# Patient Record
Sex: MALE | Race: BLACK OR AFRICAN AMERICAN | Employment: OTHER | URBAN - METROPOLITAN AREA
[De-identification: names, ages, dates, MRNs, and addresses within clinical notes are randomized per-mention and may not be internally consistent; named-entity substitution may affect disease eponyms.]

---

## 2017-06-01 PROBLEM — F17.200 TOBACCO USE DISORDER: Status: ACTIVE | Noted: 2017-06-01

## 2017-06-01 PROBLEM — I42.9 CARDIOMYOPATHY (HCC): Status: ACTIVE | Noted: 2017-06-01

## 2017-06-01 PROBLEM — I10 HYPERTENSION: Status: ACTIVE | Noted: 2017-06-01

## 2017-06-01 PROBLEM — I48.92 ATRIAL FLUTTER (HCC): Status: ACTIVE | Noted: 2017-06-01

## 2017-06-01 PROBLEM — K27.9 PEPTIC ULCER DISEASE: Status: ACTIVE | Noted: 2017-06-01

## 2017-06-01 PROBLEM — I26.99 PULMONARY EMBOLUS (HCC): Status: ACTIVE | Noted: 2017-06-01

## 2017-06-22 PROBLEM — Z86.79 HISTORY OF CARDIOMYOPATHY: Status: ACTIVE | Noted: 2017-06-22

## 2017-06-22 PROBLEM — Z86.711 HISTORY OF PULMONARY EMBOLISM: Status: ACTIVE | Noted: 2017-06-22

## 2018-07-23 ENCOUNTER — APPOINTMENT (OUTPATIENT)
Dept: GENERAL RADIOLOGY | Age: 78
DRG: 291 | End: 2018-07-23
Attending: EMERGENCY MEDICINE
Payer: MEDICARE

## 2018-07-23 ENCOUNTER — HOSPITAL ENCOUNTER (INPATIENT)
Age: 78
LOS: 6 days | Discharge: HOME | DRG: 291 | End: 2018-07-29
Attending: EMERGENCY MEDICINE | Admitting: INTERNAL MEDICINE
Payer: MEDICARE

## 2018-07-23 DIAGNOSIS — J96.21 ACUTE ON CHRONIC RESPIRATORY FAILURE WITH HYPOXIA AND HYPERCAPNIA (HCC): ICD-10-CM

## 2018-07-23 DIAGNOSIS — J44.1 COPD EXACERBATION (HCC): Primary | ICD-10-CM

## 2018-07-23 DIAGNOSIS — A31.9 MYCOBACTERIAL PNEUMONIA (HCC): ICD-10-CM

## 2018-07-23 DIAGNOSIS — J15.8 MYCOBACTERIAL PNEUMONIA (HCC): ICD-10-CM

## 2018-07-23 DIAGNOSIS — A31.0 MYCOBACTERIUM AVIUM COMPLEX (HCC): ICD-10-CM

## 2018-07-23 DIAGNOSIS — I50.43 ACUTE ON CHRONIC COMBINED SYSTOLIC AND DIASTOLIC CONGESTIVE HEART FAILURE (HCC): ICD-10-CM

## 2018-07-23 DIAGNOSIS — I50.41 ACUTE COMBINED SYSTOLIC AND DIASTOLIC CONGESTIVE HEART FAILURE (HCC): ICD-10-CM

## 2018-07-23 DIAGNOSIS — J96.22 ACUTE ON CHRONIC RESPIRATORY FAILURE WITH HYPOXIA AND HYPERCAPNIA (HCC): ICD-10-CM

## 2018-07-23 DIAGNOSIS — I50.9 ACUTE ON CHRONIC CONGESTIVE HEART FAILURE, UNSPECIFIED HEART FAILURE TYPE (HCC): ICD-10-CM

## 2018-07-23 DIAGNOSIS — I48.19 PERSISTENT ATRIAL FIBRILLATION (HCC): ICD-10-CM

## 2018-07-23 DIAGNOSIS — I50.9 ACUTE CONGESTIVE HEART FAILURE, UNSPECIFIED HEART FAILURE TYPE (HCC): ICD-10-CM

## 2018-07-23 DIAGNOSIS — J96.20 ACUTE ON CHRONIC RESPIRATORY FAILURE, UNSPECIFIED WHETHER WITH HYPOXIA OR HYPERCAPNIA (HCC): ICD-10-CM

## 2018-07-23 DIAGNOSIS — I48.91 ATRIAL FIBRILLATION, UNSPECIFIED TYPE (HCC): ICD-10-CM

## 2018-07-23 DIAGNOSIS — J44.1 CHRONIC OBSTRUCTIVE PULMONARY DISEASE WITH ACUTE EXACERBATION (HCC): ICD-10-CM

## 2018-07-23 DIAGNOSIS — I48.0 PAROXYSMAL A-FIB (HCC): ICD-10-CM

## 2018-07-23 LAB
ALBUMIN SERPL-MCNC: 2.8 G/DL (ref 3.2–4.6)
ALBUMIN/GLOB SERPL: 0.6 {RATIO} (ref 1.2–3.5)
ALP SERPL-CCNC: 110 U/L (ref 50–136)
ALT SERPL-CCNC: 62 U/L (ref 12–65)
ANION GAP SERPL CALC-SCNC: 8 MMOL/L (ref 7–16)
APTT PPP: 73.9 SEC (ref 23.2–35.3)
ARTERIAL PATENCY WRIST A: POSITIVE
AST SERPL-CCNC: 41 U/L (ref 15–37)
ATRIAL RATE: 120 BPM
ATRIAL RATE: 202 BPM
BASE EXCESS BLDA CALC-SCNC: 0.1 MMOL/L (ref 0–3)
BASOPHILS # BLD: 0 K/UL (ref 0–0.2)
BASOPHILS NFR BLD: 0 % (ref 0–2)
BDY SITE: ABNORMAL
BILIRUB SERPL-MCNC: 0.2 MG/DL (ref 0.2–1.1)
BNP SERPL-MCNC: 932 PG/ML
BUN SERPL-MCNC: 12 MG/DL (ref 8–23)
CALCIUM SERPL-MCNC: 8.5 MG/DL (ref 8.3–10.4)
CALCULATED P AXIS, ECG09: 85 DEGREES
CALCULATED R AXIS, ECG10: 70 DEGREES
CALCULATED R AXIS, ECG10: 72 DEGREES
CALCULATED T AXIS, ECG11: -147 DEGREES
CALCULATED T AXIS, ECG11: 90 DEGREES
CHLORIDE SERPL-SCNC: 108 MMOL/L (ref 98–107)
CO2 SERPL-SCNC: 28 MMOL/L (ref 21–32)
COHGB MFR BLD: 0.3 % (ref 0.5–1.5)
CREAT SERPL-MCNC: 1.45 MG/DL (ref 0.8–1.5)
DIAGNOSIS, 93000: NORMAL
DIAGNOSIS, 93000: NORMAL
DIFFERENTIAL METHOD BLD: ABNORMAL
DO-HGB BLD-MCNC: 1 % (ref 0–5)
EOSINOPHIL # BLD: 0.2 K/UL (ref 0–0.8)
EOSINOPHIL NFR BLD: 3 % (ref 0.5–7.8)
ERYTHROCYTE [DISTWIDTH] IN BLOOD BY AUTOMATED COUNT: 20 % (ref 11.9–14.6)
GAS FLOW.O2 O2 DELIVERY SYS: 15 L/MIN
GLOBULIN SER CALC-MCNC: 4.7 G/DL (ref 2.3–3.5)
GLUCOSE SERPL-MCNC: 114 MG/DL (ref 65–100)
HCO3 BLDA-SCNC: 27 MMOL/L (ref 22–26)
HCT VFR BLD AUTO: 38.4 % (ref 41.1–50.3)
HGB BLD-MCNC: 11.7 G/DL (ref 13.6–17.2)
HGB BLDMV-MCNC: 12.3 GM/DL (ref 11.7–15)
IMM GRANULOCYTES # BLD: 0 K/UL (ref 0–0.5)
IMM GRANULOCYTES NFR BLD AUTO: 0 % (ref 0–5)
LACTATE BLD-SCNC: 0.8 MMOL/L (ref 0.5–1.9)
LYMPHOCYTES # BLD: 1.7 K/UL (ref 0.5–4.6)
LYMPHOCYTES NFR BLD: 29 % (ref 13–44)
MAGNESIUM SERPL-MCNC: 2.1 MG/DL (ref 1.8–2.4)
MCH RBC QN AUTO: 24.7 PG (ref 26.1–32.9)
MCHC RBC AUTO-ENTMCNC: 30.5 G/DL (ref 31.4–35)
MCV RBC AUTO: 81 FL (ref 79.6–97.8)
METHGB MFR BLD: 0.5 % (ref 0–1.5)
MONOCYTES # BLD: 0.5 K/UL (ref 0.1–1.3)
MONOCYTES NFR BLD: 9 % (ref 4–12)
NEUTS SEG # BLD: 3.5 K/UL (ref 1.7–8.2)
NEUTS SEG NFR BLD: 59 % (ref 43–78)
OXYHGB MFR BLDA: 98.2 % (ref 94–97)
P-R INTERVAL, ECG05: 138 MS
PCO2 BLDA: 56 MMHG (ref 35–45)
PH BLDA: 7.31 [PH] (ref 7.35–7.45)
PLATELET # BLD AUTO: 353 K/UL (ref 150–450)
PMV BLD AUTO: 9.9 FL (ref 10.8–14.1)
PO2 BLDA: 209 MMHG (ref 80–105)
POTASSIUM SERPL-SCNC: 4.4 MMOL/L (ref 3.5–5.1)
PROCALCITONIN SERPL-MCNC: 0.2 NG/ML
PROT SERPL-MCNC: 7.5 G/DL (ref 6.3–8.2)
Q-T INTERVAL, ECG07: 256 MS
Q-T INTERVAL, ECG07: 304 MS
QRS DURATION, ECG06: 74 MS
QRS DURATION, ECG06: 80 MS
QTC CALCULATION (BEZET), ECG08: 405 MS
QTC CALCULATION (BEZET), ECG08: 429 MS
RBC # BLD AUTO: 4.74 M/UL (ref 4.23–5.67)
SAO2 % BLD: 99 % (ref 92–98.5)
SODIUM SERPL-SCNC: 144 MMOL/L (ref 136–145)
TROPONIN I SERPL-MCNC: 0.06 NG/ML (ref 0.02–0.05)
VENTILATION MODE VENT: ABNORMAL
VENTRICULAR RATE, ECG03: 120 BPM
VENTRICULAR RATE, ECG03: 151 BPM
WBC # BLD AUTO: 5.8 K/UL (ref 4.3–11.1)

## 2018-07-23 PROCEDURE — 80053 COMPREHEN METABOLIC PANEL: CPT | Performed by: EMERGENCY MEDICINE

## 2018-07-23 PROCEDURE — 74011250637 HC RX REV CODE- 250/637: Performed by: INTERNAL MEDICINE

## 2018-07-23 PROCEDURE — 93005 ELECTROCARDIOGRAM TRACING: CPT | Performed by: EMERGENCY MEDICINE

## 2018-07-23 PROCEDURE — 74011000250 HC RX REV CODE- 250: Performed by: INTERNAL MEDICINE

## 2018-07-23 PROCEDURE — 94644 CONT INHLJ TX 1ST HOUR: CPT

## 2018-07-23 PROCEDURE — 87040 BLOOD CULTURE FOR BACTERIA: CPT | Performed by: EMERGENCY MEDICINE

## 2018-07-23 PROCEDURE — 85730 THROMBOPLASTIN TIME PARTIAL: CPT | Performed by: INTERNAL MEDICINE

## 2018-07-23 PROCEDURE — 99285 EMERGENCY DEPT VISIT HI MDM: CPT | Performed by: EMERGENCY MEDICINE

## 2018-07-23 PROCEDURE — 94660 CPAP INITIATION&MGMT: CPT

## 2018-07-23 PROCEDURE — 99223 1ST HOSP IP/OBS HIGH 75: CPT | Performed by: INTERNAL MEDICINE

## 2018-07-23 PROCEDURE — 83880 ASSAY OF NATRIURETIC PEPTIDE: CPT | Performed by: EMERGENCY MEDICINE

## 2018-07-23 PROCEDURE — 84484 ASSAY OF TROPONIN QUANT: CPT | Performed by: INTERNAL MEDICINE

## 2018-07-23 PROCEDURE — 83735 ASSAY OF MAGNESIUM: CPT | Performed by: EMERGENCY MEDICINE

## 2018-07-23 PROCEDURE — 94640 AIRWAY INHALATION TREATMENT: CPT

## 2018-07-23 PROCEDURE — 36600 WITHDRAWAL OF ARTERIAL BLOOD: CPT

## 2018-07-23 PROCEDURE — 74011000250 HC RX REV CODE- 250: Performed by: EMERGENCY MEDICINE

## 2018-07-23 PROCEDURE — 83605 ASSAY OF LACTIC ACID: CPT

## 2018-07-23 PROCEDURE — 71045 X-RAY EXAM CHEST 1 VIEW: CPT

## 2018-07-23 PROCEDURE — 84145 PROCALCITONIN (PCT): CPT | Performed by: EMERGENCY MEDICINE

## 2018-07-23 PROCEDURE — 74011250636 HC RX REV CODE- 250/636: Performed by: INTERNAL MEDICINE

## 2018-07-23 PROCEDURE — 65660000000 HC RM CCU STEPDOWN

## 2018-07-23 PROCEDURE — 77010033678 HC OXYGEN DAILY

## 2018-07-23 PROCEDURE — 74011000258 HC RX REV CODE- 258: Performed by: INTERNAL MEDICINE

## 2018-07-23 PROCEDURE — 93005 ELECTROCARDIOGRAM TRACING: CPT | Performed by: INTERNAL MEDICINE

## 2018-07-23 PROCEDURE — 96374 THER/PROPH/DIAG INJ IV PUSH: CPT | Performed by: EMERGENCY MEDICINE

## 2018-07-23 PROCEDURE — 74011250636 HC RX REV CODE- 250/636: Performed by: EMERGENCY MEDICINE

## 2018-07-23 PROCEDURE — 82803 BLOOD GASES ANY COMBINATION: CPT

## 2018-07-23 PROCEDURE — 96375 TX/PRO/DX INJ NEW DRUG ADDON: CPT | Performed by: EMERGENCY MEDICINE

## 2018-07-23 PROCEDURE — 85025 COMPLETE CBC W/AUTO DIFF WBC: CPT | Performed by: EMERGENCY MEDICINE

## 2018-07-23 PROCEDURE — 36415 COLL VENOUS BLD VENIPUNCTURE: CPT | Performed by: INTERNAL MEDICINE

## 2018-07-23 PROCEDURE — 74011250637 HC RX REV CODE- 250/637: Performed by: EMERGENCY MEDICINE

## 2018-07-23 PROCEDURE — 94760 N-INVAS EAR/PLS OXIMETRY 1: CPT

## 2018-07-23 RX ORDER — ETHAMBUTOL HYDROCHLORIDE 400 MG/1
2000 TABLET, FILM COATED ORAL
Status: CANCELLED | OUTPATIENT
Start: 2018-07-23

## 2018-07-23 RX ORDER — AZITHROMYCIN 500 MG/1
TABLET, FILM COATED ORAL
COMMUNITY
Start: 2018-06-01 | End: 2019-03-01 | Stop reason: ALTCHOICE

## 2018-07-23 RX ORDER — HEPARIN SODIUM 5000 [USP'U]/ML
5000 INJECTION, SOLUTION INTRAVENOUS; SUBCUTANEOUS EVERY 12 HOURS
Status: CANCELLED | OUTPATIENT
Start: 2018-07-23

## 2018-07-23 RX ORDER — FUROSEMIDE 10 MG/ML
40 INJECTION INTRAMUSCULAR; INTRAVENOUS DAILY
Status: DISCONTINUED | OUTPATIENT
Start: 2018-07-24 | End: 2018-07-27

## 2018-07-23 RX ORDER — FLUTICASONE PROPIONATE AND SALMETEROL 250; 50 UG/1; UG/1
1 POWDER RESPIRATORY (INHALATION)
COMMUNITY
Start: 2018-05-15 | End: 2020-01-08

## 2018-07-23 RX ORDER — ETHAMBUTOL HYDROCHLORIDE 400 MG/1
2000 TABLET, FILM COATED ORAL
Status: DISCONTINUED | OUTPATIENT
Start: 2018-07-23 | End: 2018-07-29 | Stop reason: HOSPADM

## 2018-07-23 RX ORDER — ALLOPURINOL 300 MG/1
300 TABLET ORAL DAILY
Status: DISCONTINUED | OUTPATIENT
Start: 2018-07-24 | End: 2018-07-29 | Stop reason: HOSPADM

## 2018-07-23 RX ORDER — LEVALBUTEROL INHALATION SOLUTION 0.63 MG/3ML
0.63 SOLUTION RESPIRATORY (INHALATION)
Status: DISCONTINUED | OUTPATIENT
Start: 2018-07-23 | End: 2018-07-29 | Stop reason: HOSPADM

## 2018-07-23 RX ORDER — NITROGLYCERIN 0.4 MG/1
0.4 TABLET SUBLINGUAL
Status: DISCONTINUED | OUTPATIENT
Start: 2018-07-23 | End: 2018-07-29 | Stop reason: HOSPADM

## 2018-07-23 RX ORDER — HEPARIN SODIUM 5000 [USP'U]/ML
5000 INJECTION, SOLUTION INTRAVENOUS; SUBCUTANEOUS ONCE
Status: COMPLETED | OUTPATIENT
Start: 2018-07-23 | End: 2018-07-23

## 2018-07-23 RX ORDER — DILTIAZEM HYDROCHLORIDE 5 MG/ML
10 INJECTION INTRAVENOUS ONCE
Status: COMPLETED | OUTPATIENT
Start: 2018-07-23 | End: 2018-07-23

## 2018-07-23 RX ORDER — BUDESONIDE 0.5 MG/2ML
500 INHALANT ORAL
Status: DISCONTINUED | OUTPATIENT
Start: 2018-07-23 | End: 2018-07-29 | Stop reason: HOSPADM

## 2018-07-23 RX ORDER — BRIMONIDINE TARTRATE 2 MG/ML
1 SOLUTION/ DROPS OPHTHALMIC 3 TIMES DAILY
Status: DISCONTINUED | OUTPATIENT
Start: 2018-07-23 | End: 2018-07-29 | Stop reason: HOSPADM

## 2018-07-23 RX ORDER — DILTIAZEM HYDROCHLORIDE 60 MG/1
60 TABLET, FILM COATED ORAL
Status: DISCONTINUED | OUTPATIENT
Start: 2018-07-23 | End: 2018-07-24

## 2018-07-23 RX ORDER — ALBUTEROL SULFATE 0.83 MG/ML
2.5 SOLUTION RESPIRATORY (INHALATION)
Status: CANCELLED | OUTPATIENT
Start: 2018-07-23

## 2018-07-23 RX ORDER — ASPIRIN 325 MG
325 TABLET ORAL DAILY
Status: CANCELLED | OUTPATIENT
Start: 2018-07-24

## 2018-07-23 RX ORDER — HEPARIN SODIUM 5000 [USP'U]/ML
35 INJECTION, SOLUTION INTRAVENOUS; SUBCUTANEOUS ONCE
Status: COMPLETED | OUTPATIENT
Start: 2018-07-23 | End: 2018-07-23

## 2018-07-23 RX ORDER — AMLODIPINE BESYLATE 10 MG/1
10 TABLET ORAL DAILY
Status: DISCONTINUED | OUTPATIENT
Start: 2018-07-24 | End: 2018-07-23

## 2018-07-23 RX ORDER — RIFAMPIN 300 MG/1
600 CAPSULE ORAL
Status: DISCONTINUED | OUTPATIENT
Start: 2018-07-23 | End: 2018-07-29 | Stop reason: HOSPADM

## 2018-07-23 RX ORDER — RIFAMPIN 300 MG/1
CAPSULE ORAL
COMMUNITY
Start: 2018-06-01 | End: 2019-05-15 | Stop reason: ALTCHOICE

## 2018-07-23 RX ORDER — PANTOPRAZOLE SODIUM 40 MG/1
40 TABLET, DELAYED RELEASE ORAL
Status: DISCONTINUED | OUTPATIENT
Start: 2018-07-24 | End: 2018-07-29 | Stop reason: HOSPADM

## 2018-07-23 RX ORDER — TAMSULOSIN HYDROCHLORIDE 0.4 MG/1
0.4 CAPSULE ORAL
Status: DISCONTINUED | OUTPATIENT
Start: 2018-07-23 | End: 2018-07-29 | Stop reason: HOSPADM

## 2018-07-23 RX ORDER — LATANOPROST 50 UG/ML
1 SOLUTION/ DROPS OPHTHALMIC
Status: DISCONTINUED | OUTPATIENT
Start: 2018-07-23 | End: 2018-07-29 | Stop reason: HOSPADM

## 2018-07-23 RX ORDER — DILTIAZEM HYDROCHLORIDE 5 MG/ML
10 INJECTION INTRAVENOUS
Status: COMPLETED | OUTPATIENT
Start: 2018-07-23 | End: 2018-07-23

## 2018-07-23 RX ORDER — FUROSEMIDE 10 MG/ML
40 INJECTION INTRAMUSCULAR; INTRAVENOUS
Status: COMPLETED | OUTPATIENT
Start: 2018-07-23 | End: 2018-07-23

## 2018-07-23 RX ORDER — ASPIRIN 81 MG/1
81 TABLET ORAL DAILY
Status: DISCONTINUED | OUTPATIENT
Start: 2018-07-24 | End: 2018-07-29 | Stop reason: HOSPADM

## 2018-07-23 RX ORDER — ETHAMBUTOL HYDROCHLORIDE 400 MG/1
TABLET, FILM COATED ORAL
COMMUNITY
Start: 2018-06-01 | End: 2019-01-16

## 2018-07-23 RX ORDER — HEPARIN SODIUM 5000 [USP'U]/100ML
12-25 INJECTION, SOLUTION INTRAVENOUS
Status: DISCONTINUED | OUTPATIENT
Start: 2018-07-23 | End: 2018-07-25

## 2018-07-23 RX ORDER — ALBUTEROL SULFATE 0.83 MG/ML
10 SOLUTION RESPIRATORY (INHALATION)
Status: COMPLETED | OUTPATIENT
Start: 2018-07-23 | End: 2018-07-23

## 2018-07-23 RX ORDER — AZITHROMYCIN 250 MG/1
500 TABLET, FILM COATED ORAL
Status: DISCONTINUED | OUTPATIENT
Start: 2018-07-23 | End: 2018-07-29 | Stop reason: HOSPADM

## 2018-07-23 RX ADMIN — HEPARIN SODIUM 2800 UNITS: 5000 INJECTION, SOLUTION INTRAVENOUS; SUBCUTANEOUS at 22:02

## 2018-07-23 RX ADMIN — HEPARIN SODIUM AND DEXTROSE 12 UNITS/KG/HR: 5000; 5 INJECTION INTRAVENOUS at 15:16

## 2018-07-23 RX ADMIN — DILTIAZEM HYDROCHLORIDE 60 MG: 60 TABLET, FILM COATED ORAL at 21:34

## 2018-07-23 RX ADMIN — BRIMONIDINE TARTRATE 1 DROP: 2 SOLUTION OPHTHALMIC at 22:03

## 2018-07-23 RX ADMIN — LEVALBUTEROL HYDROCHLORIDE 0.63 MG: 0.63 SOLUTION RESPIRATORY (INHALATION) at 20:06

## 2018-07-23 RX ADMIN — METHYLPREDNISOLONE SODIUM SUCCINATE 125 MG: 125 INJECTION, POWDER, FOR SOLUTION INTRAMUSCULAR; INTRAVENOUS at 10:47

## 2018-07-23 RX ADMIN — DILTIAZEM HYDROCHLORIDE 10 MG: 5 INJECTION INTRAVENOUS at 14:26

## 2018-07-23 RX ADMIN — METHYLPREDNISOLONE SODIUM SUCCINATE 40 MG: 40 INJECTION, POWDER, FOR SOLUTION INTRAMUSCULAR; INTRAVENOUS at 17:41

## 2018-07-23 RX ADMIN — RIFAMPIN 600 MG: 300 CAPSULE ORAL at 20:40

## 2018-07-23 RX ADMIN — ETHAMBUTOL HYDROCHLORIDE 2000 MG: 400 TABLET, FILM COATED ORAL at 20:39

## 2018-07-23 RX ADMIN — BUDESONIDE 500 MCG: 0.5 INHALANT RESPIRATORY (INHALATION) at 20:06

## 2018-07-23 RX ADMIN — HEPARIN SODIUM 5000 UNITS: 5000 INJECTION, SOLUTION INTRAVENOUS; SUBCUTANEOUS at 15:11

## 2018-07-23 RX ADMIN — AZITHROMYCIN 500 MG: 250 TABLET, FILM COATED ORAL at 17:41

## 2018-07-23 RX ADMIN — ALBUTEROL SULFATE 10 MG: 2.5 SOLUTION RESPIRATORY (INHALATION) at 10:40

## 2018-07-23 RX ADMIN — TAMSULOSIN HYDROCHLORIDE 0.4 MG: 0.4 CAPSULE ORAL at 21:34

## 2018-07-23 RX ADMIN — LATANOPROST 1 DROP: 50 SOLUTION OPHTHALMIC at 22:03

## 2018-07-23 RX ADMIN — METHYLPREDNISOLONE SODIUM SUCCINATE 40 MG: 40 INJECTION, POWDER, FOR SOLUTION INTRAMUSCULAR; INTRAVENOUS at 21:34

## 2018-07-23 RX ADMIN — NITROGLYCERIN 1 INCH: 20 OINTMENT TOPICAL at 13:04

## 2018-07-23 RX ADMIN — SODIUM CHLORIDE 2.5 MG/HR: 900 INJECTION, SOLUTION INTRAVENOUS at 14:38

## 2018-07-23 RX ADMIN — DILTIAZEM HYDROCHLORIDE 10 MG: 5 INJECTION INTRAVENOUS at 11:41

## 2018-07-23 RX ADMIN — FUROSEMIDE 40 MG: 10 INJECTION, SOLUTION INTRAMUSCULAR; INTRAVENOUS at 12:24

## 2018-07-23 NOTE — PROGRESS NOTES
Patient being admitted to floor from ER  Resting  No family   Will follow    Chaz Gale, staff Felix nair [address], [address] [facility]  /   [email or web address]

## 2018-07-23 NOTE — ED PROVIDER NOTES
HPI Comments: Presents via EMS for shortness of breath. Patient reports  Shortness of breath last evening that was not resolved with his normal inhalers. He increased his oxygen this morning and still felt very short of breath. He is on 2 L all the time. EMS gave 2 duonebs and placed him on a nonrebreather which decreased his respiratory rate. Patient denies chest pain or lower extremity edema. He reports  Yellow sputum with cough. Patient is a 78 y.o. male presenting with respiratory distress syndrome. The history is provided by the patient. Respiratory Distress   This is a new problem. The problem occurs continuously. The current episode started yesterday. The problem has been rapidly worsening. Associated symptoms include cough, sputum production (yellow) and wheezing. Pertinent negatives include no fever, no hemoptysis, no chest pain and no leg swelling. It is unknown what precipitated the problem. He has tried beta-agonist inhalers and ipratropium inhalers for the symptoms. He has had prior hospitalizations. He has had prior ED visits. Associated medical issues include COPD and heart failure.         Past Medical History:   Diagnosis Date    A-fib [facility]) 5/2/2014    AAA (abdominal aortic aneurysm) without rupture ([address] 5/2/2014    3.2 on US 2/14 [facility]    Aneurysm ([address]     Arthritis     Cancer ([address]     hx prostate cancer    Chronic lung disease     COPD     COPD (chronic obstructive pulmonary disease) ([address] 5/2/2014    Elevated prostate specific antigen (PSA)     Glaucoma 5/2/2014    Heart disease     Heart failure ([address]     Hypercholesterolemia     Hyperlipemia 5/2/2014    Hypertension     Hypertrophy of prostate with urinary obstruction and other lower urinary tract symptoms (LUTS)     OA (osteoarthritis) 5/2/2014    Peptic ulcer disease 6/1/2017    Poor historian     Prostate cancer ([address]     Pulmonary embolus ([address] 6/1/2017    Supplemental oxygen dependent 5/2/2014    Unspecified disorder of prostate     Warfarin anticoagulation 5/2/2014       Past Surgical History:   Procedure Laterality Date    HX HEART CATHETERIZATION           Family History:   Problem Relation Age of Onset    Cancer Mother     Heart Disease Father        Social History     Social History    Marital status: SINGLE     Spouse name: N/A    Number of children: N/A    Years of education: N/A     Occupational History    Not on file. Social History Main Topics    Smoking status: Former Smoker     Packs/day: 2.00     Quit date: 7/26/2014    Smokeless tobacco: Never Used      Comment: still taking Chantix     Alcohol use No    Drug use: No    Sexual activity: Yes     Partners: Female     Birth control/ protection: None     Other Topics Concern    Not on file     Social History Narrative         ALLERGIES: Statins-hmg-coa reductase inhibitors    Review of Systems   Constitutional: Negative for chills and fever. Respiratory: Positive for cough, sputum production (yellow) and wheezing. Negative for hemoptysis. Cardiovascular: Negative for chest pain and leg swelling. All other systems reviewed and are negative. Vitals:    07/23/18 1017   Pulse: (!) 120   Resp: 26   Temp: 98.9 °F (37.2 °C)   SpO2: 95%   Weight: 87.1 kg (192 lb)   Height: 5' 9\" (1.753 m)            Physical Exam   Constitutional: He is oriented to person, place, and time. He appears well-developed and well-nourished. He appears distressed. HENT:   Head: Normocephalic and atraumatic. Neck: Normal range of motion. Neck supple. Cardiovascular: Normal rate and regular rhythm. Pulmonary/Chest: He is in respiratory distress. He has wheezes. He has no rales. Poor air movement and tight   Abdominal: Soft. He exhibits no distension. There is no tenderness. There is no rebound and no guarding. Musculoskeletal: Normal range of motion. He exhibits no edema or tenderness.    Neurological: He is alert and oriented to person, place, and time. No cranial nerve deficit. Coordination normal.   Skin: Skin is warm and dry. No rash noted. He is not diaphoretic. No erythema. Psychiatric: He has a normal mood and affect. His behavior is normal.   Nursing note and vitals reviewed. MDM  Number of Diagnoses or Management Options  Acute congestive heart failure, unspecified heart failure type [facility]):   Acute on chronic respiratory failure with hypoxia and hypercapnia [facility]):   Chronic obstructive pulmonary disease with acute exacerbation ([address]):   Mycobacterium avium complex [facility]):   Diagnosis management comments: When placed back on NC pt became immediately more dyspneic. His RR increased significantly. Sats stayed nl. Placed on bipap and given tx.      1 hr of Bipap-pt looks much better. Wheezing is gone, good air movement. Pt feels like he can come off bipap. CXR and BNP elevated so given lasix and NTG paste. Also given 1 dose of cardizem for PAF. 12:57 PM  Off bipap for approx 1 hour. Feels better. Admit for tx of COPD and CHF?-last echo I could find was 2011 and nl EF. D/w hospitalist.      ===================================================================  This patient is critically ill and there is a high probability of of imminent or life threatening deterioration in the patient's condition without immediate management. The nature of the patient's clinical problem is: resp failure  I have spent 45 minutes in direct patient care, documentation, review of labs/xrays/old records, discussion with Staff, Colleague, Nursing, RT . The time involved in the performance of separately reportable procedures was not counted toward critical care time.      Stevie Nava MD; 7/23/2018 @1:01 PM  ===================================================================           Amount and/or Complexity of Data Reviewed  Clinical lab tests: reviewed and ordered  Tests in the radiology section of CPT®: ordered and reviewed  Review and summarize past medical records: yes ([facility].  He is recently diagnosed with MAC. He had multiple lung nodules Bx neg for CA but culture positive for MAC.   On multiple abx currently. )  Discuss the patient with other providers: yes  Independent visualization of images, tracings, or specimens: yes (resp artifact, sinus tach, no ST elevation, CXR per my view is CHF)    Risk of Complications, Morbidity, and/or Mortality  Presenting problems: high  Diagnostic procedures: high  Management options: high    Critical Care  Total time providing critical care: 30-74 minutes    Patient Progress  Patient progress: improved        ED Course       Procedures

## 2018-07-23 NOTE — ED NOTES
Patient in room, resting in bed. Currently on NRB mask, saturation 99%. Alert and oriented, answering questions appropriately. RR around 26/min. .

## 2018-07-23 NOTE — CONSULTS
[address] Cardiology see dictation Pt has known PAF now back in sinus tach Severe COPD change to po diltiazem He is followed at [facility]

## 2018-07-23 NOTE — IP AVS SNAPSHOT
[address] 
[phone] Patient: Jay Schultz. MRN: [id number] OCF:61/[birth year] A check jagdish indicates which time of day the medication should be taken. My Medications START taking these medications Instructions Each Dose to Equal  
 Morning Noon Evening Bedtime  
 apixaban 5 mg tablet Commonly known as:  Griffin Bittinger Your next dose is:  07/29/18 PM   
   
 Take 1 Tab by mouth every twelve (12) hours for 30 days. 5 mg  
    
  
   
   
   
  
  
 furosemide 40 mg tablet Commonly known as:  LASIX Your next dose is:  07/30/18 AM  
   
 Take 1 Tab by mouth daily for 30 days. 40 mg  
    
  
   
   
   
  
 lisinopril 10 mg tablet Commonly known as:  Katya Vandana Your next dose is:  07/30/2018 AM  
   
 Take 1 Tab by mouth daily. 10 mg  
    
  
   
   
   
  
 metoprolol tartrate 50 mg tablet Commonly known as:  LOPRESSOR Your next dose is:  07/29/18 Evening Take 1 Tab by mouth two (2) times a day. 50 mg  
    
  
   
   
  
   
  
 predniSONE 10 mg tablet Commonly known as:  Nicky Mitchell Your next dose is:  07/30/18 AM 
  
   
 Take 1 Tab by mouth daily (with breakfast). 10 mg CHANGE how you take these medications Instructions Each Dose to Equal  
 Morning Noon Evening Bedtime  
 nitroglycerin 0.4 mg SL tablet Commonly known as:  NITROSTAT What changed:  Another medication with the same name was removed. Continue taking this medication, and follow the directions you see here. Notes to Patient:  Per home 1 Tab by SubLINGual route every five (5) minutes as needed for Chest Pain. 0.4 mg  
    
   
   
   
  
  
CONTINUE taking these medications Instructions Each Dose to Equal  
 Morning Noon Evening Bedtime  
 allopurinol 300 mg tablet Commonly known as:  Starr Womack Your next dose is:  07/30/18 AM  
   
 Take  by mouth daily. ALPHAGAN P 0.1 % ophthalmic solution Generic drug:  brimonidine Your next dose is:  7/29/18 Evening  
   
 every eight (8) hours. azithromycin 500 mg Tab Commonly known as:  Holly Palmer Notes to Patient:  Per home Take 1 tablet every Mon, Wed, Fri  
     
   
   
   
  
 bimatoprost 0.01 % ophthalmic drops Commonly known as:  LUMIGAN Your next dose is:  07/29/18 PM  
   
 Administer 1 Drop to both eyes every evening. 1 Drop  
    
   
   
   
  
  
 cholecalciferol (vitamin D3) 2,000 unit Tab Notes to Patient:  Per home Take  by mouth. ELIGARD SC Notes to Patient:  Per home 45 mg by SubCUTAneous route. 45 mg  
    
   
   
   
  
 ethambutol 400 mg tablet Commonly known as:  MYAMBUTOL Notes to Patient:  Per home Take 5 tabs every mon, wed, fri  
     
   
   
   
  
 fluticasone-salmeterol 250-50 mcg/dose diskus inhaler Commonly known as:  ADVAIR Notes to Patient:  Per home Take 1 Puff by inhalation. 1 Puff Iron 325 mg (65 mg iron) tablet Generic drug:  ferrous sulfate Notes to Patient:  Per home dose Take  by mouth Daily (before breakfast). omeprazole 20 mg capsule Commonly known as:  PRILOSEC Your next dose is:  07/30/18 AM  
   
 Take 20 mg by mouth daily. 20 mg OXYGEN-AIR DELIVERY SYSTEMS Notes to Patient:  Per home Use as instructed. Use as instructed. potassium chloride SR 10 mEq tablet Commonly known as:  KLOR-CON 10 Your next dose is:  07/29/18 PM  
   
 Take 20 mEq by mouth two (2) times a day. 20 mEq  
    
  
   
   
  
   
  
 rifAMPin 300 mg capsule Commonly known as:  RIFADIN Notes to Patient:  Per home Take 2 tabs every mon, wed, fri  
     
   
   
   
  
 rosuvastatin 40 mg tablet Commonly known as:  CRESTOR Your next dose is:  07/29/18 PM  
   
 Take 40 mg by mouth nightly. 40 mg  
    
   
   
  
   
  
 SPIRIVA WITH HANDIHALER 18 mcg inhalation capsule Generic drug:  tiotropium Your next dose is:  07/30/18 AM 
  
   
 Take 1 Cap by inhalation daily. 1 Cap  
    
  
   
   
   
  
 tamsulosin 0.4 mg capsule Commonly known as:  FLOMAX Notes to Patient:  Per home TAKE ONE CAPSULE BY MOUTH EVERY DAY  
     
   
   
   
  
 VENTOLIN HFA 90 mcg/actuation inhaler Generic drug:  albuterol Notes to Patient:  Per home Take  by inhalation. STOP taking these medications   
 amLODIPine 10 mg tablet Commonly known as:  NORVASC  
   
  
 aspirin 325 mg tablet Commonly known as:  ASPIRIN DYAZIDE 37.5-25 mg per capsule Generic drug:  triamterene-hydroCHLOROthiazide Where to Get Your Medications Information on where to get these meds will be given to you by the nurse or doctor. ! Ask your nurse or doctor about these medications  
  apixaban 5 mg tablet  
 furosemide 40 mg tablet  
 lisinopril 10 mg tablet  
 metoprolol tartrate 50 mg tablet  
 predniSONE 10 mg tablet

## 2018-07-23 NOTE — Clinical Note
Status[de-identified] Inpatient [101] Type of Bed: Remote Telemetry [29] Inpatient Hospitalization Certified Necessary for the Following Reasons: 3. Patient receiving treatment that can only be provided in an inpatient setting (further clarification in H&P documentation) Admitting Diagnosis: COPD exacerbation ([facility] [[id number]] Admitting Diagnosis: CHF exacerbation ([facility] [[id number]] Admitting Physician: Melissa Chin[address] [[id number]] Attending Physician: Melissa Chin[address] [[id number]] Estimated Length of Stay: 2 Midnights Discharge Plan[de-identified] Home with Office Follow-up

## 2018-07-23 NOTE — H&P
[address] [address]  HISTORY AND PHYSICAL      Rogers Camacho  MR#: [id number]  : 1940  ACCOUNT #: [de-identified]   ADMIT DATE: 2018    TIME OF ADMISSION:  2:30 p.m. CHIEF COMPLAINT:  Shortness of breath. HISTORY OF PRESENT ILLNESS:  This is a 72-year-old male patient who has a past medical history of previous tobacco use, COPD, using 3 liters of supplemental oxygen at home, hypertension, previous prostate cancer, adenomatous polyp in the colon, BPH and recently diagnosed with MAC infection who came into the emergency room with a chief complaint of shortness of breath. According to the patient, for the last 3 days he has noticed progressive shortness of breath with wheezing. He also complains of cough with some sputum production and subjective fevers. According to the patient, he has noticed some leg swelling, but he cannot specify when it developed. He has felt palpitations and according to him, he has a previous history of atrial fibrillation which comes and goes. At some point in the past he was on Coumadin, but apparently he was taken off this medication and he was just on aspirin. He has been also recently diagnosed with MAC infection and at the present time, he is receiving treatment at the [facility] system with ethambutol, Zithromax and rifampin 3 times per week. Because his shortness of breath was extremely severe, he decided to come into the emergency room. When he arrived here, his vital signs were blood pressure of 180/90, heart rate of 135, O2 saturation of 95% and a respiratory rate of 26. He was awake and alert. He had decreased breath sounds with wheezing in both lung fields and initially he had to be placed on BiPAP. His initial blood workup reported normal white blood cell count, stable hemoglobin level, normal potassium, creatinine of 1.45, which is around his baseline. His procalcitonin level was 0.2 and his BNP was 932.   An ABG was done in the emergency room and showed a pH of 7.31, pCO2 of 56, pO2 of 209 and O2 saturation of 99%. A chest x-ray was done and reported cardiomegaly with clear lung fields. An EKG was done and was found to have atrial fibrillation with rapid ventricular response. The patient received a total of 20 mg IV of Cardizem with a brief decrease in his heart rate, but he developed tachycardia, not a little bit longer after receiving the boluses. He has been started on a Cardizem drip and a heparin drip. The hospitalist team was requested to admit this patient to the hospital.    REVIEW OF SYSTEMS:  A review of 14 systems was performed and it was negative except for the findings that are reported in the HPI. PAST MEDICAL HISTORY:  1. Paroxysmal atrial fibrillation. 2.  Chronic COPD. 3.  History of aortic abdominal aneurysm. 4.  Hyperlipidemia. 5.  Hypertension. 6.  Peptic ulcer disease. 7.  History of chronic cardiomyopathy. PAST SURGICAL HISTORY:  Left heart catheterization. SOCIAL HISTORY:  Quit smoking in 2014. Denies alcohol use or illicit drug use. FAMILY HISTORY:  Positive for cancer and heart disease. ALLERGIES:  STATINS. PHYSICAL EXAMINATION:  VITAL SIGNS:  Blood pressure 190/84, heart rate of 135, respiratory rate of 17, O2 saturation of 98%. EYES:  PERRLA. EARS, NOSE,  MOUTH AND THROAT:  There is no evidence of pharyngeal erythema, edema or purulent exudate. RESPIRATORY:  Decreased breath sounds in both lung fields with diffuse wheezing and rhonchi. CARDIOVASCULAR:  Irregularly irregular rate and rhythm. Tachycardic. GASTROINTESTINAL:  Abdomen is soft and nontender with positive bowel sounds. GENITOURINARY:  Unremarkable. MUSCULOSKELETAL:  No evident leg edema. SKIN:  No evidence of active skin lesions. NEUROLOGIC:  Patient is alert and oriented with no evidence of focal weakness. PSYCHIATRIC:  Normal mood. HEMATOLOGIC, LYMPHATIC, IMMUNOLOGIC:  No evidence of active bleeding. No abnormal lymphadenopathy. LABORATORY AND IMAGING RESULTS:  White blood cell count 5.8, hemoglobin 11.7, platelet count 892. Sodium 144, potassium 4.4, anion gap 8, BUN 12, creatinine 1.45, calcium 8.5, total bilirubin 0.2. Procalcitonin 0.2. . ABG:  The pH is 7.31, pCO2 56, pO2 209, bicarbonate 27, O2 saturation 99%. Chest x-ray seen and analyzed by me, cardiomegaly, no evidence of lung infiltrates. EKG seen and analyzed by me, atrial fibrillation with rapid ventricular response. ASSESSMENT:  This is a 57-year-old male patient who came into the emergency room with severe COPD exacerbation and possible CHF exacerbation. He is at high risk of further complications. He is going to be admitted to the hospital and his length of stay is calculated to be more than 2 midnights. PLAN:  1. COPD exacerbation. The patient was briefly placed on BiPAP in the emergency room, but he is not needing it at the time. He will receive nebulizations with Xopenex and budesonide. He will also be treated with IV Solu-Medrol 40 mg IV q.8 hours. He is actively receiving treatment for Mycobacterium avium complex and I will continue using the regular dose of his antibiotics. Pulmonary has been consulted. 3.  Atrial fibrillation with rapid ventricular response. The patient received a total of 20 mg of IV Cardizem in the emergency room with persistent tachycardia. He has been started on a Cardizem drip. He has been started on a heparin drip. An echocardiogram has been ordered. Cardiology has been consulted and he has been admitted to the telemetry floor. 4.  Possible CHF exacerbation. The patient has an elevated BNP and has cardiomegaly in his chest x-ray. The chest x-ray is not very suggestive of pulmonary edema and he has no important peripheral edema. He already received 1 dose of 40 mg of Lasix in the emergency room. I will continue using Lasix, but will not receive the next dose until tomorrow. Echocardiogram has been ordered. At least 1 set of cardiac biomarkers has been ordered. 5.  MAC infection. I will continue using his regular dose of antibiotics. 6.  History of abdominal aortic aneurysm. No need for acute intervention at this time. Because he has a large aneurysm, long-term anticoagulation should be discussed with the cardiology group. 7.  Previous history of tobacco abuse. Not using tobacco anymore. 8.  History of prostate cancer and BPH. I will continue using his regular dose of Flomax. 9.  DVT, started on a heparin drip.       MD JENNIFER Gloria/  D: 07/23/2018 14:50     T: 07/23/2018 15:27  JOB #: [id number]

## 2018-07-23 NOTE — PROGRESS NOTES
TRANSFER - IN REPORT:    [facility] [facility] [employer]. being received from ED for routine progression of care      Report consisted of patients Situation, Background, Assessment and Recommendations(SBAR). Information from the following report(s) SBAR was reviewed with the receiving nurse. Opportunity for questions and clarification was provided. Assessment completed upon patients arrival to unit and care assumed. Patient arrived to room 309, heart monitor applied, ST noted on monitor with frequent PVCs noted. Pt oriented to room, educated on call bell, instructed to call for assistance OOB. Dual skin assessment completed with secondary RN - skin intact, no breakdown noted.

## 2018-07-23 NOTE — ED TRIAGE NOTES
Arrived by EMS. Hx COPD. Dyspniec since last night. O2 95% on 4L home O2. Given 2 duonebs, RR in 50's. Decreased to 20's after treatment during transport. Denies fever/cough. Currently on NRB mask. Alert and oriented.

## 2018-07-23 NOTE — PROGRESS NOTES
TRANSFER - IN REPORT:    Verbal report received from Jeremy Patton, [facility] (name) on [facility].  being received from ED (unit) for routine progression of care      Report consisted of patients Situation, Background, Assessment and   Recommendations(SBAR). Information from the following report(s) SBAR and Kardex was reviewed with the receiving nurse. Opportunity for questions and clarification was provided. Assessment completed upon patients arrival to unit and care assumed. SBAR given to primary receiving RN, Mikaela Hunt.

## 2018-07-23 NOTE — CONSULTS
[address]    Bárbara Strickland  MR#: [id number]  : 1940  ACCOUNT #: [de-identified]   DATE OF SERVICE: 2018    REASON FOR CONSULTATION:  Dr. Bud Isidro requested consultation for atrial fib. HISTORY OF PRESENT ILLNESS:  The patient is a 19-year-old -American male who presents with progressive increasing shortness of breath and exacerbation of severe chronic obstructive pulmonary disease. He is normally followed at [facility] the pulmonary clinic and medicine clinic. He had history of atrial fib in the past he states. He has not been on any antiarrhythmic drug. He came here this time with increasing shortness of breath. He has had these lung lesions and is followed by [facility] in pulmonary clinic by Dr. Ainsley López. 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 He became more and more short of breath, came here was initially in atrial fib, rate 150, given Cardizem drip and it looks like he has converted back to a sinus tachycardia. His white counts were normal.  He has noted increased shortness of breath and wheezing. Denies any significant swelling. Denies any chest pain. He has, I believe, had a heart catheterization in our group years ago with no major coronary disease. He has had no fever. PAST MEDICAL HISTORY:  Noted for PAF, COPD, severe of a small abdominal aneurysm like 4.4, hyperlipidemia, hypertension, peptic ulcer disease. There is a possibility of a cardiomyopathy in the past.    PAST SURGICAL HISTORY:  Does not drink. He quit smoking about  but smoked for over 40 years. FAMILY HISTORY:  Noted for heart disease and breast cancer. MEDICATIONS:  He is on Norvasc at home 10 mg per day for blood pressure.   He is on rifampin, the ethambutol, Zithromax, nitro p.r.n., potassium chloride, aspirin, eyedrops, Prilosec, Crestor 40, iron sulfate oxygen delivery system and he says he is on 4 L, Flomax every day. REVIEW OF SYSTEMS:  He has had no major weight change. No strokes. He has had no fever. He has had shortness of breath, wheezing. Denies chest pain but reports palpitations. Denies abdominal pain or melena. Denies swelling. PHYSICAL EXAMINATION:  GENERAL:  Currently, he is alert and walking around the room. VITAL SIGNS:  His heart rate is about  but it is sinus tachycardia at this time. His blood pressures have all been running high at 165/115. His temperature has been 97.9. NECK:  Supple. LUNGS:  Showed diffuse wheezing. CARDIOVASCULAR:  Distant sounds with regular rate and tachycardic. No definite gallop or murmur. ABDOMEN:  Soft. EXTREMITIES:  Show no major edema. NEUROLOGIC:  Alert and oriented. LABORATORY DATA:  EKG with 1 atrial fib rapid rate noted. His subsequent EKG shows a sinus tachycardia. Clearly T waves are present. Nonspecific ST-T wave change. His lab white count is 5.8, hemoglobin is 11.7. Sodium 140, potassium 4.4, creatinine 1.45, AST 41, ALT 62. Procalcitonin 0.2. Troponin 0.06. , pH 7.31, pCO2 56, pO2 209. Chest x-ray portable shows cardiomegaly, clear lung fields. IMPRESSION:    1. Paroxysmal atrial fibrillation. He has had this in the past treated at [facility]. He had a brief with the days back in a sinus tachycardia, currently. We can switch the Norvasc around, switch it to p.o. Cardizem for rate control. Not a great candidate with his lungs for amiodarone long-term. There is not a lot of good choices here. He will need a blood thinner in the long run, I think for that. He is on a heparin drip at this time. We need to consider a novel anticoagulant over time. This AFib is exacerbated by his severe lung disease. 2.  Severe chronic obstructive pulmonary disease exacerbation. He has got MAC lung infection it has been treated. This is predominant symptoms. 3.  History of cardiomyopathy. I do not have any details of that exactly. We will try to obtain an echo here. All of his records look like they have been predominantly at [facility]. We will follow with you. Thank you for this consultation.       MD PASQUALE Carey / MARIETTA  D: 07/23/2018 19:16     T: 07/23/2018 19:50  JOB #: [id number]

## 2018-07-23 NOTE — ED NOTES
Pt taken off of bipap and switched to 4L nasal cannula per Dr. Dixon Denson. Pt is tolerating the nasal cannula, was informed to let nurse know if he feels any different or if he feels like he has increased work of breathing. This nurse will check his resp rate.  Pt has call bell within reach

## 2018-07-23 NOTE — PROGRESS NOTES
Bedside and Verbal shift change report given to [address] [place] [address] Shakir RN (oncoming nurse) by self (offgoing nurse). Report included the following information SBAR, Kardex, MAR and Recent Results. Heparin gtt and Cardizem verified per [place] - VS placed in chart.

## 2018-07-23 NOTE — CONSULTS
CONSULT NOTE Carroll Shah. 
 
7/23/2018 Date of Admission:  7/23/2018 The patient's chart is reviewed and the patient is discussed with the staff. 47-year-old AA male patient who has a past medical history of previous tobacco use, COPD, using 3 liters of supplemental oxygen at home, hypertension, previous prostate cancer, adenomatous polyp in the colon, BPH and recently diagnosed with MAC infection who came into the emergency room with a chief complaint of shortness of breath. 
  
According to the patient, for the last 3 days he has noticed progressive shortness of breath with wheezing. He also complains of cough with some sputum production and subjective fevers. According to the patient, he has noticed some leg swelling, but he cannot specify when it developed. He has felt palpitations and according to him, he has a previous history of atrial fibrillation which comes and goes. At some point in the past he was on Coumadin, but apparently he was taken off this medication and he was just on aspirin. He has been also recently diagnosed with MAC infection and at the present time, he is receiving treatment at the [facility] with ethambutol, Zithromax and rifampin 3 times per week. Because his shortness of breath was extremely severe, he decided to come into the emergency room. When he arrived here, his vital signs were blood pressure of 180/90, heart rate of 135, O2 saturation of 95% and a respiratory rate of 26. He was awake and alert. He had decreased breath sounds with wheezing in both lung fields and initially he had to be placed on BiPAP. His initial blood workup reported normal white blood cell count, stable hemoglobin level, normal potassium, creatinine of 1.45, which is around his baseline. His procalcitonin level was 0.2 and his BNP was 932. An ABG was done in the emergency room and showed a pH of 7.31, pCO2 of 56, pO2 of 209 and O2 saturation of 99%.   A chest x-ray was done and reported cardiomegaly with clear lung fields. An EKG was done and was found to have atrial fibrillation with rapid ventricular response. The patient received a total of 20 mg IV of Cardizem with a brief decrease in his heart rate, but he developed tachycardia, not a little bit longer after receiving the boluses. He has been started on a Cardizem drip and a heparin drip Subjective: We are asked to see patient for COPD exacerbation As noted above patient was recently started on triple ABX Rx with ETB/Zmax and Rif and is on albuterol,  advair  and spiriva for COPD- he is per history GOLD stage IV with O2 dependence at 3L/min. He has been seen by Dr Almetta Apgar [facility] pulmonology - last available note is from 18: 
PLAN;\"Santiago Alexis. is a 78 y.o. male who returns for follow-up of COPD, chronic hypoxemic respiratory failure, and pulmonary nodule. His most recent CT scan of the chest on March 15, 2018 confirm stability of the previously followed pulmonary nodular densities but also revealed 3 new nodules in the left upper lobe with the largest measuring 10 mm x 9 mm, noncalcified, with mild spiculation.  We then proceeded to PET/CT imaging on 2018 which confirmed hypermetabolic uptake in the newly identified left upper lobe pulmonary nodules as well as in the left hilar tissue and a level 4L lymph node. He underwent bronchoscopy with BAL of the left upper lobe and endobronchial ultrasound-guided needle biopsy of the level 4L and level 11 L lymph nodes. All specimens were negative for malignancy and AFB, fungal, and respiratory cultures and stains are all negative to date. He then underwent CT-guided needle biopsy of the dominant left upper lobe nodule on May 30, 2018 which revealed no malignant cells with AFB stains positive and cultures ultimately confirming Mycobacterium avium complex. Sensitivities are pending.  He reports his moderate severity shortness of breath which is worse with exertion and better with rest that has been going on for several years with no other associated signs or symptoms at present. He is compliant with Advair, Spiriva, albuterol, and supplemental oxygen. PLAN: 
Pulmonary nodule CT-guided needle biopsy negative. Stain and culture positive for MAC. We will treat with base otherwise, rifampin, and ethambutol and repeat CT in 8 weeks to confirm stability/improvement\" He already feels better since his admission and Rx. Review of Systems Pertinent items are noted in HPI. Patient Active Problem List  
Diagnosis Code  AAA (abdominal aortic aneurysm) without rupture (HCC) I71.4  
 COPD (chronic obstructive pulmonary disease) (HCC) J44.9  Hyperlipemia E78.5  Glaucoma H40.9  Supplemental oxygen dependent Z99.81  
 OA (osteoarthritis) M19.90  Paroxysmal atrial fibrillation (HCC) I48.0  Warfarin anticoagulation Z79.01  
 Renal cysts, acquired, bilateral N28.1  Tobacco use disorder F17.200  Hypertension I10  
 Pulmonary embolus ([facility]) I26.99  
 Cardiomyopathy ([facility] I42.9  Atrial flutter ([facility]) I48.92  
 Peptic ulcer disease K27.9  History of cardiomyopathy Z86.79  
 History of pulmonary embolism Z86.711  
 COPD exacerbation (HCC) J44.1  CHF exacerbation ([facility]) I50.9  CHF (congestive heart failure) ([facility]) I50.9  A-fib ([facility] I48.91 Prior to Admission Medications Prescriptions Last Dose Informant Patient Reported? Taking? LEUPROLIDE ACETATE (ELIGARD SC)   Yes No  
Si mg by SubCUTAneous route. OXYGEN-AIR DELIVERY SYSTEMS   Yes No  
Sig: Use as instructed. Use as instructed. albuterol (VENTOLIN HFA) 90 mcg/actuation inhaler   Yes No  
Sig: Take  by inhalation. allopurinol (ZYLOPRIM) 300 mg tablet   Yes No  
Sig: Take  by mouth daily. amLODIPine (NORVASC) 10 mg tablet   Yes No  
Sig: Take  by mouth daily. aspirin (ASPIRIN) 325 mg tablet   Yes No  
Sig: Take 325 mg by mouth daily.   
azithromycin (ZITHROMAX) 500 mg tab   Yes Yes Sig: Take 1 tablet every Mon, Wed, Fri  
bimatoprost (LUMIGAN) 0.01 % ophthalmic drops   Yes No  
Sig: Administer 1 Drop to both eyes every evening. brimonidine (ALPHAGAN P) 0.1 % ophthalmic solution   Yes No  
Sig: every eight (8) hours. cholecalciferol, vitamin D3, 2,000 unit tab   Yes No  
Sig: Take  by mouth.  
ethambutol (MYAMBUTOL) 400 mg tablet   Yes Yes Sig: Take 5 tabs every mon, wed, fri  
ferrous sulfate (IRON) 325 mg (65 mg iron) tablet   Yes No  
Sig: Take  by mouth Daily (before breakfast). fluticasone-salmeterol (ADVAIR) 250-50 mcg/dose diskus inhaler   Yes Yes Sig: Take 1 Puff by inhalation. nitroglycerin (NITROSTAT) 0.4 mg SL tablet   No No  
Si Tab by SubLINGual route every five (5) minutes as needed for Chest Pain. nitroglycerin (NITROSTAT) 0.4 mg SL tablet   No No  
Si Tab by SubLINGual route every five (5) minutes as needed for Chest Pain. omeprazole (PRILOSEC) 20 mg capsule   Yes No  
Sig: Take 20 mg by mouth daily. potassium chloride SR (KLOR-CON 10) 10 mEq tablet   Yes No  
Sig: Take 20 mEq by mouth two (2) times a day. rifAMPin (RIFADIN) 300 mg capsule   Yes Yes Sig: Take 2 tabs every mon, wed, fri  
rosuvastatin (CRESTOR) 40 mg tablet   Yes No  
Sig: Take 40 mg by mouth nightly. tamsulosin (FLOMAX) 0.4 mg capsule   No No  
Sig: TAKE ONE CAPSULE BY MOUTH EVERY DAY  
tiotropium (SPIRIVA WITH HANDIHALER) 18 mcg inhalation capsule   Yes No  
Sig: Take 1 Cap by inhalation daily. triamterene-hydrochlorothiazide (DYAZIDE) 37.5-25 mg per capsule   Yes No  
Sig: Take  by mouth daily. Facility-Administered Medications: None Past Medical History:  
Diagnosis Date  A-fib ([facility] 2014  AAA (abdominal aortic aneurysm) without rupture ([facility] 2014  
 3.2 on US  [facility]  Aneurysm ([address]  Arthritis  Cancer ([address]   
 hx prostate cancer  Chronic lung disease  COPD   
 COPD (chronic obstructive pulmonary disease) ([facility] 5/2/2014  Elevated prostate specific antigen (PSA)  Glaucoma 5/2/2014  Heart disease  Heart failure ([facility]  Hypercholesterolemia  Hyperlipemia 5/2/2014  Hypertension  Hypertrophy of prostate with urinary obstruction and other lower urinary tract symptoms (LUTS)  OA (osteoarthritis) 5/2/2014  Peptic ulcer disease 6/1/2017  Poor historian  Prostate cancer ([facility]  Pulmonary embolus ([facility] 6/1/2017  Supplemental oxygen dependent 5/2/2014  Unspecified disorder of prostate  Warfarin anticoagulation 5/2/2014 Past Surgical History:  
Procedure Laterality Date  HX HEART CATHETERIZATION Social History Social History  Marital status: SINGLE Spouse name: N/A  
 Number of children: N/A  
 Years of education: N/A Occupational History  Not on file. Social History Main Topics  Smoking status: Former Smoker Packs/day: 2.00 Years: 40.00 Quit date: 7/26/2014  Smokeless tobacco: Never Used Comment: still taking Chantix  Alcohol use No  
 Drug use: No  
 Sexual activity: Yes  
  Partners: Female Birth control/ protection: None Other Topics Concern  Not on file Social History Narrative Family History Problem Relation Age of Onset  Cancer Mother  Heart Disease Father Allergies Allergen Reactions  Statins-Hmg-Coa Reductase Inhibitors Myalgia Muscle pain Current Facility-Administered Medications Medication Dose Route Frequency  budesonide (PULMICORT) 500 mcg/2 ml nebulizer suspension  500 mcg Nebulization BID RT  
 [START ON 7/24/2018] allopurinol (ZYLOPRIM) tablet 300 mg  300 mg Oral DAILY  azithromycin (ZITHROMAX) tablet 500 mg  500 mg Oral Q MON, WED & FRI  latanoprost (XALATAN) 0.005 % ophthalmic solution 1 Drop  1 Drop Both Eyes QHS  brimonidine (ALPHAGAN) 0.2 % ophthalmic solution 1 Drop  1 Drop Both Eyes TID  ethambutol (MYAMBUTOL) tablet 2,000 mg  2,000 mg Oral Q MON, WED & FRI  nitroglycerin (NITROSTAT) tablet 0.4 mg  0.4 mg SubLINGual Q5MIN PRN  
 [START ON 7/24/2018] pantoprazole (PROTONIX) tablet 40 mg  40 mg Oral ACB  rifAMPin (RIFADIN) capsule 600 mg  600 mg Oral Q MON, WED & FRI  tamsulosin (FLOMAX) capsule 0.4 mg  0.4 mg Oral QHS  [START ON 7/24/2018] tiotropium (SPIRIVA) inhalation capsule 18 mcg  1 Cap Inhalation DAILY  [START ON 7/24/2018] furosemide (LASIX) injection 40 mg  40 mg IntraVENous DAILY  levalbuterol (XOPENEX) nebulizer soln 0.63 mg/3 mL  0.63 mg Nebulization Q6H RT  
 methylPREDNISolone (PF) (SOLU-MEDROL) injection 40 mg  40 mg IntraVENous Q8H  
 heparin 25,000 units in dextrose 500 mL infusion  12-25 Units/kg/hr IntraVENous TITRATE  [START ON 7/24/2018] aspirin delayed-release tablet 81 mg  81 mg Oral DAILY  dilTIAZem (CARDIZEM) IR tablet 60 mg  60 mg Oral AC&HS Objective:  
 
Vitals:  
 07/23/18 1515 07/23/18 1529 07/23/18 1704 07/23/18 1705 BP: 156/88 171/81 (!) 171/98 (!) 165/115 Pulse: (!) 103 99 96 (!) 101 Resp: 21 21 20 Temp:   97.9 °F (36.6 °C) SpO2: 97% 99% 99% Weight:   177 lb 9.6 oz (80.6 kg) Height:   5' 9\" (1.753 m) PHYSICAL EXAM  
 
Gen:  the patient is well developed and in no acute distress on O2 via NC 
HEENT:  Sclera clear, pupils equal, oral mucosa moist 
Lungs: CTA at time of examination Heart:  RRR without M,G,R 
Abd: soft and non-tender; with positive bowel sounds. Ext: warm without cyanosis. There is 1+ lower leg edema. Skin:  no jaundice or rashes, no wounds Neuro: no gross neuro deficits Psychiatric:  alert and oriented x 3 Chest X-ray:   
 
 
Recent Labs  
   07/23/18 
 1041 WBC  5.8 HGB  11.7* HCT  38.4* PLT  353 Recent Labs  
   07/23/18 
 1041 NA  144  
K  4.4  
CL  108* GLU  114* CO2  28 BUN  12  
CREA  1.45  
MG  2.1 CA  8.5 ALB  2.8* SGOT  41* Recent Labs  
   07/23/18 
 1040 PH  7.31* PCO2  56* PO2  209* HCO3  27* Assessment:  (Medical Decision Making) Patient Active Problem List  
Diagnosis Code  AAA (abdominal aortic aneurysm) without rupture (HCC) I71.4  
 COPD (chronic obstructive pulmonary disease) (HCC) J44.9  Hyperlipemia E78.5  Glaucoma H40.9  Supplemental oxygen dependent Z99.81  
 OA (osteoarthritis) M19.90  Paroxysmal atrial fibrillation (HCC) I48.0  Warfarin anticoagulation Z79.01  
 Renal cysts, acquired, bilateral N28.1  Tobacco use disorder F17.200  Hypertension I10  
 Pulmonary embolus (HCC) I26.99  
 Cardiomyopathy ([address] I42.9  Atrial flutter (HCC) I48.92  
 Peptic ulcer disease K27.9  History of cardiomyopathy Z86.79  
 History of pulmonary embolism Z86.711  
 COPD exacerbation (HCC) J44.1  CHF exacerbation (HCC) I50.9  CHF (congestive heart failure) (HCC) I50.9  A-fib ([facility] I48.91 Plan:  (Medical Decision Making) Hospital Problems  Date Reviewed: 2/8/2018 Codes Class Noted POA  
 COPD exacerbation ([address] ICD-10-CM: J44.1 ICD-9-CM: 491.21  7/23/2018 Unknown Agree with current management - he is already improving- once improvement assured taper steroids. CHF exacerbation (HCC) ICD-10-CM: I50.9 ICD-9-CM: 428.0  7/23/2018 Unknown Made worse by AFIB - now rate controlled A-fib [facility]) ICD-10-CM: I48.91 
ICD-9-CM: 427.31  7/23/2018 Unknown On heparin, avoid amiodarone if possible due to Rx with zithromax and underlying respiratory failure History of cardiomyopathy ICD-10-CM: Z86.79 
ICD-9-CM: V12.59  6/22/2017 Yes Peptic ulcer disease ICD-10-CM: K27.9 ICD-9-CM: 533.90  6/1/2017 Yes  
   
 AAA (abdominal aortic aneurysm) without rupture (HCC) (Chronic) ICD-10-CM: I71.4 ICD-9-CM: 441.4  5/2/2014 Yes Overview Signed 5/2/2014 10:53 AM by Cruz Dockery NP  
  3.2 on US 2/14 [facility] Hyperlipemia (Chronic) ICD-10-CM: J16.7 ICD-9-CM: 272.4  5/2/2014 Yes Thank you very much for this referral.  We appreciate the opportunity to participate in this patient's care. Will follow along with above stated plan.  
 
Roe Villalta MD

## 2018-07-23 NOTE — IP AVS SNAPSHOT
[address] [facility], [place], operated by [facility] 
 
 
 [address] 
[phone] Patient: Rhys Retana. 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   
 Yuri Quach MD In 2 days  [address] [facility] [address] 
[phone] Hailey Lopez MD In 2 weeks  [address] [facility] [address] [facility] [address] 
[phone] Your Scheduled Appointments Thursday August 30, 2018  9:00 AM EDT  
BLOOD DRAW with AEY65 BLOOD DRAW [facility] 52 ([facility]OLOG[facility]) [address] [facility] [address]  
[phone] Thursday September 06, 2018 10:45 AM EDT Office Visit with Daina Camp MD  
[facility] 48 ([facility] [place] [address]) [address]  
[phone] Discharge Orders None A check jagdish indicates which time of day the medication should be taken. My Medications START taking these medications Instructions Each Dose to Equal  
 Morning Noon Evening Bedtime  
 apixaban 5 mg tablet Commonly known as:  Feliciana Spatz Your next dose is:  07/29/18 PM   
   
 Take 1 Tab by mouth every twelve (12) hours for 30 days. 5 mg furosemide 40 mg tablet Commonly known as:  LASIX Your next dose is:  07/30/18 AM  
   
 Take 1 Tab by mouth daily for 30 days. 40 mg  
    
  
   
   
   
  
 lisinopril 10 mg tablet Commonly known as:  Fifi Reasons Your next dose is:  07/30/2018 AM  
   
 Take 1 Tab by mouth daily. 10 mg  
    
  
   
   
   
  
 metoprolol tartrate 50 mg tablet Commonly known as:  LOPRESSOR Your next dose is:  07/29/18 Evening Take 1 Tab by mouth two (2) times a day. 50 mg  
    
  
   
   
  
   
  
 predniSONE 10 mg tablet Commonly known as:  Miguel Lever Your next dose is:  07/30/18 AM 
  
   
 Take 1 Tab by mouth daily (with breakfast). 10 mg CHANGE how you take these medications Instructions Each Dose to Equal  
 Morning Noon Evening Bedtime  
 nitroglycerin 0.4 mg SL tablet Commonly known as:  NITROSTAT What changed:  Another medication with the same name was removed. Continue taking this medication, and follow the directions you see here. Notes to Patient:  Per home 1 Tab by SubLINGual route every five (5) minutes as needed for Chest Pain. 0.4 mg  
    
   
   
   
  
  
CONTINUE taking these medications Instructions Each Dose to Equal  
 Morning Noon Evening Bedtime  
 allopurinol 300 mg tablet Commonly known as:  Kyler Gonzalez Your next dose is:  07/30/18 AM  
   
 Take  by mouth daily. ALPHAGAN P 0.1 % ophthalmic solution Generic drug:  brimonidine Your next dose is:  7/29/18 Evening  
   
 every eight (8) hours. azithromycin 500 mg Tab Commonly known as:  Holly Mcdonough Notes to Patient:  Per home Take 1 tablet every Mon, Wed, Fri  
     
   
   
   
  
 bimatoprost 0.01 % ophthalmic drops Commonly known as:  LUMIGAN Your next dose is:  07/29/18 PM  
   
 Administer 1 Drop to both eyes every evening. 1 Drop cholecalciferol (vitamin D3) 2,000 unit Tab Notes to Patient:  Per home Take  by mouth. ELIGARD SC Notes to Patient:  Per home 45 mg by SubCUTAneous route. 45 mg  
    
   
   
   
  
 ethambutol 400 mg tablet Commonly known as:  MYAMBUTOL Notes to Patient:  Per home Take 5 tabs every mon, wed, fri  
     
   
   
   
  
 fluticasone-salmeterol 250-50 mcg/dose diskus inhaler Commonly known as:  ADVAIR Notes to Patient:  Per home Take 1 Puff by inhalation. 1 Puff Iron 325 mg (65 mg iron) tablet Generic drug:  ferrous sulfate Notes to Patient:  Per home dose Take  by mouth Daily (before breakfast). omeprazole 20 mg capsule Commonly known as:  PRILOSEC Your next dose is:  07/30/18 AM  
   
 Take 20 mg by mouth daily. 20 mg OXYGEN-AIR DELIVERY SYSTEMS Notes to Patient:  Per home Use as instructed. Use as instructed. potassium chloride SR 10 mEq tablet Commonly known as:  KLOR-CON 10 Your next dose is:  07/29/18 PM  
   
 Take 20 mEq by mouth two (2) times a day. 20 mEq  
    
  
   
   
  
   
  
 rifAMPin 300 mg capsule Commonly known as:  RIFADIN Notes to Patient:  Per home Take 2 tabs every mon, wed, fri  
     
   
   
   
  
 rosuvastatin 40 mg tablet Commonly known as:  CRESTOR Your next dose is:  07/29/18 PM  
   
 Take 40 mg by mouth nightly. 40 mg  
    
   
   
  
   
  
 SPIRIVA WITH HANDIHALER 18 mcg inhalation capsule Generic drug:  tiotropium Your next dose is:  07/30/18 AM 
  
   
 Take 1 Cap by inhalation daily. 1 Cap  
    
  
   
   
   
  
 tamsulosin 0.4 mg capsule Commonly known as:  FLOMAX Notes to Patient:  Per home TAKE ONE CAPSULE BY MOUTH EVERY DAY  
     
   
   
   
  
 VENTOLIN HFA 90 mcg/actuation inhaler Generic drug:  albuterol Notes to Patient:  Per home Take  by inhalation. STOP taking these medications   
 amLODIPine 10 mg tablet Commonly known as:  NORVASC  
   
  
 aspirin 325 mg tablet Commonly known as:  ASPIRIN DYAZIDE 37.5-25 mg per capsule Generic drug:  triamterene-hydroCHLOROthiazide Where to Get Your Medications Information on where to get these meds will be given to you by the nurse or doctor. ! Ask your nurse or doctor about these medications  
  apixaban 5 mg tablet  
 furosemide 40 mg tablet  
 lisinopril 10 mg tablet  
 metoprolol tartrate 50 mg tablet  
 predniSONE 10 mg tablet Discharge Instructions Renato Smith Chronic Obstructive Pulmonary Disease (COPD): Care Instructions Your Care Instructions Chronic obstructive pulmonary disease (COPD) is a general term for a group of lung diseases, including emphysema and chronic bronchitis. People with COPD have decreased airflow in and out of the lungs, which makes it hard to breathe. The airways also can get clogged with thick mucus. Cigarette smoking is a major cause of COPD. Although there is no cure for COPD, you can slow its progress. Following your treatment plan and taking care of yourself can help you feel better and live longer. Follow-up care is a key part of your treatment and safety. Be sure to make and go to all appointments, and call your doctor if you are having problems. It's also a good idea to know your test results and keep a list of the medicines you take. How can you care for yourself at home? 
 Staying healthy 
  · Do not smoke. This is the most important step you can take to prevent more damage to your lungs. If you need help quitting, talk to your doctor about stop-smoking programs and medicines. These can increase your chances of quitting for good.  
  · Avoid colds and flu. Get a pneumococcal vaccine shot.  If you have had one before, ask your doctor whether you need a second dose. Get the flu vaccine every fall. If you must be around people with colds or the flu, wash your hands often.  
  · Avoid secondhand smoke, air pollution, and high altitudes. Also avoid cold, dry air and hot, humid air. Stay at home with your windows closed when air pollution is bad.  
 Medicines and oxygen therapy 
  · Take your medicines exactly as prescribed. Call your doctor if you think you are having a problem with your medicine.  
  · You may be taking medicines such as: ¨ Bronchodilators. These help open your airways and make breathing easier. Bronchodilators are either short-acting (work for 6 to 9 hours) or long-acting (work for 24 hours). You inhale most bronchodilators, so they start to act quickly. Always carry your quick-relief inhaler with you in case you need it while you are away from home. ¨ Corticosteroids (prednisone, budesonide). These reduce airway inflammation. They come in pill or inhaled form. You must take these medicines every day for them to work well.  
  · A spacer may help you get more inhaled medicine to your lungs. Ask your doctor or pharmacist if a spacer is right for you. If it is, ask how to use it properly.  
  · Do not take any vitamins, over-the-counter medicine, or herbal products without talking to your doctor first.  
  · If your doctor prescribed antibiotics, take them as directed. Do not stop taking them just because you feel better. You need to take the full course of antibiotics.  
  · Oxygen therapy boosts the amount of oxygen in your blood and helps you breathe easier. Use the flow rate your doctor has recommended, and do not change it without talking to your doctor first.  
Activity 
  · Get regular exercise. Walking is an easy way to get exercise. Start out slowly, and walk a little more each day.  
  · Pay attention to your breathing. You are exercising too hard if you cannot talk while you are exercising.   · Take short rest breaks when doing household chores and other activities.  
  · Learn breathing methods-such as breathing through pursed lips-to help you become less short of breath.  
  · If your doctor has not set you up with a pulmonary rehabilitation program, talk to him or her about whether rehab is right for you. Rehab includes exercise programs, education about your disease and how to manage it, help with diet and other changes, and emotional support. Diet 
  · Eat regular, healthy meals. Use bronchodilators about 1 hour before you eat to make it easier to eat. Eat several small meals instead of three large ones. Drink beverages at the end of the meal. Avoid foods that are hard to chew.  
  · Eat foods that contain protein so that you do not lose muscle mass.  
  · Talk with your doctor if you gain too much weight or if you lose weight without trying.  
 Mental health 
  · Talk to your family, friends, or a therapist about your feelings. It is normal to feel frightened, angry, hopeless, helpless, and even guilty. Talking openly about bad feelings can help you cope. If these feelings last, talk to your doctor. When should you call for help? Call 911 anytime you think you may need emergency care. For example, call if: 
  · You have severe trouble breathing.  
 Call your doctor now or seek immediate medical care if: 
  · You have new or worse trouble breathing.  
  · You cough up blood.  
  · You have a fever.  
 Watch closely for changes in your health, and be sure to contact your doctor if: 
  · You cough more deeply or more often, especially if you notice more mucus or a change in the color of your mucus.  
  · You have new or worse swelling in your legs or belly.  
  · You are not getting better as expected. Where can you learn more? Go to http://airam-shanelle.info/.  
Enter X342 in the search box to learn more about \"Chronic Obstructive Pulmonary Disease (COPD): Care Instructions. \" Current as of: December 6, 2017 Content Version: 11.7 © 8954-4695 [employer]. Care instructions adapted under license by [employer] (which disclaims liability or warranty for this information). If you have questions about a medical condition or this instruction, always ask your healthcare professional. [address] any warranty or liability for your use of this information. DISCHARGE SUMMARY from Nurse PATIENT INSTRUCTIONS: 
 
 
F-face looks uneven A-arms unable to move or move unevenly S-speech slurred or non-existent T-time-call 911 as soon as signs and symptoms begin-DO NOT go Back to bed or wait to see if you get better-TIME IS BRAIN. Warning Signs of HEART ATTACK Call 911 if you have these symptoms: 
? Chest discomfort. Most heart attacks involve discomfort in the center of the chest that lasts more than a few minutes, or that goes away and comes back. It can feel like uncomfortable pressure, squeezing, fullness, or pain. ? Discomfort in other areas of the upper body. Symptoms can include pain or discomfort in one or both arms, the back, neck, jaw, or stomach. ? Shortness of breath with or without chest discomfort. ? Other signs may include breaking out in a cold sweat, nausea, or lightheadedness. Don't wait more than five minutes to call [address]! Fast action can save your life. Calling 911 is almost always the fastest way to get lifesaving treatment. Emergency Medical Services staff can begin treatment when they arrive  up to an hour sooner than if someone gets to the hospital by car. The discharge information has been reviewed with the patient.   The patient verbalized understanding. Discharge medications reviewed with the patient and appropriate educational materials and side effects teaching were provided. ___________________________________________________________________________________________________________________________________ MyChart Announcement We are excited to announce that we are making your provider's discharge notes available to you in [employer]. You will see these notes when they are completed and signed by the physician that discharged you from your recent hospital stay. If you have any questions or concerns about any information you see in PolyRemedy, please call the Health Information Department where you were seen or reach out to your Primary Care Provider for more information about your plan of care. Introducing \[facility]\"" & HEALTH SERVICES! Debo Shelley introduces PolyRemedy patient portal. Now you can access parts of your medical record, email your doctor's office, and request medication refills online. 1. In your internet browser, go to [email or web address] 2. Click on the First Time User? Click Here link in the Sign In box. You will see the New Member Sign Up page. 3. Enter your PolyRemedy Access Code exactly as it appears below. You will not need to use this code after youve completed the sign-up process. If you do not sign up before the expiration date, you must request a new code. · PolyRem[employer] Access Code: [email or web address] Expires: 10/21/2018 10:24 AM 
 
4. Enter the last four digits of your Social Security Number (xxxx) and Date of Birth (mm/dd/yyyy) as indicated and click Submit. You will be taken to the next sign-up page. 5. Create a PolyRemedy ID. This will be your PolyRemedy login ID and cannot be changed, so think of one that is secure and easy to remember. 6. Create a PolyRemedy password. You can change your password at any time. 7. Enter your Password Reset Question and Answer.  This can be used at a later time if you forget your password. 8. Enter your e-mail address. You will receive e-mail notification when new information is available in [address] 9. Click Sign Up. You can now view and download portions of your medical record. 10. Click the Download Summary menu link to download a portable copy of your medical information. If you have questions, please visit the Frequently Asked Questions section of the [employer] website. Remember, [employer] is NOT to be used for urgent needs. For medical emergencies, dial 911. Now available from your iPhone and Android! Introducing Patric Cleary As a Reyna Lingsachin patient, I wanted to make you aware of our electronic visit tool called Patric Cleary. MindJolt 24/7 allows you to connect within minutes with a medical provider 24 hours a day, seven days a week via a mobile device or tablet or logging into a secure website from your computer. You can access Patric Cleary from anywhere in the [place]. A virtual visit might be right for you when you have a simple condition and feel like you just dont want to get out of bed, or cant get away from work for an appointment, when your regular Reyna Clare provider is not available (evenings, weekends or holidays), or when youre out of town and need minor care. Electronic visits cost only $49 and if the [employer]/7 provider determines a prescription is needed to treat your condition, one can be electronically transmitted to a nearby pharmacy*. Please take a moment to enroll today if you have not already done so. The enrollment process is free and takes just a few minutes. To enroll, please download the [employer]/RentFeeder irma to your tablet or phone, or visit [email or web address] to enroll on your computer.    
And, as an [address] [place] [address] patient with a [employer] account, the results of your visits will be scanned into your electronic medical record and your primary care provider will be able to view the scanned results. We urge you to continue to see your regular [employer] provider for your ongoing medical care. And while your primary care provider may not be the one available when you seek a [employer] virtual visit, the peace of mind you get from getting a real diagnosis real time can be priceless. For more information on [employer], view our Frequently Asked Questions (FAQs) at [email or web address]. Sincerely, 
 
Renato Pearce MD 
Chief Medical Officer Saul Cespedes *:  certain medications cannot be prescribed via [employer] Providers Seen During Your Hospitalization Provider Specialty Primary office phone Alba Mathews MD Emergency Medicine [phone] Ana Kerr MD Internal Medicine [phone] Your Primary Care Physician (PCP) Primary Care Physician Office Phone Office Reji T[address] [phone] You are allergic to the following Allergen Reactions Statins-Hmg-Coa Reductase Inhibitors Myalgia Muscle pain Recent Documentation Height Weight BMI Smoking Status 1.753 m 82.5 kg 26.86 kg/m2 Former Smoker Emergency Contacts Name Discharge Info Relation Home Work Mobile Ciro Alberto [address]  Child [2] [phone] Diamond Irby  Other Relative [6] [phone] Patient Belongings The following personal items are in your possession at time of discharge: 
  Dental Appliances: None  Visual Aid: Glasses, At bedside      Home Medications: None   Jewelry: None  Clothing: Shirt, Pants, Socks, Undergarments, Footwear    Other Valuables: None Please provide this summary of care documentation to your next provider. Signatures-by signing, you are acknowledging that this After Visit Summary has been reviewed with you and you have received a copy. Patient Signature:  ____________________________________________________________ Date:  ____________________________________________________________  
  
Aloma Snellen Provider Signature:  ____________________________________________________________ Date:  ____________________________________________________________

## 2018-07-23 NOTE — IP AVS SNAPSHOT
Summary of Care Report The Summary of Care report has been created to help improve care coordination. Users with access to [employer] or [employer] [facility] (Web-based application) may access additional patient information including the Discharge Summary. If you are not currently a [address] user and need more information, please call the number listed below in the [place] [address] section and ask to be connected with Medical Records. Facility Information Name Address Phone [address] [address] [address] [phone] Patient Information Patient Name Sex EZIO Waller Kenneth ([id number]) Male 1940 Discharge Information Admitting Provider Service Area Unit Ana Kerr MD / [id number] Telemetry / [phone] Discharge Provider Discharge Date/Time Discharge Disposition Destination (none) 2018 Morning (Pending) AHR (none) Patient Language Language ENGLISH [13] Hospital Problems as of 2018  Reviewed: 2018  8:11 AM by Angelica Fernandez NP Class Noted - Resolved Last Modified POA Active Problems AAA (abdominal aortic aneurysm) without rupture ([facility] [address] (Chronic)  2014 - Present 2018 by Ana Kerr MD Yes Entered by Zora Spring NP Overview Signed 2014 10:53 AM by Zora Spring NP  
   3.2 on US  [facility] Hyperlipemia (Chronic)  2014 - Present 2018 by Ana Kerr MD Yes Entered by Zora Spring NP Peptic ulcer disease  2017 - Present 2018 by Ana Kerr MD Yes Entered by Caren Pichardo History of cardiomyopathy  2017 - Present 2018 by Ana Kerr MD Yes   Entered by Ramos Herr MD  
 * (Principal)COPD exacerbation ([facility] [address]  7/23/2018 - Present 7/24/2018 by Paola Jeong, NP Yes Entered by Blanca Dumont MD  
  CHF exacerbation ([facility] [address]  7/23/2018 - Present 7/24/2018 by Paola Jeong, NP Yes Entered by Blanca Dumont MD  
  CHF (congestive heart failure) ([facility] [address]  7/23/2018 - Present 7/24/2018 by Paola Jeong, NP Yes Entered by Blanca Dumont MD  
  A-fib [facility])  7/23/2018 - Present 7/24/2018 by Paola Jeong, NP Yes Entered by Blanca Dumont MD  
  Mycobacterial pneumonia [facility])  7/25/2018 - Present 7/25/2018 by Paola Jeong, NP Yes Entered by Paola Jeong NP Acute on chronic respiratory failure ([facility] [address]  7/25/2018 - Present 7/25/2018 by Fan Wang MD Yes Entered by Fan Wang MD  
  
Non-Hospital Problems as of 7/29/2018  Reviewed: 7/27/2018  8:11 AM by Myriam Sosa NP Class Noted - Resolved Last Modified Active Problems COPD (chronic obstructive pulmonary disease) ([facility] [address] (Chronic)  5/2/2014 - Present 5/2/2014 by Eduard Lawrence, NP Entered by Eduard Lawrence, MESSI Glaucoma (Chronic)  5/2/2014 - Present 5/2/2014 by Eduard Lawrence, NP Entered by Eduard Lawrence, NP  
  Supplemental oxygen dependent (Chronic)  5/2/2014 - Present 5/2/2014 by Eduard Lawrence, NP Entered by Eduard Lawrence, NP  
  OA (osteoarthritis) (Chronic)  5/2/2014 - Present 5/2/2014 by Eduard Lawrence, NP Entered by Eduard Lawrence, NP Paroxysmal atrial fibrillation ([facility] [address] (Chronic)  5/2/2014 - Present 6/22/2017 by Arik Roman MD  
  Entered by Eduard Lawrence, MSESI Warfarin anticoagulation (Chronic)  5/2/2014 - Present 5/2/2014 by Eduard Lawrence, NP Entered by Eduard Lawrence, NP Renal cysts, acquired, bilateral  5/18/2015 - Present 5/18/2015 by Pelsor Flax Entered by Pelsor Flax Tobacco use disorder  6/1/2017 - Present 6/1/2017 by Delisa Davis Entered by Drew Bryant Hypertension  6/1/2017 - Present 6/1/2017 by Drew Bryant Entered by Drew Bryant Pulmonary embolus ([address]  6/1/2017 - Present 6/1/2017 by Drew Bryant Entered by Drew Bryant Cardiomyopathy ([address]  6/1/2017 - Present 6/1/2017 by Drew Bryant Entered by Drew Bryant Atrial flutter ([address]  6/1/2017 - Present 6/1/2017 by Drew Bryant Entered by Drew Bryant History of pulmonary embolism  6/22/2017 - Present 6/22/2017 by Qian Mccarthy MD  
  Entered by Qian Mccarthy MD  
  
You are allergic to the following Allergen Reactions Statins-Hmg-Coa Reductase Inhibitors Myalgia Muscle pain Current Discharge Medication List  
  
START taking these medications Dose & Instructions Dispensing Information Comments  
 apixaban 5 mg tablet Commonly known as:  Sanjana Minus Dose:  5 mg Take 1 Tab by mouth every twelve (12) hours for 30 days. Quantity:  60 Tab Refills:  0  
   
 furosemide 40 mg tablet Commonly known as:  LASIX Dose:  40 mg Take 1 Tab by mouth daily for 30 days. Quantity:  30 Tab Refills:  0  
   
 lisinopril 10 mg tablet Commonly known as:  Jacklynn Pares Dose:  10 mg Take 1 Tab by mouth daily. Quantity:  30 Tab Refills:  0  
   
 metoprolol tartrate 50 mg tablet Commonly known as:  LOPRESSOR Dose:  50 mg Take 1 Tab by mouth two (2) times a day. Quantity:  30 Tab Refills:  0  
   
 predniSONE 10 mg tablet Commonly known as:  Enrique Lazcano Dose:  10 mg Take 1 Tab by mouth daily (with breakfast). Quantity:  1 Tab Refills:  0 CONTINUE these medications which have CHANGED Dose & Instructions Dispensing Information Comments  
 nitroglycerin 0.4 mg SL tablet Commonly known as:  NITROSTAT What changed:  Another medication with the same name was removed. Continue taking this medication, and follow the directions you see here. Notes to Patient:  Per home Dose:  0.4 mg  
1 Tab by SubLINGual route every five (5) minutes as needed for Chest Pain. Quantity:  1 Bottle Refills:  11 CONTINUE these medications which have NOT CHANGED Dose & Instructions Dispensing Information Comments  
 allopurinol 300 mg tablet Commonly known as:  Pasquale Needle Take  by mouth daily. Refills:  0 ALPHAGAN P 0.1 % ophthalmic solution Generic drug:  brimonidine  
 every eight (8) hours. Refills:  0  
   
 azithromycin 500 mg Tab Commonly known as:  Unice Messing Notes to Patient:  Per home Take 1 tablet every Mon, Wed, Fri Refills:  0  
   
 bimatoprost 0.01 % ophthalmic drops Commonly known as:  LUMIGAN Dose:  1 Drop Administer 1 Drop to both eyes every evening. Refills:  0  
   
 cholecalciferol (vitamin D3) 2,000 unit Tab Notes to Patient:  Per home Take  by mouth. Refills:  0 ELIGARD SC Notes to Patient:  Per home Dose:  45 mg  
45 mg by SubCUTAneous route. Refills:  0  
   
 ethambutol 400 mg tablet Commonly known as:  MYAMBUTOL Notes to Patient:  Per home Take 5 tabs every mon, wed, fri Refills:  0  
   
 fluticasone-salmeterol 250-50 mcg/dose diskus inhaler Commonly known as:  ADVAIR Notes to Patient:  Per home Dose:  1 Puff Take 1 Puff by inhalation. Refills:  0 Iron 325 mg (65 mg iron) tablet Generic drug:  ferrous sulfate Notes to Patient:  Per home dose Take  by mouth Daily (before breakfast). Refills:  0  
   
 omeprazole 20 mg capsule Commonly known as:  PRILOSEC Dose:  20 mg Take 20 mg by mouth daily. Refills:  0 OXYGEN-AIR DELIVERY SYSTEMS Notes to Patient:  Per home Use as instructed. Use as instructed. Refills:  0  
   
 potassium chloride SR 10 mEq tablet Commonly known as:  KLOR-CON 10 Dose:  20 mEq Take 20 mEq by mouth two (2) times a day. Refills:  0  
   
 rifAMPin 300 mg capsule Commonly known as:  RIFADIN Notes to Patient:  Per home Take 2 tabs every mon, wed, fri Refills:  0  
   
 rosuvastatin 40 mg tablet Commonly known as:  CRESTOR Dose:  40 mg Take 40 mg by mouth nightly. Refills:  0 SPIRIVA WITH HANDIHALER 18 mcg inhalation capsule Generic drug:  tiotropium Dose:  1 Cap Take 1 Cap by inhalation daily. Refills:  0  
   
 tamsulosin 0.4 mg capsule Commonly known as:  FLOMAX Notes to Patient:  Per home TAKE ONE CAPSULE BY MOUTH EVERY DAY Quantity:  30 capsule Refills:  12 VENTOLIN HFA 90 mcg/actuation inhaler Generic drug:  albuterol Notes to Patient:  Per home Take  by inhalation. Refills:  0 STOP taking these medications Comments  
 amLODIPine 10 mg tablet Commonly known as:  NORVASC  
   
   
 aspirin 325 mg tablet Commonly known as:  ASPIRIN DYAZIDE 37.5-25 mg per capsule Generic drug:  triamterene-hydroCHLOROthiazide Follow-up Information Follow up With Details Comments Contact Info [facility] In 1 week Wednesday August 1st at 2:30pm   
 Jose Escobedo MD In 2 days  [address] [facility] [address] 
[phone] Aiden Huff MD In 2 weeks  [address] [place] [address] [address] [facility] [address] 
[phone] Discharge Instructions Jackelyn Sellers Chronic Obstructive Pulmonary Disease (COPD): Care Instructions Your Care Instructions Chronic obstructive pulmonary disease (COPD) is a general term for a group of lung diseases, including emphysema and chronic bronchitis. People with COPD have decreased airflow in and out of the lungs, which makes it hard to breathe. The airways also can get clogged with thick mucus. Cigarette smoking is a major cause of COPD. Although there is no cure for COPD, you can slow its progress. Following your treatment plan and taking care of yourself can help you feel better and live longer. Follow-up care is a key part of your treatment and safety. Be sure to make and go to all appointments, and call your doctor if you are having problems. It's also a good idea to know your test results and keep a list of the medicines you take. How can you care for yourself at home? 
 Staying healthy 
  · Do not smoke. This is the most important step you can take to prevent more damage to your lungs. If you need help quitting, talk to your doctor about stop-smoking programs and medicines. These can increase your chances of quitting for good.  
  · Avoid colds and flu. Get a pneumococcal vaccine shot. If you have had one before, ask your doctor whether you need a second dose. Get the flu vaccine every fall. If you must be around people with colds or the flu, wash your hands often.  
  · Avoid secondhand smoke, air pollution, and high altitudes. Also avoid cold, dry air and hot, humid air. Stay at home with your windows closed when air pollution is bad.  
 Medicines and oxygen therapy 
  · Take your medicines exactly as prescribed. Call your doctor if you think you are having a problem with your medicine.  
  · You may be taking medicines such as: ¨ Bronchodilators. These help open your airways and make breathing easier. Bronchodilators are either short-acting (work for 6 to 9 hours) or long-acting (work for 24 hours). You inhale most bronchodilators, so they start to act quickly. Always carry your quick-relief inhaler with you in case you need it while you are away from home. ¨ Corticosteroids (prednisone, budesonide). These reduce airway inflammation. They come in pill or inhaled form. You must take these medicines every day for them to work well.  
  · A spacer may help you get more inhaled medicine to your lungs. Ask your doctor or pharmacist if a spacer is right for you.  If it is, ask how to use it properly.  
  · Do not take any vitamins, over-the-counter medicine, or herbal products without talking to your doctor first.  
  · If your doctor prescribed antibiotics, take them as directed. Do not stop taking them just because you feel better. You need to take the full course of antibiotics.  
  · Oxygen therapy boosts the amount of oxygen in your blood and helps you breathe easier. Use the flow rate your doctor has recommended, and do not change it without talking to your doctor first.  
Activity 
  · Get regular exercise. Walking is an easy way to get exercise. Start out slowly, and walk a little more each day.  
  · Pay attention to your breathing. You are exercising too hard if you cannot talk while you are exercising.  
  · Take short rest breaks when doing household chores and other activities.  
  · Learn breathing methods-such as breathing through pursed lips-to help you become less short of breath.  
  · If your doctor has not set you up with a pulmonary rehabilitation program, talk to him or her about whether rehab is right for you. Rehab includes exercise programs, education about your disease and how to manage it, help with diet and other changes, and emotional support. Diet 
  · Eat regular, healthy meals. Use bronchodilators about 1 hour before you eat to make it easier to eat. Eat several small meals instead of three large ones. Drink beverages at the end of the meal. Avoid foods that are hard to chew.  
  · Eat foods that contain protein so that you do not lose muscle mass.  
  · Talk with your doctor if you gain too much weight or if you lose weight without trying.  
 Mental health 
  · Talk to your family, friends, or a therapist about your feelings. It is normal to feel frightened, angry, hopeless, helpless, and even guilty. Talking openly about bad feelings can help you cope. If these feelings last, talk to your doctor. When should you call for help? Call 911 anytime you think you may need emergency care. For example, call if:   · You have severe trouble breathing.  
 Call your doctor now or seek immediate medical care if: 
  · You have new or worse trouble breathing.  
  · You cough up blood.  
  · You have a fever.  
 Watch closely for changes in your health, and be sure to contact your doctor if: 
  · You cough more deeply or more often, especially if you notice more mucus or a change in the color of your mucus.  
  · You have new or worse swelling in your legs or belly.  
  · You are not getting better as expected. Where can you learn more? Go to http://airam-shanelle.info/. Hermes Stephens in the search box to learn more about \"Chronic Obstructive Pulmonary Disease (COPD): Care Instructions. \" Current as of: December 6, 2017 Content Version: 11.7 © 7787-6871 [employer]. Care instructions adapted under license by [employer] (which disclaims liability or warranty for this information). If you have questions about a medical condition or this instruction, always ask your healthcare professional. [place] [address] any warranty or liability for your use of this information. DISCHARGE SUMMARY from Nurse PATIENT INSTRUCTIONS: 
 
 
F-face looks uneven A-arms unable to move or move unevenly S-speech slurred or non-existent T-time-call 911 as soon as signs and symptoms begin-DO NOT go Back to bed or wait to see if you get better-TIME IS BRAIN. Warning Signs of HEART ATTACK Call 911 if you have these symptoms: 
? Chest discomfort. Most heart attacks involve discomfort in the center of the chest that lasts more than a few minutes, or that goes away and comes back. It can feel like uncomfortable pressure, squeezing, fullness, or pain. ? Discomfort in other areas of the upper body. Symptoms can include pain or discomfort in one or both arms, the back, neck, jaw, or stomach. ? Shortness of breath with or without chest discomfort. ? Other signs may include breaking out in a cold sweat, nausea, or lightheadedness. Don't wait more than five minutes to call [address]! Fast action can save your life. Calling 911 is almost always the fastest way to get lifesaving treatment. Emergency Medical Services staff can begin treatment when they arrive  up to an hour sooner than if someone gets to the hospital by car. The discharge information has been reviewed with the patient. The patient verbalized understanding. Discharge medications reviewed with the patient and appropriate educational materials and side effects teaching were provided. ___________________________________________________________________________________________________________________________________ Chart Review Routing History Recipient Method Report Sent By Brain Berny Marti MD  
Fax: [phone] Phone: [phone] Fax Note Review MESSI Giles 5/2/2014 11:17 AM 05/02/2014 Darryl Marti MD  
Fax: [phone] Phone: [phone] Fax Note Review MESSI Giles 1/14/2015 11:21 AM 01/14/2015 Darryl Marti MD  
Fax: [phone] Phone: [phone] Fax Notes Report Rob Monroy [[id number]] 6/23/2017  2:21 PM 6/22/2017

## 2018-07-23 NOTE — PROGRESS NOTES
Placed pt on BiPAP 15/8 and 40% with Large Full Face Mask, minimal leak noted. Pt tolerating BiPAP at this time.

## 2018-07-24 ENCOUNTER — APPOINTMENT (OUTPATIENT)
Dept: ULTRASOUND IMAGING | Age: 78
DRG: 291 | End: 2018-07-24
Attending: INTERNAL MEDICINE
Payer: MEDICARE

## 2018-07-24 LAB
ALBUMIN SERPL-MCNC: 2.6 G/DL (ref 3.2–4.6)
ALBUMIN/GLOB SERPL: 0.6 {RATIO} (ref 1.2–3.5)
ALP SERPL-CCNC: 93 U/L (ref 50–136)
ALT SERPL-CCNC: 78 U/L (ref 12–65)
ANION GAP SERPL CALC-SCNC: 9 MMOL/L (ref 7–16)
APTT PPP: 104.9 SEC (ref 23.2–35.3)
APTT PPP: 71.3 SEC (ref 23.2–35.3)
APTT PPP: 99.6 SEC (ref 23.2–35.3)
AST SERPL-CCNC: 55 U/L (ref 15–37)
BILIRUB SERPL-MCNC: 0.4 MG/DL (ref 0.2–1.1)
BUN SERPL-MCNC: 21 MG/DL (ref 8–23)
CALCIUM SERPL-MCNC: 8.5 MG/DL (ref 8.3–10.4)
CHLORIDE SERPL-SCNC: 104 MMOL/L (ref 98–107)
CO2 SERPL-SCNC: 28 MMOL/L (ref 21–32)
CREAT SERPL-MCNC: 1.58 MG/DL (ref 0.8–1.5)
ERYTHROCYTE [DISTWIDTH] IN BLOOD BY AUTOMATED COUNT: 19.9 % (ref 11.9–14.6)
GLOBULIN SER CALC-MCNC: 4.4 G/DL (ref 2.3–3.5)
GLUCOSE SERPL-MCNC: 145 MG/DL (ref 65–100)
HCT VFR BLD AUTO: 35.1 % (ref 41.1–50.3)
HGB BLD-MCNC: 11 G/DL (ref 13.6–17.2)
MAGNESIUM SERPL-MCNC: 2.1 MG/DL (ref 1.8–2.4)
MCH RBC QN AUTO: 24.9 PG (ref 26.1–32.9)
MCHC RBC AUTO-ENTMCNC: 31.3 G/DL (ref 31.4–35)
MCV RBC AUTO: 79.4 FL (ref 79.6–97.8)
PLATELET # BLD AUTO: 328 K/UL (ref 150–450)
PMV BLD AUTO: 10.1 FL (ref 10.8–14.1)
POTASSIUM SERPL-SCNC: 4.3 MMOL/L (ref 3.5–5.1)
PROT SERPL-MCNC: 7 G/DL (ref 6.3–8.2)
RBC # BLD AUTO: 4.42 M/UL (ref 4.23–5.67)
SODIUM SERPL-SCNC: 141 MMOL/L (ref 136–145)
WBC # BLD AUTO: 5 K/UL (ref 4.3–11.1)

## 2018-07-24 PROCEDURE — 74011250637 HC RX REV CODE- 250/637: Performed by: INTERNAL MEDICINE

## 2018-07-24 PROCEDURE — 74011000250 HC RX REV CODE- 250: Performed by: INTERNAL MEDICINE

## 2018-07-24 PROCEDURE — 93970 EXTREMITY STUDY: CPT

## 2018-07-24 PROCEDURE — 74011250636 HC RX REV CODE- 250/636: Performed by: INTERNAL MEDICINE

## 2018-07-24 PROCEDURE — 80053 COMPREHEN METABOLIC PANEL: CPT | Performed by: INTERNAL MEDICINE

## 2018-07-24 PROCEDURE — 83735 ASSAY OF MAGNESIUM: CPT | Performed by: INTERNAL MEDICINE

## 2018-07-24 PROCEDURE — 85730 THROMBOPLASTIN TIME PARTIAL: CPT | Performed by: INTERNAL MEDICINE

## 2018-07-24 PROCEDURE — 99232 SBSQ HOSP IP/OBS MODERATE 35: CPT | Performed by: INTERNAL MEDICINE

## 2018-07-24 PROCEDURE — C8929 TTE W OR WO FOL WCON,DOPPLER: HCPCS

## 2018-07-24 PROCEDURE — 74011000258 HC RX REV CODE- 258: Performed by: INTERNAL MEDICINE

## 2018-07-24 PROCEDURE — 94760 N-INVAS EAR/PLS OXIMETRY 1: CPT

## 2018-07-24 PROCEDURE — 36415 COLL VENOUS BLD VENIPUNCTURE: CPT | Performed by: INTERNAL MEDICINE

## 2018-07-24 PROCEDURE — 94640 AIRWAY INHALATION TREATMENT: CPT

## 2018-07-24 PROCEDURE — 85027 COMPLETE CBC AUTOMATED: CPT | Performed by: INTERNAL MEDICINE

## 2018-07-24 PROCEDURE — 65660000000 HC RM CCU STEPDOWN

## 2018-07-24 PROCEDURE — 77010033678 HC OXYGEN DAILY

## 2018-07-24 RX ORDER — HEPARIN SODIUM 5000 [USP'U]/ML
35 INJECTION, SOLUTION INTRAVENOUS; SUBCUTANEOUS ONCE
Status: COMPLETED | OUTPATIENT
Start: 2018-07-24 | End: 2018-07-24

## 2018-07-24 RX ORDER — FUROSEMIDE 10 MG/ML
40 INJECTION INTRAMUSCULAR; INTRAVENOUS ONCE
Status: COMPLETED | OUTPATIENT
Start: 2018-07-24 | End: 2018-07-24

## 2018-07-24 RX ORDER — DILTIAZEM HYDROCHLORIDE 5 MG/ML
5 INJECTION INTRAVENOUS ONCE
Status: COMPLETED | OUTPATIENT
Start: 2018-07-24 | End: 2018-07-24

## 2018-07-24 RX ADMIN — LEVALBUTEROL HYDROCHLORIDE 0.63 MG: 0.63 SOLUTION RESPIRATORY (INHALATION) at 01:15

## 2018-07-24 RX ADMIN — ALLOPURINOL 300 MG: 300 TABLET ORAL at 08:46

## 2018-07-24 RX ADMIN — HEPARIN SODIUM 2800 UNITS: 5000 INJECTION, SOLUTION INTRAVENOUS; SUBCUTANEOUS at 13:36

## 2018-07-24 RX ADMIN — LEVALBUTEROL HYDROCHLORIDE 0.63 MG: 0.63 SOLUTION RESPIRATORY (INHALATION) at 14:58

## 2018-07-24 RX ADMIN — BUDESONIDE 500 MCG: 0.5 INHALANT RESPIRATORY (INHALATION) at 19:30

## 2018-07-24 RX ADMIN — TAMSULOSIN HYDROCHLORIDE 0.4 MG: 0.4 CAPSULE ORAL at 21:55

## 2018-07-24 RX ADMIN — METHYLPREDNISOLONE SODIUM SUCCINATE 40 MG: 40 INJECTION, POWDER, FOR SOLUTION INTRAMUSCULAR; INTRAVENOUS at 06:00

## 2018-07-24 RX ADMIN — METHYLPREDNISOLONE SODIUM SUCCINATE 40 MG: 40 INJECTION, POWDER, FOR SOLUTION INTRAMUSCULAR; INTRAVENOUS at 21:55

## 2018-07-24 RX ADMIN — SODIUM CHLORIDE 10 MG/HR: 900 INJECTION, SOLUTION INTRAVENOUS at 10:25

## 2018-07-24 RX ADMIN — ASPIRIN 81 MG: 81 TABLET, COATED ORAL at 08:46

## 2018-07-24 RX ADMIN — DILTIAZEM HYDROCHLORIDE 60 MG: 60 TABLET, FILM COATED ORAL at 08:46

## 2018-07-24 RX ADMIN — BUDESONIDE 500 MCG: 0.5 INHALANT RESPIRATORY (INHALATION) at 10:13

## 2018-07-24 RX ADMIN — LEVALBUTEROL HYDROCHLORIDE 0.63 MG: 0.63 SOLUTION RESPIRATORY (INHALATION) at 19:30

## 2018-07-24 RX ADMIN — LEVALBUTEROL HYDROCHLORIDE 0.63 MG: 0.63 SOLUTION RESPIRATORY (INHALATION) at 10:13

## 2018-07-24 RX ADMIN — PERFLUTREN 1 ML: 6.52 INJECTION, SUSPENSION INTRAVENOUS at 09:00

## 2018-07-24 RX ADMIN — SODIUM CHLORIDE 10 MG/HR: 900 INJECTION, SOLUTION INTRAVENOUS at 13:30

## 2018-07-24 RX ADMIN — FUROSEMIDE 40 MG: 10 INJECTION, SOLUTION INTRAMUSCULAR; INTRAVENOUS at 08:46

## 2018-07-24 RX ADMIN — BRIMONIDINE TARTRATE 1 DROP: 2 SOLUTION OPHTHALMIC at 10:08

## 2018-07-24 RX ADMIN — HEPARIN SODIUM AND DEXTROSE 16 UNITS/KG/HR: 5000; 5 INJECTION INTRAVENOUS at 13:44

## 2018-07-24 RX ADMIN — BRIMONIDINE TARTRATE 1 DROP: 2 SOLUTION OPHTHALMIC at 17:44

## 2018-07-24 RX ADMIN — LATANOPROST 1 DROP: 50 SOLUTION OPHTHALMIC at 22:04

## 2018-07-24 RX ADMIN — METHYLPREDNISOLONE SODIUM SUCCINATE 40 MG: 40 INJECTION, POWDER, FOR SOLUTION INTRAMUSCULAR; INTRAVENOUS at 13:35

## 2018-07-24 RX ADMIN — PANTOPRAZOLE SODIUM 40 MG: 40 TABLET, DELAYED RELEASE ORAL at 08:46

## 2018-07-24 RX ADMIN — DILTIAZEM HYDROCHLORIDE 5 MG: 5 INJECTION INTRAVENOUS at 10:24

## 2018-07-24 RX ADMIN — FUROSEMIDE 40 MG: 10 INJECTION, SOLUTION INTRAMUSCULAR; INTRAVENOUS at 13:35

## 2018-07-24 RX ADMIN — BRIMONIDINE TARTRATE 1 DROP: 2 SOLUTION OPHTHALMIC at 22:04

## 2018-07-24 NOTE — PROGRESS NOTES
Bedside and Verbal shift change report given to self (oncoming nurse) by Carlos Mcarthur RN (offgoing nurse). Report included the following information SBAR, Kardex, MAR and Recent Results.

## 2018-07-24 NOTE — PROGRESS NOTES
Problem: Falls - Risk of  Goal: *Absence of Falls  Document Raul Fall Risk and appropriate interventions in the flowsheet.    Outcome: Progressing Towards Goal  Fall Risk Interventions:            Medication Interventions: Bed/chair exit alarm, Evaluate medications/consider consulting pharmacy, Patient to call before getting OOB, Teach patient to arise slowly    Elimination Interventions: Bed/chair exit alarm, Call light in reach, Patient to call for help with toileting needs, Urinal in reach

## 2018-07-24 NOTE — PROGRESS NOTES
Hospitalist H&P Note Admit Date:  2018 10:11 AM  
Name:  Oscar Castro. Age:  78 y.o. 
:  1940 MRN:  [id number] PCP:  Lina Muller MD 
Treatment Team: Attending Provider: Ashlie Campbell MD; Consulting Provider: Shonna Dent MD; Consulting Provider: Eva Thomas MD; Utilization Review: Chio Meredith RN 
 
HPI:  
CC:  Shortness of breath Pt is a 79 yo male with PMH of COPD on 3 liters, tobacco abuse, HTN, prostate CA, manolo polyp, BPH. Pt was recently diagnosed with a MAC infection. Pt presented to the ER with shortness of breath that has gotten progressively worse x 3 days. Pt noted that he had cough, wheezing, sputum, and fevers, as well as edema. He also had palpitations and has a history of intermittent Afib. He was on Coumadin in the past but was stopped and now just takes aspirin. For the MAC infection pt is following at [facility] and is being treated with ehtambutol, azithromycin, and rifampin. On arrival to the ER his BP was markedly elevated at 180/90, tachycardic at 135, oxygen sat at 95%, tachypnea at 26. Pt was awake and alert, had wheezes throughout. Pt was placed on BiPAP. Procal was 0.2, BNP elevated at 932. ABG was done and a chest xray which showed cardiomegaly and lungs clear. EKG showed Afib with RVR. IV solu medrol, will consider tapering dose. Pt placed on cardizem and heparin drips. 10 systems reviewed and negative except as noted in HPI. Past Medical History:  
Diagnosis Date  A-fib ([address] 2014  AAA (abdominal aortic aneurysm) without rupture ([address] 2014  
 3.2 on   [facility]  Aneurysm ([address]  Arthritis  Cancer ([address]   
 hx prostate cancer  Chronic lung disease  COPD   
 COPD (chronic obstructive pulmonary disease) ([address] 2014  Elevated prostate specific antigen (PSA)  Glaucoma 2014  Heart disease  Heart failure ([address]  Hypercholesterolemia  Hyperlipemia 2014  Hypertension  Hypertrophy of prostate with urinary obstruction and other lower urinary tract symptoms (LUTS)  OA (osteoarthritis) 2014  Peptic ulcer disease 2017  Poor historian  Prostate cancer [facility]  Pulmonary embolus ([facility] 2017  Supplemental oxygen dependent 2014  Unspecified disorder of prostate  Warfarin anticoagulation 2014 Past Surgical History:  
Procedure Laterality Date  HX HEART CATHETERIZATION Allergies Allergen Reactions  Statins-Hmg-Coa Reductase Inhibitors Myalgia Muscle pain Social History Substance Use Topics  Smoking status: Former Smoker Packs/day: 2.00 Years: 40.00 Quit date: 2014  Smokeless tobacco: Never Used Comment: still taking Chantix  Alcohol use No  
  
Family History Problem Relation Age of Onset  Cancer Mother  Heart Disease Father There is no immunization history on file for this patient. PTA Medications: 
Prior to Admission Medications Prescriptions Last Dose Informant Patient Reported? Taking? LEUPROLIDE ACETATE (ELIGARD SC)   Yes No  
Si mg by SubCUTAneous route. OXYGEN-AIR DELIVERY SYSTEMS   Yes No  
Sig: Use as instructed. Use as instructed. albuterol (VENTOLIN HFA) 90 mcg/actuation inhaler   Yes No  
Sig: Take  by inhalation. allopurinol (ZYLOPRIM) 300 mg tablet   Yes No  
Sig: Take  by mouth daily. amLODIPine (NORVASC) 10 mg tablet   Yes No  
Sig: Take  by mouth daily. aspirin (ASPIRIN) 325 mg tablet   Yes No  
Sig: Take 325 mg by mouth daily. azithromycin (ZITHROMAX) 500 mg tab   Yes Yes Sig: Take 1 tablet every Mon, Wed, Fri  
bimatoprost (LUMIGAN) 0.01 % ophthalmic drops   Yes No  
Sig: Administer 1 Drop to both eyes every evening. brimonidine (ALPHAGAN P) 0.1 % ophthalmic solution   Yes No  
Sig: every eight (8) hours. cholecalciferol, vitamin D3, 2,000 unit tab   Yes No  
Sig: Take  by mouth. ethambutol (MYAMBUTOL) 400 mg tablet   Yes Yes Sig: Take 5 tabs every mon, wed, fri  
ferrous sulfate (IRON) 325 mg (65 mg iron) tablet   Yes No  
Sig: Take  by mouth Daily (before breakfast). fluticasone-salmeterol (ADVAIR) 250-50 mcg/dose diskus inhaler   Yes Yes Sig: Take 1 Puff by inhalation. nitroglycerin (NITROSTAT) 0.4 mg SL tablet   No No  
Si Tab by SubLINGual route every five (5) minutes as needed for Chest Pain. nitroglycerin (NITROSTAT) 0.4 mg SL tablet   No No  
Si Tab by SubLINGual route every five (5) minutes as needed for Chest Pain. omeprazole (PRILOSEC) 20 mg capsule   Yes No  
Sig: Take 20 mg by mouth daily. potassium chloride SR (KLOR-CON 10) 10 mEq tablet   Yes No  
Sig: Take 20 mEq by mouth two (2) times a day. rifAMPin (RIFADIN) 300 mg capsule   Yes Yes Sig: Take 2 tabs every mon, wed, fri  
rosuvastatin (CRESTOR) 40 mg tablet   Yes No  
Sig: Take 40 mg by mouth nightly. tamsulosin (FLOMAX) 0.4 mg capsule   No No  
Sig: TAKE ONE CAPSULE BY MOUTH EVERY DAY  
tiotropium (SPIRIVA WITH HANDIHALER) 18 mcg inhalation capsule   Yes No  
Sig: Take 1 Cap by inhalation daily. triamterene-hydrochlorothiazide (DYAZIDE) 37.5-25 mg per capsule   Yes No  
Sig: Take  by mouth daily. Facility-Administered Medications: None Objective:  
Patient Vitals for the past 24 hrs: 
 Temp Pulse Resp BP SpO2  
18 0846 - (!) 108 - (!) 128/101 -  
18 0830 98.3 °F (36.8 °C) 100 20 125/81 98 %  
18 0516 97.8 °F (36.6 °C) 85 20 (!) 142/95 98 %  
18 0117 - - - - 98 %  
18 0103 97.7 °F (36.5 °C) 78 20 122/63 99 % 18 2134 - 92 - 145/74 -  
18 2118 98.4 °F (36.9 °C) 93 19 137/67 94 %  
18 - - - - 94 %  
18 1705 - (!) 101 - (!) 165/115 -  
18 1704 97.9 °F (36.6 °C) 96 20 (!) 171/98 99 % 18 1529 - 99 21 171/81 99 % 18 1515 - (!) 103 21 156/88 97 %  
18 1500 - (!) 107 22 145/85 97 %  
18 1445 - (!) 107 29 177/83 96 %  
07/23/18 1430 - (!) 127 26 149/70 98 %  
07/23/18 1359 - (!) 132 17 193/84 98 %  
07/23/18 1339 - 100 27 168/73 98 %  
07/23/18 1320 - (!) 142 21 (!) 153/114 98 %  
07/23/18 1259 - (!) 104 20 185/84 96 %  
07/23/18 1224 - (!) 108 - (!) 173/99 -  
07/23/18 1207 - - - - 96 %  
07/23/18 1205 - - 20 - -  
07/23/18 1159 - (!) 101 20 169/87 96 %  
07/23/18 1158 - - 20 - -  
07/23/18 1115 - - - - 96 %  
07/23/18 1109 - - - - 99 % 07/23/18 1040 - - - - 100 % 07/23/18 1035 - (!) 109 (!) 52 181/90 100 % 07/23/18 1017 98.9 °F (37.2 °C) (!) 120 26 - 95 % Oxygen Therapy O2 Sat (%): 98 % (07/24/18 0830) Pulse via Oximetry: 88 beats per minute (07/24/18 0117) O2 Device: Nasal cannula (07/24/18 0849) O2 Flow Rate (L/min): 3 l/min (07/24/18 0849) FIO2 (%): 30 % (07/23/18 1115) Intake/Output Summary (Last 24 hours) at 07/24/18 2779 Last data filed at 07/24/18 8806 Gross per 24 hour Intake              360 ml Output              650 ml Net             -290 ml Physical Exam: 
General:    Well nourished. Alert. Eyes:   Normal sclera. Extraocular movements intact. ENT:  Normocephalic, atraumatic. Moist mucous membranes Neck:  Supple, no lymphadenopathy or JVD 
CV:   Pt tachycardic at times currently 80's Lungs:  Bilateral wheezing, rhonchi Abdomen: Soft, nontender, nondistended. +BS. Extremities: Warm and dry. No cyanosis or edema. Neurologic: CN II-XII grossly intact. Sensation intact. Skin:     No rashes or jaundice. Normal coloration Psych:  Normal mood and affect. I reviewed the labs, imaging, EKGs, telemetry, and other studies done this admission. Data Review:  
Recent Results (from the past 24 hour(s)) EKG, 12 LEAD, INITIAL Collection Time: 07/23/18 10:26 AM  
Result Value Ref Range Ventricular Rate 120 BPM  
 Atrial Rate 120 BPM  
 P-R Interval 138 ms QRS Duration 74 ms Q-T Interval 304 ms QTC Calculation (Bezet) 429 ms  Calculated P Axis 85 degrees Calculated R Axis 72 degrees Calculated T Axis 90 degrees Diagnosis    
  !! AGE AND GENDER SPECIFIC ECG ANALYSIS !! Sinus tachycardia Septal infarct (cited on or before 21-DEC-2014) Abnormal ECG When compared with ECG of 21-DEC-2014 20:08, Nonspecific T wave abnormality now evident in Lateral leads Confirmed by CHRISTOPHE MANCINI (), Jere Brian ([id number]) on 7/23/2018 5:07:40 PM 
  
POC LACTIC ACID Collection Time: 07/23/18 10:36 AM  
Result Value Ref Range Lactic Acid (POC) 0.8 0.5 - 1.9 mmol/L  
BLOOD GAS, ARTERIAL Collection Time: 07/23/18 10:40 AM  
Result Value Ref Range pH 7.31 (L) 7.35 - 7.45    
 PCO2 56 (H) 35 - 45 mmHg PO2 209 (H) 80 - 105 mmHg BICARBONATE 27 (H) 22 - 26 mmol/L  
 BASE EXCESS 0.1 0 - 3 mmol/L  
 TOTAL HEMOGLOBIN 12.3 11.7 - 15.0 GM/DL  
 O2 SAT 99 (H) 92 - 98.5 % Arterial O2 Hgb 98.2 (H) 94 - 97 % CARBOXYHEMOGLOBIN 0.3 (L) 0.5 - 1.5 % METHEMOGLOBIN 0.5 0.0 - 1.5 % DEOXYHEMOGLOBIN 1 0.0 - 5.0 % SITE RR   
 ALLENS TEST POSITIVE    
 MODE NRB   
 O2 FLOW 15.00 L/min CBC WITH AUTOMATED DIFF Collection Time: 07/23/18 10:41 AM  
Result Value Ref Range WBC 5.8 4.3 - 11.1 K/uL  
 RBC 4.74 4.23 - 5.67 M/uL  
 HGB 11.7 (L) 13.6 - 17.2 g/dL HCT 38.4 (L) 41.1 - 50.3 % MCV 81.0 79.6 - 97.8 FL  
 MCH 24.7 (L) 26.1 - 32.9 PG  
 MCHC 30.5 (L) 31.4 - 35.0 g/dL RDW 20.0 (H) 11.9 - 14.6 % PLATELET 298 186 - 403 K/uL MPV 9.9 (L) 10.8 - 14.1 FL  
 DF AUTOMATED NEUTROPHILS 59 43 - 78 % LYMPHOCYTES 29 13 - 44 % MONOCYTES 9 4.0 - 12.0 % EOSINOPHILS 3 0.5 - 7.8 % BASOPHILS 0 0.0 - 2.0 % IMMATURE GRANULOCYTES 0 0.0 - 5.0 %  
 ABS. NEUTROPHILS 3.5 1.7 - 8.2 K/UL  
 ABS. LYMPHOCYTES 1.7 0.5 - 4.6 K/UL  
 ABS. MONOCYTES 0.5 0.1 - 1.3 K/UL  
 ABS. EOSINOPHILS 0.2 0.0 - 0.8 K/UL  
 ABS. BASOPHILS 0.0 0.0 - 0.2 K/UL  
 ABS. IMM. GRANS. 0.0 0.0 - 0.5 K/UL METABOLIC PANEL, COMPREHENSIVE  Collection Time: 07/23/18 10:41 AM  
Result Value Ref Range Sodium 144 136 - 145 mmol/L Potassium 4.4 3.5 - 5.1 mmol/L Chloride 108 (H) 98 - 107 mmol/L  
 CO2 28 21 - 32 mmol/L Anion gap 8 7 - 16 mmol/L Glucose 114 (H) 65 - 100 mg/dL BUN 12 8 - 23 MG/DL Creatinine 1.45 0.8 - 1.5 MG/DL  
 GFR est AA >60 >60 ml/min/1.73m2 GFR est non-AA 50 (L) >60 ml/min/1.73m2 Calcium 8.5 8.3 - 10.4 MG/DL Bilirubin, total 0.2 0.2 - 1.1 MG/DL  
 ALT (SGPT) 62 12 - 65 U/L  
 AST (SGOT) 41 (H) 15 - 37 U/L Alk. phosphatase 110 50 - 136 U/L Protein, total 7.5 6.3 - 8.2 g/dL Albumin 2.8 (L) 3.2 - 4.6 g/dL Globulin 4.7 (H) 2.3 - 3.5 g/dL A-G Ratio 0.6 (L) 1.2 - 3.5 PROCALCITONIN Collection Time: 07/23/18 10:41 AM  
Result Value Ref Range Procalcitonin 0.2 ng/mL CULTURE, BLOOD Collection Time: 07/23/18 10:41 AM  
Result Value Ref Range Special Requests: LEFT ANTECUBITAL Culture result: NO GROWTH AFTER 21 HOURS MAGNESIUM Collection Time: 07/23/18 10:41 AM  
Result Value Ref Range Magnesium 2.1 1.8 - 2.4 mg/dL BNP Collection Time: 07/23/18 10:41 AM  
Result Value Ref Range  pg/mL TROPONIN I Collection Time: 07/23/18 10:41 AM  
Result Value Ref Range Troponin-I, Qt. 0.06 (H) 0.02 - 0.05 NG/ML  
CULTURE, BLOOD Collection Time: 07/23/18 11:14 AM  
Result Value Ref Range Special Requests: RIGHT 
HAND Culture result: NO GROWTH AFTER 21 HOURS    
EKG, 12 LEAD, SUBSEQUENT Collection Time: 07/23/18  2:26 PM  
Result Value Ref Range Ventricular Rate 151 BPM  
 Atrial Rate 202 BPM  
 QRS Duration 80 ms  
 Q-T Interval 256 ms  
 QTC Calculation (Bezet) 405 ms Calculated R Axis 70 degrees Calculated T Axis -147 degrees Diagnosis    
  !! AGE AND GENDER SPECIFIC ECG ANALYSIS !! Atrial fibrillation with rapid ventricular response Septal infarct (cited on or before 21-DEC-2014) Abnormal ECG When compared with ECG of 23-JUL-2018 10:26, Atrial fibrillation has replaced Sinus rhythm T wave inversion now evident in Inferior leads Inverted T waves have replaced nonspecific T wave abnormality in Anterior  
leads Confirmed by Guanaco Baumann MD (), SHAUN FERRELL ([id number]) on 7/23/2018 10:26:41 PM 
  
PTT Collection Time: 07/23/18  8:43 PM  
Result Value Ref Range aPTT 73.9 (H) 23.2 - 35.3 SEC  
CBC W/O DIFF Collection Time: 07/24/18  4:44 AM  
Result Value Ref Range WBC 5.0 4.3 - 11.1 K/uL  
 RBC 4.42 4.23 - 5.67 M/uL  
 HGB 11.0 (L) 13.6 - 17.2 g/dL HCT 35.1 (L) 41.1 - 50.3 % MCV 79.4 (L) 79.6 - 97.8 FL  
 MCH 24.9 (L) 26.1 - 32.9 PG  
 MCHC 31.3 (L) 31.4 - 35.0 g/dL  
 RDW 19.9 (H) 11.9 - 14.6 % PLATELET 580 929 - 262 K/uL MPV 10.1 (L) 10.8 - 88.5 FL  
METABOLIC PANEL, COMPREHENSIVE Collection Time: 07/24/18  4:44 AM  
Result Value Ref Range Sodium 141 136 - 145 mmol/L Potassium 4.3 3.5 - 5.1 mmol/L Chloride 104 98 - 107 mmol/L  
 CO2 28 21 - 32 mmol/L Anion gap 9 7 - 16 mmol/L Glucose 145 (H) 65 - 100 mg/dL BUN 21 8 - 23 MG/DL Creatinine 1.58 (H) 0.8 - 1.5 MG/DL  
 GFR est AA 55 (L) >60 ml/min/1.73m2 GFR est non-AA 46 (L) >60 ml/min/1.73m2 Calcium 8.5 8.3 - 10.4 MG/DL Bilirubin, total 0.4 0.2 - 1.1 MG/DL  
 ALT (SGPT) 78 (H) 12 - 65 U/L  
 AST (SGOT) 55 (H) 15 - 37 U/L Alk. phosphatase 93 50 - 136 U/L Protein, total 7.0 6.3 - 8.2 g/dL Albumin 2.6 (L) 3.2 - 4.6 g/dL Globulin 4.4 (H) 2.3 - 3.5 g/dL A-G Ratio 0.6 (L) 1.2 - 3.5 MAGNESIUM Collection Time: 07/24/18  4:44 AM  
Result Value Ref Range Magnesium 2.1 1.8 - 2.4 mg/dL PTT Collection Time: 07/24/18  4:44 AM  
Result Value Ref Range aPTT 99.6 (HH) 23.2 - 35.3 SEC All Micro Results Procedure Component Value Units Date/Time CULTURE, BLOOD [[id number]] Collected:  07/23/18 1114 Order Status:  Completed Specimen:  Blood from Blood Updated:  07/24/18 8028 Special Requests: --     
  RIGHT 
HAND   Culture result: NO GROWTH AFTER 21 HOURS CULTURE, BLOOD [[id number]] Collected:  07/23/18 1041 Order Status:  Completed Specimen:  Blood from Blood Updated:  07/24/18 5664 Special Requests: LEFT ANTECUBITAL Culture result: NO GROWTH AFTER 21 HOURS Other Studies: Xr Chest [facility] Result Date: 7/23/2018 Portable chest x-ray 7/20/2018 INDICATION: Dyspnea Comparison 12/21/2014 Cardiomegaly is present. Lung fields, soft tissues and the bony structures are intact IMPRESSION: cardiomegaly, clear lung fields Assessment and Plan:  
 
Hospital Problems as of 7/24/2018  Date Reviewed: 2/8/2018 Codes Class Noted - Resolved POA  
 COPD exacerbation ([facility] ICD-10-CM: J44.1 ICD-9-CM: 491.21  7/23/2018 - Present Unknown CHF exacerbation (HCC) ICD-10-CM: I50.9 ICD-9-CM: 428.0  7/23/2018 - Present Unknown CHF (congestive heart failure) (HCC) ICD-10-CM: I50.9 ICD-9-CM: 428.0  7/23/2018 - Present Unknown A-fib [facility]) ICD-10-CM: I48.91 
ICD-9-CM: 427.31  7/23/2018 - Present Unknown History of cardiomyopathy ICD-10-CM: Z86.79 
ICD-9-CM: V12.59  6/22/2017 - Present Yes Peptic ulcer disease ICD-10-CM: K27.9 ICD-9-CM: 533.90  6/1/2017 - Present Yes  
   
 AAA (abdominal aortic aneurysm) without rupture (HCC) (Chronic) ICD-10-CM: I71.4 ICD-9-CM: 441.4  5/2/2014 - Present Yes Overview Signed 5/2/2014 10:53 AM by Cristal Vásquez NP  
  3.2 on  2/14 [facility] Hyperlipemia (Chronic) ICD-10-CM: D25.8 ICD-9-CM: 272.4  5/2/2014 - Present Yes PLAN: 
1. COPD exacerbation. 
-Bipap initially in ER, now on NC 
-Nebulizer with xopenex and budesonide 
-IV solumedtrol 40mg IV Q 8hr 
-Continue antibx for [facility] 
-Pulmonology consult 2. Atrial fibrillation with rapid ventricular response.   
-Cardizem drip 
-Heparin drip 
-Echocardiogram 
-Cardiology consult 
-Admit to telemetry floor 3. CHF exacerbation.   
-Elevated BNP and has cardiomegaly in his chest x-ray 
-Lasix 40mg IV QD 
-Echocardiogram  
-cardiac biomarkers 4. MAC infection.   
-Continue antibiotics - ethambutol, azithromycin, rifampin 5. History AAA 
-Stable, no intervention  
-Cardiology re long term anticoagulation 6. Tobacco abuse, non smoker now 
-Stable 8. History of prostate cancer and BPH 
-Continue Flomax 9. DVT 
-Heparin drip Discharge planning: DVT ppx:  
Code status:  Full Estimated LOS:  Greater than 2 midnights Risk:  high Signed: 
Tawana Castleman, NP

## 2018-07-24 NOTE — PROGRESS NOTES
Miguel Angel Hilton. Admission Date: 7/23/2018 Daily Progress Note: 7/24/2018 The patient's chart is reviewed and the patient is discussed with the staff. Miguel Angel Hilton. Is a 78yoM with h/o GOLD IV COPD on 3lpm, HTN, prior prostate ca, BPH and MAC followed by Dr. Severino Hussein at [facility]. In MArch he was found to have some new SAMANTHA nodules with spiculation and is s/p CT-guided biopsy of SAMANTHA nodule revealed MAC. He was started on ETB/azithro/rif. On 7/23 he was admitted to [facility] with worsening dyspnea on exertion,hypertension, BNP elevation, rapid atrial fibrillation requiring cardizem drip. He is being treated for COPD exacerbation as well as rapid atrial fibrillation with pulmonary edema. Subjective:  
 
Reports he has had more dyspnea this morning. C/o cramping in legs prior to admission but no swelling. Current Facility-Administered Medications Medication Dose Route Frequency  dilTIAZem (CARDIZEM) injection 5 mg  5 mg IntraVENous ONCE Followed by  
[address] [place] [address] dilTIAZem (CARDIZEM) 100 mg in 0.9% sodium chloride (MBP/ADV) 100 mL infusion  0-15 mg/hr IntraVENous TITRATE  budesonide (PULMICORT) 500 mcg/2 ml nebulizer suspension  500 mcg Nebulization BID RT  
 allopurinol (ZYLOPRIM) tablet 300 mg  300 mg Oral DAILY  azithromycin (ZITHROMAX) tablet 500 mg  500 mg Oral Q MON, WED & FRI  latanoprost (XALATAN) 0.005 % ophthalmic solution 1 Drop  1 Drop Both Eyes QHS  brimonidine (ALPHAGAN) 0.2 % ophthalmic solution 1 Drop  1 Drop Both Eyes TID  ethambutol (MYAMBUTOL) tablet 2,000 mg  2,000 mg Oral Q MON, WED & FRI  nitroglycerin (NITROSTAT) tablet 0.4 mg  0.4 mg SubLINGual Q5MIN PRN  pantoprazole (PROTONIX) tablet 40 mg  40 mg Oral ACB  rifAMPin (RIFADIN) capsule 600 mg  600 mg Oral Q MON, WED & FRI  tamsulosin (FLOMAX) capsule 0.4 mg  0.4 mg Oral QHS  tiotropium (SPIRIVA) inhalation capsule 18 mcg  1 Cap Inhalation DAILY  furosemide (LASIX) injection 40 mg  40 mg IntraVENous DAILY  levalbuterol (XOPENEX) nebulizer soln 0.63 mg/3 mL  0.63 mg Nebulization Q6H RT  
 methylPREDNISolone (PF) (SOLU-MEDROL) injection 40 mg  40 mg IntraVENous Q8H  
 heparin 25,000 units in dextrose 500 mL infusion  12-25 Units/kg/hr IntraVENous TITRATE  aspirin delayed-release tablet 81 mg  81 mg Oral DAILY Review of Systems Constitutional: negative for fever, chills, sweats Cardiovascular: negative for chest pain, palpitations, syncope, edema Gastrointestinal:  negative for dysphagia, reflux, vomiting, diarrhea, abdominal pain, or melena Neurologic:  negative for focal weakness, numbness, headache Objective:  
 
Vitals:  
 07/24/18 4087 07/24/18 2991 07/24/18 0830 07/24/18 6724 BP:  (!) 142/95 125/81 (!) 128/101 Pulse:  85 100 (!) 108 Resp:  20 20 Temp:  97.8 °F (36.6 °C) 98.3 °F (36.8 °C) SpO2: 98% 98% 98% Weight:  177 lb 11.2 oz (80.6 kg) Height:      
 
Intake and Output:  
07/22 1901 - 07/24 0700 In: 360 [P.O.:360] Out: 450 [Urine:450] 07/24 0701 - 07/24 1900 In: -  
Out: 200 [Urine:200] Physical Exam:  
Constitution:  the patient is well developed and in no acute distress EENMT:  Sclera clear, pupils equal, oral mucosa moist 
Respiratory: bilateral wheezes Cardiovascular:  RRR without M,G,R 
Gastrointestinal: soft and non-tender; with positive bowel sounds. Musculoskeletal: warm without cyanosis. There is no lower leg edema. Skin:  no jaundice or rashes, no wounds Neurologic: no gross neuro deficits Psychiatric:  alert and oriented x 3 CXR:  
 
 
LAB No results for input(s): GLUCPOC in the last 72 hours. No lab exists for component: [place] Recent Labs  
   07/24/18 
 0444  07/23/18 
 1041 WBC  5.0  5.8 HGB  11.0*  11.7* HCT  35.1*  38.4* PLT  328  353 Recent Labs  
   07/24/18 
 0444  07/23/18 
 1041 NA  141  144  
K  4.3  4.4  
CL  104  108* CO2  28  28 GLU  145*  114* BUN  21  12 CREA  1.58*  1.45  
MG  2.1  2.1 CA  8.5  8.5  
TROIQ   --   0.06* ALB  2.6*  2.8* TBILI  0.4  0.2 ALT  78*  62 SGOT  55*  41* Recent Labs  
   07/23/18 
 1040 PH  7.31* PCO2  56* PO2  209* HCO3  27* No results for input(s): LCAD, LAC in the last 72 hours. Assessment:  (Medical Decision Making) Hospital Problems  Date Reviewed: 2/8/2018 Codes Class Noted POA * (Principal)COPD exacerbation ([facility] [address] ICD-10-CM: J44.1 ICD-9-CM: 491.21  7/23/2018 Yes Continue steroids and bronchodilators CHF exacerbation (HCC) ICD-10-CM: I50.9 ICD-9-CM: 428.0  7/23/2018 Yes With IV lasix ordered CHF (congestive heart failure) (HCC) ICD-10-CM: I50.9 ICD-9-CM: 428.0  7/23/2018 Yes On IV diuretics with improving creatinine A-fib [facility]) ICD-10-CM: I48.91 
ICD-9-CM: 427.31  7/23/2018 Yes On cardizem with normal BP History of cardiomyopathy ICD-10-CM: Z86.79 
ICD-9-CM: V12.59  6/22/2017 Yes Peptic ulcer disease ICD-10-CM: K27.9 ICD-9-CM: 533.90  6/1/2017 Yes  
   
 AAA (abdominal aortic aneurysm) without rupture (HCC) (Chronic) ICD-10-CM: I71.4 ICD-9-CM: 441.4  5/2/2014 Yes Overview Signed 5/2/2014 10:53 AM by Tennis [place], NP  
  3.2 on US 2/14 [facility] Hyperlipemia (Chronic) ICD-10-CM: R58.5 ICD-9-CM: 272.4  5/2/2014 Yes Plan:  (Medical Decision Making) --continue cardizem and IV diuretics --will duplex bilateral LE given pain prior to admission. Acute on CKD so contrast not an option presently. --continue MAC therapy 
--continue IV steroids at current dose. More than 50% of the time documented was spent in face-to-face contact with the patient and in the care of the patient on the floor/unit where the patient is located.  
 
Stacey Merino MD

## 2018-07-24 NOTE — PROGRESS NOTES
Bedside and Verbal shift change report given to [facility], RN (oncoming nurse) by self (offgoing nurse). Report included the following information SBAR, Kardex, MAR and Recent Results. Heparin gtt and Cardizem gtt rate verified per [facility] - hourly VS placed in chart.

## 2018-07-24 NOTE — PROGRESS NOTES
Bedside and Verbal shift change report given to Bhavana Brady RN (oncoming nurse) by self (offgoing nurse). Report included the following information SBAR, Kardex, Intake/Output, MAR, Recent Results and Med Rec Status. Heparin IV gtt verified at bedside with oncoming RN.

## 2018-07-24 NOTE — PROGRESS NOTES
Bedside and Verbal shift change report received from Kathy Kate RN (offgoing nurse). Report included the following information SBAR, Kardex, MAR and Recent Results. Heparin gtt rate verified per MAR.

## 2018-07-24 NOTE — PROGRESS NOTES
Problem: Heart Failure: Day 2  Goal: Activity/Safety  Outcome: Progressing Towards Goal  Patient educated to call for assistance OOB, call bell within reach, pt verbalizes understanding to call for assistance OOB  Goal: Consults, if ordered  Outcome: Resolved/Met Date Met: 07/24/18  Consults completed as ordered by MD  Goal: Nutrition/Diet  Outcome: Progressing Towards Goal  Patient educated on cardiac diet

## 2018-07-24 NOTE — PROGRESS NOTES
[facility], [place] CARDIOLOGY PROGRESS NOTE 
      
 
7/24/2018 10:12 AM 
 
Admit Date: 7/23/2018 Subjective:  
Recurrent AF with RVR and associated dyspnea this morning -  bpm.  
 systolic. To receive breathing Rx and will resume IV Cardizem. ROS: 
Cardiovascular:  As noted above Objective:  
  
Vitals:  
 07/24/18 5147 07/24/18 7780 07/24/18 0830 07/24/18 9013 BP:  (!) 142/95 125/81 (!) 128/101 Pulse:  85 100 (!) 108 Resp:  20 20 Temp:  97.8 °F (36.6 °C) 98.3 °F (36.8 °C) SpO2: 98% 98% 98% Weight:  80.6 kg (177 lb 11.2 oz) Height:      
 
 
Physical Exam: 
General-mild respiratory distress Neck- supple, no JVD 
CV  - rapid irregular S1S2,  no MRG Lung- diminished BS bilaterally Abd- soft, nontender, nondistended Ext- no edema bilaterally. Skin- warm and dry Data Review:  
Recent Labs  
   07/24/18 
 0444  07/23/18 
 1041 NA  141  144  
K  4.3  4.4 MG  2.1  2.1 BUN  21  12 CREA  1.58*  1.45 GLU  145*  114* WBC  5.0  5.8 HGB  11.0*  11.7* HCT  35.1*  38.4* PLT  328  353 TROIQ   --   0.06* Assessment/Plan:  
 
Principal Problem: COPD exacerbation ([address] (7/23/2018) Active Problems: 
  AAA (abdominal aortic aneurysm) without rupture ([address] (5/2/2014) Hyperlipemia (5/2/2014) Peptic ulcer disease (6/1/2017) History of cardiomyopathy (6/22/2017) CHF exacerbation ([address] (7/23/2018) A-fib ([address] (7/23/2018) - with RVR Comment:  Resume IV Cardizem for rate control. Switch heparin to po anticoagulation Tomorrow - warfarin or low dose Xarelto (15 mg daily) due to reduced renal function.   
 
 
Jeremy Castellon MD 
7/24/2018 10:12 AM

## 2018-07-24 NOTE — PROGRESS NOTES
Care Management Interventions  PCP Verified by CM: Yes (2-3 months ago )  Palliative Care Criteria Met (RRAT>21 & CHF Dx)?: No (RRAT 24 Dx COPD )  Transition of Care Consult (CM Consult): Discharge Planning  Discharge Durable Medical Equipment: No  Physical Therapy Consult: No  Occupational Therapy Consult: No  Speech Therapy Consult: No  Current Support Network: Lives Alone  Confirm Follow Up Transport: Friends  Plan discussed with Pt/Family/Caregiver: Yes  Freedom of Choice Offered: Yes  Discharge Location  Discharge Placement: Unable to determine at this time  Met with patient for d/c planning. Patient alert and oriented x 3, independent of ADL's and lives alone. He has an aide 3 hrs a day Mon-Friday and she provides him transportation for him. He has O2 at home, walker, portable O2 and nebulizer. Does not know the name of the [employer] company for his Oxygen. He states no one knows he is here and he does not have numbers for his son Marbin Falcon. There is a number in the chart for son Chema Aguilar [phone] and this was provided to patient to call him. PCP is Dr. Lauren Kelly saw 2-3 months ago. Discharge plan is home when medically stable. CM following.

## 2018-07-24 NOTE — PROGRESS NOTES
PCT notified primary RN that patient was complaining of SOB  Upon entering room, O2 sat 98% on 3L NC, /78, 's Sinus Tachycardia  Patient had audible wheezing and tachypneic with RR 24 at rest  Respiratory therapist called to bedside to complete treatment   Dr. Swann  called to bedside - orders received for Cardizem gtt (see MAR) for medication administration   [facility] set up in room to monitor HR and BP - BP cycling hourly

## 2018-07-24 NOTE — PROGRESS NOTES
Problem: Nutrition Deficit  Goal: *Optimize nutritional status  Nutrition  Reason for assessment: Referral received from nursing admission Malnutrition Screening Tool for recently lost 14-23# without trying and eating poorly due to decreased appetite. Assessment:   Diet order(s): Cardiac  Food/Nutrition Patient History:  Pt reports a 10 pound weight loss over 1 week d/t decreased appetite since he has been sick. He reports a UBW of 187 pounds. Pt lives alone and eats two meals per day (lunch and dinner). He denies any weight loss, decreased appetite, or barriers to intake prior to one week ago. H/O COPD. Anthropometrics:Height: 5' 9\" (175.3 cm),  Weight: 80.6 kg (177 lb 11.2 oz), Weight Source: Standing scale (comment), Body mass index is 26.24 kg/(m^2). BMI class of normal weight. No edema noted. WT / BMI 7/24/2018 2/8/2018 6/22/2017   WEIGHT 177 lb 11.2 oz 195 lb 188 lb   Per weights listed in EMR, potential for an 18 pound, 9.2% weight loss within 6 months. Macronutrient needs:  EER:  7424-6853 kcal /day (20-25 kcal/kg listed BW)  EPR:  73-87 grams protein/day (1-1.2 grams/kg IBW)(GFR 55)-no h/o renal insufficiency noted. Intake/Comparative Standards: No recorded meal intakes. Nutrition Diagnosis: Unintended weight loss r/t decreased ability to consume sufficient oral intake as evidenced by weight loss noted above. Intervention:  Meals and snacks: Continue current diet. Nutrition Supplement Therapy: add ensure to breakfast daily. Discharge nutrition plan: Too soon to determine.     Larry Dale [address], [address] [place] [address], [phone]

## 2018-07-24 NOTE — PROGRESS NOTES
COPD education given along with flutter valve. Dyspnea this am.  Pulmonary following. Will need PT consult.

## 2018-07-24 NOTE — PROGRESS NOTES
Problem: Falls - Risk of  Goal: *Absence of Falls  Document Raul Fall Risk and appropriate interventions in the flowsheet.    Outcome: Progressing Towards Goal  Fall Risk Interventions:            Medication Interventions: Evaluate medications/consider consulting pharmacy, Patient to call before getting OOB    Elimination Interventions: Bed/chair exit alarm, Patient to call for help with toileting needs

## 2018-07-25 PROBLEM — J96.20 ACUTE ON CHRONIC RESPIRATORY FAILURE (HCC): Status: ACTIVE | Noted: 2018-07-25

## 2018-07-25 PROBLEM — J15.8 MYCOBACTERIAL PNEUMONIA (HCC): Status: ACTIVE | Noted: 2018-07-25

## 2018-07-25 PROBLEM — A31.9 MYCOBACTERIAL PNEUMONIA (HCC): Status: ACTIVE | Noted: 2018-07-25

## 2018-07-25 LAB
ANION GAP SERPL CALC-SCNC: 12 MMOL/L (ref 7–16)
APTT PPP: 119.3 SEC (ref 23.2–35.3)
BUN SERPL-MCNC: 29 MG/DL (ref 8–23)
CALCIUM SERPL-MCNC: 8.1 MG/DL (ref 8.3–10.4)
CHLORIDE SERPL-SCNC: 102 MMOL/L (ref 98–107)
CO2 SERPL-SCNC: 27 MMOL/L (ref 21–32)
CREAT SERPL-MCNC: 1.67 MG/DL (ref 0.8–1.5)
GLUCOSE SERPL-MCNC: 110 MG/DL (ref 65–100)
MAGNESIUM SERPL-MCNC: 2.2 MG/DL (ref 1.8–2.4)
POTASSIUM SERPL-SCNC: 4.5 MMOL/L (ref 3.5–5.1)
SODIUM SERPL-SCNC: 141 MMOL/L (ref 136–145)

## 2018-07-25 PROCEDURE — 74011250637 HC RX REV CODE- 250/637: Performed by: INTERNAL MEDICINE

## 2018-07-25 PROCEDURE — 36415 COLL VENOUS BLD VENIPUNCTURE: CPT | Performed by: INTERNAL MEDICINE

## 2018-07-25 PROCEDURE — 83735 ASSAY OF MAGNESIUM: CPT | Performed by: INTERNAL MEDICINE

## 2018-07-25 PROCEDURE — 74011000250 HC RX REV CODE- 250: Performed by: INTERNAL MEDICINE

## 2018-07-25 PROCEDURE — 74011250636 HC RX REV CODE- 250/636: Performed by: INTERNAL MEDICINE

## 2018-07-25 PROCEDURE — 80048 BASIC METABOLIC PNL TOTAL CA: CPT | Performed by: INTERNAL MEDICINE

## 2018-07-25 PROCEDURE — 74011000250 HC RX REV CODE- 250: Performed by: NURSE PRACTITIONER

## 2018-07-25 PROCEDURE — 94640 AIRWAY INHALATION TREATMENT: CPT

## 2018-07-25 PROCEDURE — 74011250637 HC RX REV CODE- 250/637: Performed by: FAMILY MEDICINE

## 2018-07-25 PROCEDURE — 65660000000 HC RM CCU STEPDOWN

## 2018-07-25 PROCEDURE — 85730 THROMBOPLASTIN TIME PARTIAL: CPT | Performed by: INTERNAL MEDICINE

## 2018-07-25 PROCEDURE — 99232 SBSQ HOSP IP/OBS MODERATE 35: CPT | Performed by: INTERNAL MEDICINE

## 2018-07-25 PROCEDURE — 77010033678 HC OXYGEN DAILY

## 2018-07-25 PROCEDURE — 94760 N-INVAS EAR/PLS OXIMETRY 1: CPT

## 2018-07-25 PROCEDURE — 74011000258 HC RX REV CODE- 258: Performed by: INTERNAL MEDICINE

## 2018-07-25 RX ORDER — AMOXICILLIN 250 MG
1 CAPSULE ORAL DAILY PRN
Status: DISCONTINUED | OUTPATIENT
Start: 2018-07-25 | End: 2018-07-29 | Stop reason: HOSPADM

## 2018-07-25 RX ORDER — POLYETHYLENE GLYCOL 3350 17 G/17G
17 POWDER, FOR SOLUTION ORAL
Status: DISCONTINUED | OUTPATIENT
Start: 2018-07-25 | End: 2018-07-29 | Stop reason: HOSPADM

## 2018-07-25 RX ORDER — DILTIAZEM HYDROCHLORIDE 120 MG/1
120 CAPSULE, COATED, EXTENDED RELEASE ORAL DAILY
Status: DISCONTINUED | OUTPATIENT
Start: 2018-07-25 | End: 2018-07-26

## 2018-07-25 RX ORDER — DILTIAZEM HYDROCHLORIDE 5 MG/ML
10 INJECTION INTRAVENOUS ONCE
Status: COMPLETED | OUTPATIENT
Start: 2018-07-25 | End: 2018-07-25

## 2018-07-25 RX ORDER — LISINOPRIL 5 MG/1
10 TABLET ORAL DAILY
Status: DISCONTINUED | OUTPATIENT
Start: 2018-07-25 | End: 2018-07-29 | Stop reason: HOSPADM

## 2018-07-25 RX ADMIN — TIOTROPIUM BROMIDE 18 MCG: 18 CAPSULE ORAL; RESPIRATORY (INHALATION) at 07:22

## 2018-07-25 RX ADMIN — FUROSEMIDE 40 MG: 10 INJECTION, SOLUTION INTRAMUSCULAR; INTRAVENOUS at 08:02

## 2018-07-25 RX ADMIN — TAMSULOSIN HYDROCHLORIDE 0.4 MG: 0.4 CAPSULE ORAL at 20:32

## 2018-07-25 RX ADMIN — DILTIAZEM HYDROCHLORIDE 120 MG: 120 CAPSULE, COATED, EXTENDED RELEASE ORAL at 09:01

## 2018-07-25 RX ADMIN — AZITHROMYCIN 500 MG: 250 TABLET, FILM COATED ORAL at 16:03

## 2018-07-25 RX ADMIN — RIFAMPIN 600 MG: 300 CAPSULE ORAL at 16:03

## 2018-07-25 RX ADMIN — PANTOPRAZOLE SODIUM 40 MG: 40 TABLET, DELAYED RELEASE ORAL at 08:02

## 2018-07-25 RX ADMIN — BUDESONIDE 500 MCG: 0.5 INHALANT RESPIRATORY (INHALATION) at 07:22

## 2018-07-25 RX ADMIN — ALLOPURINOL 300 MG: 300 TABLET ORAL at 08:02

## 2018-07-25 RX ADMIN — ASPIRIN 81 MG: 81 TABLET, COATED ORAL at 08:02

## 2018-07-25 RX ADMIN — NITROGLYCERIN 0.4 MG: 0.4 TABLET, ORALLY DISINTEGRATING SUBLINGUAL at 09:23

## 2018-07-25 RX ADMIN — METHYLPREDNISOLONE SODIUM SUCCINATE 40 MG: 40 INJECTION, POWDER, FOR SOLUTION INTRAMUSCULAR; INTRAVENOUS at 20:33

## 2018-07-25 RX ADMIN — LATANOPROST 1 DROP: 50 SOLUTION OPHTHALMIC at 20:34

## 2018-07-25 RX ADMIN — LEVALBUTEROL HYDROCHLORIDE 0.63 MG: 0.63 SOLUTION RESPIRATORY (INHALATION) at 13:46

## 2018-07-25 RX ADMIN — SODIUM CHLORIDE 7.5 MG/HR: 900 INJECTION, SOLUTION INTRAVENOUS at 00:51

## 2018-07-25 RX ADMIN — BRIMONIDINE TARTRATE 1 DROP: 2 SOLUTION OPHTHALMIC at 20:34

## 2018-07-25 RX ADMIN — BUDESONIDE 500 MCG: 0.5 INHALANT RESPIRATORY (INHALATION) at 20:40

## 2018-07-25 RX ADMIN — LEVALBUTEROL HYDROCHLORIDE 0.63 MG: 0.63 SOLUTION RESPIRATORY (INHALATION) at 01:27

## 2018-07-25 RX ADMIN — METHYLPREDNISOLONE SODIUM SUCCINATE 40 MG: 40 INJECTION, POWDER, FOR SOLUTION INTRAMUSCULAR; INTRAVENOUS at 05:12

## 2018-07-25 RX ADMIN — LEVALBUTEROL HYDROCHLORIDE 0.63 MG: 0.63 SOLUTION RESPIRATORY (INHALATION) at 07:22

## 2018-07-25 RX ADMIN — LEVALBUTEROL HYDROCHLORIDE 0.63 MG: 0.63 SOLUTION RESPIRATORY (INHALATION) at 20:40

## 2018-07-25 RX ADMIN — LISINOPRIL 10 MG: 5 TABLET ORAL at 10:39

## 2018-07-25 RX ADMIN — DILTIAZEM HYDROCHLORIDE 10 MG: 5 INJECTION INTRAVENOUS at 17:06

## 2018-07-25 RX ADMIN — BRIMONIDINE TARTRATE 1 DROP: 2 SOLUTION OPHTHALMIC at 16:04

## 2018-07-25 RX ADMIN — STANDARDIZED SENNA CONCENTRATE AND DOCUSATE SODIUM 1 TABLET: 8.6; 5 TABLET, FILM COATED ORAL at 17:08

## 2018-07-25 RX ADMIN — BRIMONIDINE TARTRATE 1 DROP: 2 SOLUTION OPHTHALMIC at 08:04

## 2018-07-25 RX ADMIN — ETHAMBUTOL HYDROCHLORIDE 2000 MG: 400 TABLET, FILM COATED ORAL at 16:04

## 2018-07-25 RX ADMIN — HEPARIN SODIUM AND DEXTROSE 16 UNITS/KG/HR: 5000; 5 INJECTION INTRAVENOUS at 05:17

## 2018-07-25 RX ADMIN — METHYLPREDNISOLONE SODIUM SUCCINATE 40 MG: 40 INJECTION, POWDER, FOR SOLUTION INTRAMUSCULAR; INTRAVENOUS at 13:19

## 2018-07-25 RX ADMIN — APIXABAN 5 MG: 5 TABLET, FILM COATED ORAL at 09:01

## 2018-07-25 RX ADMIN — SODIUM CHLORIDE 10 MG/HR: 900 INJECTION, SOLUTION INTRAVENOUS at 20:29

## 2018-07-25 RX ADMIN — APIXABAN 5 MG: 5 TABLET, FILM COATED ORAL at 20:30

## 2018-07-25 NOTE — PROGRESS NOTES
Verbal bedside report received from [facility], [address] [place] [address]. Assumed care of patient. Heparin IV drip verified at bedside with outgoing RN.

## 2018-07-25 NOTE — PROGRESS NOTES
Bedside and Verbal shift change report given to [facility], [facility], RN (oncoming nurse) by self (offgoing nurse). Report included the following information SBAR, Kardex, MAR and Recent Results. Heparin gtt @ 16 units verified by myself and oncoming RN. Hourly vitals for Cardizem gtt @ 5 mg/hr placed in chart.

## 2018-07-25 NOTE — PROGRESS NOTES
[facility] CARDIOLOGY PROGRESS NOTE 
      
 
7/25/2018 8:12 AM 
 
Admit Date: 7/23/2018 Subjective:  
 shortness of breath Review of Systems Respiratory: Positive for shortness of breath. Cardiovascular: Negative for chest pain. Genitourinary: Negative for dysuria. Skin: Positive for rash. Neurological: Negative for dizziness. Objective:  
  
Vitals:  
 07/25/18 8399 07/25/18 9538 07/25/18 4474 07/25/18 8895 BP: 128/64   141/70 Pulse: (!) 108 81  83 Resp: 19 Temp: 99 °F (37.2 °C) SpO2: 100%  99% Weight: 81.5 kg (179 lb 11.2 oz) Height:      
 
 
 
Physical Exam  
Constitutional: He appears well-developed. HENT:  
Head: Normocephalic and atraumatic. Eyes: Pupils are equal, round, and reactive to light. Neck: Normal range of motion. Cardiovascular: Normal rate. No murmur heard. Pulmonary/Chest: Effort normal. He has decreased breath sounds in the right lower field and the left lower field. Abdominal: Soft. There is no tenderness. Musculoskeletal: He exhibits no tenderness. Neurological: He is alert. Skin: Skin is warm and dry. No rash noted. Psychiatric: He has a normal mood and affect. His behavior is normal.  
 
 
Data Review:  
Recent Labs  
   07/25/18 
 0345  07/24/18 
 0444  07/23/18 
 1041 NA  141  141  144  
K  4.5  4.3  4.4 MG  2.2  2.1  2.1 BUN  29*  21  12 CREA  1.67*  1.58*  1.45 GLU  110*  145*  114* WBC   --   5.0  5.8 HGB   --   11.0*  11.7* HCT   --   35.1*  38.4* PLT   --   328  353 TROIQ   --    --   0.06* Estimated Creatinine Clearance: 38.2 mL/min (based on Cr of 1.67). Intake/Output Summary (Last 24 hours) at 07/25/18 5609 Last data filed at 07/25/18 3630 Gross per 24 hour Intake                0 ml Output             1250 ml Net            -1250 ml  
 
Current Facility-Administered Medications Medication Dose Route Frequency  dilTIAZem (CARDIZEM) 100 mg in 0.9% sodium chloride (MBP/ADV) 100 mL infusion  0-15 mg/hr IntraVENous TITRATE  budesonide (PULMICORT) 500 mcg/2 ml nebulizer suspension  500 mcg Nebulization BID RT  
 allopurinol (ZYLOPRIM) tablet 300 mg  300 mg Oral DAILY  azithromycin (ZITHROMAX) tablet 500 mg  500 mg Oral Q MON, WED & FRI  latanoprost (XALATAN) 0.005 % ophthalmic solution 1 Drop  1 Drop Both Eyes QHS  brimonidine (ALPHAGAN) 0.2 % ophthalmic solution 1 Drop  1 Drop Both Eyes TID  ethambutol (MYAMBUTOL) tablet 2,000 mg  2,000 mg Oral Q MON, WED & FRI  nitroglycerin (NITROSTAT) tablet 0.4 mg  0.4 mg SubLINGual Q5MIN PRN  pantoprazole (PROTONIX) tablet 40 mg  40 mg Oral ACB  rifAMPin (RIFADIN) capsule 600 mg  600 mg Oral Q MON, WED & FRI  tamsulosin (FLOMAX) capsule 0.4 mg  0.4 mg Oral QHS  tiotropium (SPIRIVA) inhalation capsule 18 mcg  1 Cap Inhalation DAILY  furosemide (LASIX) injection 40 mg  40 mg IntraVENous DAILY  levalbuterol (XOPENEX) nebulizer soln 0.63 mg/3 mL  0.63 mg Nebulization Q6H RT  
 methylPREDNISolone (PF) (SOLU-MEDROL) injection 40 mg  40 mg IntraVENous Q8H  
 heparin 25,000 units in dextrose 500 mL infusion  12-25 Units/kg/hr IntraVENous TITRATE  aspirin delayed-release tablet 81 mg  81 mg Oral DAILY Assessment/Plan:  
 
1. COPD exacerbation felt by pulmonary 2. CHF EF now 30-35% previous echocardiogram showed normal left ventricular systolic function review of [facility] records does not reveal recent cardiology visit he is not on any guideline therapies at this time. Would initiate ACE inhibitor therapy [hypertensive] on diltiazem which will be converted to oral therapy beta blocker not initiated by previous consult team due to concern for acute COPD exacerbation. Additional ischemic workup should be completed following recovery from acute respiratory illness 3.  Atrial fibrillation paroxysmal now in sinus rhythm rate control with diltiazem as above on heparin drip which should be converted to oral anticoagulation. It does not appear patient has indications for cardiac procedures at this time. Estimated Creatinine Clearance: 38.2 mL/min (based on Cr of 1.67). Eliquis 5 mg po bid Add ace Start eliquis Start po CCB Maritza Pérez MD 
7/25/2018 8:12 AM

## 2018-07-25 NOTE — PROGRESS NOTES
Notified by Dr. Angie Callahan MD that new orders have been placed for po Eliquis and po cardizem, d/c heparin and cardizem gtt.

## 2018-07-25 NOTE — PROGRESS NOTES
Bedside and Verbal shift change report given to self (oncoming nurse) by Karen Duong RN (offgoing nurse). Report included the following information SBAR, Kardex, MAR and Recent Results.

## 2018-07-25 NOTE — PROGRESS NOTES
Roel Pham. Admission Date: 7/23/2018 Daily Progress Note: 7/25/2018 The patient's chart is reviewed and the patient is discussed with the staff. Roel King Is a 78yoM with h/o GOLD IV COPD on 3lpm, HTN, prior prostate ca, BPH and MAC followed by Dr. Mariana Ashton at [facility]. In [facility] he was found to have some new SAMANTHA nodules with spiculation and is s/p CT-guided biopsy of SAMANTHA nodule revealed MAC. He was started on ETB/azithro/rif. On 7/23 he was admitted to [address] [place] [address] with worsening dyspnea on exertion,hypertension, BNP elevation, rapid atrial fibrillation requiring cardizem drip. He is being treated for COPD exacerbation as well as rapid atrial fibrillation with pulmonary edema. Subjective:  
 
Remains a little dyspneic. Some wheezing which is chronic per pt. Sputum is white. Remains in afib. Pt's inhaler at bedside- encourage him not to use it as he is already getting nebs. Current Facility-Administered Medications Medication Dose Route Frequency  lisinopril (PRINIVIL, ZESTRIL) tablet 10 mg  10 mg Oral DAILY  apixaban (ELIQUIS) tablet 5 mg  5 mg Oral Q12H  
 dilTIAZem CD (CARDIZEM CD) capsule 120 mg  120 mg Oral DAILY  dilTIAZem (CARDIZEM) 100 mg in 0.9% sodium chloride (MBP/ADV) 100 mL infusion  0-15 mg/hr IntraVENous TITRATE  budesonide (PULMICORT) 500 mcg/2 ml nebulizer suspension  500 mcg Nebulization BID RT  
 allopurinol (ZYLOPRIM) tablet 300 mg  300 mg Oral DAILY  azithromycin (ZITHROMAX) tablet 500 mg  500 mg Oral Q MON, WED & FRI  latanoprost (XALATAN) 0.005 % ophthalmic solution 1 Drop  1 Drop Both Eyes QHS  brimonidine (ALPHAGAN) 0.2 % ophthalmic solution 1 Drop  1 Drop Both Eyes TID  ethambutol (MYAMBUTOL) tablet 2,000 mg  2,000 mg Oral Q MON, WED & FRI  nitroglycerin (NITROSTAT) tablet 0.4 mg  0.4 mg SubLINGual Q5MIN PRN  pantoprazole (PROTONIX) tablet 40 mg  40 mg Oral ACB  rifAMPin (RIFADIN) capsule 600 mg  600 mg Oral Q MON, WED & FRI  tamsulosin (FLOMAX) capsule 0.4 mg  0.4 mg Oral QHS  tiotropium (SPIRIVA) inhalation capsule 18 mcg  1 Cap Inhalation DAILY  furosemide (LASIX) injection 40 mg  40 mg IntraVENous DAILY  levalbuterol (XOPENEX) nebulizer soln 0.63 mg/3 mL  0.63 mg Nebulization Q6H RT  
 methylPREDNISolone (PF) (SOLU-MEDROL) injection 40 mg  40 mg IntraVENous Q8H  
 aspirin delayed-release tablet 81 mg  81 mg Oral DAILY Review of Systems Constitutional: negative for fever, chills, sweats Cardiovascular: negative for chest pain, palpitations, syncope, edema Gastrointestinal:  negative for dysphagia, reflux, vomiting, diarrhea, abdominal pain, or melena Neurologic:  negative for focal weakness, numbness, headache Objective:  
 
Vitals:  
 07/25/18 9144 07/25/18 9674 07/25/18 1527 07/25/18 0330 BP: 128/64   141/70 Pulse: (!) 108 81  83 Resp: 19 Temp: 99 °F (37.2 °C) SpO2: 100%  99% Weight: 179 lb 11.2 oz (81.5 kg) Height:      
 
Intake and Output:  
07/23 1901 - 07/25 0700 In: 360 [P.O.:360] Out: 1700 [Urine:1700] Physical Exam:  
Constitution:  the patient is well developed and in no acute distress EENMT:  Sclera clear, pupils equal, oral mucosa moist 
Respiratory: bilateral wheezes with prolonged exp phase Cardiovascular:  iRR without M,G,R 
Gastrointestinal: soft and non-tender; with positive bowel sounds. Musculoskeletal: warm without cyanosis. There is no lower leg edema. Skin:  no jaundice or rashes, no wounds Neurologic: no gross neuro deficits Psychiatric:  alert and oriented x 3 CXR:  
 
 
LAB No results for input(s): GLUCPOC in the last 72 hours. No lab exists for component: [place] Recent Labs  
   07/24/18 
 0444  07/23/18 
 1041 WBC  5.0  5.8 HGB  11.0*  11.7* HCT  35.1*  38.4* PLT  328  353 Recent Labs  
   07/25/18 
 0345  07/24/18 
 0444  07/23/18 
 1041 NA  141  141  144  
K  4.5  4.3  4.4  
CL  102 104  108* CO2  27  28  28 GLU  110*  145*  114* BUN  29*  21  12 CREA  1.67*  1.58*  1.45  
MG  2.2  2.1  2.1 CA  8.1*  8.5  8.5  
TROIQ   --    --   0.06* ALB   --   2.6*  2.8* TBILI   --   0.4  0.2 ALT   --   78*  62 SGOT   --   55*  41* Recent Labs  
   07/23/18 
 1040 PH  7.31* PCO2  56* PO2  209* HCO3  27* No results for input(s): LCAD, LAC in the last 72 hours. Venous US: IMPRESSION:   
 
No deep venous thrombosis identified. Assessment:  (Medical Decision Making) Hospital Problems  Date Reviewed: 2/8/2018 Codes Class Noted POA * (Principal)COPD exacerbation ([facility] ICD-10-CM: J44.1 ICD-9-CM: 491.21  7/23/2018 Yes Continue steroids and bronchodilators CHF exacerbation (HCC) ICD-10-CM: I50.9 ICD-9-CM: 428.0  7/23/2018 Yes On lasix; creatinine rising. CHF (congestive heart failure) (HCC) ICD-10-CM: I50.9 ICD-9-CM: 428.0  7/23/2018 Yes On IV diuretics with improving creatinine A-fib [facility]) ICD-10-CM: I48.91 
ICD-9-CM: 427.31  7/23/2018 Yes On cardizem with normal BP History of cardiomyopathy ICD-10-CM: Z86.79 
ICD-9-CM: V12.59  6/22/2017 Yes Peptic ulcer disease ICD-10-CM: K27.9 ICD-9-CM: 533.90  6/1/2017 Yes  
   
 AAA (abdominal aortic aneurysm) without rupture (HCC) (Chronic) ICD-10-CM: I71.4 ICD-9-CM: 441.4  5/2/2014 Yes Overview Signed 5/2/2014 10:53 AM by Cruz Dockery NP  
  3.2 on US 2/14 [facility] Hyperlipemia (Chronic) ICD-10-CM: V00.1 ICD-9-CM: 272.4  5/2/2014 Yes Plan:  (Medical Decision Making) Try to wean steroids a little to avoid rising BUN. Wean oxygen. Mobilize. Continue RON Rx TIW. Watch renal function with pt on diuretics and ACE. More than 50% of the time documented was spent in face-to-face contact with the patient and in the care of the patient on the floor/unit where the patient is located.  
 
Maria Alejandra Hood MD

## 2018-07-25 NOTE — PROGRESS NOTES
Verbal bedside report given to Corinna Huddleston oncoming RN. Patient's situation, background, assessment and recommendations provided. Opportunity for questions provided. Oncoming RN assumed care of patient.

## 2018-07-25 NOTE — PROGRESS NOTES
Problem: Falls - Risk of  Goal: *Absence of Falls  Document Raul Fall Risk and appropriate interventions in the flowsheet. Outcome: Progressing Towards Goal  Fall Risk Interventions:            Medication Interventions: Teach patient to arise slowly, Patient to call before getting OOB, Bed/chair exit alarm    Elimination Interventions: Bed/chair exit alarm, Call light in reach       Instructed patient to call for assistance before getting up due to IV pole and oxygen tubing. Patient voiced understanding.

## 2018-07-25 NOTE — PROGRESS NOTES
Patient's 's-150's. Marla Printers, NP notified and new orders received to give 10 mg Cardizem bolus and to restart cardizem gtt at 10 mg/hr.

## 2018-07-25 NOTE — PROGRESS NOTES
Hospitalist H&P Note Admit Date:  2018 10:11 AM  
Name:  Daisy Huff. Age:  78 y.o. 
:  1940 MRN:  [id number] PCP:  Cielo Morris MD 
Treatment Team: Attending Provider: David Ordonez MD; Consulting Provider: Shoshana Cuellar MD; Consulting Provider: Jan San MD; Utilization Review: Whit Hoffman RN; Transitional Nurse Navigator: Mohini Mansfield RN; Care Manager: Italo Maradiaga RN 
 
HPI:  
CC:  Shortness of breath Pt is a 79 yo male with PMH of COPD on 3 liters, tobacco abuse, HTN, prostate CA, manolo polyp, BPH. Pt was recently diagnosed with a MAC infection. Pt presented to the ER with shortness of breath that has gotten progressively worse x 3 days. Pt noted that he had cough, wheezing, sputum, and fevers, as well as edema. He also had palpitations and has a history of intermittent Afib. He was on Coumadin in the past but was stopped and now just takes aspirin. For the MAC infection pt is following at [facility] and is being treated with ehtambutol, azithromycin, and rifampin. On arrival to the ER his BP was markedly elevated at 180/90, tachycardic at 135, oxygen sat at 95%, tachypnea at 26. Pt was awake and alert, had wheezes throughout. Pt was placed on BiPAP initially. Procal was 0.2, BNP elevated at 932. ABG was done and a chest xray which showed cardiomegaly and lungs clear. EKG showed Afib with RVR. IV solu medrol and on cardizem and heparin drips. Pt resting comfortably, does report that he feels constipated. Denies any pain. Short of breath with minimal activity. Per cardiology pt EF is now 30-35%. Transitioned from heparin drip to apixaban and diltiazem being changed from IV to PO, adding lisinopril. 10 systems reviewed and negative except as noted in HPI. Past Medical History:  
Diagnosis Date  A-fib ([facility] 2014  AAA (abdominal aortic aneurysm) without rupture ([facility] 2014  
 [facility]  Aneurysm ([facility]  Arthritis  Cancer ([facility]   
 hx prostate cancer  Chronic lung disease  COPD   
 COPD [facility] obstructive pulmonary [facility] 2014  Elevated prostate specific antigen (PSA)  Glaucoma 2014  Heart disease  Heart failure ([facility]  Hypercholesterolemia  Hyperlipemia 2014  Hypertension  Hypertrophy of prostate with urinary obstruction and other lower urinary tract symptoms (LUTS)  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[facility] 2018  OA (osteoarthritis) 2014  Peptic ulcer disease 2017  Poor historian  Prostate cancer ([facility]  Pulmonary embolus ([facility] 2017  Supplemental oxygen dependent 2014  Unspecified disorder of prostate  Warfarin anticoagulation 2014 Past Surgical History:  
Procedure Laterality Date  HX HEART CATHETERIZATION Allergies Allergen Reactions  Statins-Hmg-Coa Reductase Inhibitors Myalgia Muscle pain Social History Substance Use Topics  Smoking status: Former Smoker Packs/day: 2.00 Years: 40.00 Quit date: 2014  Smokeless tobacco: Never Used Comment: still taking Chantix  Alcohol use No  
  
Family History Problem Relation Age of Onset  Cancer Mother  Heart Disease Father There is no immunization history on file for this patient. PTA Medications: 
Prior to Admission Medications Prescriptions Last Dose Informant Patient Reported? Taking? LEUPROLIDE ACETATE (ELIGARD SC)   Yes No  
Si mg by SubCUTAneous route. OXYGEN-AIR DELIVERY SYSTEMS   Yes No  
Sig: Use as instructed. Use as instructed. albuterol (VENTOLIN HFA) 90 mcg/actuation inhaler   Yes No  
Sig: Take  by inhalation. allopurinol (ZYLOPRIM) 300 mg tablet   Yes No  
Sig: Take  by mouth daily. amLODIPine (NORVASC) 10 mg tablet   Yes No  
Sig: Take  by mouth daily. aspirin (ASPIRIN) 325 mg tablet   Yes No  
Sig: Take 325 mg by mouth daily.   
azithromycin (ZITHROMAX) 500 mg tab   Yes Yes Sig: Take 1 tablet every Mon, Wed, Fri  
bimatoprost (LUMIGAN) 0.01 % ophthalmic drops   Yes No  
Sig: Administer 1 Drop to both eyes every evening. brimonidine (ALPHAGAN P) 0.1 % ophthalmic solution   Yes No  
Sig: every eight (8) hours. cholecalciferol, vitamin D3, 2,000 unit tab   Yes No  
Sig: Take  by mouth.  
ethambutol (MYAMBUTOL) 400 mg tablet   Yes Yes Sig: Take 5 tabs every mon, wed, fri  
ferrous sulfate (IRON) 325 mg (65 mg iron) tablet   Yes No  
Sig: Take  by mouth Daily (before breakfast). fluticasone-salmeterol (ADVAIR) 250-50 mcg/dose diskus inhaler   Yes Yes Sig: Take 1 Puff by inhalation. nitroglycerin (NITROSTAT) 0.4 mg SL tablet   No No  
Si Tab by SubLINGual route every five (5) minutes as needed for Chest Pain. nitroglycerin (NITROSTAT) 0.4 mg SL tablet   No No  
Si Tab by SubLINGual route every five (5) minutes as needed for Chest Pain. omeprazole (PRILOSEC) 20 mg capsule   Yes No  
Sig: Take 20 mg by mouth daily. potassium chloride SR (KLOR-CON 10) 10 mEq tablet   Yes No  
Sig: Take 20 mEq by mouth two (2) times a day. rifAMPin (RIFADIN) 300 mg capsule   Yes Yes Sig: Take 2 tabs every mon, wed, fri  
rosuvastatin (CRESTOR) 40 mg tablet   Yes No  
Sig: Take 40 mg by mouth nightly. tamsulosin (FLOMAX) 0.4 mg capsule   No No  
Sig: TAKE ONE CAPSULE BY MOUTH EVERY DAY  
tiotropium (SPIRIVA WITH HANDIHALER) 18 mcg inhalation capsule   Yes No  
Sig: Take 1 Cap by inhalation daily. triamterene-hydrochlorothiazide (DYAZIDE) 37.5-25 mg per capsule   Yes No  
Sig: Take  by mouth daily. Facility-Administered Medications: None Objective:  
 
Patient Vitals for the past 24 hrs: 
 Temp Pulse Resp BP SpO2  
18 1039 - 82 - 123/65 -  
18 0948 98.1 °F (36.7 °C) 86 18 134/84 94 %  
18 0928 - 96 - 125/60 -  
18 0925 - - - - 92 %  
18 0923 - 84 - 123/62 -  
18 09 - 90 - 134/ -  
18 08 - 83 - 141/70 -  
07/25/18 0722 - - - - 99 % 07/25/18 0453 - 81 - - -  
07/25/18 0445 99 °F (37.2 °C) (!) 108 19 128/64 100 % 07/25/18 0040 98 °F (36.7 °C) 80 19 123/74 100 % 07/24/18 2037 97.8 °F (36.6 °C) 94 20 140/72 91 %  
07/24/18 1930 - - - - 93 % 07/24/18 1740 99.1 °F (37.3 °C) 95 19 157/75 96 %  
07/24/18 1459 - - - - 94 %  
07/24/18 1331 - 84 - 129/68 - Oxygen Therapy O2 Sat (%): 94 % (07/25/18 0948) Pulse via Oximetry: 79 beats per minute (07/25/18 0722) O2 Device: Nasal cannula (07/25/18 1215) O2 Flow Rate (L/min): 2 l/min (07/25/18 1215) FIO2 (%): 30 % (07/23/18 1115) Intake/Output Summary (Last 24 hours) at 07/25/18 1327 Last data filed at 07/25/18 1041 Gross per 24 hour Intake              240 ml Output             1400 ml Net            -1160 ml Physical Exam: 
General:    Well nourished. Alert. Eyes:   Normal sclera. Extraocular movements intact. ENT:  Normocephalic, atraumatic. Moist mucous membranes Neck:  Supple, no lymphadenopathy or JVD 
CV:   Pt tachycardic at times currently 80's Lungs:  Decreased BS in bases Abdomen: Soft, nontender, nondistended. +BS. Extremities: Warm and dry. No cyanosis or edema. Neurologic: CN II-XII grossly intact. Sensation intact. Skin:     No rashes or jaundice. Normal coloration Psych:  Normal mood and affect. I reviewed the labs, imaging, EKGs, telemetry, and other studies done this admission. Data Review:  
Recent Results (from the past 24 hour(s)) PTT Collection Time: 07/24/18  8:37 PM  
Result Value Ref Range aPTT 104.9 (HH) 23.2 - 35.3 SEC  
PTT Collection Time: 07/25/18  3:45 AM  
Result Value Ref Range aPTT 119.3 (HH) 23.2 - 35.3 SEC MAGNESIUM Collection Time: 07/25/18  3:45 AM  
Result Value Ref Range Magnesium 2.2 1.8 - 2.4 mg/dL METABOLIC PANEL, BASIC Collection Time: 07/25/18  3:45 AM  
Result Value Ref Range Sodium 141 136 - 145 mmol/L  Potassium 4.5 3.5 - 5.1 mmol/L  
 Chloride 102 98 - 107 mmol/L  
 CO2 27 21 - 32 mmol/L Anion gap 12 7 - 16 mmol/L Glucose 110 (H) 65 - 100 mg/dL BUN 29 (H) 8 - 23 MG/DL Creatinine 1.67 (H) 0.8 - 1.5 MG/DL  
 GFR est AA 52 (L) >60 ml/min/1.73m2 GFR est non-AA 43 (L) >60 ml/min/1.73m2 Calcium 8.1 (L) 8.3 - 10.4 MG/DL All Micro Results Procedure Component Value Units Date/Time CULTURE, BLOOD [[id number]] Collected:  07/23/18 1041 Order Status:  Completed Specimen:  Blood from Blood Updated:  07/25/18 3995 Special Requests: LEFT ANTECUBITAL Culture result: NO GROWTH 2 DAYS     
 CULTURE, BLOOD [[id number]] Collected:  07/23/18 1114 Order Status:  Completed Specimen:  Blood from Blood Updated:  07/25/18 9744 Special Requests: --     
  RIGHT 
HAND Culture result: NO GROWTH 2 DAYS Other Studies: No results found. Assessment and Plan:  
 
Hospital Problems as of 7/25/2018  Date Reviewed: 2/8/2018 Codes Class Noted - Resolved POA 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[facility] ICD-10-CM: J15.8, A31.9 ICD-9-CM: 482.89, 031.9  7/25/2018 - Present Yes Acute on chronic respiratory failure (HCC) ICD-10-CM: J96.20 ICD-9-CM: 518.84  7/25/2018 - Present Yes * (Principal)COPD exacerbation ([facility] ICD-10-CM: J44.1 ICD-9-CM: 491.21  7/23/2018 - Present Yes CHF exacerbation (HCC) ICD-10-CM: I50.9 ICD-9-CM: 428.0  7/23/2018 - Present Yes  
   
 CHF (congestive heart failure) (HCC) ICD-10-CM: I50.9 ICD-9-CM: 428.0  7/23/2018 - Present Yes A-fib [facility]) ICD-10-CM: I48.91 
ICD-9-CM: 427.31  7/23/2018 - Present Yes History of cardiomyopathy ICD-10-CM: Z86.79 
ICD-9-CM: V12.59  6/22/2017 - Present Yes Peptic ulcer disease ICD-10-CM: K27.9 ICD-9-CM: 533.90  6/1/2017 - Present Yes  
   
 AAA (abdominal aortic aneurysm) without rupture (HCC) (Chronic) ICD-10-CM: I71.4 ICD-9-CM: 441.4  5/2/2014 - Present Yes  Overview Signed 5/2/2014 10:53 AM by Parveen Alaniz NP 3.2 on  2/14 [facility] Hyperlipemia (Chronic) ICD-10-CM: Q95.6 ICD-9-CM: 272.4  5/2/2014 - Present Yes PLAN: 
1. COPD exacerbation. 
-Bipap initially in ER, now on NC 
-Nebulizer with xopenex and budesonide 
-IV solumedtrol 40mg IV Q 8hr 
-Continue antibx for [facility] 
-Pulmonology consult 2. Atrial fibrillation with rapid ventricular response.   
-Cardizem PO 
-Heparin drip d/c Apixaban started 
-Echocardiogram EF 30-35% 
-Cardiology following 
-Continue tele 3. CHF exacerbation.   
-Elevated BNP and has cardiomegaly in his chest x-ray 
-Lasix 40mg IV QD 
-cardiac biomarkers WNL 4. MAC infection.   
-Continue antibiotics - ethambutol, azithromycin, rifampin 5. History AAA 
-Stable, no intervention  
-Cardiology re long term anticoagulation 6. Tobacco abuse, non smoker now 
-Stable 8. History of prostate cancer and BPH 
-Continue Flomax 9. DVT 
-Heparin drip d/c 
-Start apixaban Discharge planning:  TBD 
DVT ppx: apixaban Code status:  Full Estimated LOS:  Greater than 2 midnights Risk:  high Signed: 
Sergo Roldan NP

## 2018-07-26 LAB — APTT PPP: 28.6 SEC (ref 23.2–35.3)

## 2018-07-26 PROCEDURE — 74011250637 HC RX REV CODE- 250/637: Performed by: INTERNAL MEDICINE

## 2018-07-26 PROCEDURE — 77010033678 HC OXYGEN DAILY

## 2018-07-26 PROCEDURE — 65660000000 HC RM CCU STEPDOWN

## 2018-07-26 PROCEDURE — 36415 COLL VENOUS BLD VENIPUNCTURE: CPT | Performed by: INTERNAL MEDICINE

## 2018-07-26 PROCEDURE — 99232 SBSQ HOSP IP/OBS MODERATE 35: CPT | Performed by: INTERNAL MEDICINE

## 2018-07-26 PROCEDURE — 85730 THROMBOPLASTIN TIME PARTIAL: CPT | Performed by: INTERNAL MEDICINE

## 2018-07-26 PROCEDURE — 94640 AIRWAY INHALATION TREATMENT: CPT

## 2018-07-26 PROCEDURE — 74011250636 HC RX REV CODE- 250/636: Performed by: INTERNAL MEDICINE

## 2018-07-26 PROCEDURE — 94760 N-INVAS EAR/PLS OXIMETRY 1: CPT

## 2018-07-26 PROCEDURE — 74011000250 HC RX REV CODE- 250: Performed by: INTERNAL MEDICINE

## 2018-07-26 RX ORDER — PREDNISONE 20 MG/1
20 TABLET ORAL
Status: DISCONTINUED | OUTPATIENT
Start: 2018-07-27 | End: 2018-07-27

## 2018-07-26 RX ORDER — METOPROLOL TARTRATE 50 MG/1
50 TABLET ORAL 2 TIMES DAILY
Status: DISCONTINUED | OUTPATIENT
Start: 2018-07-26 | End: 2018-07-29 | Stop reason: HOSPADM

## 2018-07-26 RX ADMIN — APIXABAN 5 MG: 5 TABLET, FILM COATED ORAL at 08:53

## 2018-07-26 RX ADMIN — BRIMONIDINE TARTRATE 1 DROP: 2 SOLUTION OPHTHALMIC at 17:16

## 2018-07-26 RX ADMIN — LEVALBUTEROL HYDROCHLORIDE 0.63 MG: 0.63 SOLUTION RESPIRATORY (INHALATION) at 13:46

## 2018-07-26 RX ADMIN — TAMSULOSIN HYDROCHLORIDE 0.4 MG: 0.4 CAPSULE ORAL at 21:41

## 2018-07-26 RX ADMIN — BUDESONIDE 500 MCG: 0.5 INHALANT RESPIRATORY (INHALATION) at 08:20

## 2018-07-26 RX ADMIN — BRIMONIDINE TARTRATE 1 DROP: 2 SOLUTION OPHTHALMIC at 08:54

## 2018-07-26 RX ADMIN — LATANOPROST 1 DROP: 50 SOLUTION OPHTHALMIC at 21:43

## 2018-07-26 RX ADMIN — LEVALBUTEROL HYDROCHLORIDE 0.63 MG: 0.63 SOLUTION RESPIRATORY (INHALATION) at 08:20

## 2018-07-26 RX ADMIN — LEVALBUTEROL HYDROCHLORIDE 0.63 MG: 0.63 SOLUTION RESPIRATORY (INHALATION) at 20:07

## 2018-07-26 RX ADMIN — METOPROLOL TARTRATE 50 MG: 50 TABLET ORAL at 17:15

## 2018-07-26 RX ADMIN — METHYLPREDNISOLONE SODIUM SUCCINATE 40 MG: 40 INJECTION, POWDER, FOR SOLUTION INTRAMUSCULAR; INTRAVENOUS at 06:03

## 2018-07-26 RX ADMIN — ALLOPURINOL 300 MG: 300 TABLET ORAL at 08:53

## 2018-07-26 RX ADMIN — PANTOPRAZOLE SODIUM 40 MG: 40 TABLET, DELAYED RELEASE ORAL at 08:53

## 2018-07-26 RX ADMIN — ASPIRIN 81 MG: 81 TABLET, COATED ORAL at 08:53

## 2018-07-26 RX ADMIN — TIOTROPIUM BROMIDE 18 MCG: 18 CAPSULE ORAL; RESPIRATORY (INHALATION) at 08:20

## 2018-07-26 RX ADMIN — FUROSEMIDE 40 MG: 10 INJECTION, SOLUTION INTRAMUSCULAR; INTRAVENOUS at 08:54

## 2018-07-26 RX ADMIN — APIXABAN 5 MG: 5 TABLET, FILM COATED ORAL at 21:41

## 2018-07-26 RX ADMIN — METOPROLOL TARTRATE 50 MG: 50 TABLET ORAL at 10:00

## 2018-07-26 RX ADMIN — BRIMONIDINE TARTRATE 1 DROP: 2 SOLUTION OPHTHALMIC at 21:43

## 2018-07-26 RX ADMIN — BUDESONIDE 500 MCG: 0.5 INHALANT RESPIRATORY (INHALATION) at 20:07

## 2018-07-26 RX ADMIN — LISINOPRIL 10 MG: 5 TABLET ORAL at 08:53

## 2018-07-26 NOTE — PROGRESS NOTES
[facility] CARDIOLOGY PROGRESS NOTE 
      
 
7/26/2018 9:24 AM 
 
Admit Date: 7/23/2018 Subjective:  
Denies cp or palpitations. Notes short of breath with minimal activity though improving. ROS: 
Cardiovascular:  As noted above Objective:  
  
Vitals:  
 07/26/18 4580 07/26/18 2345 07/26/18 8546 07/26/18 3026 BP: 117/63 124/70  127/71 Pulse: 68 76  94 Resp: 18 18 Temp: 97.6 °F (36.4 °C) 97.5 °F (36.4 °C) SpO2: 99% 98% (!) 86% Weight:  179 lb 4.8 oz (81.3 kg) Height:      
 
 
Physical Exam: 
General-No Acute Distress Neck- supple, no JVD 
CV- irregular, distant Lung- diminished throughout, no wheeze or rales Ext- trace edema bilaterally. Skin- warm and dry Data Review:  
Recent Labs  
   07/25/18 
 0345  07/24/18 
 0444  07/23/18 
 1041 NA  141  141  144  
K  4.5  4.3  4.4 MG  2.2  2.1  2.1 BUN  29*  21  12 CREA  1.67*  1.58*  1.45 GLU  110*  145*  114* WBC   --   5.0  5.8 HGB   --   11.0*  11.7* HCT   --   35.1*  38.4* PLT   --   328  353 TROIQ   --    --   0.06* Assessment/Plan:  
 
Principal Problem: COPD exacerbation [facility] (7/23/2018) Per primary team 
 
Active Problems: 
  AAA (abdominal aortic aneurysm) without rupture ([facility] (5/2/2014) Followed serially by S vascular, 4 cm, asymptomatic Hyperlipemia (5/2/2014) Peptic ulcer disease (6/1/2017) History of cardiomyopathy (6/22/2017) CHF exacerbation ([facility] (7/23/2018) EF 30-35%, apparently first time documented. Stop diltiazem, switch to cardioselective beta blocker metoprolol, change to once daily form once dose stabilized. Lisinopril was added yesterday. Eventually will need ischemic evaluation as outpatient, no ischemic symptoms at this time CHF (congestive heart failure) ([facility] (7/23/2018) A-fib ([facility] (7/23/2018) See above, rate controlled, switch to BB, wean off IV dilt and stop oral diltiazem.  Eliquis was added yesterday 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[facility] (7/25/2018) Per primary team 
 
  Acute on chronic respiratory failure ([facility] (7/25/2018) Julián Celis MD 
7/26/2018 9:24 AM

## 2018-07-26 NOTE — PROGRESS NOTES
Verbal bedside report received from Mary Cuevas, [facility]. Assumed care of patient.

## 2018-07-26 NOTE — PROGRESS NOTES
Amina Bowser. Admission Date: 7/23/2018 Daily Progress Note: 7/26/2018 The patient's chart is reviewed and the patient is discussed with the staff. Amina Arreola Is a 78yoM with h/o GOLD IV COPD on 3lpm, HTN, prior prostate ca, BPH and MAC followed by Dr. Nico Nuno at [address] In [facility] he was found to have some new SAMANTHA nodules with spiculation and is s/p CT-guided biopsy of SAMANTHA nodule revealed MAC. He was started on ETB/azithro/rif. On 7/23 he was admitted to [facility] with worsening dyspnea on exertion,hypertension, BNP elevation, rapid atrial fibrillation requiring cardizem drip. He is being treated for COPD exacerbation as well as rapid atrial fibrillation with pulmonary edema. Subjective:  
Has been weaned to 2lpm with O2 sat of 97%. HR is controlled with rate of 76bpm. 
 
Current Facility-Administered Medications Medication Dose Route Frequency  metoprolol tartrate (LOPRESSOR) tablet 50 mg  50 mg Oral BID  lisinopril (PRINIVIL, ZESTRIL) tablet 10 mg  10 mg Oral DAILY  apixaban (ELIQUIS) tablet 5 mg  5 mg Oral Q12H  polyethylene glycol (MIRALAX) packet 17 g  17 g Oral DAILY PRN  
 senna-docusate (PERICOLACE) 8.6-50 mg per tablet 1 Tab  1 Tab Oral DAILY PRN  
 dilTIAZem (CARDIZEM) 100 mg in 0.9% sodium chloride (MBP/ADV) 100 mL infusion  0-15 mg/hr IntraVENous TITRATE  budesonide (PULMICORT) 500 mcg/2 ml nebulizer suspension  500 mcg Nebulization BID RT  
 allopurinol (ZYLOPRIM) tablet 300 mg  300 mg Oral DAILY  azithromycin (ZITHROMAX) tablet 500 mg  500 mg Oral Q MON, WED & FRI  latanoprost (XALATAN) 0.005 % ophthalmic solution 1 Drop  1 Drop Both Eyes QHS  brimonidine (ALPHAGAN) 0.2 % ophthalmic solution 1 Drop  1 Drop Both Eyes TID  ethambutol (MYAMBUTOL) tablet 2,000 mg  2,000 mg Oral Q MON, WED & FRI  nitroglycerin (NITROSTAT) tablet 0.4 mg  0.4 mg SubLINGual Q5MIN PRN  pantoprazole (PROTONIX) tablet 40 mg  40 mg Oral ACB  rifAMPin (RIFADIN) capsule 600 mg  600 mg Oral Q MON, WED & FRI  tamsulosin (FLOMAX) capsule 0.4 mg  0.4 mg Oral QHS  tiotropium (SPIRIVA) inhalation capsule 18 mcg  1 Cap Inhalation DAILY  furosemide (LASIX) injection 40 mg  40 mg IntraVENous DAILY  levalbuterol (XOPENEX) nebulizer soln 0.63 mg/3 mL  0.63 mg Nebulization Q6H RT  
 methylPREDNISolone (PF) (SOLU-MEDROL) injection 40 mg  40 mg IntraVENous Q8H  
 aspirin delayed-release tablet 81 mg  81 mg Oral DAILY Review of Systems Constitutional: negative for fever, chills, sweats Cardiovascular: negative for chest pain, palpitations, syncope, edema Gastrointestinal:  negative for dysphagia, reflux, vomiting, diarrhea, abdominal pain, or melena Neurologic:  negative for focal weakness, numbness, headache Objective:  
 
Vitals:  
 07/26/18 0822 07/26/18 0853 07/26/18 0900 07/26/18 1000 BP:  127/71  128/63 Pulse:  94  76 Resp:  16 16 Temp:  98 °F (36.7 °C) 98 °F (36.7 °C) SpO2: (!) 86% 97% 97% Weight:      
Height:      
 
Intake and Output:  
07/24 1901 - 07/26 0700 In: 240 [P.O.:240] Out: 1800 [Urine:1800] 07/26 0701 - 07/26 1900 In: -  
Out: 880 [RCAIK:860] Physical Exam:  
Constitution:  the patient is well developed and in no acute distress EENMT:  Sclera clear, pupils equal, oral mucosa moist 
Respiratory: bilateral wheezes with prolonged exp phase Cardiovascular:  iRR without M,G,R 
Gastrointestinal: soft and non-tender; with positive bowel sounds. Musculoskeletal: warm without cyanosis. There is no lower leg edema. Skin:  no jaundice or rashes, no wounds Neurologic: no gross neuro deficits Psychiatric:  alert and oriented x 3 CXR:  
 
 
LAB No results for input(s): GLUCPOC in the last 72 hours. No lab exists for component: [place] Recent Labs  
   07/24/18 
 0444 WBC  5.0 HGB  11.0*  
HCT  35.1*  
PLT  328 Recent Labs  
   07/25/18 
 0345  07/24/18 
 0444 NA  141  141 K 4.5  4.3 CL  102  104 CO2  27  28 GLU  110*  145* BUN  29*  21  
CREA  1.67*  1.58* MG  2.2  2.1 CA  8.1*  8.5 ALB   --   2.6* TBILI   --   0.4 ALT   --   78* SGOT   --   55* No results for input(s): PH, PCO2, PO2, HCO3 in the last 72 hours. No results for input(s): LCAD, LAC in the last 72 hours. Venous US: IMPRESSION:   
 
No deep venous thrombosis identified. Assessment:  (Medical Decision Making) Hospital Problems  Date Reviewed: 2/8/2018 Codes Class Noted POA * (Principal)COPD exacerbation ([facility] ICD-10-CM: J44.1 ICD-9-CM: 491.21  7/23/2018 Yes D/C iv steroids. Transition to prednisone 20mg taper. CHF exacerbation (HCC) ICD-10-CM: I50.9 ICD-9-CM: 428.0  7/23/2018 Yes On lasix; creatinine rising. CHF (congestive heart failure) (HCC) ICD-10-CM: I50.9 ICD-9-CM: 428.0  7/23/2018 Yes On IV diuretics with improving creatinine A-fib [facility]) ICD-10-CM: I48.91 
ICD-9-CM: 427.31  7/23/2018 Yes On cardizem with normal BP History of cardiomyopathy ICD-10-CM: Z86.79 
ICD-9-CM: V12.59  6/22/2017 Yes Peptic ulcer disease ICD-10-CM: K27.9 ICD-9-CM: 533.90  6/1/2017 Yes  
   
 AAA (abdominal aortic aneurysm) without rupture (HCC) (Chronic) ICD-10-CM: I71.4 ICD-9-CM: 441.4  5/2/2014 Yes Overview Signed 5/2/2014 10:53 AM by Sabrina Gray NP  
  3.2 on US 2/14 [facility] Hyperlipemia (Chronic) ICD-10-CM: Q38.7 ICD-9-CM: 272.4  5/2/2014 Yes Plan:  (Medical Decision Making) Prednisone 20mg tomorrow, then 10mg x 3 days, then stop. Ambulating oximetry today with RT. OK for discharge from a pulmonary perspective. F/u at the [facility] as scheduled. More than 50% of the time documented was spent in face-to-face contact with the patient and in the care of the patient on the floor/unit where the patient is located.  
 
Saloni Arevalo MD

## 2018-07-26 NOTE — PROGRESS NOTES
Bedside and Verbal shift change report given to [facility], [facility], RN (oncoming nurse) by self (offgoing nurse). Report included the following information SBAR, Kardex, MAR and Recent Results.

## 2018-07-26 NOTE — PROGRESS NOTES
Bedside and Verbal shift change report given to self (oncoming nurse) by [facility], [facility], RN (offgoing nurse). Report included the following information SBAR, Kardex, MAR and Recent Results.

## 2018-07-26 NOTE — PROGRESS NOTES
Progress Note    2018  Admit Date: 2018 10:11 AM   NAME: Nina Sheriff. :  1940   MRN:  [id number]   Attending: Arnaud Hooper, *  PCP:  Olena Jimenez MD  Treatment Team: Attending Provider: Arnaud Hooper MD; Consulting Provider: Amisha Bean MD; Consulting Provider: Veda Canela MD; Utilization Review: Shanika Pendleton RN; Transitional Nurse Navigator: Juan Meneses RN; Care Manager: Glen Moran RN      SUBJECTIVE:     Mr. Callie Spring is a 77 yo male with PMH of COPD on 3 Liters O2 at home, tobacco abuse (stopped ), HTN, hx prostate cancer, hx on afib on coumadin at one time but now only on ASA, colon polyps, BPH who was recently dx with MAC infection at [facility] (on ehtambutol, azithromycin, rifampin). He presented with c/o SOB that had progressively worsened over 3 days. He was found to have cough, wheezing, sputum, fevers and LE edema. Pt on BiPAP initially in ED and wean to NC. BNP elevated at 932, CXR showed cardiomegaly. EKG with afib with RVR. Pt started on solu medrol, cardizem and heparin gtts. EF 30-35%. Cardiology following. Heparin gtt stopped and eliquis started. Pt on po prednisone currently. Pulmonary following. Pt reports SOB continues with minimal activity but is improving. Denies CP, n/v/d, abd pain.          Past Medical History:   Diagnosis Date    A-fib [facility]) 2014    AAA (abdominal aortic aneurysm) without rupture ([address] 2014    3.2 on US  [facility]    Aneurysm ([address]     Arthritis     Cancer ([facility]     hx prostate cancer    Chronic lung disease     COPD     COPD (chronic obstructive pulmonary disease) ([facility] 2014    Elevated prostate specific antigen (PSA)     Glaucoma 2014    Heart disease     Heart failure ([address]     Hypercholesterolemia     Hyperlipemia 2014    Hypertension     Hypertrophy of prostate with urinary obstruction and other lower urinary tract symptoms (LUTS)     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[facility] 7/25/2018    OA (osteoarthritis) 5/2/2014    Peptic ulcer disease 6/1/2017    Poor historian     Prostate cancer ([facility]     Pulmonary embolus ([facility] 6/1/2017    Supplemental oxygen dependent 5/2/2014    Unspecified disorder of prostate     Warfarin anticoagulation 5/2/2014     Recent Results (from the past 24 hour(s))   PTT    Collection Time: 07/26/18  4:33 AM   Result Value Ref Range    aPTT 28.6 23.2 - 35.3 SEC     Allergies   Allergen Reactions    Statins-Hmg-Coa Reductase Inhibitors Myalgia     Muscle pain     Current Facility-Administered Medications   Medication Dose Route Frequency Provider Last Rate Last Dose    metoprolol tartrate (LOPRESSOR) tablet 50 mg  50 mg Oral BID Jadon Ren MD   50 mg at 07/26/18 1000    lisinopril (PRINIVIL, ZESTRIL) tablet 10 mg  10 mg Oral DAILY Daniel Jasso MD   10 mg at 07/26/18 8810    apixaban (ELIQUIS) tablet 5 mg  5 mg Oral Q12H Daniel Jasso MD   5 mg at 07/26/18 0853    polyethylene glycol (MIRALAX) packet 17 g  17 g Oral DAILY PRN Ignacio Ohara MD        senna-docusate (PERICOLACE) 8.6-50 mg per tablet 1 Tab  1 Tab Oral DAILY PRN Ignacio Ohara MD   1 Tab at 07/25/18 1708    dilTIAZem (CARDIZEM) 100 mg in 0.9% sodium chloride (MBP/ADV) 100 mL infusion  0-15 mg/hr IntraVENous TITRATE Jadon Ren MD 2.5 mL/hr at 07/26/18 0400 2.5 mg/hr at 07/26/18 0400    budesonide (PULMICORT) 500 mcg/2 ml nebulizer suspension  500 mcg Nebulization BID RT Bri Nascimento MD   500 mcg at 07/26/18 0820    allopurinol (ZYLOPRIM) tablet 300 mg  300 mg Oral DAILY Bri Nascimento MD   300 mg at 07/26/18 0853    azithromycin (ZITHROMAX) tablet 500 mg  500 mg Oral Q MON, [address] Teresita Soliz R Diana Hall MD   500 mg at 07/25/18 1603    latanoprost (XALATAN) 0.005 % ophthalmic solution 1 Drop  1 Drop Both Eyes QHS Bri Nascimento MD   1 Drop at 07/25/18 2034    brimonidine (ALPHAGAN) 0.2 % ophthalmic solution 1 Drop  1 Drop Both Eyes TID Wale Caruso MD   1 Drop at 18 0854    ethambutol (MYAMBUTOL) tablet 2,000 mg  2,000 mg Oral Q MON, WED &  [address] R Lorna Severin, MD   2,000 mg at 18 1604    nitroglycerin (NITROSTAT) tablet 0.4 mg  0.4 mg SubLINGual Q5MIN PRN Wale Caruso MD   0.4 mg at 18 0709    pantoprazole (PROTONIX) tablet 40 mg  40 mg Oral ACB Wale Caruso MD   40 mg at 18 0853    rifAMPin (RIFADIN) capsule 600 mg  600 mg Oral Q MON, WED &  [address] R Lorna Severin, MD   600 mg at 18 1603    tamsulosin (FLOMAX) capsule 0.4 mg  0.4 mg Oral QHS Wale Caruso MD   0.4 mg at 18 2032    tiotropium [facility]) inhalation capsule 18 mcg  1 Cap Inhalation DAILY Wale Caruso MD   18 mcg at 18 0820    furosemide (LASIX) injection 40 mg  40 mg IntraVENous DAILY Wale Caruso MD   40 mg at 18 0854    levalbuterol (XOPENEX) nebulizer soln 0.63 mg/3 mL  0.63 mg Nebulization Q6H RT Wale Caruso MD   0.63 mg at 18 0820    methylPREDNISolone (PF) (SOLU-MEDROL) injection 40 mg  40 mg IntraVENous Q8H Nadir San MD   40 mg at 18 6355    aspirin delayed-release tablet 81 mg  81 mg Oral DAILY Wale Caruso MD   81 mg at 18 8437       Review of Systems negative with exception of pertinent positives noted above  PHYSICAL EXAM     Visit Vitals    /63    Pulse 76    Temp 98 °F (36.7 °C)    Resp 16    Ht 5' 9\" (1.753 m)    Wt 81.3 kg (179 lb 4.8 oz)    SpO2 97%    BMI 26.48 kg/m2      Temp (24hrs), Av.9 °F (36.6 °C), Min:97.2 °F (36.2 °C), Max:98.4 °F (36.9 °C)    Oxygen Therapy  O2 Sat (%): 97 % (18 0900)  Pulse via Oximetry: 85 beats per minute (18 0822)  O2 Device: Nasal cannula (18 0830)  O2 Flow Rate (L/min): 2 l/min (18 0830)  FIO2 (%): 30 % (18 1115)    Intake/Output Summary (Last 24 hours) at 18 1305 [facility] filed at 07/26/18 0850   Gross per 24 hour   Intake                0 ml   Output             1200 ml   Net            -1200 ml      General: No acute distress    Lungs: Diminished all lung fields. Resp even and non labored  Heart:  S1S2 without murmurs rubs gallops. Irregularly irregular. No edema  Abdomen: Soft, non tender, non distended. BS present  Extremities: Moves ext spontaneously. No cyanosis  Neurologic:   A/O X4. 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[facility] 07/25/2018    Acute on chronic respiratory failure ([facility] 07/25/2018    COPD exacerbation ([facility] 07/23/2018    CHF exacerbation ([facility] 07/23/2018    CHF (congestive heart failure) ([facility] 07/23/2018    A-fib ([facility] 07/23/2018    History of cardiomyopathy 06/22/2017    Peptic ulcer disease 06/01/2017    AAA (abdominal aortic aneurysm) without rupture ([facility] 05/02/2014     3.2 on  2/14 [facility]      Hyperlipemia 05/02/2014     Plan:      COPD exacerbation:  Pulmonary following. On oral prednisone now (20mg tomorrow, 10mg X3 days then stop). Continue nebulizers. Ambulating oximetry today. MAC:  Continue current tx regimen. Pulmonary following    Afib with RVR:  On Eliquis. Cardiology following. Echo with EF 30-35%. Continue lopressor. Stop cardizem gtt. Acute systolic CHF exacerbation:  Continue Lasix IV. Echo with EF 30-35%. Cardiology following. On BB, ACE-I. Will need outpatient ischemic workup.         Notes, labs, VS, diagnostic testing reviewed  Time spent with pt 20 min      DVT Prophylaxis: eliquis  Plan of Care Discussed with: Supervising MD Dr. Acosta Roland, care team, pt   Aydee Malik NP

## 2018-07-26 NOTE — PROGRESS NOTES
Verbal bedside report given to Lynnette Ya, oncoming RN. Patient's situation, background, assessment and recommendations provided. Opportunity for questions provided. Oncoming RN assumed care of patient.

## 2018-07-27 LAB
ANION GAP SERPL CALC-SCNC: 7 MMOL/L (ref 7–16)
BUN SERPL-MCNC: 34 MG/DL (ref 8–23)
CALCIUM SERPL-MCNC: 8.1 MG/DL (ref 8.3–10.4)
CHLORIDE SERPL-SCNC: 105 MMOL/L (ref 98–107)
CO2 SERPL-SCNC: 32 MMOL/L (ref 21–32)
CREAT SERPL-MCNC: 1.66 MG/DL (ref 0.8–1.5)
ERYTHROCYTE [DISTWIDTH] IN BLOOD BY AUTOMATED COUNT: 20.2 % (ref 11.9–14.6)
GLUCOSE SERPL-MCNC: 78 MG/DL (ref 65–100)
HCT VFR BLD AUTO: 35 % (ref 41.1–50.3)
HGB BLD-MCNC: 10.7 G/DL (ref 13.6–17.2)
MCH RBC QN AUTO: 24.6 PG (ref 26.1–32.9)
MCHC RBC AUTO-ENTMCNC: 30.6 G/DL (ref 31.4–35)
MCV RBC AUTO: 80.5 FL (ref 79.6–97.8)
PLATELET # BLD AUTO: 353 K/UL (ref 150–450)
PMV BLD AUTO: 10.7 FL (ref 10.8–14.1)
POTASSIUM SERPL-SCNC: 4.1 MMOL/L (ref 3.5–5.1)
RBC # BLD AUTO: 4.35 M/UL (ref 4.23–5.67)
SODIUM SERPL-SCNC: 144 MMOL/L (ref 136–145)
WBC # BLD AUTO: 9 K/UL (ref 4.3–11.1)

## 2018-07-27 PROCEDURE — 94640 AIRWAY INHALATION TREATMENT: CPT

## 2018-07-27 PROCEDURE — 74011250637 HC RX REV CODE- 250/637: Performed by: INTERNAL MEDICINE

## 2018-07-27 PROCEDURE — 85027 COMPLETE CBC AUTOMATED: CPT | Performed by: NURSE PRACTITIONER

## 2018-07-27 PROCEDURE — 99232 SBSQ HOSP IP/OBS MODERATE 35: CPT | Performed by: INTERNAL MEDICINE

## 2018-07-27 PROCEDURE — 65660000000 HC RM CCU STEPDOWN

## 2018-07-27 PROCEDURE — 74011000250 HC RX REV CODE- 250: Performed by: INTERNAL MEDICINE

## 2018-07-27 PROCEDURE — 77010033678 HC OXYGEN DAILY

## 2018-07-27 PROCEDURE — 74011636637 HC RX REV CODE- 636/637: Performed by: INTERNAL MEDICINE

## 2018-07-27 PROCEDURE — 36415 COLL VENOUS BLD VENIPUNCTURE: CPT | Performed by: NURSE PRACTITIONER

## 2018-07-27 PROCEDURE — 74011250637 HC RX REV CODE- 250/637: Performed by: NURSE PRACTITIONER

## 2018-07-27 PROCEDURE — 80048 BASIC METABOLIC PNL TOTAL CA: CPT | Performed by: NURSE PRACTITIONER

## 2018-07-27 PROCEDURE — 94760 N-INVAS EAR/PLS OXIMETRY 1: CPT

## 2018-07-27 RX ORDER — MAG HYDROX/ALUMINUM HYD/SIMETH 200-200-20
30 SUSPENSION, ORAL (FINAL DOSE FORM) ORAL
Status: DISCONTINUED | OUTPATIENT
Start: 2018-07-27 | End: 2018-07-29 | Stop reason: HOSPADM

## 2018-07-27 RX ORDER — PREDNISONE 5 MG/1
10 TABLET ORAL
Status: DISCONTINUED | OUTPATIENT
Start: 2018-07-28 | End: 2018-07-29 | Stop reason: HOSPADM

## 2018-07-27 RX ORDER — FUROSEMIDE 40 MG/1
40 TABLET ORAL 2 TIMES DAILY
Status: DISCONTINUED | OUTPATIENT
Start: 2018-07-27 | End: 2018-07-29 | Stop reason: HOSPADM

## 2018-07-27 RX ADMIN — LATANOPROST 1 DROP: 50 SOLUTION OPHTHALMIC at 20:07

## 2018-07-27 RX ADMIN — TAMSULOSIN HYDROCHLORIDE 0.4 MG: 0.4 CAPSULE ORAL at 19:59

## 2018-07-27 RX ADMIN — BRIMONIDINE TARTRATE 1 DROP: 2 SOLUTION OPHTHALMIC at 21:38

## 2018-07-27 RX ADMIN — TIOTROPIUM BROMIDE 18 MCG: 18 CAPSULE ORAL; RESPIRATORY (INHALATION) at 07:18

## 2018-07-27 RX ADMIN — ALUMINUM HYDROXIDE, MAGNESIUM HYDROXIDE, AND SIMETHICONE 30 ML: 200; 200; 20 SUSPENSION ORAL at 13:24

## 2018-07-27 RX ADMIN — METOPROLOL TARTRATE 50 MG: 50 TABLET ORAL at 08:19

## 2018-07-27 RX ADMIN — APIXABAN 5 MG: 5 TABLET, FILM COATED ORAL at 08:19

## 2018-07-27 RX ADMIN — METOPROLOL TARTRATE 50 MG: 50 TABLET ORAL at 19:58

## 2018-07-27 RX ADMIN — BRIMONIDINE TARTRATE 1 DROP: 2 SOLUTION OPHTHALMIC at 17:06

## 2018-07-27 RX ADMIN — PREDNISONE 20 MG: 20 TABLET ORAL at 08:19

## 2018-07-27 RX ADMIN — LEVALBUTEROL HYDROCHLORIDE 0.63 MG: 0.63 SOLUTION RESPIRATORY (INHALATION) at 20:25

## 2018-07-27 RX ADMIN — RIFAMPIN 600 MG: 300 CAPSULE ORAL at 17:04

## 2018-07-27 RX ADMIN — ETHAMBUTOL HYDROCHLORIDE 2000 MG: 400 TABLET, FILM COATED ORAL at 17:04

## 2018-07-27 RX ADMIN — FUROSEMIDE 40 MG: 40 TABLET ORAL at 08:19

## 2018-07-27 RX ADMIN — LISINOPRIL 10 MG: 5 TABLET ORAL at 08:19

## 2018-07-27 RX ADMIN — BRIMONIDINE TARTRATE 1 DROP: 2 SOLUTION OPHTHALMIC at 08:22

## 2018-07-27 RX ADMIN — AZITHROMYCIN 500 MG: 250 TABLET, FILM COATED ORAL at 17:04

## 2018-07-27 RX ADMIN — BUDESONIDE 500 MCG: 0.5 INHALANT RESPIRATORY (INHALATION) at 20:25

## 2018-07-27 RX ADMIN — FUROSEMIDE 40 MG: 40 TABLET ORAL at 19:58

## 2018-07-27 RX ADMIN — ALLOPURINOL 300 MG: 300 TABLET ORAL at 08:19

## 2018-07-27 RX ADMIN — LEVALBUTEROL HYDROCHLORIDE 0.63 MG: 0.63 SOLUTION RESPIRATORY (INHALATION) at 07:18

## 2018-07-27 RX ADMIN — LEVALBUTEROL HYDROCHLORIDE 0.63 MG: 0.63 SOLUTION RESPIRATORY (INHALATION) at 01:58

## 2018-07-27 RX ADMIN — ASPIRIN 81 MG: 81 TABLET, COATED ORAL at 08:19

## 2018-07-27 RX ADMIN — LEVALBUTEROL HYDROCHLORIDE 0.63 MG: 0.63 SOLUTION RESPIRATORY (INHALATION) at 11:58

## 2018-07-27 RX ADMIN — APIXABAN 5 MG: 5 TABLET, FILM COATED ORAL at 20:00

## 2018-07-27 RX ADMIN — BUDESONIDE 500 MCG: 0.5 INHALANT RESPIRATORY (INHALATION) at 07:18

## 2018-07-27 RX ADMIN — PANTOPRAZOLE SODIUM 40 MG: 40 TABLET, DELAYED RELEASE ORAL at 08:19

## 2018-07-27 NOTE — PROGRESS NOTES
Progress Note 2018 Admit Date: 2018 10:11 AM  
NAME: Crystal Price. :  1940 MRN:  [id number] Attending: Rolf Newberry, * PCP:  Leesa Lindo MD 
Treatment Team: Attending Provider: Rolf Newberry MD; Consulting Provider: Heavenly Lee MD; Utilization Review: Brodie Sanchez RN; Transitional Nurse Navigator: Marcio Leone RN; Care Manager: Krissy Hamm RN 
 
 
SUBJECTIVE:  
Mr. Elías Shaw is a 79 yo male with PMH of COPD on 3 Liters O2 at home, tobacco abuse (stopped ), HTN, hx prostate cancer, hx on afib on coumadin at one time but now only on ASA, colon polyps, BPH who was recently dx with MAC infection at [facility] (on ehtambutol, azithromycin, rifampin). He presented with c/o SOB that had progressively worsened over 3 days. He was found to have cough, wheezing, sputum, fevers and LE edema. Pt on BiPAP initially in ED and wean to NC. BNP elevated at 932, CXR showed cardiomegaly. EKG with afib with RVR. Pt started on solu medrol, cardizem and heparin gtts. EF 30-35%. Cardiology following. Heparin gtt stopped and eliquis started. Pt on po prednisone currently. Pulmonary following. Pt reports SOB improved this morning however worse after exertion to care. C/o \"heartburn\". Denies CP, n/v/d, abd pain. Past Medical History:  
Diagnosis Date  A-fib ([address] 2014  AAA (abdominal aortic aneurysm) without rupture ([address] 2014  
 3.2 on US  [facility]  Aneurysm ([address]  Arthritis  Cancer ([address]   
 hx prostate cancer  Chronic lung disease  COPD   
 COPD (chronic obstructive pulmonary disease) ([address] 2014  Elevated prostate specific antigen (PSA)  Glaucoma 2014  Heart disease  Heart failure ([address]  Hypercholesterolemia  Hyperlipemia 2014  Hypertension  Hypertrophy of prostate with urinary obstruction and other lower urinary tract symptoms (LUTS)  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  OA (osteoarthritis) 5/2/2014  Peptic ulcer disease 6/1/2017  Poor historian  Prostate cancer ([facility]  Pulmonary embolus ([facility] 6/1/2017  Supplemental oxygen dependent 5/2/2014  Unspecified disorder of prostate  Warfarin anticoagulation 5/2/2014 Recent Results (from the past 24 hour(s)) METABOLIC PANEL, BASIC Collection Time: 07/27/18  4:50 AM  
Result Value Ref Range Sodium 144 136 - 145 mmol/L Potassium 4.1 3.5 - 5.1 mmol/L Chloride 105 98 - 107 mmol/L  
 CO2 32 21 - 32 mmol/L Anion gap 7 7 - 16 mmol/L Glucose 78 65 - 100 mg/dL BUN 34 (H) 8 - 23 MG/DL Creatinine 1.66 (H) 0.8 - 1.5 MG/DL  
 GFR est AA 52 (L) >60 ml/min/1.73m2 GFR est non-AA 43 (L) >60 ml/min/1.73m2 Calcium 8.1 (L) 8.3 - 10.4 MG/DL  
CBC W/O DIFF Collection Time: 07/27/18  4:50 AM  
Result Value Ref Range WBC 9.0 4.3 - 11.1 K/uL  
 RBC 4.35 4.23 - 5.67 M/uL  
 HGB 10.7 (L) 13.6 - 17.2 g/dL HCT 35.0 (L) 41.1 - 50.3 % MCV 80.5 79.6 - 97.8 FL  
 MCH 24.6 (L) 26.1 - 32.9 PG  
 MCHC 30.6 (L) 31.4 - 35.0 g/dL RDW 20.2 (H) 11.9 - 14.6 % PLATELET 250 135 - 392 K/uL MPV 10.7 (L) 10.8 - 14.1 FL Allergies Allergen Reactions  Statins-Hmg-Coa Reductase Inhibitors Myalgia Muscle pain Current Facility-Administered Medications Medication Dose Route Frequency Provider Last Rate Last Dose  furosemide (LASIX) tablet 40 mg  40 mg Oral BID Jadon Ren MD   40 mg at 07/27/18 0819  
 [START ON 7/28/2018] predniSONE (DELTASONE) tablet 10 mg  10 mg Oral DAILY WITH BREAKFAST Jesus Bernabe MD      
 alum-mag hydroxide-[facility]) oral suspension 30 mL  30 mL Oral Q4H PRN Veda Odell, NP      
 metoprolol tartrate (LOPRESSOR) tablet 50 mg  50 mg Oral BID Jadon Ren MD   50 mg at 07/27/18 0819  
 lisinopril (PRINIVIL, ZESTRIL) tablet 10 mg  10 mg Oral DAILY Daniel Jasso MD   10 mg at 07/27/18 5541  apixaban (ELIQUIS) tablet 5 mg  5 mg Oral Q12H Daniel Jasso MD   5 mg at 07/27/18 5909  polyethylene glycol (MIRALAX) packet 17 g  17 g Oral DAILY PRN Ignacio Ohara MD      
 senna-docusate (PERICOLACE) 8.6-50 mg per tablet 1 Tab  1 Tab Oral DAILY PRN Ignacio Ohara MD   1 Tab at 07/25/18 1708  dilTIAZem (CARDIZEM) 100 mg in 0.9% sodium chloride (MBP/ADV) 100 mL infusion  0-15 mg/hr IntraVENous TITRATE Jadon Ren MD 2.5 mL/hr at 07/26/18 0400 2.5 mg/hr at 07/26/18 0400  budesonide (PULMICORT) 500 mcg/2 ml nebulizer suspension  500 mcg Nebulization BID RT Bri Nascimento MD   500 mcg at 07/27/18 8184  allopurinol (ZYLOPRIM) tablet 300 mg  300 mg Oral DAILY Bri Nascimento MD   300 mg at 07/27/18 0819  
 azithromycin (ZITHROMAX) tablet 500 mg  500 mg Oral Q MON, [address] Teresita Soliz MD   500 mg at 07/25/18 1603  latanoprost (XALATAN) 0.005 % ophthalmic solution 1 Drop  1 Drop Both Eyes QHS Nadir Hall MD   1 Drop at 07/26/18 2143  brimonidine (ALPHAGAN) 0.2 % ophthalmic solution 1 Drop  1 Drop Both Eyes TID Bri Nascimento MD   1 Drop at 07/27/18 7919  
 ethambutol (MYAMBUTOL) tablet 2,000 mg  2,000 mg Oral Q MON, WED & [address] Teresita Soliz MD   2,000 mg at 07/25/18 1604  nitroglycerin (NITROSTAT) tablet 0.4 mg  0.4 mg SubLINGual Q5MIN PRN Bri Nascimento MD   0.4 mg at 07/25/18 1678  pantoprazole (PROTONIX) tablet 40 mg  40 mg Oral ACB Nadir Hall MD   40 mg at 07/27/18 0819  
 rifAMPin (RIFADIN) capsule 600 mg  600 mg Oral Q MON, WED & [address] EstefaniTeresita Taylor MD   600 mg at 07/25/18 1603  tamsulosin (FLOMAX) capsule 0.4 mg  0.4 mg Oral QHS Bri Nascimento MD   0.4 mg at 07/26/18 2141  tiotropium (SPIRIVA) inhalation capsule 18 mcg  1 Cap Inhalation DAILY Nadir Hall MD   18 mcg at 07/27/18 4422  levalbuterol (XOPENEX) nebulizer soln 0.63 mg/3 mL  0.63 mg Nebulization Q6H RT Coretta Gooden Noelle Georges MD   0.63 mg at 18 1158  aspirin delayed-release tablet 81 mg  81 mg Oral DAILY Kelin Caldera MD   81 mg at 18 1733 Review of Systems negative with exception of pertinent positives noted above PHYSICAL EXAM  
 
Visit Vitals  BP 94/56 (BP 1 Location: Right arm, BP Patient Position: Supine)  Pulse 67  Temp 98.8 °F (37.1 °C)  Resp 18  Ht 5' 9\" (1.753 m)  Wt 81.7 kg (180 lb 1.6 oz)  SpO2 96%  BMI 26.6 kg/m2 Temp (24hrs), Av.1 °F (36.7 °C), Min:97.9 °F (36.6 °C), Max:98.8 °F (37.1 °C) Oxygen Therapy O2 Sat (%): 96 % (18 115) Pulse via Oximetry: 85 beats per minute (18 115) O2 Device: Nasal cannula (18) O2 Flow Rate (L/min): 2 l/min (18 115) FIO2 (%): 28 % (18 0718) Intake/Output Summary (Last 24 hours) at 18 1227 Last data filed at 18 2205 Gross per 24 hour Intake                0 ml Output              200 ml Net             -200 ml General:                 No acute distress   
Lungs:                    Diminished all lung fields. Resp even and non labored Heart:                      S1S2 without murmurs rubs gallops. RRR. No edema Abdomen:              Soft, non tender, non distended. BS present Extremities:           Moves ext spontaneously. No cyanosis Neurologic:           A/O X4. No focal deficits 
  
 
Results summary of Diagnostic Studies/Procedures copied from within [facility] EMR: 
 
 
ASSESSMENT Active Hospital Problems Diagnosis Date Noted  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[facility] 2018  Acute on chronic respiratory failure ([facility] 2018  COPD exacerbation ([facility] 2018  CHF exacerbation ([facility] 2018  CHF (congestive heart failure) ([facility] 2018  A-fib ([facility] 2018  History of cardiomyopathy 2017  Peptic ulcer disease 2017  AAA (abdominal aortic aneurysm) without rupture ([facility] 2014  
  3.2 on US 2/14 [facility]  Hyperlipemia 05/02/2014 Plan: 
 
  
COPD exacerbation:  Pulmonary following. On oral prednisone now (10mg X3 days then stop). Continue nebulizers.   
  
MAC:  Continue current tx regimen. Will need FU at [facility] on DC 
  
Afib with RVR:  On Eliquis. Cardiology following. Echo with EF 30-35%. Continue lopressor. Will need Cardiology FU outpatient, pt request [facility]. 
  
Acute systolic CHF exacerbation:  Transition to oral lasix today. Echo with EF 30-35%. Cardiology following. On BB, ACE-I. Will need outpatient ischemic workup.   
  
  
Likely DC in AM 
 
Notes, labs, VS, diagnostic testing reviewed Time spent with pt 20 min 
  
  
DVT Prophylaxis: eliquis Plan of Care Discussed with: Supervising MD Dr. Yoana Escobar, care team, pt Deborah Parks NP

## 2018-07-27 NOTE — PROGRESS NOTES
Jossue Sanders. Admission Date: 7/23/2018 Daily Progress Note: 7/27/2018 The patient's chart is reviewed and the patient is discussed with the staff. 78yoM with h/o GOLD IV COPD on 3lpm, HTN, prior prostate CA, BPH and MAC followed by Dr. Uzair Ac at [facility]. In MArch he was found to have some new SAMANTHA nodules with spiculation and is s/p CT-guided biopsy of SAMANTHA nodule revealed MAC. He was started on ETB/azithro/rif. On 7/23 he was admitted to [facility] with worsening dyspnea on exertion, hypertension, BNP elevation, and rapid atrial fibrillation requiring cardizem drip. He is being treated for COPD exacerbation as well as rapid atrial fibrillation with pulmonary edema. Initially required BIPAP but since weaned to [facility].  EF 30-35% Cardiology following. Transitioned from Heparin to Eliquis. Nebs and steroids added. Subjective:  
 
HR controlled. Currently on O2 at 2 lpm via NC with o2 sat 94%. Continues to feel slightly more short of breath than baseline. Denies cough or mucus production. No further palpitations. Denies chest pain. Current Facility-Administered Medications Medication Dose Route Frequency  furosemide (LASIX) tablet 40 mg  40 mg Oral BID  metoprolol tartrate (LOPRESSOR) tablet 50 mg  50 mg Oral BID  predniSONE (DELTASONE) tablet 20 mg  20 mg Oral DAILY WITH BREAKFAST  lisinopril (PRINIVIL, ZESTRIL) tablet 10 mg  10 mg Oral DAILY  apixaban (ELIQUIS) tablet 5 mg  5 mg Oral Q12H  polyethylene glycol (MIRALAX) packet 17 g  17 g Oral DAILY PRN  
 senna-docusate (PERICOLACE) 8.6-50 mg per tablet 1 Tab  1 Tab Oral DAILY PRN  
 dilTIAZem (CARDIZEM) 100 mg in 0.9% sodium chloride (MBP/ADV) 100 mL infusion  0-15 mg/hr IntraVENous TITRATE  budesonide (PULMICORT) 500 mcg/2 ml nebulizer suspension  500 mcg Nebulization BID RT  
 allopurinol (ZYLOPRIM) tablet 300 mg  300 mg Oral DAILY  azithromycin (ZITHROMAX) tablet 500 mg  500 mg Oral Q MON, WED & FRI  latanoprost (XALATAN) 0.005 % ophthalmic solution 1 Drop  1 Drop Both Eyes QHS  brimonidine (ALPHAGAN) 0.2 % ophthalmic solution 1 Drop  1 Drop Both Eyes TID  ethambutol (MYAMBUTOL) tablet 2,000 mg  2,000 mg Oral Q MON, WED & FRI  nitroglycerin (NITROSTAT) tablet 0.4 mg  0.4 mg SubLINGual Q5MIN PRN  pantoprazole (PROTONIX) tablet 40 mg  40 mg Oral ACB  rifAMPin (RIFADIN) capsule 600 mg  600 mg Oral Q MON, WED & FRI  tamsulosin (FLOMAX) capsule 0.4 mg  0.4 mg Oral QHS  tiotropium (SPIRIVA) inhalation capsule 18 mcg  1 Cap Inhalation DAILY  levalbuterol (XOPENEX) nebulizer soln 0.63 mg/3 mL  0.63 mg Nebulization Q6H RT  
 aspirin delayed-release tablet 81 mg  81 mg Oral DAILY Review of Systems Constitutional: negative for fever, chills, sweats Cardiovascular: negative for chest pain, palpitations, syncope, edema Gastrointestinal:  negative for dysphagia, reflux, vomiting, diarrhea, abdominal pain, or melena Neurologic:  negative for focal weakness, numbness, headache Objective:  
 
Vitals:  
 07/27/18 9615 07/27/18 0200 07/27/18 9081 07/27/18 9491 BP: 118/71  105/62 Pulse: (!) 58  78 Resp: 18  18 Temp: 97.9 °F (36.6 °C)  98 °F (36.7 °C) SpO2: 97% 92% 92% 94% Weight:   180 lb 1.6 oz (81.7 kg) Height:      
 
Intake and Output:  
07/25 1901 - 07/27 0700 In: -  
Out: 2779 [[id number]] Physical Exam:  
Constitution:  the patient is well developed and in no acute distress EENMT:  Sclera clear, pupils equal, oral mucosa moist 
Respiratory: diminished but CTA bilaterally, O2 at 2 lpm 
Cardiovascular:  iRRR without M,G,R 
Gastrointestinal: soft and non-tender; with positive bowel sounds. Musculoskeletal: warm without cyanosis. There is no lower leg edema. Skin:  no jaundice or rashes, no open wounds Neurologic: no gross neuro deficits Psychiatric:  alert and oriented x 3 CXR:  
7/24 LE doppler: No deep venous thrombosis identified. 7/23 7/23 TTE -  Left ventricle: The ventricle was moderately dilated. Systolic function was moderately reduced. Ejection fraction was estimated in the range of 30% to 35%. There was mild to moderate global diffuse hypokinesis. -  Left atrium: The atrium was mildly dilated. -  Right atrium: The atrium was mildly dilated. -  Inferior vena cava, hepatic veins: The inferior vena cava was dilated. The respirophasic change in diameter was more than 50%. LAB Recent Labs  
   07/27/18 
 0450 WBC  9.0 HGB  10.7* HCT  35.0*  
PLT  353 Recent Labs  
   07/27/18 
 0450  07/25/18 
 0345 NA  144  141  
K  4.1  4.5  
CL  105  102 CO2  32  27 GLU  78  110* BUN  34*  29* CREA  1.66*  1.67* MG   --   2.2 CA  8.1*  8.1* No results for input(s): PH, PCO2, PO2, HCO3 in the last 72 hours. No results for input(s): LCAD, LAC in the last 72 hours. Assessment:  (Medical Decision Making) Hospital Problems  Date Reviewed: 7/27/2018 Codes Class Noted POA 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[facility] ICD-10-CM: J15.8, A31.9 ICD-9-CM: 482.89, 031.9  7/25/2018 Yes Triple abx therapy Acute on chronic respiratory failure (HCC) ICD-10-CM: J96.20 ICD-9-CM: 518.84  7/25/2018 Yes Currently on o2 at 2 lpm - baseline o2 at 3 lpm 
  
 * (Principal)COPD exacerbation (HCC) ICD-10-CM: J44.1 ICD-9-CM: 491.21  7/23/2018 Yes No active wheezing Nebs Steroid taper CHF exacerbation (HCC) ICD-10-CM: I50.9 ICD-9-CM: 428.0  7/23/2018 Yes PO lasix per Cardiology CHF (congestive heart failure) (HCC) ICD-10-CM: I50.9 ICD-9-CM: 428.0  7/23/2018 Yes A-fib [facility]) ICD-10-CM: I48.91 
ICD-9-CM: 427.31  7/23/2018 Yes Rate controlled Eliquis History of cardiomyopathy ICD-10-CM: Z86.79 
ICD-9-CM: V12.59  6/22/2017 Yes EF 30-35% Peptic ulcer disease ICD-10-CM: K27.9 ICD-9-CM: 533.90  6/1/2017 Yes  
   
 AAA (abdominal aortic aneurysm) without rupture (HCC) (Chronic) ICD-10-CM: I71.4 ICD-9-CM: 441.4  5/2/2014 Yes  
  3.2 on US 2/14 [facility] Hyperlipemia (Chronic) ICD-10-CM: A44.8 ICD-9-CM: 272.4  5/2/2014 Yes Plan:  (Medical Decision Making) --Zithromax / ethambutol / rifampin 
--xopenex / Tessa Bough / Ria Lust --prednisone - tapering 
--lasix 40 mg po BID Creat 1.66 
 950 ml urine output / 24 hours - overall negative fluid balance 
--plans for ongoing follow up with S More than 50% of the time documented was spent in face-to-face contact with the patient and in the care of the patient on the floor/unit where the patient is located. Angelica Fernandez NP I have spoken with and examined the patient. I agree with the above assessment and plan as documented. Gen: pleasant, on home abraham O2 Lungs:  Decreased bilaterally Heart:  RRR with no Murmur/Rubs/Gallops 
 
--f/u with Dr. Alis Mckeon at [facility] within one month, continuing on chronic medications for MAC 
--Complete prednisone taper as outpatient with 10mg x 3 more days 
--[facility] will sign off. Please let us know if we can be of further assistance.  
 
Nasima Mckay MD

## 2018-07-27 NOTE — PROGRESS NOTES
Bedside and Verbal shift change report given to Wanda Hilliard RN (oncoming nurse) by self Jamison [facility] ). Report included the following information SBAR, Kardex, MAR and Recent Results.

## 2018-07-27 NOTE — PROGRESS NOTES
[facility] PROGRESS NOTE 
      
 
7/27/2018 7:52 AM 
 
Admit Date: 7/23/2018 Subjective:  
Feeling better today, breathing significantly improved, no cp or edema. ROS: 
Cardiovascular:  As noted above Objective:  
  
Vitals:  
 07/27/18 1524 07/27/18 0200 07/27/18 5868 07/27/18 2380 BP: 118/71  105/62 Pulse: (!) 58  78 Resp: 18  18 Temp: 97.9 °F (36.6 °C)  98 °F (36.7 °C) SpO2: 97% 92% 92% 94% Weight:   180 lb 1.6 oz (81.7 kg) Height:      
 
 
Physical Exam: 
General-No Acute Distress Neck- supple, no JVD 
CV- regular rate and rhythm distant heart sounds Lung- clear bilaterally Ext- no edema bilaterally. Skin- warm and dry Data Review:  
Recent Labs  
   07/27/18 
 0450  07/25/18 
 0345 NA  144  141  
K  4.1  4.5 MG   --   2.2 BUN  34*  29* CREA  1.66*  1.67* GLU  78  110* WBC  9.0   --   
HGB  10.7*   --   
HCT  35.0*   --   
PLT  353   -- Assessment/Plan:  
 
Principal Problem: COPD exacerbation ([facility] (7/23/2018) Per pulmonary, stable for discharge and follow up with Dr Alis Mckeon at [facility] Active Problems: 
  AAA (abdominal aortic aneurysm) without rupture ([facility] (5/2/2014) Followed by [facility] Vascular Hyperlipemia (5/2/2014) Peptic ulcer disease (6/1/2017) CHF (congestive heart failure) ([facility] (7/23/2018) Acute systolic, new EF 23-09%, tolerating beta blocker, ACEi. BUN beginning to climb Transition to po lasix today and anticipate discharge tomorrow. He wishes to continue his follow up at [facility], will request a TC-7 follow up at [facility] for outpatient ischemic evaluation A-fib ([facility] (7/23/2018) Spontaneously converted. Rate controlled, anticoagulated. 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[facility] (7/25/2018) Acute on chronic respiratory failure ([address] (7/25/2018) improved Kenny Rich MD 
7/27/2018 7:52 AM

## 2018-07-27 NOTE — PROGRESS NOTES
Bedside and Verbal shift change report given to self (oncoming nurse) by Marcela Barlow RN (offgoing nurse). Report included the following information SBAR, Kardex, MAR and Recent Results.

## 2018-07-28 LAB
BACTERIA SPEC CULT: NORMAL
BACTERIA SPEC CULT: NORMAL
SERVICE CMNT-IMP: NORMAL
SERVICE CMNT-IMP: NORMAL

## 2018-07-28 PROCEDURE — 74011636637 HC RX REV CODE- 636/637: Performed by: INTERNAL MEDICINE

## 2018-07-28 PROCEDURE — 65660000000 HC RM CCU STEPDOWN

## 2018-07-28 PROCEDURE — 74011250637 HC RX REV CODE- 250/637: Performed by: INTERNAL MEDICINE

## 2018-07-28 PROCEDURE — 94760 N-INVAS EAR/PLS OXIMETRY 1: CPT

## 2018-07-28 PROCEDURE — 74011250637 HC RX REV CODE- 250/637: Performed by: NURSE PRACTITIONER

## 2018-07-28 PROCEDURE — 94640 AIRWAY INHALATION TREATMENT: CPT

## 2018-07-28 PROCEDURE — 74011000250 HC RX REV CODE- 250: Performed by: INTERNAL MEDICINE

## 2018-07-28 PROCEDURE — 77010033678 HC OXYGEN DAILY

## 2018-07-28 RX ORDER — METOPROLOL TARTRATE 50 MG/1
50 TABLET ORAL 2 TIMES DAILY
Qty: 30 TAB | Refills: 0 | Status: SHIPPED | OUTPATIENT
Start: 2018-07-28 | End: 2018-09-01

## 2018-07-28 RX ORDER — PREDNISONE 10 MG/1
10 TABLET ORAL
Qty: 1 TAB | Refills: 0 | Status: ON HOLD | OUTPATIENT
Start: 2018-07-29 | End: 2018-09-01

## 2018-07-28 RX ORDER — LISINOPRIL 10 MG/1
10 TABLET ORAL DAILY
Qty: 30 TAB | Refills: 0 | Status: SHIPPED | OUTPATIENT
Start: 2018-07-29 | End: 2021-01-01

## 2018-07-28 RX ORDER — FUROSEMIDE 40 MG/1
40 TABLET ORAL DAILY
Qty: 30 TAB | Refills: 0 | Status: SHIPPED | OUTPATIENT
Start: 2018-07-28 | End: 2018-08-27

## 2018-07-28 RX ADMIN — TIOTROPIUM BROMIDE 18 MCG: 18 CAPSULE ORAL; RESPIRATORY (INHALATION) at 07:17

## 2018-07-28 RX ADMIN — BRIMONIDINE TARTRATE 1 DROP: 2 SOLUTION OPHTHALMIC at 16:25

## 2018-07-28 RX ADMIN — APIXABAN 5 MG: 5 TABLET, FILM COATED ORAL at 09:02

## 2018-07-28 RX ADMIN — BUDESONIDE 500 MCG: 0.5 INHALANT RESPIRATORY (INHALATION) at 07:17

## 2018-07-28 RX ADMIN — APIXABAN 5 MG: 5 TABLET, FILM COATED ORAL at 21:30

## 2018-07-28 RX ADMIN — METOPROLOL TARTRATE 50 MG: 50 TABLET ORAL at 09:02

## 2018-07-28 RX ADMIN — LEVALBUTEROL HYDROCHLORIDE 0.63 MG: 0.63 SOLUTION RESPIRATORY (INHALATION) at 19:40

## 2018-07-28 RX ADMIN — ALUMINUM HYDROXIDE, MAGNESIUM HYDROXIDE, AND SIMETHICONE 30 ML: 200; 200; 20 SUSPENSION ORAL at 00:29

## 2018-07-28 RX ADMIN — LATANOPROST 1 DROP: 50 SOLUTION OPHTHALMIC at 21:40

## 2018-07-28 RX ADMIN — FUROSEMIDE 40 MG: 40 TABLET ORAL at 17:13

## 2018-07-28 RX ADMIN — LEVALBUTEROL HYDROCHLORIDE 0.63 MG: 0.63 SOLUTION RESPIRATORY (INHALATION) at 14:48

## 2018-07-28 RX ADMIN — BRIMONIDINE TARTRATE 1 DROP: 2 SOLUTION OPHTHALMIC at 09:04

## 2018-07-28 RX ADMIN — ASPIRIN 81 MG: 81 TABLET, COATED ORAL at 09:02

## 2018-07-28 RX ADMIN — LISINOPRIL 10 MG: 5 TABLET ORAL at 09:02

## 2018-07-28 RX ADMIN — BRIMONIDINE TARTRATE 1 DROP: 2 SOLUTION OPHTHALMIC at 21:40

## 2018-07-28 RX ADMIN — BUDESONIDE 500 MCG: 0.5 INHALANT RESPIRATORY (INHALATION) at 19:40

## 2018-07-28 RX ADMIN — FUROSEMIDE 40 MG: 40 TABLET ORAL at 09:01

## 2018-07-28 RX ADMIN — PANTOPRAZOLE SODIUM 40 MG: 40 TABLET, DELAYED RELEASE ORAL at 05:52

## 2018-07-28 RX ADMIN — ALUMINUM HYDROXIDE, MAGNESIUM HYDROXIDE, AND SIMETHICONE 30 ML: 200; 200; 20 SUSPENSION ORAL at 16:25

## 2018-07-28 RX ADMIN — ALLOPURINOL 300 MG: 300 TABLET ORAL at 09:02

## 2018-07-28 RX ADMIN — TAMSULOSIN HYDROCHLORIDE 0.4 MG: 0.4 CAPSULE ORAL at 21:30

## 2018-07-28 RX ADMIN — PREDNISONE 10 MG: 5 TABLET ORAL at 07:49

## 2018-07-28 RX ADMIN — LEVALBUTEROL HYDROCHLORIDE 0.63 MG: 0.63 SOLUTION RESPIRATORY (INHALATION) at 02:34

## 2018-07-28 RX ADMIN — METOPROLOL TARTRATE 50 MG: 50 TABLET ORAL at 17:13

## 2018-07-28 RX ADMIN — LEVALBUTEROL HYDROCHLORIDE 0.63 MG: 0.63 SOLUTION RESPIRATORY (INHALATION) at 07:17

## 2018-07-28 NOTE — PROGRESS NOTES
Problem: Falls - Risk of 
Goal: *Absence of Falls Document Leida Vila Fall Risk and appropriate interventions in the flowsheet. Outcome: Progressing Towards Goal 
Fall Risk Interventions: 
  
 
  
 
Medication Interventions: Evaluate medications/consider consulting pharmacy, Teach patient to arise slowly Elimination Interventions: Call light in reach, Urinal in reach History of Falls Interventions: Consult care management for discharge planning Pt has not had any falls during this shift. Pt knows to call if he feels short of breath or dizzy. Problem: Patient Education: Go to Patient Education Activity Goal: Patient/Family Education Outcome: Progressing Towards Goal 
Pt educated on his medications at each medication pass. Also educated on plan of care for his breathing. Problem: Chronic Obstructive Pulmonary Disease (COPD) Goal: *Able to remain out of bed as prescribed Outcome: Progressing Towards Goal 
Pt able to get up ad shayne, has not needed assistance or increased oxygen except for one point where his anxiety was increased. Goal: *Optimize nutritional status Outcome: Progressing Towards Goal 
Pt has been eating adequate intake: breakfast, lunch, and dinner.

## 2018-07-28 NOTE — PROGRESS NOTES
Bedside and Verbal shift change report given to self & Jonnathan Ashley RN (oncoming nurse) by Krista Moss RN (offgoing nurse). Report included the following information SBAR, Kardex, ED Summary, Intake/Output, MAR, Recent Results and Cardiac Rhythm SR-SB.

## 2018-07-28 NOTE — PROGRESS NOTES
Bedside and Verbal shift change report given to Lazaro Cook RN / Liz Steel RN (oncoming nurse) by self Niles huitron). Report included the following information SBAR, Kardex, MAR and Recent Results.

## 2018-07-28 NOTE — PROGRESS NOTES
Progress Note 2018 Admit Date: 2018 10:11 AM  
NAME: Varghese Cuellar. :  1940 MRN:  [id number] Attending: Hitesh Ramos, * PCP:  Brody Webster MD 
Treatment Team: Attending Provider: Hitesh Ramos MD; Consulting Provider: Melba Mancia MD; Utilization Review: Marisela Olivares RN; Transitional Nurse Navigator: Evangelina Lozoya RN; Care Manager: Clementina Medley RN 
 
 
SUBJECTIVE:  
 
Mr. Jose L Gardner is a 79 yo male with PMH of COPD on 3 Liters O2 at home, tobacco abuse (stopped ), HTN, hx prostate cancer, hx on afib on coumadin at one time but now only on ASA, colon polyps, BPH who was recently dx with MAC infection at [facility] (on ehtambutol, azithromycin, rifampin).  He presented with c/o SOB that had progressively worsened over 3 days. Tawny Reno was found to have cough, wheezing, sputum, fevers and LE edema.  Pt on BiPAP initially in ED and wean to NC.  BNP elevated at 932, CXR showed cardiomegaly.  EKG with afib with RVR.  Pt started on solu medrol, cardizem and heparin gtts.  EF 30-35%.  Cardiology following.  Heparin gtt stopped and eliquis started.  Pt on po prednisone currently. Pulmonary following.  Pt on PO cardizem in NSR currently. SOB at rest is at baseline however still quite SOB on exertion. Pt anxious about going home as he lives along. Family setting up O2 at home today to ensure he has enough tanks.  Denies CP, n/v/d, abd pain. Past Medical History:  
Diagnosis Date  A-fib ([address] 2014  AAA (abdominal aortic aneurysm) without rupture ([address] 2014  
 3.2 on   [facility]  Aneurysm ([address]  Arthritis  Cancer ([address]   
 hx prostate cancer  Chronic lung disease  COPD   
 COPD (chronic obstructive pulmonary disease) ([address] 2014  Elevated prostate specific antigen (PSA)  Glaucoma 2014  Heart disease  Heart failure ([address]  Hypercholesterolemia  Hyperlipemia 2014  Hypertension  Hypertrophy of prostate with urinary obstruction and other lower urinary tract symptoms (LUTS)  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[facility] 7/25/2018  OA (osteoarthritis) 5/2/2014  Peptic ulcer disease 6/1/2017  Poor historian  Prostate cancer ([facility]  Pulmonary embolus ([facility] 6/1/2017  Supplemental oxygen dependent 5/2/2014  Unspecified disorder of prostate  Warfarin anticoagulation 5/2/2014 No results found for this or any previous visit (from the past 24 hour(s)). Allergies Allergen Reactions  Statins-Hmg-Coa Reductase Inhibitors Myalgia Muscle pain Current Facility-Administered Medications Medication Dose Route Frequency Provider Last Rate Last Dose  furosemide (LASIX) tablet 40 mg  40 mg Oral BID Javier Barraza MD   40 mg at 07/28/18 0901  predniSONE (DELTASONE) tablet 10 mg  10 mg Oral DAILY WITH BREAKFAST Daisy Nina MD   10 mg at 07/28/18 0749  
 alum-mag hydroxide-simeth (MYLANTA) oral suspension 30 mL  30 mL Oral Q4H PRN Matthew Hernandez NP   30 mL at 07/28/18 0029  
 metoprolol tartrate (LOPRESSOR) tablet 50 mg  50 mg Oral BID Javier Barraza MD   50 mg at 07/28/18 5332  lisinopril (PRINIVIL, ZESTRIL) tablet 10 mg  10 mg Oral DAILY Rex Rushing MD   10 mg at 07/28/18 6020  apixaban (ELIQUIS) tablet 5 mg  5 mg Oral Q12H Rex Rushing MD   5 mg at 07/28/18 3405  polyethylene glycol (MIRALAX) packet 17 g  17 g Oral DAILY PRN Elena Mooney MD      
 senna-docusate (PERICOLACE) 8.6-50 mg per tablet 1 Tab  1 Tab Oral DAILY PRN Elena Mooney MD   1 Tab at 07/25/18 1708  dilTIAZem (CARDIZEM) 100 mg in 0.9% sodium chloride (MBP/ADV) 100 mL infusion  0-15 mg/hr IntraVENous TITRATE Javier Barraza MD 2.5 mL/hr at 07/26/18 0400 2.5 mg/hr at 07/26/18 0400  budesonide (PULMICORT) 500 mcg/2 ml nebulizer suspension  500 mcg Nebulization BID RT Arvel Pace, MD   500 mcg at 07/28/18 0717  
 allopurinol (ZYLOPRIM) tablet 300 mg 300 mg Oral DAILY Blanca Dumont MD   300 mg at 18 0902  
 azithromycin [facility]) tablet 500 mg  500 mg Oral Q MON, WED & 9725 Teresita Soliz MD   500 mg at 18 1704  latanoprost (XALATAN) 0.005 % ophthalmic solution 1 Drop  1 Drop Both Eyes QHS Nadir Aguilera MD   1 Drop at 18 2007  brimonidine (ALPHAGAN) 0.2 % ophthalmic solution 1 Drop  1 Drop Both Eyes TID Blanca Dumont MD   1 Drop at 18 5771  ethambutol (MYAMBUTOL) tablet 2,000 mg  2,000 mg Oral Q MON, WED & 9725 Teresita Soliz MD   2,000 mg at 18 1704  nitroglycerin (NITROSTAT) tablet 0.4 mg  0.4 mg SubLINGual Q5MIN PRN Blanca Dumont MD   0.4 mg at 18 0018  pantoprazole (PROTONIX) tablet 40 mg  40 mg Oral ACB Nadir Aguilera MD   40 mg at 18 0858  rifAMPin (RIFADIN) capsule 600 mg  600 mg Oral Q MON, WED & 9725 Teresita Soliz MD   600 mg at 18 1704  tamsulosin (FLOMAX) capsule 0.4 mg  0.4 mg Oral QHS Nadir Aguilera MD   0.4 mg at 18 7645  tiotropium (SPIRIVA) inhalation capsule 18 mcg  1 Cap Inhalation DAILY Nadir Aguilera MD   18 mcg at 18 3550  levalbuterol (XOPENEX) nebulizer soln 0.63 mg/3 mL  0.63 mg Nebulization Q6H RT Blanca Dumont MD   0.63 mg at 18 7639  aspirin delayed-release tablet 81 mg  81 mg Oral DAILY Blanca Dumont MD   81 mg at 18 3283 Review of Systems negative with exception of pertinent positives noted above PHYSICAL EXAM  
 
Visit Vitals  /76 (BP 1 Location: Right arm, BP Patient Position: At rest)  Pulse 77  Temp 98.1 °F (36.7 °C)  Resp 18  Ht 5' 9\" (1.753 m)  Wt 81.4 kg (179 lb 6.4 oz)  SpO2 98%  BMI 26.49 kg/m2 Temp (24hrs), Av.8 °F (36.6 °C), Min:97.1 °F (36.2 °C), Max:98.1 °F (36.7 °C) Oxygen Therapy O2 Sat (%): 98 % (18 0855) Pulse via Oximetry: 69 beats per minute (07/28/18 9810) O2 Device: Nasal cannula (07/28/18 1220) O2 Flow Rate (L/min): 2 l/min (07/28/18 1220) FIO2 (%): 28 % (07/27/18 0718) Intake/Output Summary (Last 24 hours) at 07/28/18 1239 Last data filed at 07/28/18 1569 Gross per 24 hour Intake              390 ml Output             1150 ml Net             -760 ml General:                 No acute distress   
Lungs:                    Diminished all lung fields. Resp even and non labored FBQTX:                      S1S2 without murmurs rubs gallops. RRR. No edema Abdomen:              Soft, non tender, non distended. BS present Extremities:           Moves ext spontaneously. No cyanosis Neurologic:           A/O X4.  No focal deficits 
  
 
 
Results summary of Diagnostic Studies/Procedures copied from within [facility] EMR: 
 
 
 
 
ASSESSMENT Active Hospital Problems Diagnosis Date Noted  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[facility] 07/25/2018  Acute on chronic respiratory failure ([facility] 07/25/2018  COPD exacerbation ([address] 07/23/2018  CHF exacerbation ([address] 07/23/2018  CHF (congestive heart failure) ([address] 07/23/2018  A-fib ([address] 07/23/2018  History of cardiomyopathy 06/22/2017  Peptic ulcer disease 06/01/2017  AAA (abdominal aortic aneurysm) without rupture ([address] 05/02/2014  
  3.2 on US 2/14 [facility]  Hyperlipemia 05/02/2014 Plan: COPD exacerbation:  Pulmonary following. On oral prednisone now (10mg X2 days then stop).  Continue nebulizers.   
   
MAC:  Continue current tx regimen. Will need FU at [facility] on DC 
   
Afib with RVR:  On Eliquis. Cardiology following. Lorrene Bun with EF 30-35%.  Continue lopressor. Sandy Peter need Cardiology FU outpatient, pt request [facility]. On PO Cardizem now in NSR 
   
Acute systolic CHF exacerbation:  On oral lasix now.  Echo with EF 30-35%.  Cardiology following.  On BB, ACE-I.   Will need outpatient ischemic workup.   
   
   
Likely DC in AM if exertional dyspnea improved   Notes, labs, VS, diagnostic testing reviewed Time spent with pt 20 min 
   
   
DVT Prophylaxis: eliquis Plan of Care Discussed with: Supervising MD  Dr. Acosta Roland, care team, pt Aydee Malik NP

## 2018-07-28 NOTE — PROGRESS NOTES
[facility] CARDIOLOGY PROGRESS NOTE 
      
 
7/28/2018 12:15 PM 
 
Admit Date: 7/23/2018 Subjective:  
Still having mild sob Review of Systems Respiratory: Positive for shortness of breath. Cardiovascular: Negative for chest pain. Objective:  
  
Vitals:  
 07/28/18 9069 07/28/18 0500 07/28/18 8172 07/28/18 5157 BP:  114/57  127/76 Pulse:  67  77 Resp:  20  18 Temp:  97.1 °F (36.2 °C)  98.1 °F (36.7 °C) SpO2: 98% 96% 98% 98% Weight:  81.4 kg (179 lb 6.4 oz) Height:      
 
 
 
Physical Exam  
Eyes: Conjunctivae are normal.  
Neck: No tracheal deviation present. Cardiovascular: Normal rate. Pulmonary/Chest: He has wheezes. Abdominal: He exhibits no distension. Musculoskeletal: He exhibits no tenderness. Neurological: He is alert. Skin: Skin is warm. He is not diaphoretic. Data Review:  
Recent Labs  
   07/27/18 
 0450 NA  144  
K  4.1 BUN  34* CREA  1.66* GLU  78 WBC  9.0 HGB  10.7* HCT  35.0*  
PLT  353 Intake/Output Summary (Last 24 hours) at 07/28/18 1215 Last data filed at 07/28/18 8881 Gross per 24 hour Intake              390 ml Output             1150 ml Net             -760 ml  
 
Current Facility-Administered Medications Medication Dose Route Frequency  furosemide (LASIX) tablet 40 mg  40 mg Oral BID  predniSONE (DELTASONE) tablet 10 mg  10 mg Oral DAILY WITH BREAKFAST  alum-mag hydroxide-simeth (MYLANTA) oral suspension 30 mL  30 mL Oral Q4H PRN  
 metoprolol tartrate (LOPRESSOR) tablet 50 mg  50 mg Oral BID  lisinopril (PRINIVIL, ZESTRIL) tablet 10 mg  10 mg Oral DAILY  apixaban (ELIQUIS) tablet 5 mg  5 mg Oral Q12H  polyethylene glycol (MIRALAX) packet 17 g  17 g Oral DAILY PRN  
 senna-docusate (PERICOLACE) 8.6-50 mg per tablet 1 Tab  1 Tab Oral DAILY PRN  
 dilTIAZem (CARDIZEM) 100 mg in 0.9% sodium chloride (MBP/ADV) 100 mL infusion  0-15 mg/hr IntraVENous TITRATE  budesonide (PULMICORT) 500 mcg/2 ml nebulizer suspension  500 mcg Nebulization BID RT  
 allopurinol (ZYLOPRIM) tablet 300 mg  300 mg Oral DAILY  azithromycin (ZITHROMAX) tablet 500 mg  500 mg Oral Q MON, WED & FRI  latanoprost (XALATAN) 0.005 % ophthalmic solution 1 Drop  1 Drop Both Eyes QHS  brimonidine (ALPHAGAN) 0.2 % ophthalmic solution 1 Drop  1 Drop Both Eyes TID  ethambutol (MYAMBUTOL) tablet 2,000 mg  2,000 mg Oral Q MON, WED & FRI  nitroglycerin (NITROSTAT) tablet 0.4 mg  0.4 mg SubLINGual Q5MIN PRN  pantoprazole (PROTONIX) tablet 40 mg  40 mg Oral ACB  rifAMPin (RIFADIN) capsule 600 mg  600 mg Oral Q MON, WED & FRI  tamsulosin (FLOMAX) capsule 0.4 mg  0.4 mg Oral QHS  tiotropium (SPIRIVA) inhalation capsule 18 mcg  1 Cap Inhalation DAILY  levalbuterol (XOPENEX) nebulizer soln 0.63 mg/3 mL  0.63 mg Nebulization Q6H RT  
 aspirin delayed-release tablet 81 mg  81 mg Oral DAILY Assessment/Plan: 1. Systolic heart failure new diagnosis EF 30-35%. Follow-up will be made with [facility] per patient request.  Tolerating current therapies ACE inhibitor and beta blocker. Placed on oral Lasix 7/27/2018 2. Atrial fibrillation now in sinus rhythm on appropriate anticoagulation Neno Hernandez MD 
7/28/2018 12:15 PM

## 2018-07-28 NOTE — PROGRESS NOTES
Bedside and Verbal shift change report given to Jonathan Baldwin RN (oncoming nurse) by self & Archana Esqueda RN (offgoing nurse). Report included the following information SBAR, Kardex, Intake/Output, MAR, Recent Results and Cardiac Rhythm SR, 60s-70s.

## 2018-07-29 VITALS
DIASTOLIC BLOOD PRESSURE: 49 MMHG | OXYGEN SATURATION: 97 % | BODY MASS INDEX: 26.94 KG/M2 | HEART RATE: 57 BPM | SYSTOLIC BLOOD PRESSURE: 92 MMHG | RESPIRATION RATE: 16 BRPM | WEIGHT: 181.9 LBS | HEIGHT: 69 IN | TEMPERATURE: 97.5 F

## 2018-07-29 PROCEDURE — 74011000250 HC RX REV CODE- 250: Performed by: INTERNAL MEDICINE

## 2018-07-29 PROCEDURE — 94760 N-INVAS EAR/PLS OXIMETRY 1: CPT

## 2018-07-29 PROCEDURE — 74011250637 HC RX REV CODE- 250/637: Performed by: INTERNAL MEDICINE

## 2018-07-29 PROCEDURE — 77010033678 HC OXYGEN DAILY

## 2018-07-29 PROCEDURE — 94640 AIRWAY INHALATION TREATMENT: CPT

## 2018-07-29 PROCEDURE — 74011636637 HC RX REV CODE- 636/637: Performed by: INTERNAL MEDICINE

## 2018-07-29 RX ADMIN — BUDESONIDE 500 MCG: 0.5 INHALANT RESPIRATORY (INHALATION) at 07:45

## 2018-07-29 RX ADMIN — PANTOPRAZOLE SODIUM 40 MG: 40 TABLET, DELAYED RELEASE ORAL at 06:26

## 2018-07-29 RX ADMIN — LEVALBUTEROL HYDROCHLORIDE 0.63 MG: 0.63 SOLUTION RESPIRATORY (INHALATION) at 02:43

## 2018-07-29 RX ADMIN — PREDNISONE 10 MG: 5 TABLET ORAL at 08:18

## 2018-07-29 RX ADMIN — BRIMONIDINE TARTRATE 1 DROP: 2 SOLUTION OPHTHALMIC at 08:19

## 2018-07-29 RX ADMIN — METOPROLOL TARTRATE 50 MG: 50 TABLET ORAL at 08:18

## 2018-07-29 RX ADMIN — LISINOPRIL 10 MG: 5 TABLET ORAL at 08:18

## 2018-07-29 RX ADMIN — TIOTROPIUM BROMIDE 18 MCG: 18 CAPSULE ORAL; RESPIRATORY (INHALATION) at 07:47

## 2018-07-29 RX ADMIN — FUROSEMIDE 40 MG: 40 TABLET ORAL at 08:18

## 2018-07-29 RX ADMIN — ASPIRIN 81 MG: 81 TABLET, COATED ORAL at 08:18

## 2018-07-29 RX ADMIN — ALLOPURINOL 300 MG: 300 TABLET ORAL at 08:18

## 2018-07-29 RX ADMIN — LEVALBUTEROL HYDROCHLORIDE 0.63 MG: 0.63 SOLUTION RESPIRATORY (INHALATION) at 07:45

## 2018-07-29 RX ADMIN — APIXABAN 5 MG: 5 TABLET, FILM COATED ORAL at 08:18

## 2018-07-29 NOTE — DISCHARGE SUMMARY
Hospitalist Discharge Summary Patient ID: 
Eileen Fernandez 
[id number] 
78 y.o. 
1940 Admit date: 7/23/2018 10:11 AM 
Discharge date and time: 7/29/2018 Attending: Lino Alfaro, * PCP:  Demetris Chin MD 
Treatment Team: Attending Provider: Lino Alfaro MD; Consulting Provider: Maikol Castro MD; Utilization Review: Thea Arellano RN; Transitional Nurse Navigator: Kim Knott RN; Care Manager: Fermín Jamil RN 
 
Principal Diagnosis COPD exacerbation [facility]) Principal Problem: COPD exacerbation ([address] (7/23/2018) Active Problems: 
  AAA (abdominal aortic aneurysm) without rupture ([facility] (5/2/2014) Overview: 3.2 on  2/14 [facility] Hyperlipemia (5/2/2014) Peptic ulcer disease (6/1/2017) History of cardiomyopathy (6/22/2017) CHF exacerbation ([address] (7/23/2018) CHF (congestive heart failure) ([address] (7/23/2018) A-fib ([facility] (7/23/2018) 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[address] (7/25/2018) Acute on chronic respiratory failure ([address] (7/25/2018) Hospital Course: 
Please refer to the admission H&P for details of presentation. In summary, the patient is a 77 yo male with PMH of COPD on 3 Liters O2 at home, tobacco abuse (stopped 2013), HTN, hx prostate cancer, hx on afib on coumadin at one time but now only on ASA, colon polyps, BPH who was recently dx with MAC infection at [facility] (on ehtambutol, azithromycin, rifampin). He presented here 07/23 with c/o SOB that had progressively worsened over 3 days. He was found to have cough, wheezing, sputum, fevers and LE edema. Pt on BiPAP initially in ED and weaned to [place]. BNP elevated at 932, CXR showed cardiomegaly. EKG with afib with RVR. Pt started on solumedrol, cardizem and heparin gtt. EF 30-35%. Cardiology consulted, Heparin gtt stopped and eliquis started. Pt now in SR on PO Cardizem. Additionally started on Lasix, Lisinopril and Toprol.   Pulmonary also followed pt here with recs to continue chronic med therapy for MAC. Today pt is stable for d/c home. He is on 2 L NC which is his baseline, denies SOB/CP and states ready. He is to follow up with S cards and pulm soon. Significant Diagnostic Studies:  
 
Echo SUMMARY: 
 
-  Left ventricle: The ventricle was moderately dilated. Systolic function  
was 
moderately reduced. Ejection fraction was estimated in the range of 30 % to  
35 
%. There was mild to moderate global diffuse hypokinesis. -  Left atrium: The atrium was mildly dilated. -  Right atrium: The atrium was mildly dilated. -  Inferior vena cava, hepatic veins: The inferior vena cava was dilated. The 
respirophasic change in diameter was more than 50%. Labs: Results:  
   
Chemistry Recent Labs  
   07/27/18 
 9031 GLU  78 NA  144  
K  4.1 CL  105 CO2  32 BUN  34* CREA  1.66* CA  8.1* AGAP  7  
  
CBC w/Diff Recent Labs  
   07/27/18 
 0450 WBC  9.0  
RBC  4.35  
HGB  10.7* HCT  35.0*  
PLT  353 Cardiac Enzymes No results for input(s): CPK, CKND1, ALISHA in the last 72 hours. No lab exists for component: Horris Clan Coagulation No results for input(s): PTP, INR, APTT in the last 72 hours. No lab exists for component: INREXT Lipid Panel No results found for: CHOL, CHOLPOCT, CHOLX, CHLST, CHOLV, 190455, HDL, LDL, LDLC, DLDLP, 394797, VLDLC, VLDL, TGLX, TRIGL, TRIGP, TGLPOCT, CHHD, CHHDX  
BNP No results for input(s): BNPP in the last 72 hours. Liver Enzymes No results for input(s): TP, ALB, TBIL, AP, SGOT, GPT in the last 72 hours. No lab exists for component: DBIL Thyroid Studies No results found for: T4, T3U, TSH, TSHEXT Discharge Exam: 
Visit Vitals  /59 (BP 1 Location: Left arm, BP Patient Position: At rest)  Pulse 67  Temp 98 °F (36.7 °C)  Resp 18  Ht 5' 9\" (1.753 m)  Wt 82.5 kg (181 lb 14.4 oz)  SpO2 94%  BMI 26.86 kg/m2 General appearance: alert, cooperative, no distress, appears stated age Lungs: clear to auscultation bilaterally Heart: regular rate and rhythm, S1, S2 normal, no murmur, click, rub or gallop Abdomen: soft, non-tender. Bowel sounds normal. No masses,  no organomegaly Extremities: no cyanosis or edema Neurologic: Grossly normal 
 
Disposition: Home Discharge Condition: stable Patient Instructions:  
Current Discharge Medication List  
  
START taking these medications Details  
apixaban (ELIQUIS) 5 mg tablet Take 1 Tab by mouth every twelve (12) hours for 30 days. Qty: 60 Tab, Refills: 0  
  
furosemide (LASIX) 40 mg tablet Take 1 Tab by mouth daily for 30 days. Qty: 30 Tab, Refills: 0 Associated Diagnoses: Acute on chronic combined systolic and diastolic congestive heart failure (HCC)  
  
lisinopril (PRINIVIL, ZESTRIL) 10 mg tablet Take 1 Tab by mouth daily. Qty: 30 Tab, Refills: 0  
  
metoprolol tartrate (LOPRESSOR) 50 mg tablet Take 1 Tab by mouth two (2) times a day. Qty: 30 Tab, Refills: 0 Associated Diagnoses: Acute combined systolic and diastolic congestive heart failure ([address] Persistent atrial fibrillation ([address] predniSONE (DELTASONE) 10 mg tablet Take 1 Tab by mouth daily (with breakfast). Qty: 1 Tab, Refills: 0 CONTINUE these medications which have NOT CHANGED Details  
azithromycin (ZITHROMAX) 500 mg tab Take 1 tablet every Mon, Wed, Fri  
  
ethambutol (MYAMBUTOL) 400 mg tablet Take 5 tabs every mon, wed, fri  
  
fluticasone-salmeterol (ADVAIR) 250-50 mcg/dose diskus inhaler Take 1 Puff by inhalation. rifAMPin (RIFADIN) 300 mg capsule Take 2 tabs every mon, wed, fri  
  
nitroglycerin (NITROSTAT) 0.4 mg SL tablet 1 Tab by SubLINGual route every five (5) minutes as needed for Chest Pain. Qty: 1 Bottle, Refills: 11  
  
allopurinol (ZYLOPRIM) 300 mg tablet Take  by mouth daily.   
  
cholecalciferol, vitamin D3, 2,000 unit tab Take  by mouth.  
  
bimatoprost (LUMIGAN) 0.01 % ophthalmic drops Administer 1 Drop to both eyes every evening. OXYGEN-AIR DELIVERY SYSTEMS Use as instructed. Use as instructed. omeprazole (PRILOSEC) 20 mg capsule Take 20 mg by mouth daily. ferrous sulfate (IRON) 325 mg (65 mg iron) tablet Take  by mouth Daily (before breakfast). brimonidine (ALPHAGAN P) 0.1 % ophthalmic solution every eight (8) hours. LEUPROLIDE ACETATE (ELIGARD SC) 45 mg by SubCUTAneous route. albuterol (VENTOLIN HFA) 90 mcg/actuation inhaler Take  by inhalation. potassium chloride SR (KLOR-CON 10) 10 mEq tablet Take 20 mEq by mouth two (2) times a day. rosuvastatin (CRESTOR) 40 mg tablet Take 40 mg by mouth nightly. tamsulosin (FLOMAX) 0.4 mg capsule TAKE ONE CAPSULE BY MOUTH EVERY DAY Qty: 30 capsule, Refills: 12  
  
tiotropium (SPIRIVA WITH HANDIHALER) 18 mcg inhalation capsule Take 1 Cap by inhalation daily. STOP taking these medications  
  
 amLODIPine (NORVASC) 10 mg tablet Comments:  
Reason for Stopping:   
   
 aspirin (ASPIRIN) 325 mg tablet Comments:  
Reason for Stopping:   
   
 triamterene-hydrochlorothiazide (DYAZIDE) 37.5-25 mg per capsule Comments:  
Reason for Stopping:   
   
  
 
 
Activity: Regular, as tolerated Diet: Low sodium,  DIET CARDIAC Regular DIET NUTRITIONAL SUPPLEMENTS Breakfast; Ensure Doyce Labs Follow-up Follow-up Information Follow up With Details Comments Contact Info [facility] In 1 week Wednesday August 1st at 2:30pm   
 Narcisa Munoz MD In 2 days  [address] [place] [id number] 
[phone] Haylee Dubose MD In 2 weeks  [address] [facility] [address] [place] [id number] 
[phone] Time spent to discharge patient greater than 30 minutes Signed: 
Raisa Davies NP 
7/29/2018 
7:58 AM

## 2018-07-29 NOTE — PROGRESS NOTES
Bedside and Verbal shift change report given to kelvin & Robbie Miranda RN (oncoming nurse) by Tamie Miranda RN (offgoing nurse).  Report included the following information SBAR, Kardex, Intake/Output, MAR, Recent Results and Cardiac Rhythm SR.

## 2018-07-29 NOTE — PROGRESS NOTES
Discharge instructions reviewed with patient. Prescriptions given for eliquis, prednisone, metoprolol, lisinopril, and lasix and med info sheets provided for all new medications. Opportunity for questions provided. Patient voiced understanding of all discharge instructions.

## 2018-07-29 NOTE — DISCHARGE INSTRUCTIONS
.     Chronic Obstructive Pulmonary Disease (COPD): Care Instructions  Your Care Instructions    Chronic obstructive pulmonary disease (COPD) is a general term for a group of lung diseases, including emphysema and chronic bronchitis. People with COPD have decreased airflow in and out of the lungs, which makes it hard to breathe. The airways also can get clogged with thick mucus. Cigarette smoking is a major cause of COPD. Although there is no cure for COPD, you can slow its progress. Following your treatment plan and taking care of yourself can help you feel better and live longer. Follow-up care is a key part of your treatment and safety. Be sure to make and go to all appointments, and call your doctor if you are having problems. It's also a good idea to know your test results and keep a list of the medicines you take. How can you care for yourself at home?   Staying healthy    · Do not smoke. This is the most important step you can take to prevent more damage to your lungs. If you need help quitting, talk to your doctor about stop-smoking programs and medicines. These can increase your chances of quitting for good.     · Avoid colds and flu. Get a pneumococcal vaccine shot. If you have had one before, ask your doctor whether you need a second dose. Get the flu vaccine every fall. If you must be around people with colds or the flu, wash your hands often.     · Avoid secondhand smoke, air pollution, and high altitudes. Also avoid cold, dry air and hot, humid air. Stay at home with your windows closed when air pollution is bad.    Medicines and oxygen therapy    · Take your medicines exactly as prescribed. Call your doctor if you think you are having a problem with your medicine.     · You may be taking medicines such as:  ¨ Bronchodilators. These help open your airways and make breathing easier. Bronchodilators are either short-acting (work for 6 to 9 hours) or long-acting (work for 24 hours).  You inhale most bronchodilators, so they start to act quickly. Always carry your quick-relief inhaler with you in case you need it while you are away from home. ¨ Corticosteroids (prednisone, budesonide). These reduce airway inflammation. They come in pill or inhaled form. You must take these medicines every day for them to work well.     · A spacer may help you get more inhaled medicine to your lungs. Ask your doctor or pharmacist if a spacer is right for you. If it is, ask how to use it properly.     · Do not take any vitamins, over-the-counter medicine, or herbal products without talking to your doctor first.     · If your doctor prescribed antibiotics, take them as directed. Do not stop taking them just because you feel better. You need to take the full course of antibiotics.     · Oxygen therapy boosts the amount of oxygen in your blood and helps you breathe easier. Use the flow rate your doctor has recommended, and do not change it without talking to your doctor first.   Activity    · Get regular exercise. Walking is an easy way to get exercise. Start out slowly, and walk a little more each day.     · Pay attention to your breathing. You are exercising too hard if you cannot talk while you are exercising.     · Take short rest breaks when doing household chores and other activities.     · Learn breathing methods-such as breathing through pursed lips-to help you become less short of breath.     · If your doctor has not set you up with a pulmonary rehabilitation program, talk to him or her about whether rehab is right for you. Rehab includes exercise programs, education about your disease and how to manage it, help with diet and other changes, and emotional support. Diet    · Eat regular, healthy meals. Use bronchodilators about 1 hour before you eat to make it easier to eat. Eat several small meals instead of three large ones.  Drink beverages at the end of the meal. Avoid foods that are hard to chew.     · Eat foods that contain protein so that you do not lose muscle mass.     · Talk with your doctor if you gain too much weight or if you lose weight without trying.    Mental health    · Talk to your family, friends, or a therapist about your feelings. It is normal to feel frightened, angry, hopeless, helpless, and even guilty. Talking openly about bad feelings can help you cope. If these feelings last, talk to your doctor. When should you call for help? Call 911 anytime you think you may need emergency care. For example, call if:    · You have severe trouble breathing.    Call your doctor now or seek immediate medical care if:    · You have new or worse trouble breathing.     · You cough up blood.     · You have a fever.    Watch closely for changes in your health, and be sure to contact your doctor if:    · You cough more deeply or more often, especially if you notice more mucus or a change in the color of your mucus.     · You have new or worse swelling in your legs or belly.     · You are not getting better as expected. Where can you learn more? Go to http://airam-shanelle.info/. Ary Riojas in the search box to learn more about \"Chronic Obstructive Pulmonary Disease (COPD): Care Instructions. \"  Current as of: December 6, 2017  Content Version: 11.7  © 9094-9144 [employer]. Care instructions adapted under license by [employer] (which disclaims liability or warranty for this information). If you have questions about a medical condition or this instruction, always ask your healthcare professional. [place] [address] any warranty or liability for your use of this information.       DISCHARGE SUMMARY from Nurse    PATIENT INSTRUCTIONS:    After general anesthesia or intravenous sedation, for 24 hours or while taking prescription Narcotics:  · Limit your activities  · Do not drive and operate hazardous machinery  · Do not make important personal or business decisions  · Do  not drink alcoholic beverages  · If you have not urinated within 8 hours after discharge, please contact your surgeon on call. Report the following to your surgeon:  · Excessive pain, swelling, redness or odor of or around the surgical area  · Temperature over 100.5  · Nausea and vomiting lasting longer than 4 hours or if unable to take medications  · Any signs of decreased circulation or nerve impairment to extremity: change in color, persistent  numbness, tingling, coldness or increase pain  · Any questions    What to do at Home:  Recommended activity: Activity as tolerated    If you experience shortness of breath, please follow up with primary care provider. *  Please give a list of your current medications to your Primary Care Provider. *  Please update this list whenever your medications are discontinued, doses are      changed, or new medications (including over-the-counter products) are added. *  Please carry medication information at all times in case of emergency situations. These are general instructions for a healthy lifestyle:    No smoking/ No tobacco products/ Avoid exposure to second hand smoke  Surgeon General's Warning:  Quitting smoking now greatly reduces serious risk to your health. Obesity, smoking, and sedentary lifestyle greatly increases your risk for illness    A healthy diet, regular physical exercise & weight monitoring are important for maintaining a healthy lifestyle    You may be retaining fluid if you have a history of heart failure or if you experience any of the following symptoms:  Weight gain of 3 pounds or more overnight or 5 pounds in a week, increased swelling in our hands or feet or shortness of breath while lying flat in bed. Please call your doctor as soon as you notice any of these symptoms; do not wait until your next office visit.     Recognize signs and symptoms of STROKE:    F-face looks uneven    A-arms unable to move or move unevenly    S-speech slurred or non-existent    T-time-call 911 as soon as signs and symptoms begin-DO NOT go       Back to bed or wait to see if you get better-TIME IS BRAIN. Warning Signs of HEART ATTACK     Call 911 if you have these symptoms:   Chest discomfort. Most heart attacks involve discomfort in the center of the chest that lasts more than a few minutes, or that goes away and comes back. It can feel like uncomfortable pressure, squeezing, fullness, or pain.  Discomfort in other areas of the upper body. Symptoms can include pain or discomfort in one or both arms, the back, neck, jaw, or stomach.  Shortness of breath with or without chest discomfort.  Other signs may include breaking out in a cold sweat, nausea, or lightheadedness. Don't wait more than five minutes to call 911 - MINUTES MATTER! Fast action can save your life. Calling 911 is almost always the fastest way to get lifesaving treatment. Emergency Medical Services staff can begin treatment when they arrive -- up to an hour sooner than if someone gets to the hospital by car. The discharge information has been reviewed with the patient. The patient verbalized understanding. Discharge medications reviewed with the patient and appropriate educational materials and side effects teaching were provided.   ___________________________________________________________________________________________________________________________________

## 2018-07-29 NOTE — PROGRESS NOTES
[facility] CARDIOLOGY PROGRESS NOTE 
      
 
7/29/2018 12:15 PM 
 
Admit Date: 7/23/2018 Subjective:  
 
No chest pain Review of Systems Constitutional: Negative for chills. Eyes: Negative for double vision. Respiratory: Positive for shortness of breath. Cardiovascular: Negative for chest pain. Neurological: Negative for dizziness. Objective:  
  
Vitals:  
 07/29/18 5278 07/29/18 4044 07/29/18 9004 07/29/18 6449 BP:  107/59  92/49 Pulse:  67  (!) 57 Resp:  18  16 Temp:  98 °F (36.7 °C)  97.5 °F (36.4 °C) SpO2: 99% 99% 94% 97% Weight:  82.5 kg (181 lb 14.4 oz) Height:      
 
 
 
Physical Exam  
HENT:  
Head: Normocephalic. Eyes: Conjunctivae are normal.  
Neck: Normal range of motion. No tracheal deviation present. Cardiovascular: Normal rate. Pulmonary/Chest: He has wheezes. Abdominal: He exhibits no distension. Musculoskeletal: He exhibits no tenderness. Neurological: He is alert. Skin: Skin is warm. He is not diaphoretic. Data Review:  
Recent Labs  
   07/27/18 
 0450 NA  144  
K  4.1 BUN  34* CREA  1.66* GLU  78 WBC  9.0 HGB  10.7* HCT  35.0*  
PLT  353 Intake/Output Summary (Last 24 hours) at 07/29/18 1003 Last data filed at 07/29/18 0484 Gross per 24 hour Intake              150 ml Output              600 ml Net             -450 ml  
 
Current Facility-Administered Medications Medication Dose Route Frequency  furosemide (LASIX) tablet 40 mg  40 mg Oral BID  predniSONE (DELTASONE) tablet 10 mg  10 mg Oral DAILY WITH BREAKFAST  alum-mag hydroxide-simeth (MYLANTA) oral suspension 30 mL  30 mL Oral Q4H PRN  
 metoprolol tartrate (LOPRESSOR) tablet 50 mg  50 mg Oral BID  lisinopril (PRINIVIL, ZESTRIL) tablet 10 mg  10 mg Oral DAILY  apixaban (ELIQUIS) tablet 5 mg  5 mg Oral Q12H  polyethylene glycol (MIRALAX) packet 17 g  17 g Oral DAILY PRN  
 senna-docusate (PERICOLACE) 8.6-50 mg per tablet 1 Tab  1 Tab Oral DAILY PRN  
 dilTIAZem (CARDIZEM) 100 mg in 0.9% sodium chloride (MBP/ADV) 100 mL infusion  0-15 mg/hr IntraVENous TITRATE  budesonide (PULMICORT) 500 mcg/2 ml nebulizer suspension  500 mcg Nebulization BID RT  
 allopurinol (ZYLOPRIM) tablet 300 mg  300 mg Oral DAILY  azithromycin (ZITHROMAX) tablet 500 mg  500 mg Oral Q MON, WED & FRI  latanoprost (XALATAN) 0.005 % ophthalmic solution 1 Drop  1 Drop Both Eyes QHS  brimonidine (ALPHAGAN) 0.2 % ophthalmic solution 1 Drop  1 Drop Both Eyes TID  ethambutol (MYAMBUTOL) tablet 2,000 mg  2,000 mg Oral Q MON, WED & FRI  nitroglycerin (NITROSTAT) tablet 0.4 mg  0.4 mg SubLINGual Q5MIN PRN  pantoprazole (PROTONIX) tablet 40 mg  40 mg Oral ACB  rifAMPin (RIFADIN) capsule 600 mg  600 mg Oral Q MON, WED & FRI  tamsulosin (FLOMAX) capsule 0.4 mg  0.4 mg Oral QHS  tiotropium (SPIRIVA) inhalation capsule 18 mcg  1 Cap Inhalation DAILY  levalbuterol (XOPENEX) nebulizer soln 0.63 mg/3 mL  0.63 mg Nebulization Q6H RT  
 aspirin delayed-release tablet 81 mg  81 mg Oral DAILY Assessment/Plan: 1. Systolic heart failure new diagnosis EF 30-35%. Follow-up will be made with [address] [place] [address] cardiology per patient request.  Tolerating current therapies ACE inhibitor and beta blocker. Placed on oral Lasix 7/27/2018. He will need ischemic evaluation. 2. Atrial fibrillation now in sinus rhythm on appropriate anticoagulation We will sign off patient needs TC7 with [facility] JESSICA Pitts MD 
7/29/2018 12:15 PM

## 2018-07-30 ENCOUNTER — PATIENT OUTREACH (OUTPATIENT)
Dept: CASE MANAGEMENT | Age: 78
End: 2018-07-30

## 2018-07-30 NOTE — PROGRESS NOTES
Transition of Care Discharge Follow-up Questionnaire Date/Time of Call: 
 July 30, 2018 1:03PM  
What was the patient hospitalized for? COPD exacerbation Does the patient understand his/her diagnosis and/or treatment and what happened during the hospitalization? Care Coordinator spoke with patient who agreed to Transitions of Care Outreach Call. Patient states understanding of diagnosis and treatment plan during hospitalization. Did the patient receive discharge instructions? Yes  
CM Assessed Risk for Readmission:  
 
 
Patient stated Risk for Readmission: Low to Moderate risk for readmit related to complications from COPD exacerbation. Review any discharge instructions (see discharge instructions/AVS in [facility]). Ask patient if they understand these. Do they have any questions? Patient states understanding of discharge instructions, patient states no questions. Were home services ordered (nursing, PT, OT, ST, etc.)? No home health services ordered. If so, has the first visit occurred? If not, why? (Assist with coordination of services if necessary. ) 
 NA Was any DME ordered? No durable medical equipment ordered. If so, has it been received? If not, why?  (Assist patient in obtaining DME orders &/or equipment if necessary. ) NA Complete a review of all medications (new, continued and discontinued meds per the D/C instructions and medication tab in [facility]). Patient decline medication review, patient states he has follow up appointment at [facility] with Dr. Renato Morris (PCP) today for medication review and hospital follow up. Were all new prescriptions filled? If not, why?  (Assist patient in obtaining medications if necessary  escalate for CCM &/or SW if ongoing issues are verbalized by pt or anticipated) Yes, patient states new prescriptions picked up.   
 
 
  
Does the patient understand the purpose and dosing instructions for all medications? (If patient has questions, provide explanation and education.) Patient states he has very little understanding of new medications and states he has follow up appointment today to review new medications with PCP. Does the patient have any problems in performing ADLs? (If patient is unable to perform ADLs  what is the limiting factor(s)? Do they have a support system that can assist? If no support system is present, discuss possible assistance that they may be able to obtain. Escalate for CCM/SW if ongoing issues are verbalized by pt or anticipated) Patient states he is independent with ADL's and ambulation. Patient states he is doing okay and states he has no questions or concerns. Does the patient have all follow-up appointments scheduled? 7 day f/up with PCP?  
(f/up with PCP may be w/in 14 days if patient has a f/up with their specialist w/in 7 days) 7-14 day f/up with specialist?  
(or per discharge instructions) If f/up has not been made  what actions has the care coordinator made to accomplish this? Has transportation been arranged? Yes Patient states follow up appointment at [facility] with Dr. Rima Garcia (PCP) 07/30/3018 @ 4:00PM. Follow Up with [facility] @ [address] on 08/01/2018 @ 2:30PM. Patient advised to follow up with Dr. Rica Karimi (Pulmonary Disease). Yes Any other questions or concerns expressed by the patient? No further needs identified: Patient instructed to call Care Coordinator if further questions or concerns arise Schedule next appointment with JE Waters or refer to RN Case Manager/ per the workflow guidelines. When is care coordinators next follow-up call scheduled? If referred for CCM  what RN care manager was the referral assigned? NA 
 
 
 
 
1 week - Care Coordinator provided contact information if assistance is needed for concerns or questions.  
 
NA YUMIKO Call Completed By: Dedrick Healy LPN [facility] Care Coordinator This note will not be viewable in [address]

## 2018-08-08 ENCOUNTER — PATIENT OUTREACH (OUTPATIENT)
Dept: CASE MANAGEMENT | Age: 78
End: 2018-08-08

## 2018-08-08 NOTE — PROGRESS NOTES
Transitions of Care  Follow up Outreach Note   Outreach type Phone call: [phone]  Home visit:   Date/Time of Outreach: 08/08/2018 2:58PM     Has patient attended PCP or specialist follow-up appointments since last contact? What was outcome of appointment? When is next follow-up scheduled? Patient states follow up appointment went well with Dr. Annette Ann (PCP)  patient states questions he has pertaining to new medications were explained and patient states he has understanding of new medications dosages and purpose. Patient states he has a follow up appointment scheduled with Pulmonary doctor tomorrow August 9, 2018 @ 1:00 with Dr. Roger Vo. Review medications. Any medication changes since last outreach? Does patient have any questions or issues related to their medications? Patient states no medications ordered, patient states questions about new medications were answered at his follow up appointment [facility] (Dr. Annette Ann) on July 30, 2018     Home health active? If yes  any issue? Progress? NA- No Home Health services ordered     Referrals needed?  (CM, SW, HH, etc. )   NA   Other issues/Miscellaneous? (Transportation, access to meals, ability to perform ADLs, adequate caregiver support, etc.)     Patient states he has no issues at this time, patient states he is independent with ADL's and ambulation. Next Outreach Scheduled? Graduation from program?   Patient states he is doing pretty good and states he has no needs at this time. Next Steps/Goals (if applicable): Outreach completed by:   Zac Edwards, [address] [place] [address] Coordinator    This note will not be viewable in [address]

## 2018-08-30 ENCOUNTER — APPOINTMENT (OUTPATIENT)
Dept: GENERAL RADIOLOGY | Age: 78
DRG: 273 | End: 2018-08-30
Attending: EMERGENCY MEDICINE
Payer: MEDICARE

## 2018-08-30 ENCOUNTER — HOSPITAL ENCOUNTER (INPATIENT)
Age: 78
LOS: 2 days | Discharge: HOME HEALTH | DRG: 273 | End: 2018-09-01
Attending: EMERGENCY MEDICINE | Admitting: INTERNAL MEDICINE
Payer: MEDICARE

## 2018-08-30 DIAGNOSIS — R91.8 MULTIPLE PULMONARY NODULES: Chronic | ICD-10-CM

## 2018-08-30 DIAGNOSIS — J96.22 ACUTE ON CHRONIC RESPIRATORY FAILURE WITH HYPOXIA AND HYPERCAPNIA (HCC): ICD-10-CM

## 2018-08-30 DIAGNOSIS — A31.9 MYCOBACTERIAL PNEUMONIA (HCC): Chronic | ICD-10-CM

## 2018-08-30 DIAGNOSIS — J15.8 MYCOBACTERIAL PNEUMONIA (HCC): Chronic | ICD-10-CM

## 2018-08-30 DIAGNOSIS — I48.3 TYPICAL ATRIAL FLUTTER (HCC): Primary | ICD-10-CM

## 2018-08-30 DIAGNOSIS — I48.91 ATRIAL FIBRILLATION AND FLUTTER (HCC): ICD-10-CM

## 2018-08-30 DIAGNOSIS — J43.9 PULMONARY EMPHYSEMA, UNSPECIFIED EMPHYSEMA TYPE (HCC): Chronic | ICD-10-CM

## 2018-08-30 DIAGNOSIS — I48.0 PAROXYSMAL ATRIAL FIBRILLATION (HCC): Chronic | ICD-10-CM

## 2018-08-30 DIAGNOSIS — I48.92 ATRIAL FIBRILLATION AND FLUTTER (HCC): ICD-10-CM

## 2018-08-30 DIAGNOSIS — I50.41 ACUTE COMBINED SYSTOLIC AND DIASTOLIC CONGESTIVE HEART FAILURE (HCC): ICD-10-CM

## 2018-08-30 DIAGNOSIS — J96.21 ACUTE ON CHRONIC RESPIRATORY FAILURE WITH HYPOXIA AND HYPERCAPNIA (HCC): ICD-10-CM

## 2018-08-30 DIAGNOSIS — J44.1 COPD EXACERBATION (HCC): ICD-10-CM

## 2018-08-30 DIAGNOSIS — I50.43 ACUTE ON CHRONIC COMBINED SYSTOLIC AND DIASTOLIC CONGESTIVE HEART FAILURE (HCC): ICD-10-CM

## 2018-08-30 PROBLEM — I50.9 CHF (CONGESTIVE HEART FAILURE) (HCC): Chronic | Status: ACTIVE | Noted: 2018-07-23

## 2018-08-30 LAB
ALBUMIN SERPL-MCNC: 2.7 G/DL (ref 3.2–4.6)
ALBUMIN SERPL-MCNC: 3.3 G/DL (ref 3.2–4.6)
ALBUMIN/GLOB SERPL: 0.6 {RATIO} (ref 1.2–3.5)
ALBUMIN/GLOB SERPL: 0.8 {RATIO} (ref 1.2–3.5)
ALP SERPL-CCNC: 112 U/L (ref 50–136)
ALP SERPL-CCNC: 125 U/L (ref 50–136)
ALT SERPL-CCNC: 59 U/L (ref 12–65)
ALT SERPL-CCNC: 63 U/L (ref 12–65)
ANION GAP SERPL CALC-SCNC: 12 MMOL/L (ref 7–16)
ANION GAP SERPL CALC-SCNC: 7 MMOL/L (ref 7–16)
APTT PPP: 80.6 SEC (ref 23.2–35.3)
APTT PPP: 91.1 SEC (ref 23.2–35.3)
APTT PPP: 97 SEC (ref 23.2–35.3)
ARTERIAL PATENCY WRIST A: POSITIVE
AST SERPL-CCNC: 24 U/L (ref 15–37)
AST SERPL-CCNC: 31 U/L (ref 15–37)
ATRIAL RATE: 105 BPM
ATRIAL RATE: 159 BPM
BASE DEFICIT BLDA-SCNC: 1.2 MMOL/L (ref 0–2)
BASE DEFICIT BLDA-SCNC: 1.4 MMOL/L (ref 0–2)
BASE DEFICIT BLDA-SCNC: 2.9 MMOL/L (ref 0–2)
BASOPHILS # BLD: 0 K/UL (ref 0–0.2)
BASOPHILS # BLD: 0.1 K/UL (ref 0–0.2)
BASOPHILS NFR BLD: 0 % (ref 0–2)
BASOPHILS NFR BLD: 1 % (ref 0–2)
BDY SITE: ABNORMAL
BILIRUB SERPL-MCNC: 0.3 MG/DL (ref 0.2–1.1)
BILIRUB SERPL-MCNC: 0.4 MG/DL (ref 0.2–1.1)
BNP SERPL-MCNC: 1067 PG/ML
BUN SERPL-MCNC: 17 MG/DL (ref 8–23)
BUN SERPL-MCNC: 18 MG/DL (ref 8–23)
CALCIUM SERPL-MCNC: 8.7 MG/DL (ref 8.3–10.4)
CALCIUM SERPL-MCNC: 9 MG/DL (ref 8.3–10.4)
CALCULATED P AXIS, ECG09: -90 DEGREES
CALCULATED R AXIS, ECG10: 73 DEGREES
CALCULATED R AXIS, ECG10: 75 DEGREES
CALCULATED T AXIS, ECG11: -102 DEGREES
CALCULATED T AXIS, ECG11: 87 DEGREES
CHLORIDE SERPL-SCNC: 107 MMOL/L (ref 98–107)
CHLORIDE SERPL-SCNC: 108 MMOL/L (ref 98–107)
CO2 SERPL-SCNC: 25 MMOL/L (ref 21–32)
CO2 SERPL-SCNC: 27 MMOL/L (ref 21–32)
COHGB MFR BLD: 0.3 % (ref 0.5–1.5)
CREAT SERPL-MCNC: 1.42 MG/DL (ref 0.8–1.5)
CREAT SERPL-MCNC: 1.47 MG/DL (ref 0.8–1.5)
DIAGNOSIS, 93000: NORMAL
DIAGNOSIS, 93000: NORMAL
DIFFERENTIAL METHOD BLD: ABNORMAL
DIFFERENTIAL METHOD BLD: ABNORMAL
DO-HGB BLD-MCNC: 2 % (ref 0–5)
DO-HGB BLD-MCNC: 4 % (ref 0–5)
DO-HGB BLD-MCNC: 5 % (ref 0–5)
EOSINOPHIL # BLD: 0 K/UL (ref 0–0.8)
EOSINOPHIL # BLD: 0.1 K/UL (ref 0–0.8)
EOSINOPHIL NFR BLD: 0 % (ref 0.5–7.8)
EOSINOPHIL NFR BLD: 2 % (ref 0.5–7.8)
ERYTHROCYTE [DISTWIDTH] IN BLOOD BY AUTOMATED COUNT: 19.1 %
ERYTHROCYTE [DISTWIDTH] IN BLOOD BY AUTOMATED COUNT: 19.1 %
GAS FLOW.O2 O2 DELIVERY SYS: 2 L/MIN
GAS FLOW.O2 O2 DELIVERY SYS: 2 L/MIN
GLOBULIN SER CALC-MCNC: 4.3 G/DL (ref 2.3–3.5)
GLOBULIN SER CALC-MCNC: 4.5 G/DL (ref 2.3–3.5)
GLUCOSE SERPL-MCNC: 122 MG/DL (ref 65–100)
GLUCOSE SERPL-MCNC: 123 MG/DL (ref 65–100)
HCO3 BLDA-SCNC: 24 MMOL/L (ref 22–26)
HCO3 BLDA-SCNC: 25 MMOL/L (ref 22–26)
HCO3 BLDA-SCNC: 27 MMOL/L (ref 22–26)
HCT VFR BLD AUTO: 33.7 % (ref 41.1–50.3)
HCT VFR BLD AUTO: 35.3 % (ref 41.1–50.3)
HGB BLD-MCNC: 10.2 G/DL (ref 13.6–17.2)
HGB BLD-MCNC: 10.6 G/DL (ref 13.6–17.2)
HGB BLDMV-MCNC: 11.5 GM/DL (ref 11.7–15)
HGB BLDMV-MCNC: 11.5 GM/DL (ref 11.7–15)
HGB BLDMV-MCNC: 11.6 GM/DL (ref 11.7–15)
IMM GRANULOCYTES # BLD: 0 K/UL (ref 0–0.5)
IMM GRANULOCYTES # BLD: 0.1 K/UL (ref 0–0.5)
IMM GRANULOCYTES NFR BLD AUTO: 1 % (ref 0–5)
IMM GRANULOCYTES NFR BLD AUTO: 1 % (ref 0–5)
INR PPP: 1.1
LYMPHOCYTES # BLD: 0.8 K/UL (ref 0.5–4.6)
LYMPHOCYTES # BLD: 1.8 K/UL (ref 0.5–4.6)
LYMPHOCYTES NFR BLD: 16 % (ref 13–44)
LYMPHOCYTES NFR BLD: 20 % (ref 13–44)
MAGNESIUM SERPL-MCNC: 2.3 MG/DL (ref 1.8–2.4)
MCH RBC QN AUTO: 25.8 PG (ref 26.1–32.9)
MCH RBC QN AUTO: 25.9 PG (ref 26.1–32.9)
MCHC RBC AUTO-ENTMCNC: 30 G/DL (ref 31.4–35)
MCHC RBC AUTO-ENTMCNC: 30.3 G/DL (ref 31.4–35)
MCV RBC AUTO: 85.1 FL (ref 79.6–97.8)
MCV RBC AUTO: 86.3 FL (ref 79.6–97.8)
METHGB MFR BLD: 0.3 % (ref 0–1.5)
MONOCYTES # BLD: 0.2 K/UL (ref 0.1–1.3)
MONOCYTES # BLD: 0.8 K/UL (ref 0.1–1.3)
MONOCYTES NFR BLD: 5 % (ref 4–12)
MONOCYTES NFR BLD: 9 % (ref 4–12)
NEUTS SEG # BLD: 3.8 K/UL (ref 1.7–8.2)
NEUTS SEG # BLD: 6.4 K/UL (ref 1.7–8.2)
NEUTS SEG NFR BLD: 69 % (ref 43–78)
NEUTS SEG NFR BLD: 79 % (ref 43–78)
NRBC # BLD: 0.02 K/UL (ref 0–0.2)
NRBC # BLD: 0.03 K/UL (ref 0–0.2)
OXYHGB MFR BLDA: 94.8 % (ref 94–97)
OXYHGB MFR BLDA: 95.5 % (ref 94–97)
OXYHGB MFR BLDA: 97.1 % (ref 94–97)
P-R INTERVAL, ECG05: 104 MS
PCO2 BLDA: 47 MMHG (ref 35–45)
PCO2 BLDA: 50 MMHG (ref 35–45)
PCO2 BLDA: 61 MMHG (ref 35–45)
PH BLDA: 7.26 [PH] (ref 7.35–7.45)
PH BLDA: 7.3 [PH] (ref 7.35–7.45)
PH BLDA: 7.34 [PH] (ref 7.35–7.45)
PHOSPHATE SERPL-MCNC: 4 MG/DL (ref 2.3–3.7)
PLATELET # BLD AUTO: 335 K/UL (ref 150–450)
PLATELET # BLD AUTO: 343 K/UL (ref 150–450)
PMV BLD AUTO: 10.1 FL (ref 9.4–12.3)
PMV BLD AUTO: 10.4 FL (ref 9.4–12.3)
PO2 BLDA: 119 MMHG (ref 80–105)
PO2 BLDA: 88 MMHG (ref 80–105)
PO2 BLDA: 88 MMHG (ref 80–105)
POTASSIUM SERPL-SCNC: 4.3 MMOL/L (ref 3.5–5.1)
POTASSIUM SERPL-SCNC: 4.6 MMOL/L (ref 3.5–5.1)
PROT SERPL-MCNC: 7.2 G/DL (ref 6.3–8.2)
PROT SERPL-MCNC: 7.6 G/DL (ref 6.3–8.2)
PROTHROMBIN TIME: 13.7 SEC (ref 11.5–14.5)
Q-T INTERVAL, ECG07: 284 MS
Q-T INTERVAL, ECG07: 348 MS
QRS DURATION, ECG06: 148 MS
QRS DURATION, ECG06: 92 MS
QTC CALCULATION (BEZET), ECG08: 418 MS
QTC CALCULATION (BEZET), ECG08: 461 MS
RBC # BLD AUTO: 3.96 M/UL (ref 4.23–5.6)
RBC # BLD AUTO: 4.09 M/UL (ref 4.23–5.6)
RESP RATE: 8
SAO2 % BLD: 95 % (ref 92–98.5)
SAO2 % BLD: 96 % (ref 92–98.5)
SAO2 % BLD: 98 % (ref 92–98.5)
SERVICE CMNT-IMP: ABNORMAL
SODIUM SERPL-SCNC: 142 MMOL/L (ref 136–145)
SODIUM SERPL-SCNC: 144 MMOL/L (ref 136–145)
TROPONIN I BLD-MCNC: 0.09 NG/ML (ref 0.02–0.05)
VENTILATION MODE VENT: ABNORMAL
VENTILATION MODE VENT: ABNORMAL
VENTRICULAR RATE, ECG03: 159 BPM
VENTRICULAR RATE, ECG03: 87 BPM
WBC # BLD AUTO: 4.8 K/UL (ref 4.3–11.1)
WBC # BLD AUTO: 9.3 K/UL (ref 4.3–11.1)

## 2018-08-30 PROCEDURE — 93005 ELECTROCARDIOGRAM TRACING: CPT | Performed by: EMERGENCY MEDICINE

## 2018-08-30 PROCEDURE — 74011250636 HC RX REV CODE- 250/636: Performed by: NURSE PRACTITIONER

## 2018-08-30 PROCEDURE — 85730 THROMBOPLASTIN TIME PARTIAL: CPT

## 2018-08-30 PROCEDURE — 36415 COLL VENOUS BLD VENIPUNCTURE: CPT

## 2018-08-30 PROCEDURE — 85025 COMPLETE CBC W/AUTO DIFF WBC: CPT

## 2018-08-30 PROCEDURE — 84484 ASSAY OF TROPONIN QUANT: CPT

## 2018-08-30 PROCEDURE — 83735 ASSAY OF MAGNESIUM: CPT

## 2018-08-30 PROCEDURE — 65660000000 HC RM CCU STEPDOWN

## 2018-08-30 PROCEDURE — 82803 BLOOD GASES ANY COMBINATION: CPT

## 2018-08-30 PROCEDURE — 74011000258 HC RX REV CODE- 258: Performed by: EMERGENCY MEDICINE

## 2018-08-30 PROCEDURE — 83880 ASSAY OF NATRIURETIC PEPTIDE: CPT

## 2018-08-30 PROCEDURE — 80053 COMPREHEN METABOLIC PANEL: CPT

## 2018-08-30 PROCEDURE — 99291 CRITICAL CARE FIRST HOUR: CPT | Performed by: INTERNAL MEDICINE

## 2018-08-30 PROCEDURE — 74011000250 HC RX REV CODE- 250: Performed by: EMERGENCY MEDICINE

## 2018-08-30 PROCEDURE — 74011000258 HC RX REV CODE- 258: Performed by: INTERNAL MEDICINE

## 2018-08-30 PROCEDURE — 94760 N-INVAS EAR/PLS OXIMETRY 1: CPT

## 2018-08-30 PROCEDURE — 36600 WITHDRAWAL OF ARTERIAL BLOOD: CPT

## 2018-08-30 PROCEDURE — 74011250636 HC RX REV CODE- 250/636: Performed by: INTERNAL MEDICINE

## 2018-08-30 PROCEDURE — 99285 EMERGENCY DEPT VISIT HI MDM: CPT | Performed by: EMERGENCY MEDICINE

## 2018-08-30 PROCEDURE — 94660 CPAP INITIATION&MGMT: CPT

## 2018-08-30 PROCEDURE — 96375 TX/PRO/DX INJ NEW DRUG ADDON: CPT | Performed by: EMERGENCY MEDICINE

## 2018-08-30 PROCEDURE — 84100 ASSAY OF PHOSPHORUS: CPT

## 2018-08-30 PROCEDURE — 96365 THER/PROPH/DIAG IV INF INIT: CPT | Performed by: EMERGENCY MEDICINE

## 2018-08-30 PROCEDURE — 74011250637 HC RX REV CODE- 250/637: Performed by: INTERNAL MEDICINE

## 2018-08-30 PROCEDURE — 94640 AIRWAY INHALATION TREATMENT: CPT

## 2018-08-30 PROCEDURE — 85610 PROTHROMBIN TIME: CPT

## 2018-08-30 PROCEDURE — 74011250636 HC RX REV CODE- 250/636: Performed by: EMERGENCY MEDICINE

## 2018-08-30 PROCEDURE — 71045 X-RAY EXAM CHEST 1 VIEW: CPT

## 2018-08-30 PROCEDURE — 96366 THER/PROPH/DIAG IV INF ADDON: CPT | Performed by: EMERGENCY MEDICINE

## 2018-08-30 PROCEDURE — 74011000250 HC RX REV CODE- 250: Performed by: INTERNAL MEDICINE

## 2018-08-30 RX ORDER — ENOXAPARIN SODIUM 100 MG/ML
40 INJECTION SUBCUTANEOUS EVERY 24 HOURS
Status: CANCELLED | OUTPATIENT
Start: 2018-08-30

## 2018-08-30 RX ORDER — DOCUSATE SODIUM 100 MG/1
100 CAPSULE, LIQUID FILLED ORAL 2 TIMES DAILY
Status: DISCONTINUED | OUTPATIENT
Start: 2018-08-30 | End: 2018-09-01

## 2018-08-30 RX ORDER — ONDANSETRON 2 MG/ML
4 INJECTION INTRAMUSCULAR; INTRAVENOUS
Status: DISCONTINUED | OUTPATIENT
Start: 2018-08-30 | End: 2018-08-31 | Stop reason: SDUPTHER

## 2018-08-30 RX ORDER — RIFAMPIN 300 MG/1
600 CAPSULE ORAL
Status: DISCONTINUED | OUTPATIENT
Start: 2018-08-31 | End: 2018-09-01 | Stop reason: HOSPADM

## 2018-08-30 RX ORDER — ALBUTEROL SULFATE 0.83 MG/ML
2.5 SOLUTION RESPIRATORY (INHALATION)
Status: DISCONTINUED | OUTPATIENT
Start: 2018-08-30 | End: 2018-08-31

## 2018-08-30 RX ORDER — HEPARIN SODIUM 5000 [USP'U]/ML
4000 INJECTION, SOLUTION INTRAVENOUS; SUBCUTANEOUS ONCE
Status: COMPLETED | OUTPATIENT
Start: 2018-08-30 | End: 2018-08-30

## 2018-08-30 RX ORDER — IPRATROPIUM BROMIDE 0.5 MG/2.5ML
0.5 SOLUTION RESPIRATORY (INHALATION)
Status: DISCONTINUED | OUTPATIENT
Start: 2018-08-30 | End: 2018-08-31

## 2018-08-30 RX ORDER — PANTOPRAZOLE SODIUM 40 MG/1
40 TABLET, DELAYED RELEASE ORAL
Status: DISCONTINUED | OUTPATIENT
Start: 2018-08-31 | End: 2018-09-01 | Stop reason: HOSPADM

## 2018-08-30 RX ORDER — SODIUM CHLORIDE 0.9 % (FLUSH) 0.9 %
5-10 SYRINGE (ML) INJECTION EVERY 8 HOURS
Status: DISCONTINUED | OUTPATIENT
Start: 2018-08-30 | End: 2018-09-01 | Stop reason: HOSPADM

## 2018-08-30 RX ORDER — SODIUM CHLORIDE 0.9 % (FLUSH) 0.9 %
5-10 SYRINGE (ML) INJECTION AS NEEDED
Status: DISCONTINUED | OUTPATIENT
Start: 2018-08-30 | End: 2018-09-01 | Stop reason: HOSPADM

## 2018-08-30 RX ORDER — METOPROLOL SUCCINATE 50 MG/1
50 TABLET, EXTENDED RELEASE ORAL DAILY
Status: DISCONTINUED | OUTPATIENT
Start: 2018-08-31 | End: 2018-09-01 | Stop reason: HOSPADM

## 2018-08-30 RX ORDER — TAMSULOSIN HYDROCHLORIDE 0.4 MG/1
0.4 CAPSULE ORAL DAILY
Status: DISCONTINUED | OUTPATIENT
Start: 2018-08-31 | End: 2018-09-01 | Stop reason: HOSPADM

## 2018-08-30 RX ORDER — METOPROLOL TARTRATE 50 MG/1
50 TABLET ORAL 2 TIMES DAILY
Status: CANCELLED | OUTPATIENT
Start: 2018-08-30

## 2018-08-30 RX ORDER — FUROSEMIDE 10 MG/ML
40 INJECTION INTRAMUSCULAR; INTRAVENOUS ONCE
Status: COMPLETED | OUTPATIENT
Start: 2018-08-30 | End: 2018-08-30

## 2018-08-30 RX ORDER — AZITHROMYCIN 250 MG/1
500 TABLET, FILM COATED ORAL
Status: DISCONTINUED | OUTPATIENT
Start: 2018-08-31 | End: 2018-09-01 | Stop reason: HOSPADM

## 2018-08-30 RX ORDER — ACETAMINOPHEN 325 MG/1
650 TABLET ORAL
Status: DISCONTINUED | OUTPATIENT
Start: 2018-08-30 | End: 2018-08-31 | Stop reason: SDUPTHER

## 2018-08-30 RX ORDER — METOPROLOL TARTRATE 5 MG/5ML
5 INJECTION INTRAVENOUS ONCE
Status: COMPLETED | OUTPATIENT
Start: 2018-08-30 | End: 2018-08-30

## 2018-08-30 RX ORDER — INSULIN LISPRO 100 [IU]/ML
INJECTION, SOLUTION INTRAVENOUS; SUBCUTANEOUS EVERY 6 HOURS
Status: DISCONTINUED | OUTPATIENT
Start: 2018-08-30 | End: 2018-08-30

## 2018-08-30 RX ORDER — ETHAMBUTOL HYDROCHLORIDE 400 MG/1
2000 TABLET, FILM COATED ORAL
Status: DISCONTINUED | OUTPATIENT
Start: 2018-08-31 | End: 2018-09-01 | Stop reason: HOSPADM

## 2018-08-30 RX ORDER — DILTIAZEM HYDROCHLORIDE 5 MG/ML
20 INJECTION INTRAVENOUS
Status: COMPLETED | OUTPATIENT
Start: 2018-08-30 | End: 2018-08-30

## 2018-08-30 RX ORDER — NITROGLYCERIN 0.4 MG/1
0.4 TABLET SUBLINGUAL
Status: DISCONTINUED | OUTPATIENT
Start: 2018-08-30 | End: 2018-09-01 | Stop reason: HOSPADM

## 2018-08-30 RX ORDER — LISINOPRIL 5 MG/1
10 TABLET ORAL DAILY
Status: DISCONTINUED | OUTPATIENT
Start: 2018-08-31 | End: 2018-09-01 | Stop reason: HOSPADM

## 2018-08-30 RX ORDER — ALLOPURINOL 300 MG/1
300 TABLET ORAL DAILY
Status: DISCONTINUED | OUTPATIENT
Start: 2018-08-31 | End: 2018-09-01 | Stop reason: HOSPADM

## 2018-08-30 RX ORDER — ATORVASTATIN CALCIUM 40 MG/1
40 TABLET, FILM COATED ORAL DAILY
Status: DISCONTINUED | OUTPATIENT
Start: 2018-08-31 | End: 2018-09-01 | Stop reason: HOSPADM

## 2018-08-30 RX ORDER — HEPARIN SODIUM 5000 [USP'U]/100ML
12-25 INJECTION, SOLUTION INTRAVENOUS
Status: DISCONTINUED | OUTPATIENT
Start: 2018-08-30 | End: 2018-08-31

## 2018-08-30 RX ORDER — LANOLIN ALCOHOL/MO/W.PET/CERES
1 CREAM (GRAM) TOPICAL
Status: DISCONTINUED | OUTPATIENT
Start: 2018-08-31 | End: 2018-09-01 | Stop reason: HOSPADM

## 2018-08-30 RX ADMIN — DILTIAZEM HYDROCHLORIDE 20 MG: 5 INJECTION INTRAVENOUS at 07:05

## 2018-08-30 RX ADMIN — HEPARIN SODIUM 4000 UNITS: 5000 INJECTION INTRAVENOUS; SUBCUTANEOUS at 12:34

## 2018-08-30 RX ADMIN — DOCUSATE SODIUM 100 MG: 100 CAPSULE, LIQUID FILLED ORAL at 17:32

## 2018-08-30 RX ADMIN — IPRATROPIUM BROMIDE 0.5 MG: 0.5 SOLUTION RESPIRATORY (INHALATION) at 16:30

## 2018-08-30 RX ADMIN — SODIUM CHLORIDE 15 MG/HR: 900 INJECTION, SOLUTION INTRAVENOUS at 18:54

## 2018-08-30 RX ADMIN — SODIUM CHLORIDE 15 MG/HR: 900 INJECTION, SOLUTION INTRAVENOUS at 13:45

## 2018-08-30 RX ADMIN — METOPROLOL TARTRATE 5 MG: 1 INJECTION, SOLUTION INTRAVENOUS at 08:03

## 2018-08-30 RX ADMIN — Medication 10 ML: at 21:12

## 2018-08-30 RX ADMIN — ALBUTEROL SULFATE 2.5 MG: 2.5 SOLUTION RESPIRATORY (INHALATION) at 16:30

## 2018-08-30 RX ADMIN — METHYLPREDNISOLONE SODIUM SUCCINATE 60 MG: 125 INJECTION, POWDER, FOR SOLUTION INTRAMUSCULAR; INTRAVENOUS at 17:33

## 2018-08-30 RX ADMIN — FUROSEMIDE 40 MG: 10 INJECTION, SOLUTION INTRAMUSCULAR; INTRAVENOUS at 10:13

## 2018-08-30 RX ADMIN — HEPARIN SODIUM AND DEXTROSE 12 UNITS/KG/HR: 5000; 5 INJECTION INTRAVENOUS at 12:38

## 2018-08-30 RX ADMIN — IPRATROPIUM BROMIDE 0.5 MG: 0.5 SOLUTION RESPIRATORY (INHALATION) at 19:18

## 2018-08-30 RX ADMIN — SODIUM CHLORIDE 10 MG/HR: 900 INJECTION, SOLUTION INTRAVENOUS at 07:39

## 2018-08-30 RX ADMIN — ALBUTEROL SULFATE 2.5 MG: 2.5 SOLUTION RESPIRATORY (INHALATION) at 19:18

## 2018-08-30 NOTE — ED TRIAGE NOTES
Pt arrives to ED via [facility] coming from home. Pt has hx COPD and c/o increased SOB this AM. Wears home O2 @2L via [place]. Pt had attempted home inhalers/breathing treatments without relief. Initial sats with EMS on 2L via [place] were at 90%. EMS gave: 125 solumedrol, 2 g mag sulfate, and 1 duoneb tx. Was placed on CPAP en oute to facility. Pt denies any CP at this time.

## 2018-08-30 NOTE — PROGRESS NOTES
BiPAP initiated in the ED following ABG results and an increased WOB. Patient has accessory muscle use and labored breaths with hypercapnia and a low pH. ER MD notified of results.

## 2018-08-30 NOTE — ED NOTES
Pt respirations even and unlabored. Pt denies any additional needs at this time. Pt bed locked and low, call bell within reach. Pt vital signs continuously being monitored.

## 2018-08-30 NOTE — CONSULTS
[facility] Cardiology/Electrophyiology Consult Date of  Admission: 8/30/2018  6:47 AM  
 
CC/Reason for consult: atrial flutter Asa Cole is a 78 y.o. male admitted for COPD exacerbation ([address] 78 y. o. male with a history of PAF, HFrEF, AAA, COPD,  HTN, Prostate CA, hyperlipidemia, OA, and PE who presented to ER with complaints of worsening SOB, malaise, and cough and was admitted with aflutter with RVR and hypoxic respiratory failure and EP was consulted for management of aflutter.  The patient has had SOB starting yesterday and just feeling bad.  He was brought to the hospital by EMS with productive yellow sputum but denies any recent fevers or chills.  He was stared with IV Nebs, Steroids, and placed on CPAP for transport.  In the ED he was changed to bipap and started on a Cardizem drip with bolus and IV lopressor. Felice Cheese recently finished up antibiotics but is a poor historian.  The patient denies CP, Neck pain, back pain, n/v, weight gain, weakness, dizziness, or LOC.  Pt aflutter is a recurrent problem, aggravated by underlying cardiomyopathy, no alleviating factors, with symptoms as noted above. Cardiac PMH: (Old records have been reviewed and summarized below) LHC: LHC from 2006 at [facility] shows mild none obstructive disease with dilated cardiomyopathy. Echo: from 7/23/2018 Left ventricle: The ventricle was moderately dilated. Systolic function  Was moderately reduced. Ejection fraction was estimated in the range of 30 % to  35%. There was mild to moderate global diffuse hypokinesis.  Left atrium: The atrium was mildly dilated. Right atrium: The atrium was mildly dilated.  Inferior vena cava, hepatic veins: The inferior vena cava was dilated. The respirophasic change in diameter was more than 50%. Trop I: 0.09 BNP: 1067 EKG:  Atrial Flutter rate of 110 CXR: Possible ascending aortic aneurysm, unchanged. Consider a dedicated CTA of the chest there is further clinical concern. Note: Pulmonary space are grossly clear. Patient Active Problem List  
Diagnosis Code  AAA (abdominal aortic aneurysm) without rupture (HCC) I71.4  
 COPD (chronic obstructive pulmonary disease) (HCC) J44.9  Hyperlipemia E78.5  Glaucoma H40.9  Supplemental oxygen dependent Z99.81  
 OA (osteoarthritis) M19.90  Paroxysmal atrial fibrillation (HCC) I48.0  Warfarin anticoagulation Z79.01  
 Renal cysts, acquired, bilateral N28.1  Tobacco use disorder F17.200  Hypertension I10  
 Pulmonary embolus (HCC) I26.99  
 Cardiomyopathy ([address] I42.9  Atrial flutter (HCC) I48.92  
 Peptic ulcer disease K27.9  History of pulmonary embolism Z86.711  
 COPD exacerbation (HCC) J44.1  CHF exacerbation (HCC) I50.9  CHF (congestive heart failure) (HCC) I50.9  A-fib ([facility]) I48.91  
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  
Diagnosis Date  A-fib ([address] 5/2/2014  AAA (abdominal aortic aneurysm) without rupture ([address] 5/2/2014  
 3.2 on US 2/14 [facility]  Arthritis  Cancer ([address]   
 hx prostate cancer  COPD (chronic obstructive pulmonary disease) ([address] 5/2/2014  Elevated prostate specific antigen (PSA)  Glaucoma 5/2/2014  Heart disease  Heart failure ([address]  Hyperlipemia 5/2/2014  Hypertension  Hypertrophy of prostate with urinary obstruction and other lower urinary tract symptoms (LUTS)  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  OA (osteoarthritis) 5/2/2014  Peptic ulcer disease 6/1/2017  Poor historian  Prostate cancer ([address]  Pulmonary embolus ([address] 6/1/2017  Supplemental oxygen dependent 5/2/2014  Warfarin anticoagulation 5/2/2014 Past Surgical History:  
Procedure Laterality Date  HX HEART CATHETERIZATION Allergies Allergen Reactions  Statins-Hmg-Coa Reductase Inhibitors Myalgia Muscle pain Family History Problem Relation Age of Onset  Cancer Mother  Heart Disease Father Current Facility-Administered Medications Medication Dose Route Frequency  dilTIAZem (CARDIZEM) 100 mg in 0.9% sodium chloride (MBP/ADV) 100 mL infusion  0-15 mg/hr IntraVENous TITRATE  [START ON 8/31/2018] azithromycin (ZITHROMAX) tablet 500 mg  500 mg Oral Q MON, WED & FRI  
 [START ON 8/31/2018] ethambutol (MYAMBUTOL) tablet 2,000 mg  2,000 mg Oral Q MON, WED & FRI  
 [START ON 8/31/2018] rifAMPin (RIFADIN) capsule 600 mg  600 mg Oral Q MON, WED & FRI  
 [START ON 8/31/2018] tamsulosin (FLOMAX) capsule 0.4 mg  0.4 mg Oral DAILY  sodium chloride (NS) flush 5-10 mL  5-10 mL IntraVENous Q8H  
 sodium chloride (NS) flush 5-10 mL  5-10 mL IntraVENous PRN  
 albuterol (PROVENTIL VENTOLIN) nebulizer solution 2.5 mg  2.5 mg Nebulization QID RT  
 ipratropium (ATROVENT) 0.02 % nebulizer solution 0.5 mg  0.5 mg Nebulization QID RT  
 methylPREDNISolone (PF) (SOLU-MEDROL) injection 60 mg  60 mg IntraVENous Q6H  
 acetaminophen (TYLENOL) tablet 650 mg  650 mg Oral Q4H PRN  
 ondansetron (ZOFRAN) injection 4 mg  4 mg IntraVENous Q4H PRN  
 docusate sodium (COLACE) capsule 100 mg  100 mg Oral BID  heparin 25,000 units in dextrose 500 mL infusion  12-25 Units/kg/hr IntraVENous TITRATE  [START ON 8/31/2018] lisinopril (PRINIVIL, ZESTRIL) tablet 10 mg  10 mg Oral DAILY  [START ON 8/31/2018] pantoprazole (PROTONIX) tablet 40 mg  40 mg Oral ACB  [START ON 8/31/2018] ferrous sulfate tablet 325 mg  1 Tab Oral DAILY WITH BREAKFAST  [START ON 8/31/2018] allopurinol (ZYLOPRIM) tablet 300 mg  300 mg Oral DAILY  nitroglycerin (NITROSTAT) tablet 0.4 mg  0.4 mg SubLINGual Q5MIN PRN  
 [START ON 8/31/2018] atorvastatin (LIPITOR) tablet 40 mg  40 mg Oral DAILY  [START ON 8/31/2018] metoprolol succinate (TOPROL-XL) XL tablet 50 mg 50 mg Oral DAILY Review of Symptoms: A comprehensive ROS was performed with the pertinent positives and negatives mentioned in the HPI, all other systems reviewed and are negative Physical Exam 
Vitals:  
 08/30/18 1430 08/30/18 1512 08/30/18 1630 08/30/18 1645 BP: 135/73 145/74  149/74 Pulse: 85 84  97 Resp:  18  18 Temp:  98.3 °F (36.8 °C)  98.9 °F (37.2 °C) SpO2: 97% 98% 97% 100% Weight:      
Height:      
 
 
Physical Exam: 
General appearance - Alert, well appearing, and in no distress Mental status - Affect appropriate to mood. Eyes - Sclerae anicteric, ENMT - Hearing grossly normal bilaterally, Dental hygiene good. Neck - Carotids upstroke normal bilaterally, no bruits, no JVD. Resp - bibasilar crackles Heart - irreg irreg, distant S1, S2, no murmurs, rubs, clicks or gallops. GI - Soft, nontender, nondistended, no masses or organomegaly. Neurological - Grossly intact - normal speech, no focal findings Musculoskeletal - No joint tenderness, deformity or swelling, no muscular tenderness noted. Extremities - Peripheral pulses normal, trace JOVITA, no clubbing or cyanosis. Skin - Normal coloration and turgor. Psych -  oriented to person, place, and time. Cardiographics Telemetry:  
ECG (Indpendently visualized and interpreted): atrial flutter, rate 87, NSST, appears CTI dependent Echocardiogram: as noted above Labs:  
Recent Labs 08/30/18 
 1629  08/30/18 
 1249  08/30/18 2005 NA  144   --   142  
K  4.6   --   4.3 MG  2.3   --    --   
BUN  18   --   17  
CREA  1.47   --   1.42  
GLU  123*   --   122* WBC  4.8   --   9.3 HGB  10.2*   --   10.6* HCT  33.7*   --   35.3*  
PLT  343   --   335 INR   --   1.1   -- Assessment:  
  
Principal Problem: 
  Acute on chronic respiratory failure ([facility] (7/25/2018) Active Problems: 
  AAA (abdominal aortic aneurysm) without rupture ([facility] (5/2/2014)     Overview: 3.2 on US 2/14 [facility] 
 
 COPD (chronic obstructive pulmonary disease) ([facility] (5/2/2014) Paroxysmal atrial fibrillation ([facility] (5/2/2014) COPD exacerbation ([facility] (7/23/2018) CHF (congestive heart failure) ([facility] (7/23/2018) Overview: 7/23/18  ECHO 
    -  Left ventricle moderately dilated. EF30-35%. Mild/mod global diffuse  
    hypokinesis. -  Left atrium: The atrium was mildly dilated. -  Right atrium: The atrium was mildly dilated. -  Inferior vena cava, hepatic veins: IVC dilated. Respirophasic change  
    more than 50%. 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[facility] (7/25/2018) Overview: MAC followed by Dr. Orlando Bello at [facility]. In March 2018 was found with new SAMANTHA  
    nodules with spiculation. 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  
     He was started on ETB/azithro/rif.   
 
  Multiple pulmonary nodules (8/30/2018) 78 y. o. male with a history of PAF, HFrEF, AAA, COPD,  HTN, Prostate CA, hyperlipidemia, OA, and PE who presented to ER with complaints of worsening SOB, malaise, and cough and was admitted with aflutter with RVR and hypoxic respiratory failure and EP was consulted for management of aflutter. Plan: 1. Atrial flutter with RVR, uncontrolled: I had a long discussion with the Pt today regarding rate and rhythm control strategies, rhythm control strategy treatment options including DCCV, antiarrhythmic therapy, catheter ablation and the combination of the above. I discussed at length the advantages and disadvantages of all treatment strategies and possible complications from cardiac ablation including femoral vascular access complications, damage to the heart tissue, bleeding around the heart, the need for emergent open heart surgery, heart attack, stroke, possible need for pacemaker post procedure or even death. Pt will undergo a transesophageal echocardiogram immediately prior to catheter insertion to completely exclude the possibility of left atrial appendage thrombus. --EPS and aflutter ablation in the AM 
 
2. NICM, uncontrolled: will pursue rhythm control strategy for aflutter, consider life vest, initiate OMT and Pt will require follow up TTE, may ultimately have indication for ICD Thank you very much for this referral. We appreciate the opportunity to participate in this patient's care. We will follow along with above stated plan. Jm Keller MD, MS 
Cardiology/Electrophysiology

## 2018-08-30 NOTE — PROGRESS NOTES
Primary Nurse Jim Avila RN and Diego Barber RN performed a dual skin assessment on this patient No skin breakdown noted

## 2018-08-30 NOTE — PROGRESS NOTES
Patient taken off bipap and placed on 2L NC. He is tolerating this change well and wears 2L O2 at home. O2 sat 98%.

## 2018-08-30 NOTE — PROGRESS NOTES
Verbal bedside report received from Gerardo Gilman RN. Assumed care of patient. Heparin and Cardizem IV drip verified at bedside with outgoing RN.

## 2018-08-30 NOTE — H&P
HISTORY AND PHYSICAL Javon Wright. 
 
2018 Date of Admission:  2018 The patient's chart is reviewed and the patient is discussed with the staff. Subjective:  
 
Patient is a 78 y.o.  male presents via EMS with increased shortness of breath and productive cough with yellow sputum. States he began to feel \"bad yesterday\" feeling like his breath was cutting off. He denies recent fever or chills but appetite has been less and had some nausea. Was treated by EMS with nebs, IV steroids and placed on CPAP for transport. In the ER was changed to BIPAP and given Cardizem bolus and Lopressor IV for tachycardia--was started on Cardizem drip. Troponin was slightly elevated 0.09, BNP elevated 1067, AB.30/50/119/24 on BIPAP. Heart rate currently uncontrolled and appears to be AFlutter with HR 90-110s. He states since recent discharge he \"finished up his antibiotics after 2-3 days and has not been on any treatment for MAC since then. He is romero poor historian and has a poor understanding of his medical regimen. He was to follow up with Dr. Matty Hoover and cardiology after discharge but has only seen Dr. Brian Echols at [facility]. He will be admitted to the ICU on BIPAP for acute on chronic respiratory failure. Was recently hospitalized -18 by hospitalist service for worsening hypoxia, dyspnea on exertion, hypertension, BNP elevation, and rapid atrial fibrillation requiring cardizem drip. We were consulted during that admission to assist with care. He was discharged home on Zithromax / ethambutol / rifampin, Prednisone taper, Eliquis and planned follow up with [facility] pulmonary and cardiology. 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 Matty Hoover at [address], [address] [facility], cardiomegaly with EF 30-35%, hx tobacco abuse, pulmonary nodules. Review of Systems A comprehensive review of systems was negative except for: Constitutional: positive for fatigue, malaise and anorexia Respiratory: positive for cough, sputum, dyspnea on exertion or MAC, on chronic O2 2L, COPD Cardiovascular: positive for dyspnea, palpitations, fatigue, dyspnea on exertion Gastrointestinal: positive for reflux symptoms, nausea and abdominal bloating Patient Active Problem List  
Diagnosis Code  AAA (abdominal aortic aneurysm) without rupture ([facility]) I71.4  
 COPD (chronic obstructive pulmonary disease) ([facility]) J44.9  Hyperlipemia E78.5  Glaucoma H40.9  Supplemental oxygen dependent Z99.81  
 OA (osteoarthritis) M19.90  Paroxysmal atrial fibrillation ([facility]) I48.0  Warfarin anticoagulation Z79.01  
 Renal cysts, acquired, bilateral N28.1  Tobacco use disorder F17.200  Hypertension I10  
 Pulmonary embolus ([facility]) I26.99  
 Cardiomyopathy ([facility] I42.9  Atrial flutter ([facility]) I48.92  
 Peptic ulcer disease K27.9  History of pulmonary embolism Z86.711  
 COPD exacerbation ([facility]) J44.1  CHF exacerbation ([facility]) I50.9  CHF (congestive heart failure) ([facility]) I50.9  A-fib ([facility]) I48.91  
 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[facility]) J15.8, A31.9  Acute on chronic respiratory failure ([facility]) J96.20  Multiple pulmonary nodules R91.8 Prior to Admission Medications Prescriptions Last Dose Informant Patient Reported? Taking? LEUPROLIDE ACETATE (ELIGARD SC)   Yes No  
Si mg by SubCUTAneous route. OXYGEN-AIR DELIVERY SYSTEMS   Yes No  
Sig: Use as instructed. Use as instructed. albuterol (VENTOLIN HFA) 90 mcg/actuation inhaler   Yes No  
Sig: Take  by inhalation. allopurinol (ZYLOPRIM) 300 mg tablet   Yes No  
Sig: Take  by mouth daily. azithromycin (ZITHROMAX) 500 mg tab   Yes No  
Sig: Take 1 tablet every Mon, Wed, Fri  
bimatoprost (LUMIGAN) 0.01 % ophthalmic drops   Yes No  
Sig: Administer 1 Drop to both eyes every evening. brimonidine (ALPHAGAN P) 0.1 % ophthalmic solution   Yes No  
Sig: every eight (8) hours. cholecalciferol, vitamin D3, 2,000 unit tab   Yes No  
Sig: Take  by mouth.  
ethambutol (MYAMBUTOL) 400 mg tablet   Yes No  
Sig: Take 5 tabs every mon, wed, fri  
ferrous sulfate (IRON) 325 mg (65 mg iron) tablet   Yes No  
Sig: Take  by mouth Daily (before breakfast). fluticasone-salmeterol (ADVAIR) 250-50 mcg/dose diskus inhaler   Yes No  
Sig: Take 1 Puff by inhalation. lisinopril (PRINIVIL, ZESTRIL) 10 mg tablet   No No  
Sig: Take 1 Tab by mouth daily. metoprolol tartrate (LOPRESSOR) 50 mg tablet   No No  
Sig: Take 1 Tab by mouth two (2) times a day. nitroglycerin (NITROSTAT) 0.4 mg SL tablet   No No  
Si Tab by SubLINGual route every five (5) minutes as needed for Chest Pain. omeprazole (PRILOSEC) 20 mg capsule   Yes No  
Sig: Take 20 mg by mouth daily. potassium chloride SR (KLOR-CON 10) 10 mEq tablet   Yes No  
Sig: Take 20 mEq by mouth two (2) times a day. predniSONE (DELTASONE) 10 mg tablet   No No  
Sig: Take 1 Tab by mouth daily (with breakfast). rifAMPin (RIFADIN) 300 mg capsule   Yes No  
Sig: Take 2 tabs every mon, wed, fri  
rosuvastatin (CRESTOR) 40 mg tablet   Yes No  
Sig: Take 40 mg by mouth nightly. tamsulosin (FLOMAX) 0.4 mg capsule   No No  
Sig: TAKE ONE CAPSULE BY MOUTH EVERY DAY  
tiotropium (SPIRIVA WITH HANDIHALER) 18 mcg inhalation capsule   Yes No  
Sig: Take 1 Cap by inhalation daily. Facility-Administered Medications: None Past Medical History:  
Diagnosis Date  A-fib ([facility] 2014  AAA (abdominal aortic aneurysm) without rupture ([facility] 2014  
 3.2 on US  [facility]  Arthritis  Cancer ([facility]   
 hx prostate cancer  COPD (chronic obstructive pulmonary disease) ([facility] 2014  Elevated prostate specific antigen (PSA)  Glaucoma 2014  Heart disease  Heart failure ([facility]  Hyperlipemia 2014  Hypertension  Hypertrophy of prostate with urinary obstruction and other lower urinary tract symptoms (LUTS)  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[facility] 7/25/2018  OA (osteoarthritis) 5/2/2014  Peptic ulcer disease 6/1/2017  Poor historian  Prostate cancer ([facility]  Pulmonary embolus ([facility] 6/1/2017  Supplemental oxygen dependent 5/2/2014  Warfarin anticoagulation 5/2/2014 Past Surgical History:  
Procedure Laterality Date  HX HEART CATHETERIZATION Social History Social History  Marital status: SINGLE Spouse name: N/A  
 Number of children: N/A  
 Years of education: N/A Occupational History  Not on file. Social History Main Topics  Smoking status: Former Smoker Packs/day: 2.00 Years: 40.00 Quit date: 7/26/2014  Smokeless tobacco: Never Used Comment: still taking Chantix  Alcohol use No  
 Drug use: No  
 Sexual activity: Yes  
  Partners: Female Birth control/ protection: None Other Topics Concern  Not on file Social History Narrative Family History Problem Relation Age of Onset  Cancer Mother  Heart Disease Father Allergies Allergen Reactions  Statins-Hmg-Coa Reductase Inhibitors Myalgia Muscle pain Current Facility-Administered Medications Medication Dose Route Frequency  dilTIAZem (CARDIZEM) 100 mg in 0.9% sodium chloride (MBP/ADV) 100 mL infusion  0-15 mg/hr IntraVENous TITRATE Current Outpatient Prescriptions Medication Sig  
 lisinopril (PRINIVIL, ZESTRIL) 10 mg tablet Take 1 Tab by mouth daily.  metoprolol tartrate (LOPRESSOR) 50 mg tablet Take 1 Tab by mouth two (2) times a day.  predniSONE (DELTASONE) 10 mg tablet Take 1 Tab by mouth daily (with breakfast).   
 azithromycin (ZITHROMAX) 500 mg tab Take 1 tablet every Mon, Wed, Fri  
 ethambutol (MYAMBUTOL) 400 mg tablet Take 5 tabs every mon, wed, fri  
 fluticasone-salmeterol (ADVAIR) 250-50 mcg/dose diskus inhaler Take 1 Puff by inhalation.  rifAMPin (RIFADIN) 300 mg capsule Take 2 tabs every mon, wed, fri  
 nitroglycerin (NITROSTAT) 0.4 mg SL tablet 1 Tab by SubLINGual route every five (5) minutes as needed for Chest Pain.  potassium chloride SR (KLOR-CON 10) 10 mEq tablet Take 20 mEq by mouth two (2) times a day.  allopurinol (ZYLOPRIM) 300 mg tablet Take  by mouth daily.  cholecalciferol, vitamin D3, 2,000 unit tab Take  by mouth.  bimatoprost (LUMIGAN) 0.01 % ophthalmic drops Administer 1 Drop to both eyes every evening.  OXYGEN-AIR DELIVERY SYSTEMS Use as instructed. Use as instructed.  omeprazole (PRILOSEC) 20 mg capsule Take 20 mg by mouth daily.  rosuvastatin (CRESTOR) 40 mg tablet Take 40 mg by mouth nightly.  ferrous sulfate (IRON) 325 mg (65 mg iron) tablet Take  by mouth Daily (before breakfast).  tamsulosin (FLOMAX) 0.4 mg capsule TAKE ONE CAPSULE BY MOUTH EVERY DAY  brimonidine (ALPHAGAN P) 0.1 % ophthalmic solution every eight (8) hours.  LEUPROLIDE ACETATE (ELIGARD SC) 45 mg by SubCUTAneous route.  tiotropium (SPIRIVA WITH HANDIHALER) 18 mcg inhalation capsule Take 1 Cap by inhalation daily.  albuterol (VENTOLIN HFA) 90 mcg/actuation inhaler Take  by inhalation. Objective:  
 
Vitals:  
 08/30/18 4556 08/30/18 1285 08/30/18 9749 08/30/18 2560 BP: 124/90 124/90 (!) 133/92 Pulse: (!) 162 (!) 161 (!) 133 Resp: 20 Temp:      
SpO2: 100%  97% 97% Weight:      
Height: PHYSICAL EXAM  
 
Constitutional:  the patient is well developed and in no acute distress, BIPAP 30% 14/8, sat 99% EENMT:  Sclera clear, pupils equal, oral mucosa moist 
Respiratory: clear anterior, diminished in bases, no wheezing Cardiovascular:  Irregular, AFlutter without M,G,R 
Gastrointestinal: soft and non-tender; with positive bowel sounds. Musculoskeletal: warm without cyanosis. There is no lower leg edema. Skin:  Dry, no jaundice or rashes, no wounds Neurologic: no gross neuro deficits Psychiatric:  alert and oriented x 3 CXR 8/30/18:  Possible ascending aortic aneurysm, unchanged. Consider a dedicated CTA of the chest there is further clinical concern Recent Labs 08/30/18 
 4690 WBC  9.3 HGB  10.6* HCT  35.3*  
PLT  335 Recent Labs 08/30/18 
 9983 NA  142  
K  4.3 CL  108* GLU  122* CO2  27 BUN  17 CREA  1.42  
CA  9.0 ALB  3.3 TBILI  0.4 ALT  59 SGOT  24 Recent Labs 08/30/18 
 0815  08/30/18 
 4693 PH  7.30*  7.26* PCO2  50*  61* PO2  119*  88 HCO3  24  27* No results for input(s): LCAD, LAC in the last 72 hours. Assessment:  (Medical Decision Making) Hospital Problems  Date Reviewed: 8/30/2018 Codes Class Noted POA Acute on chronic respiratory failure (HCC) ICD-10-CM: J96.20 ICD-9-CM: 518.84  7/25/2018 Yes 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HCC) (Chronic) ICD-10-CM: J15.8, A31.9 ICD-9-CM: 482.89, 031.9  7/25/2018 Yes Overview Signed 8/30/2018  8:54 AM by Shirley Turner NP  
  MAC followed by Dr. Jesús Paulino at [facility]. In March 2018 was found with new SAMANTHA nodules with spiculation. 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  He was started on ETB/azithro/rif.   
  
  
   
 Paroxysmal atrial fibrillation (HCC) (Chronic) ICD-10-CM: I48.0 ICD-9-CM: 427.31  5/2/2014 Yes Multiple pulmonary nodules (Chronic) ICD-10-CM: R91.8 ICD-9-CM: 793.19  8/30/2018 Yes  
   
 CHF (congestive heart failure) (HCC) (Chronic) ICD-10-CM: I50.9 ICD-9-CM: 428.0  7/23/2018 Yes Overview Signed 8/30/2018  8:50 AM by Shirley Turner NP  
  7/23/18  ECHO 
-  Left ventricle moderately dilated. EF30-35%. Mild/mod global diffuse hypokinesis. -  Left atrium: The atrium was mildly dilated. -  Right atrium: The atrium was mildly dilated. -  Inferior vena cava, hepatic veins: IVC dilated. Respirophasic change more than 50%. AAA (abdominal aortic aneurysm) without rupture (HCC) (Chronic) ICD-10-CM: I71.4 ICD-9-CM: 441.4  5/2/2014 Yes Overview Signed 5/2/2014 10:53 AM by Cristal Vásquez NP  
  3.2 on US 2/14 [facility] 
  
  
   
 COPD (chronic obstructive pulmonary disease) (HCC) (Chronic) ICD-10-CM: J44.9 ICD-9-CM: 624  5/2/2014 Yes Plan:  (Medical Decision Making) --Will admit to the ICU for further medical management. If can wean off BIPAP could move to telemetry on Cardizem drip. --Supplemental O2--BIPAP--wean as tolerated --Respiratory nebulizer treatments 
--IV steroid therapy 60mg q6h 
--IV Lasix 40mg now--[id number] 
--Antibiotic therapy--resume coverage for MAC:   
 Zithromax 500 mg 1 tablet every MWF Ethambutol 400 mg 5 tablets every MWF Rifampin 300 mg 2 tablets MWF 
--Consult cardiology for atrial flutter and CHF More than 50% of the time documented was spent in face-to-face contact with the patient and in the care of the patient on the floor/unit where the patient is located. Kwesi Blanco NP Lungs:  Diminished bilaterally, no rales Heart:  [place] Additional Comments:  Acute hypoxemic/hypercapneic respiratory failure, h/o COPD and CHF likely with exacerbations of same based on history, exam and lab reviewed. Nebs, Steroids, MAC therapy, diuresis now, BIPAP wean as able. Will give Lasix 40 x 1 now and continue rate control with Dilt gtt. Start Lopressor and hopefully can wean dilt gtt. Will consult cardiology for any additional expert recommendations. I have spoken with and examined the patient. I agree with the above assessment and plan as documented. Ottie Cooks, MD 
 
Patient was able to be taken off BiPAP after lasix, rate control, nebs and one dose of lasix. Reports breathing was better, ABG was acceptable and weaned to [facility]. Cardiology is willing to admit for primary and will discuss with him option for aflutter ablation. Appreciate assistance.  We will continue to follow along.   
 
Raymond Torrez MD

## 2018-08-30 NOTE — H&P
[facility] Cardiology H and P 
                                                                                               
  
Date of  Admission: 8/30/2018  6:47 AM                   
  
Primary Care Physician: Dr Mike Murphy Primary Cardiologist: Dr Yves Baltazar last seen 7/2018 to be re-established Referring Physician: Pulmonology Admitting Physician: Dr Nicki Carlin 
CC/Reason for consult: A Flutter RVR 
  
  
Jac Bernabe. is a 78 y.o. male with a history of PAF, HFrEF, AAA, COPD,  HTN, Prostate CA, hyperlipidemia, OA, and PE. The patient has had SOB starting yesterday and just feeling bad. He was brought to the hospital by EMS with productive yellow sputum but denies any recent fevers or chills. He was stared with IV Nebs, Steroids, and placed on CPAP for transport. In the ED he was changed to bipap and started on a Cardizem drip with bolus and IV lopressor. He recently finished up antibiotics but is a poor historian on what for. The patient denies CP, Neck pain, back pain, n/v, weight gain, weakness, dizziness, or LOC.   
  
LHC: LHC from 2006 at [facility] shows mild none obstructive disease with dilated cardiomyopathy. Echo: from 7/23/2018 Left ventricle: The ventricle was moderately dilated. Systolic function  Was moderately reduced. Ejection fraction was estimated in the range of 30 % to  35%. There was mild to moderate global diffuse hypokinesis. Left atrium: The atrium was mildly dilated. Right atrium: The atrium was mildly dilated. Inferior vena cava, hepatic veins: The inferior vena cava was dilated. The respirophasic change in diameter was more than 50%. Trop I: 0.09 BNP: 1067 EKG:  Atrial Flutter rate of 110 CXR: Possible ascending aortic aneurysm, unchanged. Consider a dedicated CTA of the chest there is further clinical concern. Note: Pulmonary space are grossly clear. 
  
    
Patient Active Problem List  
Diagnosis Code  AAA (abdominal aortic aneurysm) without rupture (HCC) I71.4  COPD (chronic obstructive pulmonary disease) (HCC) J44.9  Hyperlipemia E78.5  Glaucoma H40.9  Supplemental oxygen dependent Z99.81  
 OA (osteoarthritis) M19.90  Paroxysmal atrial fibrillation (HCC) I48.0  Warfarin anticoagulation Z79.01  
 Renal cysts, acquired, bilateral N28.1  Tobacco use disorder F17.200  Hypertension I10  
 Pulmonary embolus (HCC) I26.99  
 Cardiomyopathy ([address] I42.9  Atrial flutter (HCC) I48.92  
 Peptic ulcer disease K27.9  History of pulmonary embolism Z86.711  
 COPD exacerbation (HCC) J44.1  CHF exacerbation (HCC) I50.9  CHF (congestive heart failure) (HCC) I50.9  A-fib ([facility]) I48.91  
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  
  
  
    
Past Medical History:  
Diagnosis Date  A-fib ([address] 5/2/2014  AAA (abdominal aortic aneurysm) without rupture ([address] 5/2/2014  
  3.2 on US 2/14 [facility]  Arthritis    
 Cancer ([address]    
  hx prostate cancer  COPD (chronic obstructive pulmonary disease) ([address] 5/2/2014  Elevated prostate specific antigen (PSA)    
 Glaucoma 5/2/2014  Heart disease    
 Heart failure ([address]    
 Hyperlipemia 5/2/2014  Hypertension    
 Hypertrophy of prostate with urinary obstruction and other lower urinary tract symptoms (LUTS)    
 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  OA (osteoarthritis) 5/2/2014  Peptic ulcer disease 6/1/2017  Poor historian    
 Prostate cancer [facility])    
 Pulmonary embolus ([address] 6/1/2017  Supplemental oxygen dependent 5/2/2014  Warfarin anticoagulation 5/2/2014 Past Surgical History:  
Procedure Laterality Date  HX HEART CATHETERIZATION      
  
     
Allergies Allergen Reactions  Statins-Hmg-Coa Reductase Inhibitors Myalgia  
    Muscle pain Family History Problem Relation Age of Onset  Cancer Mother    
 Heart Disease Father    
  
  
 Current Facility-Administered Medications Medication Dose Route Frequency  dilTIAZem (CARDIZEM) 100 mg in 0.9% sodium chloride (MBP/ADV) 100 mL infusion  0-15 mg/hr IntraVENous TITRATE  
  
    
Current Outpatient Prescriptions Medication Sig  
 lisinopril (PRINIVIL, ZESTRIL) 10 mg tablet Take 1 Tab by mouth daily.  metoprolol tartrate (LOPRESSOR) 50 mg tablet Take 1 Tab by mouth two (2) times a day.  predniSONE (DELTASONE) 10 mg tablet Take 1 Tab by mouth daily (with breakfast).  azithromycin (ZITHROMAX) 500 mg tab Take 1 tablet every Mon, Wed, Fri  
 ethambutol (MYAMBUTOL) 400 mg tablet Take 5 tabs every mon, wed, fri  
 fluticasone-salmeterol (ADVAIR) 250-50 mcg/dose diskus inhaler Take 1 Puff by inhalation.  rifAMPin (RIFADIN) 300 mg capsule Take 2 tabs every mon, wed, fri  
 nitroglycerin (NITROSTAT) 0.4 mg SL tablet 1 Tab by SubLINGual route every five (5) minutes as needed for Chest Pain.  potassium chloride SR (KLOR-CON 10) 10 mEq tablet Take 20 mEq by mouth two (2) times a day.  allopurinol (ZYLOPRIM) 300 mg tablet Take  by mouth daily.  cholecalciferol, vitamin D3, 2,000 unit tab Take  by mouth.  bimatoprost (LUMIGAN) 0.01 % ophthalmic drops Administer 1 Drop to both eyes every evening.  OXYGEN-AIR DELIVERY SYSTEMS Use as instructed. Use as instructed.  omeprazole (PRILOSEC) 20 mg capsule Take 20 mg by mouth daily.  rosuvastatin (CRESTOR) 40 mg tablet Take 40 mg by mouth nightly.  ferrous sulfate (IRON) 325 mg (65 mg iron) tablet Take  by mouth Daily (before breakfast).  tamsulosin (FLOMAX) 0.4 mg capsule TAKE ONE CAPSULE BY MOUTH EVERY DAY  brimonidine (ALPHAGAN P) 0.1 % ophthalmic solution every eight (8) hours.  LEUPROLIDE ACETATE (ELIGARD SC) 45 mg by SubCUTAneous route.  tiotropium (SPIRIVA WITH HANDIHALER) 18 mcg inhalation capsule Take 1 Cap by inhalation daily.  albuterol (VENTOLIN HFA) 90 mcg/actuation inhaler Take  by inhalation.  
  
  
Review of Systems Constitution: Negative for weight gain and weight loss. HENT: Negative. Eyes: Negative. Cardiovascular: Negative for chest pain (currently pain free), claudication, cyanosis, dyspnea on exertion, irregular heartbeat, leg swelling, near-syncope, orthopnea, palpitations, paroxysmal nocturnal dyspnea and syncope. Respiratory: Positive for cough and sputum production. Negative for shortness of breath (since last night) and wheezing. Endocrine: Negative. Skin: Negative. Musculoskeletal: Negative. Gastrointestinal: Negative for nausea and vomiting. Genitourinary: Negative. Neurological: Negative for dizziness. Psychiatric/Behavioral: Negative. Allergic/Immunologic: Negative.   
  
  
 Physical Exam 
      
Vitals:  
  08/30/18 8897 08/30/18 6845 08/30/18 0932 08/30/18 1013 BP: 124/90 (!) 133/92   131/64 Pulse: (!) 161 (!) 133   (!) 105 Resp:          
Temp:          
SpO2:   97% 97%    
Weight:          
Height:          
  
  
Physical Exam: 
General: Well Developed, Well Nourished, No Acute Distress HEENT: pupils equal and round, no abnormalities noted Neck: supple, no JVD, no carotid bruits Heart: S1S2 with Regular unable to obtain due to noise of bipap Lungs: Clear throughout auscultation bilaterally without adventitious sounds Abd: soft, nontender, nondistended, with good bowel sounds Ext: warm, no edema, calves supple/nontender, pulses 2+ bilaterally Skin: warm and dry Psychiatric: Normal mood and affect Neurologic: Alert and oriented X 3 
  
  
Labs:  
   
Recent Labs  
    08/30/18 
 4577 NA  142  
K  4.3 BUN  17 CREA  1.42  
GLU  122* WBC  9.3 HGB  10.6* HCT  35.3*  
PLT  335  
  
  
   
Echo Results  (Last 48 hours)  
  None  
   
  
CXR Results  (Last 48 hours)  
              
  08/30/18 0700   XR CHEST PORT Final result   Impression:   IMPRESSION: Possible ascending aortic aneurysm, unchanged. Consider a dedicated CTA of the chest there is further clinical concern.  
   
  Narrative:   Portable chest:   
    
History: dyspnea.    
    
Comparison: 07/23/2018  
    
Findings: Portable AP views of the chest were obtained at 6:42 AM.  
    
The cardiac silhouette is normal in size. There is mild prominence along the  
right aspect of the mediastinum which raises suspicion for prominence to the  
ascending aorta. The lungs and pleural spaces are grossly clear. The pulmonary  
vasculature is within normal limits.     
   
  
  
  
 Assessment/Plan: 
  
 Assessment:  
Atrial flutter with rapid ventricular response-  Heparin and cardizem gtt, iv lasix today, EP consult for ablation consideration, outpatient evaluation for ICD but Life Vest consult this admit     
 
CHF (congestive heart failure) ([address] (7/23/2018) Overview: 7/23/18  ECHO 
    -  Left ventricle moderately dilated. EF30-35%. Mild/mod global diffuse  
    hypokinesis. -  Left atrium: The atrium was mildly dilated. -  Right atrium: The atrium was mildly dilated. -  Inferior vena cava, hepatic veins: IVC dilated. Respirophasic change  
    more than 50%. COPD exacerbation ([address] (7/23/2018) Active Problems: 
  AAA (abdominal aortic aneurysm) without rupture ([address] (5/2/2014) Overview: 3.2 on US 2/14 [facility]  
  
  COPD (chronic obstructive pulmonary disease) ([address] (5/2/2014) 
  
  Paroxysmal atrial fibrillation ([address] (5/2/2014) Start Heparin Drip with Bolus weight based protocol, Continue Cardizem Drip, EP consult for RASHEEDA possible A Fib Ablation.  
  
  COPD exacerbation ([address] (7/23/2018) 
   
  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[address] (7/25/2018) Overview: MAC followed by Dr. Jesús Paulino at [facility]. In March 2018 was found with new SAMANTHA  
    nodules with spiculation.   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  
     He was started on ETB/azithro/rif.   
  
 Acute on chronic respiratory failure ([facility] (7/25/2018) 
  
  Multiple pulmonary nodules (8/30/2018) 
  
  
 
Thank you very much for this referral. We appreciate the opportunity to participate in this patient's care. We will follow along with above stated plan. 
  
Rosa Smyth NP Admiting MD: Dr Sid Morton ATTENDING ADDENDUM: 
 
Patient seen and examined by me. Agree with above note by physician extender. Key findings are:  No CP or palpitations but more SOB, weakness, SIM and in typical flutter with RVR in setting of chronic NICM with EF 20-30% dating back 12 years to [facility] with no significant CAD. No angina recently, acute onset SOB yesterday but no palpitations whatsoever (? Duration of flutter). CV- irregularly irregular in flutter without murmur, JVD 8 at 45 deg Lungs- Clear bilaterally apically, mild exp bibasilar wheezing but no crackles Abd- soft, nontender, nondistended Ext- no edema Plan: As above. Does not appear overtly volume overloaded at present, but known LV dysfunction, NICM and BNP >1000 at present. ? Rapid acute onset typical flutter to blame for acute respiratory failure at present? Feeling better and off BiPAP now s/p control of HR with cardizem gtt, but still in typical flutter. Admit, continue cardizem gtt, start heparin gtt for now and consult EP for consideration of flutter ablation after RASHEEDA. Diurese overnight, recheck labs in AM. Titrate CHF, cardiac meds as tolerated. Raul Yancey MD 
[address] Cardiology Pager [phone]

## 2018-08-30 NOTE — ED NOTES
TRANSFER - OUT REPORT: 
 
Verbal report given to SAULO fonseca on [employer].  being transferred to [facility] for routine progression of care Report consisted of patients Situation, Background, Assessment and  
Recommendations(SBAR). Information from the following report(s) SBAR, MAR and Recent Results was reviewed with the receiving nurse. Lines:  
Peripheral IV 08/30/18 Left Hand (Active) Peripheral IV 08/30/18 Right Forearm (Active) Site Assessment Clean, dry, & intact 8/30/2018  7:33 AM  
Phlebitis Assessment 0 8/30/2018  7:33 AM  
Infiltration Assessment 0 8/30/2018  7:33 AM  
Dressing Status Clean, dry, & intact 8/30/2018  7:33 AM  
Dressing Type Transparent 8/30/2018  7:33 AM  
  
 
Opportunity for questions and clarification was provided. Patient transported with: 
 O2 @ 2 liters

## 2018-08-30 NOTE — IP AVS SNAPSHOT
[address] 
[phone] Patient: Savi Lambert. MRN: [id number] A check jagdish indicates which time of day the medication should be taken. My Medications START taking these medications Instructions Each Dose to Equal  
 Morning Noon Evening Bedtime  
 apixaban 5 mg tablet Commonly known as:  Rhenda Juan Your last dose was:  9/1/2018 Take 1 Tab by mouth two (2) times a day. 5 mg  
    
  
   
   
   
  
  
 atorvastatin 40 mg tablet Commonly known as:  LIPITOR Your last dose was:  9/1/2018 Take 1 Tab by mouth daily. 40 mg  
    
  
   
   
   
  
 metoprolol succinate 50 mg XL tablet Commonly known as:  TOPROL-XL Your last dose was:  9/1/2018 Take 1 Tab by mouth daily. 50 mg CHANGE how you take these medications Instructions Each Dose to Equal  
 Morning Noon Evening Bedtime  
 predniSONE 10 mg tablet Commonly known as:  More Lim What changed:  additional instructions Your last dose was:  9/1/2018 Take 1 Tab by mouth daily (with breakfast). 40mg per day for 3 days, then 30mg per day for 3 days, 20mg per day for 3 days then 10mg per day maintenance 10 mg CONTINUE taking these medications Instructions Each Dose to Equal  
 Morning Noon Evening Bedtime  
 allopurinol 300 mg tablet Commonly known as:  Per Cons Your last dose was:  9/1/2018 Take  by mouth daily. ALPHAGAN P 0.1 % ophthalmic solution Generic drug:  brimonidine Your last dose was:  8/30/2018  
   
 every eight (8) hours. azithromycin 500 mg Tab Commonly known as:  Oma Campoverde Your last dose was:  As directed Take 1 tablet every Mon, Wed, Fri  
     
  
   
   
   
  
 bimatoprost 0.01 % ophthalmic drops Commonly known as:  LUMIGAN Your last dose was:  8/30/2018 Administer 1 Drop to both eyes every evening. 1 Drop  
    
   
   
  
   
  
 cholecalciferol (vitamin D3) 2,000 unit Tab Your last dose was:  As directed Take  by mouth. ELIGARD SC Your last dose was:  As directed 45 mg by SubCUTAneous route. 45 mg  
    
  
   
   
   
  
 ethambutol 400 mg tablet Commonly known as:  MYAMBUTOL Your last dose was:  As directed Take 5 tabs every mon, wed, fri  
     
  
   
   
   
  
 fluticasone-salmeterol 250-50 mcg/dose diskus inhaler Commonly known as:  ADVAIR Your last dose was:  As directed Take 1 Puff by inhalation. 1 Puff Iron 325 mg (65 mg iron) tablet Generic drug:  ferrous sulfate Your last dose was:  9/1/2018 Take  by mouth Daily (before breakfast). lisinopril 10 mg tablet Commonly known as:  Corbett Lights Your last dose was:  9/1/2018 Take 1 Tab by mouth daily. 10 mg  
    
  
   
   
   
  
 nitroglycerin 0.4 mg SL tablet Commonly known as:  NITROSTAT Your last dose was: As needed 1 Tab by SubLINGual route every five (5) minutes as needed for Chest Pain. 0.4 mg  
    
   
   
   
  
 omeprazole 20 mg capsule Commonly known as:  PRILOSEC Your last dose was:  8/30/2018 Take 20 mg by mouth daily. 20 mg OXYGEN-AIR DELIVERY SYSTEMS Your last dose was:  As directed Use as instructed. Use as instructed. potassium chloride SR 10 mEq tablet Commonly known as:  KLOR-CON 10 Your last dose was:  8/29/2018 Take 20 mEq by mouth two (2) times a day. 20 mEq  
    
  
   
   
  
   
  
 rifAMPin 300 mg capsule Commonly known as:  RIFADIN Your last dose was:  As directed  Take 2 tabs every mon, wed, fri  
     
  
   
   
   
  
 SPIRIVA WITH HANDIHALER 18 mcg inhalation capsule Generic drug:  tiotropium Your last dose was:  As directed Take 1 Cap by inhalation daily. 1 Cap  
    
  
   
   
   
  
 tamsulosin 0.4 mg capsule Commonly known as:  FLOMAX Your last dose was:  As directed TAKE ONE CAPSULE BY MOUTH EVERY DAY  
     
   
   
   
  
 VENTOLIN HFA 90 mcg/actuation inhaler Generic drug:  albuterol Your last dose was:  As directed Take  by inhalation. STOP taking these medications   
 metoprolol tartrate 50 mg tablet Commonly known as:  LOPRESSOR  
   
  
 rosuvastatin 40 mg tablet Commonly known as:  CRESTOR Where to Get Your Medications These medications were sent to [address] [place] [address] Way [address] Hours:  24-hours Phone:  [phone]  
  apixaban 5 mg tablet  
 metoprolol succinate 50 mg XL tablet Information on where to get these meds will be given to you by the nurse or doctor. ! Ask your nurse or doctor about these medications  
  atorvastatin 40 mg tablet  
 predniSONE 10 mg tablet

## 2018-08-30 NOTE — IP AVS SNAPSHOT
[facility] 
 
 
 [address] [place] [address] 
[phone] Patient: Crystal Price. 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 
[phone] Leesa Lindo MD In 2 weeks [facility] follow up, Please call office for appointment on Tuesday  [address] [place] [address] 
[phone] [facility]  In 2 weeks [facility] follow up, Please call office for appointment on Tuesday 0819  [address]   [address] [place] [address] [address] [place] [address] 
[phone] Your Scheduled Appointments Thursday September 06, 2018 10:45 AM EDT Office Visit with Jailene Tao MD  
[facility] ([facility] [address]) 1441 [facility] [address] [place] [address]  
[phone] Wednesday September 26, 2018 11:00 AM EDT HOSPITAL FOLLOW-UP with Brittany Reddy MD  
[facility] 2 [facility] 
[address] [place] [address]  
[phone] Discharge Orders None A check jagdish indicates which time of day the medication should be taken. My Medications START taking these medications Instructions Each Dose to Equal  
 Morning Noon Evening Bedtime  
 apixaban 5 mg tablet Commonly known as:  Felipe Athens Your last dose was:  9/1/2018 Take 1 Tab by mouth two (2) times a day. 5 mg  
    
  
   
   
   
  
  
 atorvastatin 40 mg tablet Commonly known as:  LIPITOR Your last dose was:  9/1/2018 Take 1 Tab by mouth daily. 40 mg  
    
  
   
   
   
  
 metoprolol succinate 50 mg XL tablet Commonly known as:  TOPROL-XL Your last dose was:  9/1/2018 Take 1 Tab by mouth daily. 50 mg CHANGE how you take these medications Instructions Each Dose to Equal  
 Morning Noon Evening Bedtime  
 predniSONE 10 mg tablet Commonly known as:  Agustin Powers What changed:  additional instructions Your last dose was:  9/1/2018 Take 1 Tab by mouth daily (with breakfast). 40mg per day for 3 days, then 30mg per day for 3 days, 20mg per day for 3 days then 10mg per day maintenance 10 mg CONTINUE taking these medications Instructions Each Dose to Equal  
 Morning Noon Evening Bedtime  
 allopurinol 300 mg tablet Commonly known as:  Gaylan Elders Your last dose was:  9/1/2018 Take  by mouth daily. ALPHAGAN P 0.1 % ophthalmic solution Generic drug:  brimonidine Your last dose was:  8/30/2018  
   
 every eight (8) hours. azithromycin 500 mg Tab Commonly known as:  Livan Sleight Your last dose was:  As directed Take 1 tablet every Mon, Wed, Fri  
     
  
   
   
   
  
 bimatoprost 0.01 % ophthalmic drops Commonly known as:  LUMIGAN Your last dose was:  8/30/2018 Administer 1 Drop to both eyes every evening. 1 Drop  
    
   
   
  
   
  
 cholecalciferol (vitamin D3) 2,000 unit Tab Your last dose was:  As directed Take  by mouth. FARHEEN KIMBALL Your last dose was:  As directed 45 mg by SubCUTAneous route. 45 mg  
    
  
   
   
   
  
 ethambutol 400 mg tablet Commonly known as:  MYAMBUTOL Your last dose was:  As directed Take 5 tabs every mon, wed, fri  
     
  
   
   
   
  
 fluticasone-salmeterol 250-50 mcg/dose diskus inhaler Commonly known as:  ADVAIR Your last dose was:  As directed Take 1 Puff by inhalation. 1 Puff Iron 325 mg (65 mg iron) tablet Generic drug:  ferrous sulfate Your last dose was:  9/1/2018 Take  by mouth Daily (before breakfast). lisinopril 10 mg tablet Commonly known as:  Deshaun Economy Your last dose was:  9/1/2018 Take 1 Tab by mouth daily. 10 mg  
    
  
   
   
   
  
 nitroglycerin 0.4 mg SL tablet Commonly known as:  NITROSTAT Your last dose was: As needed 1 Tab by SubLINGual route every five (5) minutes as needed for Chest Pain. 0.4 mg  
    
   
   
   
  
 omeprazole 20 mg capsule Commonly known as:  PRILOSEC Your last dose was:  8/30/2018 Take 20 mg by mouth daily. 20 mg OXYGEN-AIR DELIVERY SYSTEMS Your last dose was:  As directed Use as instructed. Use as instructed. potassium chloride SR 10 mEq tablet Commonly known as:  KLOR-CON 10 Your last dose was:  8/29/2018 Take 20 mEq by mouth two (2) times a day. 20 mEq  
    
  
   
   
  
   
  
 rifAMPin 300 mg capsule Commonly known as:  RIFADIN Your last dose was:  As directed Take 2 tabs every mon, wed, fri  
     
  
   
   
   
  
 SPIRIVA WITH HANDIHALER 18 mcg inhalation capsule Generic drug:  tiotropium Your last dose was:  As directed Take 1 Cap by inhalation daily. 1 Cap  
    
  
   
   
   
  
 tamsulosin 0.4 mg capsule Commonly known as:  FLOMAX Your last dose was:  As directed  TAKE ONE CAPSULE BY MOUTH EVERY DAY  
     
   
   
   
  
 VENTOLIN HFA 90 mcg/actuation inhaler Generic drug:  albuterol Your last dose was:  As directed Take  by inhalation. STOP taking these medications   
 metoprolol tartrate 50 mg tablet Commonly known as:  LOPRESSOR  
   
  
 rosuvastatin 40 mg tablet Commonly known as:  CRESTOR Where to Get Your Medications These medications were sent to [address] [place] [address]  [address] [address] Hours:  24-hours Phone:  [phone]  
  apixaban 5 mg tablet  
 metoprolol succinate 50 mg XL tablet Information on where to get these meds will be given to you by the nurse or doctor. ! Ask your nurse or doctor about these medications  
  atorvastatin 40 mg tablet  
 predniSONE 10 mg tablet Discharge Instructions DISCHARGE SUMMARY from Nurse PATIENT INSTRUCTIONS: 
 
 
F-face looks uneven A-arms unable to move or move unevenly S-speech slurred or non-existent T-time-call 911 as soon as signs and symptoms begin-DO NOT go Back to bed or wait to see if you get better-TIME IS BRAIN. Warning Signs of HEART ATTACK Call 911 if you have these symptoms: 
? Chest discomfort. Most heart attacks involve discomfort in the center of the chest that lasts more than a few minutes, or that goes away and comes back. It can feel like uncomfortable pressure, squeezing, fullness, or pain. ? Discomfort in other areas of the upper body. Symptoms can include pain or discomfort in one or both arms, the back, neck, jaw, or stomach. ? Shortness of breath with or without chest discomfort. ? Other signs may include breaking out in a cold sweat, nausea, or lightheadedness. Don't wait more than five minutes to call [address]! Fast action can save your life. Calling 911 is almost always the fastest way to get lifesaving treatment. Emergency Medical Services staff can begin treatment when they arrive  up to an hour sooner than if someone gets to the hospital by car. The discharge information has been reviewed with the patient. The patient verbalized understanding. Discharge medications reviewed with the patient and appropriate educational materials and side effects teaching were provided. ___________________________________________________________________________________________________________________________________ Home Health to follow patient at home. Electrophysiology Study and Catheter Ablation: What to Expect at [facility] Your Recovery You had an electrophysiology study for a problem with your heartbeat. You may also have had a catheter ablation to try to correct the problem. You may have swelling, bruising, or a small lump around the site where the catheters went into your body. These should go away in 3 to 4 weeks. Do not exercise hard or lift anything heavy for a week. You may be able to go back to work and to your normal routine in 1 or 2 days. This care sheet gives you a general idea about how long it will take for you to recover. But each person recovers at a different pace. Follow the steps below to get better as quickly as possible. How can you care for yourself at home? Activity 
  · For 1 week, do not lift anything that would make you strain.  This may include heavy grocery bags and milk containers, a heavy briefcase or backpack, cat litter or dog food bags, a vacuum , or a child.  
  · For 1 week, do not exercise hard or do any activity that could strain your blood vessels or the site where the catheters went into your body.  
  · Ask your doctor when it is okay to have sex.  
  · You may shower 24 to 48 hours after the procedure, if your doctor okays it. Pat the incision dry. Do not take a bath for 1 week, or until your doctor tells you it is okay. Diet 
  · You can eat your normal diet. If your stomach is upset, try bland, low-fat foods like plain rice, broiled chicken, toast, and yogurt.  
  · Drink plenty of fluids (unless your doctor tells you not to). Medicines 
  · Your doctor will tell you if and when you can restart your medicines. He or she will also give you instructions about taking any new medicines.  
  · If you take blood thinners, such as warfarin (Coumadin), clopidogrel (Plavix), or aspirin, be sure to talk to your doctor. He or she will tell you if and when to start taking those medicines again. Make sure that you understand exactly what your doctor wants you to do.  
  · Ask your doctor if you can take acetaminophen (Tylenol) for pain. Do not take aspirin for 3 days, unless your doctor says it is okay.  
  · Check with your doctor before you take aspirin or anti-inflammatory medicines to reduce pain and swelling. These include ibuprofen (Advil, Motrin) and naproxen (Aleve).   · Make sure you know which heart medicines to continue and which ones to stop. Ask your doctor if you are not sure.  
[facility] site care 
  · You can remove your bandages the day after the procedure.  
  · If you are bleeding, lie down and press on the area for 15 minutes to try to make it stop. If the bleeding does not stop, call your doctor or seek immediate medical care. Follow-up care is a key part of your treatment and safety. Be sure to make and go to all appointments, and call your doctor if you are having problems. It's also a good idea to know your test results and keep a list of the medicines you take. When should you call for help? Call 911 anytime you think you may need emergency care. For example, call if: 
  · You passed out (lost consciousness).  
  · You have symptoms of a heart attack. These may include: ¨ Chest pain or pressure, or a strange feeling in the chest. 
¨ Sweating. ¨ Shortness of breath. ¨ Nausea or vomiting. ¨ Pain, pressure, or a strange feeling in the back, neck, jaw, or upper belly, or in one or both shoulders or arms. ¨ Lightheadedness or sudden weakness. ¨ A fast or irregular heartbeat. After you call 911, the  may tell you to chew 1 adult-strength or 2 to 4 low-dose aspirin. Wait for an ambulance. Do not try to drive yourself.  
  · You have symptoms of a stroke. These may include: 
¨ Sudden numbness, tingling, weakness, or loss of movement in your face, arm, or leg, especially on lorena side of your body. ¨ Sudden vision changes. ¨ Sudden trouble speaking. ¨ Sudden confusion or trouble understanding simple statements. ¨ Sudden problems with walking or balance. ¨ A sudden, severe headache that is different from past headaches.  
 Call your doctor now or seek immediate medical care if: 
  · You are bleeding from the area where the catheter was put in your artery.  
  · You have a fast-growing, painful lump at the catheter site.  
  · You have signs of infection, such as: 
¨ Increased pain, swelling, warmth, or redness. ¨ Red streaks leading from the catheter site. ¨ Pus draining from the catheter site. ¨ A fever.  
  · Your leg or arm looks blue or feels cold, numb, or tingly.  
 Watch closely for any changes in your health, and be sure to contact your doctor if you have any problems. Where can you learn more? Go to http://airam-shanelle.info/. Enter [phone] in the search box to learn more about \"Electrophysiology Study and Catheter Ablation: What to Expect at Home. \" Current as of: December 6, 2017 Content Version: 11.7 © 1611-5296 [employer]. Care instructions adapted under license by [employer] (which disclaims liability or warranty for this information). If you have questions about a medical condition or this instruction, always ask your healthcare professional. [address] any warranty or liability for your use of this information. Avoiding Triggers With Heart Failure: Care Instructions Your Care Instructions Triggers are anything that make your heart failure flare up. A flare-up is also called \"sudden heart failure\" or \"acute heart failure. \" When you have a flare-up, fluid builds up in your lungs, and you have problems breathing. You might need to go to the hospital. By watching for changes in your condition and avoiding triggers, you can prevent heart failure flare-ups. Follow-up care is a key part of your treatment and safety. Be sure to make and go to all appointments, and call your doctor if you are having problems. It's also a good idea to know your test results and keep a list of the medicines you take. How can you care for yourself at home? Watch for changes in your weight and condition · Weigh yourself without clothing at the same time each day. Record your weight. Call your doctor if you have sudden weight gain, such as more than 2 to 3 pounds in a day or 5 pounds in a week. (Your doctor may suggest a different range of weight gain.) A sudden weight gain may mean that your heart failure is getting worse. · Keep a daily record of your symptoms. Write down any changes in how you feel, such as new shortness of breath, cough, or problems eating. Also record if your ankles are more swollen than usual and if you feel more tired than usual. Note anything that you ate or did that could have triggered these changes. Limit sodium Sodium causes your body to hold on to extra water.  This may cause your heart failure symptoms to get worse. People get most of their sodium from processed foods. Fast food and restaurant meals also tend to be very high in sodium. · Your doctor may suggest that you limit sodium to 2,000 milligrams (mg) a day or less. That is less than 1 teaspoon of salt a day, including all the salt you eat in cooking or in packaged foods. · Read food labels on cans and food packages. They tell you how much sodium you get in one serving. Check the serving size. If you eat more than one serving, you are getting more sodium. · Be aware that sodium can come in forms other than salt, including monosodium glutamate (MSG), sodium citrate, and sodium bicarbonate (baking soda). MSG is often added to Asian food. You can sometimes ask for food without MSG or salt. · Slowly reducing salt will help you adjust to the taste. Take the salt shaker off the table. · Flavor your food with garlic, lemon juice, onion, vinegar, herbs, and spices instead of salt. Do not use soy sauce, steak sauce, onion salt, garlic salt, mustard, or ketchup on your food, unless it is labeled \"low-sodium\" or \"low-salt. \" 
· Make your own salad dressings, sauces, and ketchup without adding salt. · Use fresh or frozen ingredients, instead of canned ones, whenever you can. Choose low-sodium canned goods. · Eat less processed food and food from restaurants, including fast food. Exercise as directed Moderate, regular exercise is very good for your heart. It improves your blood flow and helps control your weight. But too much exercise can stress your heart and cause a heart failure flare-up. · Check with your doctor before you start an exercise program. 
· Walking is an easy way to get exercise. Start out slowly. Gradually increase the length and pace of your walk. Swimming, riding a bike, and using a treadmill are also good forms of exercise. · When you exercise, watch for signs that your heart is working too hard. You are pushing yourself too hard if you cannot talk while you are exercising. If you become short of breath or dizzy or have chest pain, stop, sit down, and rest. 
· Do not exercise when you do not feel well. Take medicines correctly · Take your medicines exactly as prescribed. Call your doctor if you think you are having a problem with your medicine. · Make a list of all the medicines you take. Include those prescribed to you by other doctors and any over-the-counter medicines, vitamins, or supplements you take. Take this list with you when you go to any doctor. · Take your medicines at the same time every day. It may help you to post a list of all the medicines you take every day and what time of day you take them. · Make taking your medicine as simple as you can. Plan times to take your medicines when you are doing other things, such as eating a meal or getting ready for bed. This will make it easier to remember to take your medicines. · Get organized. Use helpful tools, such as daily or weekly pill containers. When should you call for help? Call 911 if you have symptoms of sudden heart failure such as: 
  · You have severe trouble breathing.  
  · You cough up pink, foamy mucus.  
  · You have a new irregular or rapid heartbeat.  
 Call your doctor now or seek immediate medical care if: 
  · You have new or increased shortness of breath.  
  · You are dizzy or lightheaded, or you feel like you may faint.  
  · You have sudden weight gain, such as more than 2 to 3 pounds in a day or 5 pounds in a week. (Your doctor may suggest a different range of weight gain.)  
  · You have increased swelling in your legs, ankles, or feet.  
  · You are suddenly so tired or weak that you cannot do your usual activities.  
 Watch closely for changes in your health, and be sure to contact your doctor if you develop new symptoms. Where can you learn more? Go to http://airam-shanelle.info/. Enter X264 in the search box to learn more about \"Avoiding Triggers With Heart Failure: Care Instructions. \" Current as of: December 6, 2017 Content Version: 11.7 © 2621-2703 [employer]. Care instructions adapted under license by [employer] (which disclaims liability or warranty for this information). If you have questions about a medical condition or this instruction, always ask your healthcare professional. [place] [address] any warranty or liability for your use of this information. [employer] Announcement We are excited to announce that we are making your provider's discharge notes available to you in [employer]. You will see these notes when they are completed and signed by the physician that discharged you from your recent hospital stay. If you have any questions or concerns about any information you see in [employer], please call the Health Information Department where you were seen or reach out to your Primary Care Provider for more information about your plan of care. Introducing [facility]! [employer] introduces [employer] patient portal. Now you can access parts of your medical record, email your doctor's office, and request medication refills online. 1. In your internet browser, go to [email or web address] 2. Click on the First Time User? Click Here link in the Sign In box. You will see the New Member Sign Up page. 3. Enter your [employer] Access Code exactly as it appears below. You will not need to use this code after youve completed the sign-up process. If you do not sign up before the expiration date, you must request a new code. · [employer] Access Code: [email or web address] Expires: 10/21/2018 10:24 AM 
 
4. Enter the last four digits of your Social Security Number (xxxx) and Date of Birth (mm/dd/yyyy) as indicated and click Submit. You will be taken to the next sign-up page. 5. Create a [employer] ID. This will be your [employer] login ID and cannot be changed, so think of one that is secure and easy to remember. 6. Create a [employer] password. You can change your password at any time. 7. Enter your Password Reset Question and Answer. This can be used at a later time if you forget your password. 8. Enter your e-mail address. You will receive e-mail notification when new information is available in [address] 9. Click Sign Up. You can now view and download portions of your medical record. 10. Click the Download Summary menu link to download a portable copy of your medical information. If you have questions, please visit the Frequently Asked Questions section of the [employer] website. Remember, [employer] is NOT to be used for urgent needs. For medical emergencies, dial 911. Now available from your iPhone and Android! Introducing Patric Cleary As a [employer] patient, I wanted to make you aware of our electronic visit tool called Patric Cleary. [employer] 24/7 allows you to connect within minutes with a medical provider 24 hours a day, seven days a week via a mobile device or tablet or logging into a secure website from your computer. You can access Patric Cleary from anywhere in the [place]. A virtual visit might be right for you when you have a simple condition and feel like you just dont want to get out of bed, or cant get away from work for an appointment, when your regular [employer] provider is not available (evenings, weekends or holidays), or when youre out of town and need minor care. Electronic visits cost only $49 and if the [employer] 24/7 provider determines a prescription is needed to treat your condition, one can be electronically transmitted to a nearby pharmacy*. Please take a moment to enroll today if you have not already done so. The enrollment process is free and takes just a few minutes.   To enroll, please download the [employer] 24/7 irma to your tablet or phone, or visit [email or web address] to enroll on your computer. And, as an [address] [place] [address] patient with a [employer] account, the results of your visits will be scanned into your electronic medical record and your primary care provider will be able to view the scanned results. We urge you to continue to see your regular [employer] provider for your ongoing medical care. And while your primary care provider may not be the one available when you seek a [employer] virtual visit, the peace of mind you get from getting a real diagnosis real time can be priceless. For more information on [employer], view our Frequently Asked Questions (FAQs) at [email or web address]. Sincerely, 
 
Alvaro Londono MD 
Chief Medical Officer [employer] *:  certain medications cannot be prescribed via [employer] Providers Seen During Your Hospitalization Provider Specialty Primary office phone Kaleb Rosas MD Emergency Medicine [phone] Lisa Thakur MD Pulmonary Disease [phone] Estefany [place] MD Cardiology [phone] Your Primary Care Physician (PCP) Primary Care Physician Office Phone [facility] [phone] You are allergic to the following Allergen Reactions Statins-Hmg-Coa Reductase Inhibitors Myalgia Muscle pain Recent Documentation Height Weight BMI Smoking Status 1.753 m 80.4 kg 26.17 kg/m2 Former Smoker Emergency Contacts Name Discharge Info Relation Home Work Mobile Ciro [address]  Child [2] [phone] IrbyDiamond mak  Other Relative [6] [phone] Patient Belongings The following personal items are in your possession at time of discharge: 
  Dental Appliances:  At home         Home Medications: None   Jewelry: None  Clothing: At bedside    Other Valuables: Cell Phone Please provide this summary of care documentation to your next provider. Signatures-by signing, you are acknowledging that this After Visit Summary has been reviewed with you and you have received a copy. Patient Signature:  ____________________________________________________________ Date:  ____________________________________________________________  
  
Bhavna Renetta Provider Signature:  ____________________________________________________________ Date:  ____________________________________________________________

## 2018-08-30 NOTE — CONSULTS
Consult 
  
  
Angela Humphrey. 
  
2018 
  
Date of Admission:  2018 
  
The patient's chart is reviewed and the patient is discussed with the staff. 
  
Subjective:  
  
Patient is a 78 y.o.  male presents via EMS with increased shortness of breath and productive cough with yellow sputum. States he began to feel \"bad yesterday\" feeling like his breath was cutting off. He denies recent fever or chills but appetite has been less and had some nausea. Was treated by EMS with nebs, IV steroids and placed on CPAP for transport. In the ER was changed to BIPAP and given Cardizem bolus and Lopressor IV for tachycardia--was started on Cardizem drip. Troponin was slightly elevated 0.09, BNP elevated 1067, AB.30/50/119/24 on BIPAP. Heart rate currently uncontrolled and appears to be AFlutter with HR 90-110s. He states since recent discharge he \"finished up his antibiotics after 2-3 days and has not been on any treatment for MAC since then. He is romero poor historian and has a poor understanding of his medical regimen. He was to follow up with Dr. Dagmar Lobo and cardiology after discharge but has only seen Dr. Carroll Rodriguez at [facility]. He will be admitted to the ICU on BIPAP for acute on chronic respiratory failure.   
  
Was recently hospitalized -18 by hospitalist service for worsening hypoxia, dyspnea on exertion, hypertension, BNP elevation, and rapid atrial fibrillation requiring cardizem drip. We were consulted during that admission to assist with care. He was discharged home on Zithromax / ethambutol / rifampin, Prednisone taper, Eliquis and planned follow up with [facility] pulmonary and cardiology.   
  
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 Dagmar Lobo at [facility], [address] [facility], cardiomegaly with EF 30-35%, hx tobacco abuse, pulmonary nodules. 
  
Review of Systems A comprehensive review of systems was negative except for: Constitutional: positive for fatigue, malaise and anorexia Respiratory: positive for cough, sputum, dyspnea on exertion or MAC, on chronic O2 2L, COPD Cardiovascular: positive for dyspnea, palpitations, fatigue, dyspnea on exertion Gastrointestinal: positive for reflux symptoms, nausea and abdominal bloating 
  
    
Patient Active Problem List  
Diagnosis Code  AAA (abdominal aortic aneurysm) without rupture ([facility]) I71.4  
 COPD (chronic obstructive pulmonary disease) ([facility]) J44.9  Hyperlipemia E78.5  Glaucoma H40.9  Supplemental oxygen dependent Z99.81  
 OA (osteoarthritis) M19.90  Paroxysmal atrial fibrillation ([facility]) I48.0  Warfarin anticoagulation Z79.01  
 Renal cysts, acquired, bilateral N28.1  Tobacco use disorder F17.200  Hypertension I10  
 Pulmonary embolus ([facility]) I26.99  
 Cardiomyopathy ([facility] I42.9  Atrial flutter ([facility]) I48.92  
 Peptic ulcer disease K27.9  History of pulmonary embolism Z86.711  
 COPD exacerbation ([facility]) J44.1  CHF exacerbation ([facility]) I50.9  CHF (congestive heart failure) ([facility]) I50.9  A-fib ([facility]) I48.91  
 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[facility]) J15.8, A31.9  Acute on chronic respiratory failure ([facility]) J96.20  Multiple pulmonary nodules R91.8  
  
  
Prior to Admission Medications Prescriptions Last Dose Informant Patient Reported? Taking? LEUPROLIDE ACETATE (ELIGARD SC)     Yes No  
Si mg by SubCUTAneous route. OXYGEN-AIR DELIVERY SYSTEMS     Yes No  
Sig: Use as instructed. Use as instructed. albuterol (VENTOLIN HFA) 90 mcg/actuation inhaler     Yes No  
Sig: Take  by inhalation. allopurinol (ZYLOPRIM) 300 mg tablet     Yes No  
Sig: Take  by mouth daily. azithromycin (ZITHROMAX) 500 mg tab     Yes No  
Sig: Take 1 tablet every Mon, Wed, Fri  
bimatoprost (LUMIGAN) 0.01 % ophthalmic drops     Yes No  
Sig: Administer 1 Drop to both eyes every evening. brimonidine (ALPHAGAN P) 0.1 % ophthalmic solution     Yes No  
Sig: every eight (8) hours. cholecalciferol, vitamin D3, 2,000 unit tab     Yes No  
Sig: Take  by mouth.  
ethambutol (MYAMBUTOL) 400 mg tablet     Yes No  
Sig: Take 5 tabs every mon, wed, fri  
ferrous sulfate (IRON) 325 mg (65 mg iron) tablet     Yes No  
Sig: Take  by mouth Daily (before breakfast). fluticasone-salmeterol (ADVAIR) 250-50 mcg/dose diskus inhaler     Yes No  
Sig: Take 1 Puff by inhalation. lisinopril (PRINIVIL, ZESTRIL) 10 mg tablet     No No  
Sig: Take 1 Tab by mouth daily. metoprolol tartrate (LOPRESSOR) 50 mg tablet     No No  
Sig: Take 1 Tab by mouth two (2) times a day. nitroglycerin (NITROSTAT) 0.4 mg SL tablet     No No  
Si Tab by SubLINGual route every five (5) minutes as needed for Chest Pain. omeprazole (PRILOSEC) 20 mg capsule     Yes No  
Sig: Take 20 mg by mouth daily. potassium chloride SR (KLOR-CON 10) 10 mEq tablet     Yes No  
Sig: Take 20 mEq by mouth two (2) times a day. predniSONE (DELTASONE) 10 mg tablet     No No  
Sig: Take 1 Tab by mouth daily (with breakfast). rifAMPin (RIFADIN) 300 mg capsule     Yes No  
Sig: Take 2 tabs every mon, wed, fri  
rosuvastatin (CRESTOR) 40 mg tablet     Yes No  
Sig: Take 40 mg by mouth nightly. tamsulosin (FLOMAX) 0.4 mg capsule     No No  
Sig: TAKE ONE CAPSULE BY MOUTH EVERY DAY  
tiotropium (SPIRIVA WITH HANDIHALER) 18 mcg inhalation capsule     Yes No  
Sig: Take 1 Cap by inhalation daily.  
   
Facility-Administered Medications: None  
  
  
    
Past Medical History:  
Diagnosis Date  A-fib ([facility] [address] 2014  AAA (abdominal aortic aneurysm) without rupture ([facility] [address] 2014  
  3.2 on US  [facility]  Arthritis    
 Cancer ([facility] [address]    
  hx prostate cancer  COPD (chronic obstructive pulmonary disease) ([facility] [address] 2014  Elevated prostate specific antigen (PSA)    
 Glaucoma 2014  Heart disease    
 Heart failure ([facility] [address]    
  Hyperlipemia 5/2/2014  Hypertension    
 Hypertrophy of prostate with urinary obstruction and other lower urinary tract symptoms (LUTS)    
 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 7/25/2018  OA (osteoarthritis) 5/2/2014  Peptic ulcer disease 6/1/2017  Poor historian    
 Prostate cancer [facility])    
 Pulmonary embolus ([facility] 6/1/2017  Supplemental oxygen dependent 5/2/2014  Warfarin anticoagulation 5/2/2014  
  
     
Past Surgical History:  
Procedure Laterality Date  HX HEART CATHETERIZATION      
  
Social History  
  
     
Social History  Marital status: SINGLE  
    Spouse name: N/A  
 Number of children: N/A  
 Years of education: N/A  
  
   
Occupational History  Not on file.  
  
Social History Main Topics  Smoking status: Former Smoker  
    Packs/day: 2.00  
    Years: 40.00  
    Quit date: 7/26/2014  Smokeless tobacco: Never Used  
      Comment: still taking Chantix  Alcohol use No  
 Drug use: No  
 Sexual activity: Yes  
    Partners: Female  
    Birth control/ protection: None  
  
    
Other Topics Concern  Not on file  
  
Social History Narrative  
  
     
Family History Problem Relation Age of Onset  Cancer Mother    
 Heart Disease Father    
  
     
Allergies Allergen Reactions  Statins-Hmg-Coa Reductase Inhibitors Myalgia  
    Muscle pain  
  
  
      
Current Facility-Administered Medications Medication Dose Route Frequency  dilTIAZem (CARDIZEM) 100 mg in 0.9% sodium chloride (MBP/ADV) 100 mL infusion  0-15 mg/hr IntraVENous TITRATE  
  
    
Current Outpatient Prescriptions Medication Sig  
 lisinopril (PRINIVIL, ZESTRIL) 10 mg tablet Take 1 Tab by mouth daily.  metoprolol tartrate (LOPRESSOR) 50 mg tablet Take 1 Tab by mouth two (2) times a day.  predniSONE (DELTASONE) 10 mg tablet Take 1 Tab by mouth daily (with breakfast).   
 azithromycin (ZITHROMAX) 500 mg tab Take 1 tablet every Mon, Wed, Fri  
  ethambutol (MYAMBUTOL) 400 mg tablet Take 5 tabs every mon, wed, fri  
 fluticasone-salmeterol (ADVAIR) 250-50 mcg/dose diskus inhaler Take 1 Puff by inhalation.  rifAMPin (RIFADIN) 300 mg capsule Take 2 tabs every mon, wed, fri  
 nitroglycerin (NITROSTAT) 0.4 mg SL tablet 1 Tab by SubLINGual route every five (5) minutes as needed for Chest Pain.  potassium chloride SR (KLOR-CON 10) 10 mEq tablet Take 20 mEq by mouth two (2) times a day.  allopurinol (ZYLOPRIM) 300 mg tablet Take  by mouth daily.  cholecalciferol, vitamin D3, 2,000 unit tab Take  by mouth.  bimatoprost (LUMIGAN) 0.01 % ophthalmic drops Administer 1 Drop to both eyes every evening.  OXYGEN-AIR DELIVERY SYSTEMS Use as instructed. Use as instructed.  omeprazole (PRILOSEC) 20 mg capsule Take 20 mg by mouth daily.  rosuvastatin (CRESTOR) 40 mg tablet Take 40 mg by mouth nightly.  ferrous sulfate (IRON) 325 mg (65 mg iron) tablet Take  by mouth Daily (before breakfast).  tamsulosin (FLOMAX) 0.4 mg capsule TAKE ONE CAPSULE BY MOUTH EVERY DAY  brimonidine (ALPHAGAN P) 0.1 % ophthalmic solution every eight (8) hours.  LEUPROLIDE ACETATE (ELIGARD SC) 45 mg by SubCUTAneous route.  tiotropium (SPIRIVA WITH HANDIHALER) 18 mcg inhalation capsule Take 1 Cap by inhalation daily.  albuterol (VENTOLIN HFA) 90 mcg/actuation inhaler Take  by inhalation.  
  
  
  
  
Objective:  
  
      
Vitals:  
  08/30/18 1400 08/30/18 2581 08/30/18 8911 08/30/18 8785 BP: 124/90 124/90 (!) 133/92    
Pulse: (!) 162 (!) 161 (!) 133    
Resp: 20        
Temp:          
SpO2: 100%   97% 97% Weight:          
Height:          
  
  
PHYSICAL EXAM  
  
Constitutional:  the patient is well developed and in no acute distress, BIPAP 30% 14/8, sat 99% EENMT:  Sclera clear, pupils equal, oral mucosa moist 
Respiratory: clear anterior, diminished in bases, no wheezing Cardiovascular:  Irregular, AFlutter without M,G,R 
 Gastrointestinal: soft and non-tender; with positive bowel sounds. Musculoskeletal: warm without cyanosis. There is no lower leg edema. Skin:  Dry, no jaundice or rashes, no wounds Neurologic: no gross neuro deficits Psychiatric:  alert and oriented x 3 
  
CXR 8/30/18:  Possible ascending aortic aneurysm, unchanged. Consider a dedicated CTA of the chest there is further clinical concern 
 
  
   
Recent Labs  
    08/30/18 
 0708 WBC  9.3 HGB  10.6* HCT  35.3*  
PLT  335  
  
   
Recent Labs  
    08/30/18 
 0708 NA  142  
K  4.3 CL  108* GLU  122* CO2  27 BUN  17 CREA  1.42  
CA  9.0 ALB  3.3 TBILI  0.4 ALT  59 SGOT  24  
  
    
Recent Labs  
    08/30/18 
 0815  08/30/18 
 8165 PH  7.30*  7.26* PCO2  50*  61* PO2  119*  88 HCO3  24  27*  
  
No results for input(s): LCAD, LAC in the last 72 hours. 
  
Assessment:  (Medical Decision Making)  
  Hospital Problems  Date Reviewed: 8/30/2018  
          Codes Class Noted POA  
  Acute on chronic respiratory failure [facility]) ICD-10-CM: J96.20 ICD-9-CM: 518.84   7/25/2018 Yes  
     
  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HCC) (Chronic) ICD-10-CM: J15.8, A31.9 ICD-9-CM: 482.89, 031.9   7/25/2018 Yes  
  Overview Signed 8/30/2018  8:54 AM by Carlos Enrique Casiano NP  
    MAC followed by Dr. Mariana Ashton at [facility]. In March 2018 was found with new SAMANTHA nodules with spiculation. 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  He was started on ETB/azithro/rif.    
   
     
  Paroxysmal atrial fibrillation (HCC) (Chronic) ICD-10-CM: I48.0 ICD-9-CM: 427.31   5/2/2014 Yes  
     
  Multiple pulmonary nodules (Chronic) ICD-10-CM: R91.8 ICD-9-CM: 793.19   8/30/2018 Yes  
     
  CHF (congestive heart failure) (HCC) (Chronic) ICD-10-CM: I50.9 ICD-9-CM: 428.0   7/23/2018 Yes  
  Overview Signed 8/30/2018  8:50 AM by Carlos Enrique Casiano NP  
    7/23/18  ECHO 
-  Left ventricle moderately dilated. EF30-35%. Mild/mod global diffuse hypokinesis. -  Left atrium: The atrium was mildly dilated. -  Right atrium: The atrium was mildly dilated. -  Inferior vena cava, hepatic veins: IVC dilated. Respirophasic change more than 50%.    
     
  AAA (abdominal aortic aneurysm) without rupture (HCC) (Chronic) ICD-10-CM: I71.4 ICD-9-CM: 540. 4   5/2/2014 Yes  
  Overview Signed 5/2/2014 10:53 AM by Gregoria Nyhan, NP  
    3.2 on US 2/14 [facility]  
   
     
  COPD (chronic obstructive pulmonary disease) (HCC) (Chronic) ICD-10-CM: J44.9 ICD-9-CM: 174   5/2/2014 Yes  
     
  
  
  
Plan:  (Medical Decision Making)  
  
--Will admit to the ICU for further medical management. If can wean off BIPAP could move to telemetry on Cardizem drip. --Supplemental O2--BIPAP--wean as tolerated --Respiratory nebulizer treatments 
--IV steroid therapy 60mg q6h 
--IV Lasix 40mg now--[id number] 
--Antibiotic therapy--resume coverage for MAC:   
                      Zithromax 500 mg 1 tablet every MWF Ethambutol 400 mg 5 tablets every MWF Rifampin 300 mg 2 tablets MWF 
--Consult cardiology for atrial flutter and CHF 
  
More than 50% of the time documented was spent in face-to-face contact with the patient and in the care of the patient on the floor/unit where the patient is located. 
  
Destiny Mancuso NP 
  
Lungs:  Diminished bilaterally, no rales Heart:  Irr, aflutter  
  
Additional Comments:  Acute hypoxemic/hypercapneic respiratory failure, h/o COPD and CHF likely with exacerbations of same based on history, exam and lab reviewed. Nebs, Steroids, MAC therapy, diuresis now, BIPAP wean as able. Will give Lasix 40 x 1 now and continue rate control with Dilt gtt. Start Lopressor and hopefully can wean dilt gtt. Will consult cardiology for any additional expert recommendations.  
  
I have spoken with and examined the patient.  I agree with the above assessment and plan as documented. 
Fang Colon MD 
  
 Patient was able to be taken off BiPAP after lasix, rate control, nebs and one dose of lasix. Reports breathing was better, ABG was acceptable and weaned to [facility]. Cardiology is willing to admit for primary and will discuss with him option for aflutter ablation. Appreciate assistance. We will continue to follow along.   
 
Taiwo Mohamud MD

## 2018-08-30 NOTE — CONSULTS
[facility] Cardiology Consult Date of  Admission: 8/30/2018  6:47 AM  
 
Primary Care Physician: Dr Art Bradford Primary Cardiologist: Dr Dannie Pang last seen 7/2018 to be re-established Referring Physician: Pulmonology Consulting Physician: Dr Loy Leventhal CC/Reason for consult: A Flutter RVR Savi Lacy is a 78 y.o. male with a history of PAF, HFrEF, AAA, COPD,  HTN, Prostate CA, hyperlipidemia, OA, and PE. The patient has had SOB starting yesterday and just feeling bad. He was brought to the hospital by EMS with productive yellow sputum but denies any recent fevers or chills. He was stared with IV Nebs, Steroids, and placed on CPAP for transport. In the ED he was changed to bipap and started on a Cardizem drip with bolus and IV lopressor. He recently finished up antibiotics but is a poor historian on what for. The patient denies CP, Neck pain, back pain, n/v, weight gain, weakness, dizziness, or LOC. LHC: LHC from 2006 at [address] shows mild none obstructive disease with dilated cardiomyopathy. Echo: from 7/23/2018 Left ventricle: The ventricle was moderately dilated. Systolic function  Was moderately reduced. Ejection fraction was estimated in the range of 30 % to  35%. There was mild to moderate global diffuse hypokinesis. Left atrium: The atrium was mildly dilated. Right atrium: The atrium was mildly dilated. Inferior vena cava, hepatic veins: The inferior vena cava was dilated. The respirophasic change in diameter was more than 50%. Trop I: 0.09 BNP: 1067 EKG:  Atrial Flutter rate of 110 CXR: Possible ascending aortic aneurysm, unchanged. Consider a dedicated CTA of the chest there is further clinical concern. Note: Pulmonary space are grossly clear. Patient Active Problem List  
Diagnosis Code  AAA (abdominal aortic aneurysm) without rupture ([facility]) I71.4  
 COPD (chronic obstructive pulmonary disease) ([facility]) J44.9  Hyperlipemia E78.5  Glaucoma H40.9  Supplemental oxygen dependent Z99.81  
 OA (osteoarthritis) M19.90  Paroxysmal atrial fibrillation (HCC) I48.0  Warfarin anticoagulation Z79.01  
 Renal cysts, acquired, bilateral N28.1  Tobacco use disorder F17.200  Hypertension I10  
 Pulmonary embolus (HCC) I26.99  
 Cardiomyopathy ([address] I42.9  Atrial flutter (HCC) I48.92  
 Peptic ulcer disease K27.9  History of pulmonary embolism Z86.711  
 COPD exacerbation (HCC) J44.1  CHF exacerbation (HCC) I50.9  CHF (congestive heart failure) (HCC) I50.9  A-fib (HCC) I48.91  
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  
Diagnosis Date  A-fib ([address] 5/2/2014  AAA (abdominal aortic aneurysm) without rupture ([address] 5/2/2014  
 3.2 on US 2/14 [facility]  Arthritis  Cancer ([address]   
 hx prostate cancer  COPD (chronic obstructive pulmonary disease) ([address] 5/2/2014  Elevated prostate specific antigen (PSA)  Glaucoma 5/2/2014  Heart disease  Heart failure ([address]  Hyperlipemia 5/2/2014  Hypertension  Hypertrophy of prostate with urinary obstruction and other lower urinary tract symptoms (LUTS)  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  OA (osteoarthritis) 5/2/2014  Peptic ulcer disease 6/1/2017  Poor historian  Prostate cancer ([address]  Pulmonary embolus ([address] 6/1/2017  Supplemental oxygen dependent 5/2/2014  Warfarin anticoagulation 5/2/2014 Past Surgical History:  
Procedure Laterality Date  HX HEART CATHETERIZATION Allergies Allergen Reactions  Statins-Hmg-Coa Reductase Inhibitors Myalgia Muscle pain Family History Problem Relation Age of Onset  Cancer Mother  Heart Disease Father Current Facility-Administered Medications Medication Dose Route Frequency  dilTIAZem (CARDIZEM) 100 mg in 0.9% sodium chloride (MBP/ADV) 100 mL infusion  0-15 mg/hr IntraVENous TITRATE Current Outpatient Prescriptions Medication Sig  
 lisinopril (PRINIVIL, ZESTRIL) 10 mg tablet Take 1 Tab by mouth daily.  metoprolol tartrate (LOPRESSOR) 50 mg tablet Take 1 Tab by mouth two (2) times a day.  predniSONE (DELTASONE) 10 mg tablet Take 1 Tab by mouth daily (with breakfast).  azithromycin (ZITHROMAX) 500 mg tab Take 1 tablet every Mon, Wed, Fri  
 ethambutol (MYAMBUTOL) 400 mg tablet Take 5 tabs every mon, wed, fri  
 fluticasone-salmeterol (ADVAIR) 250-50 mcg/dose diskus inhaler Take 1 Puff by inhalation.  rifAMPin (RIFADIN) 300 mg capsule Take 2 tabs every mon, wed, fri  
 nitroglycerin (NITROSTAT) 0.4 mg SL tablet 1 Tab by SubLINGual route every five (5) minutes as needed for Chest Pain.  potassium chloride SR (KLOR-CON 10) 10 mEq tablet Take 20 mEq by mouth two (2) times a day.  allopurinol (ZYLOPRIM) 300 mg tablet Take  by mouth daily.  cholecalciferol, vitamin D3, 2,000 unit tab Take  by mouth.  bimatoprost (LUMIGAN) 0.01 % ophthalmic drops Administer 1 Drop to both eyes every evening.  OXYGEN-AIR DELIVERY SYSTEMS Use as instructed. Use as instructed.  omeprazole (PRILOSEC) 20 mg capsule Take 20 mg by mouth daily.  rosuvastatin (CRESTOR) 40 mg tablet Take 40 mg by mouth nightly.  ferrous sulfate (IRON) 325 mg (65 mg iron) tablet Take  by mouth Daily (before breakfast).  tamsulosin (FLOMAX) 0.4 mg capsule TAKE ONE CAPSULE BY MOUTH EVERY DAY  brimonidine (ALPHAGAN P) 0.1 % ophthalmic solution every eight (8) hours.  LEUPROLIDE ACETATE (ELIGARD SC) 45 mg by SubCUTAneous route.  tiotropium (SPIRIVA WITH HANDIHALER) 18 mcg inhalation capsule Take 1 Cap by inhalation daily.  albuterol (VENTOLIN HFA) 90 mcg/actuation inhaler Take  by inhalation. Review of Systems Constitution: Negative for weight gain and weight loss. HENT: Negative. Eyes: Negative. Cardiovascular: Negative for chest pain (currently pain free), claudication, cyanosis, dyspnea on exertion, irregular heartbeat, leg swelling, near-syncope, orthopnea, palpitations, paroxysmal nocturnal dyspnea and syncope. Respiratory: Positive for cough and sputum production. Negative for shortness of breath (since last night) and wheezing. Endocrine: Negative. Skin: Negative. Musculoskeletal: Negative. Gastrointestinal: Negative for nausea and vomiting. Genitourinary: Negative. Neurological: Negative for dizziness. Psychiatric/Behavioral: Negative. Allergic/Immunologic: Negative. Physical Exam 
Vitals:  
 08/30/18 5469 08/30/18 0128 08/30/18 0932 08/30/18 1013 BP: 124/90 (!) 133/92  131/64 Pulse: (!) 161 (!) 133  (!) 105 Resp:      
Temp:      
SpO2:  97% 97% Weight:      
Height:      
 
 
Physical Exam: 
General: Well Developed, Well Nourished, No Acute Distress HEENT: pupils equal and round, no abnormalities noted Neck: supple, no JVD, no carotid bruits Heart: S1S2 with Regular unable to obtain due to noise of bipap Lungs: Clear throughout auscultation bilaterally without adventitious sounds Abd: soft, nontender, nondistended, with good bowel sounds Ext: warm, no edema, calves supple/nontender, pulses 2+ bilaterally Skin: warm and dry Psychiatric: Normal mood and affect Neurologic: Alert and oriented X 3 Labs:  
Recent Labs 08/30/18 
 9310 NA  142  
K  4.3 BUN  17 CREA  1.42  
GLU  122* WBC  9.3 HGB  10.6* HCT  35.3*  
PLT  335 Echo Results  (Last 48 hours) None CXR Results  (Last 48 hours) 08/30/18 0700  XR CHEST PORT Final result Impression:  IMPRESSION: Possible ascending aortic aneurysm, unchanged. Consider a dedicated CTA of the chest there is further clinical concern. Narrative:  Portable chest:   
   
History: dyspnea. Comparison: 07/23/2018 Findings: Portable AP views of the chest were obtained at 6:42 AM. The cardiac silhouette is normal in size. There is mild prominence along the  
right aspect of the mediastinum which raises suspicion for prominence to the  
ascending aorta. The lungs and pleural spaces are grossly clear. The pulmonary  
vasculature is within normal limits. Assessment/Plan: 
 
 Assessment:  
  
Active Problems: 
  AAA (abdominal aortic aneurysm) without rupture ([address] (5/2/2014) Overview: 3.2 on US 2/14 [facility]  
 
  COPD (chronic obstructive pulmonary disease) ([address] (5/2/2014) Paroxysmal atrial fibrillation (HCC) (5/2/2014) Start Heparin Drip with Bolus weight based protocol, Continue Cardizem Drip, EP consult for RASHEEDA possible A Fib Ablation. COPD exacerbation ([address] (7/23/2018) CHF (congestive heart failure) ([address] (7/23/2018) Overview: 7/23/18  ECHO 
    -  Left ventricle moderately dilated. EF30-35%. Mild/mod global diffuse  
    hypokinesis. -  Left atrium: The atrium was mildly dilated. -  Right atrium: The atrium was mildly dilated. -  Inferior vena cava, hepatic veins: IVC dilated. Respirophasic change  
    more than 50%. 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[address] (7/25/2018) Overview: MAC followed by Dr. Silvia Garg at [facility]. In March 2018 was found with new SAMANTHA  
    nodules with spiculation. 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  
     He was started on ETB/azithro/rif.   
 
  Acute on chronic respiratory failure ([address] (7/25/2018) Multiple pulmonary nodules (8/30/2018) Thank you very much for this referral. We appreciate the opportunity to participate in this patient's care. We will follow along with above stated plan. Giselle Silverio NP Consulting MD: Dr John Albert

## 2018-08-30 NOTE — ED PROVIDER NOTES
HPI Comments: Patient is a 24-year-old male with a history of COPD who arrives in the emergency department via EMS from home on CPAP. He states yesterday he started breathing bad. He reports that he was recently admitted to the hospital for a similar exacerbation. He chronically wears 2 L of oxygen at home. He used extra breathing treatments yesterday and last night. Then this morning his breathing was worse and called EMS. EMS states his sat was 90% but he was labored. They gave a neb treatment. Solu-Medrol 125 mg IV. Magnesium sulfate 2 g IV. And placed him on CPAP. He states he is improving a lot now. Patient is a 78 y.o. male presenting with shortness of breath. The history is provided by the patient. Shortness of Breath This is a new problem. The problem occurs continuously. The current episode started yesterday. The problem has been gradually worsening. Associated symptoms include cough. Pertinent negatives include no fever, no chest pain, no abdominal pain and no leg swelling. It is unknown what precipitated the problem. He has tried beta-agonist inhalers for the symptoms. The treatment provided no relief. He has had prior hospitalizations. He has had prior ED visits. He has had prior ICU admissions. Associated medical issues include COPD and heart failure. Past Medical History:  
Diagnosis Date  A-fib ([address] 5/2/2014  AAA (abdominal aortic aneurysm) without rupture ([address] 5/2/2014  
 3.2 on US 2/14 [facility]  Aneurysm ([address]  Arthritis  Cancer ([address]   
 hx prostate cancer  Chronic lung disease  COPD   
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  
Procedure Laterality Date  HX HEART CATHETERIZATION Family History:  
Problem Relation Age of Onset  Cancer Mother  Heart Disease Father Social History Social History  Marital status: SINGLE Spouse name: N/A  
 Number of children: N/A  
 Years of education: N/A Occupational History  Not on file. Social History Main Topics  Smoking status: Former Smoker Packs/day: 2.00 Years: 40.00 Quit date: 7/26/2014  Smokeless tobacco: Never Used Comment: still taking Chantix  Alcohol use No  
 Drug use: No  
 Sexual activity: Yes  
  Partners: Female Birth control/ protection: None Other Topics Concern  Not on file Social History Narrative ALLERGIES: Statins-hmg-coa reductase inhibitors Review of Systems Constitutional: Negative for chills and fever. HENT: Negative. Eyes: Negative. Respiratory: Positive for cough and shortness of breath. Cardiovascular: Negative for chest pain and leg swelling. Gastrointestinal: Negative for abdominal pain. Endocrine: Negative. Genitourinary: Negative. Musculoskeletal: Negative. Skin: Negative. Neurological: Negative. Vitals:  
 08/30/18 8402 BP: (!) 148/95 Pulse: (!) 160 Resp: 20 Temp: 98.9 °F (37.2 °C) SpO2: 98% Weight: 86.2 kg (190 lb) Height: 5' 9\" (1.753 m) Physical Exam  
Constitutional: He is oriented to person, place, and time. He appears well-developed. He appears distressed. HENT:  
Head: Normocephalic and atraumatic. Neck: Normal range of motion. Neck supple. Cardiovascular: Intact distal pulses. An irregularly irregular rhythm present. Tachycardia present. Pulmonary/Chest: He is in respiratory distress. He has wheezes. Abdominal: Soft. There is no tenderness. Musculoskeletal: Normal range of motion. He exhibits no edema. Neurological: He is alert and oriented to person, place, and time. No cranial nerve deficit. Skin: Skin is warm and dry. No rash noted. Nursing note and vitals reviewed. MDM Number of Diagnoses or Management Options Diagnosis management comments: On arrival the patient was taken off of the EMS CPAP and placed on his 2 L nasal cannula. He did maintain his sat and he became very labored and tachypneic up to about a rate of 40. Arterial blood gas showed a PCO2 of 61 sodium is placed back on the CPAP by the respiratory therapist.  A dose of Cardizem is also been ordered for his atrial fibrillation EKG shows A. Fib with RVR at a rate of 159 
 
7:13 AM 
After Cardizem 20 mg IV his heart rate decreased and repeat EKG showed atrial flutter at a rate of 87. 
 
7:59 AM 
Breathing much more comfortably on the CPAP. Heart rate did climb back up to the 150s Cardizem infusion was ordered. White blood cell count is normal, chest x-ray shows no infiltrate. Chemistries show some chronic renal insufficiency but otherwise at his baseline. I called and consulted with the intensivist for admission to the ICU and further management. His heart rate was then noted to be back up at 160 on reviewing his records he is on Lopressor at home so a dose of 5 mg Lopressor IV was ordered. Voice dictation software was used during the making of this note. This software is not perfect and grammatical and other typographical errors may be present. This note has been proofread, but may still contain errors.  
Calista Rosas MD; 8/30/2018 @8:00 AM  
=================================================================== 
 
 
  
 Amount and/or Complexity of Data Reviewed Clinical lab tests: reviewed and ordered Tests in the radiology section of CPT®: ordered and reviewed Review and summarize past medical records: yes Discuss the patient with other providers: yes Independent visualization of images, tracings, or specimens: yes Risk of Complications, Morbidity, and/or Mortality Presenting problems: high Diagnostic procedures: moderate Management options: moderate Patient Progress Patient progress: improved ED Course  
=================================================================== This patient is critically ill and there is a high probability of of imminent or life threatening deterioration in the patient's condition without immediate management. The nature of the patient's clinical problem is: copd and atrial flutter I have spent 60 minutes in direct patient care, documentation, review of labs/xrays/old records, discussion with patient and intensivist . The time involved in the performance of separately reportable procedures was not counted toward critical care time. Sandra Arizmendi MD; 8/30/2018 @8:00 AM 
=================================================================== 
 
 
 
 
Procedures

## 2018-08-31 ENCOUNTER — ANESTHESIA (OUTPATIENT)
Dept: SURGERY | Age: 78
DRG: 273 | End: 2018-08-31
Payer: MEDICARE

## 2018-08-31 ENCOUNTER — ANESTHESIA EVENT (OUTPATIENT)
Dept: SURGERY | Age: 78
DRG: 273 | End: 2018-08-31
Payer: MEDICARE

## 2018-08-31 LAB
ALBUMIN SERPL-MCNC: 2.6 G/DL (ref 3.2–4.6)
ALBUMIN/GLOB SERPL: 0.6 {RATIO} (ref 1.2–3.5)
ALP SERPL-CCNC: 102 U/L (ref 50–136)
ALT SERPL-CCNC: 77 U/L (ref 12–65)
ANION GAP SERPL CALC-SCNC: 9 MMOL/L (ref 7–16)
APTT PPP: 73.1 SEC (ref 23.2–35.3)
AST SERPL-CCNC: 43 U/L (ref 15–37)
ATRIAL RATE: 76 BPM
BASOPHILS # BLD: 0 K/UL (ref 0–0.2)
BASOPHILS NFR BLD: 0 % (ref 0–2)
BILIRUB SERPL-MCNC: 0.2 MG/DL (ref 0.2–1.1)
BUN SERPL-MCNC: 24 MG/DL (ref 8–23)
CALCIUM SERPL-MCNC: 8.7 MG/DL (ref 8.3–10.4)
CALCULATED P AXIS, ECG09: 76 DEGREES
CALCULATED R AXIS, ECG10: 71 DEGREES
CALCULATED T AXIS, ECG11: 98 DEGREES
CHLORIDE SERPL-SCNC: 105 MMOL/L (ref 98–107)
CO2 SERPL-SCNC: 27 MMOL/L (ref 21–32)
CREAT SERPL-MCNC: 1.61 MG/DL (ref 0.8–1.5)
DIAGNOSIS, 93000: NORMAL
DIFFERENTIAL METHOD BLD: ABNORMAL
EOSINOPHIL # BLD: 0 K/UL (ref 0–0.8)
EOSINOPHIL NFR BLD: 0 % (ref 0.5–7.8)
ERYTHROCYTE [DISTWIDTH] IN BLOOD BY AUTOMATED COUNT: 18.9 %
GLOBULIN SER CALC-MCNC: 4.1 G/DL (ref 2.3–3.5)
GLUCOSE SERPL-MCNC: 198 MG/DL (ref 65–100)
HCT VFR BLD AUTO: 31.5 % (ref 41.1–50.3)
HGB BLD-MCNC: 9.7 G/DL (ref 13.6–17.2)
IMM GRANULOCYTES # BLD: 0 K/UL (ref 0–0.5)
IMM GRANULOCYTES NFR BLD AUTO: 0 % (ref 0–5)
LYMPHOCYTES # BLD: 0.7 K/UL (ref 0.5–4.6)
LYMPHOCYTES NFR BLD: 14 % (ref 13–44)
MAGNESIUM SERPL-MCNC: 2.4 MG/DL (ref 1.8–2.4)
MCH RBC QN AUTO: 25.9 PG (ref 26.1–32.9)
MCHC RBC AUTO-ENTMCNC: 30.8 G/DL (ref 31.4–35)
MCV RBC AUTO: 84 FL (ref 79.6–97.8)
MONOCYTES # BLD: 0.3 K/UL (ref 0.1–1.3)
MONOCYTES NFR BLD: 7 % (ref 4–12)
NEUTS SEG # BLD: 3.7 K/UL (ref 1.7–8.2)
NEUTS SEG NFR BLD: 78 % (ref 43–78)
NRBC # BLD: 0.03 K/UL (ref 0–0.2)
P-R INTERVAL, ECG05: 142 MS
PHOSPHATE SERPL-MCNC: 3.6 MG/DL (ref 2.3–3.7)
PLATELET # BLD AUTO: 335 K/UL (ref 150–450)
PMV BLD AUTO: 10.7 FL (ref 9.4–12.3)
POTASSIUM SERPL-SCNC: 4.4 MMOL/L (ref 3.5–5.1)
PROT SERPL-MCNC: 6.7 G/DL (ref 6.3–8.2)
Q-T INTERVAL, ECG07: 422 MS
QRS DURATION, ECG06: 82 MS
QTC CALCULATION (BEZET), ECG08: 474 MS
RBC # BLD AUTO: 3.75 M/UL (ref 4.23–5.6)
SODIUM SERPL-SCNC: 141 MMOL/L (ref 136–145)
VENTRICULAR RATE, ECG03: 76 BPM
WBC # BLD AUTO: 4.7 K/UL (ref 4.3–11.1)

## 2018-08-31 PROCEDURE — C1759 CATH, INTRA ECHOCARDIOGRAPHY: HCPCS

## 2018-08-31 PROCEDURE — 74011250637 HC RX REV CODE- 250/637: Performed by: INTERNAL MEDICINE

## 2018-08-31 PROCEDURE — 74011000250 HC RX REV CODE- 250: Performed by: INTERNAL MEDICINE

## 2018-08-31 PROCEDURE — 74011250636 HC RX REV CODE- 250/636

## 2018-08-31 PROCEDURE — 93005 ELECTROCARDIOGRAM TRACING: CPT | Performed by: INTERNAL MEDICINE

## 2018-08-31 PROCEDURE — 94760 N-INVAS EAR/PLS OXIMETRY 1: CPT

## 2018-08-31 PROCEDURE — 76060000034 HC ANESTHESIA 1.5 TO 2 HR: Performed by: INTERNAL MEDICINE

## 2018-08-31 PROCEDURE — 74011000258 HC RX REV CODE- 258: Performed by: INTERNAL MEDICINE

## 2018-08-31 PROCEDURE — 74011250636 HC RX REV CODE- 250/636: Performed by: INTERNAL MEDICINE

## 2018-08-31 PROCEDURE — 84100 ASSAY OF PHOSPHORUS: CPT

## 2018-08-31 PROCEDURE — C1894 INTRO/SHEATH, NON-LASER: HCPCS

## 2018-08-31 PROCEDURE — 02K83ZZ MAP CONDUCTION MECHANISM, PERCUTANEOUS APPROACH: ICD-10-PCS | Performed by: INTERNAL MEDICINE

## 2018-08-31 PROCEDURE — 93613 INTRACARDIAC EPHYS 3D MAPG: CPT

## 2018-08-31 PROCEDURE — 65660000000 HC RM CCU STEPDOWN

## 2018-08-31 PROCEDURE — 93621 COMP EP EVL L PAC&REC C SINS: CPT

## 2018-08-31 PROCEDURE — C1732 CATH, EP, DIAG/ABL, 3D/VECT: HCPCS

## 2018-08-31 PROCEDURE — 77030035291 HC TBNG PMP SMARTABLATE J&J -B

## 2018-08-31 PROCEDURE — 93312 ECHO TRANSESOPHAGEAL: CPT

## 2018-08-31 PROCEDURE — 94640 AIRWAY INHALATION TREATMENT: CPT

## 2018-08-31 PROCEDURE — 80053 COMPREHEN METABOLIC PANEL: CPT

## 2018-08-31 PROCEDURE — 93653 COMPRE EP EVAL TX SVT: CPT

## 2018-08-31 PROCEDURE — 4A023FZ MEASUREMENT OF CARDIAC RHYTHM, PERCUTANEOUS APPROACH: ICD-10-PCS | Performed by: INTERNAL MEDICINE

## 2018-08-31 PROCEDURE — 83735 ASSAY OF MAGNESIUM: CPT

## 2018-08-31 PROCEDURE — 77030027107 HC PTCH EXT REF CARTO3 J&J -F

## 2018-08-31 PROCEDURE — C8924 2D TTE W OR W/O FOL W/CON,FU: HCPCS

## 2018-08-31 PROCEDURE — 74011000250 HC RX REV CODE- 250

## 2018-08-31 PROCEDURE — 74011250637 HC RX REV CODE- 250/637: Performed by: NURSE PRACTITIONER

## 2018-08-31 PROCEDURE — 4A0234Z MEASUREMENT OF CARDIAC ELECTRICAL ACTIVITY, PERCUTANEOUS APPROACH: ICD-10-PCS | Performed by: INTERNAL MEDICINE

## 2018-08-31 PROCEDURE — B24BZZ4 ULTRASONOGRAPHY OF HEART WITH AORTA, TRANSESOPHAGEAL: ICD-10-PCS | Performed by: INTERNAL MEDICINE

## 2018-08-31 PROCEDURE — 99232 SBSQ HOSP IP/OBS MODERATE 35: CPT | Performed by: INTERNAL MEDICINE

## 2018-08-31 PROCEDURE — 36415 COLL VENOUS BLD VENIPUNCTURE: CPT

## 2018-08-31 PROCEDURE — 77010033678 HC OXYGEN DAILY

## 2018-08-31 PROCEDURE — 93662 INTRACARDIAC ECG (ICE): CPT

## 2018-08-31 PROCEDURE — 76937 US GUIDE VASCULAR ACCESS: CPT

## 2018-08-31 PROCEDURE — 85730 THROMBOPLASTIN TIME PARTIAL: CPT

## 2018-08-31 PROCEDURE — 85025 COMPLETE CBC W/AUTO DIFF WBC: CPT

## 2018-08-31 PROCEDURE — 02583ZZ DESTRUCTION OF CONDUCTION MECHANISM, PERCUTANEOUS APPROACH: ICD-10-PCS | Performed by: INTERNAL MEDICINE

## 2018-08-31 RX ORDER — CEFAZOLIN SODIUM IN 0.9 % NACL 2 G/50 ML
INTRAVENOUS SOLUTION, PIGGYBACK (ML) INTRAVENOUS AS NEEDED
Status: DISCONTINUED | OUTPATIENT
Start: 2018-08-31 | End: 2018-08-31 | Stop reason: HOSPADM

## 2018-08-31 RX ORDER — ONDANSETRON 2 MG/ML
4 INJECTION INTRAMUSCULAR; INTRAVENOUS
Status: DISCONTINUED | OUTPATIENT
Start: 2018-08-31 | End: 2018-09-01 | Stop reason: HOSPADM

## 2018-08-31 RX ORDER — IPRATROPIUM BROMIDE AND ALBUTEROL SULFATE 2.5; .5 MG/3ML; MG/3ML
3 SOLUTION RESPIRATORY (INHALATION)
Status: DISCONTINUED | OUTPATIENT
Start: 2018-08-31 | End: 2018-09-01 | Stop reason: HOSPADM

## 2018-08-31 RX ORDER — HEPARIN SODIUM 5000 [USP'U]/ML
35 INJECTION, SOLUTION INTRAVENOUS; SUBCUTANEOUS ONCE
Status: COMPLETED | OUTPATIENT
Start: 2018-08-31 | End: 2018-08-31

## 2018-08-31 RX ORDER — SODIUM CHLORIDE 0.9 % (FLUSH) 0.9 %
5-10 SYRINGE (ML) INJECTION AS NEEDED
Status: DISCONTINUED | OUTPATIENT
Start: 2018-08-31 | End: 2018-09-01 | Stop reason: HOSPADM

## 2018-08-31 RX ORDER — ACETAMINOPHEN 325 MG/1
650 TABLET ORAL
Status: DISCONTINUED | OUTPATIENT
Start: 2018-08-31 | End: 2018-09-01 | Stop reason: HOSPADM

## 2018-08-31 RX ORDER — PROPOFOL 10 MG/ML
INJECTION, EMULSION INTRAVENOUS
Status: DISCONTINUED | OUTPATIENT
Start: 2018-08-31 | End: 2018-08-31 | Stop reason: HOSPADM

## 2018-08-31 RX ORDER — IPRATROPIUM BROMIDE AND ALBUTEROL SULFATE 2.5; .5 MG/3ML; MG/3ML
SOLUTION RESPIRATORY (INHALATION)
Status: ACTIVE
Start: 2018-08-31 | End: 2018-08-31

## 2018-08-31 RX ORDER — SODIUM CHLORIDE 0.9 % (FLUSH) 0.9 %
5-10 SYRINGE (ML) INJECTION EVERY 8 HOURS
Status: DISCONTINUED | OUTPATIENT
Start: 2018-08-31 | End: 2018-09-01 | Stop reason: HOSPADM

## 2018-08-31 RX ORDER — WARFARIN SODIUM 5 MG/1
5 TABLET ORAL ONCE
Status: CANCELLED | OUTPATIENT
Start: 2018-08-31 | End: 2018-08-31

## 2018-08-31 RX ADMIN — METHYLPREDNISOLONE SODIUM SUCCINATE 60 MG: 125 INJECTION, POWDER, FOR SOLUTION INTRAMUSCULAR; INTRAVENOUS at 06:02

## 2018-08-31 RX ADMIN — IPRATROPIUM BROMIDE 0.5 MG: 0.5 SOLUTION RESPIRATORY (INHALATION) at 07:49

## 2018-08-31 RX ADMIN — Medication 10 ML: at 14:35

## 2018-08-31 RX ADMIN — ATORVASTATIN CALCIUM 40 MG: 40 TABLET, FILM COATED ORAL at 08:15

## 2018-08-31 RX ADMIN — AZITHROMYCIN 500 MG: 250 TABLET, FILM COATED ORAL at 17:37

## 2018-08-31 RX ADMIN — ALLOPURINOL 300 MG: 300 TABLET ORAL at 08:15

## 2018-08-31 RX ADMIN — Medication 5 ML: at 21:49

## 2018-08-31 RX ADMIN — ALBUTEROL SULFATE 2.5 MG: 2.5 SOLUTION RESPIRATORY (INHALATION) at 09:37

## 2018-08-31 RX ADMIN — TAMSULOSIN HYDROCHLORIDE 0.4 MG: 0.4 CAPSULE ORAL at 08:15

## 2018-08-31 RX ADMIN — IPRATROPIUM BROMIDE AND ALBUTEROL SULFATE 3 ML: .5; 3 SOLUTION RESPIRATORY (INHALATION) at 21:00

## 2018-08-31 RX ADMIN — DOCUSATE SODIUM 100 MG: 100 CAPSULE, LIQUID FILLED ORAL at 08:15

## 2018-08-31 RX ADMIN — METOPROLOL SUCCINATE 50 MG: 50 TABLET, EXTENDED RELEASE ORAL at 08:15

## 2018-08-31 RX ADMIN — IPRATROPIUM BROMIDE AND ALBUTEROL SULFATE 3 ML: .5; 3 SOLUTION RESPIRATORY (INHALATION) at 16:23

## 2018-08-31 RX ADMIN — APIXABAN 5 MG: 5 TABLET, FILM COATED ORAL at 17:37

## 2018-08-31 RX ADMIN — LISINOPRIL 10 MG: 5 TABLET ORAL at 08:15

## 2018-08-31 RX ADMIN — ETHAMBUTOL HYDROCHLORIDE 2000 MG: 400 TABLET, FILM COATED ORAL at 17:37

## 2018-08-31 RX ADMIN — PERFLUTREN 1 ML: 6.52 INJECTION, SUSPENSION INTRAVENOUS at 13:00

## 2018-08-31 RX ADMIN — PANTOPRAZOLE SODIUM 40 MG: 40 TABLET, DELAYED RELEASE ORAL at 08:15

## 2018-08-31 RX ADMIN — SODIUM CHLORIDE 15 MG/HR: 900 INJECTION, SOLUTION INTRAVENOUS at 02:49

## 2018-08-31 RX ADMIN — FERROUS SULFATE TAB 325 MG (65 MG ELEMENTAL FE) 325 MG: 325 (65 FE) TAB at 08:15

## 2018-08-31 RX ADMIN — HEPARIN SODIUM 3000 UNITS: 5000 INJECTION INTRAVENOUS; SUBCUTANEOUS at 03:52

## 2018-08-31 RX ADMIN — METHYLPREDNISOLONE SODIUM SUCCINATE 40 MG: 125 INJECTION, POWDER, FOR SOLUTION INTRAMUSCULAR; INTRAVENOUS at 21:49

## 2018-08-31 RX ADMIN — Medication 2 G: at 11:07

## 2018-08-31 RX ADMIN — DOCUSATE SODIUM 100 MG: 100 CAPSULE, LIQUID FILLED ORAL at 17:37

## 2018-08-31 RX ADMIN — RIFAMPIN 600 MG: 300 CAPSULE ORAL at 17:37

## 2018-08-31 RX ADMIN — Medication 5 ML: at 06:05

## 2018-08-31 RX ADMIN — METHYLPREDNISOLONE SODIUM SUCCINATE 60 MG: 125 INJECTION, POWDER, FOR SOLUTION INTRAMUSCULAR; INTRAVENOUS at 00:05

## 2018-08-31 RX ADMIN — ALBUTEROL SULFATE 2.5 MG: 2.5 SOLUTION RESPIRATORY (INHALATION) at 07:49

## 2018-08-31 RX ADMIN — PROPOFOL 75 MCG/KG/MIN: 10 INJECTION, EMULSION INTRAVENOUS at 10:53

## 2018-08-31 NOTE — ANESTHESIA POSTPROCEDURE EVALUATION
Post-Anesthesia Evaluation and Assessment Patient: Savi Lambert. MRN: [id number]  SSN: [id number] YOB: 1940  Age: 78 y.o. Sex: male Cardiovascular Function/Vital Signs Visit Vitals  /62 (BP 1 Location: Right arm, BP Patient Position: At rest)  Pulse 78  Temp 35.7 °C (96.2 °F)  Resp 20  
 Ht 5' 9\" (1.753 m)  Wt 79.6 kg (175 lb 6.4 oz)  SpO2 98%  BMI 25.9 kg/m2 Patient is status post total IV anesthesia anesthesia for Procedure(s): ESOPHAGEAL TRANS ECHOCARDIOGRAM. 
 
Nausea/Vomiting: None Postoperative hydration reviewed and adequate. Pain: 
Pain Scale 1: Numeric (0 - 10) (08/31/18 1340) Pain Intensity 1: 0 (08/31/18 1340) Managed Neurological Status:  
Neuro Neurologic State: Drowsy; Alert (08/31/18 1241) Orientation Level: Oriented X4 (08/31/18 0815) Cognition: Follows commands; Appropriate decision making; Appropriate for age attention/concentration (08/31/18 3781) Speech: Clear; Appropriate for age (08/31/18 2098) At baseline Mental Status and Level of Consciousness: Arousable Pulmonary Status:  
O2 Device: Nasal cannula (08/31/18 1340) Adequate oxygenation and airway patent Complications related to anesthesia: None Post-anesthesia assessment completed. No concerns Signed By: Marquez Cherry MD   
 August 31, 2018

## 2018-08-31 NOTE — PROGRESS NOTES
Verbal bedside report received from Martín RN. Assumed care of patient. Bilateral groin sites assessed at bedside with outgoing RNs. Right groin site with small amount of old oozing marked with black marker.

## 2018-08-31 NOTE — PROGRESS NOTES
[facility] CARDIOLOGY PROGRESS NOTE 
      
 
8/31/2018 7:30 AM 
 
Admit Date: 8/30/2018 Admit Diagnosis: COPD exacerbation ([address] Assessment:  
Principal Problem: 
  Acute on chronic respiratory failure ([address] (7/25/2018) Active Problems: 
  Typical atrial flutter AAA (abdominal aortic aneurysm) without rupture ([address] (5/2/2014) Overview: 3.2 on  2/14 [facility] 
 
  COPD (chronic obstructive pulmonary disease) ([address] (5/2/2014) Paroxysmal atrial fibrillation ([address] (5/2/2014) COPD exacerbation ([address] (7/23/2018) CHF (congestive heart failure) ([address] (7/23/2018) Overview: 7/23/18  ECHO 
    -  Left ventricle moderately dilated. EF30-35%. Mild/mod global diffuse  
    hypokinesis. -  Left atrium: The atrium was mildly dilated. -  Right atrium: The atrium was mildly dilated. -  Inferior vena cava, hepatic veins: IVC dilated. Respirophasic change  
    more than 50%. 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 In March 2018 was found with new SAMANTHA  
    nodules with spiculation. 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  
     He was started on ETB/azithro/rif.   
 
  Multiple pulmonary nodules (8/30/2018) Plan: 1. Continues to be in AFL, on metoprolol and IV diltiazem. Discussed RASHEEDA/DCCV vs AFL ablation. The patient is interested in the flutter ablation. Will look to perform today. Will look to perform RASHEEDA prior to ablation to ensure clear appendage. I  explained  to  the  patient  in  detail  today  the  pathophysiology  of  and  managementoptions  for  atrial  flutter,  including  rate  control  versus  rhythm  control  with  antiarrhythmic  drugs  (AAD)  or  catheter  ablation. I  also  explained  to  the  patient  that  approximately  50%  of  patients  with  atrial  flutter  may  have  or  develop  atrial  fibrillation  some  time  in  the  future. This  patient  has  not  had  documented  atrial  fibrillation  and  a  typical  atrial  flutter  ablation  is  not  technically  complex  and  relatively  low  risk  (<<1%  for  major complications). I  also  explained  that  atrial  flutter  is  often  more  difficult  to  rate  control  than  atrial  fibrillation,  and  although  this  will  not  address  AF  if  it  recurs  (nor  left  atrial  flutters),  a  typical  (right)  atrial  flutter  ablation  can  almost  always  eliminate  episodes  of  persistent  AFL  with  RVR. This  may  provide  for  improved  rate  control  and  symptomatic  relief  in  the  future. Then,  if  AF  recurs  and  is  symptomatic,  we  can  discuss  further  rhythm  control  strategies  at  that  time. The  catheter  ablation  procedure  was  described  to the  patient  in  detail,  including  the  risks  of  recurrent  AF,  stroke,  cardiac  perforation  with  the  need  for  catheter  drainage  or  surgical  repair,  vascular  damage,  DVT/PE,  bleeding,  pneumo/hemothorax,  need  for  permanent  pacemaker,  phrenic  or  vagus  nerve  damage  resulting  in  diaphragmatic  paralysis  or  gastroparesis,  thermal  skin  burns,  and  even  death. The  patient  understands  these  risks  and  wishes  to  proceed  with  ablation. We  will  perform  the  ablation  procedure  on  oral  anticoagulation  therapy  ([address] [facility])  to  reduce  the  risk  of  developing  LA  thrombus. The  patient  understands  that  there  will  be  a  slightly  higher  bleeding  risk  and  agrees  to  proceed  as  planned. The  patient  will  need  to  continue  anticoagulation  for  at  least  one  month  post  procedure,  and  should  continue  until  we  can  satisfactorily  document  the  lack  of  recurrent  atrial  arrhythmias.    
 
I spent  a  total  of  45  minutes  with  the  patient,  with  over  half  of  the  time  spent  discussing  pathophysiolgy  and treatment/management options. Thank you for allowing me to participate in the electrophysiologic care of this most pleasant patient. Please feel free to contact me if there are any questions or concerns. Jana Wooten. Homar Bennett MD, MS Clinical Cardiac Electrophysiology [facility] Cardiology Subjective: No complaints this AM, no chest pain or shortness of breath Interval History: (History of pertinent interval events obtained from nursing staff) ROS: 
GEN:  No fever or chills Cardiovascular:  As noted above Pulmonary:  As noted above Neuro:  No new focal motor or sensory loss Objective:  
 
Vitals:  
 08/30/18 1918 08/30/18 2100 08/31/18 0158 08/31/18 4574 BP:  131/78 129/72 142/76 Pulse:  (!) 127 88 98 Resp:  18 18 18 Temp:  97.2 °F (36.2 °C) 97.4 °F (36.3 °C) 97.7 °F (36.5 °C) SpO2: 98% 100% 99% 92% Weight:    175 lb 6.4 oz (79.6 kg) Height:    5' 9\" (1.753 m) Physical Exam: 
Ilean Alden, Well Nourished, No Acute Distress, Alert & Oriented x 3, appropriate mood. Neck- supple, no JVD 
CV- regular rate and rhythm no MRG Lung- clear bilaterally Abd- soft, nontender, nondistended Ext- no edema bilaterally. Skin- warm and dry Current Facility-Administered Medications Medication Dose Route Frequency  dilTIAZem (CARDIZEM) 100 mg in 0.9% sodium chloride (MBP/ADV) 100 mL infusion  0-15 mg/hr IntraVENous TITRATE  azithromycin (ZITHROMAX) tablet 500 mg  500 mg Oral Q MON, WED & FRI  ethambutol (MYAMBUTOL) tablet 2,000 mg  2,000 mg Oral Q MON, WED & FRI  rifAMPin (RIFADIN) capsule 600 mg  600 mg Oral Q MON, WED & FRI  tamsulosin (FLOMAX) capsule 0.4 mg  0.4 mg Oral DAILY  sodium chloride (NS) flush 5-10 mL  5-10 mL IntraVENous Q8H  
 sodium chloride (NS) flush 5-10 mL  5-10 mL IntraVENous PRN  
 albuterol (PROVENTIL VENTOLIN) nebulizer solution 2.5 mg  2.5 mg Nebulization QID RT  
 ipratropium (ATROVENT) 0.02 % nebulizer solution 0.5 mg  0.5 mg Nebulization QID RT  
 methylPREDNISolone (PF) (SOLU-MEDROL) injection 60 mg  60 mg IntraVENous Q6H  
 acetaminophen (TYLENOL) tablet 650 mg  650 mg Oral Q4H PRN  
 ondansetron (ZOFRAN) injection 4 mg  4 mg IntraVENous Q4H PRN  
 docusate sodium (COLACE) capsule 100 mg  100 mg Oral BID  heparin 25,000 units in dextrose 500 mL infusion  12-25 Units/kg/hr IntraVENous TITRATE  lisinopril (PRINIVIL, ZESTRIL) tablet 10 mg  10 mg Oral DAILY  pantoprazole (PROTONIX) tablet 40 mg  40 mg Oral ACB  ferrous sulfate tablet 325 mg  1 Tab Oral DAILY WITH BREAKFAST  allopurinol (ZYLOPRIM) tablet 300 mg  300 mg Oral DAILY  nitroglycerin (NITROSTAT) tablet 0.4 mg  0.4 mg SubLINGual Q5MIN PRN  
 atorvastatin (LIPITOR) tablet 40 mg  40 mg Oral DAILY  metoprolol succinate (TOPROL-XL) XL tablet 50 mg  50 mg Oral DAILY Data Review:  
Recent Results (from the past 24 hour(s)) BLOOD GAS, ARTERIAL Collection Time: 08/30/18  8:15 AM  
Result Value Ref Range pH 7.30 (L) 7.35 - 7.45    
 PCO2 50 (H) 35 - 45 mmHg PO2 119 (H) 80 - 105 mmHg BICARBONATE 24 22 - 26 mmol/L  
 BASE DEFICIT 2.9 (H) 0 - 2 mmol/L  
 TOTAL HEMOGLOBIN 11.5 (L) 11.7 - 15.0 GM/DL  
 O2 SAT 98 92 - 98.5 % Arterial O2 Hgb 97.1 (H) 94 - 97 % CARBOXYHEMOGLOBIN 0.3 (L) 0.5 - 1.5 % METHEMOGLOBIN 0.3 0.0 - 1.5 % DEOXYHEMOGLOBIN 2 0.0 - 5.0 % SITE LR   
 ALLENS TEST POSITIVE    
 MODE BIPAP 14 8 RATE 8.0 Respiratory comment: MD Brain at 8 30 2018 8 21 49 AM. Read back. BLOOD GAS, ARTERIAL Collection Time: 08/30/18 12:08 PM  
Result Value Ref Range pH 7.34 (L) 7.35 - 7.45    
 PCO2 47 (H) 35 - 45 mmHg PO2 88 80 - 105 mmHg BICARBONATE 25 22 - 26 mmol/L  
 BASE DEFICIT 1.4 0 - 2 mmol/L  
 TOTAL HEMOGLOBIN 11.5 (L) 11.7 - 15.0 GM/DL  
 O2 SAT 96 92 - 98.5 % Arterial O2 Hgb 95.5 94 - 97 % CARBOXYHEMOGLOBIN 0.3 (L) 0.5 - 1.5 % METHEMOGLOBIN 0.3 0.0 - 1.5 % DEOXYHEMOGLOBIN 4 0.0 - 5.0 % SITE LR   
 ALLENS TEST POSITIVE    
 MODE NC   
 O2 FLOW 2.00 L/min Respiratory comment: MD Marco Antonio at 8 30 2018 12 13 25 PM. Read back. PROTHROMBIN TIME + INR Collection Time: 08/30/18 12:49 PM  
Result Value Ref Range Prothrombin time 13.7 11.5 - 14.5 sec INR 1.1 PTT Collection Time: 08/30/18 12:49 PM  
Result Value Ref Range aPTT 97.0 (HH) 23.2 - 35.3 SEC  
PTT Collection Time: 08/30/18  4:29 PM  
Result Value Ref Range aPTT 91.1 (HH) 23.2 - 35.3 SEC METABOLIC PANEL, COMPREHENSIVE Collection Time: 08/30/18  4:29 PM  
Result Value Ref Range Sodium 144 136 - 145 mmol/L Potassium 4.6 3.5 - 5.1 mmol/L Chloride 107 98 - 107 mmol/L  
 CO2 25 21 - 32 mmol/L Anion gap 12 7 - 16 mmol/L Glucose 123 (H) 65 - 100 mg/dL BUN 18 8 - 23 MG/DL Creatinine 1.47 0.8 - 1.5 MG/DL  
 GFR est AA >60 >60 ml/min/1.73m2 GFR est non-AA 50 (L) >60 ml/min/1.73m2 Calcium 8.7 8.3 - 10.4 MG/DL Bilirubin, total 0.3 0.2 - 1.1 MG/DL  
 ALT (SGPT) 63 12 - 65 U/L  
 AST (SGOT) 31 15 - 37 U/L Alk. phosphatase 112 50 - 136 U/L Protein, total 7.2 6.3 - 8.2 g/dL Albumin 2.7 (L) 3.2 - 4.6 g/dL Globulin 4.5 (H) 2.3 - 3.5 g/dL A-G Ratio 0.6 (L) 1.2 - 3.5 MAGNESIUM Collection Time: 08/30/18  4:29 PM  
Result Value Ref Range Magnesium 2.3 1.8 - 2.4 mg/dL PHOSPHORUS Collection Time: 08/30/18  4:29 PM  
Result Value Ref Range Phosphorus 4.0 (H) 2.3 - 3.7 MG/DL  
CBC WITH AUTOMATED DIFF Collection Time: 08/30/18  4:29 PM  
Result Value Ref Range WBC 4.8 4.3 - 11.1 K/uL  
 RBC 3.96 (L) 4.23 - 5.6 M/uL  
 HGB 10.2 (L) 13.6 - 17.2 g/dL HCT 33.7 (L) 41.1 - 50.3 % MCV 85.1 79.6 - 97.8 FL  
 MCH 25.8 (L) 26.1 - 32.9 PG  
 MCHC 30.3 (L) 31.4 - 35.0 g/dL  
 RDW 19.1 % PLATELET 838 933 - 068 K/uL MPV 10.4 9.4 - 12.3 FL ABSOLUTE NRBC 0.03 0.0 - 0.2 K/uL  
 DF AUTOMATED NEUTROPHILS 79 (H) 43 - 78 % LYMPHOCYTES 16 13 - 44 % MONOCYTES 5 4.0 - 12.0 % EOSINOPHILS 0 (L) 0.5 - 7.8 % BASOPHILS 0 0.0 - 2.0 % IMMATURE GRANULOCYTES 1 0.0 - 5.0 %  
 ABS. NEUTROPHILS 3.8 1.7 - 8.2 K/UL  
 ABS. LYMPHOCYTES 0.8 0.5 - 4.6 K/UL  
 ABS. MONOCYTES 0.2 0.1 - 1.3 K/UL  
 ABS. EOSINOPHILS 0.0 0.0 - 0.8 K/UL  
 ABS. BASOPHILS 0.0 0.0 - 0.2 K/UL  
 ABS. IMM. GRANS. 0.0 0.0 - 0.5 K/UL  
PTT Collection Time: 08/30/18  6:34 PM  
Result Value Ref Range aPTT 80.6 (H) 23.2 - 35.3 SEC METABOLIC PANEL, COMPREHENSIVE Collection Time: 08/31/18  1:40 AM  
Result Value Ref Range Sodium 141 136 - 145 mmol/L Potassium 4.4 3.5 - 5.1 mmol/L Chloride 105 98 - 107 mmol/L  
 CO2 27 21 - 32 mmol/L Anion gap 9 7 - 16 mmol/L Glucose 198 (H) 65 - 100 mg/dL BUN 24 (H) 8 - 23 MG/DL Creatinine 1.61 (H) 0.8 - 1.5 MG/DL  
 GFR est AA 54 (L) >60 ml/min/1.73m2 GFR est non-AA 45 (L) >60 ml/min/1.73m2 Calcium 8.7 8.3 - 10.4 MG/DL Bilirubin, total 0.2 0.2 - 1.1 MG/DL  
 ALT (SGPT) 77 (H) 12 - 65 U/L  
 AST (SGOT) 43 (H) 15 - 37 U/L Alk. phosphatase 102 50 - 136 U/L Protein, total 6.7 6.3 - 8.2 g/dL Albumin 2.6 (L) 3.2 - 4.6 g/dL Globulin 4.1 (H) 2.3 - 3.5 g/dL A-G Ratio 0.6 (L) 1.2 - 3.5 MAGNESIUM Collection Time: 08/31/18  1:40 AM  
Result Value Ref Range Magnesium 2.4 1.8 - 2.4 mg/dL PHOSPHORUS Collection Time: 08/31/18  1:40 AM  
Result Value Ref Range Phosphorus 3.6 2.3 - 3.7 MG/DL  
CBC WITH AUTOMATED DIFF Collection Time: 08/31/18  1:40 AM  
Result Value Ref Range WBC 4.7 4.3 - 11.1 K/uL  
 RBC 3.75 (L) 4.23 - 5.6 M/uL HGB 9.7 (L) 13.6 - 17.2 g/dL HCT 31.5 (L) 41.1 - 50.3 % MCV 84.0 79.6 - 97.8 FL  
 MCH 25.9 (L) 26.1 - 32.9 PG  
 MCHC 30.8 (L) 31.4 - 35.0 g/dL  
 RDW 18.9 % PLATELET 586 165 - 871 K/uL MPV 10.7 9.4 - 12.3 FL ABSOLUTE NRBC 0.03 0.0 - 0.2 K/uL  
 DF AUTOMATED NEUTROPHILS 78 43 - 78 % LYMPHOCYTES 14 13 - 44 %  MONOCYTES 7 4.0 - 12.0 %  
 EOSINOPHILS 0 (L) 0.5 - 7.8 % BASOPHILS 0 0.0 - 2.0 % IMMATURE GRANULOCYTES 0 0.0 - 5.0 %  
 ABS. NEUTROPHILS 3.7 1.7 - 8.2 K/UL  
 ABS. LYMPHOCYTES 0.7 0.5 - 4.6 K/UL  
 ABS. MONOCYTES 0.3 0.1 - 1.3 K/UL  
 ABS. EOSINOPHILS 0.0 0.0 - 0.8 K/UL  
 ABS. BASOPHILS 0.0 0.0 - 0.2 K/UL  
 ABS. IMM. GRANS. 0.0 0.0 - 0.5 K/UL  
PTT Collection Time: 08/31/18  1:40 AM  
Result Value Ref Range aPTT 73.1 (H) 23.2 - 35.3 SEC  
 
 
EKG:  (EKG has been independently visualized by me with interpretation below): Typical appearing atrial flutter with PVCs.

## 2018-08-31 NOTE — PROGRESS NOTES
Nina Sheriff. Admission Date: 8/30/2018 Daily Progress Note: 8/31/2018 The patient's chart is reviewed and the patient is discussed with the staff. 79 yo male with a history of PAF, CHF with EF 30-35%, AAA, tobacco abuse, pulmonary nodules, COPD, chronic respiratory failure maintained on o2 at 2 lpm, MAC followed at [facility], HTN, Prostate CA, hyperlipidemia, OA, and PE. Patient is a poor historian with poor understanding of his medical regimen. Was hospitalized  7/23-7/29/18 by hospitalist service for worsening hypoxia, dyspnea on exertion, hypertension, BNP elevation, and rapid atrial fibrillation requiring cardizem drip. We were consulted during that admission to assist with care. He was discharged home on Zithromax / ethambutol / rifampin, Prednisone taper, Eliquis and planned follow up with [facility] pulmonary and Cardiology. Since recent discharge he \"finished up his antibiotics after 2-3 days and has not been on any treatment for MAC since then. Called EMS for difficulty breathing and treated with nebs, IV steroids and placed on CPAP for transport. In the ER was changed to BIPAP and given Cardizem bolus and Lopressor IV for tachycardia--was started on Cardizem drip. He was admitted and abx for MAC resumed. EP consulted for possible ablation 
  
 
Subjective:  
 
Currently on o2 at 2lpm via NC with o2 sat 96%. Back from ablation procedure. States that his breathing feels back to his usual baseline. No additional issues. Current Facility-Administered Medications Medication Dose Route Frequency  albuterol-ipratropium (DUO-NEB) 2.5 MG-0.5 MG/3 ML  3 mL Nebulization QID RT  
 sodium chloride (NS) flush 5-10 mL  5-10 mL IntraVENous Q8H  
 sodium chloride (NS) flush 5-10 mL  5-10 mL IntraVENous PRN  
 acetaminophen (TYLENOL) tablet 650 mg  650 mg Oral Q4H PRN  
 ondansetron (ZOFRAN) injection 4 mg  4 mg IntraVENous Q4H PRN  
  apixaban (ELIQUIS) tablet 5 mg  5 mg Oral BID  perflutren lipid microspheres (DEFINITY) in NS bolus IV  1 mL IntraVENous PRN  
 azithromycin (ZITHROMAX) tablet 500 mg  500 mg Oral Q MON, WED & FRI  ethambutol (MYAMBUTOL) tablet 2,000 mg  2,000 mg Oral Q MON, WED & FRI  rifAMPin (RIFADIN) capsule 600 mg  600 mg Oral Q MON, WED & FRI  tamsulosin (FLOMAX) capsule 0.4 mg  0.4 mg Oral DAILY  sodium chloride (NS) flush 5-10 mL  5-10 mL IntraVENous Q8H  
 sodium chloride (NS) flush 5-10 mL  5-10 mL IntraVENous PRN  
 methylPREDNISolone (PF) (SOLU-MEDROL) injection 60 mg  60 mg IntraVENous Q6H  
 docusate sodium (COLACE) capsule 100 mg  100 mg Oral BID  lisinopril (PRINIVIL, ZESTRIL) tablet 10 mg  10 mg Oral DAILY  pantoprazole (PROTONIX) tablet 40 mg  40 mg Oral ACB  ferrous sulfate tablet 325 mg  1 Tab Oral DAILY WITH BREAKFAST  allopurinol (ZYLOPRIM) tablet 300 mg  300 mg Oral DAILY  nitroglycerin (NITROSTAT) tablet 0.4 mg  0.4 mg SubLINGual Q5MIN PRN  
 atorvastatin (LIPITOR) tablet 40 mg  40 mg Oral DAILY  metoprolol succinate (TOPROL-XL) XL tablet 50 mg  50 mg Oral DAILY Review of Systems Constitutional: negative for fever, chills, sweats Cardiovascular: negative for chest pain, palpitations, syncope, edema Gastrointestinal:  negative for dysphagia, reflux, vomiting, diarrhea, abdominal pain, or melena Neurologic:  negative for focal weakness, numbness, headache Objective:  
 
Vitals:  
 08/31/18 1241 08/31/18 1244 08/31/18 1249 08/31/18 1319 BP: 121/66 113/68 113/69 108/62 Pulse: 76 68 69 78 Resp: 17 15 16 20 Temp:    96.2 °F (35.7 °C) SpO2: 100% 100% 100% 98% Weight:      
Height:      
 
Intake and Output:  
08/29 1901 - 08/31 0700 In: 240 [P.O.:240] Out: 470 [Urine:470] 08/31 0701 - 08/31 1900 In: 27 [P.O.:30] Out: 5 Physical Exam:  
Constitution:  the patient is well developed and in no acute distress EENMT:  Sclera clear, pupils equal, oral mucosa moist 
Respiratory: Minimal R sided exp wheeze Cardiovascular:  RRR without M,G,R 
Gastrointestinal: soft and non-tender; with positive bowel sounds. Musculoskeletal: warm without cyanosis. There is no lower leg edema. Skin:  no jaundice or rashes, no wounds Neurologic: no gross neuro deficits Psychiatric:  alert and oriented x ppt CXR:  
8/30 LAB No results for input(s): GLUCPOC in the last 72 hours. No lab exists for component: [place] Recent Labs 08/31/18 
 0140  08/30/18 
 1629  08/30/18 
 1249  08/30/18 
 3483 WBC  4.7  4.8   --   9.3 HGB  9.7*  10.2*   --   10.6* HCT  31.5*  33.7*   --   35.3*  
PLT  335  343   --   335 INR   --    --   1.1   --   
 
Recent Labs 08/31/18 
 0140  08/30/18 
 1629  08/30/18 
 2801 NA  141  144  142  
K  4.4  4.6  4.3 CL  105  107  108* CO2  27  25  27 GLU  198*  123*  122* BUN  24*  18  17 CREA  1.61*  1.47  1.42  
MG  2.4  2.3   --   
CA  8.7  8.7  9.0 PHOS  3.6  4.0*   --   
ALB  2.6*  2.7*  3.3 TBILI  0.2  0.3  0.4 ALT  77*  63  59 SGOT  43*  31  24 Recent Labs 08/30/18 
 1208  08/30/18 
 0815  08/30/18 
 4443 PH  7.34*  7.30*  7.26* PCO2  47*  50*  61* PO2  88  119*  88 HCO3  25  24  27* No results for input(s): LCAD, LAC in the last 72 hours. Assessment:  (Medical Decision Making) Hospital Problems  Date Reviewed: 8/31/2018 Codes Class Noted POA Multiple pulmonary nodules (Chronic) ICD-10-CM: R91.8 ICD-9-CM: 793.19  8/30/2018 Yes 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HCC) (Chronic) ICD-10-CM: J15.8, A31.9 ICD-9-CM: 482.89, 031.9  7/25/2018 Yes Overview Signed 8/30/2018  8:54 AM by Jeremy Barker NP  
  MAC followed by Dr. Aby Bowman at [facility]. In March 2018 was found with new SAMANTHA nodules with spiculation.   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  He was started on ETB/azithro/rif.   
  
  
   
 * (Principal)Acute on chronic respiratory failure (HCC) ICD-10-CM: J96.20 ICD-9-CM: 518.84  7/25/2018 Yes COPD exacerbation ([address] ICD-10-CM: J44.1 ICD-9-CM: 491.21  7/23/2018 Unknown CHF (congestive heart failure) (HCC) (Chronic) ICD-10-CM: I50.9 ICD-9-CM: 428.0  7/23/2018 Yes Overview Signed 8/30/2018  8:50 AM by Caryle Dach, NP  
  7/23/18  ECHO 
-  Left ventricle moderately dilated. EF30-35%. Mild/mod global diffuse hypokinesis. -  Left atrium: The atrium was mildly dilated. -  Right atrium: The atrium was mildly dilated. -  Inferior vena cava, hepatic veins: IVC dilated. Respirophasic change more than 50%. AAA (abdominal aortic aneurysm) without rupture (HCC) (Chronic) ICD-10-CM: I71.4 ICD-9-CM: 441.4  5/2/2014 Yes Overview Signed 5/2/2014 10:53 AM by Eduard Lawrence NP  
  3.2 on  2/14 [facility] 
  
  
   
 COPD (chronic obstructive pulmonary disease) (HCC) (Chronic) ICD-10-CM: J44.9 ICD-9-CM: 429  5/2/2014 Yes Paroxysmal atrial fibrillation (HCC) (Chronic) ICD-10-CM: I48.0 ICD-9-CM: 427.31  5/2/2014 Yes Patient started on outpatient MAC therapy by S. Clinically better after ablation for a fib. Minimal wheeze on exam at present. Plan:  (Medical Decision Making) --cont duoneb. --cont MAC therapy: zithromax / rifampin / ethambutol 
--wean solumedrol to 40 q 12. More than 50% of the time documented was spent in face-to-face contact with the patient and in the care of the patient on the floor/unit where the patient is located.  
 
Junior Jacinto MD

## 2018-08-31 NOTE — PROGRESS NOTES
Bedside and Verbal shift change report given to [address] [place] [address] Shakir RN (oncoming nurse) by Loida Leal (offgoing nurse). Report included the following information SBAR, Kardex, Procedure Summary, Intake/Output, MAR and Recent Results.

## 2018-08-31 NOTE — PROGRESS NOTES
Problem: Afib Pathway: Day 1 Goal: Activity/Safety Outcome: Progressing Towards Goal 
Patient ambulate in room with standby assistance with unsteady gait. Goal: Consults, if ordered Outcome: Progressing Towards Goal 
Cardiology is consulted. Goal: Nutrition/Diet Outcome: Progressing Towards Goal 
Patient is on 2L fluid restriction. Goal: Respiratory Outcome: Progressing Towards Goal 
Patient has scattered wheezing in his bilateral upper lobes. Lower breath sounds are diminished. Goal: Treatments/Interventions/Procedures Outcome: Progressing Towards Goal 
Current treatment for his A flutter includes Cardizem gtt infusing. Goal: *Optimal pain control at patient's stated goal 
Outcome: Progressing Towards Goal 
Patient has no pain at this time. Problem: Falls - Risk of 
Goal: *Absence of Falls Document Efrain Kaiser Fall Risk and appropriate interventions in the flowsheet. Outcome: Progressing Towards Goal 
Fall Risk Interventions: 
Mobility Interventions: Communicate number of staff needed for ambulation/transfer, Bed/chair exit alarm, Patient to call before getting OOB Medication Interventions: Bed/chair exit alarm, Evaluate medications/consider consulting pharmacy, Teach patient to arise slowly, Patient to call before getting OOB

## 2018-08-31 NOTE — PROGRESS NOTES
TRANSFER - IN REPORT: 
 
Verbal report received from [address] (name) on Asa Bailey.  being received from CCL (unit) for routine progression of care Report consisted of patients Situation, Background, Assessment and  
Recommendations(SBAR). Information from the following report(s) SBAR, Procedure Summary, Intake/Output and Cardiac Rhythm NSR was reviewed with the receiving nurse. Opportunity for questions and clarification was provided. Assessment completed upon patients arrival to unit and care assumed. Bilateral groin sites assessed. Bilateral sites clean dry and intact with no hematomas present.

## 2018-08-31 NOTE — PROCEDURES
Attending: Ludwig Bowen. Drea Ahuja MD 
 
Referring: Niko Hamilton MD 
 
Pre-Electrophysiology Diagnosis 1. Typical atrial flutter. 2. Nonischemic cardiomyopathy 
  
Procedure Performed 1. Electrophysiology testing with right-sided atrial flutter ablation. 2. Left atrial pacing recording from the coronary sinus. 3. 3-D Electroanatomical mapping 4. Transesophageal echo 5. Intracardiac Echo Anesthesia: MAC Estimated Blood Loss: Less than 10 mL Complications: None Fluoroscopy Time: 4.3 minutes Specimens: * No specimens in log * Procedure in Detail:  The patient was brought to the electrophysiology lab in the fasting state. A Ref-Star CARTO patch was placed, the patient was then prepped and draped in sterile fashion. A transesophageal echocardiogram was performed prior to the procedure and was negative for an GASTON thrombus (see full report in the chart). Venous access was then obtained x3 using modified Seldinger technique under ultrasound guidance, with placement of 2 8 F short sidearm sheaths into the right femoral vein and a 9 F sheath in the left femoral vein. A 3.5 mm InsideViewter porous irrigated [employer] ablation catheter was inserted into one of the 8F sheaths and was used to create an electroanatomical map of the right atrium focusing on the cavotricuspid isthmus and into the coronary sinus. The ablation catheter was used to record intracardiac electrograms along the lateral wall of the right atrium and the His electrogram.  A multipolar catheter was then inserted via an 8Fr sheath and positioned in the mid coronary sinus. An 6 F SoundStar ICE catheter was then advanced into the right atrium for complete visualization of pertinent atrial anatomy and the CTI during mapping ablation. The heart appeared to be anteriorly rotated. The patient presented to the EP lab in typical appearing atrial flutter based on the surface ECG.   After right atrial and coronary sinus electrograms were obtained, it was clear the majority of the cycle length was accounted for with a counterclockwise activation pattern consistent with CTI dependent atrial flutter. Entrainment was performed with proximal CS pacing and lateral TV pacing revealing concealed entrainment with a PPI-TCL of less than 30 msec (218-200=18). RF ablation was performed during the clinical tachycardia. Linear ablation across the cavo-tricuspid isthmus was performed starting with 1:2 A:V EGMs along the isthmus at 6pm LORETO. During delivery of RF, the arrhythmia terminated and further ablation was performed to obtain bidirectional block. The local electrogram activation sequence, differential pacing maneuvers and electrogram timing was used to demonstrate bidirectional block along the cavotricuspid isthmus with further ablation. Tachycardia cycle length: 200 msec Local double potential atrial electrograms: 90 msec Trans-isthmus time post ablation: 150 msec The coronary sinus multipolar catheter was used to pace the left atrium during the EP study. The LA CS electrograms were documented and interpreted during the procedure. A comprehensive EP study was performed with 1:1 AV decremental pacing, atrial extrastimuli and ventricular pacing to assess retrograde conduction. The patient did not have sustained slow pathway conduction or evidence of an accessory pathway. Ventricular pacing was not performed due to excessive ectopy during catheter manipulation in the RV. At the completion of the final comprehensive EP study, all catheters were removed, and sheaths pulled. The patient tolerated the procedure well with no acute complications recognized. The patient was transferred to the recovery area in stable condition. Baseline Intervals QRS duration: 76 msec MN interval: 123 msec RR interval: 993 msec AH interval: 80 msec HV interval: 50 msec QT interval: 373 msec EP Study AV [place]: 350 msec AV ran ERP: 600/260 msec Figures 1 and 2. Inferior and BULL images of pertinent RA and CS geometry with associated typical CTI-dependent atrial flutter line. Summary: 1. Successful ablation of typical CTI-dependent atrial flutter. 2. Creation of a line of bidirectional block at the cavotricuspid isthmus. 3.         Comprehensive EP study . 4. Pt tolerated the procedure well. 5. Family updated. Plan: - Return to telemetry bed. - 4 hours of bedrest. 
- Limited echo for effusion. 
- D/c diltiazem and heparin infusions. 
- Start Eliquis tonight if no significant effusion. Amrik Coronel. Clayton Nielsen MD, MS Clinical Cardiac Electrophysiology [facility] Cardiology 8/31/2018 12:43 PM

## 2018-08-31 NOTE — ANESTHESIA PREPROCEDURE EVALUATION
Anesthetic History No history of anesthetic complications Review of Systems / Medical History Patient summary reviewed and pertinent labs reviewed Pulmonary COPD: severe Smoker (quit 5 years) Neuro/Psych Within defined limits Cardiovascular Hypertension: well controlled Dysrhythmias : atrial flutter Exercise tolerance: <4 METS Comments: Ef 30-35% on echo GI/Hepatic/Renal 
Within defined limits Endo/Other Within defined limits Arthritis Other Findings Physical Exam 
 
Airway Mallampati: II 
TM Distance: 4 - 6 cm Neck ROM: normal range of motion Mouth opening: Normal 
 
 Cardiovascular Regular rate and rhythm,  S1 and S2 normal,  no murmur, click, rub, or gallop Rhythm: regular Rate: normal 
 
 
 
 Dental 
 
Dentition: Edentulous, Full lower dentures and Full upper dentures Pulmonary Wheezes:bilateral 
 
 
 
 Abdominal 
 
 
 
 Other Findings Anesthetic Plan ASA: 4 Anesthesia type: total IV anesthesia Induction: Intravenous Anesthetic plan and risks discussed with: Patient

## 2018-08-31 NOTE — PROGRESS NOTES
Patient HR sustains at 154-159. Dorian Alicea NP made aware. Cardizem gtt infusing at 15 mcg. No new orders received. Will continue to monitor pt.

## 2018-08-31 NOTE — PROGRESS NOTES
Care Management Interventions PCP Verified by CM:  Burt Varma MD) Transition of Care Consult (CM Consult): Discharge Planning (Pt is insured with pharmacy benefits through medicaid. He also has [employer] aide that assists with errands and transportation to appointments.  ) Discharge Durable Medical Equipment:  (he has home O2, walker and nebulizer) Physical Therapy Consult: No 
Occupational Therapy Consult: No 
Speech Therapy Consult: No 
Current Support Network: Own Home, Lives Alone, Has Personal Caregivers (Lives alone with the support of his CLTC aide. He has a son in the area that works fulltime so he can only assist at [employer].) Confirm Follow Up Transport: Other (see comment) (Medicaid transport or CLTC aide) Plan discussed with Pt/Family/Caregiver: Yes Freedom of Choice Offered: Yes [employer] Information Provided?: No 
Discharge Location Discharge Placement: Unable to determine at this time ([facility] follow plan of care and asssit with supportive referrals as exact needs are known.) He has an aide 3 hrs a day Mon-Friday. He has O2 at home, walker, portable O2 and nebulizer. Does not know the name of the [employer] company for his Oxygen. PCP is Dr. May Gordon saw a few months ago. Discharge plan is home when medically stable. CM following, may benefit from [facility] follow up.

## 2018-08-31 NOTE — PROGRESS NOTES
Report received from YudyRN for continued care. Right and Left groin site dsgs D&I. No complaints voiced. In NSR.

## 2018-08-31 NOTE — PROGRESS NOTES
Bedside and Verbal shift change report given to Self and Loida Paez (oncoming nurse) by [address] [place] [address] Shakir RN (offgoing nurse). Report included the following information SBAR, Kardex, ED Summary, Intake/Output, MAR, Recent Results and Cardiac Rhythm A Flutter. Heparin and Cardizem gtt verified at bedside with offgoing nurse.

## 2018-08-31 NOTE — PROGRESS NOTES
Bedside and Verbal shift change report given to Ana Neville and [place] RN (oncoming nurse) by self (offgoing nurse). Report included the following information SBAR, Kardex, Intake/Output, MAR, Recent Results and Med Rec Status. Heparin and Cardizem gtt verified at bedside with oncoming RN.

## 2018-08-31 NOTE — PROGRESS NOTES
TRANSFER - OUT REPORT: 
 
Verbal report given to [facility] on Velora Pham.  being transferred to [facility] for routine post - op Report consisted of patients Situation, Background, Assessment and  
Recommendations(SBAR). Information from the following report(s) SBAR, Procedure Summary, Intake/Output, MAR and Cardiac Rhythm NSR was reviewed with the receiving nurse. Lines:  
Peripheral IV 08/30/18 Left Hand (Active) Site Assessment Clean, dry, & intact; Clean;Dry 8/31/2018 12:41 PM  
Phlebitis Assessment 0 8/31/2018 12:41 PM  
Infiltration Assessment 0 8/31/2018 12:41 PM  
Dressing Status Clean, dry, & intact; Clean;Dry 8/31/2018 12:41 PM  
Dressing Type Tape;Transparent 8/31/2018 12:41 PM  
Hub Color/Line Status Patent; Infusing;Green 8/31/2018 12:41 PM  
Alcohol Cap Used No 8/31/2018  3:51 AM  
   
Peripheral IV 08/30/18 Right Forearm (Active) Site Assessment Clean, dry, & intact; Clean;Dry 8/31/2018 12:41 PM  
Phlebitis Assessment 0 8/31/2018 12:41 PM  
Infiltration Assessment 0 8/31/2018 12:41 PM  
Dressing Status Clean, dry, & intact; Clean;Dry 8/31/2018 12:41 PM  
Dressing Type Tape;Transparent 8/31/2018 12:41 PM  
Hub Color/Line Status Patent; Infusing;Green 8/31/2018 12:41 PM  
Alcohol Cap Used No 8/31/2018  3:51 AM  
  
 
Opportunity for questions and clarification was provided. Patient transported with: 
 O2 @ 2 liters

## 2018-08-31 NOTE — PROGRESS NOTES
MEWS score noted to be 3. Charge nurse, Ambreen Aguilera RN informed and assessed patient. High MEWS score noted due to elevate HR. Will continue to monitor.

## 2018-09-01 ENCOUNTER — HOME HEALTH ADMISSION (OUTPATIENT)
Dept: HOME HEALTH SERVICES | Facility: HOME HEALTH | Age: 78
End: 2018-09-01

## 2018-09-01 VITALS
SYSTOLIC BLOOD PRESSURE: 125 MMHG | TEMPERATURE: 97.2 F | WEIGHT: 177.2 LBS | DIASTOLIC BLOOD PRESSURE: 55 MMHG | HEIGHT: 69 IN | RESPIRATION RATE: 18 BRPM | OXYGEN SATURATION: 93 % | BODY MASS INDEX: 26.25 KG/M2 | HEART RATE: 97 BPM

## 2018-09-01 LAB
ALBUMIN SERPL-MCNC: 2.9 G/DL (ref 3.2–4.6)
ALBUMIN/GLOB SERPL: 0.8 {RATIO} (ref 1.2–3.5)
ALP SERPL-CCNC: 98 U/L (ref 50–136)
ALT SERPL-CCNC: 90 U/L (ref 12–65)
ANION GAP SERPL CALC-SCNC: 7 MMOL/L (ref 7–16)
AST SERPL-CCNC: 51 U/L (ref 15–37)
BASOPHILS # BLD: 0 K/UL (ref 0–0.2)
BASOPHILS NFR BLD: 0 % (ref 0–2)
BILIRUB SERPL-MCNC: 0.4 MG/DL (ref 0.2–1.1)
BUN SERPL-MCNC: 31 MG/DL (ref 8–23)
CALCIUM SERPL-MCNC: 8.5 MG/DL (ref 8.3–10.4)
CHLORIDE SERPL-SCNC: 106 MMOL/L (ref 98–107)
CO2 SERPL-SCNC: 27 MMOL/L (ref 21–32)
CREAT SERPL-MCNC: 1.62 MG/DL (ref 0.8–1.5)
DIFFERENTIAL METHOD BLD: ABNORMAL
EOSINOPHIL # BLD: 0 K/UL (ref 0–0.8)
EOSINOPHIL NFR BLD: 0 % (ref 0.5–7.8)
ERYTHROCYTE [DISTWIDTH] IN BLOOD BY AUTOMATED COUNT: 19 %
GLOBULIN SER CALC-MCNC: 3.7 G/DL (ref 2.3–3.5)
GLUCOSE SERPL-MCNC: 118 MG/DL (ref 65–100)
HCT VFR BLD AUTO: 31 % (ref 41.1–50.3)
HGB BLD-MCNC: 9.6 G/DL (ref 13.6–17.2)
IMM GRANULOCYTES # BLD: 0 K/UL (ref 0–0.5)
IMM GRANULOCYTES NFR BLD AUTO: 0 % (ref 0–5)
LYMPHOCYTES # BLD: 0.7 K/UL (ref 0.5–4.6)
LYMPHOCYTES NFR BLD: 6 % (ref 13–44)
MAGNESIUM SERPL-MCNC: 2.4 MG/DL (ref 1.8–2.4)
MCH RBC QN AUTO: 26.4 PG (ref 26.1–32.9)
MCHC RBC AUTO-ENTMCNC: 31 G/DL (ref 31.4–35)
MCV RBC AUTO: 85.2 FL (ref 79.6–97.8)
MONOCYTES # BLD: 1 K/UL (ref 0.1–1.3)
MONOCYTES NFR BLD: 9 % (ref 4–12)
NEUTS SEG # BLD: 8.9 K/UL (ref 1.7–8.2)
NEUTS SEG NFR BLD: 84 % (ref 43–78)
NRBC # BLD: 0.05 K/UL (ref 0–0.2)
PHOSPHATE SERPL-MCNC: 4.3 MG/DL (ref 2.3–3.7)
PLATELET # BLD AUTO: 324 K/UL (ref 150–450)
PMV BLD AUTO: 10.5 FL (ref 9.4–12.3)
POTASSIUM SERPL-SCNC: 5.5 MMOL/L (ref 3.5–5.1)
PROT SERPL-MCNC: 6.6 G/DL (ref 6.3–8.2)
RBC # BLD AUTO: 3.64 M/UL (ref 4.23–5.6)
SODIUM SERPL-SCNC: 140 MMOL/L (ref 136–145)
WBC # BLD AUTO: 10.6 K/UL (ref 4.3–11.1)

## 2018-09-01 PROCEDURE — 77010033678 HC OXYGEN DAILY

## 2018-09-01 PROCEDURE — 74011250636 HC RX REV CODE- 250/636: Performed by: INTERNAL MEDICINE

## 2018-09-01 PROCEDURE — 74011000250 HC RX REV CODE- 250: Performed by: INTERNAL MEDICINE

## 2018-09-01 PROCEDURE — 80053 COMPREHEN METABOLIC PANEL: CPT

## 2018-09-01 PROCEDURE — 74011250637 HC RX REV CODE- 250/637: Performed by: NURSE PRACTITIONER

## 2018-09-01 PROCEDURE — 74011250637 HC RX REV CODE- 250/637: Performed by: INTERNAL MEDICINE

## 2018-09-01 PROCEDURE — 84100 ASSAY OF PHOSPHORUS: CPT

## 2018-09-01 PROCEDURE — 83735 ASSAY OF MAGNESIUM: CPT

## 2018-09-01 PROCEDURE — 85025 COMPLETE CBC W/AUTO DIFF WBC: CPT

## 2018-09-01 PROCEDURE — 36415 COLL VENOUS BLD VENIPUNCTURE: CPT

## 2018-09-01 PROCEDURE — 94640 AIRWAY INHALATION TREATMENT: CPT

## 2018-09-01 RX ORDER — ATORVASTATIN CALCIUM 40 MG/1
40 TABLET, FILM COATED ORAL DAILY
Qty: 30 TAB | Refills: 111 | Status: SHIPPED | OUTPATIENT
Start: 2018-09-01 | End: 2019-01-16

## 2018-09-01 RX ORDER — METOPROLOL SUCCINATE 50 MG/1
50 TABLET, EXTENDED RELEASE ORAL DAILY
Qty: 30 TAB | Refills: 11 | Status: SHIPPED | OUTPATIENT
Start: 2018-09-01 | End: 2018-09-01

## 2018-09-01 RX ORDER — ATORVASTATIN CALCIUM 40 MG/1
40 TABLET, FILM COATED ORAL DAILY
Qty: 30 TAB | Refills: 111 | Status: SHIPPED | OUTPATIENT
Start: 2018-09-01 | End: 2018-09-01

## 2018-09-01 RX ORDER — POLYETHYLENE GLYCOL 3350 17 G/17G
17 POWDER, FOR SOLUTION ORAL DAILY
Status: DISCONTINUED | OUTPATIENT
Start: 2018-09-01 | End: 2018-09-01 | Stop reason: HOSPADM

## 2018-09-01 RX ORDER — PREDNISONE 10 MG/1
10 TABLET ORAL
Qty: 50 TAB | Refills: 0 | Status: SHIPPED | OUTPATIENT
Start: 2018-09-01 | End: 2018-09-13

## 2018-09-01 RX ORDER — AMOXICILLIN 250 MG
1 CAPSULE ORAL DAILY
Status: DISCONTINUED | OUTPATIENT
Start: 2018-09-01 | End: 2018-09-01 | Stop reason: HOSPADM

## 2018-09-01 RX ORDER — METOPROLOL SUCCINATE 50 MG/1
50 TABLET, EXTENDED RELEASE ORAL DAILY
Qty: 30 TAB | Refills: 11 | Status: SHIPPED | OUTPATIENT
Start: 2018-09-01 | End: 2019-12-04 | Stop reason: SDUPTHER

## 2018-09-01 RX ADMIN — METOPROLOL SUCCINATE 50 MG: 50 TABLET, EXTENDED RELEASE ORAL at 08:15

## 2018-09-01 RX ADMIN — ATORVASTATIN CALCIUM 40 MG: 40 TABLET, FILM COATED ORAL at 08:15

## 2018-09-01 RX ADMIN — METHYLPREDNISOLONE SODIUM SUCCINATE 40 MG: 125 INJECTION, POWDER, FOR SOLUTION INTRAMUSCULAR; INTRAVENOUS at 08:14

## 2018-09-01 RX ADMIN — FERROUS SULFATE TAB 325 MG (65 MG ELEMENTAL FE) 325 MG: 325 (65 FE) TAB at 08:15

## 2018-09-01 RX ADMIN — APIXABAN 5 MG: 5 TABLET, FILM COATED ORAL at 08:15

## 2018-09-01 RX ADMIN — PANTOPRAZOLE SODIUM 40 MG: 40 TABLET, DELAYED RELEASE ORAL at 08:15

## 2018-09-01 RX ADMIN — Medication 5 ML: at 05:21

## 2018-09-01 RX ADMIN — IPRATROPIUM BROMIDE AND ALBUTEROL SULFATE 3 ML: .5; 3 SOLUTION RESPIRATORY (INHALATION) at 07:10

## 2018-09-01 RX ADMIN — TAMSULOSIN HYDROCHLORIDE 0.4 MG: 0.4 CAPSULE ORAL at 08:15

## 2018-09-01 RX ADMIN — ALLOPURINOL 300 MG: 300 TABLET ORAL at 08:15

## 2018-09-01 RX ADMIN — LISINOPRIL 10 MG: 5 TABLET ORAL at 08:15

## 2018-09-01 RX ADMIN — DOCUSATE SODIUM AND SENNOSIDES 1 TABLET: 8.6; 5 TABLET, FILM COATED ORAL at 08:15

## 2018-09-01 RX ADMIN — POLYETHYLENE GLYCOL (3350) 17 G: 17 POWDER, FOR SOLUTION ORAL at 08:15

## 2018-09-01 NOTE — PROGRESS NOTES
[facility] Face to Face Encounter Patients Name: Eda Ovalle. YOB: 1940 Ordering Physician: Brooklyn Kim MD 
 
Primary Diagnosis: COPD exacerbation ([facility] [address] A Flutter Date of Face to Face:   9/1/2018 Face to Face Encounter findings are related to primary reason for home care:   yes. 1. I certify that the patient needs intermittent care as follows: skilled nursing care:  skilled observation/assessment, patient education 
physical therapy: strengthening 2. I certify that this patient is homebound, that is: 1) patient requires the use of a walker device, special transportation, or assistance of another to leave the home; or 2) patient's condition makes leaving the home medically contraindicated; and 3) patient has a normal inability to leave the home and leaving the home requires considerable and taxing effort. Patient may leave the home for infrequent and short duration for medical reasons, and occasional absences for non-medical reasons. Homebound status is due to the following functional limitations: Patient with strength deficits limiting the performance of all ADL's without caregiver assistance or the use of an assistive device. 3. I certify that this patient is under my care and that I, or a nurse practitioner or [facility], or clinical nurse specialist, or certified nurse midwife, working with me, had a Face-to-Face Encounter that meets the physician Face-to-Face Encounter requirements. The following are the clinical findings from the [address] [place] [address] encounter that support the need for skilled services and is a summary of the encounter: see hospital medical record See discharge summary London Grider LMSW 
9/1/2018 THE FOLLOWING TO BE COMPLETED BY THE COMMUNITY PHYSICIAN: 
 
I concur with the findings described above from the [facility] encounter that this patient is homebound and in need of a skilled service. Certifying Physician: _____________________________________ Printed Certifying Physician Name: _____________________________________ Date: _________________

## 2018-09-01 NOTE — DISCHARGE SUMMARY
[facility] Cardiology Discharge Summary Patient ID: 
Robson Garza 
[id number] 
78 y.o. 
1940 Admit date: 8/30/2018 Discharge date:  8/30/2018 Admitting Physician: Dr. Radha Nova Discharge Physician: Dr. Apurva Arellano Admission Diagnoses: COPD exacerbation ([address] A Flutter Discharge Diagnoses:  
 Diagnosis  Multiple pulmonary nodules  Mycobacterial pneumonia  Acute on chronic respiratory failure  COPD exacerbation  CHF exacerbation  CHF (congestive heart failure)  History of pulmonary embolism  Tobacco use disorder  Hypertension  Cardiomyopathy  Atrial flutter  Peptic ulcer disease  Renal cysts, acquired, bilateral  
 AAA (abdominal aortic aneurysm) without rupture  Hyperlipemia  Glaucoma  Supplemental oxygen dependent  OA (osteoarthritis)  Paroxysmal atrial fibrillation  Warfarin anticoagulation Cardiology Procedures this admission:  EPS and AFlutter ablation Consults: Pulmonary/Intensive care Hospital Course: Patient presented to the emergency department of [facility] with complaints of shortness of breath and feeling poorly. Recently finished antibiotics -- unknown reason. He was given nebs, IV steroids and then brought in by EMS on CPAP. In the ED he was moved to BIPAP for further support. He was found to be in AFib RVR rate 160s. He was given IV Cardizem bolus/gtt and IV lopressor. He was started on IV heparin. BNP was 1067 and he was given IV lasix. After receiving meds he was weaned off BIPAP to NC. Pulmonary was also consulted for evaluation. He was admitted to the telemetry floor and was evaluated by EP. He was taken to the EP lab on 8/31/18 by Dr. Apurva Arellano. RASHEEDA was negative for GASTON thrombus. He then underwent successful EPS and atrial flutter ablation. He was started on Eliquis for anticoagulation.  The morning of 9/1/18, the patient was feeling much better and remained in sinus rhythm. Cath access sites were stable. Labs were stable. The patient was seen and examined by Dr. Zhane Verde and was determined stable and ready for discharge home. The patient was instructed on the importance of medication compliance and outpatient follow up. The patient will follow up with [address] [place] [address] [address]. Zhane Verde on 9/26/18 @ 11:00AM in the [facility]. DISPOSITION: The patient is being discharged home in stable condition on a low saturated fat, low cholesterol and low salt diet. The patient is instructed to advance activities as tolerated to the limit of fatigue or shortness of breath. The patient is instructed to avoid all heavy lifting, straining, stooping or squatting for 3-5 days. The patient is instructed to watch the cath site for bleeding/oozing; if seen, the patient is instructed to apply firm pressure with a clean cloth and call [address] [place] [address] Cardiology at [phone]. The patient is instructed to watch for signs of infection which include: increasing area of redness, fever/hot to touch or purulent drainage at the catheterization site. The patient is instructed not to soak in a bathtub for 7-10 days, but is cleared to shower. The patient is instructed to call the office or return to the ER for immediate evaluation for any severe shortness of breath, chest pain, prolonged palpitations, near syncope or syncope. Discharge Exam:  
Visit Vitals  /68 (BP 1 Location: Right arm, BP Patient Position: At rest)  Pulse (!) 107 Comment: RN notified  Temp 98.5 °F (36.9 °C)  Resp 18  Ht 5' 9\" (1.753 m)  Wt 79.6 kg (175 lb 6.4 oz)  SpO2 99%  BMI 25.9 kg/m2 Patient has been seen by Dr. Zhane Verde: see his progress note for exam details. Recent Results (from the past 24 hour(s)) METABOLIC PANEL, COMPREHENSIVE Collection Time: 08/31/18  1:40 AM  
Result Value Ref Range  Sodium 141 136 - 145 mmol/L  
 Potassium 4.4 3.5 - 5.1 mmol/L Chloride 105 98 - 107 mmol/L  
 CO2 27 21 - 32 mmol/L Anion gap 9 7 - 16 mmol/L Glucose 198 (H) 65 - 100 mg/dL BUN 24 (H) 8 - 23 MG/DL Creatinine 1.61 (H) 0.8 - 1.5 MG/DL  
 GFR est AA 54 (L) >60 ml/min/1.73m2 GFR est non-AA 45 (L) >60 ml/min/1.73m2 Calcium 8.7 8.3 - 10.4 MG/DL Bilirubin, total 0.2 0.2 - 1.1 MG/DL  
 ALT (SGPT) 77 (H) 12 - 65 U/L  
 AST (SGOT) 43 (H) 15 - 37 U/L Alk. phosphatase 102 50 - 136 U/L Protein, total 6.7 6.3 - 8.2 g/dL Albumin 2.6 (L) 3.2 - 4.6 g/dL Globulin 4.1 (H) 2.3 - 3.5 g/dL A-G Ratio 0.6 (L) 1.2 - 3.5 MAGNESIUM Collection Time: 08/31/18  1:40 AM  
Result Value Ref Range Magnesium 2.4 1.8 - 2.4 mg/dL PHOSPHORUS Collection Time: 08/31/18  1:40 AM  
Result Value Ref Range Phosphorus 3.6 2.3 - 3.7 MG/DL  
CBC WITH AUTOMATED DIFF Collection Time: 08/31/18  1:40 AM  
Result Value Ref Range WBC 4.7 4.3 - 11.1 K/uL  
 RBC 3.75 (L) 4.23 - 5.6 M/uL HGB 9.7 (L) 13.6 - 17.2 g/dL HCT 31.5 (L) 41.1 - 50.3 % MCV 84.0 79.6 - 97.8 FL  
 MCH 25.9 (L) 26.1 - 32.9 PG  
 MCHC 30.8 (L) 31.4 - 35.0 g/dL  
 RDW 18.9 % PLATELET 198 927 - 382 K/uL MPV 10.7 9.4 - 12.3 FL ABSOLUTE NRBC 0.03 0.0 - 0.2 K/uL  
 DF AUTOMATED NEUTROPHILS 78 43 - 78 % LYMPHOCYTES 14 13 - 44 % MONOCYTES 7 4.0 - 12.0 % EOSINOPHILS 0 (L) 0.5 - 7.8 % BASOPHILS 0 0.0 - 2.0 % IMMATURE GRANULOCYTES 0 0.0 - 5.0 %  
 ABS. NEUTROPHILS 3.7 1.7 - 8.2 K/UL  
 ABS. LYMPHOCYTES 0.7 0.5 - 4.6 K/UL  
 ABS. MONOCYTES 0.3 0.1 - 1.3 K/UL  
 ABS. EOSINOPHILS 0.0 0.0 - 0.8 K/UL  
 ABS. BASOPHILS 0.0 0.0 - 0.2 K/UL  
 ABS. IMM. GRANS. 0.0 0.0 - 0.5 K/UL  
PTT Collection Time: 08/31/18  1:40 AM  
Result Value Ref Range aPTT 73.1 (H) 23.2 - 35.3 SEC  
EKG, 12 LEAD, INITIAL Collection Time: 08/31/18  2:06 PM  
Result Value Ref Range Ventricular Rate 76 BPM  
 Atrial Rate 76 BPM  
 P-R Interval 142 ms QRS Duration 82 ms Q-T Interval 422 ms QTC Calculation (Bezet) 474 ms Calculated P Axis 76 degrees Calculated R Axis 71 degrees Calculated T Axis 98 degrees Diagnosis Sinus rhythm with Premature ventricular complexes or Fusion complexes T wave abnormality, consider lateral ischemia Abnormal ECG When compared with ECG of 30-AUG-2018 07:10, Sinus rhythm has replaced Atrial fibrillation T wave inversion now evident in Anterolateral leads QT has lengthened Patient Instructions: Follow up Planned. Continue current medicines including Eliquis.

## 2018-09-01 NOTE — DISCHARGE INSTRUCTIONS
DISCHARGE SUMMARY from Nurse    PATIENT INSTRUCTIONS:    After general anesthesia or intravenous sedation, for 24 hours or while taking prescription Narcotics:  · Limit your activities  · Do not drive and operate hazardous machinery  · Do not make important personal or business decisions  · Do  not drink alcoholic beverages  · If you have not urinated within 8 hours after discharge, please contact your surgeon on call. Report the following to your surgeon:  · Excessive pain, swelling, redness or odor of or around the surgical area  · Temperature over 100.5  · Nausea and vomiting lasting longer than 4 hours or if unable to take medications  · Any signs of decreased circulation or nerve impairment to extremity: change in color, persistent  numbness, tingling, coldness or increase pain  · Any questions    What to do at Home:  Recommended activity: No lifting, Driving, or Strenuous exercise for 5 days    If you experience any of the following symptoms chest pain, shortness of breath, palpitations, dizziness drainage at puncture sites please follow up with [address] Card[facility]. *  Please give a list of your current medications to your Primary Care Provider. *  Please update this list whenever your medications are discontinued, doses are      changed, or new medications (including over-the-counter products) are added. *  Please carry medication information at all times in case of emergency situations. These are general instructions for a healthy lifestyle:    No smoking/ No tobacco products/ Avoid exposure to second hand smoke  Surgeon General's Warning:  Quitting smoking now greatly reduces serious risk to your health.     Obesity, smoking, and sedentary lifestyle greatly increases your risk for illness    A healthy diet, regular physical exercise & weight monitoring are important for maintaining a healthy lifestyle    You may be retaining fluid if you have a history of heart failure or if you experience any of the following symptoms:  Weight gain of 3 pounds or more overnight or 5 pounds in a week, increased swelling in our hands or feet or shortness of breath while lying flat in bed. Please call your doctor as soon as you notice any of these symptoms; do not wait until your next office visit. Recognize signs and symptoms of STROKE:    F-face looks uneven    A-arms unable to move or move unevenly    S-speech slurred or non-existent    T-time-call 911 as soon as signs and symptoms begin-DO NOT go       Back to bed or wait to see if you get better-TIME IS BRAIN. Warning Signs of HEART ATTACK     Call 911 if you have these symptoms:   Chest discomfort. Most heart attacks involve discomfort in the center of the chest that lasts more than a few minutes, or that goes away and comes back. It can feel like uncomfortable pressure, squeezing, fullness, or pain.  Discomfort in other areas of the upper body. Symptoms can include pain or discomfort in one or both arms, the back, neck, jaw, or stomach.  Shortness of breath with or without chest discomfort.  Other signs may include breaking out in a cold sweat, nausea, or lightheadedness. Don't wait more than five minutes to call 911 - MINUTES MATTER! Fast action can save your life. Calling 911 is almost always the fastest way to get lifesaving treatment. Emergency Medical Services staff can begin treatment when they arrive -- up to an hour sooner than if someone gets to the hospital by car. The discharge information has been reviewed with the patient. The patient verbalized understanding. Discharge medications reviewed with the patient and appropriate educational materials and side effects teaching were provided. ___________________________________________________________________________________________________________________________________     Home Health to follow patient at home.    Electrophysiology Study and Catheter Ablation: What to Expect at Home  Your Recovery    You had an electrophysiology study for a problem with your heartbeat. You may also have had a catheter ablation to try to correct the problem. You may have swelling, bruising, or a small lump around the site where the catheters went into your body. These should go away in 3 to 4 weeks. Do not exercise hard or lift anything heavy for a week. You may be able to go back to work and to your normal routine in 1 or 2 days. This care sheet gives you a general idea about how long it will take for you to recover. But each person recovers at a different pace. Follow the steps below to get better as quickly as possible. How can you care for yourself at home? Activity    · For 1 week, do not lift anything that would make you strain. This may include heavy grocery bags and milk containers, a heavy briefcase or backpack, cat litter or dog food bags, a vacuum , or a child.     · For 1 week, do not exercise hard or do any activity that could strain your blood vessels or the site where the catheters went into your body.     · Ask your doctor when it is okay to have sex.     · You may shower 24 to 48 hours after the procedure, if your doctor okays it. Pat the incision dry. Do not take a bath for 1 week, or until your doctor tells you it is okay. Diet    · You can eat your normal diet. If your stomach is upset, try bland, low-fat foods like plain rice, broiled chicken, toast, and yogurt.     · Drink plenty of fluids (unless your doctor tells you not to). Medicines    · Your doctor will tell you if and when you can restart your medicines. He or she will also give you instructions about taking any new medicines.     · If you take blood thinners, such as warfarin (Coumadin), clopidogrel (Plavix), or aspirin, be sure to talk to your doctor. He or she will tell you if and when to start taking those medicines again.  Make sure that you understand exactly what your doctor wants you to do.     · Ask your doctor if you can take acetaminophen (Tylenol) for pain. Do not take aspirin for 3 days, unless your doctor says it is okay.     · Check with your doctor before you take aspirin or anti-inflammatory medicines to reduce pain and swelling. These include ibuprofen (Advil, Motrin) and naproxen (Aleve).   · Make sure you know which heart medicines to continue and which ones to stop. Ask your doctor if you are not sure.   Arelis Paulino site care    · You can remove your bandages the day after the procedure.     · If you are bleeding, lie down and press on the area for 15 minutes to try to make it stop. If the bleeding does not stop, call your doctor or seek immediate medical care. Follow-up care is a key part of your treatment and safety. Be sure to make and go to all appointments, and call your doctor if you are having problems. It's also a good idea to know your test results and keep a list of the medicines you take. When should you call for help? Call 911 anytime you think you may need emergency care. For example, call if:    · You passed out (lost consciousness).     · You have symptoms of a heart attack. These may include:  ¨ Chest pain or pressure, or a strange feeling in the chest.  ¨ Sweating. ¨ Shortness of breath. ¨ Nausea or vomiting. ¨ Pain, pressure, or a strange feeling in the back, neck, jaw, or upper belly, or in one or both shoulders or arms. ¨ Lightheadedness or sudden weakness. ¨ A fast or irregular heartbeat. After you call 911, the  may tell you to chew 1 adult-strength or 2 to 4 low-dose aspirin. Wait for an ambulance. Do not try to drive yourself.     · You have symptoms of a stroke. These may include:  ¨ Sudden numbness, tingling, weakness, or loss of movement in your face, arm, or leg, especially on lorena side of your body. ¨ Sudden vision changes. ¨ Sudden trouble speaking. ¨ Sudden confusion or trouble understanding simple statements.   ¨ Sudden problems with walking or balance. ¨ A sudden, severe headache that is different from past headaches.    Call your doctor now or seek immediate medical care if:    · You are bleeding from the area where the catheter was put in your artery.     · You have a fast-growing, painful lump at the catheter site.     · You have signs of infection, such as:  ¨ Increased pain, swelling, warmth, or redness. ¨ Red streaks leading from the catheter site. ¨ Pus draining from the catheter site. ¨ A fever.     · Your leg or arm looks blue or feels cold, numb, or tingly.    Watch closely for any changes in your health, and be sure to contact your doctor if you have any problems. Where can you learn more? Go to http://airam-shanelle.info/. Enter [phone] in the search box to learn more about \"Electrophysiology Study and Catheter Ablation: What to Expect at Home. \"  Current as of: December 6, 2017  Content Version: 11.7  © 6309-2190 [email or web address]. Care instructions adapted under license by [employer] (which disclaims liability or warranty for this information). If you have questions about a medical condition or this instruction, always ask your healthcare professional. [place] [address] any warranty or liability for your use of this information. Avoiding Triggers With Heart Failure: Care Instructions  Your Care Instructions    Triggers are anything that make your heart failure flare up. A flare-up is also called \"sudden heart failure\" or \"acute heart failure. \" When you have a flare-up, fluid builds up in your lungs, and you have problems breathing. You might need to go to the hospital. By watching for changes in your condition and avoiding triggers, you can prevent heart failure flare-ups. Follow-up care is a key part of your treatment and safety. Be sure to make and go to all appointments, and call your doctor if you are having problems.  It's also a good idea to know your test results and keep a list of the medicines you take. How can you care for yourself at home? Watch for changes in your weight and condition  · Weigh yourself without clothing at the same time each day. Record your weight. Call your doctor if you have sudden weight gain, such as more than 2 to 3 pounds in a day or 5 pounds in a week. (Your doctor may suggest a different range of weight gain.) A sudden weight gain may mean that your heart failure is getting worse. · Keep a daily record of your symptoms. Write down any changes in how you feel, such as new shortness of breath, cough, or problems eating. Also record if your ankles are more swollen than usual and if you feel more tired than usual. Note anything that you ate or did that could have triggered these changes. Limit sodium  Sodium causes your body to hold on to extra water. This may cause your heart failure symptoms to get worse. People get most of their sodium from processed foods. Fast food and restaurant meals also tend to be very high in sodium. · Your doctor may suggest that you limit sodium to 2,000 milligrams (mg) a day or less. That is less than 1 teaspoon of salt a day, including all the salt you eat in cooking or in packaged foods. · Read food labels on cans and food packages. They tell you how much sodium you get in one serving. Check the serving size. If you eat more than one serving, you are getting more sodium. · Be aware that sodium can come in forms other than salt, including monosodium glutamate (MSG), sodium citrate, and sodium bicarbonate (baking soda). MSG is often added to Asian food. You can sometimes ask for food without MSG or salt. · Slowly reducing salt will help you adjust to the taste. Take the salt shaker off the table. · Flavor your food with garlic, lemon juice, onion, vinegar, herbs, and spices instead of salt.  Do not use soy sauce, steak sauce, onion salt, garlic salt, mustard, or ketchup on your food, unless it is labeled \"low-sodium\" or \"low-salt. \"  · Make your own salad dressings, sauces, and ketchup without adding salt. · Use fresh or frozen ingredients, instead of canned ones, whenever you can. Choose low-sodium canned goods. · Eat less processed food and food from restaurants, including fast food. Exercise as directed  Moderate, regular exercise is very good for your heart. It improves your blood flow and helps control your weight. But too much exercise can stress your heart and cause a heart failure flare-up. · Check with your doctor before you start an exercise program.  · Walking is an easy way to get exercise. Start out slowly. Gradually increase the length and pace of your walk. Swimming, riding a bike, and using a treadmill are also good forms of exercise. · When you exercise, watch for signs that your heart is working too hard. You are pushing yourself too hard if you cannot talk while you are exercising. If you become short of breath or dizzy or have chest pain, stop, sit down, and rest.  · Do not exercise when you do not feel well. Take medicines correctly  · Take your medicines exactly as prescribed. Call your doctor if you think you are having a problem with your medicine. · Make a list of all the medicines you take. Include those prescribed to you by other doctors and any over-the-counter medicines, vitamins, or supplements you take. Take this list with you when you go to any doctor. · Take your medicines at the same time every day. It may help you to post a list of all the medicines you take every day and what time of day you take them. · Make taking your medicine as simple as you can. Plan times to take your medicines when you are doing other things, such as eating a meal or getting ready for bed. This will make it easier to remember to take your medicines. · Get organized. Use helpful tools, such as daily or weekly pill containers. When should you call for help?   Call 911 if you have symptoms of sudden heart failure such as:    · You have severe trouble breathing.     · You cough up pink, foamy mucus.     · You have a new irregular or rapid heartbeat.    Call your doctor now or seek immediate medical care if:    · You have new or increased shortness of breath.     · You are dizzy or lightheaded, or you feel like you may faint.     · You have sudden weight gain, such as more than 2 to 3 pounds in a day or 5 pounds in a week. (Your doctor may suggest a different range of weight gain.)     · You have increased swelling in your legs, ankles, or feet.     · You are suddenly so tired or weak that you cannot do your usual activities.    Watch closely for changes in your health, and be sure to contact your doctor if you develop new symptoms. Where can you learn more? Go to http://airam-shanelle.info/. Enter S390 in the search box to learn more about \"Avoiding Triggers With Heart Failure: Care Instructions. \"  Current as of: December 6, 2017  Content Version: 11.7  © 6631-4106 [employer]. Care instructions adapted under license by [employer] (which disclaims liability or warranty for this information). If you have questions about a medical condition or this instruction, always ask your healthcare professional. [address] any warranty or liability for your use of this information.

## 2018-09-01 NOTE — PROGRESS NOTES
Problem: Falls - Risk of 
Goal: *Absence of Falls Document Leticiat President Fall Risk and appropriate interventions in the flowsheet. Outcome: Progressing Towards Goal 
Fall Risk Interventions: 
Mobility Interventions: Bed/chair exit alarm, Communicate number of staff needed for ambulation/transfer, Patient to call before getting OOB Medication Interventions: Bed/chair exit alarm, Evaluate medications/consider consulting pharmacy, Patient to call before getting OOB

## 2018-09-01 NOTE — PROGRESS NOTES
IV's removed and heart monitor returned to monitor room. Discharge instructions reviewed and given to patient with prescriptions. Patient verbalized understanding.

## 2018-09-01 NOTE — PROGRESS NOTES
Problem: Cath Lab Procedures: Post-Cath Day of Procedure (Initiate SCIP Measures for Post-Op Care) Goal: Activity/Safety Outcome: Progressing Towards Goal 
Patient ambulate in room without any difficulties.

## 2018-09-01 NOTE — PROGRESS NOTES
Bedside and Verbal shift change report given to Gwyn Davenport RN (oncoming nurse) by self Genetta Ebbing nurse). Report included the following information SBAR, Kardex, Intake/Output, MAR, Recent Results and Med Rec Status. Bilateral groin sites assessed at bedside.

## 2018-09-03 ENCOUNTER — PATIENT OUTREACH (OUTPATIENT)
Dept: CASE MANAGEMENT | Age: 78
End: 2018-09-03

## 2018-09-03 NOTE — PROGRESS NOTES
Transition of Care Discharge Follow-up Questionnaire Date/Time of YUMIKO Outreach: 9/03/18 3:43 PM  
What was the patient hospitalized for? Patient was hospitalized for Acute on Chronic Respiratory Failure Does the patient understand his/her diagnosis and/or treatment and what happened during the hospitalization? CM Assessed Risk for Readmission: 
 
 
 
Patient stated Risk for Readmission: 
 
 Spoke to Patient who consented to [facility] [place] outreach, and verbalized understanding of treatment and diagnosis. Risk for Readmission completed and assessed and Care Coordinator assessed risk would be due to diagnosis. Patient states he has support from his CLTC aide and does not feel he will go back in the hospital. 
  
Did the patient receive discharge instructions? Patient states d/c instructions received. Review any discharge instructions (see notes in [facility]). Ask patient if they understand these. Do they have any questions? Patient discussed discharge plan and instruction as Patient understood it to be with Care Coordinator. Which I have documented in the pertinent areas throughout this progress note. Were home services ordered (nursing, PT, OT, ST, etc.)? [facility] ordered at d/c. Please note patient also has CLTC aide. If so, has the first visit occurred? If not, why? (Assist with coordination of services if necessary.) No visit has yet occurred, but patient states he has received a call from [facility] on today. Was any DME ordered? No DME ordered at d/c. If so, has it been received? If not, why?  (Assist with coordination of arranging DME orders if necessary.) N/A Complete a review of all medications (new, continued and discontinued meds per the D/C instructions and medication tab in [facility]). Review of all meds completed with Patient and Care Coordinator. Eliquis, Lipitor, Toprol XL prescribed at d/c. Were all new prescriptions filled?   If not, why?  (Assist with obtainment of medications if necessary.) Yes. Does the patient understand the purpose and dosing instructions for all medications? (If patient has questions, provide explanation and education.) Indicated by Patient to Care Coordinator, the purpose and dosing instructions for all medications were understood. Does the patient have any problems in performing ADLs? (If patient is unable to perform ADLs  what is the limiting factor(s)? Do they have a support system that can assist? If no support system is present, discuss possible assistance that they may be able to obtain.) Patient states he is limited with ADLs. Please note patient has CLTC aide. Does the patient have all follow-up appointments scheduled? 7 day f/up with PCP? 
 
7-14 day f/up with specialist? 
 
If f/up has not been made  what actions has the care coordinator made to accomplish this? Has transportation been arranged? [facility] Pulmonary follow-up should be within 7 days of discharge; all others should have PCP follow-up within 7 days of discharge; follow-ups with other specialists as appropriate or ordered.) PCP and Pulmonology f/u appts. advised and patient stated understanding and that he and or his aide will schedule tomorrow on their own. Please note today is a holiday, and offices are closed. Aide and or medical transport provide transportation to patient for all appts. Schedule next appointment with JE ASHLEY Coordinator or refer to RN Case Manager/  per the workflow guidelines. Care Coordinator will f/u in appx. 2 days to discuss Pulm and [facility]t. [facility]. Any other questions or concerns expressed by the patient? Gratitude stated and no further questions asked or needs identified. YUMIKO Call Completed By:  
Amparo Andrews LPN/ Care Coordinator [email or web address]:[phone] [place] [address] [place] [address] [email or web address] This note will not be viewable in [address]

## 2018-09-13 ENCOUNTER — HOSPITAL ENCOUNTER (INPATIENT)
Age: 78
LOS: 2 days | Discharge: HOME HEALTH | DRG: 190 | End: 2018-09-15
Attending: EMERGENCY MEDICINE | Admitting: INTERNAL MEDICINE
Payer: MEDICARE

## 2018-09-13 ENCOUNTER — APPOINTMENT (OUTPATIENT)
Dept: GENERAL RADIOLOGY | Age: 78
DRG: 190 | End: 2018-09-13
Attending: EMERGENCY MEDICINE
Payer: MEDICARE

## 2018-09-13 DIAGNOSIS — R79.89 ELEVATED BRAIN NATRIURETIC PEPTIDE (BNP) LEVEL: ICD-10-CM

## 2018-09-13 DIAGNOSIS — J44.1 ACUTE EXACERBATION OF CHRONIC OBSTRUCTIVE PULMONARY DISEASE (COPD) (HCC): Primary | ICD-10-CM

## 2018-09-13 DIAGNOSIS — J96.21 ACUTE ON CHRONIC RESPIRATORY FAILURE WITH HYPOXIA AND HYPERCAPNIA (HCC): ICD-10-CM

## 2018-09-13 DIAGNOSIS — A31.9 MYCOBACTERIAL PNEUMONIA (HCC): Chronic | ICD-10-CM

## 2018-09-13 DIAGNOSIS — J43.9 PULMONARY EMPHYSEMA, UNSPECIFIED EMPHYSEMA TYPE (HCC): Chronic | ICD-10-CM

## 2018-09-13 DIAGNOSIS — J96.22 ACUTE ON CHRONIC RESPIRATORY FAILURE WITH HYPOXIA AND HYPERCAPNIA (HCC): ICD-10-CM

## 2018-09-13 DIAGNOSIS — I50.43 ACUTE ON CHRONIC COMBINED SYSTOLIC AND DIASTOLIC CONGESTIVE HEART FAILURE (HCC): ICD-10-CM

## 2018-09-13 DIAGNOSIS — J15.8 MYCOBACTERIAL PNEUMONIA (HCC): Chronic | ICD-10-CM

## 2018-09-13 DIAGNOSIS — J44.1 COPD EXACERBATION (HCC): ICD-10-CM

## 2018-09-13 DIAGNOSIS — R91.8 MULTIPLE PULMONARY NODULES: Chronic | ICD-10-CM

## 2018-09-13 LAB
ALBUMIN SERPL-MCNC: 2.9 G/DL (ref 3.2–4.6)
ALBUMIN/GLOB SERPL: 0.7 {RATIO} (ref 1.2–3.5)
ALP SERPL-CCNC: 111 U/L (ref 50–136)
ALT SERPL-CCNC: 92 U/L (ref 12–65)
ANION GAP SERPL CALC-SCNC: 8 MMOL/L (ref 7–16)
ARTERIAL PATENCY WRIST A: POSITIVE
AST SERPL-CCNC: 69 U/L (ref 15–37)
BASE DEFICIT BLDA-SCNC: 3.1 MMOL/L (ref 0–2)
BASOPHILS # BLD: 0 K/UL (ref 0–0.2)
BASOPHILS NFR BLD: 0 % (ref 0–2)
BDY SITE: ABNORMAL
BILIRUB SERPL-MCNC: 0.2 MG/DL (ref 0.2–1.1)
BNP SERPL-MCNC: 1516 PG/ML
BUN SERPL-MCNC: 15 MG/DL (ref 8–23)
CALCIUM SERPL-MCNC: 8.4 MG/DL (ref 8.3–10.4)
CHLORIDE SERPL-SCNC: 109 MMOL/L (ref 98–107)
CO2 SERPL-SCNC: 26 MMOL/L (ref 21–32)
COHGB MFR BLD: 0.1 % (ref 0.5–1.5)
CREAT SERPL-MCNC: 1.29 MG/DL (ref 0.8–1.5)
DIFFERENTIAL METHOD BLD: ABNORMAL
DO-HGB BLD-MCNC: 6 % (ref 0–5)
EOSINOPHIL # BLD: 0.1 K/UL (ref 0–0.8)
EOSINOPHIL NFR BLD: 1 % (ref 0.5–7.8)
ERYTHROCYTE [DISTWIDTH] IN BLOOD BY AUTOMATED COUNT: 20 %
GAS FLOW.O2 O2 DELIVERY SYS: 2 L/MIN
GLOBULIN SER CALC-MCNC: 3.9 G/DL (ref 2.3–3.5)
GLUCOSE SERPL-MCNC: 138 MG/DL (ref 65–100)
HCO3 BLDA-SCNC: 23 MMOL/L (ref 22–26)
HCT VFR BLD AUTO: 35.4 % (ref 41.1–50.3)
HGB BLD-MCNC: 10.6 G/DL (ref 13.6–17.2)
HGB BLDMV-MCNC: 11.4 GM/DL (ref 11.7–15)
IMM GRANULOCYTES # BLD: 0 K/UL (ref 0–0.5)
IMM GRANULOCYTES NFR BLD AUTO: 0 % (ref 0–5)
LACTATE BLD-SCNC: 0.6 MMOL/L (ref 0.5–1.9)
LYMPHOCYTES # BLD: 1.8 K/UL (ref 0.5–4.6)
LYMPHOCYTES NFR BLD: 18 % (ref 13–44)
MCH RBC QN AUTO: 26.2 PG (ref 26.1–32.9)
MCHC RBC AUTO-ENTMCNC: 29.9 G/DL (ref 31.4–35)
MCV RBC AUTO: 87.6 FL (ref 79.6–97.8)
METHGB MFR BLD: 0.4 % (ref 0–1.5)
MONOCYTES # BLD: 0.8 K/UL (ref 0.1–1.3)
MONOCYTES NFR BLD: 8 % (ref 4–12)
NEUTS SEG # BLD: 7 K/UL (ref 1.7–8.2)
NEUTS SEG NFR BLD: 73 % (ref 43–78)
NRBC # BLD: 0.02 K/UL (ref 0–0.2)
OXYHGB MFR BLDA: 93.4 % (ref 94–97)
PCO2 BLDA: 49 MMHG (ref 35–45)
PH BLDA: 7.3 [PH] (ref 7.35–7.45)
PLATELET # BLD AUTO: 214 K/UL (ref 150–450)
PMV BLD AUTO: 11.5 FL (ref 9.4–12.3)
PO2 BLDA: 75 MMHG (ref 80–105)
POTASSIUM SERPL-SCNC: 4.6 MMOL/L (ref 3.5–5.1)
PROCALCITONIN SERPL-MCNC: 0.1 NG/ML
PROT SERPL-MCNC: 6.8 G/DL (ref 6.3–8.2)
RBC # BLD AUTO: 4.04 M/UL (ref 4.23–5.6)
SAO2 % BLD: 94 % (ref 92–98.5)
SODIUM SERPL-SCNC: 143 MMOL/L (ref 136–145)
VENTILATION MODE VENT: ABNORMAL
WBC # BLD AUTO: 9.7 K/UL (ref 4.3–11.1)

## 2018-09-13 PROCEDURE — 74011000250 HC RX REV CODE- 250: Performed by: EMERGENCY MEDICINE

## 2018-09-13 PROCEDURE — 84145 PROCALCITONIN (PCT): CPT

## 2018-09-13 PROCEDURE — 83605 ASSAY OF LACTIC ACID: CPT

## 2018-09-13 PROCEDURE — 99285 EMERGENCY DEPT VISIT HI MDM: CPT | Performed by: EMERGENCY MEDICINE

## 2018-09-13 PROCEDURE — 85025 COMPLETE CBC W/AUTO DIFF WBC: CPT

## 2018-09-13 PROCEDURE — 82803 BLOOD GASES ANY COMBINATION: CPT

## 2018-09-13 PROCEDURE — 94640 AIRWAY INHALATION TREATMENT: CPT

## 2018-09-13 PROCEDURE — 83880 ASSAY OF NATRIURETIC PEPTIDE: CPT

## 2018-09-13 PROCEDURE — 80053 COMPREHEN METABOLIC PANEL: CPT

## 2018-09-13 PROCEDURE — 71045 X-RAY EXAM CHEST 1 VIEW: CPT

## 2018-09-13 PROCEDURE — 36600 WITHDRAWAL OF ARTERIAL BLOOD: CPT

## 2018-09-13 PROCEDURE — 65270000029 HC RM PRIVATE

## 2018-09-13 PROCEDURE — 93005 ELECTROCARDIOGRAM TRACING: CPT | Performed by: EMERGENCY MEDICINE

## 2018-09-13 RX ORDER — PREDNISONE 20 MG/1
40 TABLET ORAL DAILY
Qty: 10 TAB | Refills: 0 | Status: SHIPPED | OUTPATIENT
Start: 2018-09-13 | End: 2018-09-18

## 2018-09-13 RX ORDER — ALBUTEROL SULFATE 0.83 MG/ML
5 SOLUTION RESPIRATORY (INHALATION)
Status: COMPLETED | OUTPATIENT
Start: 2018-09-13 | End: 2018-09-13

## 2018-09-13 RX ADMIN — ALBUTEROL SULFATE 5 MG: 2.5 SOLUTION RESPIRATORY (INHALATION) at 20:33

## 2018-09-13 NOTE — IP AVS SNAPSHOT
[address] 
[phone] Patient: Jac Bernabe. MRN: [id number] [facility]:92[id number] A check jagdish indicates which time of day the medication should be taken. My Medications START taking these medications Instructions Each Dose to Equal  
 Morning Noon Evening Bedtime  
 albuterol-ipratropium 2.5 mg-0.5 mg/3 ml Nebu Commonly known as:  DUO-NEB  
   
 3 mL by Nebulization route every six (6) hours as needed. 3 mL  
    
   
   
   
  
 budesonide 0.5 mg/2 mL Nbsp Commonly known as:  PULMICORT  
   
 2 mL by Nebulization route two (2) times a day. 500 mcg  
    
  
   
   
   
  
 furosemide 20 mg tablet Commonly known as:  LASIX Take 1 Tab by mouth daily. 20 mg CHANGE how you take these medications Instructions Each Dose to Equal  
 Morning Noon Evening Bedtime  
 predniSONE 20 mg tablet Commonly known as:  Maurice Aldana What changed:   
- medication strength 
- how much to take - when to take this 
- additional instructions Take 2 Tabs by mouth daily for 5 days. 40 mg CONTINUE taking these medications Instructions Each Dose to Equal  
 Morning Noon Evening Bedtime  
 allopurinol 300 mg tablet Commonly known as:  Jaunita Phenes Take  by mouth daily. ALPHAGAN P 0.1 % ophthalmic solution Generic drug:  brimonidine  
   
 every eight (8) hours. apixaban 5 mg tablet Commonly known as:  Jodelle Patient Take 1 Tab by mouth two (2) times a day. 5 mg  
    
  
   
   
  
   
  
 atorvastatin 40 mg tablet Commonly known as:  LIPITOR Take 1 Tab by mouth daily. 40 mg  
    
  
   
   
   
  
 azithromycin 500 mg Tab Commonly known as:  Alia Bass Take 1 tablet every Mon, Wed, Fri  
     
  
   
   
   
  
 bimatoprost 0.01 % ophthalmic drops Commonly known as:  LUMIGAN Administer 1 Drop to both eyes every evening. 1 Drop  
    
   
   
   
  
 cholecalciferol (vitamin D3) 2,000 unit Tab Take  by mouth. ELIGARD SC  
   
 45 mg by SubCUTAneous route. 45 mg  
    
   
   
   
  
 ethambutol 400 mg tablet Commonly known as:  MYAMBUTOL Take 5 tabs every mon, wed, fri  
     
  
   
   
   
  
 fluticasone-salmeterol 250-50 mcg/dose diskus inhaler Commonly known as:  ADVAIR Take 1 Puff by inhalation. 1 Puff Iron 325 mg (65 mg iron) tablet Generic drug:  ferrous sulfate Take  by mouth Daily (before breakfast). lisinopril 10 mg tablet Commonly known as:  Robertha Roup Take 1 Tab by mouth daily. 10 mg  
    
  
   
   
   
  
 metoprolol succinate 50 mg XL tablet Commonly known as:  TOPROL-XL Take 1 Tab by mouth daily. 50 mg  
    
  
   
   
   
  
 nitroglycerin 0.4 mg SL tablet Commonly known as:  NITROSTAT  
   
 1 Tab by SubLINGual route every five (5) minutes as needed for Chest Pain. 0.4 mg  
    
   
   
   
  
 omeprazole 20 mg capsule Commonly known as:  PRILOSEC Take 20 mg by mouth daily. 20 mg OXYGEN-AIR DELIVERY SYSTEMS Use as instructed. Use as instructed. potassium chloride SR 10 mEq tablet Commonly known as:  KLOR-CON 10 Take 20 mEq by mouth two (2) times a day. 20 mEq  
    
  
   
   
  
   
  
 rifAMPin 300 mg capsule Commonly known as:  RIFADIN Take 2 tabs every mon, wed, fri  
     
  
   
   
   
  
 SPIRIVA WITH HANDIHALER 18 mcg inhalation capsule Generic drug:  tiotropium Take 1 Cap by inhalation daily. 1 Cap  
    
  
   
   
   
  
 tamsulosin 0.4 mg capsule Commonly known as:  FLOMAX Take 1 Cap by mouth nightly for 90 days.   
 0.4 mg  
    
   
   
  
   
  
 VENTOLIN HFA 90 mcg/actuation inhaler Generic drug:  albuterol Take  by inhalation. Where to Get Your Medications Information on where to get these meds will be given to you by the nurse or doctor. ! Ask your nurse or doctor about these medications  
  albuterol-ipratropium 2.5 mg-0.5 mg/3 ml Nebu  
 budesonide 0.5 mg/2 mL Nbsp  
 furosemide 20 mg tablet  
 predniSONE 20 mg tablet

## 2018-09-13 NOTE — IP AVS SNAPSHOT
[address] 
[phone] Patient: Cyndi Kiser. 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 
[phone] Collin Hutchinson MD Schedule an appointment as soon as possible for a visit in 1 week  [address] [place] [address] [address] [facility] [address] 
[phone] Your Scheduled Appointments Wednesday September 26, 2018 11:00 AM EDT HOSPITAL FOLLOW-UP with Nima Trevino MD  
[facility] ([address] [place] [address]) 2 [facility] 
[address] [place] [address]  
[phone] Discharge Orders None A check jagdish indicates which time of day the medication should be taken. My Medications START taking these medications Instructions Each Dose to Equal  
 Morning Noon Evening Bedtime  
 albuterol-ipratropium 2.5 mg-0.5 mg/3 ml Nebu Commonly known as:  DUO-NEB  
   
 3 mL by Nebulization route every six (6) hours as needed. 3 mL  
    
   
   
   
  
 budesonide 0.5 mg/2 mL Nbsp Commonly known as:  PULMICORT  
   
 2 mL by Nebulization route two (2) times a day. 500 mcg furosemide 20 mg tablet Commonly known as:  LASIX Take 1 Tab by mouth daily. 20 mg CHANGE how you take these medications Instructions Each Dose to Equal  
 Morning Noon Evening Bedtime  
 predniSONE 20 mg tablet Commonly known as:  Rita Seth What changed:   
- medication strength 
- how much to take - when to take this 
- additional instructions Take 2 Tabs by mouth daily for 5 days. 40 mg CONTINUE taking these medications Instructions Each Dose to Equal  
 Morning Noon Evening Bedtime  
 allopurinol 300 mg tablet Commonly known as:  Jerrica Bailey Take  by mouth daily. ALPHAGAN P 0.1 % ophthalmic solution Generic drug:  brimonidine  
   
 every eight (8) hours. apixaban 5 mg tablet Commonly known as:  Odette Haver Take 1 Tab by mouth two (2) times a day. 5 mg  
    
  
   
   
  
   
  
 atorvastatin 40 mg tablet Commonly known as:  LIPITOR Take 1 Tab by mouth daily. 40 mg  
    
  
   
   
   
  
 azithromycin 500 mg Tab Commonly known as:  Herve Mcdowell Take 1 tablet every Mon, Wed, Fri  
     
  
   
   
   
  
 bimatoprost 0.01 % ophthalmic drops Commonly known as:  LUMIGAN Administer 1 Drop to both eyes every evening. 1 Drop  
    
   
   
   
  
 cholecalciferol (vitamin D3) 2,000 unit Tab Take  by mouth. ELIGARD SC  
   
 45 mg by SubCUTAneous route. 45 mg  
    
   
   
   
  
 ethambutol 400 mg tablet Commonly known as:  MYAMBUTOL Take 5 tabs every mon, wed, fri  
     
  
   
   
   
  
 fluticasone-salmeterol 250-50 mcg/dose diskus inhaler Commonly known as:  ADVAIR Take 1 Puff by inhalation. 1 Puff Iron 325 mg (65 mg iron) tablet Generic drug:  ferrous sulfate Take  by mouth Daily (before breakfast). lisinopril 10 mg tablet Commonly known as:  Kary Needy Take 1 Tab by mouth daily. 10 mg  
    
  
   
   
   
  
 metoprolol succinate 50 mg XL tablet Commonly known as:  TOPROL-XL Take 1 Tab by mouth daily. 50 mg  
    
  
   
   
   
  
 nitroglycerin 0.4 mg SL tablet Commonly known as:  NITROSTAT  
   
 1 Tab by SubLINGual route every five (5) minutes as needed for Chest Pain. 0.4 mg  
    
   
   
   
  
 omeprazole 20 mg capsule Commonly known as:  PRILOSEC Take 20 mg by mouth daily. 20 mg OXYGEN-AIR DELIVERY SYSTEMS Use as instructed. Use as instructed. potassium chloride SR 10 mEq tablet Commonly known as:  KLOR-CON 10 Take 20 mEq by mouth two (2) times a day. 20 mEq  
    
  
   
   
  
   
  
 rifAMPin 300 mg capsule Commonly known as:  RIFADIN Take 2 tabs every mon, wed, fri  
     
  
   
   
   
  
 SPIRIVA WITH HANDIHALER 18 mcg inhalation capsule Generic drug:  tiotropium Take 1 Cap by inhalation daily. 1 Cap  
    
  
   
   
   
  
 tamsulosin 0.4 mg capsule Commonly known as:  FLOMAX Take 1 Cap by mouth nightly for 90 days. 0.4 mg VENTOLIN HFA 90 mcg/actuation inhaler Generic drug:  albuterol Take  by inhalation. Where to Get Your Medications Information on where to get these meds will be given to you by the nurse or doctor. ! Ask your nurse or doctor about these medications  
  albuterol-ipratropium 2.5 mg-0.5 mg/3 ml Nebu  
 budesonide 0.5 mg/2 mL Nbsp  
 furosemide 20 mg tablet  
 predniSONE 20 mg tablet Discharge Instructions COPD Exacerbation Plan: Care Instructions Your Care Instructions If you have chronic obstructive pulmonary disease (COPD), your usual shortness of breath could suddenly get worse.  You may start coughing more and have more mucus. This flare-up is called a COPD exacerbation (say \"sh-CGZ-ug-BAY-kristie\"). A lung infection or air pollution could set off an exacerbation. Sometimes it can happen after a quick change in temperature or being around chemicals. Work with your doctor to make a plan for dealing with an exacerbation. You can better manage it if you plan ahead. Follow-up care is a key part of your treatment and safety. Be sure to make and go to all appointments, and call your doctor if you are having problems. It's also a good idea to know your test results and keep a list of the medicines you take. How can you care for yourself at home? During an exacerbation · Do not panic if you start to have one. Quick treatment at home may help you prevent serious breathing problems. If you have a COPD exacerbation plan that you developed with your doctor, follow it. · Take your medicines exactly as your doctor tells you. ¨ Use your inhaler as directed by your doctor. If your symptoms do not get better after you use your medicine, have someone take you to the emergency room. Call an ambulance if necessary. ¨ With inhaled medicines, a spacer or a nebulizer may help you get more medicine to your lungs. Ask your doctor or pharmacist how to use them properly. Practice using the spacer in front of a mirror before you have an exacerbation. This may help you get the medicine into your lungs quickly. ¨ If your doctor has given you steroid pills, take them as directed. ¨ Your doctor may have given you a prescription for antibiotics, which you can fill if you need it. ¨ Talk to your doctor if you have any problems with your medicine. And call your doctor if you have to use your antibiotic or steroid pills. Preventing an exacerbation · Do not smoke. This is the most important step you can take to prevent more damage to your lungs and prevent problems.  If you already smoke, it is never too late to stop. If you need help quitting, talk to your doctor about stop-smoking programs and medicines. These can increase your chances of quitting for good. · Take your daily medicines as prescribed. · Avoid colds and flu. ¨ Get a pneumococcal vaccine. ¨ Get a flu vaccine each year, as soon as it is available. Ask those you live or work with to do the same, so they will not get the flu and infect you. ¨ Try to stay away from people with colds or the flu. ¨ Wash your hands often. · Avoid secondhand smoke; air pollution; cold, dry air; hot, humid air; and high altitudes. Stay at home with your windows closed when air pollution is bad. · Learn breathing techniques for COPD, such as breathing through pursed lips. These techniques can help you breathe easier during an exacerbation. When should you call for help? Call 911 anytime you think you may need emergency care. For example, call if: 
  · You have severe trouble breathing.  
  · You have severe chest pain.  
 Call your doctor now or seek immediate medical care if: 
  · You have new or worse shortness of breath.  
  · You develop new chest pain.  
  · You are coughing more deeply or more often, especially if you notice more mucus or a change in the color of your mucus.  
  · You cough up blood.  
  · You have new or increased swelling in your legs or belly.  
  · You have a fever.  
 Watch closely for changes in your health, and be sure to contact your doctor if: 
  · You need to use your antibiotic or steroid pills.  
  · Your symptoms are getting worse. Where can you learn more? Go to http://airam-shanelle.info/. Enter J137 in the search box to learn more about \"COPD Exacerbation Plan: Care Instructions. \" Current as of: December 6, 2017 Content Version: 11.7 © 8207-8888 [employer].  Care instructions adapted under license by [employer] (which disclaims liability or warranty for this information). If you have questions about a medical condition or this instruction, always ask your healthcare professional. [address] any warranty or liability for your use of this information. [employer] Announcement We are excited to announce that we are making your provider's discharge notes available to you in [employer]. You will see these notes when they are completed and signed by the physician that discharged you from your recent hospital stay. If you have any questions or concerns about any information you see in [employer], please call the Health Information Department where you were seen or reach out to your Primary Care Provider for more information about your plan of care. Introducing [facility]! [facility] introduces [employer] patient portal. Now you can access parts of your medical record, email your doctor's office, and request medication refills online. 1. In your internet browser, go to [email or web address] 2. Click on the First Time User? Click Here link in the Sign In box. You will see the New Member Sign Up page. 3. Enter your [employer] Access Code exactly as it appears below. You will not need to use this code after youve completed the sign-up process. If you do not sign up before the expiration date, you must request a new code. · [employer] Access Code: [email or web address] Expires: 10/21/2018 10:24 AM 
 
4. Enter the last four digits of your Social Security Number (xxxx) and Date of Birth (mm/dd/yyyy) as indicated and click Submit. You will be taken to the next sign-up page. 5. Create a Partner[employer] ID. This will be your [employer] login ID and cannot be changed, so think of one that is secure and easy to remember. 6. Create a [employer] password. You can change your password at any time. 7. Enter your Password Reset Question and Answer. This can be used at a later time if you forget your password. 8. Enter your e-mail address. You will receive e-mail notification when new information is available in [address] 9. Click Sign Up. You can now view and download portions of your medical record. 10. Click the Download Summary menu link to download a portable copy of your medical information. If you have questions, please visit the Frequently Asked Questions section of the [employer] website. Remember, [employer] (QEEP) is NOT to be used for urgent needs. For medical emergencies, dial 911. Now available from your iPhone and Android! Introducing Patric Cleary As a [employer] patient, I wanted to make you aware of our electronic visit tool called Patric Cleary. [employer] 24/7 allows you to connect within minutes with a medical provider 24 hours a day, seven days a week via a mobile device or tablet or logging into a secure website from your computer. You can access Patric Cleary from anywhere in the [place]. A virtual visit might be right for you when you have a simple condition and feel like you just dont want to get out of bed, or cant get away from work for an appointment, when your regular [employer] provider is not available (evenings, weekends or holidays), or when youre out of town and need minor care. Electronic visits cost only $49 and if the [employer] 24/7 provider determines a prescription is needed to treat your condition, one can be electronically transmitted to a nearby pharmacy*. Please take a moment to enroll today if you have not already done so. The enrollment process is free and takes just a few minutes. To enroll, please download the [employer] 24/7 irma to your tablet or phone, or visit [email or web address] to enroll on your computer.    
And, as an [address] [place] [address] patient with a [employer] account, the results of your visits will be scanned into your electronic medical record and your primary care provider will be able to view the scanned results. We urge you to continue to see your regular [facility] provider for your ongoing medical care. And while your primary care provider may not be the one available when you seek a [employer] virtual visit, the peace of mind you get from getting a real diagnosis real time can be priceless. For more information on [employer]Communities, view our Frequently Asked Questions (FAQs) at [email or web address]. Sincerely, 
 
Obey Sol MD 
Chief Medical Officer [facility] Eri Cespedes *:  certain medications cannot be prescribed via bCommunities Providers Seen During Your Hospitalization Provider Specialty Primary office phone Saw Mccartney MD Emergency Medicine [phone] MD Myriam Internal Medicine [phone] Immunizations Administered for This Admission Name Date Influenza Vaccine (Quad) PF 9/15/2018 TB Skin Test (PPD) Intradermal  Deferred (), 9/14/2018 Your Primary Care Physician (PCP) Primary Care Physician Office Phone Office Reji Titus [place] [address] [phone] You are allergic to the following Allergen Reactions Statins-Hmg-Coa Reductase Inhibitors Myalgia Muscle pain Recent Documentation Height Weight BMI Smoking Status 1.753 m 80.9 kg 26.35 kg/m2 Former Smoker Emergency Contacts Name Discharge Info Relation Home Work Mobile [address]  Child [2] [phone] Diamond Irby  Other Relative [6] [phone] Patient Belongings The following personal items are in your possession at time of discharge: 
  Dental Appliances: Uppers, Lowers  Visual Aid: Glasses          Jewelry: None  Clothing: Pants, Shirt    Other Valuables: None Please provide this summary of care documentation to your next provider.  
  
  
 
  
Signatures-by signing, you are acknowledging that this After Visit Summary has been reviewed with you and you have received a copy. Patient Signature:  ____________________________________________________________ Date:  ____________________________________________________________  
  
Guille Mais Provider Signature:  ____________________________________________________________ Date:  ____________________________________________________________

## 2018-09-14 LAB
ALBUMIN SERPL-MCNC: 2.6 G/DL (ref 3.2–4.6)
ALBUMIN/GLOB SERPL: 0.7 {RATIO} (ref 1.2–3.5)
ALP SERPL-CCNC: 93 U/L (ref 50–136)
ALT SERPL-CCNC: 80 U/L (ref 12–65)
ANION GAP SERPL CALC-SCNC: 8 MMOL/L (ref 7–16)
AST SERPL-CCNC: 45 U/L (ref 15–37)
ATRIAL RATE: 88 BPM
BILIRUB SERPL-MCNC: 0.2 MG/DL (ref 0.2–1.1)
BUN SERPL-MCNC: 16 MG/DL (ref 8–23)
CALCIUM SERPL-MCNC: 8.2 MG/DL (ref 8.3–10.4)
CALCULATED P AXIS, ECG09: 79 DEGREES
CALCULATED R AXIS, ECG10: 69 DEGREES
CALCULATED T AXIS, ECG11: 78 DEGREES
CHLORIDE SERPL-SCNC: 106 MMOL/L (ref 98–107)
CO2 SERPL-SCNC: 29 MMOL/L (ref 21–32)
CREAT SERPL-MCNC: 1.13 MG/DL (ref 0.8–1.5)
DIAGNOSIS, 93000: NORMAL
ERYTHROCYTE [DISTWIDTH] IN BLOOD BY AUTOMATED COUNT: 19.7 %
GLOBULIN SER CALC-MCNC: 3.5 G/DL (ref 2.3–3.5)
GLUCOSE SERPL-MCNC: 119 MG/DL (ref 65–100)
HCT VFR BLD AUTO: 30.8 % (ref 41.1–50.3)
HGB BLD-MCNC: 9.3 G/DL (ref 13.6–17.2)
MAGNESIUM SERPL-MCNC: 2.2 MG/DL (ref 1.8–2.4)
MCH RBC QN AUTO: 26.4 PG (ref 26.1–32.9)
MCHC RBC AUTO-ENTMCNC: 30.2 G/DL (ref 31.4–35)
MCV RBC AUTO: 87.5 FL (ref 79.6–97.8)
NRBC # BLD: 0.02 K/UL (ref 0–0.2)
P-R INTERVAL, ECG05: 148 MS
PLATELET # BLD AUTO: 187 K/UL (ref 150–450)
PMV BLD AUTO: 11 FL (ref 9.4–12.3)
POTASSIUM SERPL-SCNC: 4.4 MMOL/L (ref 3.5–5.1)
PROT SERPL-MCNC: 6.1 G/DL (ref 6.3–8.2)
Q-T INTERVAL, ECG07: 384 MS
QRS DURATION, ECG06: 90 MS
QTC CALCULATION (BEZET), ECG08: 464 MS
RBC # BLD AUTO: 3.52 M/UL (ref 4.23–5.6)
SODIUM SERPL-SCNC: 143 MMOL/L (ref 136–145)
VENTRICULAR RATE, ECG03: 88 BPM
WBC # BLD AUTO: 6.8 K/UL (ref 4.3–11.1)

## 2018-09-14 PROCEDURE — 80053 COMPREHEN METABOLIC PANEL: CPT

## 2018-09-14 PROCEDURE — 74011000250 HC RX REV CODE- 250: Performed by: INTERNAL MEDICINE

## 2018-09-14 PROCEDURE — 36415 COLL VENOUS BLD VENIPUNCTURE: CPT

## 2018-09-14 PROCEDURE — 83735 ASSAY OF MAGNESIUM: CPT

## 2018-09-14 PROCEDURE — 86580 TB INTRADERMAL TEST: CPT | Performed by: FAMILY MEDICINE

## 2018-09-14 PROCEDURE — 94640 AIRWAY INHALATION TREATMENT: CPT

## 2018-09-14 PROCEDURE — 74011000302 HC RX REV CODE- 302: Performed by: FAMILY MEDICINE

## 2018-09-14 PROCEDURE — 77010033678 HC OXYGEN DAILY

## 2018-09-14 PROCEDURE — 94761 N-INVAS EAR/PLS OXIMETRY MLT: CPT

## 2018-09-14 PROCEDURE — 65270000029 HC RM PRIVATE

## 2018-09-14 PROCEDURE — 74011250637 HC RX REV CODE- 250/637: Performed by: INTERNAL MEDICINE

## 2018-09-14 PROCEDURE — 94760 N-INVAS EAR/PLS OXIMETRY 1: CPT

## 2018-09-14 PROCEDURE — 99223 1ST HOSP IP/OBS HIGH 75: CPT | Performed by: INTERNAL MEDICINE

## 2018-09-14 PROCEDURE — 74011250636 HC RX REV CODE- 250/636: Performed by: INTERNAL MEDICINE

## 2018-09-14 PROCEDURE — 85027 COMPLETE CBC AUTOMATED: CPT

## 2018-09-14 RX ORDER — TRAMADOL HYDROCHLORIDE 50 MG/1
50 TABLET ORAL
Status: DISCONTINUED | OUTPATIENT
Start: 2018-09-14 | End: 2018-09-15 | Stop reason: HOSPADM

## 2018-09-14 RX ORDER — ATORVASTATIN CALCIUM 40 MG/1
40 TABLET, FILM COATED ORAL DAILY
Status: DISCONTINUED | OUTPATIENT
Start: 2018-09-14 | End: 2018-09-15 | Stop reason: HOSPADM

## 2018-09-14 RX ORDER — ALBUTEROL SULFATE 0.83 MG/ML
2.5 SOLUTION RESPIRATORY (INHALATION)
Status: DISCONTINUED | OUTPATIENT
Start: 2018-09-14 | End: 2018-09-15 | Stop reason: HOSPADM

## 2018-09-14 RX ORDER — FUROSEMIDE 10 MG/ML
40 INJECTION INTRAMUSCULAR; INTRAVENOUS DAILY
Status: DISCONTINUED | OUTPATIENT
Start: 2018-09-14 | End: 2018-09-15 | Stop reason: HOSPADM

## 2018-09-14 RX ORDER — TAMSULOSIN HYDROCHLORIDE 0.4 MG/1
0.4 CAPSULE ORAL
Status: DISCONTINUED | OUTPATIENT
Start: 2018-09-14 | End: 2018-09-15 | Stop reason: HOSPADM

## 2018-09-14 RX ORDER — RIFAMPIN 300 MG/1
600 CAPSULE ORAL
Status: DISCONTINUED | OUTPATIENT
Start: 2018-09-14 | End: 2018-09-14 | Stop reason: SDUPTHER

## 2018-09-14 RX ORDER — PANTOPRAZOLE SODIUM 40 MG/1
40 TABLET, DELAYED RELEASE ORAL
Status: DISCONTINUED | OUTPATIENT
Start: 2018-09-14 | End: 2018-09-15 | Stop reason: HOSPADM

## 2018-09-14 RX ORDER — ACETAMINOPHEN 325 MG/1
650 TABLET ORAL
Status: DISCONTINUED | OUTPATIENT
Start: 2018-09-14 | End: 2018-09-15 | Stop reason: HOSPADM

## 2018-09-14 RX ORDER — ETHAMBUTOL HYDROCHLORIDE 400 MG/1
2000 TABLET, FILM COATED ORAL
Status: DISCONTINUED | OUTPATIENT
Start: 2018-09-14 | End: 2018-09-14 | Stop reason: SDUPTHER

## 2018-09-14 RX ORDER — BRIMONIDINE TARTRATE 2 MG/ML
1 SOLUTION/ DROPS OPHTHALMIC EVERY 8 HOURS
Status: DISCONTINUED | OUTPATIENT
Start: 2018-09-14 | End: 2018-09-15 | Stop reason: HOSPADM

## 2018-09-14 RX ORDER — ALLOPURINOL 300 MG/1
300 TABLET ORAL DAILY
Status: DISCONTINUED | OUTPATIENT
Start: 2018-09-14 | End: 2018-09-15 | Stop reason: HOSPADM

## 2018-09-14 RX ORDER — NITROGLYCERIN 0.4 MG/1
0.4 TABLET SUBLINGUAL
Status: DISCONTINUED | OUTPATIENT
Start: 2018-09-14 | End: 2018-09-15 | Stop reason: HOSPADM

## 2018-09-14 RX ORDER — METOPROLOL SUCCINATE 50 MG/1
50 TABLET, EXTENDED RELEASE ORAL DAILY
Status: DISCONTINUED | OUTPATIENT
Start: 2018-09-14 | End: 2018-09-15 | Stop reason: HOSPADM

## 2018-09-14 RX ORDER — LISINOPRIL 5 MG/1
10 TABLET ORAL DAILY
Status: DISCONTINUED | OUTPATIENT
Start: 2018-09-14 | End: 2018-09-15 | Stop reason: HOSPADM

## 2018-09-14 RX ORDER — LATANOPROST 50 UG/ML
1 SOLUTION/ DROPS OPHTHALMIC EVERY EVENING
Status: DISCONTINUED | OUTPATIENT
Start: 2018-09-14 | End: 2018-09-15 | Stop reason: HOSPADM

## 2018-09-14 RX ORDER — POTASSIUM CHLORIDE 750 MG/1
10 TABLET, EXTENDED RELEASE ORAL DAILY
Status: DISCONTINUED | OUTPATIENT
Start: 2018-09-14 | End: 2018-09-15 | Stop reason: HOSPADM

## 2018-09-14 RX ORDER — ETHAMBUTOL HYDROCHLORIDE 400 MG/1
2000 TABLET, FILM COATED ORAL
Status: DISCONTINUED | OUTPATIENT
Start: 2018-09-14 | End: 2018-09-15 | Stop reason: HOSPADM

## 2018-09-14 RX ORDER — ONDANSETRON 2 MG/ML
4 INJECTION INTRAMUSCULAR; INTRAVENOUS
Status: DISCONTINUED | OUTPATIENT
Start: 2018-09-14 | End: 2018-09-15 | Stop reason: HOSPADM

## 2018-09-14 RX ORDER — BUDESONIDE 0.5 MG/2ML
500 INHALANT ORAL
Status: DISCONTINUED | OUTPATIENT
Start: 2018-09-14 | End: 2018-09-15 | Stop reason: HOSPADM

## 2018-09-14 RX ORDER — RIFAMPIN 300 MG/1
600 CAPSULE ORAL
Status: DISCONTINUED | OUTPATIENT
Start: 2018-09-14 | End: 2018-09-15 | Stop reason: HOSPADM

## 2018-09-14 RX ORDER — AZITHROMYCIN 250 MG/1
500 TABLET, FILM COATED ORAL
Status: DISCONTINUED | OUTPATIENT
Start: 2018-09-14 | End: 2018-09-15 | Stop reason: HOSPADM

## 2018-09-14 RX ADMIN — BRIMONIDINE TARTRATE 1 DROP: 2 SOLUTION OPHTHALMIC at 15:29

## 2018-09-14 RX ADMIN — LISINOPRIL 10 MG: 5 TABLET ORAL at 09:08

## 2018-09-14 RX ADMIN — METOPROLOL SUCCINATE 50 MG: 50 TABLET, EXTENDED RELEASE ORAL at 09:08

## 2018-09-14 RX ADMIN — LATANOPROST 1 DROP: 50 SOLUTION OPHTHALMIC at 22:18

## 2018-09-14 RX ADMIN — ALBUTEROL SULFATE 2.5 MG: 2.5 SOLUTION RESPIRATORY (INHALATION) at 07:40

## 2018-09-14 RX ADMIN — BRIMONIDINE TARTRATE 1 DROP: 2 SOLUTION OPHTHALMIC at 02:41

## 2018-09-14 RX ADMIN — RIFAMPIN 600 MG: 300 CAPSULE ORAL at 17:08

## 2018-09-14 RX ADMIN — ATORVASTATIN CALCIUM 40 MG: 40 TABLET, FILM COATED ORAL at 09:08

## 2018-09-14 RX ADMIN — BRIMONIDINE TARTRATE 1 DROP: 2 SOLUTION OPHTHALMIC at 05:36

## 2018-09-14 RX ADMIN — TUBERCULIN PURIFIED PROTEIN DERIVATIVE 5 UNITS: 5 INJECTION, SOLUTION INTRADERMAL at 13:54

## 2018-09-14 RX ADMIN — ALLOPURINOL 300 MG: 300 TABLET ORAL at 09:08

## 2018-09-14 RX ADMIN — BRIMONIDINE TARTRATE 1 DROP: 2 SOLUTION OPHTHALMIC at 22:19

## 2018-09-14 RX ADMIN — APIXABAN 5 MG: 5 TABLET, FILM COATED ORAL at 09:08

## 2018-09-14 RX ADMIN — POTASSIUM CHLORIDE 10 MEQ: 10 TABLET, EXTENDED RELEASE ORAL at 09:08

## 2018-09-14 RX ADMIN — METHYLPREDNISOLONE SODIUM SUCCINATE 40 MG: 40 INJECTION, POWDER, FOR SOLUTION INTRAMUSCULAR; INTRAVENOUS at 09:08

## 2018-09-14 RX ADMIN — METHYLPREDNISOLONE SODIUM SUCCINATE 40 MG: 40 INJECTION, POWDER, FOR SOLUTION INTRAMUSCULAR; INTRAVENOUS at 22:27

## 2018-09-14 RX ADMIN — APIXABAN 5 MG: 5 TABLET, FILM COATED ORAL at 17:08

## 2018-09-14 RX ADMIN — AZITHROMYCIN 500 MG: 250 TABLET, FILM COATED ORAL at 17:08

## 2018-09-14 RX ADMIN — ALBUTEROL SULFATE 2.5 MG: 2.5 SOLUTION RESPIRATORY (INHALATION) at 14:53

## 2018-09-14 RX ADMIN — FUROSEMIDE 40 MG: 10 INJECTION, SOLUTION INTRAMUSCULAR; INTRAVENOUS at 09:07

## 2018-09-14 RX ADMIN — TIOTROPIUM BROMIDE 18 MCG: 18 CAPSULE ORAL; RESPIRATORY (INHALATION) at 07:40

## 2018-09-14 RX ADMIN — ETHAMBUTOL HYDROCHLORIDE 2000 MG: 400 TABLET, FILM COATED ORAL at 17:08

## 2018-09-14 RX ADMIN — BUDESONIDE 500 MCG: 0.5 INHALANT RESPIRATORY (INHALATION) at 19:38

## 2018-09-14 RX ADMIN — TAMSULOSIN HYDROCHLORIDE 0.4 MG: 0.4 CAPSULE ORAL at 02:31

## 2018-09-14 RX ADMIN — ALBUTEROL SULFATE 2.5 MG: 2.5 SOLUTION RESPIRATORY (INHALATION) at 19:38

## 2018-09-14 RX ADMIN — TAMSULOSIN HYDROCHLORIDE 0.4 MG: 0.4 CAPSULE ORAL at 22:19

## 2018-09-14 RX ADMIN — BUDESONIDE 500 MCG: 0.5 INHALANT RESPIRATORY (INHALATION) at 07:40

## 2018-09-14 NOTE — ACP (ADVANCE CARE PLANNING)
HCPOA was requested by nurse. Pt did not want to complete the paperwork.  
Chaplain Dino Metz MDiv,ThM,PhD

## 2018-09-14 NOTE — ED PROVIDER NOTES
HPI Comments: Patient presented to the ER complaining of worsening for the past couple days. EMS was called, his family wheezing and mildly hypoxic. Patient was given nebulizer treatment in route with some improvement in symptoms. Reports some cough, productive of sputum. Denies any fevers or chills. Patient also was administered Solu-Medrol as well as magnesium en route. Patient is a 78 y.o. male presenting with shortness of breath. The history is provided by the patient. Shortness of Breath This is a recurrent problem. The problem occurs frequently. The current episode started more than 2 days ago. The problem has been gradually worsening. Associated symptoms include cough. Pertinent negatives include no fever, no sputum production, no hemoptysis, no abdominal pain and no leg swelling. Associated medical issues include COPD. Past Medical History:  
Diagnosis Date  A-fib ([address] 5/2/2014  AAA (abdominal aortic aneurysm) without rupture ([address] 5/2/2014  
 3.2 on US 2/14 [facility]  Arthritis  Cancer ([address]   
 hx prostate cancer  COPD (chronic obstructive pulmonary disease) ([address] 5/2/2014  Elevated prostate specific antigen (PSA)  Glaucoma 5/2/2014  Heart disease  Heart failure ([address]  Hyperlipemia 5/2/2014  Hypertension  Hypertrophy of prostate with urinary obstruction and other lower urinary tract symptoms (LUTS)  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  OA (osteoarthritis) 5/2/2014  Peptic ulcer disease 6/1/2017  Poor historian  Prostate cancer ([address]  Pulmonary embolus ([address] 6/1/2017  Supplemental oxygen dependent 5/2/2014  Warfarin anticoagulation 5/2/2014 Past Surgical History:  
Procedure Laterality Date  HX HEART CATHETERIZATION Family History:  
Problem Relation Age of Onset  Cancer Mother  Heart Disease Father Social History Social History  Marital status: SINGLE   Spouse name: N/A  
  Number of children: N/A  
 Years of education: N/A Occupational History  Not on file. Social History Main Topics  Smoking status: Former Smoker Packs/day: 2.00 Years: 40.00 Quit date: 7/26/2014  Smokeless tobacco: Never Used Comment: still taking Chantix  Alcohol use No  
 Drug use: No  
 Sexual activity: Yes  
  Partners: Female Birth control/ protection: None Other Topics Concern  Not on file Social History Narrative ALLERGIES: Statins-hmg-coa reductase inhibitors Review of Systems Constitutional: Positive for fatigue. Negative for chills and fever. Eyes: Negative for photophobia and visual disturbance. Respiratory: Positive for cough, chest tightness and shortness of breath. Negative for hemoptysis and sputum production. Cardiovascular: Negative for palpitations and leg swelling. Gastrointestinal: Negative for abdominal pain. Genitourinary: Negative for dysuria and urgency. Musculoskeletal: Negative for back pain and gait problem. Neurological: Negative for light-headedness and numbness. Hematological: Negative for adenopathy. Does not bruise/bleed easily. All other systems reviewed and are negative. Vitals:  
 09/13/18 2010 09/13/18 2012 09/13/18 2019 BP: 158/78 158/78 165/81 Pulse: 90 94 87 Resp: 20  21 Temp: 98 °F (36.7 °C) SpO2: 100% 100% 100% Weight: 82.1 kg (181 lb) Height: 5' 9\" (1.753 m) Physical Exam  
Constitutional: He is oriented to person, place, and time. He appears well-developed and well-nourished. HENT:  
Head: Normocephalic and atraumatic. Mouth/Throat: Oropharynx is clear and moist.  
Cardiovascular: Normal rate, regular rhythm and intact distal pulses. Exam reveals no friction rub. No murmur heard. Pulmonary/Chest: Effort normal. He has wheezes. Neurological: He is alert and oriented to person, place, and time. He has normal reflexes. Nursing note and vitals reviewed. MDM Number of Diagnoses or Management Options Acute exacerbation of chronic obstructive pulmonary disease (COPD) [facility]):  
Diagnosis management comments: Will obtain chest x-ray here to rule out pneumonia or volume overload Treat symptomatically 10:18 PM 
Chest x-ray is clear. Normal lactic acid as well as normal pro-calcitonin. ABG shows a pH is 7.3, PaCO2 49 and a PA  O2 of 75 Symptomatically patient voices improvements. We'll ambulate to ensure no significant hypoxia or exertional dyspnea. If the patient is able to tolerate, we'll discharge home 10:32 PM 
Patient with significant hypoxia and dyspnea upon ambulation. We'll discuss case with hospitalist for admission Amount and/or Complexity of Data Reviewed Clinical lab tests: reviewed and ordered Tests in the radiology section of CPT®: ordered and reviewed Risk of Complications, Morbidity, and/or Mortality Presenting problems: moderate Diagnostic procedures: low Management options: low Patient Progress Patient progress: stable ED Course Procedures Results Include: 
 
Recent Results (from the past 24 hour(s)) EKG, 12 LEAD, INITIAL Collection Time: 09/13/18  8:17 PM  
Result Value Ref Range Ventricular Rate 88 BPM  
 Atrial Rate 88 BPM  
 P-R Interval 148 ms QRS Duration 90 ms Q-T Interval 384 ms QTC Calculation (Bezet) 464 ms Calculated P Axis 79 degrees Calculated R Axis 69 degrees Calculated T Axis 78 degrees Diagnosis    
  !! AGE AND GENDER SPECIFIC ECG ANALYSIS !! Sinus rhythm with occasional Premature ventricular complexes Cannot rule out Anterior infarct , age undetermined Abnormal ECG When compared with ECG of 31-AUG-2018 14:06, Fusion complexes are no longer Present Nonspecific T wave abnormality no longer evident in Inferior leads T wave inversion no longer evident in Anterolateral leads CBC WITH AUTOMATED DIFF  
 Collection Time: 09/13/18  8:18 PM  
Result Value Ref Range WBC 9.7 4.3 - 11.1 K/uL  
 RBC 4.04 (L) 4.23 - 5.6 M/uL  
 HGB 10.6 (L) 13.6 - 17.2 g/dL HCT 35.4 (L) 41.1 - 50.3 % MCV 87.6 79.6 - 97.8 FL  
 MCH 26.2 26.1 - 32.9 PG  
 MCHC 29.9 (L) 31.4 - 35.0 g/dL RDW 20.0 % PLATELET 587 419 - 108 K/uL MPV 11.5 9.4 - 12.3 FL ABSOLUTE NRBC 0.02 0.0 - 0.2 K/uL  
 DF AUTOMATED NEUTROPHILS 73 43 - 78 % LYMPHOCYTES 18 13 - 44 % MONOCYTES 8 4.0 - 12.0 % EOSINOPHILS 1 0.5 - 7.8 % BASOPHILS 0 0.0 - 2.0 % IMMATURE GRANULOCYTES 0 0.0 - 5.0 %  
 ABS. NEUTROPHILS 7.0 1.7 - 8.2 K/UL  
 ABS. LYMPHOCYTES 1.8 0.5 - 4.6 K/UL  
 ABS. MONOCYTES 0.8 0.1 - 1.3 K/UL  
 ABS. EOSINOPHILS 0.1 0.0 - 0.8 K/UL  
 ABS. BASOPHILS 0.0 0.0 - 0.2 K/UL  
 ABS. IMM. GRANS. 0.0 0.0 - 0.5 K/UL METABOLIC PANEL, COMPREHENSIVE Collection Time: 09/13/18  8:18 PM  
Result Value Ref Range Sodium 143 136 - 145 mmol/L Potassium 4.6 3.5 - 5.1 mmol/L Chloride 109 (H) 98 - 107 mmol/L  
 CO2 26 21 - 32 mmol/L Anion gap 8 7 - 16 mmol/L Glucose 138 (H) 65 - 100 mg/dL BUN 15 8 - 23 MG/DL Creatinine 1.29 0.8 - 1.5 MG/DL  
 GFR est AA >60 >60 ml/min/1.73m2 GFR est non-AA 58 (L) >60 ml/min/1.73m2 Calcium 8.4 8.3 - 10.4 MG/DL Bilirubin, total 0.2 0.2 - 1.1 MG/DL  
 ALT (SGPT) 92 (H) 12 - 65 U/L  
 AST (SGOT) 69 (H) 15 - 37 U/L Alk. phosphatase 111 50 - 136 U/L Protein, total 6.8 6.3 - 8.2 g/dL Albumin 2.9 (L) 3.2 - 4.6 g/dL Globulin 3.9 (H) 2.3 - 3.5 g/dL A-G Ratio 0.7 (L) 1.2 - 3.5 BNP Collection Time: 09/13/18  8:18 PM  
Result Value Ref Range BNP 1516 pg/mL BLOOD GAS, ARTERIAL Collection Time: 09/13/18  8:30 PM  
Result Value Ref Range pH 7.30 (L) 7.35 - 7.45    
 PCO2 49 (H) 35 - 45 mmHg PO2 75 (L) 80 - 105 mmHg BICARBONATE 23 22 - 26 mmol/L  
 BASE DEFICIT 3.1 (H) 0 - 2 mmol/L  
 TOTAL HEMOGLOBIN 11.4 (L) 11.7 - 15.0 GM/DL  
 O2 SAT 94 92 - 98.5 % Arterial O2 Hgb 93.4 (L) 94 - 97 % CARBOXYHEMOGLOBIN 0.1 (L) 0.5 - 1.5 % METHEMOGLOBIN 0.4 0.0 - 1.5 % DEOXYHEMOGLOBIN 6 (H) 0.0 - 5.0 % SITE RR   
 ALLENS TEST POSITIVE    
 MODE NC   
 O2 FLOW 2.00 L/min POC LACTIC ACID Collection Time: 09/13/18  8:43 PM  
Result Value Ref Range Lactic Acid (POC) 0.6 0.5 - 1.9 mmol/L PROCALCITONIN Collection Time: 09/13/18  8:48 PM  
Result Value Ref Range Procalcitonin 0.1 ng/mL

## 2018-09-14 NOTE — ED NOTES
TRANSFER - OUT REPORT: 
 
Verbal report given to Evangelina Shaw on [employer].  being transferred to [facility] for routine progression of care Report consisted of patients Situation, Background, Assessment and  
Recommendations(SBAR). Information from the following report(s) SBAR, ED Summary, [facility] and Recent Results was reviewed with the receiving nurse. Lines:    
 
Opportunity for questions and clarification was provided. Patient transported with: 
 O2 @ 2 liters Tech

## 2018-09-14 NOTE — ED TRIAGE NOTES
EMS called to home for SOB. EMS administered duoneb albuterol solumedrol and mag. Dr Shore Forth to bedside

## 2018-09-14 NOTE — CONSULTS
CONSULT NOTE Javon Wright. 
 
9/14/2018 Date of Admission:  9/13/2018 The patient's chart is reviewed and the patient is discussed with the staff. Subjective:  
 
Patient is a 78 y.o.  male seen and evaluated at the request of Dr. Sveta Delong. We have been asked to see for acute respiratory distress. Was brought in the  ER via EMS for shortness of breath, given Duoneb, Albuterol, IV steroids and magnesium. Reported worsening symptoms for the last few days with wheezing and hypoxia. On exam is sitting up in the bed and states is feeling better and back on his home O2. Has chronic productive cough, lives alone and provides his own care in the home. He has MAC and pulmonary nodules, followed at [address] and was seen by Dr. Matty Hoover 9/4 with recommendations to continue azithromycin, ethambutol and rifampin 3 times per week with office follow up 11/5/18. He was just discharged 9/1/18, was in AFib with RVR and placed on BIPAP. Cardiology was consulted, RASHEEDA negative for thrombus and successfully cardioverted and Aflutter ablation. Has chronic respiratory failure and on home O2 2L. Has chronic COPD with centrilobular emphysema, AAA, hx Mycobacterium avium infection, glaucoma, P-AFib on Eliquis, sCHF (EF 30%), multiple pulmonary nodules with previous bx negative for malignancy and hx PE. Review of Systems A comprehensive review of systems was negative except for: Constitutional: positive for fatigue Respiratory: positive for cough, sputum, wheezing, dyspnea on exertion or COPD, emphysema, chronic respiratory failure, home O2 Cardiovascular: positive for dyspnea, irregular heart beats, fatigue, arial flutter ablation, on eliquis, dyspnea on exertion Gastrointestinal: positive for reflux symptoms 
iron def. anemia--on supplement Patient Active Problem List  
Diagnosis Code  AAA (abdominal aortic aneurysm) without rupture (HCC) I71.4  COPD (chronic obstructive pulmonary disease) ([facility]) J44.9  Hyperlipemia E78.5  Glaucoma H40.9  Supplemental oxygen dependent Z99.81  
 OA (osteoarthritis) M19.90  Paroxysmal atrial fibrillation ([facility]) I48.0  Warfarin anticoagulation Z79.01  
 Renal cysts, acquired, bilateral N28.1  Tobacco use disorder F17.200  Hypertension I10  
 Pulmonary embolus ([facility]) I26.99  
 Cardiomyopathy ([address] I42.9  Atrial flutter ([facility]) I48.92  
 Peptic ulcer disease K27.9  History of pulmonary embolism Z86.711  
 COPD exacerbation ([facility]) J44.1  CHF exacerbation ([facility]) I50.9  CHF (congestive heart failure) ([facility]) I50.9  A-fib ([facility]) I48.91  
 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[facility]) J15.8, A31.9  Acute on chronic respiratory failure ([facility]) J96.20  Multiple pulmonary nodules R91.8 Home O2 at 2L. Prior to Admission Medications Prescriptions Last Dose Informant Patient Reported? Taking? LEUPROLIDE ACETATE (ELIGARD SC) 2018 at Unknown time  Yes Yes Si mg by SubCUTAneous route. OXYGEN-AIR DELIVERY SYSTEMS 2018 at Unknown time  Yes Yes Sig: Use as instructed. Use as instructed. albuterol (VENTOLIN HFA) 90 mcg/actuation inhaler   Yes No  
Sig: Take  by inhalation. allopurinol (ZYLOPRIM) 300 mg tablet   Yes No  
Sig: Take  by mouth daily. apixaban (ELIQUIS) 5 mg tablet   No No  
Sig: Take 1 Tab by mouth two (2) times a day. atorvastatin (LIPITOR) 40 mg tablet   No No  
Sig: Take 1 Tab by mouth daily. azithromycin (ZITHROMAX) 500 mg tab   Yes No  
Sig: Take 1 tablet every Mon, Wed, Fri  
bimatoprost (LUMIGAN) 0.01 % ophthalmic drops   Yes No  
Sig: Administer 1 Drop to both eyes every evening. brimonidine (ALPHAGAN P) 0.1 % ophthalmic solution   Yes No  
Sig: every eight (8) hours.   
cholecalciferol, vitamin D3, 2,000 unit tab   Yes No  
Sig: Take  by mouth.  
ethambutol (MYAMBUTOL) 400 mg tablet   Yes No  
Sig: Take 5 tabs every mon, wed, fri  
 ferrous sulfate (IRON) 325 mg (65 mg iron) tablet 2018 at Unknown time  Yes Yes Sig: Take  by mouth Daily (before breakfast). fluticasone-salmeterol (ADVAIR) 250-50 mcg/dose diskus inhaler 2018 at Unknown time  Yes Yes Sig: Take 1 Puff by inhalation. lisinopril (PRINIVIL, ZESTRIL) 10 mg tablet 2018 at Unknown time  No Yes Sig: Take 1 Tab by mouth daily. metoprolol succinate (TOPROL-XL) 50 mg XL tablet 2018 at Unknown time  No Yes Sig: Take 1 Tab by mouth daily. nitroglycerin (NITROSTAT) 0.4 mg SL tablet Unknown at Unknown time  No No  
Si Tab by SubLINGual route every five (5) minutes as needed for Chest Pain. omeprazole (PRILOSEC) 20 mg capsule 2018 at Unknown time  Yes Yes Sig: Take 20 mg by mouth daily. potassium chloride SR (KLOR-CON 10) 10 mEq tablet 2018 at Unknown time  Yes Yes Sig: Take 20 mEq by mouth two (2) times a day. rifAMPin (RIFADIN) 300 mg capsule Unknown at Unknown time  Yes No  
Sig: Take 2 tabs every mon, wed, fri  
tamsulosin (FLOMAX) 0.4 mg capsule 2018 at Unknown time  No Yes Sig: Take 1 Cap by mouth nightly for 90 days. tiotropium (SPIRIVA WITH HANDIHALER) 18 mcg inhalation capsule Unknown at Unknown time  Yes No  
Sig: Take 1 Cap by inhalation daily. Facility-Administered Medications: None Past Medical History:  
Diagnosis Date  A-fib ([address] 2014  AAA (abdominal aortic aneurysm) without rupture ([address] 2014  
 3.2 on US  [facility][1]  Arthritis  Cancer ([address]   
 hx prostate cancer  COPD (chronic obstructive pulmonary disease) ([address] 2014  Elevated prostate specific antigen (PSA)  Glaucoma 2014  Heart disease  Heart failure ([address]  Hyperlipemia 2014  Hypertension  Hypertrophy of prostate with urinary obstruction and other lower urinary tract symptoms (LUTS)  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  OA (osteoarthritis) 2014  Peptic ulcer disease 6/1/2017  Poor historian  Prostate cancer [facility]  Pulmonary embolus ([facility] 6/1/2017  Supplemental oxygen dependent 5/2/2014  Warfarin anticoagulation 5/2/2014 Past Surgical History:  
Procedure Laterality Date  HX HEART CATHETERIZATION Social History Social History  Marital status: SINGLE Spouse name: N/A  
 Number of children: N/A  
 Years of education: N/A Occupational History  Not on file. Social History Main Topics  Smoking status: Former Smoker Packs/day: 2.00 Years: 40.00 Quit date: 7/26/2014  Smokeless tobacco: Never Used Comment: still taking Chantix  Alcohol use No  
 Drug use: No  
 Sexual activity: Yes  
  Partners: Female Birth control/ protection: None Other Topics Concern  Not on file Social History Narrative Family History Problem Relation Age of Onset  Cancer Mother  Heart Disease Father Allergies Allergen Reactions  Statins-Hmg-Coa Reductase Inhibitors Myalgia Muscle pain Current Facility-Administered Medications Medication Dose Route Frequency  allopurinol (ZYLOPRIM) tablet 300 mg  300 mg Oral DAILY  apixaban (ELIQUIS) tablet 5 mg  5 mg Oral BID  atorvastatin (LIPITOR) tablet 40 mg  40 mg Oral DAILY  azithromycin (ZITHROMAX) tablet 500 mg  500 mg Oral Q MON, WED & FRI  latanoprost (XALATAN) 0.005 % ophthalmic solution 1 Drop  1 Drop Both Eyes QPM  
 brimonidine (ALPHAGAN) 0.2 % ophthalmic solution 1 Drop  1 Drop Both Eyes Q8H  
 budesonide (PULMICORT) 500 mcg/2 ml nebulizer suspension  500 mcg Nebulization BID RT  
 lisinopril (PRINIVIL, ZESTRIL) tablet 10 mg  10 mg Oral DAILY  metoprolol succinate (TOPROL-XL) XL tablet 50 mg  50 mg Oral DAILY  nitroglycerin (NITROSTAT) tablet 0.4 mg  0.4 mg SubLINGual Q5MIN PRN  pantoprazole (PROTONIX) tablet 40 mg  40 mg Oral ACB  potassium chloride (KLOR-CON) tablet 10 mEq  10 mEq Oral DAILY  tamsulosin (FLOMAX) capsule 0.4 mg  0.4 mg Oral QHS  tiotropium (SPIRIVA) inhalation capsule 18 mcg  1 Cap Inhalation DAILY  albuterol (PROVENTIL VENTOLIN) nebulizer solution 2.5 mg  2.5 mg Nebulization Q6H RT  
 furosemide (LASIX) injection 40 mg  40 mg IntraVENous DAILY  methylPREDNISolone (PF) (SOLU-MEDROL) injection 40 mg  40 mg IntraVENous Q12H  
 acetaminophen (TYLENOL) tablet 650 mg  650 mg Oral Q6H PRN  
 traMADol (ULTRAM) tablet 50 mg  50 mg Oral Q6H PRN  
 ondansetron (ZOFRAN) injection 4 mg  4 mg IntraVENous Q6H PRN  
 ethambutol (MYAMBUTOL) tablet 2,000 mg  2,000 mg Oral Q MON, WED & FRI  rifAMPin (RIFADIN) capsule 600 mg  600 mg Oral Q MON, WED & FRI  influenza vaccine 2018-19 (6 mos+)(PF) (FLUARIX QUAD/FLULAVAL QUAD) injection 0.5 mL  0.5 mL IntraMUSCular PRIOR TO DISCHARGE Objective:  
 
Vitals:  
 09/14/18 5187 09/14/18 0419 09/14/18 0709 09/14/18 0740 BP: 130/84 125/60 137/69 Pulse: 75 73 64 Resp: 18 18 18 Temp: 97.6 °F (36.4 °C) 97.7 °F (36.5 °C) 98.4 °F (36.9 °C) SpO2: 97% 100% 100% 100% Weight:      
Height: PHYSICAL EXAM  
 
Constitutional:  the patient is well developed and in no acute distress, NC 2L, sat 100% EENMT:  Sclera clear, pupils equal, oral mucosa moist 
Respiratory: few basilar crackles, no wheezing Cardiovascular:  RRR without M,G,R 
Gastrointestinal: soft and non-tender; with positive bowel sounds. Musculoskeletal: warm without cyanosis. There is no lower leg edema. Skin:  no jaundice or rashes, no wounds Neurologic: no gross neuro deficits Psychiatric:  alert and oriented x 3 CXR:  None today CXR 9/13/18:  Grossly stable cardiomegaly without evolving acute changes. Recent Labs  
   09/14/18 
 0533  09/13/18 2018 WBC  6.8  9.7 HGB  9.3*  10.6* HCT  30.8*  35.4*  
PLT  187  214 Recent Labs  
   09/14/18 
 0533  09/13/18 2018  
NA  143  143  
K  4.4  4.6 CL  106  109* GLU  119*  138* CO2  29  26 BUN  16  15 CREA  1.13  1.29  
MG  2.2   --   
CA  8.2*  8.4 ALB  2.6*  2.9* TBILI  0.2  0.2 ALT  80*  92* SGOT  45*  69* Recent Labs  
   09/13/18 
 2030 PH  7.30* PCO2  49* PO2  75* HCO3  23 No results for input(s): LCAD, LAC in the last 72 hours. Assessment:  (Medical Decision Making) Hospital Problems  Date Reviewed: 8/31/2018 Codes Class Noted POA Acute on chronic respiratory failure (HCC) ICD-10-CM: J96.20 ICD-9-CM: 518.84  7/25/2018 Yes Home o2 2l--check ambulating sat on on O2 Paroxysmal atrial fibrillation (HCC) (Chronic) ICD-10-CM: I48.0 ICD-9-CM: 427.31  5/2/2014 Yes S/p ablation--on eliquis Multiple pulmonary nodules (Chronic) ICD-10-CM: R91.8 ICD-9-CM: 793.19  8/30/2018 Yes  
 bx not malignant COPD exacerbation ([facility] ICD-10-CM: J44.1 ICD-9-CM: 491.21  7/23/2018 Unknown No wheezing CHF (congestive heart failure) (HCC) (Chronic) ICD-10-CM: I50.9 ICD-9-CM: 428.0  7/23/2018 Yes  
  7/23/18  ECHO 
-  Left ventricle moderately dilated. EF30-35%. Mild/mod global diffuse hypokinesis. -  Left atrium: The atrium was mildly dilated. -  Right atrium: The atrium was mildly dilated. -  Inferior vena cava, hepatic veins: IVC dilated. Respirophasic change more than 50%. Chronic--elevated BNP--on Lasix IV here Pulmonary embolus [facility]) ICD-10-CM: I26.99 
ICD-9-CM: 415.19  6/1/2017 Yes  
 hx  
 AAA (abdominal aortic aneurysm) without rupture (HCC) (Chronic) ICD-10-CM: I71.4 ICD-9-CM: 441.4  5/2/2014 Yes  
  3.2 on US 2/14 [facility] 
  
  
 chronic Glaucoma (Chronic) ICD-10-CM: H40.9 ICD-9-CM: 365.9  5/2/2014 Yes  
 chronic Plan:  (Medical Decision Making) --Albuterol, Pulmicort, Spiriva 
--Eliquis with recent Aflutter albation 
--Zithromax, Rifampin, Ethambutol 3 times per week for MAC--followed at [facility]. --Admission BNP 1516--Lasix 40mg IV ordered daily--urine output not recorded 
--Solu Medrol 40mg o90r--nn wheezing 
--Will check room air ambulating sat--O2 requirements may had increased with chronic lung diease. More than 50% of the time documented was spent in face-to-face contact with the patient and in the care of the patient on the floor/unit where the patient is located. Thank you very much for this referral.  We appreciate the opportunity to participate in this patient's care. Will follow along with above stated plan. Maddi Michaels NP The patient has been seen and examined by me personally, the chart,labs, and radiographic studies have been reviewed. Chest:CTA at time of my examination Extremities: 2+ edema I agree with the above assessment and plan.  
Needs strict I and Barron Duran MD.

## 2018-09-14 NOTE — DISCHARGE INSTRUCTIONS
COPD Exacerbation Plan: Care Instructions  Your Care Instructions    If you have chronic obstructive pulmonary disease (COPD), your usual shortness of breath could suddenly get worse. You may start coughing more and have more mucus. This flare-up is called a COPD exacerbation (say \"pu-MII-xc-BAY-kristie\"). A lung infection or air pollution could set off an exacerbation. Sometimes it can happen after a quick change in temperature or being around chemicals. Work with your doctor to make a plan for dealing with an exacerbation. You can better manage it if you plan ahead. Follow-up care is a key part of your treatment and safety. Be sure to make and go to all appointments, and call your doctor if you are having problems. It's also a good idea to know your test results and keep a list of the medicines you take. How can you care for yourself at home? During an exacerbation  · Do not panic if you start to have one. Quick treatment at home may help you prevent serious breathing problems. If you have a COPD exacerbation plan that you developed with your doctor, follow it. · Take your medicines exactly as your doctor tells you. ¨ Use your inhaler as directed by your doctor. If your symptoms do not get better after you use your medicine, have someone take you to the emergency room. Call an ambulance if necessary. ¨ With inhaled medicines, a spacer or a nebulizer may help you get more medicine to your lungs. Ask your doctor or pharmacist how to use them properly. Practice using the spacer in front of a mirror before you have an exacerbation. This may help you get the medicine into your lungs quickly. ¨ If your doctor has given you steroid pills, take them as directed. ¨ Your doctor may have given you a prescription for antibiotics, which you can fill if you need it. ¨ Talk to your doctor if you have any problems with your medicine.  And call your doctor if you have to use your antibiotic or steroid pills.  Preventing an exacerbation  · Do not smoke. This is the most important step you can take to prevent more damage to your lungs and prevent problems. If you already smoke, it is never too late to stop. If you need help quitting, talk to your doctor about stop-smoking programs and medicines. These can increase your chances of quitting for good. · Take your daily medicines as prescribed. · Avoid colds and flu. ¨ Get a pneumococcal vaccine. ¨ Get a flu vaccine each year, as soon as it is available. Ask those you live or work with to do the same, so they will not get the flu and infect you. ¨ Try to stay away from people with colds or the flu. ¨ Wash your hands often. · Avoid secondhand smoke; air pollution; cold, dry air; hot, humid air; and high altitudes. Stay at home with your windows closed when air pollution is bad. · Learn breathing techniques for COPD, such as breathing through pursed lips. These techniques can help you breathe easier during an exacerbation. When should you call for help? Call 911 anytime you think you may need emergency care. For example, call if:    · You have severe trouble breathing.     · You have severe chest pain.    Call your doctor now or seek immediate medical care if:    · You have new or worse shortness of breath.     · You develop new chest pain.     · You are coughing more deeply or more often, especially if you notice more mucus or a change in the color of your mucus.     · You cough up blood.     · You have new or increased swelling in your legs or belly.     · You have a fever.    Watch closely for changes in your health, and be sure to contact your doctor if:    · You need to use your antibiotic or steroid pills.     · Your symptoms are getting worse. Where can you learn more? Go to http://airam-shanelle.info/. Enter K631 in the search box to learn more about \"COPD Exacerbation Plan: Care Instructions. \"  Current as of: December 6, 2017  Content Version: 11.7  © 3762-0690 [employer]. Care instructions adapted under license by [employer] (which disclaims liability or warranty for this information). If you have questions about a medical condition or this instruction, always ask your healthcare professional. [address] any warranty or liability for your use of this information.

## 2018-09-14 NOTE — H&P
[address] HISTORY AND PHYSICAL Mansoor Crockett 
MR#: [id number] : 1940 ACCOUNT #: [de-identified] ADMIT DATE: 2018 TIME OF ADMISSION:  11:00 p.m. CHIEF COMPLAINT:  Severe shortness of breath. 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 This patient was recently discharged from this hospital on 2018. At that time, he was admitted to the hospital with respiratory distress and he was found to have atrial fibrillation with rapid ventricular response. He was placed on BiPAP support and he was seen by cardiology. He had a transesophageal echocardiogram done which was negative for any intracavitary thrombus and he underwent successful cardioversion and atrial flutter ablation. He was started on Eliquis for anticoagulation. This patient is receiving active treatment for mycobacterial avium infection and after he was discharged, he was already seen by his pulmonary doctor on 2018. He was stable at that time and he was advised to continue taking his treatment for Mycobacterium infection and a new CT scan of his chest was planned to be done within 3 months. According to the patient in the last 3 days he has developed progressive shortness of breath even at rest, wheezing, rhonchi and progressive discomfort. He denies fever or chills.   He has had some cough with sputum production, but according to him, is not worse than usual.  Today, his symptoms became extremely severe and he was brought into the emergency room. When he arrived here, his vital signs were blood pressure of 158/78, heart rate of 90, respiratory rate of 20, O2 saturation of 98% on 4 liters per nasal cannula. His lungs had bilateral wheezing and rhonchi. He did receive IV Solu-Medrol while he was en route into the emergency room and he received nebulizations with albuterol. His initial blood workup reported normal white blood cell count, stable hemoglobin level, stable kidney function, procalcitonin of 0.1 and a BNP of 1500. Clinically, he was not found to have crackles or bilateral leg edema. His chest x-ray did not report pulmonary edema. The patient was presented to the hospitalist team to be admitted under the suspicion of COPD exacerbation. REVIEW OF SYSTEMS:  A review of 14 systems was performed and it was negative except for the findings that are reported in the HPI. PAST MEDICAL HISTORY: 
1. Systolic congestive heart failure. 2.  Atrial fibrillation, on anticoagulation with Eliquis. 3.  Previous history of pulmonary embolism. 4.  Abdominal aortic aneurysm. 5.  Prostate cancer. 6.  Chronic obstructive pulmonary disease on home oxygen. 7.  Glaucoma. 8.  Hyperlipidemia. 9.  Hypertension. PAST SURGICAL HISTORY:  Left heart catheterization. SOCIAL HISTORY:  Former smoker, but quit in 2014. Denies alcohol use or illicit drug use. FAMILY HISTORY:  Positive for heart disease or cancer. ALLERGIES:  THE PATIENT SEEMS TO BE ALLERGIC TO STATINS. PHYSICAL EXAMINATION: 
VITAL SIGNS:  Blood pressure 156/75, heart rate 74, respiratory rate of 18, O2 saturation of 95% on 2 liters per nasal cannula. EYES:  PERRLA. EARS, NOSE,  MOUTH AND THROAT:  There is no evidence of pharyngeal erythema, edema or purulent exudate. RESPIRATORY:  Mild bilateral wheezing; decreased breath sounds in both lung bases. No crackles. CARDIOVASCULAR:  Irregular rate and rhythm. GENITOURINARY:  Unremarkable. MUSCULOSKELETAL:  No evidence of trauma. SKIN:  No evidence of active skin lesions. NEUROLOGIC:  Patient is alert and oriented with no evidence of focal weakness. PSYCHIATRIC:  Normal mood. HEMATOLOGIC, LYMPHATIC, IMMUNOLOGIC:  No evidence of active bleeding. No abnormal lymphadenopathy. LABORATORY DATA AND  IMAGING RESULTS:  Lactic acid 0.6. White blood cell count 9.7, hemoglobin 10.6, platelet count 275. Sodium 143, potassium 4.6, chloride 109, CO2 of 26, glucose 138, creatinine 1.29, ALT 92, AST 69. Procalcitonin 0.1. BNP 1500. Chest x-ray:  No evidence of lung infiltrates. EKG seen and analyzed by me, normal sinus rhythm with some PVCs. No evidence of acute ischemia. ASSESSMENT:  This is a 53-year-old male patient who came into the emergency room with acute on chronic respiratory failure, possibly secondary to chronic obstructive pulmonary disease exacerbation in the setting of mycobacterial infection. He is at high risk of further complications. He is going to be admitted to the hospital and his initial length of stay is calculated to be more than 2 midnights. PLAN: 
1. Acute on chronic hypoxic respiratory failure in the setting of pulmonary mycobacterial infection. The patient is receiving treatment with azithromycin, ethambutol and rifampin 3 times per week. I will continue using the same antibiotics at this time and I would not add any other antibiotic. He will be treated with a low dose of IV steroids as he is still having some wheezing and he will receive nebulizations with albuterol and Pulmicort. His pulmonary doctor has seen him very recently on 09/04/2018. He recommended to continue the antibiotics and to repeat a CT scan in three months. He has multiple lung nodules which were biopsied and reported negative for malignancy.   Mycobacterial infection was reported. Apparently some of his nodules are larger than before and if his disease continues getting worse, they have recommended doing a second set of biopsies. 2.  History of hypertension. We will continue using his regular medications. 3.  History of systolic congestive heart failure. The patient has a left ventricular ejection fraction of 30%. His BNP is very elevated at 1500, which apparently is chronic. I do not see heavy clinical evidence of fluid overload. He has no JVD. He has no lung crackles and he has no bilateral leg edema. For now, I will treat him with IV diuretics and we will monitor his clinical progress. 4.  History of prostate cancer, stable. No need for acute intervention. 5.  Deep venous thrombosis prophylaxis; already on anticoagulation with Eliquis. 6.  Atrial fibrillation. He recently had an atrial flutter ablation and he is on anticoagulation with Eliquis. MD JENNIFER Neves/[place] 
D: 09/14/2018 00:11 T: 09/14/2018 00:55 JOB #: [id number]

## 2018-09-14 NOTE — PROGRESS NOTES
Pt at rest on 2L NC, SAT 98%. Pt ambulating on 2L NC, SAT 93%. Pt states he is out of breath with exertion.

## 2018-09-14 NOTE — ED NOTES
Ambulated patient on his normal 2L O2 NC. Patient Sp02 dipped as low as 89%, patient with tachypnea and labored breathing near end of walk. Took patient several minutes to catch his breath on return to room. . MD made aware.

## 2018-09-14 NOTE — PROGRESS NOTES
Progress Note Patient: Radha Walters. MRN: [id number]  SSN: [id number] YOB: 1940  Age: 78 y.o. Sex: male Admit Date: 9/13/2018 LOS: 1 day Subjective:  
F/U acute on chronic hypoxic respiratory failure Patient states he is taking all medications appropriately at home. Only SOB when exerts himself. No active SOB or chest pain. No exertional SOB now. Current Facility-Administered Medications Medication Dose Route Frequency  allopurinol (ZYLOPRIM) tablet 300 mg  300 mg Oral DAILY  apixaban (ELIQUIS) tablet 5 mg  5 mg Oral BID  atorvastatin (LIPITOR) tablet 40 mg  40 mg Oral DAILY  azithromycin (ZITHROMAX) tablet 500 mg  500 mg Oral Q MON, WED & FRI  latanoprost (XALATAN) 0.005 % ophthalmic solution 1 Drop  1 Drop Both Eyes QPM  
 brimonidine (ALPHAGAN) 0.2 % ophthalmic solution 1 Drop  1 Drop Both Eyes Q8H  
 budesonide (PULMICORT) 500 mcg/2 ml nebulizer suspension  500 mcg Nebulization BID RT  
 lisinopril (PRINIVIL, ZESTRIL) tablet 10 mg  10 mg Oral DAILY  metoprolol succinate (TOPROL-XL) XL tablet 50 mg  50 mg Oral DAILY  nitroglycerin (NITROSTAT) tablet 0.4 mg  0.4 mg SubLINGual Q5MIN PRN  pantoprazole (PROTONIX) tablet 40 mg  40 mg Oral ACB  potassium chloride (KLOR-CON) tablet 10 mEq  10 mEq Oral DAILY  tamsulosin (FLOMAX) capsule 0.4 mg  0.4 mg Oral QHS  tiotropium (SPIRIVA) inhalation capsule 18 mcg  1 Cap Inhalation DAILY  albuterol (PROVENTIL VENTOLIN) nebulizer solution 2.5 mg  2.5 mg Nebulization Q6H RT  
 furosemide (LASIX) injection 40 mg  40 mg IntraVENous DAILY  methylPREDNISolone (PF) (SOLU-MEDROL) injection 40 mg  40 mg IntraVENous Q12H  
 acetaminophen (TYLENOL) tablet 650 mg  650 mg Oral Q6H PRN  
 traMADol (ULTRAM) tablet 50 mg  50 mg Oral Q6H PRN  
 ondansetron (ZOFRAN) injection 4 mg  4 mg IntraVENous Q6H PRN  
 ethambutol (MYAMBUTOL) tablet 2,000 mg  2,000 mg Oral Q MON, WED & FRI  
  rifAMPin (RIFADIN) capsule 600 mg  600 mg Oral Q MON, WED & FRI  influenza vaccine 2018-19 (6 mos+)(PF) (FLUARIX QUAD/FLULAVAL QUAD) injection 0.5 mL  0.5 mL IntraMUSCular PRIOR TO DISCHARGE  tuberculin injection 5 Units  5 Units IntraDERMal ONCE  
 
 
Objective:  
 
Vitals:  
 09/14/18 0740 09/14/18 1134 09/14/18 1453 09/14/18 1500 BP:  143/75  135/67 Pulse:  (!) 59  91 Resp:  18  18 Temp:  98.1 °F (36.7 °C)  97.9 °F (36.6 °C) SpO2: 100% 98% 99% 92% Weight:      
Height:      
  
  
Intake and Output: 
Current Shift: 09/14 0701 - 09/14 1900 In: 240 [P.O.:240] Out: 400 [Urine:400] Last three shifts:   
 
Physical Exam:  
General:  Alert, cooperative, no distress, appears stated age. Eyes:  Conjunctivae/corneas clear. Ears:  Normal TMs and external ear canals both ears. Nose: Nares normal. Septum midline. Mouth/Throat: Lips, mucosa, and tongue normal.. Neck:  no JVD. Back:   Symmetric, no curvature. ROM normal. No CVA tenderness. Lungs:   Clear to auscultation bilaterally. Heart:  Regular rate and rhythm, S1, S2 normal, no murmur, click, rub or gallop. Abdomen:   Soft, non-tender. Bowel sounds normal. No masses,  No organomegaly. Extremities: Extremities normal, atraumatic, no cyanosis or edema. Pulses: 2+ and symmetric all extremities. Skin: Skin color, texture, turgor normal. No rashes or lesions Lymph nodes: Cervical, supraclavicular, and axillary nodes normal.  
Neurologic: CNII-XII intact. Normal strength, sensation and reflexes throughout. Lab/Data Review: 
 
Recent Results (from the past 24 hour(s)) EKG, 12 LEAD, INITIAL Collection Time: 09/13/18  8:17 PM  
Result Value Ref Range Ventricular Rate 88 BPM  
 Atrial Rate 88 BPM  
 P-R Interval 148 ms QRS Duration 90 ms Q-T Interval 384 ms QTC Calculation (Bezet) 464 ms Calculated P Axis 79 degrees Calculated R Axis 69 degrees Calculated T Axis 78 degrees Diagnosis Sinus rhythm with occasional Premature ventricular complexes When compared with ECG of 31-AUG-2018 14:06, Fusion complexes are no longer Present Nonspecific T wave abnormality no longer evident in Inferior leads T wave inversion no longer evident in Anterolateral leads Confirmed by Shonda Gramajo ([id number]) on 9/14/2018 7:10:56 AM 
  
CBC WITH AUTOMATED DIFF Collection Time: 09/13/18  8:18 PM  
Result Value Ref Range WBC 9.7 4.3 - 11.1 K/uL  
 RBC 4.04 (L) 4.23 - 5.6 M/uL  
 HGB 10.6 (L) 13.6 - 17.2 g/dL HCT 35.4 (L) 41.1 - 50.3 % MCV 87.6 79.6 - 97.8 FL  
 MCH 26.2 26.1 - 32.9 PG  
 MCHC 29.9 (L) 31.4 - 35.0 g/dL RDW 20.0 % PLATELET 224 423 - 616 K/uL MPV 11.5 9.4 - 12.3 FL ABSOLUTE NRBC 0.02 0.0 - 0.2 K/uL  
 DF AUTOMATED NEUTROPHILS 73 43 - 78 % LYMPHOCYTES 18 13 - 44 % MONOCYTES 8 4.0 - 12.0 % EOSINOPHILS 1 0.5 - 7.8 % BASOPHILS 0 0.0 - 2.0 % IMMATURE GRANULOCYTES 0 0.0 - 5.0 %  
 ABS. NEUTROPHILS 7.0 1.7 - 8.2 K/UL  
 ABS. LYMPHOCYTES 1.8 0.5 - 4.6 K/UL  
 ABS. MONOCYTES 0.8 0.1 - 1.3 K/UL  
 ABS. EOSINOPHILS 0.1 0.0 - 0.8 K/UL  
 ABS. BASOPHILS 0.0 0.0 - 0.2 K/UL  
 ABS. IMM. GRANS. 0.0 0.0 - 0.5 K/UL METABOLIC PANEL, COMPREHENSIVE Collection Time: 09/13/18  8:18 PM  
Result Value Ref Range Sodium 143 136 - 145 mmol/L Potassium 4.6 3.5 - 5.1 mmol/L Chloride 109 (H) 98 - 107 mmol/L  
 CO2 26 21 - 32 mmol/L Anion gap 8 7 - 16 mmol/L Glucose 138 (H) 65 - 100 mg/dL BUN 15 8 - 23 MG/DL Creatinine 1.29 0.8 - 1.5 MG/DL  
 GFR est AA >60 >60 ml/min/1.73m2 GFR est non-AA 58 (L) >60 ml/min/1.73m2 Calcium 8.4 8.3 - 10.4 MG/DL Bilirubin, total 0.2 0.2 - 1.1 MG/DL  
 ALT (SGPT) 92 (H) 12 - 65 U/L  
 AST (SGOT) 69 (H) 15 - 37 U/L Alk. phosphatase 111 50 - 136 U/L Protein, total 6.8 6.3 - 8.2 g/dL Albumin 2.9 (L) 3.2 - 4.6 g/dL Globulin 3.9 (H) 2.3 - 3.5 g/dL A-G Ratio 0.7 (L) 1.2 - 3.5 BNP  
 Collection Time: 09/13/18  8:18 PM  
Result Value Ref Range BNP 1516 pg/mL BLOOD GAS, ARTERIAL Collection Time: 09/13/18  8:30 PM  
Result Value Ref Range pH 7.30 (L) 7.35 - 7.45    
 PCO2 49 (H) 35 - 45 mmHg PO2 75 (L) 80 - 105 mmHg BICARBONATE 23 22 - 26 mmol/L  
 BASE DEFICIT 3.1 (H) 0 - 2 mmol/L  
 TOTAL HEMOGLOBIN 11.4 (L) 11.7 - 15.0 GM/DL  
 O2 SAT 94 92 - 98.5 % Arterial O2 Hgb 93.4 (L) 94 - 97 % CARBOXYHEMOGLOBIN 0.1 (L) 0.5 - 1.5 % METHEMOGLOBIN 0.4 0.0 - 1.5 % DEOXYHEMOGLOBIN 6 (H) 0.0 - 5.0 % SITE RR   
 ALLENS TEST POSITIVE    
 MODE NC   
 O2 FLOW 2.00 L/min POC LACTIC ACID Collection Time: 09/13/18  8:43 PM  
Result Value Ref Range Lactic Acid (POC) 0.6 0.5 - 1.9 mmol/L PROCALCITONIN Collection Time: 09/13/18  8:48 PM  
Result Value Ref Range Procalcitonin 0.1 ng/mL CBC W/O DIFF Collection Time: 09/14/18  5:33 AM  
Result Value Ref Range WBC 6.8 4.3 - 11.1 K/uL  
 RBC 3.52 (L) 4.23 - 5.6 M/uL HGB 9.3 (L) 13.6 - 17.2 g/dL HCT 30.8 (L) 41.1 - 50.3 % MCV 87.5 79.6 - 97.8 FL  
 MCH 26.4 26.1 - 32.9 PG  
 MCHC 30.2 (L) 31.4 - 35.0 g/dL  
 RDW 19.7 % PLATELET 498 740 - 405 K/uL MPV 11.0 9.4 - 12.3 FL ABSOLUTE NRBC 0.02 0.0 - 0.2 K/uL METABOLIC PANEL, COMPREHENSIVE Collection Time: 09/14/18  5:33 AM  
Result Value Ref Range Sodium 143 136 - 145 mmol/L Potassium 4.4 3.5 - 5.1 mmol/L Chloride 106 98 - 107 mmol/L  
 CO2 29 21 - 32 mmol/L Anion gap 8 7 - 16 mmol/L Glucose 119 (H) 65 - 100 mg/dL BUN 16 8 - 23 MG/DL Creatinine 1.13 0.8 - 1.5 MG/DL  
 GFR est AA >60 >60 ml/min/1.73m2 GFR est non-AA >60 >60 ml/min/1.73m2 Calcium 8.2 (L) 8.3 - 10.4 MG/DL Bilirubin, total 0.2 0.2 - 1.1 MG/DL  
 ALT (SGPT) 80 (H) 12 - 65 U/L  
 AST (SGOT) 45 (H) 15 - 37 U/L Alk. phosphatase 93 50 - 136 U/L Protein, total 6.1 (L) 6.3 - 8.2 g/dL Albumin 2.6 (L) 3.2 - 4.6 g/dL Globulin 3.5 2.3 - 3.5 g/dL A-G Ratio 0.7 (L) 1.2 - 3.5 MAGNESIUM Collection Time: 09/14/18  5:33 AM  
Result Value Ref Range Magnesium 2.2 1.8 - 2.4 mg/dL Assessment/ Plan:  
 
Principal Problem: 
  Acute on chronic respiratory failure ([address] (7/25/2018) Active Problems: 
  AAA (abdominal aortic aneurysm) without rupture ([address] (5/2/2014) Overview: 3.2 on  2/14 [facility] (5/2/2014) Paroxysmal atrial fibrillation ([address] (5/2/2014) Pulmonary embolus ([address] (6/1/2017) COPD exacerbation ([address] (7/23/2018) CHF (congestive heart failure) ([address] (7/23/2018) Overview: 7/23/18  ECHO 
    -  Left ventricle moderately dilated. EF30-35%. Mild/mod global diffuse  
    hypokinesis. -  Left atrium: The atrium was mildly dilated. -  Right atrium: The atrium was mildly dilated. -  Inferior vena cava, hepatic veins: IVC dilated. Respirophasic change  
    more than 50%. 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[facility] (7/25/2018) Overview: MAC followed by Dr. Silvia Garg at [facility]. In March 2018 was found with new SAMANTHA  
    nodules with spiculation. 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  
     He was started on ETB/azithro/rif.   
 
  Multiple pulmonary nodules (8/30/2018) Continue scheduled nebulizer treatments. Azithromycin, ethambutol, and rifampin on Monday, Wednesday, Friday. Strict Is and Os with Lasix 40mg IV for now. No signs of fluid overload but patient is improving. Pulmonology also following to which I appreciate. Expect to d/c within next two days. Will need to make sure goes to follow up appointments. Patient admits to not having a ride to his physician appointments at times. DVT prophylaxis - Eliquis. Signed By: Braeden Jay, DO September 14, 2018

## 2018-09-14 NOTE — PROGRESS NOTES
Patient very lethargic when CM went to meet with him. Patient verbalized that CM could call son farheen. Call placed to son. Son states patient currently lives alone. Patient has [address] aide that comes 3hrs/day. Patient has home oxygen but cannot recall company. Patient also has walker and nebulizer. Provided patient's son with [employer] [facility] [employer] information and phone number to help with transportation. Son states discharge plan is for patient to return home with [facility] services. [facility] Resume orders faxed to [facility] homecare. Referral made to ambulatory case management. No other needs voiced at this time. CM will continue to follow for discharge needs. Care Management Interventions PCP Verified by CM: Yes Mode of Transport at Discharge: Other (see comment) Transition of Care Consult (CM Consult): Discharge Planning, Home Health [address]: No 
Reason Outside [facility]: Patient already serviced by other home care/hospice agency Discharge Durable Medical Equipment: No 
Physical Therapy Consult: Yes Occupational Therapy Consult: Yes Current Support Network: Own Home, Lives Alone Confirm Follow Up Transport: Family Plan discussed with Pt/Family/Caregiver: Yes Freedom of Choice Offered: Yes Discharge Location Discharge Placement: Home with home health

## 2018-09-14 NOTE — PROGRESS NOTES
Response to [facility] consult, requested by Pt's nurse. Pt did not know what HCPOA was; I explained it and assured pt (who was becoming agitated) that providing an advance directive was 1 of the ways we offered care to pts who needed to designate an agent to further their health care needs, were they not able to do so. Pt asked Oliva Campoverde would I want that? \"   I assured pt that to have the paper work completed was entirely his choice.

## 2018-09-15 VITALS
HEART RATE: 93 BPM | DIASTOLIC BLOOD PRESSURE: 73 MMHG | TEMPERATURE: 98.4 F | WEIGHT: 178.4 LBS | HEIGHT: 69 IN | OXYGEN SATURATION: 100 % | SYSTOLIC BLOOD PRESSURE: 135 MMHG | BODY MASS INDEX: 26.42 KG/M2 | RESPIRATION RATE: 18 BRPM

## 2018-09-15 LAB
ANION GAP SERPL CALC-SCNC: 9 MMOL/L (ref 7–16)
BUN SERPL-MCNC: 23 MG/DL (ref 8–23)
CALCIUM SERPL-MCNC: 8 MG/DL (ref 8.3–10.4)
CHLORIDE SERPL-SCNC: 108 MMOL/L (ref 98–107)
CO2 SERPL-SCNC: 27 MMOL/L (ref 21–32)
CREAT SERPL-MCNC: 1.3 MG/DL (ref 0.8–1.5)
GLUCOSE SERPL-MCNC: 171 MG/DL (ref 65–100)
POTASSIUM SERPL-SCNC: 4.2 MMOL/L (ref 3.5–5.1)
SODIUM SERPL-SCNC: 144 MMOL/L (ref 136–145)

## 2018-09-15 PROCEDURE — 77030020253 HC SOL INJ D545NS .05 DEX .45 SAL

## 2018-09-15 PROCEDURE — 80048 BASIC METABOLIC PNL TOTAL CA: CPT

## 2018-09-15 PROCEDURE — 94760 N-INVAS EAR/PLS OXIMETRY 1: CPT

## 2018-09-15 PROCEDURE — 90471 IMMUNIZATION ADMIN: CPT

## 2018-09-15 PROCEDURE — 99232 SBSQ HOSP IP/OBS MODERATE 35: CPT | Performed by: INTERNAL MEDICINE

## 2018-09-15 PROCEDURE — 74011250636 HC RX REV CODE- 250/636: Performed by: INTERNAL MEDICINE

## 2018-09-15 PROCEDURE — 77010033678 HC OXYGEN DAILY

## 2018-09-15 PROCEDURE — 36415 COLL VENOUS BLD VENIPUNCTURE: CPT

## 2018-09-15 PROCEDURE — 74011250637 HC RX REV CODE- 250/637: Performed by: INTERNAL MEDICINE

## 2018-09-15 PROCEDURE — 94640 AIRWAY INHALATION TREATMENT: CPT

## 2018-09-15 PROCEDURE — 90686 IIV4 VACC NO PRSV 0.5 ML IM: CPT | Performed by: INTERNAL MEDICINE

## 2018-09-15 PROCEDURE — 74011000250 HC RX REV CODE- 250: Performed by: INTERNAL MEDICINE

## 2018-09-15 RX ORDER — IPRATROPIUM BROMIDE AND ALBUTEROL SULFATE 2.5; .5 MG/3ML; MG/3ML
3 SOLUTION RESPIRATORY (INHALATION)
Qty: 30 NEBULE | Refills: 0 | Status: SHIPPED | OUTPATIENT
Start: 2018-09-15 | End: 2021-01-01 | Stop reason: SDUPTHER

## 2018-09-15 RX ORDER — BUDESONIDE 0.5 MG/2ML
500 INHALANT ORAL 2 TIMES DAILY
Qty: 1 EACH | Refills: 0 | Status: SHIPPED | OUTPATIENT
Start: 2018-09-15 | End: 2021-01-01 | Stop reason: ALTCHOICE

## 2018-09-15 RX ORDER — FUROSEMIDE 20 MG/1
20 TABLET ORAL DAILY
Qty: 30 TAB | Refills: 0 | Status: SHIPPED | OUTPATIENT
Start: 2018-09-15 | End: 2019-01-10

## 2018-09-15 RX ADMIN — BRIMONIDINE TARTRATE 1 DROP: 2 SOLUTION OPHTHALMIC at 05:10

## 2018-09-15 RX ADMIN — ALBUTEROL SULFATE 2.5 MG: 2.5 SOLUTION RESPIRATORY (INHALATION) at 14:41

## 2018-09-15 RX ADMIN — BRIMONIDINE TARTRATE 1 DROP: 2 SOLUTION OPHTHALMIC at 15:39

## 2018-09-15 RX ADMIN — METOPROLOL SUCCINATE 50 MG: 50 TABLET, EXTENDED RELEASE ORAL at 09:21

## 2018-09-15 RX ADMIN — BUDESONIDE 500 MCG: 0.5 INHALANT RESPIRATORY (INHALATION) at 07:53

## 2018-09-15 RX ADMIN — POTASSIUM CHLORIDE 10 MEQ: 10 TABLET, EXTENDED RELEASE ORAL at 09:21

## 2018-09-15 RX ADMIN — PANTOPRAZOLE SODIUM 40 MG: 40 TABLET, DELAYED RELEASE ORAL at 06:00

## 2018-09-15 RX ADMIN — LISINOPRIL 10 MG: 5 TABLET ORAL at 09:21

## 2018-09-15 RX ADMIN — ALBUTEROL SULFATE 2.5 MG: 2.5 SOLUTION RESPIRATORY (INHALATION) at 01:13

## 2018-09-15 RX ADMIN — ALLOPURINOL 300 MG: 300 TABLET ORAL at 09:20

## 2018-09-15 RX ADMIN — TIOTROPIUM BROMIDE 18 MCG: 18 CAPSULE ORAL; RESPIRATORY (INHALATION) at 07:53

## 2018-09-15 RX ADMIN — FUROSEMIDE 40 MG: 10 INJECTION, SOLUTION INTRAMUSCULAR; INTRAVENOUS at 09:20

## 2018-09-15 RX ADMIN — METHYLPREDNISOLONE SODIUM SUCCINATE 40 MG: 40 INJECTION, POWDER, FOR SOLUTION INTRAMUSCULAR; INTRAVENOUS at 09:20

## 2018-09-15 RX ADMIN — INFLUENZA VIRUS VACCINE 0.5 ML: 15; 15; 15; 15 SUSPENSION INTRAMUSCULAR at 15:38

## 2018-09-15 RX ADMIN — APIXABAN 5 MG: 5 TABLET, FILM COATED ORAL at 09:20

## 2018-09-15 RX ADMIN — ATORVASTATIN CALCIUM 40 MG: 40 TABLET, FILM COATED ORAL at 09:20

## 2018-09-15 RX ADMIN — ALBUTEROL SULFATE 2.5 MG: 2.5 SOLUTION RESPIRATORY (INHALATION) at 07:53

## 2018-09-15 NOTE — DISCHARGE SUMMARY
Hospitalist Discharge Summary Patient ID: 
Aletha Odell 
[id number] 
78 y.o. 
1940 Admit date: 9/13/2018  8:10 PM 
Discharge date and time: 9/15/2018 Attending: Myriam, * PCP:  Lina Muller MD 
Treatment Team: Attending Provider: MD Myriam; Consulting Provider: Shonna Dent MD; Utilization Review: Chio Meredith RN; Care Manager: Ree Chakraborty RN; Charge Nurse: Prakash Hernandez RN 
 
Principal Diagnosis Acute on chronic respiratory failure ([address] Principal Problem: 
  Acute on chronic respiratory failure ([address] (7/25/2018) Active Problems: 
  AAA (abdominal aortic aneurysm) without rupture ([address] (5/2/2014) Overview: 3.2 on  2/14 [facility] (5/2/2014) Paroxysmal atrial fibrillation ([address] (5/2/2014) Pulmonary embolus ([address] (6/1/2017) COPD exacerbation ([address] (7/23/2018) CHF (congestive heart failure) ([address] (7/23/2018) Overview: 7/23/18  ECHO 
    -  Left ventricle moderately dilated. EF30-35%. Mild/mod global diffuse  
    hypokinesis. -  Left atrium: The atrium was mildly dilated. -  Right atrium: The atrium was mildly dilated. -  Inferior vena cava, hepatic veins: IVC dilated. Respirophasic change  
    more than 50%. 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[address] (7/25/2018) Overview: MAC followed by Dr. Castro Lo at [facility]. In March 2018 was found with new SAMANTHA  
    nodules with spiculation. 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  
     He was started on ETB/azithro/rif.   
 
  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 
  
This patient was recently discharged from this hospital on 09/01/2018. At that time, he was admitted to the hospital with respiratory distress and he was found to have atrial fibrillation with rapid ventricular response. He was placed on BiPAP support and he was seen by cardiology. He had a transesophageal echocardiogram done which was negative for any intracavitary thrombus and he underwent successful cardioversion and atrial flutter ablation. He was started on Eliquis for anticoagulation. This patient is receiving active treatment for mycobacterial avium infection and after he was discharged, he was already seen by his pulmonary doctor on 09/04/2018. He was stable at that time and he was advised to continue taking his treatment for Mycobacterium infection and a new CT scan of his chest was planned to be done within 3 months. When he arrived here, his vital signs were blood pressure of 158/78, heart rate of 90, respiratory rate of 20, O2 saturation of 98% on 4 liters per nasal cannula. His lungs had bilateral wheezing and rhonchi. He did receive IV Solu-Medrol while he was en route into the emergency room and he received nebulizations with albuterol. His initial blood workup reported normal white blood cell count, stable hemoglobin level, stable kidney function, procalcitonin of 0.1 and a BNP of 1500. Clinically, he was not found to have crackles or bilateral leg edema. His chest x-ray did not report pulmonary edema. Pulmonology consulted along with starting IV furosemide 40mg daily. Scheduled pulmicort and duoneb started. With treatments, patient no longer was SOB. Ambulating without SOB. Will discharge with nebulizer treatments along with small daily dose of furosemide.  Will need repeat BMP and BNP in two days. PCP to determine if needs to stay on furosemide long term. Continue treatment for mycobacterium avium infection. Will need close follow up with [facility] pulmonology. If worsening SOB, chest pain, or AMS, please come back to the ED. Please refer to the admission H&P for details of presentation. In summary, the patient is stable for discharge. Significant Diagnostic Studies:  
 
 
Labs: Results:  
   
Chemistry Recent Labs  
   09/15/18 
 0523  09/14/18 
 0533  09/13/18 
 2018 GLU  171*  119*  138* NA  144  143  143  
K  4.2  4.4  4.6 CL  108*  106  109* CO2  27  29  26 BUN  23  16  15 CREA  1.30  1.13  1.29  
CA  8.0*  8.2*  8.4 AGAP  9  8  8 AP   --   93  111 TP   --   6.1*  6.8 ALB   --   2.6*  2.9*  
GLOB   --   3.5  3.9* AGRAT   --   0.7*  0.7* CBC w/Diff Recent Labs  
   09/14/18 0533 09/13/18 2018 WBC  6.8  9.7  
RBC  3.52*  4.04* HGB  9.3*  10.6* HCT  30.8*  35.4*  
PLT  187  214 GRANS   --   73 LYMPH   --   18  
EOS   --   1 Cardiac Enzymes No results for input(s): CPK, CKND1, ALISHA in the last 72 hours. No lab exists for component: Monica Lust Coagulation No results for input(s): PTP, INR, APTT in the last 72 hours. No lab exists for component: INREXT Lipid Panel No results found for: CHOL, CHOLPOCT, CHOLX, CHLST, CHOLV, 141099, HDL, LDL, LDLC, DLDLP, 997120, VLDLC, VLDL, TGLX, TRIGL, TRIGP, TGLPOCT, CHHD, CHHDX  
BNP No results for input(s): BNPP in the last 72 hours. Liver Enzymes Recent Labs  
   09/14/18 0533 TP  6.1* ALB  2.6* AP  93 SGOT  45* Thyroid Studies No results found for: T4, T3U, TSH, TSHEXT Discharge Exam: 
Visit Vitals  /73 (BP 1 Location: Left arm, BP Patient Position: At rest)  Pulse 93  Temp 98.4 °F (36.9 °C)  Resp 18  Ht 5' 9\" (1.753 m)  Wt 80.9 kg (178 lb 6.4 oz)  SpO2 100%  BMI 26.35 kg/m2 General appearance: alert, cooperative, no distress, appears stated age Lungs: clear to auscultation bilaterally Heart: regular rate and rhythm, S1, S2 normal, no murmur, click, rub or gallop Abdomen: soft, non-tender. Bowel sounds normal. No masses,  no organomegaly Extremities: no cyanosis or edema. No JVD Neurologic: Grossly normal 
 
Disposition: home Discharge Condition: stable Patient Instructions: as above Current Discharge Medication List  
  
START taking these medications Details  
budesonide (PULMICORT) 0.5 mg/2 mL nbsp 2 mL by Nebulization route two (2) times a day. Qty: 1 Each, Refills: 0  
  
furosemide (LASIX) 20 mg tablet Take 1 Tab by mouth daily. Qty: 30 Tab, Refills: 0  
  
albuterol-ipratropium (DUO-NEB) 2.5 mg-0.5 mg/3 ml nebu 3 mL by Nebulization route every six (6) hours as needed. Qty: 30 Nebule, Refills: 0 CONTINUE these medications which have CHANGED Details  
predniSONE (DELTASONE) 20 mg tablet Take 2 Tabs by mouth daily for 5 days. Qty: 10 Tab, Refills: 0 CONTINUE these medications which have NOT CHANGED Details  
tamsulosin (FLOMAX) 0.4 mg capsule Take 1 Cap by mouth nightly for 90 days. Qty: 90 Cap, Refills: 4 Associated Diagnoses: Prostate cancer ([facility] [address] metoprolol succinate (TOPROL-XL) 50 mg XL tablet Take 1 Tab by mouth daily. Qty: 30 Tab, Refills: 11  
  
lisinopril (PRINIVIL, ZESTRIL) 10 mg tablet Take 1 Tab by mouth daily. Qty: 30 Tab, Refills: 0  
  
fluticasone-salmeterol (ADVAIR) 250-50 mcg/dose diskus inhaler Take 1 Puff by inhalation. potassium chloride SR (KLOR-CON 10) 10 mEq tablet Take 20 mEq by mouth two (2) times a day. OXYGEN-AIR DELIVERY SYSTEMS Use as instructed. Use as instructed. omeprazole (PRILOSEC) 20 mg capsule Take 20 mg by mouth daily. ferrous sulfate (IRON) 325 mg (65 mg iron) tablet Take  by mouth Daily (before breakfast). LEUPROLIDE ACETATE (ELIGARD SC) 45 mg by SubCUTAneous route. atorvastatin (LIPITOR) 40 mg tablet Take 1 Tab by mouth daily. Qty: 30 Tab, Refills: 111  
  
apixaban (ELIQUIS) 5 mg tablet Take 1 Tab by mouth two (2) times a day. Qty: 60 Tab, Refills: 11  
 Associated Diagnoses: Atrial fibrillation and flutter ([address] Paroxysmal atrial fibrillation ([address] Acute on chronic combined systolic and diastolic congestive heart failure ([address] Typical atrial flutter ([address] azithromycin (ZITHROMAX) 500 mg tab Take 1 tablet every Mon, Wed, Fri  
  
ethambutol (MYAMBUTOL) 400 mg tablet Take 5 tabs every mon, wed, fri  
  
rifAMPin (RIFADIN) 300 mg capsule Take 2 tabs every mon, wed, fri  
  
nitroglycerin (NITROSTAT) 0.4 mg SL tablet 1 Tab by SubLINGual route every five (5) minutes as needed for Chest Pain. Qty: 1 Bottle, Refills: 11  
  
allopurinol (ZYLOPRIM) 300 mg tablet Take  by mouth daily. cholecalciferol, vitamin D3, 2,000 unit tab Take  by mouth.  
  
bimatoprost (LUMIGAN) 0.01 % ophthalmic drops Administer 1 Drop to both eyes every evening. brimonidine (ALPHAGAN P) 0.1 % ophthalmic solution every eight (8) hours. tiotropium (SPIRIVA WITH HANDIHALER) 18 mcg inhalation capsule Take 1 Cap by inhalation daily. albuterol (VENTOLIN HFA) 90 mcg/actuation inhaler Take  by inhalation. Activity: up and shayne. On 2L NC. Diet: cardiac Wound Care: none Follow-up PCP in one week. S pulmonology in one week. · Time spent to discharge patient 35 minutes Signed: 
Karan Campbell DO 
9/15/2018 
3:13 PM

## 2018-09-15 NOTE — PROGRESS NOTES
PROGRESS NOTE Javon Wright. 
 
9/15/2018 Date of Admission:  9/13/2018 The patient's chart is reviewed and the patient is discussed with the staff. Patient is a 78 y.o.  male seen and evaluated at the request of Dr. Sveta Delong. We have been asked to see for acute respiratory distress. Was brought in the  ER via EMS for shortness of breath, given Duoneb, Albuterol, IV steroids and magnesium. Reported worsening symptoms for the last few days with wheezing and hypoxia. On exam is sitting up in the bed and states is feeling better and back on his home O2. Has chronic productive cough, lives alone and provides his own care in the home. He has MAC and pulmonary nodules, followed at [facility] and was seen by Dr. Matty Hoover 9/4 with recommendations to continue azithromycin, ethambutol and rifampin 3 times per week with office follow up 11/5/18. He was just discharged 9/1/18, was in AFib with RVR and placed on BIPAP. Cardiology was consulted, RASHEEDA negative for thrombus and successfully cardioverted and Aflutter ablation. Has chronic respiratory failure and on home O2 2L. Has chronic COPD with centrilobular emphysema, AAA, hx Mycobacterium avium infection, glaucoma, P-AFib on Eliquis, sCHF (EF 30%), multiple pulmonary nodules with previous bx negative for malignancy and hx PE. Subjective:  
 
Feels better no complaints- no events overnight. Review of Systems A comprehensive review of systems was negative except for: Constitutional: positive for fatigue Respiratory: positive for cough, sputum, wheezing, dyspnea on exertion or COPD, emphysema, chronic respiratory failure, home O2 Cardiovascular: positive for dyspnea, irregular heart beats, fatigue, arial flutter ablation, on eliquis, dyspnea on exertion Gastrointestinal: positive for reflux symptoms 
iron def. anemia--on supplement Patient Active Problem List  
Diagnosis Code  AAA (abdominal aortic aneurysm) without rupture ([facility]) I71.4  
 COPD (chronic obstructive pulmonary disease) ([facility]) J44.9  Hyperlipemia E78.5  Glaucoma H40.9  Supplemental oxygen dependent Z99.81  
 OA (osteoarthritis) M19.90  Paroxysmal atrial fibrillation (HCC) I48.0  Warfarin anticoagulation Z79.01  
 Renal cysts, acquired, bilateral N28.1  Tobacco use disorder F17.200  Hypertension I10  
 Pulmonary embolus ([facility]) I26.99  
 Cardiomyopathy ([address] I42.9  Atrial flutter ([facility]) I48.92  
 Peptic ulcer disease K27.9  History of pulmonary embolism Z86.711  
 COPD exacerbation ([facility]) J44.1  CHF exacerbation ([facility]) I50.9  CHF (congestive heart failure) ([facility]) I50.9  A-fib ([facility]) I48.91  
 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[facility]) J15.8, A31.9  Acute on chronic respiratory failure ([facility]) J96.20  Multiple pulmonary nodules R91.8 Home O2 at 2L. Prior to Admission Medications Prescriptions Last Dose Informant Patient Reported? Taking? LEUPROLIDE ACETATE (ELIGARD SC) 2018 at Unknown time  Yes Yes Si mg by SubCUTAneous route. OXYGEN-AIR DELIVERY SYSTEMS 2018 at Unknown time  Yes Yes Sig: Use as instructed. Use as instructed. albuterol (VENTOLIN HFA) 90 mcg/actuation inhaler   Yes No  
Sig: Take  by inhalation. allopurinol (ZYLOPRIM) 300 mg tablet   Yes No  
Sig: Take  by mouth daily. apixaban (ELIQUIS) 5 mg tablet   No No  
Sig: Take 1 Tab by mouth two (2) times a day. atorvastatin (LIPITOR) 40 mg tablet   No No  
Sig: Take 1 Tab by mouth daily. azithromycin (ZITHROMAX) 500 mg tab   Yes No  
Sig: Take 1 tablet every Mon, Wed, Fri  
bimatoprost (LUMIGAN) 0.01 % ophthalmic drops   Yes No  
Sig: Administer 1 Drop to both eyes every evening. brimonidine (ALPHAGAN P) 0.1 % ophthalmic solution   Yes No  
Sig: every eight (8) hours.   
cholecalciferol, vitamin D3, 2,000 unit tab   Yes No  
Sig: Take  by mouth.  
ethambutol (MYAMBUTOL) 400 mg tablet   Yes No  
 Sig: Take 5 tabs every mon, wed, fri  
ferrous sulfate (IRON) 325 mg (65 mg iron) tablet 2018 at Unknown time  Yes Yes Sig: Take  by mouth Daily (before breakfast). fluticasone-salmeterol (ADVAIR) 250-50 mcg/dose diskus inhaler 2018 at Unknown time  Yes Yes Sig: Take 1 Puff by inhalation. lisinopril (PRINIVIL, ZESTRIL) 10 mg tablet 2018 at Unknown time  No Yes Sig: Take 1 Tab by mouth daily. metoprolol succinate (TOPROL-XL) 50 mg XL tablet 2018 at Unknown time  No Yes Sig: Take 1 Tab by mouth daily. nitroglycerin (NITROSTAT) 0.4 mg SL tablet Unknown at Unknown time  No No  
Si Tab by SubLINGual route every five (5) minutes as needed for Chest Pain. omeprazole (PRILOSEC) 20 mg capsule 2018 at Unknown time  Yes Yes Sig: Take 20 mg by mouth daily. potassium chloride SR (KLOR-CON 10) 10 mEq tablet 2018 at Unknown time  Yes Yes Sig: Take 20 mEq by mouth two (2) times a day. rifAMPin (RIFADIN) 300 mg capsule Unknown at Unknown time  Yes No  
Sig: Take 2 tabs every mon, wed, fri  
tamsulosin (FLOMAX) 0.4 mg capsule 2018 at Unknown time  No Yes Sig: Take 1 Cap by mouth nightly for 90 days. tiotropium (SPIRIVA WITH HANDIHALER) 18 mcg inhalation capsule Unknown at Unknown time  Yes No  
Sig: Take 1 Cap by inhalation daily. Facility-Administered Medications: None Past Medical History:  
Diagnosis Date  A-fib ([facility] 2014  AAA (abdominal aortic aneurysm) without rupture ([facility] 2014  
 3.2 on US  [facility]  Arthritis  Cancer ([facility]   
 hx prostate cancer  COPD (chronic obstructive pulmonary disease) ([facility] 2014  Elevated prostate specific antigen (PSA)  Glaucoma 2014  Heart disease  Heart failure ([facility]  Hyperlipemia 2014  Hypertension  Hypertrophy of prostate with urinary obstruction and other lower urinary tract symptoms (LUTS)  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[facility] 2018  OA (osteoarthritis) 5/2/2014  Peptic ulcer disease 6/1/2017  Poor historian  Prostate cancer ([facility]  Pulmonary embolus ([facility] 6/1/2017  Supplemental oxygen dependent 5/2/2014  Warfarin anticoagulation 5/2/2014 Past Surgical History:  
Procedure Laterality Date  HX HEART CATHETERIZATION Social History Social History  Marital status: SINGLE Spouse name: N/A  
 Number of children: N/A  
 Years of education: N/A Occupational History  Not on file. Social History Main Topics  Smoking status: Former Smoker Packs/day: 2.00 Years: 40.00 Quit date: 7/26/2014  Smokeless tobacco: Never Used Comment: still taking Chantix  Alcohol use No  
 Drug use: No  
 Sexual activity: Yes  
  Partners: Female Birth control/ protection: None Other Topics Concern  Not on file Social History Narrative Family History Problem Relation Age of Onset  Cancer Mother  Heart Disease Father Allergies Allergen Reactions  Statins-Hmg-Coa Reductase Inhibitors Myalgia Muscle pain Current Facility-Administered Medications Medication Dose Route Frequency  allopurinol (ZYLOPRIM) tablet 300 mg  300 mg Oral DAILY  apixaban (ELIQUIS) tablet 5 mg  5 mg Oral BID  atorvastatin (LIPITOR) tablet 40 mg  40 mg Oral DAILY  azithromycin (ZITHROMAX) tablet 500 mg  500 mg Oral Q MON, WED & FRI  latanoprost (XALATAN) 0.005 % ophthalmic solution 1 Drop  1 Drop Both Eyes QPM  
 brimonidine (ALPHAGAN) 0.2 % ophthalmic solution 1 Drop  1 Drop Both Eyes Q8H  
 budesonide (PULMICORT) 500 mcg/2 ml nebulizer suspension  500 mcg Nebulization BID RT  
 lisinopril (PRINIVIL, ZESTRIL) tablet 10 mg  10 mg Oral DAILY  metoprolol succinate (TOPROL-XL) XL tablet 50 mg  50 mg Oral DAILY  nitroglycerin (NITROSTAT) tablet 0.4 mg  0.4 mg SubLINGual Q5MIN PRN  pantoprazole (PROTONIX) tablet 40 mg  40 mg Oral ACB  potassium chloride (KLOR-CON) tablet 10 mEq  10 mEq Oral DAILY  tamsulosin (FLOMAX) capsule 0.4 mg  0.4 mg Oral QHS  tiotropium (SPIRIVA) inhalation capsule 18 mcg  1 Cap Inhalation DAILY  albuterol (PROVENTIL VENTOLIN) nebulizer solution 2.5 mg  2.5 mg Nebulization Q6H RT  
 furosemide (LASIX) injection 40 mg  40 mg IntraVENous DAILY  methylPREDNISolone (PF) (SOLU-MEDROL) injection 40 mg  40 mg IntraVENous Q12H  
 acetaminophen (TYLENOL) tablet 650 mg  650 mg Oral Q6H PRN  
 traMADol (ULTRAM) tablet 50 mg  50 mg Oral Q6H PRN  
 ondansetron (ZOFRAN) injection 4 mg  4 mg IntraVENous Q6H PRN  
 ethambutol (MYAMBUTOL) tablet 2,000 mg  2,000 mg Oral Q MON, WED & FRI  rifAMPin (RIFADIN) capsule 600 mg  600 mg Oral Q MON, WED & FRI  influenza vaccine 2018-19 (6 mos+)(PF) (FLUARIX QUAD/FLULAVAL QUAD) injection 0.5 mL  0.5 mL IntraMUSCular PRIOR TO DISCHARGE  tuberculin injection 5 Units  5 Units IntraDERMal ONCE  
 
 
 
Objective:  
 
Vitals:  
 09/15/18 0546 09/15/18 0709 09/15/18 0753 09/15/18 1104 BP: 140/69 125/73  135/73 Pulse: 62 73  93 Resp: 18 18 18 Temp: 98.7 °F (37.1 °C) 98.5 °F (36.9 °C)  98.4 °F (36.9 °C) SpO2: 97% 100% 100% 92% Weight: 178 lb 6.4 oz (80.9 kg) Height: PHYSICAL EXAM  
 
Constitutional:  the patient is well developed and in no acute distress, RA, sat 97% EENMT:  Sclera clear, pupils equal, oral mucosa moist 
Respiratory: few basilar crackles, no wheezing Cardiovascular:  RRR without M,G,R 
Gastrointestinal: soft and non-tender; with positive bowel sounds. Musculoskeletal: warm without cyanosis. There is no lower leg edema. Skin:  no jaundice or rashes, no wounds Neurologic: no gross neuro deficits Psychiatric:  alert and oriented x 3 CXR:  None today CXR 9/13/18:  Grossly stable cardiomegaly without evolving acute changes. Recent Labs  
   09/14/18 
 0533  09/13/18 2018 WBC  6.8  9.7 HGB  9.3*  10.6*  
 HCT  30.8*  35.4*  
PLT  187  214 Recent Labs  
   09/15/18 
 0523  09/14/18 
 0533  09/13/18 2018 NA  144  143  143  
K  4.2  4.4  4.6 CL  108*  106  109* GLU  171*  119*  138* CO2  27  29  26 BUN  23  16  15 CREA  1.30  1.13  1.29  
MG   --   2.2   --   
CA  8.0*  8.2*  8.4 ALB   --   2.6*  2.9* TBILI   --   0.2  0.2 ALT   --   80*  92* SGOT   --   45*  69* Recent Labs  
   09/13/18 2030 PH  7.30* PCO2  49* PO2  75* HCO3  23 No results for input(s): LCAD, LAC in the last 72 hours. Assessment:  (Medical Decision Making) Patient Active Problem List  
Diagnosis Code  AAA (abdominal aortic aneurysm) without rupture (HCC) I71.4  
 COPD (chronic obstructive pulmonary disease) (HCC) J44.9  Hyperlipemia E78.5  Glaucoma H40.9  Supplemental oxygen dependent Z99.81  
 OA (osteoarthritis) M19.90  Paroxysmal atrial fibrillation (HCC) I48.0  Warfarin anticoagulation Z79.01  
 Renal cysts, acquired, bilateral N28.1  Tobacco use disorder F17.200  Hypertension I10  
 Pulmonary embolus (HCC) I26.99  
 Cardiomyopathy ([address] I42.9  Atrial flutter (HCC) I48.92  
 Peptic ulcer disease K27.9  History of pulmonary embolism Z86.711  
 COPD exacerbation (HCC) J44.1  CHF exacerbation (HCC) I50.9  CHF (congestive heart failure) (HCC) I50.9  A-fib ([facility]) I48.91  
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  
 
' Plan:  (Medical Decision Making) Hospital Problems  Date Reviewed: 8/31/2018 Codes Class Noted POA Acute on chronic respiratory failure (HCC) ICD-10-CM: J96.20 ICD-9-CM: 518.84  7/25/2018 Yes Home o2 2l--check ambulating sat on on O2 Paroxysmal atrial fibrillation (HCC) (Chronic) ICD-10-CM: I48.0 ICD-9-CM: 427.31  5/2/2014 Yes S/p ablation--on eliquis Multiple pulmonary nodules (Chronic) ICD-10-CM: R91.8 ICD-9-CM: 793.19  8/30/2018 Yes  
 bx not malignant COPD exacerbation ([address] ICD-10-CM: J44.1 ICD-9-CM: 491.21  7/23/2018 Unknown No wheezing CHF (congestive heart failure) (HCC) (Chronic) ICD-10-CM: I50.9 ICD-9-CM: 428.0  7/23/2018 Yes  
  7/23/18  ECHO 
-  Left ventricle moderately dilated. EF30-35%. Mild/mod global diffuse hypokinesis. -  Left atrium: The atrium was mildly dilated. -  Right atrium: The atrium was mildly dilated. -  Inferior vena cava, hepatic veins: IVC dilated. Respirophasic change more than 50%. Chronic--elevated BNP--on Lasix IV here Pulmonary embolus [facility]) ICD-10-CM: I26.99 
ICD-9-CM: 415.19  6/1/2017 Yes  
 hx  
 AAA (abdominal aortic aneurysm) without rupture (HCC) (Chronic) ICD-10-CM: I71.4 ICD-9-CM: 441.4  5/2/2014 Yes  
  3.2 on US 2/14 [facility] 
  
  
 chronic Glaucoma (Chronic) ICD-10-CM: H40.9 ICD-9-CM: 365.9  5/2/2014 Yes  
 chronic  
  
 
 
--Continue Albuterol, Pulmicort, Spiriva 
--Eliquis with recent Aflutter albation 
--Zithromax, Rifampin, Ethambutol 3 times per week for MAC--followed at [facility]- continue and follow up as scheduled --Stop steroids Nothing to add from my stand point- discharge home to your discretion More than 50% of the time documented was spent in face-to-face contact with the patient and in the care of the patient on the floor/unit where the patient is located.  
Shahnaz Garcia MD

## 2018-09-15 NOTE — PROGRESS NOTES
Pt given prescriptions and reviewed follow up appointments. Pt states that son will bring portable oxygen tank for DC.

## 2018-09-15 NOTE — PROGRESS NOTES
Patient alert and orientated X4, VSS. No complaints of pain. Dyspnea on exertion noted, tolerating 2L o2 via NC, patient's baseline. Bed low, locked, and call bell within reach. Strict I&Os continued. All needs met. Report to be given to oncoming RN.

## 2018-09-17 ENCOUNTER — PATIENT OUTREACH (OUTPATIENT)
Dept: CASE MANAGEMENT | Age: 78
End: 2018-09-17

## 2018-09-17 NOTE — PROGRESS NOTES
Transition of Care Discharge Follow-up Questionnaire Date/Time of Call: 
 September 17, 2018 3:25PM  
What was the patient hospitalized for? Acute on chronic respiratory failure Does the patient understand his/her diagnosis and/or treatment and what happened during the hospitalization? Care Coordinator spoke with patient who agreed to Transitions of Care Outreach Call. Patient states understanding of diagnosis and treatment plan during hospitalization. Did the patient receive discharge instructions? Yes  
CM Assessed Risk for Readmission:  
 
 
 
 
Patient stated Risk for Readmission: Low to Moderate risk for readmit related to complications from Acute on chronic respiratory failure  and other comorbidities. Patient states he is doing pretty good and states he won't readmit. Review any discharge instructions (see discharge instructions/AVS in [facility]). Ask patient if they understand these. Do they have any questions? Care Coordinator reviewed discharge medications, diet and discharge instructions with patient. Patient states understanding of discharge instructions, patient states no questions. Were home services ordered (nursing, PT, OT, ST, etc.)? Yes, [facility] health services [facility]. If so, has the first visit occurred? If not, why? (Assist with coordination of services if necessary.) Yes- 09/17/2018 Was any DME ordered? No durable medical equipment ordered. If so, has it been received? If not, why?  (Assist patient in obtaining DME orders &/or equipment if necessary. ) NA Complete a review of all medications (new, continued and discontinued meds per the D/C instructions and medication tab in [address] [place] [address]). Care Coordinator reviewed all medications with patient per connect care who verbalized understanding of current and new medications. Were all new prescriptions filled? If not, why?  (Assist patient in obtaining medications if necessary  escalate for CCM &/or SW if ongoing issues are verbalized by pt or anticipated) Yes Does the patient understand the purpose and dosing instructions for all medications? (If patient has questions, provide explanation and education.) Patient states understanding of all medications and dosing instructions. Does the patient have any problems in performing ADLs? (If patient is unable to perform ADLs  what is the limiting factor(s)? Do they have a support system that can assist? If no support system is present, discuss possible assistance that they may be able to obtain. Escalate for CCM/SW if ongoing issues are verbalized by pt or anticipated) Patient states he is independent with ADL's and ambulation but requires assistance with ADL's. Patient states he is doing pretty good, no shortness of breath. Patient states he has a CLTC aide that comes to home weekly x 3 hours per day to assist with ADL's, household chores and errands. Does the patient have all follow-up appointments scheduled? 7 day f/up with PCP?  
(f/up with PCP may be w/in 14 days if patient has a f/up with their specialist w/in 7 days) 7-14 day f/up with specialist?  
(or per discharge instructions) If f/up has not been made  what actions has the care coordinator made to accomplish this? Has transportation been arranged? Care Coordinator educated patient on the importance of scheduling follow up appointment with PCP within 7 days of hospital discharge. Patient states that follow up appointments with PCP and Pulmonary doctor has been scheduled. Patient declined to give Care Coordinator date and times of appointments. Patient states follow appointment with pulmonary has been scheduled. NA Patient states he has transportation to appointments. Any other questions or concerns expressed by the patient? No further needs identified: Patient instructed to call Care Coordinator if further questions or concerns arise Schedule next appointment with JE Waters or refer to RN Case Manager/ per the workflow guidelines. When is care coordinators next follow-up call scheduled? If referred for CCM  what RN care manager was the referral assigned? NA 
 
 
 
 
 
2 weeks- Care Coordinator provided contact information if assistance is needed for concerns or questions. Patient offered CCM referral, patient  declined referral for CCM services, patient states he is doing pretty well. YUMIKO Call Completed By: Chase Mcarthur LPN [facility] Care Coordinator This note will not be viewable in [address]

## 2018-10-02 ENCOUNTER — PATIENT OUTREACH (OUTPATIENT)
Dept: CASE MANAGEMENT | Age: 78
End: 2018-10-02

## 2018-10-02 NOTE — PROGRESS NOTES
Care Coordinator called home # [phone], no answer, voicemail box full unable to leave voicemail message,Care Coordinator will reopen encounter if or when patient returns call. This note will not be viewable in [address]

## 2019-03-04 ENCOUNTER — PATIENT OUTREACH (OUTPATIENT)
Dept: CASE MANAGEMENT | Age: 79
End: 2019-03-04

## 2019-03-04 NOTE — PROGRESS NOTES
ESTHELA CM in receipt of referral from Dr. Danny Adam. Attempted phone outreach with pt today. VM is not set up on phone. Will attempt again tomorrow. This note will not be viewable in [address]

## 2019-03-06 ENCOUNTER — PATIENT OUTREACH (OUTPATIENT)
Dept: CASE MANAGEMENT | Age: 79
End: 2019-03-06

## 2019-03-06 NOTE — Clinical Note
Edenilson Morgan today's assessment note . This pt has no SW needs. Is followed by a Home Health RN with [facility] who will be sending you a plan of care to sign. I'll involve Aliyah, our RN CM, after [facility] discharges, if need be.  Thanks,Myra

## 2019-03-07 NOTE — PROGRESS NOTES
Ambulatory Care Coordination  Social Work Assessment   Date of referral?    Referral from which RN CM/other source? 3/1/19    Dr. Corwin Pino   Previously referred? If so, reason and brief outcome na   Reason for current referral: COPD/HTN/CHF, debilitated, weak  Assess needs/resource management    Living situation: Lives alone at [facility] type? Prescription drug plan? Affordable meds? Obtained where? Manage own meds? Take as directed? VA involved? [facility]/Medicaid  Meds are affordable. CLTC aide gets his meds from pharmacy  Manages his own meds. Fills pill box. Reviewed by [facility] RN for accuracy  na     Income information (if needed):    Food stamps? SS    No food stamps   Transportation ? [place] transport   Housing situation? Housing assistance needed? [place]    na   Sources of Support: Family? One son who works full time; minimally involved   [address] involved? [facility] aides/pvt  pay aides? [address], [place], [address] aide 3 hours /day Monday thru Friday   DME? Oxygen, walker, portable oxygen, nebulizer    Ambulatory? ADLs  assistance required? Assistive device needed for ambulation? Debilitated, weak. Able to use walker . Able to perform bathing and dressing. Aide assists with hkeeping, errands, transportation   Referral to [facility]/Medicaid needed? In place   Referral to Medicare Extra Help/LIS program needed? In place   Small home repair needed? na   MOW referral?  Adequate nutrition? Moms Meals in place. Aide goes to the grocery store for pt. He is able to prepare simple meals     Falls Risk Assessment? No falls   Depression screening? Completed during 3/1 OV   ACP set up? Needs information?  Pt requests mailing of forms   CM Assessed Risk for Readmission:       Patient stated Risk for Readmission:     Na        na       Any other concerns/questions? na   Next steps: See below     SW in receipt of referral from Dr. Corwin Pino after pt recently established care. Assessment, as above. Pt is followed by [facility] and has a RN CM involved (contact name and # noted above). She will continue to follow pt, monitor appointments, set up transportation to same, provide meds mgmt and monitoring. No SW needs noted at this time. Dr. Yash Diallo informed. PLAN  ESTHELA CM will contact pt and  RN at least every two weeks to check on status. Once [facility] discharges, ESTHELA CM will refer to [facility] RN CM, if indicated. SW will mail ACP forms to pt and follow up re explanation of documents/instructions for completion. This note will not be viewable in [address]

## 2019-03-18 PROBLEM — D64.9 ANEMIA: Status: ACTIVE | Noted: 2019-03-18

## 2019-03-22 ENCOUNTER — PATIENT OUTREACH (OUTPATIENT)
Dept: CASE MANAGEMENT | Age: 79
End: 2019-03-22

## 2019-03-22 NOTE — PROGRESS NOTES
Phone outreach with Samantha Malcolm1 [facility], [facility]. Discussed pt's recent PCP appt with Dr. Sharyle Kelly. Pt is scheduled to see Hematology/Oncology on 3/27. Transportation arrangements have been made via [place].  RN will work with pt's family to instruct them about how to arrange Medicaid transportation so this will be in place once [facility] discharges in several weeks. Pt is prefilling his own med box. Meds are on auto refill at [facility].  
  
PLAN 
ESTHELA RUCKER will contact pt on Mon March 25 to review ACP 
ESTHELA RUCKER will contact  [facility] RN at least every 2-3weeks to check on status. Once [facility] discharges, ESTHELA RUCKER will refer to [facility] RN CM, if indicated. This note will not be viewable in [address]

## 2019-03-25 ENCOUNTER — PATIENT OUTREACH (OUTPATIENT)
Dept: CASE MANAGEMENT | Age: 79
End: 2019-03-25

## 2019-03-25 NOTE — PROGRESS NOTES
Phone outreach with pt . Generally, doing well. Discussed ACP. Pt states he has looked over the forms. Will review them further and contact ESTHELA RUCKER if he has questions. PLAN 
ESTHELA RUCKER will contact  [facility] RN and pt at least every 2-3weeks to check on status. Once [facility] discharges, ESTHELA RUCKER will refer to [facility] RN CM, if indicated This note will not be viewable in [address]

## 2019-03-27 ENCOUNTER — HOSPITAL ENCOUNTER (OUTPATIENT)
Dept: LAB | Age: 79
Discharge: HOME | End: 2019-03-27
Payer: MEDICARE

## 2019-03-27 DIAGNOSIS — R53.83 FATIGUE, UNSPECIFIED TYPE: ICD-10-CM

## 2019-03-27 DIAGNOSIS — D50.8 OTHER IRON DEFICIENCY ANEMIA: ICD-10-CM

## 2019-03-27 LAB
BASOPHILS # BLD: 0 K/UL (ref 0–0.2)
BASOPHILS NFR BLD: 1 % (ref 0–2)
DIFFERENTIAL METHOD BLD: ABNORMAL
EOSINOPHIL # BLD: 0.2 K/UL (ref 0–0.8)
EOSINOPHIL NFR BLD: 3 % (ref 0.5–7.8)
ERYTHROCYTE [DISTWIDTH] IN BLOOD BY AUTOMATED COUNT: 18.3 % (ref 11.9–14.6)
HCT VFR BLD AUTO: 31.9 % (ref 41.1–50.3)
HGB BLD-MCNC: 9.6 G/DL (ref 13.6–17.2)
HGB RETIC QN AUTO: 27 PG (ref 29–35)
IMM GRANULOCYTES # BLD AUTO: 0.1 K/UL (ref 0–0.5)
IMM GRANULOCYTES NFR BLD AUTO: 1 % (ref 0–5)
IMM RETICS NFR: 26.9 % (ref 2.3–13.4)
LDH SERPL L TO P-CCNC: 168 U/L (ref 110–210)
LYMPHOCYTES # BLD: 1.6 K/UL (ref 0.5–4.6)
LYMPHOCYTES NFR BLD: 27 % (ref 13–44)
MCH RBC QN AUTO: 27.6 PG (ref 26.1–32.9)
MCHC RBC AUTO-ENTMCNC: 30.1 G/DL (ref 31.4–35)
MCV RBC AUTO: 91.7 FL (ref 79.6–97.8)
MONOCYTES # BLD: 0.7 K/UL (ref 0.1–1.3)
MONOCYTES NFR BLD: 12 % (ref 4–12)
NEUTS SEG # BLD: 3.3 K/UL (ref 1.7–8.2)
NEUTS SEG NFR BLD: 57 % (ref 43–78)
NRBC # BLD: 0.01 K/UL (ref 0–0.2)
PLATELET # BLD AUTO: 344 K/UL (ref 150–450)
PMV BLD AUTO: 11 FL (ref 9.4–12.3)
RBC # BLD AUTO: 3.48 M/UL (ref 4.23–5.67)
RETICS # AUTO: 0.1 M/UL (ref 0.03–0.1)
RETICS/RBC NFR AUTO: 3 % (ref 0.3–2)
TSH SERPL DL<=0.005 MIU/L-ACNC: 0.99 UIU/ML (ref 0.36–3.74)
WBC # BLD AUTO: 5.9 K/UL (ref 4.3–11.1)

## 2019-03-27 PROCEDURE — 85025 COMPLETE CBC W/AUTO DIFF WBC: CPT

## 2019-03-27 PROCEDURE — 85046 RETICYTE/HGB CONCENTRATE: CPT

## 2019-03-27 PROCEDURE — 84443 ASSAY THYROID STIM HORMONE: CPT

## 2019-03-27 PROCEDURE — 36415 COLL VENOUS BLD VENIPUNCTURE: CPT

## 2019-03-27 PROCEDURE — 82784 ASSAY IGA/IGD/IGG/IGM EACH: CPT

## 2019-03-27 PROCEDURE — 86334 IMMUNOFIX E-PHORESIS SERUM: CPT

## 2019-03-27 PROCEDURE — 84238 ASSAY NONENDOCRINE RECEPTOR: CPT

## 2019-03-27 PROCEDURE — 83615 LACTATE (LD) (LDH) ENZYME: CPT

## 2019-03-27 PROCEDURE — 82668 ASSAY OF ERYTHROPOIETIN: CPT

## 2019-03-28 LAB
ALBUMIN SERPL ELPH-MCNC: 3.82 G/DL (ref 3.2–5.6)
ALBUMIN/GLOB SERPL: 1.2 {RATIO}
ALPHA1 GLOB SERPL ELPH-MCNC: 0.25 G/DL (ref 0.1–0.4)
ALPHA2 GLOB SERPL ELPH-MCNC: 0.89 G/DL (ref 0.4–1.2)
B-GLOBULIN SERPL QL ELPH: 1.4 G/DL (ref 0.6–1.3)
EPO SERPL-ACNC: 21.8 MIU/ML (ref 2.6–18.5)
GAMMA GLOB MFR SERPL ELPH: 0.73 G/DL (ref 0.5–1.6)
IGA SERPL-MCNC: 323 MG/DL (ref 85–499)
IGG SERPL-MCNC: 744 MG/DL (ref 610–1616)
IGM SERPL-MCNC: 71 MG/DL (ref 35–242)
M PROTEIN SERPL ELPH-MCNC: ABNORMAL G/DL
PATH REV BLD -IMP: NORMAL
PROT PATTERN SERPL ELPH-IMP: ABNORMAL
PROT PATTERN SPEC IFE-IMP: ABNORMAL
PROT SERPL-MCNC: 7.1 G/DL (ref 6.3–8.2)
TRANSFERRIN SERPL-SCNC: 54.5 NMOL/L (ref 12.2–27.3)

## 2019-04-12 PROBLEM — D64.9 ABSOLUTE ANEMIA: Status: ACTIVE | Noted: 2019-04-12

## 2019-04-19 ENCOUNTER — PATIENT OUTREACH (OUTPATIENT)
Dept: CASE MANAGEMENT | Age: 79
End: 2019-04-19

## 2019-04-19 NOTE — PROGRESS NOTES
Phone outreach with pt . Pt reports feeling very weak. Missed his appt today with Dr. Linda Gómez. \"I just got it mixed up with my other appointments. \" 
 CM phone outreach with Samantha Gold[address] [place] [address], Penny Morin. She will visit pt today and follow up after visit. PLAN Will extend SW CM involvement to 5/12 ESTHELA CM will [facility] RN and pt at least every 2-3weeks to check on status. Once [facility] discharges, [facility] will refer to [facility] RN CM, if indicated ADDENDUM; 
RETURN PHONE CALL FROM Abdoul Potts, SAMANTHA [facility] RN. SHE VISITED PT. VSS, LUNGS CLEAR. PT HAS FOLLOW UP APPT WITH HEMATOLOGY ON 5/1. [facility] RN WILL CHECK IN WITH PT TELEPHONICALLY OVER THE WEEKEND AND SEE HIM NEXT WEEK.   
 
 
This note will not be viewable in [address]

## 2019-05-01 ENCOUNTER — APPOINTMENT (OUTPATIENT)
Dept: INFUSION THERAPY | Age: 79
End: 2019-05-01

## 2019-05-08 ENCOUNTER — APPOINTMENT (OUTPATIENT)
Dept: INFUSION THERAPY | Age: 79
End: 2019-05-08

## 2019-05-10 ENCOUNTER — PATIENT OUTREACH (OUTPATIENT)
Dept: CASE MANAGEMENT | Age: 79
End: 2019-05-10

## 2019-05-10 NOTE — PROGRESS NOTES
Phone outreach with pt . Pt reports feeling much better. ESTHELA RUCKER reminded pt of appointment with Dr. Bryson Torres on 5/13. Pt has appt on calendar and transportation is arranged. ESTHELA RUCKER will outreach with Samantha Soto [facility] RN, after Monday's appt. PLAN 
  
Will extend ESTHELA RUCKER involvement to 6/7/19. ESTHELA RUCKER will [facility] RN Monday, 5/13 Continue outreaches with [facility] RN  and pt at least every 2-3 weeks to check on status. Once [facility] discharges, ESTHELA RUCKER will refer to [facility] RN CM, if indicated 
  
This note will not be viewable in [address]

## 2019-05-14 ENCOUNTER — PATIENT OUTREACH (OUTPATIENT)
Dept: CASE MANAGEMENT | Age: 79
End: 2019-05-14

## 2019-05-14 NOTE — PROGRESS NOTES
Attempted phone outreach with Samantha Mota at [facility] RN. LM on voice mail for call back. Informed about pt's appt with Dr. Chani Payan yesterday. No med changes. This note will not be viewable in [address]

## 2019-05-15 ENCOUNTER — HOSPITAL ENCOUNTER (OUTPATIENT)
Dept: INFUSION THERAPY | Age: 79
Discharge: HOME | End: 2019-05-15
Payer: MEDICARE

## 2019-05-15 VITALS
RESPIRATION RATE: 18 BRPM | OXYGEN SATURATION: 99 % | SYSTOLIC BLOOD PRESSURE: 109 MMHG | DIASTOLIC BLOOD PRESSURE: 72 MMHG | HEART RATE: 102 BPM | TEMPERATURE: 97.4 F | WEIGHT: 175.8 LBS | BODY MASS INDEX: 26.34 KG/M2

## 2019-05-15 DIAGNOSIS — D50.8 OTHER IRON DEFICIENCY ANEMIA: Primary | ICD-10-CM

## 2019-05-15 PROCEDURE — 96374 THER/PROPH/DIAG INJ IV PUSH: CPT

## 2019-05-15 PROCEDURE — 74011250636 HC RX REV CODE- 250/636: Performed by: INTERNAL MEDICINE

## 2019-05-15 PROCEDURE — 74011000258 HC RX REV CODE- 258: Performed by: INTERNAL MEDICINE

## 2019-05-15 RX ORDER — ACETAMINOPHEN 325 MG/1
650 TABLET ORAL AS NEEDED
Status: DISCONTINUED | OUTPATIENT
Start: 2019-05-15 | End: 2019-05-16 | Stop reason: HOSPADM

## 2019-05-15 RX ORDER — ONDANSETRON 2 MG/ML
8 INJECTION INTRAMUSCULAR; INTRAVENOUS AS NEEDED
Status: DISCONTINUED | OUTPATIENT
Start: 2019-05-15 | End: 2019-05-16 | Stop reason: HOSPADM

## 2019-05-15 RX ORDER — DIPHENHYDRAMINE HYDROCHLORIDE 50 MG/ML
50 INJECTION, SOLUTION INTRAMUSCULAR; INTRAVENOUS AS NEEDED
Status: DISCONTINUED | OUTPATIENT
Start: 2019-05-15 | End: 2019-05-16 | Stop reason: HOSPADM

## 2019-05-15 RX ADMIN — SODIUM CHLORIDE 500 ML: 900 INJECTION, SOLUTION INTRAVENOUS at 11:40

## 2019-05-15 RX ADMIN — FERUMOXYTOL 510 MG: 510 INJECTION INTRAVENOUS at 12:18

## 2019-05-15 NOTE — PROGRESS NOTES
Arrived to the [facility]. Feraheme infusion completed. Patient tolerated well. Any issues or concerns during appointment: none. Patient aware of next infusion appointment on 05.22.2019 (date) at 1892 (time).   Discharged ambulatory using rolling walker with oxygen via home tank at 3 l/m via [place].

## 2019-05-20 ENCOUNTER — PATIENT OUTREACH (OUTPATIENT)
Dept: CASE MANAGEMENT | Age: 79
End: 2019-05-20

## 2019-05-20 NOTE — PROGRESS NOTES
Phone outreach with [facility] intake. [facility] nursing still involved. Attempted phone outreach with Arlen Vang, [facility] RN. ELADIA on  for call back. This note will not be viewable in [address]

## 2019-05-30 ENCOUNTER — HOSPITAL ENCOUNTER (OUTPATIENT)
Dept: INFUSION THERAPY | Age: 79
Discharge: HOME | End: 2019-05-30
Payer: MEDICARE

## 2019-05-30 VITALS
SYSTOLIC BLOOD PRESSURE: 96 MMHG | OXYGEN SATURATION: 98 % | DIASTOLIC BLOOD PRESSURE: 58 MMHG | BODY MASS INDEX: 26.01 KG/M2 | TEMPERATURE: 98.3 F | HEART RATE: 84 BPM | RESPIRATION RATE: 20 BRPM | WEIGHT: 173.6 LBS

## 2019-05-30 DIAGNOSIS — D50.8 OTHER IRON DEFICIENCY ANEMIA: Primary | ICD-10-CM

## 2019-05-30 PROCEDURE — 74011000258 HC RX REV CODE- 258: Performed by: INTERNAL MEDICINE

## 2019-05-30 PROCEDURE — 96374 THER/PROPH/DIAG INJ IV PUSH: CPT

## 2019-05-30 PROCEDURE — 74011250636 HC RX REV CODE- 250/636: Performed by: INTERNAL MEDICINE

## 2019-05-30 RX ORDER — SODIUM CHLORIDE 0.9 % (FLUSH) 0.9 %
10 SYRINGE (ML) INJECTION AS NEEDED
Status: ACTIVE | OUTPATIENT
Start: 2019-05-30 | End: 2019-05-30

## 2019-05-30 RX ADMIN — FERUMOXYTOL 510 MG: 510 INJECTION INTRAVENOUS at 08:50

## 2019-05-30 RX ADMIN — SODIUM CHLORIDE 500 ML: 900 INJECTION, SOLUTION INTRAVENOUS at 08:32

## 2019-05-30 RX ADMIN — Medication 10 ML: at 08:30

## 2019-05-30 NOTE — PROGRESS NOTES
Arrived to the [facility]. Abbe completed. Patient monitored 30 minutes post infusion completion, VSS. Patient tolerated well. Any issues or concerns during appointment: None. Patient aware of next [address] appointment on 8/16/19 at 0830. Discharged ambulatory from Infusion.

## 2019-06-07 ENCOUNTER — PATIENT OUTREACH (OUTPATIENT)
Dept: CASE MANAGEMENT | Age: 79
End: 2019-06-07

## 2019-06-07 NOTE — PROGRESS NOTES
Phone outreach with pt .  
Doing well, states he is keeping up with meds. ESTHELA RUCKER also spoke with Samantha Cueto [facility] RN. HH will likely continue to remain involved with pt for another month or so. HH RN will be contacting pt's son's girlfriend to see if she can begin helping him schedule transportation to MD appts. PLAN 
  
Will extend ESTHELA RUCKER involvement to 6/28/19. Continue outreaches with [facility] RN  and pt at least every 2-3 weeks to check on status. Once [facility] discharges, ESTHELA RUCKER will refer to [facility] RN CM, if indicated This note will not be viewable in [address]

## 2019-06-28 ENCOUNTER — PATIENT OUTREACH (OUTPATIENT)
Dept: CASE MANAGEMENT | Age: 79
End: 2019-06-28

## 2019-06-28 NOTE — PROGRESS NOTES
ESTHELA RUCKER attempted phone outreach with pt. No VM set up. ETSHELA RUCKER [facility], [facility]. LM on VM for call back. This note will not be viewable in [address]

## 2019-07-08 ENCOUNTER — PATIENT OUTREACH (OUTPATIENT)
Dept: CASE MANAGEMENT | Age: 79
End: 2019-07-08

## 2019-07-08 NOTE — PROGRESS NOTES
ESTHELA RUCKER attempted outreach with pt.  not set up. Unable to lv message. ESTHELA RUCKER attempted outreach with pt's [facility] RN from [facility]. LM on voice mail for call back. 7/8/19 4:00 p.m. 
ESTHELA RUCKER received call from Jeremías Yoon, [facility] RN with Samantha XVAIER. Pt is doing well. [facility]EN[facility] has recertified pt and will remain involved until August 30. Pt is able to manage his own meds. Setting up transportation via Medicaid Mesha Aase to MD appts has been an issue.  RN is assisting with this now but will be speaking with son's gabi (who visits frequently) to see if she can assume this responsibility when [facility] discharges. Agreed that [facility] RN will contact ESTHELA RUCKER if pt needs additional CM services after [facility] discharges. Case will be closed at this time. This note will not be viewable in [address]

## 2019-08-28 ENCOUNTER — HOSPITAL ENCOUNTER (OUTPATIENT)
Dept: LAB | Age: 79
Discharge: HOME | End: 2019-08-28
Payer: MEDICARE

## 2019-08-28 DIAGNOSIS — D63.1 ANEMIA DUE TO STAGE 3 CHRONIC KIDNEY DISEASE (HCC): ICD-10-CM

## 2019-08-28 DIAGNOSIS — N18.30 ANEMIA DUE TO STAGE 3 CHRONIC KIDNEY DISEASE (HCC): ICD-10-CM

## 2019-08-28 LAB
ALBUMIN SERPL-MCNC: 3.5 G/DL (ref 3.2–4.6)
ALBUMIN/GLOB SERPL: 0.9 {RATIO} (ref 1.2–3.5)
ALP SERPL-CCNC: 68 U/L (ref 50–136)
ALT SERPL-CCNC: 10 U/L (ref 12–65)
ANION GAP SERPL CALC-SCNC: 6 MMOL/L (ref 7–16)
AST SERPL-CCNC: 10 U/L (ref 15–37)
BASOPHILS # BLD: 0 K/UL (ref 0–0.2)
BASOPHILS NFR BLD: 1 % (ref 0–2)
BILIRUB SERPL-MCNC: 0.2 MG/DL (ref 0.2–1.1)
BUN SERPL-MCNC: 23 MG/DL (ref 8–23)
CALCIUM SERPL-MCNC: 9.2 MG/DL (ref 8.3–10.4)
CHLORIDE SERPL-SCNC: 105 MMOL/L (ref 98–107)
CO2 SERPL-SCNC: 31 MMOL/L (ref 21–32)
CREAT SERPL-MCNC: 1.61 MG/DL (ref 0.8–1.5)
DIFFERENTIAL METHOD BLD: ABNORMAL
EOSINOPHIL # BLD: 0.2 K/UL (ref 0–0.8)
EOSINOPHIL NFR BLD: 3 % (ref 0.5–7.8)
ERYTHROCYTE [DISTWIDTH] IN BLOOD BY AUTOMATED COUNT: 17.1 % (ref 11.9–14.6)
FERRITIN SERPL-MCNC: 36 NG/ML (ref 8–388)
GLOBULIN SER CALC-MCNC: 4.1 G/DL (ref 2.3–3.5)
GLUCOSE SERPL-MCNC: 98 MG/DL (ref 65–100)
HCT VFR BLD AUTO: 27.1 % (ref 41.1–50.3)
HGB BLD-MCNC: 8.3 G/DL (ref 13.6–17.2)
IMM GRANULOCYTES # BLD AUTO: 0 K/UL (ref 0–0.5)
IMM GRANULOCYTES NFR BLD AUTO: 1 % (ref 0–5)
IRON SATN MFR SERPL: 13 %
IRON SERPL-MCNC: 42 UG/DL (ref 35–150)
LYMPHOCYTES # BLD: 1.9 K/UL (ref 0.5–4.6)
LYMPHOCYTES NFR BLD: 30 % (ref 13–44)
MCH RBC QN AUTO: 27.7 PG (ref 26.1–32.9)
MCHC RBC AUTO-ENTMCNC: 30.6 G/DL (ref 31.4–35)
MCV RBC AUTO: 90.3 FL (ref 79.6–97.8)
MONOCYTES # BLD: 0.6 K/UL (ref 0.1–1.3)
MONOCYTES NFR BLD: 10 % (ref 4–12)
NEUTS SEG # BLD: 3.5 K/UL (ref 1.7–8.2)
NEUTS SEG NFR BLD: 56 % (ref 43–78)
NRBC # BLD: 0.02 K/UL (ref 0–0.2)
PLATELET # BLD AUTO: 349 K/UL (ref 150–450)
PMV BLD AUTO: 9.8 FL (ref 9.4–12.3)
POTASSIUM SERPL-SCNC: 4.1 MMOL/L (ref 3.5–5.1)
PROT SERPL-MCNC: 7.6 G/DL (ref 6.3–8.2)
RBC # BLD AUTO: 3 M/UL (ref 4.23–5.67)
SODIUM SERPL-SCNC: 142 MMOL/L (ref 136–145)
TIBC SERPL-MCNC: 321 UG/DL (ref 250–450)
WBC # BLD AUTO: 6.2 K/UL (ref 4.3–11.1)

## 2019-08-28 PROCEDURE — 36415 COLL VENOUS BLD VENIPUNCTURE: CPT

## 2019-08-28 PROCEDURE — 80053 COMPREHEN METABOLIC PANEL: CPT

## 2019-08-28 PROCEDURE — 83540 ASSAY OF IRON: CPT

## 2019-08-28 PROCEDURE — 85025 COMPLETE CBC W/AUTO DIFF WBC: CPT

## 2019-08-28 PROCEDURE — 82728 ASSAY OF FERRITIN: CPT

## 2019-09-03 ENCOUNTER — HOSPITAL ENCOUNTER (OUTPATIENT)
Dept: INFUSION THERAPY | Age: 79
Discharge: HOME | End: 2019-09-03
Payer: MEDICARE

## 2019-09-03 VITALS
RESPIRATION RATE: 18 BRPM | SYSTOLIC BLOOD PRESSURE: 103 MMHG | TEMPERATURE: 97.7 F | HEART RATE: 97 BPM | DIASTOLIC BLOOD PRESSURE: 65 MMHG

## 2019-09-03 DIAGNOSIS — D50.8 OTHER IRON DEFICIENCY ANEMIA: Primary | ICD-10-CM

## 2019-09-03 PROCEDURE — 74011250636 HC RX REV CODE- 250/636: Performed by: NURSE PRACTITIONER

## 2019-09-03 PROCEDURE — 74011000258 HC RX REV CODE- 258: Performed by: NURSE PRACTITIONER

## 2019-09-03 PROCEDURE — 96374 THER/PROPH/DIAG INJ IV PUSH: CPT

## 2019-09-03 RX ORDER — SODIUM CHLORIDE 0.9 % (FLUSH) 0.9 %
10 SYRINGE (ML) INJECTION AS NEEDED
Status: ACTIVE | OUTPATIENT
Start: 2019-09-03 | End: 2019-09-03

## 2019-09-03 RX ADMIN — SODIUM CHLORIDE 500 ML: 900 INJECTION, SOLUTION INTRAVENOUS at 08:50

## 2019-09-03 RX ADMIN — Medication 10 ML: at 08:30

## 2019-09-03 RX ADMIN — FERUMOXYTOL 510 MG: 510 INJECTION INTRAVENOUS at 09:00

## 2019-09-03 NOTE — PROGRESS NOTES
Pt. Discharged ambulatory accompanied by self. Tolerated infusion well. No distress noted. To call physician with any problems or concerns. Understanding voiced. To return to Infusions on 9/10/19.

## 2019-09-09 ENCOUNTER — HOSPITAL ENCOUNTER (INPATIENT)
Age: 79
LOS: 4 days | Discharge: NURSING, REHAB OR LONG-TERM CARE | DRG: 268 | End: 2019-09-18
Attending: EMERGENCY MEDICINE | Admitting: SURGERY
Payer: MEDICARE

## 2019-09-09 ENCOUNTER — APPOINTMENT (OUTPATIENT)
Dept: GENERAL RADIOLOGY | Age: 79
DRG: 268 | End: 2019-09-09
Attending: EMERGENCY MEDICINE
Payer: MEDICARE

## 2019-09-09 DIAGNOSIS — K92.2 GASTROINTESTINAL HEMORRHAGE, UNSPECIFIED GASTROINTESTINAL HEMORRHAGE TYPE: Primary | ICD-10-CM

## 2019-09-09 DIAGNOSIS — I95.9 HYPOTENSION, UNSPECIFIED HYPOTENSION TYPE: ICD-10-CM

## 2019-09-09 PROBLEM — N18.30 CKD (CHRONIC KIDNEY DISEASE), STAGE III (HCC): Chronic | Status: ACTIVE | Noted: 2019-09-09

## 2019-09-09 PROBLEM — I50.20 HEART FAILURE WITH REDUCED EJECTION FRACTION (HCC): Chronic | Status: ACTIVE | Noted: 2019-09-09

## 2019-09-09 PROBLEM — J96.11 CHRONIC RESPIRATORY FAILURE WITH HYPOXIA (HCC): Chronic | Status: ACTIVE | Noted: 2019-09-09

## 2019-09-09 LAB
ALBUMIN SERPL-MCNC: 2.8 G/DL (ref 3.2–4.6)
ALBUMIN/GLOB SERPL: 0.8 {RATIO} (ref 1.2–3.5)
ALP SERPL-CCNC: 69 U/L (ref 50–136)
ALT SERPL-CCNC: 9 U/L (ref 12–65)
ANION GAP SERPL CALC-SCNC: 4 MMOL/L (ref 7–16)
AST SERPL-CCNC: 17 U/L (ref 15–37)
ATRIAL RATE: 61 BPM
BASOPHILS # BLD: 0.1 K/UL (ref 0–0.2)
BASOPHILS NFR BLD: 1 % (ref 0–2)
BILIRUB SERPL-MCNC: 0.1 MG/DL (ref 0.2–1.1)
BUN SERPL-MCNC: 30 MG/DL (ref 8–23)
CALCIUM SERPL-MCNC: 8.5 MG/DL (ref 8.3–10.4)
CALCULATED P AXIS, ECG09: 79 DEGREES
CALCULATED R AXIS, ECG10: 74 DEGREES
CALCULATED T AXIS, ECG11: 82 DEGREES
CHLORIDE SERPL-SCNC: 107 MMOL/L (ref 98–107)
CO2 SERPL-SCNC: 30 MMOL/L (ref 21–32)
CREAT SERPL-MCNC: 1.82 MG/DL (ref 0.8–1.5)
DIAGNOSIS, 93000: NORMAL
DIFFERENTIAL METHOD BLD: ABNORMAL
EOSINOPHIL # BLD: 0.1 K/UL (ref 0–0.8)
EOSINOPHIL NFR BLD: 2 % (ref 0.5–7.8)
ERYTHROCYTE [DISTWIDTH] IN BLOOD BY AUTOMATED COUNT: 17.2 % (ref 11.9–14.6)
GLOBULIN SER CALC-MCNC: 3.4 G/DL (ref 2.3–3.5)
GLUCOSE SERPL-MCNC: 99 MG/DL (ref 65–100)
HCT VFR BLD AUTO: 23.6 % (ref 41.1–50.3)
HCT VFR BLD AUTO: 27.8 % (ref 41.1–50.3)
HGB BLD-MCNC: 7.2 G/DL (ref 13.6–17.2)
HGB BLD-MCNC: 8.3 G/DL (ref 13.6–17.2)
IMM GRANULOCYTES # BLD AUTO: 0 K/UL (ref 0–0.5)
IMM GRANULOCYTES NFR BLD AUTO: 1 % (ref 0–5)
LYMPHOCYTES # BLD: 1.8 K/UL (ref 0.5–4.6)
LYMPHOCYTES NFR BLD: 32 % (ref 13–44)
MCH RBC QN AUTO: 28.1 PG (ref 26.1–32.9)
MCHC RBC AUTO-ENTMCNC: 30.5 G/DL (ref 31.4–35)
MCV RBC AUTO: 92.2 FL (ref 79.6–97.8)
MM INDURATION POC: 0 MM (ref 0–5)
MONOCYTES # BLD: 0.7 K/UL (ref 0.1–1.3)
MONOCYTES NFR BLD: 12 % (ref 4–12)
NEUTS SEG # BLD: 2.9 K/UL (ref 1.7–8.2)
NEUTS SEG NFR BLD: 53 % (ref 43–78)
NRBC # BLD: 0.02 K/UL (ref 0–0.2)
P-R INTERVAL, ECG05: 194 MS
PLATELET # BLD AUTO: 261 K/UL (ref 150–450)
PMV BLD AUTO: 10.5 FL (ref 9.4–12.3)
POTASSIUM SERPL-SCNC: 5.1 MMOL/L (ref 3.5–5.1)
PPD POC: NEGATIVE NEGATIVE
PROT SERPL-MCNC: 6.2 G/DL (ref 6.3–8.2)
Q-T INTERVAL, ECG07: 408 MS
QRS DURATION, ECG06: 66 MS
QTC CALCULATION (BEZET), ECG08: 410 MS
RBC # BLD AUTO: 2.56 M/UL (ref 4.23–5.6)
SODIUM SERPL-SCNC: 141 MMOL/L (ref 136–145)
TROPONIN I SERPL-MCNC: 0.02 NG/ML (ref 0.02–0.05)
TROPONIN I SERPL-MCNC: <0.02 NG/ML (ref 0.02–0.05)
VENTRICULAR RATE, ECG03: 61 BPM
WBC # BLD AUTO: 5.6 K/UL (ref 4.3–11.1)

## 2019-09-09 PROCEDURE — 77010033678 HC OXYGEN DAILY

## 2019-09-09 PROCEDURE — 65270000029 HC RM PRIVATE

## 2019-09-09 PROCEDURE — 82272 OCCULT BLD FECES 1-3 TESTS: CPT

## 2019-09-09 PROCEDURE — 99285 EMERGENCY DEPT VISIT HI MDM: CPT | Performed by: EMERGENCY MEDICINE

## 2019-09-09 PROCEDURE — 74011250636 HC RX REV CODE- 250/636: Performed by: INTERNAL MEDICINE

## 2019-09-09 PROCEDURE — 86580 TB INTRADERMAL TEST: CPT | Performed by: INTERNAL MEDICINE

## 2019-09-09 PROCEDURE — 74011000302 HC RX REV CODE- 302: Performed by: INTERNAL MEDICINE

## 2019-09-09 PROCEDURE — 36415 COLL VENOUS BLD VENIPUNCTURE: CPT

## 2019-09-09 PROCEDURE — 84484 ASSAY OF TROPONIN QUANT: CPT

## 2019-09-09 PROCEDURE — 85025 COMPLETE CBC W/AUTO DIFF WBC: CPT

## 2019-09-09 PROCEDURE — 80053 COMPREHEN METABOLIC PANEL: CPT

## 2019-09-09 PROCEDURE — 94640 AIRWAY INHALATION TREATMENT: CPT

## 2019-09-09 PROCEDURE — 71045 X-RAY EXAM CHEST 1 VIEW: CPT

## 2019-09-09 PROCEDURE — C9113 INJ PANTOPRAZOLE SODIUM, VIA: HCPCS | Performed by: INTERNAL MEDICINE

## 2019-09-09 PROCEDURE — 85018 HEMOGLOBIN: CPT

## 2019-09-09 PROCEDURE — 93005 ELECTROCARDIOGRAM TRACING: CPT | Performed by: EMERGENCY MEDICINE

## 2019-09-09 PROCEDURE — 74011000250 HC RX REV CODE- 250: Performed by: INTERNAL MEDICINE

## 2019-09-09 PROCEDURE — 94760 N-INVAS EAR/PLS OXIMETRY 1: CPT

## 2019-09-09 PROCEDURE — 65660000000 HC RM CCU STEPDOWN

## 2019-09-09 RX ORDER — SODIUM CHLORIDE 0.9 % (FLUSH) 0.9 %
5-40 SYRINGE (ML) INJECTION EVERY 8 HOURS
Status: DISCONTINUED | OUTPATIENT
Start: 2019-09-09 | End: 2019-09-18 | Stop reason: HOSPADM

## 2019-09-09 RX ORDER — ALLOPURINOL 100 MG/1
300 TABLET ORAL DAILY
Status: DISCONTINUED | OUTPATIENT
Start: 2019-09-10 | End: 2019-09-18 | Stop reason: HOSPADM

## 2019-09-09 RX ORDER — ACETAMINOPHEN 325 MG/1
650 TABLET ORAL
Status: DISCONTINUED | OUTPATIENT
Start: 2019-09-09 | End: 2019-09-18 | Stop reason: HOSPADM

## 2019-09-09 RX ORDER — ONDANSETRON 2 MG/ML
4 INJECTION INTRAMUSCULAR; INTRAVENOUS
Status: DISCONTINUED | OUTPATIENT
Start: 2019-09-09 | End: 2019-09-18 | Stop reason: HOSPADM

## 2019-09-09 RX ORDER — IPRATROPIUM BROMIDE AND ALBUTEROL SULFATE 2.5; .5 MG/3ML; MG/3ML
3 SOLUTION RESPIRATORY (INHALATION)
Status: DISCONTINUED | OUTPATIENT
Start: 2019-09-09 | End: 2019-09-18 | Stop reason: HOSPADM

## 2019-09-09 RX ORDER — SODIUM CHLORIDE 0.9 % (FLUSH) 0.9 %
5-40 SYRINGE (ML) INJECTION AS NEEDED
Status: DISCONTINUED | OUTPATIENT
Start: 2019-09-09 | End: 2019-09-18 | Stop reason: HOSPADM

## 2019-09-09 RX ORDER — BUDESONIDE 0.5 MG/2ML
500 INHALANT ORAL
Status: DISCONTINUED | OUTPATIENT
Start: 2019-09-09 | End: 2019-09-18 | Stop reason: HOSPADM

## 2019-09-09 RX ORDER — BRIMONIDINE TARTRATE 2 MG/ML
1 SOLUTION/ DROPS OPHTHALMIC 2 TIMES DAILY
Status: DISCONTINUED | OUTPATIENT
Start: 2019-09-09 | End: 2019-09-18 | Stop reason: HOSPADM

## 2019-09-09 RX ADMIN — BRIMONIDINE TARTRATE 1 DROP: 2 SOLUTION OPHTHALMIC at 22:43

## 2019-09-09 RX ADMIN — TUBERCULIN PURIFIED PROTEIN DERIVATIVE 5 UNITS: 5 INJECTION, SOLUTION INTRADERMAL at 19:11

## 2019-09-09 RX ADMIN — BUDESONIDE 500 MCG: 0.5 INHALANT RESPIRATORY (INHALATION) at 20:13

## 2019-09-09 RX ADMIN — Medication 10 ML: at 22:46

## 2019-09-09 RX ADMIN — PANTOPRAZOLE SODIUM 40 MG: 40 INJECTION, POWDER, FOR SOLUTION INTRAVENOUS at 19:11

## 2019-09-09 NOTE — PROGRESS NOTES
09/09/19 1827   Dual Skin Pressure Injury Assessment   Dual Skin Pressure Injury Assessment X   Second Care Provider (Based on [address] [place] [address]) Darek Voss RN     Dual skin assessment complete. Skin warm, dry, and intact. Dry flaky skin to BLE.

## 2019-09-09 NOTE — H&P
Hospitalist H&P Note     Admit Date:  2019  2:23 PM   Name:  Greg Soto. Age:  70 y.o.  :  1940   MRN:  [id number]   PCP:  Monica Jean MD  Treatment Team: Attending Provider: Raul Younger MD; Primary Nurse: Hector Gonzales RN    HPI:     CC:   Near syncope       Mr. Daren Balderrama is a 71 yo male with PMH of CKD3, HFrEF (EF 30-35%), iron deficient anemia, COPD with chronic hypoxia, afib on eliquis,  Who is evaluated with near syncope and hypotension. He received IVF bolus while in the field and BP normalized. HGB noted to be down to 7.2 from his baseline of 8-9. He is followed by hematology and receiving IV iron due to CKD, and he has an infusion tomorrow. He denies jaiden GI bleeding due to decreased vision but has pending outpatient GI referral.    He denies chest pain, dyspnea but has vision loss, edema, skin changes, mood and memory changes, and weight loss        10 systems reviewed and negative except as noted in HPI.         Past Medical History:   Diagnosis Date    A-fib [facility]) 2014    AAA (abdominal aortic aneurysm) without rupture ([address] 2014    3.2 on   [facility]    Arthritis     Cancer 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    OA (osteoarthritis) 2014    Peptic ulcer disease 2017    Poor historian     Prostate cancer ([address]     Pulmonary embolus ([address] 2017    Supplemental oxygen dependent 2014    Warfarin anticoagulation 2014      Past Surgical History:   Procedure Laterality Date    HX HEART CATHETERIZATION        Allergies   Allergen Reactions    Statins-Hmg-Coa Reductase Inhibitors Myalgia     Muscle pain      Social History     Tobacco Use    Smoking status: Former Smoker     Packs/day: 2.00     Years: 40.00     Pack years: 80.00     Last attempt to quit: 2014     Years since quittin.1    Smokeless tobacco: Never Used    Tobacco comment: still taking Chantix    Substance Use Topics    Alcohol use: No      Family History   Problem Relation Age of Onset    Cancer Mother     Heart Disease Father       Immunization History   Administered Date(s) Administered    Influenza Vaccine (Quad) PF 09/15/2018    TB Skin Test (PPD) Intradermal 2018     PTA Medications:  Prior to Admission Medications   Prescriptions Last Dose Informant Patient Reported? Taking? OXYGEN-AIR DELIVERY SYSTEMS   Yes No   Sig: Use as instructed. Use as instructed. albuterol-ipratropium (DUO-NEB) 2.5 mg-0.5 mg/3 ml nebu   No No   Sig: 3 mL by Nebulization route every six (6) hours as needed. allopurinol (ZYLOPRIM) 300 mg tablet   Yes No   Sig: Take  by mouth daily. amLODIPine (NORVASC) 10 mg tablet   Yes No   Sig: Take  by mouth daily. apixaban (ELIQUIS) 5 mg tablet   No No   Sig: Take 1 Tab by mouth two (2) times a day. brimonidine (ALPHAGAN P) 0.1 % ophthalmic solution   Yes No   Sig: every eight (8) hours. budesonide (PULMICORT) 0.5 mg/2 mL nbsp   No No   Si mL by Nebulization route two (2) times a day. docusate sodium (COLACE) 100 mg capsule   Yes No   Sig: Take 100 mg by mouth daily. famotidine (PEPCID) 40 mg tablet   Yes No   Sig: Take 40 mg by mouth daily. ferrous sulfate (IRON) 325 mg (65 mg iron) tablet   Yes No   Sig: Take  by mouth Daily (before breakfast). fluticasone propion-salmeterol (WIXELA INHUB) 250-50 mcg/dose diskus inhaler   Yes No   Sig: Take 1 Puff by inhalation every twelve (12) hours. fluticasone-salmeterol (ADVAIR) 250-50 mcg/dose diskus inhaler   Yes No   Sig: Take 1 Puff by inhalation.    furosemide (LASIX) 40 mg tablet   No No   Sig: Take 1 Tab by mouth daily. lisinopril (PRINIVIL, ZESTRIL) 10 mg tablet   No No   Sig: Take 1 Tab by mouth daily. metoprolol succinate (TOPROL-XL) 50 mg XL tablet   No No   Sig: Take 1 Tab by mouth daily. nitroglycerin (NITROSTAT) 0.4 mg SL tablet   No No   Si Tab by SubLINGual route every five (5) minutes as needed for Chest Pain. rosuvastatin (CRESTOR) 40 mg tablet   Yes No   Sig: Take 40 mg by mouth nightly. Facility-Administered Medications: None       Objective:     Patient Vitals for the past 24 hrs:   Temp Pulse Resp BP SpO2   19 1614  (!) 51 (!) 51 129/62    19 1559  (!) 58 23 116/56 100 %   19 1544  64 (!) 36 109/68 95 %   19 1529  64 (!) 31 119/75 100 %   19 1514  (!) 58 24 112/53 100 %   19 1458  61 17 97/54 100 %   19 1443  64 25 101/57 100 %   19 1428  63 24 94/55 100 %   19 1421 98.6 °F (37 °C) 66 18 96/51 100 %     Oxygen Therapy  O2 Sat (%): 100 % (19 155)  Pulse via Oximetry: 58 beats per minute (19 155)  O2 Device: Nasal cannula (19 142)  O2 Flow Rate (L/min): 3 l/min (19 142)  No intake or output data in the 24 hours ending 19 1707    Physical Exam:  General:    Alert. No distress  Eyes:   Normal sclera. Extraocular movements intact. PERRLA  ENT:  Normocephalic, atraumatic. Moist mucous membranes  CV:   RRR. No m/r/g. No edema  Lungs:  CTAB. No wheezing, rhonchi, or rales. Abdomen: Soft, nontender, nondistended. Present BS  Extremities: Warm and dry. .  Neurologic:  grossly intact. Skin:     No rashes or jaundice. Normal coloration  Psych:  Normal mood and affect. I reviewed the labs, imaging, EKGs, telemetry, and other studies done this admission.       EKG personally reviewed as NSR      Data Review:   Recent Results (from the past 24 hour(s))   CBC WITH AUTOMATED DIFF    Collection Time: 19  2:34 PM   Result Value Ref Range    WBC 5.6 4.3 - 11.1 K/uL    RBC 2.56 (L) 4.23 - 5.6 M/uL    HGB 7.2 (L) 13.6 - 17.2 g/dL    HCT 23.6 (L) 41.1 - 50.3 %    MCV 92.2 79.6 - 97.8 FL    MCH 28.1 26.1 - 32.9 PG    MCHC 30.5 (L) 31.4 - 35.0 g/dL    RDW 17.2 (H) 11.9 - 14.6 %    PLATELET 867 205 - 274 K/uL    MPV 10.5 9.4 - 12.3 FL    ABSOLUTE NRBC 0.02 0.0 - 0.2 K/uL    DF AUTOMATED      NEUTROPHILS 53 43 - 78 %    LYMPHOCYTES 32 13 - 44 %    MONOCYTES 12 4.0 - 12.0 %    EOSINOPHILS 2 0.5 - 7.8 %    BASOPHILS 1 0.0 - 2.0 %    IMMATURE GRANULOCYTES 1 0.0 - 5.0 %    ABS. NEUTROPHILS 2.9 1.7 - 8.2 K/UL    ABS. LYMPHOCYTES 1.8 0.5 - 4.6 K/UL    ABS. MONOCYTES 0.7 0.1 - 1.3 K/UL    ABS. EOSINOPHILS 0.1 0.0 - 0.8 K/UL    ABS. BASOPHILS 0.1 0.0 - 0.2 K/UL    ABS. IMM. GRANS. 0.0 0.0 - 0.5 K/UL   METABOLIC PANEL, COMPREHENSIVE    Collection Time: 09/09/19  2:34 PM   Result Value Ref Range    Sodium 141 136 - 145 mmol/L    Potassium 5.1 3.5 - 5.1 mmol/L    Chloride 107 98 - 107 mmol/L    CO2 30 21 - 32 mmol/L    Anion gap 4 (L) 7 - 16 mmol/L    Glucose 99 65 - 100 mg/dL    BUN 30 (H) 8 - 23 MG/DL    Creatinine 1.82 (H) 0.8 - 1.5 MG/DL    GFR est AA 47 (L) >60 ml/min/1.73m2    GFR est non-AA 39 (L) >60 ml/min/1.73m2    Calcium 8.5 8.3 - 10.4 MG/DL    Bilirubin, total 0.1 (L) 0.2 - 1.1 MG/DL    ALT (SGPT) 9 (L) 12 - 65 U/L    AST (SGOT) 17 15 - 37 U/L    Alk.  phosphatase 69 50 - 136 U/L    Protein, total 6.2 (L) 6.3 - 8.2 g/dL    Albumin 2.8 (L) 3.2 - 4.6 g/dL    Globulin 3.4 2.3 - 3.5 g/dL    A-G Ratio 0.8 (L) 1.2 - 3.5     TROPONIN I    Collection Time: 09/09/19  2:34 PM   Result Value Ref Range    Troponin-I, Qt. 0.02 0.02 - 0.05 NG/ML   EKG, 12 LEAD, INITIAL    Collection Time: 09/09/19  2:36 PM   Result Value Ref Range    Ventricular Rate 61 BPM    Atrial Rate 61 BPM    P-R Interval 194 ms    QRS Duration 66 ms    Q-T Interval 408 ms    QTC Calculation (Bezet) 410 ms    Calculated P Axis 79 degrees    Calculated R Axis 74 degrees    Calculated T Axis 82 degrees    Diagnosis       !! AGE AND GENDER SPECIFIC ECG ANALYSIS !! Normal sinus rhythm  Septal infarct (cited on or before 09-SEP-2019)  Abnormal ECG  When compared with ECG of 13-SEP-2018 20:17,  Premature ventricular complexes are no longer Present  QRS duration has decreased  QT has shortened  Confirmed by CHRISTOPHE MANCINI (), Sirera Adler ([id number]) on 9/9/2019 4:42:15 PM         All Micro Results     None          Other Studies:  Xr Chest Port    Result Date: 9/9/2019  History: Hypotension Exam: portable chest Comparison: 9/13/2018 Findings: No focal alveolar infiltrate or pleural effusion. No change in the appearance of the mediastinal contour or osseous structures.  Impressions: Stable portable chest       Assessment and Plan:     Hospital Problems as of 9/9/2019 Date Reviewed: 8/28/2019          Codes Class Noted - Resolved POA    * (Principal) GI bleed ICD-10-CM: K92.2  ICD-9-CM: 578.9  9/9/2019 - Present Yes        Chronic respiratory failure with hypoxia (HCC) (Chronic) ICD-10-CM: J96.11  ICD-9-CM: 518.83, 799.02  9/9/2019 - Present Yes        Heart failure with reduced ejection fraction (HCC) (Chronic) ICD-10-CM: I50.20  ICD-9-CM: 428.20  9/9/2019 - Present Yes        CKD (chronic kidney disease), stage III (HCC) (Chronic) ICD-10-CM: N18.3  ICD-9-CM: 585.3  9/9/2019 - Present Yes        Anemia ICD-10-CM: D64.9  ICD-9-CM: 285.9  3/18/2019 - Present Yes        A-fib ([facility] ICD-10-CM: I48.91  ICD-9-CM: 427.31  7/23/2018 - Present Yes        Hypertension ICD-10-CM: I10  ICD-9-CM: 401.9  6/1/2017 - Present Yes        COPD (chronic obstructive pulmonary disease) ([facility] (Chronic) ICD-10-CM: J44.9  ICD-9-CM: 692  5/2/2014 - Present Yes              · Acute on chronic anemia: no jaiden GI bleed, will admit to remote tele, NPO at midnight, consult GI tomorrow for EGD, trend HGB every 12 hours, add protonix IV every 12 hours, hold eliquis  · Hypotension: improved, will need to evaluate if can resume home antihypertensives slowly tomorrow due to cardiomyopathy  · AFIB: hold eliquis due to GI workup and metoprolol due to hypotension   · CKD: stable, followup BMP   · Near syncope: due to hypotension, followup on tele and trend troponin  · COPD/chronic hypoxia: stable, continue O2, continue pulmicort, duo-nebs    Discharge planning:  PPD, PT,OT  DVT ppx: SCD  Code status:  Full  Estimated LOS:  Greater than 2 midnights  Risk:  high  Care plan: no family  Signed:  Navin Black MD

## 2019-09-09 NOTE — PROGRESS NOTES
Visit with patient in ER. Calm.     Kristi Mcdonough, staff   C: [phone] /  [email or web address]

## 2019-09-09 NOTE — ED PROVIDER NOTES
Patient complains of generalized fatigue, light headed, dry mouth, faint feeling over the past 24 hours. Nausea but no vomiting. He has frequent soft stools - has not seen blood or dark stools. He is on home oxygen -says his breathing is his normal at this time. Denies chest pain. Decreased appetite. No abdominal pain. He admits to some dysuria and straining with urination. Has history of prostate disorder. He has been receiving Iron infusions for anemia. He has cardiomyopathy and is on multiple medications that lower BP. He is on Eliquis. EMS found his BP to be 80/50 and he received 450 cc of NS IVF.             Past Medical History:   Diagnosis Date    A-fib [facility]) 5/2/2014    AAA (abdominal aortic aneurysm) without rupture ([address] 5/2/2014    3.2 on  2/14 [facility]    Arthritis     Cancer ([address]     hx prostate cancer    COPD (chronic obstructive pulmonary disease) ([address] 5/2/2014    Elevated prostate specific antigen (PSA)     Glaucoma 5/2/2014    Heart disease     Heart failure ([address]     Hyperlipemia 5/2/2014    Hypertension     Hypertrophy of prostate with urinary obstruction and other lower urinary tract symptoms (LUTS)     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    OA (osteoarthritis) 5/2/2014    Peptic ulcer disease 6/1/2017    Poor historian     Prostate cancer ([address]     Pulmonary embolus ([address] 6/1/2017    Supplemental oxygen dependent 5/2/2014    Warfarin anticoagulation 5/2/2014       Past Surgical History:   Procedure Laterality Date    HX HEART CATHETERIZATION           Family History:   Problem Relation Age of Onset    Cancer Mother     Heart Disease Father        Social History     Socioeconomic History    Marital status: SINGLE     Spouse name: Not on file    Number of children: Not on file    Years of education: Not on file    Highest education level: Not on file   Occupational History    Not on file   Social Needs    Financial resource strain: Not on file    Food insecurity: Worry: Not on file     Inability: Not on file    Transportation needs:     Medical: Not on file     Non-medical: Not on file   Tobacco Use    Smoking status: Former Smoker     Packs/day: 2.00     Years: 40.00     Pack years: 80.00     Last attempt to quit: 2014     Years since quittin.1    Smokeless tobacco: Never Used    Tobacco comment: still taking Chantix    Substance and Sexual Activity    Alcohol use: No    Drug use: No    Sexual activity: Yes     Partners: Female     Birth control/protection: None   Lifestyle    Physical activity:     Days per week: Not on file     Minutes per session: Not on file    Stress: Not on file   Relationships    Social connections:     Talks on phone: Not on file     Gets together: Not on file     Attends Restoration service: Not on file     Active member of club or organization: Not on file     Attends meetings of clubs or organizations: Not on file     Relationship status: Not on file    Intimate partner violence:     Fear of current or ex partner: Not on file     Emotionally abused: Not on file     Physically abused: Not on file     Forced sexual activity: Not on file   Other Topics Concern    Not on file   Social History Narrative    Not on file         ALLERGIES: Statins-hmg-coa reductase inhibitors    Review of Systems   Constitutional: Positive for appetite change and fatigue. Negative for chills and fever. HENT:        Dry mouth   Eyes: Positive for photophobia. Negative for pain. Respiratory: Positive for shortness of breath (chronic and unchanged). Negative for cough. Cardiovascular: Negative for chest pain, palpitations and leg swelling. Gastrointestinal: Positive for nausea. Negative for abdominal distention, abdominal pain, blood in stool and vomiting. Genitourinary: Positive for difficulty urinating and dysuria. Musculoskeletal: Positive for arthralgias and gait problem. Skin: Negative.     Neurological: Positive for weakness and light-headedness. Hematological:        Eliquis   Psychiatric/Behavioral: Negative. Vitals:    09/09/19 1421 09/09/19 1428   BP: 96/51 94/55   Pulse: 66 63   Resp: 18 24   Temp: 98.6 °F (37 °C)    SpO2: 100% 100%   Weight: 78 kg (172 lb)    Height: 5' 9\" (1.753 m)             Physical Exam   Constitutional: He is oriented to person, place, and time. He appears well-developed and well-nourished. HENT:   Head: Normocephalic and atraumatic. MM dry   Eyes: Pupils are equal, round, and reactive to light. Conjunctivae and EOM are normal. No scleral icterus. Neck: Normal range of motion. Neck supple. No JVD present. Cardiovascular: Normal rate, regular rhythm, normal heart sounds and intact distal pulses. Pulmonary/Chest: Effort normal.   Decreased breath sounds bilaterally   Abdominal: Soft. Bowel sounds are normal. He exhibits no mass. There is no tenderness. Genitourinary: Rectal exam shows guaiac positive stool. Genitourinary Comments: No rectal mass or tenderness. Poor sphincter tone. Stool light brown Heme positive   Musculoskeletal:   No extremity tenderness   Neurological: He is alert and oriented to person, place, and time. Skin: Skin is warm and dry. Psychiatric: He has a normal mood and affect. His behavior is normal.   Nursing note and vitals reviewed.        MDM  Number of Diagnoses or Management Options  Diagnosis management comments: Hypotension  His last ECHO shows EF of 30 - 35 % in 8/2018  GI bleeding  COPD  Oxygen dependent  Anemia  Labs reviewed  EKG sinus no ischemia  CXR clear lungs  Consult Hospitalist - Dr. Jose Bruno - will see       Amount and/or Complexity of Data Reviewed  Clinical lab tests: ordered and reviewed  Tests in the radiology section of CPT®: ordered and reviewed  Review and summarize past medical records: yes  Discuss the patient with other providers: yes (hospitalist)    Critical Care  Total time providing critical care: 30-74 minutes (Critical care time 35 minutes)    Patient Progress  Patient progress: stable         Procedures

## 2019-09-09 NOTE — ED TRIAGE NOTES
Pt arrives via [facility]. Pt c/o weakness and dizziness progressing over last 3 days. Pt wears 3 L O2 [place] at home. Pt walks with a walker at home but was unable to get up on his own today.     Initial BP:80/50    450 fluid bolus given by EMS    Last BP:100/60

## 2019-09-09 NOTE — PROGRESS NOTES
Admission database complete besides PTA med list. Unable to complete med list as pt is unsure of home medications and does not have any family to bring medications.

## 2019-09-09 NOTE — PROGRESS NOTES
TRANSFER - IN REPORT:    Verbal report received from [address] on [employer].  being received from ED for routine progression of care      Report consisted of patients Situation, Background, Assessment and   Recommendations(SBAR). Information from the following report(s) SBAR was reviewed with the receiving nurse. Opportunity for questions and clarification was provided. Assessment completed upon patients arrival to unit and care assumed.

## 2019-09-10 LAB
ANION GAP SERPL CALC-SCNC: 2 MMOL/L (ref 7–16)
APPEARANCE UR: CLEAR
BACTERIA URNS QL MICRO: 0 /HPF
BASOPHILS # BLD: 0 K/UL (ref 0–0.2)
BASOPHILS NFR BLD: 1 % (ref 0–2)
BILIRUB UR QL: NEGATIVE
BUN SERPL-MCNC: 27 MG/DL (ref 8–23)
CALCIUM SERPL-MCNC: 9.2 MG/DL (ref 8.3–10.4)
CASTS URNS QL MICRO: ABNORMAL /LPF
CHLORIDE SERPL-SCNC: 106 MMOL/L (ref 98–107)
CO2 SERPL-SCNC: 31 MMOL/L (ref 21–32)
COLOR UR: YELLOW
CREAT SERPL-MCNC: 1.78 MG/DL (ref 0.8–1.5)
DIFFERENTIAL METHOD BLD: ABNORMAL
EOSINOPHIL # BLD: 0.1 K/UL (ref 0–0.8)
EOSINOPHIL NFR BLD: 2 % (ref 0.5–7.8)
EPI CELLS #/AREA URNS HPF: ABNORMAL /HPF
ERYTHROCYTE [DISTWIDTH] IN BLOOD BY AUTOMATED COUNT: 17.2 % (ref 11.9–14.6)
GLUCOSE BLD STRIP.AUTO-MCNC: 85 MG/DL (ref 65–100)
GLUCOSE SERPL-MCNC: 83 MG/DL (ref 65–100)
GLUCOSE UR STRIP.AUTO-MCNC: NEGATIVE MG/DL
HCT VFR BLD AUTO: 24.4 % (ref 41.1–50.3)
HCT VFR BLD AUTO: 26.4 % (ref 41.1–50.3)
HCT VFR BLD AUTO: 29.3 % (ref 41.1–50.3)
HGB BLD-MCNC: 7.4 G/DL (ref 13.6–17.2)
HGB BLD-MCNC: 8 G/DL (ref 13.6–17.2)
HGB BLD-MCNC: 9 G/DL (ref 13.6–17.2)
HGB UR QL STRIP: NEGATIVE
IMM GRANULOCYTES # BLD AUTO: 0.1 K/UL (ref 0–0.5)
IMM GRANULOCYTES NFR BLD AUTO: 1 % (ref 0–5)
KETONES UR QL STRIP.AUTO: NEGATIVE MG/DL
LEUKOCYTE ESTERASE UR QL STRIP.AUTO: NEGATIVE
LYMPHOCYTES # BLD: 1.9 K/UL (ref 0.5–4.6)
LYMPHOCYTES NFR BLD: 31 % (ref 13–44)
MCH RBC QN AUTO: 28.1 PG (ref 26.1–32.9)
MCHC RBC AUTO-ENTMCNC: 30.3 G/DL (ref 31.4–35)
MCV RBC AUTO: 92.8 FL (ref 79.6–97.8)
MONOCYTES # BLD: 0.8 K/UL (ref 0.1–1.3)
MONOCYTES NFR BLD: 13 % (ref 4–12)
NEUTS SEG # BLD: 3.4 K/UL (ref 1.7–8.2)
NEUTS SEG NFR BLD: 53 % (ref 43–78)
NITRITE UR QL STRIP.AUTO: NEGATIVE
NRBC # BLD: 0 K/UL (ref 0–0.2)
PH UR STRIP: 6.5 [PH] (ref 5–9)
PLATELET # BLD AUTO: 268 K/UL (ref 150–450)
PMV BLD AUTO: 10.6 FL (ref 9.4–12.3)
POTASSIUM SERPL-SCNC: 4.7 MMOL/L (ref 3.5–5.1)
PROT UR STRIP-MCNC: 30 MG/DL
RBC # BLD AUTO: 2.63 M/UL (ref 4.23–5.6)
RBC #/AREA URNS HPF: 0 /HPF
SODIUM SERPL-SCNC: 139 MMOL/L (ref 136–145)
SP GR UR REFRACTOMETRY: 1.01 (ref 1–1.02)
TROPONIN I SERPL-MCNC: 0.06 NG/ML (ref 0.02–0.05)
TROPONIN I SERPL-MCNC: 0.09 NG/ML (ref 0.02–0.05)
UROBILINOGEN UR QL STRIP.AUTO: 0.2 EU/DL (ref 0.2–1)
WBC # BLD AUTO: 6.3 K/UL (ref 4.3–11.1)
WBC URNS QL MICRO: ABNORMAL /HPF

## 2019-09-10 PROCEDURE — 97165 OT EVAL LOW COMPLEX 30 MIN: CPT

## 2019-09-10 PROCEDURE — 97530 THERAPEUTIC ACTIVITIES: CPT

## 2019-09-10 PROCEDURE — 84484 ASSAY OF TROPONIN QUANT: CPT

## 2019-09-10 PROCEDURE — 77010033678 HC OXYGEN DAILY

## 2019-09-10 PROCEDURE — 97161 PT EVAL LOW COMPLEX 20 MIN: CPT

## 2019-09-10 PROCEDURE — C9113 INJ PANTOPRAZOLE SODIUM, VIA: HCPCS | Performed by: INTERNAL MEDICINE

## 2019-09-10 PROCEDURE — 65660000000 HC RM CCU STEPDOWN

## 2019-09-10 PROCEDURE — 94760 N-INVAS EAR/PLS OXIMETRY 1: CPT

## 2019-09-10 PROCEDURE — 74011250637 HC RX REV CODE- 250/637: Performed by: INTERNAL MEDICINE

## 2019-09-10 PROCEDURE — P9016 RBC LEUKOCYTES REDUCED: HCPCS

## 2019-09-10 PROCEDURE — 74011250636 HC RX REV CODE- 250/636: Performed by: INTERNAL MEDICINE

## 2019-09-10 PROCEDURE — 65270000029 HC RM PRIVATE

## 2019-09-10 PROCEDURE — 94640 AIRWAY INHALATION TREATMENT: CPT

## 2019-09-10 PROCEDURE — 85018 HEMOGLOBIN: CPT

## 2019-09-10 PROCEDURE — 36415 COLL VENOUS BLD VENIPUNCTURE: CPT

## 2019-09-10 PROCEDURE — 81001 URINALYSIS AUTO W/SCOPE: CPT

## 2019-09-10 PROCEDURE — 80048 BASIC METABOLIC PNL TOTAL CA: CPT

## 2019-09-10 PROCEDURE — 74011000250 HC RX REV CODE- 250: Performed by: INTERNAL MEDICINE

## 2019-09-10 PROCEDURE — 82962 GLUCOSE BLOOD TEST: CPT

## 2019-09-10 PROCEDURE — 86900 BLOOD TYPING SEROLOGIC ABO: CPT

## 2019-09-10 PROCEDURE — 36430 TRANSFUSION BLD/BLD COMPNT: CPT

## 2019-09-10 PROCEDURE — 85025 COMPLETE CBC W/AUTO DIFF WBC: CPT

## 2019-09-10 PROCEDURE — 86923 COMPATIBILITY TEST ELECTRIC: CPT

## 2019-09-10 PROCEDURE — 77030039270 HC TU BLD FLTR CARD -A

## 2019-09-10 RX ORDER — SODIUM CHLORIDE 9 MG/ML
250 INJECTION, SOLUTION INTRAVENOUS AS NEEDED
Status: DISCONTINUED | OUTPATIENT
Start: 2019-09-10 | End: 2019-09-18 | Stop reason: HOSPADM

## 2019-09-10 RX ORDER — BISACODYL 5 MG
20 TABLET, DELAYED RELEASE (ENTERIC COATED) ORAL
Status: COMPLETED | OUTPATIENT
Start: 2019-09-10 | End: 2019-09-10

## 2019-09-10 RX ORDER — MAGNESIUM CITRATE
296 SOLUTION, ORAL ORAL
Status: COMPLETED | OUTPATIENT
Start: 2019-09-10 | End: 2019-09-10

## 2019-09-10 RX ADMIN — MAGNESIUM CITRATE 296 ML: 1.75 LIQUID ORAL at 15:15

## 2019-09-10 RX ADMIN — BUDESONIDE 500 MCG: 0.5 INHALANT RESPIRATORY (INHALATION) at 07:40

## 2019-09-10 RX ADMIN — BUDESONIDE 500 MCG: 0.5 INHALANT RESPIRATORY (INHALATION) at 19:16

## 2019-09-10 RX ADMIN — SODIUM CHLORIDE 250 ML: 900 INJECTION, SOLUTION INTRAVENOUS at 17:57

## 2019-09-10 RX ADMIN — PANTOPRAZOLE SODIUM 40 MG: 40 INJECTION, POWDER, FOR SOLUTION INTRAVENOUS at 08:56

## 2019-09-10 RX ADMIN — ALLOPURINOL 300 MG: 100 TABLET ORAL at 08:49

## 2019-09-10 RX ADMIN — Medication 10 ML: at 22:13

## 2019-09-10 RX ADMIN — BRIMONIDINE TARTRATE 1 DROP: 2 SOLUTION OPHTHALMIC at 18:00

## 2019-09-10 RX ADMIN — BISACODYL 20 MG: 5 TABLET, COATED ORAL at 15:15

## 2019-09-10 RX ADMIN — BRIMONIDINE TARTRATE 1 DROP: 2 SOLUTION OPHTHALMIC at 08:57

## 2019-09-10 RX ADMIN — PANTOPRAZOLE SODIUM 40 MG: 40 INJECTION, POWDER, FOR SOLUTION INTRAVENOUS at 22:00

## 2019-09-10 RX ADMIN — Medication 10 ML: at 05:28

## 2019-09-10 RX ADMIN — Medication 10 ML: at 15:15

## 2019-09-10 NOTE — CONSULTS
[facility] Consult Note       Primary GI Physician: Dr Suzi Dickerson    Referring Provider:  Dr Ethel Thomas    Consult Date:  9/10/2019    Admit Date:  9/9/2019    Chief Complaint:  AYAN    Subjective:     History of Present Illness:  Patient is a 70 y.o. male with PMH of A fib on Eliquis, AAA, arthritis, hx prostate cancer, CKD stage 3, COPD on O2, heart failure (EF 30-35% in 8/2018), HTN. HLD, PUD, PE, hx of colon polyps, who is seen in consultation at the request of Dr. Ethel Thomas for iron deficiency anemia. Pt presented to ED 9/9/19 for fatigue and weakness. He reported frequent soft stools with no BRB or melena. He did have decreased appetite. He has been receiving iron infusions as outpatient for AYAN secondary to CKD. He does have cardiomyopathy. He does take Eliquis. Rectal exam in ED with light brown heme positive stool. HGB at admission 7.2. Baseline reported as 8-9. Labs 9/10/19: HGB 8.0, MCV 92.8, MCH 28.1, MCHC 30.3, RDW 17.2, BUN 27, Cr 1.78, troponin 0.06. He was admitted to hospitalist. He was started on IV pantoprazole 40mg BID and Eliquis was held. He has not received blood transfusion since arrival.     Pt reports recent onset constipation, having maybe 1 or 2 small BMs weekly. He takes Miralax prn to help manage. He does complain of some lower abdominal discomfort, but not pain. He has had recent onset of frequent HB. He is not currently taking any medication at home for this. He denies any nausea, vomiting, or dysphagia. Last dose of Eliquis was 9/9/19 AM. He has been followed by [facility] hematology for anemia. He was last seen 8/28/19. He had Feraheme infusions 5/30/19 and 9/3/19. Next appt with hematology recommended for Nov/Dec 2019. Pt is known to Dr Suzi Dickerson. He was seen in office 8/30/19 for AYAN. He denied any overt bleeding at that time. He cannot recall if he has had previous EGD. Last colonoscopy was 5/21/18 with Dr Ashley Leon at [facility].  Findings include mild diverticulosis in transverse/left/sigmoid colon, diminutive polyps in proximal R colon, and medium pedunculated sigmoid polyp. Unable to access pathology at this time. He is scheduled for EGD and colonoscopy with Dr Gil Brody 9/26/19 at [facility]. PMH:  Past Medical History:   Diagnosis Date    A-fib [facility]) 5/2/2014    AAA (abdominal aortic aneurysm) without rupture ([address] 5/2/2014    3.2 on US 2/14 [facility]    Arthritis     Cancer [facility]     hx prostate cancer    COPD (chronic obstructive pulmonary disease) ([address] 5/2/2014    Elevated prostate specific antigen (PSA)     Glaucoma 5/2/2014    Heart disease     Heart failure ([address]     Hyperlipemia 5/2/2014    Hypertension     Hypertrophy of prostate with urinary obstruction and other lower urinary tract symptoms (LUTS)     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 Authorizing Provider   famotidine (PEPCID) 40 mg tablet Take 40 mg by mouth daily. Provider, Historical   fluticasone propion-salmeterol (WIXELA INHUB) 250-50 mcg/dose diskus inhaler Take 1 Puff by inhalation every twelve (12) hours. Provider, Historical   furosemide (LASIX) 40 mg tablet Take 1 Tab by mouth daily. 3/1/19   Pelon Hammonds MD   nitroglycerin (NITROSTAT) 0.4 mg SL tablet 1 Tab by SubLINGual route every five (5) minutes as needed for Chest Pain. 2/8/19   Kin Snellen, MD   rosuvastatin (CRESTOR) 40 mg tablet Take 40 mg by mouth nightly.     Provider, Historical   docusate sodium (COLACE) 100 mg capsule Take 100 mg by mouth daily. Provider, Historical   amLODIPine (NORVASC) 10 mg tablet Take  by mouth daily. Provider, Historical   budesonide (PULMICORT) 0.5 mg/2 mL nbsp 2 mL by Nebulization route two (2) times a day. 9/15/18   Juan Pablo Dubose,    albuterol-ipratropium (DUO-NEB) 2.5 mg-0.5 mg/3 ml nebu 3 mL by Nebulization route every six (6) hours as needed. 9/15/18   Juan Pablo Dubose,    metoprolol succinate (TOPROL-XL) 50 mg XL tablet Take 1 Tab by mouth daily. 9/1/18   Hesham Bowens PA   apixaban (ELIQUIS) 5 mg tablet Take 1 Tab by mouth two (2) times a day. 9/1/18   EDGAR Leal   lisinopril (PRINIVIL, ZESTRIL) 10 mg tablet Take 1 Tab by mouth daily. 7/29/18   Elham Rogers NP   fluticasone-salmeterol (ADVAIR) 250-50 mcg/dose diskus inhaler Take 1 Puff by inhalation. 5/15/18   Other, MD Grayson   allopurinol (ZYLOPRIM) 300 mg tablet Take  by mouth daily. Provider, Historical   OXYGEN-AIR DELIVERY SYSTEMS Use as instructed. Use as instructed. 9/15/15   Provider, Historical   ferrous sulfate (IRON) 325 mg (65 mg iron) tablet Take  by mouth Daily (before breakfast). Provider, Historical   brimonidine (ALPHAGAN P) 0.1 % ophthalmic solution every eight (8) hours.     Provider, Historical       Hospital Medications:  Current Facility-Administered Medications   Medication Dose Route Frequency    albuterol-ipratropium (DUO-NEB) 2.5 MG-0.5 MG/3 ML  3 mL Nebulization Q6H PRN    allopurinol (ZYLOPRIM) tablet 300 mg  300 mg Oral DAILY    budesonide (PULMICORT) 500 mcg/2 ml nebulizer suspension  500 mcg Nebulization BID RT    sodium chloride (NS) flush 5-40 mL  5-40 mL IntraVENous Q8H    sodium chloride (NS) flush 5-40 mL  5-40 mL IntraVENous PRN    acetaminophen (TYLENOL) tablet 650 mg  650 mg Oral Q6H PRN    ondansetron (ZOFRAN) injection 4 mg  4 mg IntraVENous Q4H PRN    pantoprazole (PROTONIX) 40 mg in sodium chloride 0.9% 10 mL injection  40 mg IntraVENous Q12H    tuberculin injection 5 Units  5 Units IntraDERMal ONCE    brimonidine (ALPHAGAN) 0.2 % ophthalmic solution 1 Drop  1 Drop Both Eyes BID    influenza vaccine 2019- (6 mos+)(PF) (FLUARIX/FLULAVAL/FLUZONE QUAD) injection 0.5 mL  0.5 mL IntraMUSCular PRIOR TO DISCHARGE       Social History:  Social History     Tobacco Use    Smoking status: Former Smoker     Packs/day: 2.00     Years: 40.00     Pack years: 80.00     Last attempt to quit: 2014     Years since quittin.1    Smokeless tobacco: Never Used    Tobacco comment: still taking Chantix    Substance Use Topics    Alcohol use: No       Pt denies any history of drug use, blood transfusions, or tattoos. Family History:  Family History   Problem Relation Age of Onset    Cancer Mother     Heart Disease Father        Review of Systems:  A detailed 10 system ROS is obtained, with pertinent positives as listed above. All others are negative. Diet:  NPO    Objective:     Physical Exam:  Vitals:  Visit Vitals  /59 (BP 1 Location: Left arm, BP Patient Position: Sitting)   Pulse 80   Temp 98.1 °F (36.7 °C)   Resp 16   Ht 5' 9\" (1.753 m)   Wt 78 kg (172 lb)   SpO2 95%   BMI 25.40 kg/m²     Gen:  Pt is alert, cooperative, no acute distress  Skin:  Extremities and face reveal no rashes. HEENT: Sclerae anicteric. Extra-occular muscles are intact. No oral ulcers. No abnormal pigmentation of the lips. The neck is supple. Cardiovascular: Regular rate and rhythm. No murmurs, gallops, or rubs. Respiratory:  Comfortable breathing with no accessory muscle use. Clear, but decreased breath sounds anteriorly with no wheezes, rales, or rhonchi. O2 at 3L/min. GI:  Abdomen nondistended, soft, and nontender. Normal active bowel sounds. No enlargement of the liver or spleen. No masses palpable. Rectal:  Deferred  Musculoskeletal:  No pitting edema of the lower legs. Neurological:  Gross memory appears intact.   Patient is alert and oriented. Psychiatric:  Mood appears appropriate with judgement intact. Lymphatic:  No cervical or supraclavicular adenopathy. Laboratory:    Recent Labs     09/10/19  0734 09/10/19  0214 09/09/19 2024 09/09/19  1434   WBC  --  6.3  --  5.6   HGB 8.0* 7.4* 8.3* 7.2*   HCT 26.4* 24.4* 27.8* 23.6*   PLT  --  268  --  261   MCV  --  92.8  --  92.2   NA  --  139  --  141   K  --  4.7  --  5.1   CL  --  106  --  107   CO2  --  31  --  30   BUN  --  27*  --  30*   CREA  --  1.78*  --  1.82*   CA  --  9.2  --  8.5   GLU  --  83  --  99   AP  --   --   --  69   SGOT  --   --   --  17   ALT  --   --   --  9*   TBILI  --   --   --  0.1*   ALB  --   --   --  2.8*   TP  --   --   --  6.2*      Colonoscopy 5/21/18-Dr Yeung  Pre-operative Diagnosis:  Positive fecal immunochemical test [R19.5]    Post-op Diagnosis:  Post-Op Diagnosis Codes:  * Colon polyps [K63.5]  * Diverticulosis [K57.90]    Procedure:  Colonoscopy/Ablation/Snare Polypectomy/Control of bleeding with endo clip    Surgeon: Surgeon(s):  Quincy Yeung MD    EBL:  0 mL    Specimen:  ID Type Source Tests Collected by Time Destination   1 : sigmoid polyp Tissue Colon SURGICAL PATHOLOGY EXAM Saumya Sellers MD 5/21/2018 2469       Findings  #1. Mild diverticulosis--Transverse, Left and Sigmoid Colon  #2. Diminutive Polyps--proximal right colon--fulgurated with tip of the snare  #3.  Medium pedunculated sigmoid polyp--snare cautery polypectomy/endo clip          Assessment:     Principal Problem:    GI bleed (9/9/2019)    Active Problems:    COPD (chronic obstructive pulmonary disease) ([facility] (5/2/2014)      Hypertension (6/1/2017)      A-fib ([facility] (7/23/2018)      Anemia (3/18/2019)      Chronic respiratory failure with hypoxia ([facility] (9/9/2019)      Heart failure with reduced ejection fraction ([address] (9/9/2019)      CKD (chronic kidney disease), stage III ([facility] (9/9/2019)     Patient is a 70 y.o. male with PMH of A fib on Eliquis, AAA, arthritis, hx prostate cancer, CKD stage 3, COPD on O2, heart failure (EF 30-35% in 8/2018), HTN. HLD, PUD, PE, hx of colon polyps, who is seen in consultation at the request of Dr. Daniel Clarke for iron deficiency anemia. Pt presented to ED 9/9/19 for fatigue and weakness. He has been receiving iron infusions as outpatient for AYAN secondary to CKD. He does have cardiomyopathy. He does take Eliquis. Rectal exam in ED with light brown heme positive stool. HGB at admission 7.2. Baseline reported as 8-9. Labs 9/10/19: HGB 8.0, MCV 92.8, MCH 28.1, MCHC 30.3, RDW 17.2, BUN 27, Cr 1.78, troponin 0.06. He was admitted to hospitalist. He was started on IV pantoprazole 40mg BID and Eliquis was held. He has not received blood transfusion since arrival. Last colonoscopy was 5/21/18 with Dr Ramonia Denver at [facility]. Findings include mild diverticulosis in transverse/left/sigmoid colon, diminutive polyps in proximal R colon, and medium pedunculated sigmoid polyp. Unable to access pathology at this time. Unknown when or if he had prior EGD. He complains of recent onset constipation and takes prn Miralax. He has recently had frequent heartburn. Currently receiving pantoprazole 40mg IV BID. Last Eliquis dose was 9/9/19 AM. No current overt GI bleeding. Differential for anemia includes: AOCD, chronic blood loss, neoplastic process, PUD. Plan:     -Monitor HGB and transfuse as needed  -Monitor for signs of overt GI bleeding  -Continue PPI IV BID  -Follow troponin  -Will need EGD and colonoscopy for evaluation of AYAN. Will discuss timing with Dr Jose Moncada. He does have constipation and will need additional prep.   -Continue to follow    CHANDLER Oropeza    Patient is seen and examined in collaboration with Dr. Jose Moncada. Assessment and plan as per Dr. Jose Moncada.

## 2019-09-10 NOTE — PROGRESS NOTES
OCCUPATIONAL THERAPY: Initial Assessment and Discharge 9/10/2019  INPATIENT:    Payor: [employer] Petra / Plan: [address] / Product Type: Managed Care Medicare /      NAME/AGE/GENDER: Zackary Lobo is a 70 y.o. male   PRIMARY DIAGNOSIS:  GI bleed [K92.2] GI bleed   GI bleed    Procedure(s) (LRB):  ESOPHAGOGASTRODUODENOSCOPY (EGD) (N/A)  COLONOSCOPY/ 26 ROOM 614 (N/A)     ICD-10: Treatment Diagnosis:    Generalized Muscle Weakness (M62.81)   Precautions/Allergies:     Statins-hmg-coa reductase inhibitors      ASSESSMENT:     Mr. Matt Izquierdo was admitted with weakness, hypotension, and anemia d/t GIB. Pt lives alone and is independent with ADLs at baseline, uses a rollator for mobility, is on 3L home O2. Pt stated that he is mostly independent with IADLs, has a nursing aide several times/ week. Pt denies having any recent falls. This session, pt greeted reclined in bed, very pleasant and agreeable to OT session, A&O X4. Pt wearing sunglasses to cut down on glare/ light sensitivity d/t glaucoma. Pt demonstrated independence with bed mobility, mobility in room and bathroom SBA with RW. Pt donned/ doffed socks independently, completed ADLs at sink independently. Pt endorsed mild SOB with activity; SpO2 < 89% with activity on 3L , increased to 92% within 2 minutes with rest break and cues for PLB technique. Pt educated on energy conservation techniques and verbalized good understanding. Pt is at or near his functional baseline for ADLs and does not require further skilled OT services at this time, no d/c needs. REHAB RECOMMENDATIONS (at time of discharge pending progress):    Placement: It is my opinion, based on this patient's performance to date, that Mr. Matt Izquierdo may benefit from being discharged with NO further skilled therapy due to a proven ability to function at baseline.   Equipment:   None at this time              OCCUPATIONAL PROFILE AND HISTORY:   History of Present Injury/Illness (Reason for Referral):  See H&P  Past Medical History/Comorbidities:   Mr. Gifford Hashimoto  has a past medical history of A-fib ([address] (5/2/2014), AAA (abdominal aortic aneurysm) without rupture ([address] (5/2/2014), Arthritis, Cancer ([address] COPD (chronic obstructive pulmonary disease) ([address] (5/2/2014), Elevated prostate specific antigen (PSA), Glaucoma (5/2/2014), Heart disease, Heart failure ([address] Hyperlipemia (5/2/2014), Hypertension, Hypertrophy of prostate with urinary obstruction and other lower urinary tract symptoms (LUTS), 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[address] (7/25/2018), OA (osteoarthritis) (5/2/2014), Peptic ulcer disease (6/1/2017), Poor historian, Prostate cancer ([address] Pulmonary embolus ([address] (6/1/2017), Supplemental oxygen dependent (5/2/2014), and Warfarin anticoagulation (5/2/2014). He also has no past medical history of Autoimmune disease ([address] Dementia, Liver disease, Neurological disorder, Other ill-defined conditions(799.89), Psychiatric disorder, Seizures ([address] or Sleep disorder. Mr. Gifford Hashimoto  has a past surgical history that includes hx heart catheterization.   Social History/Living Environment:   Home Environment: Apartment  # Steps to Enter: (elevator)  One/Two Story Residence: One story  Living Alone: Yes  Support Systems: None  Patient Expects to be Discharged to[de-identified] Apartment  Current DME Used/Available at Home: Edith Border, rollator, Shower chair, Oxygen, portable  Tub or Shower Type: Tub/Shower combination  Prior Level of Function/Work/Activity:  Independent, lives alone, has a nursing aide, rollator for mobility     Number of Personal Factors/Comorbidities that affect the Plan of Care: Brief history (0):  LOW COMPLEXITY   ASSESSMENT OF OCCUPATIONAL PERFORMANCE[de-identified]   Activities of Daily Living:   Basic ADLs (From Assessment) Complex ADLs (From Assessment)   Feeding: Independent  Oral Facial Hygiene/Grooming: Independent  Bathing: Independent  Upper Body Dressing: Independent  Lower Body Dressing: Independent  Toileting: Independent     Grooming/Bathing/Dressing Activities of Daily Living     Cognitive Retraining  Safety/Judgement: Awareness of environment; Fall prevention                 Functional Transfers  Bathroom Mobility: Supervision/set up     Bed/Mat Mobility  Rolling: Independent  Supine to Sit: Independent  Sit to Supine: Independent  Sit to Stand: Supervision  Stand to Sit: Supervision  Bed to Chair: Supervision     Most Recent Physical Functioning:   Gross Assessment:  AROM: Within functional limits  Strength: Within functional limits               Posture:     Balance:  Sitting: Intact  Standing: Intact; With support  Standing - Static: Good Bed Mobility:  Rolling: Independent  Supine to Sit: Independent  Sit to Supine: Independent  Wheelchair Mobility:     Transfers:  Sit to Stand: Supervision  Stand to Sit: Supervision  Bed to Chair: Supervision            Patient Vitals for the past 6 hrs:   BP BP Patient Position SpO2 O2 Flow Rate (L/min) Pulse   09/10/19 1144 121/45 Sitting 100 % -- 70   09/10/19 1145 -- -- -- 3 l/min --       Mental Status  Neurologic State: Alert  Orientation Level: Oriented X4  Cognition: Appropriate decision making, Follows commands  Perception: Appears intact  Perseveration: No perseveration noted  Safety/Judgement: Awareness of environment, Fall prevention                          Physical Skills Involved: Activity Tolerance Cognitive Skills Affected (resulting in the inability to perform in a timely and safe manner):  none  Psychosocial Skills Affected:  Habits/Routines   Number of elements that affect the Plan of Care: 1-3:  LOW COMPLEXITY   CLINICAL DECISION MAKIN [facility] [address] [id number] AM-PAC 6 Clicks   Daily Activity Inpatient Short Form  How much help from another person does the patient currently need. .. Total A Lot A Little None   1. Putting on and taking off regular lower body clothing? ? 1   ? 2   ? 3   ? 4   2.   Bathing (including washing, rinsing, drying)? ? 1   ? 2   ? 3   ? 4   3. Toileting, which includes using toilet, bedpan or urinal?   ? 1   ? 2   ? 3   ? 4   4. Putting on and taking off regular upper body clothing? ? 1   ? 2   ? 3   ? 4   5. Taking care of personal grooming such as brushing teeth? ? 1   ? 2   ? 3   ? 4   6. Eating meals? ? 1   ? 2   ? 3   ? 4   © 2007, Trustees of [facility] [employer], under license to [employer]. All rights reserved      Score:  Initial: 24 Most Recent: X (Date: -- )    Interpretation of Tool:  Represents activities that are increasingly more difficult (i.e. Bed mobility, Transfers, Gait).        Use of outcome tool(s) and clinical judgement create a POC that gives a: LOW COMPLEXITY         TREATMENT:   (In addition to Assessment/Re-Assessment sessions the following treatments were rendered)     Pre-treatment Symptoms/Complaints:    Pain: Initial:   Pain Intensity 1: 0  Post Session:  0     Assessment/Reassessment only, no treatment provided today    Braces/Orthotics/Lines/Etc:   O2 Device: Nasal cannula  Treatment/Session Assessment:    Response to Treatment:  no adverse reaction   Interdisciplinary Collaboration:   Occupational Therapist  Registered Nurse  After treatment position/precautions:   Supine in bed  Bed/Chair-wheels locked  Bed in low position  Call light within reach  RN notified     Total Treatment Duration:  OT Patient Time In/Time Out  Time In: 1350  Time Out: Kenia Dawkins 50, OT

## 2019-09-10 NOTE — PROGRESS NOTES
Chart screened by  for discharge planning. PT/OT eval ordered. CM will follow for recommendations. Recently discharged from [facility]. CM will send new orders if indicated. Please consult  if any new issues arise. \      \Care Management Interventions  PCP Verified by CM:  Yes  Transition of Care Consult (CM Consult): Discharge Planning  Physical Therapy Consult: Yes  Occupational Therapy Consult: Yes  Current Support Network: Own Home  Confirm Follow Up Transport: Self  Plan discussed with Pt/Family/Caregiver: Yes  Freedom of Choice Offered: Yes  Discharge Location  Discharge Placement: Unable to determine at this time

## 2019-09-10 NOTE — PROGRESS NOTES
Progress Note    Patient: Deandre Trujillo MRN: [id number]  SSN: [id number]    YOB: 1940  Age: 70 y.o. Sex: male      Admit Date: 9/9/2019    LOS: 1 day     Subjective:     Patient admitted with anemia. No clear history of GI bleed. Seen by GI. Will transfuse him with 1 U of pRBCs today and will have EGD/colonoscopy done on 9/12th. Will monitor his VS and Hgb level. Objective:     Vitals:    09/10/19 0741 09/10/19 1144 09/10/19 1534 09/10/19 1629   BP:  121/45  144/66   Pulse:  70  76   Resp:  11  18   Temp:  98.2 °F (36.8 °C)  98.9 °F (37.2 °C)   SpO2: 95% 100% 98% 100%   Weight:       Height:            Intake and Output:  Current Shift: 09/10 0701 - 09/10 1900  In: 0   Out: 90 [Urine:90]  Last three shifts: 09/08 1901 - 09/10 0700  In: -   Out: 300 [Urine:300]    Physical Exam:   GENERAL: alert, appears stated age  EYE: conjunctivae/corneas clear. LYMPHATIC: Cervical, supraclavicular, and axillary nodes normal.   THROAT & NECK: normal and no erythema or exudates noted. LUNG: clear to auscultation bilaterally  HEART: regular rate and rhythm, S1, S2 normal, no murmur, click, rub or gallop  ABDOMEN: soft, non-tender. Bowel sounds normal. No masses,  no organomegaly  EXTREMITIES:  extremities normal, atraumatic, no cyanosis or edema  SKIN: Normal.  NEUROLOGIC: No focal deficits   PSYCHIATRIC: non focal    Lab/Data Review: All lab results for the last 24 hours reviewed.          Assessment:     Principal Problem:    GI bleed (9/9/2019)    Active Problems:    COPD (chronic obstructive pulmonary disease) ([address] (5/2/2014)      Hypertension (6/1/2017)      A-fib ([address] (7/23/2018)      Anemia (3/18/2019)      Chronic respiratory failure with hypoxia ([address] (9/9/2019)      Heart failure with reduced ejection fraction ([address] (9/9/2019)      CKD (chronic kidney disease), stage III ([facility] (9/9/2019)        Plan:     -monitor VS and Hgb level   -transfuse 1 U of pRBCs   -seen by GI. EGD and colonoscopy to be done on 9/12    Signed By: Alexia Tyler MD     September 10, 2019

## 2019-09-10 NOTE — PROGRESS NOTES
Interdisciplinary Rounds completed 09/10/19. Nursing, Case Management, Physician and PT present. Plan of care reviewed and updated. Continue remote tele. Gi following.

## 2019-09-10 NOTE — PROGRESS NOTES
Patient A/O x3. Disoriented to time. Patient NPO since 0000 as ordered. O2 Sat at 95% on 2 L NC. Per tele patient running NSR 60-80's. Hourly rounds performed. All needs meet. Bed low/locked. Call light within reach. Patient denies needs at this time.   Will continue to monitor and report to oncoming RN

## 2019-09-10 NOTE — PROGRESS NOTES
Hourly rounds completed throughout this shift. Pt is currently receiving 1 unit PRBC per MD orders. Pt reported having 3 dark loose stools this shift. Pt resting in bed; denies needs at this time. Will continue to monitor and report to oncoming night shift nurse.

## 2019-09-10 NOTE — H&P (VIEW-ONLY)
[facility] Consult Note       Primary GI Physician: Dr Melissa Britt    Referring Provider:  Dr Milla Adamson    Consult Date:  9/10/2019    Admit Date:  9/9/2019    Chief Complaint:  AYAN    Subjective:     History of Present Illness:  Patient is a 70 y.o. male with PMH of A fib on Eliquis, AAA, arthritis, hx prostate cancer, CKD stage 3, COPD on O2, heart failure (EF 30-35% in 8/2018), HTN. HLD, PUD, PE, hx of colon polyps, who is seen in consultation at the request of Dr. Milla Adamson for iron deficiency anemia. Pt presented to ED 9/9/19 for fatigue and weakness. He reported frequent soft stools with no BRB or melena. He did have decreased appetite. He has been receiving iron infusions as outpatient for AYAN secondary to CKD. He does have cardiomyopathy. He does take Eliquis. Rectal exam in ED with light brown heme positive stool. HGB at admission 7.2. Baseline reported as 8-9. Labs 9/10/19: HGB 8.0, MCV 92.8, MCH 28.1, MCHC 30.3, RDW 17.2, BUN 27, Cr 1.78, troponin 0.06. He was admitted to hospitalist. He was started on IV pantoprazole 40mg BID and Eliquis was held. He has not received blood transfusion since arrival.     Pt reports recent onset constipation, having maybe 1 or 2 small BMs weekly. He takes Miralax prn to help manage. He does complain of some lower abdominal discomfort, but not pain. He has had recent onset of frequent HB. He is not currently taking any medication at home for this. He denies any nausea, vomiting, or dysphagia. Last dose of Eliquis was 9/9/19 AM. He has been followed by [facility] hematology for anemia. He was last seen 8/28/19. He had Feraheme infusions 5/30/19 and 9/3/19. Next appt with hematology recommended for Nov/Dec 2019. Pt is known to Dr Melissa Britt. He was seen in office 8/30/19 for AYAN. He denied any overt bleeding at that time. He cannot recall if he has had previous EGD. Last colonoscopy was 5/21/18 with Dr Amy Cadena at [facility].  Findings include mild diverticulosis in transverse/left/sigmoid colon, diminutive polyps in proximal R colon, and medium pedunculated sigmoid polyp. Unable to access pathology at this time. He is scheduled for EGD and colonoscopy with Dr Raoul Carrasco [facility]. PMH:  Past Medical History:   Diagnosis Date    A-fib [facility]) 5/2/2014    AAA (abdominal aortic aneurysm) without rupture ([address] 5/2/2014    3.2 on US 2/14 [facility]    Arthritis     Cancer [facility]     hx prostate cancer    COPD (chronic obstructive pulmonary disease) ([address] 5/2/2014    Elevated prostate specific antigen (PSA)     Glaucoma 5/2/2014    Heart disease     Heart failure ([address]     Hyperlipemia 5/2/2014    Hypertension     Hypertrophy of prostate with urinary obstruction and other lower urinary tract symptoms (LUTS)     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    OA (osteoarthritis) 5/2/2014    Peptic ulcer disease 6/1/2017    Poor historian     Prostate cancer ([facility]     Pulmonary embolus ([address] 6/1/2017    Supplemental oxygen dependent 5/2/2014    Warfarin anticoagulation 5/2/2014       PSH:  Past Surgical History:   Procedure Laterality Date    HX HEART CATHETERIZATION         Allergies: Allergies   Allergen Reactions    Statins-Hmg-Coa Reductase Inhibitors Myalgia     Muscle pain       Home Medications:  Prior to Admission medications    Medication Sig Start Date End Date Taking? Authorizing Provider   famotidine (PEPCID) 40 mg tablet Take 40 mg by mouth daily. Provider, Historical   fluticasone propion-salmeterol (WIXELA INHUB) 250-50 mcg/dose diskus inhaler Take 1 Puff by inhalation every twelve (12) hours. Provider, Historical   furosemide (LASIX) 40 mg tablet Take 1 Tab by mouth daily. 3/1/19   Sami Hammonds MD   nitroglycerin (NITROSTAT) 0.4 mg SL tablet 1 Tab by SubLINGual route every five (5) minutes as needed for Chest Pain. 2/8/19   Jojo Edward MD   rosuvastatin (CRESTOR) 40 mg tablet Take 40 mg by mouth nightly.     Provider, Historical   docusate sodium (COLACE) 100 mg capsule Take 100 mg by mouth daily. Provider, Historical   amLODIPine (NORVASC) 10 mg tablet Take  by mouth daily. Provider, Historical   budesonide (PULMICORT) 0.5 mg/2 mL nbsp 2 mL by Nebulization route two (2) times a day. 9/15/18   Padbrenda Busing, DO   albuterol-ipratropium (DUO-NEB) 2.5 mg-0.5 mg/3 ml nebu 3 mL by Nebulization route every six (6) hours as needed. 9/15/18   Padbrenda Busing, DO   metoprolol succinate (TOPROL-XL) 50 mg XL tablet Take 1 Tab by mouth daily. 9/1/18   Lei Bowens PA   apixaban (ELIQUIS) 5 mg tablet Take 1 Tab by mouth two (2) times a day. 9/1/18   EDGAR Doherty   lisinopril (PRINIVIL, ZESTRIL) 10 mg tablet Take 1 Tab by mouth daily. 7/29/18   Patricia Love NP   fluticasone-salmeterol (ADVAIR) 250-50 mcg/dose diskus inhaler Take 1 Puff by inhalation. 5/15/18   Other, MD Grayson   allopurinol (ZYLOPRIM) 300 mg tablet Take  by mouth daily. Provider, Historical   OXYGEN-AIR DELIVERY SYSTEMS Use as instructed. Use as instructed. 9/15/15   Provider, Historical   ferrous sulfate (IRON) 325 mg (65 mg iron) tablet Take  by mouth Daily (before breakfast). Provider, Historical   brimonidine (ALPHAGAN P) 0.1 % ophthalmic solution every eight (8) hours.     Provider, Historical       Hospital Medications:  Current Facility-Administered Medications   Medication Dose Route Frequency    albuterol-ipratropium (DUO-NEB) 2.5 MG-0.5 MG/3 ML  3 mL Nebulization Q6H PRN    allopurinol (ZYLOPRIM) tablet 300 mg  300 mg Oral DAILY    budesonide (PULMICORT) 500 mcg/2 ml nebulizer suspension  500 mcg Nebulization BID RT    sodium chloride (NS) flush 5-40 mL  5-40 mL IntraVENous Q8H    sodium chloride (NS) flush 5-40 mL  5-40 mL IntraVENous PRN    acetaminophen (TYLENOL) tablet 650 mg  650 mg Oral Q6H PRN    ondansetron (ZOFRAN) injection 4 mg  4 mg IntraVENous Q4H PRN    pantoprazole (PROTONIX) 40 mg in sodium chloride 0.9% 10 mL injection  40 mg IntraVENous Q12H    tuberculin injection 5 Units  5 Units IntraDERMal ONCE    brimonidine (ALPHAGAN) 0.2 % ophthalmic solution 1 Drop  1 Drop Both Eyes BID    influenza vaccine 2019- (6 mos+)(PF) (FLUARIX/FLULAVAL/FLUZONE QUAD) injection 0.5 mL  0.5 mL IntraMUSCular PRIOR TO DISCHARGE       Social History:  Social History     Tobacco Use    Smoking status: Former Smoker     Packs/day: 2.00     Years: 40.00     Pack years: 80.00     Last attempt to quit: 2014     Years since quittin.1    Smokeless tobacco: Never Used    Tobacco comment: still taking Chantix    Substance Use Topics    Alcohol use: No       Pt denies any history of drug use, blood transfusions, or tattoos. Family History:  Family History   Problem Relation Age of Onset    Cancer Mother     Heart Disease Father        Review of Systems:  A detailed 10 system ROS is obtained, with pertinent positives as listed above. All others are negative. Diet:  NPO    Objective:     Physical Exam:  Vitals:  Visit Vitals  /59 (BP 1 Location: Left arm, BP Patient Position: Sitting)   Pulse 80   Temp 98.1 °F (36.7 °C)   Resp 16   Ht 5' 9\" (1.753 m)   Wt 78 kg (172 lb)   SpO2 95%   BMI 25.40 kg/m²     Gen:  Pt is alert, cooperative, no acute distress  Skin:  Extremities and face reveal no rashes. HEENT: Sclerae anicteric. Extra-occular muscles are intact. No oral ulcers. No abnormal pigmentation of the lips. The neck is supple. Cardiovascular: Regular rate and rhythm. No murmurs, gallops, or rubs. Respiratory:  Comfortable breathing with no accessory muscle use. Clear, but decreased breath sounds anteriorly with no wheezes, rales, or rhonchi. O2 at 3L/min. GI:  Abdomen nondistended, soft, and nontender. Normal active bowel sounds. No enlargement of the liver or spleen. No masses palpable. Rectal:  Deferred  Musculoskeletal:  No pitting edema of the lower legs. Neurological:  Gross memory appears intact.   Patient is alert and oriented. Psychiatric:  Mood appears appropriate with judgement intact. Lymphatic:  No cervical or supraclavicular adenopathy. Laboratory:    Recent Labs     09/10/19  0734 09/10/19  0214 09/09/19 2024 09/09/19  1434   WBC  --  6.3  --  5.6   HGB 8.0* 7.4* 8.3* 7.2*   HCT 26.4* 24.4* 27.8* 23.6*   PLT  --  268  --  261   MCV  --  92.8  --  92.2   NA  --  139  --  141   K  --  4.7  --  5.1   CL  --  106  --  107   CO2  --  31  --  30   BUN  --  27*  --  30*   CREA  --  1.78*  --  1.82*   CA  --  9.2  --  8.5   GLU  --  83  --  99   AP  --   --   --  69   SGOT  --   --   --  17   ALT  --   --   --  9*   TBILI  --   --   --  0.1*   ALB  --   --   --  2.8*   TP  --   --   --  6.2*      Colonoscopy 5/21/18-Dr Yeung  Pre-operative Diagnosis:  Positive fecal immunochemical test [R19.5]    Post-op Diagnosis:  Post-Op Diagnosis Codes:  * Colon polyps [K63.5]  * Diverticulosis [K57.90]    Procedure:  Colonoscopy/Ablation/Snare Polypectomy/Control of bleeding with endo clip    Surgeon: Surgeon(s):  Sujey Yeung MD    EBL:  0 mL    Specimen:  ID Type Source Tests Collected by Time Destination   1 : sigmoid polyp Tissue Colon SURGICAL PATHOLOGY EXAM Sam Hurley MD 5/21/2018 2229       Findings  #1. Mild diverticulosis--Transverse, Left and Sigmoid Colon  #2. Diminutive Polyps--proximal right colon--fulgurated with tip of the snare  #3.  Medium pedunculated sigmoid polyp--snare cautery polypectomy/endo clip          Assessment:     Principal Problem:    GI bleed (9/9/2019)    Active Problems:    COPD (chronic obstructive pulmonary disease) ([facility] (5/2/2014)      Hypertension (6/1/2017)      A-fib ([facility] (7/23/2018)      Anemia (3/18/2019)      Chronic respiratory failure with hypoxia ([facility] (9/9/2019)      Heart failure with reduced ejection fraction ([address] (9/9/2019)      CKD (chronic kidney disease), stage III ([facility] (9/9/2019)     Patient is a 70 y.o. male with PMH of A fib on Eliquis, AAA, arthritis, hx prostate cancer, CKD stage 3, COPD on O2, heart failure (EF 30-35% in 8/2018), HTN. HLD, PUD, PE, hx of colon polyps, who is seen in consultation at the request of Dr. Martha Hernandez for iron deficiency anemia. Pt presented to ED 9/9/19 for fatigue and weakness. He has been receiving iron infusions as outpatient for AYAN secondary to CKD. He does have cardiomyopathy. He does take Eliquis. Rectal exam in ED with light brown heme positive stool. HGB at admission 7.2. Baseline reported as 8-9. Labs 9/10/19: HGB 8.0, MCV 92.8, MCH 28.1, MCHC 30.3, RDW 17.2, BUN 27, Cr 1.78, troponin 0.06. He was admitted to hospitalist. He was started on IV pantoprazole 40mg BID and Eliquis was held. He has not received blood transfusion since arrival. Last colonoscopy was 5/21/18 with Dr Lev Willoughby at [facility]. Findings include mild diverticulosis in transverse/left/sigmoid colon, diminutive polyps in proximal R colon, and medium pedunculated sigmoid polyp. Unable to access pathology at this time. Unknown when or if he had prior EGD. He complains of recent onset constipation and takes prn Miralax. He has recently had frequent heartburn. Currently receiving pantoprazole 40mg IV BID. Last Eliquis dose was 9/9/19 AM. No current overt GI bleeding. Differential for anemia includes: AOCD, chronic blood loss, neoplastic process, PUD. Plan:     -Monitor HGB and transfuse as needed  -Monitor for signs of overt GI bleeding  -Continue PPI IV BID  -Follow troponin  -Will need EGD and colonoscopy for evaluation of AYAN. Will discuss timing with Dr Renee Valle. He does have constipation and will need additional prep.   -Continue to follow    CHANDLER Fuentes    Patient is seen and examined in collaboration with Dr. Renee Valle. Assessment and plan as per Dr. Renee Valle.

## 2019-09-10 NOTE — PROGRESS NOTES
Problem: Patient Education: Go to Patient Education Activity  Goal: Patient/Family Education  9/10/2019 0410 by Randolph Monroy RN  Outcome: Progressing Towards Goal  9/9/2019 2249 by Randolph Monroy RN  Outcome: Progressing Towards Goal     Problem: Upper and Lower GI Bleed: Day 1  Goal: Diagnostic Test/Procedures  Outcome: Progressing Towards Goal  Goal: Medications  Outcome: Progressing Towards Goal     Problem: Falls - Risk of  Goal: *Absence of Falls  Description  Document Reyna Patches Fall Risk and appropriate interventions in the flowsheet.   Outcome: Progressing Towards Goal  Note:   Fall Risk Interventions:  Mobility Interventions: Patient to call before getting OOB    Mentation Interventions: Adequate sleep, hydration, pain control    Medication Interventions: Evaluate medications/consider consulting pharmacy    Elimination Interventions: Call light in reach

## 2019-09-10 NOTE — PROGRESS NOTES
Problem: Mobility Impaired (Adult and Pediatric)  Goal: *Acute Goals and Plan of Care (Insert Text)  Description  1. Mr. Khadijah Trevizo will perform all transfers independently in 5 days. 2.  Mr. Khadijah Trevizo will perform gait with rolling walker and supervision >1000 in 5 days. Outcome: Progressing Towards Goal      PHYSICAL THERAPY: Initial Assessment and Daily Note 9/10/2019  INPATIENT: PT Visit Days : 1  Payor: [address] [place]9D / Plan: [address] / Product Type: [employer] /       NAME/AGE/GENDER: Deandre Trujillo is a 70 y.o. male   PRIMARY DIAGNOSIS: GI bleed [K92.2] GI bleed   GI bleed          ICD-10: Treatment Diagnosis:    Generalized Muscle Weakness (M62.81)  Difficulty in walking, Not elsewhere classified (R26.2)   Precaution/Allergies:  Statins-hmg-coa reductase inhibitors      ASSESSMENT:     Mr. Khadijah Trevizo presents in supine with dark sunglasses on. He tells me he has sensativity to the light due to cataracts and glaucoma. He was able to see to participate with therapy. Transfers supine to sit and sit to stand with supervision. Gait with rolling walker 600 ft. Did 1 lap and he wanted to do another. On 3 liters. No issues. Set up in the chair with all needs. Not really sure if he will need anything different than the aide he has right now. Do not think so. Will see 1 or 2 more times just to make sure since he lives by himself.       This section established at most recent assessment   PROBLEM LIST (Impairments causing functional limitations):  Decreased Transfer Abilities  Decreased Ambulation Ability/Technique  Decreased Activity Tolerance   INTERVENTIONS PLANNED: (Benefits and precautions of physical therapy have been discussed with the patient.)  Gait Training  Therapeutic Activites  Therapeutic Exercise/Strengthening     TREATMENT PLAN: Frequency/Duration: 4 times a week for duration of hospital stay  Rehabilitation Potential For Stated Goals: Good     REHAB RECOMMENDATIONS (at time of discharge pending progress):    Placement: It is my opinion, based on this patient's performance to date, that Mr. Kiko Fonseca may benefit from participating in 1-2 additional therapy sessions in order to continue to assess for rehab potential and then make recommendation for disposition at discharge. Equipment:   None at this time              HISTORY:   History of Present Injury/Illness (Reason for Referral):  Mr. Kiko Fonseca is a 71 yo male with PMH of CKD3, HFrEF (EF 30-35%), iron deficient anemia, COPD with chronic hypoxia, afib on eliquis,  Who is evaluated with near syncope and hypotension. He received IVF bolus while in the field and BP normalized. HGB noted to be down to 7.2 from his baseline of 8-9. He is followed by hematology and receiving IV iron due to CKD, and he has an infusion tomorrow. He denies jaiden GI bleeding due to decreased vision but has pending outpatient GI referral.     He denies chest pain, dyspnea but has vision loss, edema, skin changes, mood and memory changes, and weight loss     Past Medical History/Comorbidities:   Mr. 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  He also has no past medical history of Autoimmune disease ([address] Dementia, Liver disease, Neurological disorder, Other ill-defined conditions(799.89), Psychiatric disorder, Seizures ([address] or Sleep disorder. Mr. Christiano Hui  has a past surgical history that includes hx heart catheterization. Social History/Living Environment:   Home Environment: Apartment  # Steps to Enter: (9th floor apartment with elevator)  One/Two Story Residence: One story  Living Alone: Yes  Support Systems: None  Patient Expects to be Discharged to[de-identified] Apartment  Current DME Used/Available at Home: [place], rollator, Oxygen, portable  Tub or Shower Type: Shower  Prior Level of Function/Work/Activity:  Independent with rollator and O2  Age, COPD    Number of Personal Factors/Comorbidities that affect the Plan of Care: 1-2: MODERATE COMPLEXITY   EXAMINATION:   Most Recent Physical Functioning:   Gross Assessment:  AROM: Within functional limits  PROM: Within functional limits  Strength: Within functional limits  Sensation: Intact               Posture:     Balance:  Sitting: Intact  Standing: With support; Impaired  Standing - Static: Good Bed Mobility:  Rolling: Independent  Supine to Sit: Supervision  Wheelchair Mobility:     Transfers:  Sit to Stand: Supervision  Stand to Sit: Supervision  Bed to Chair: Supervision  Gait:     Step Length: Right shortened;Left shortened  Distance (ft): 600 Feet (ft)  Assistive Device: Walker, rolling  Ambulation - Level of Assistance: Stand-by assistance;Contact guard assistance      Body Structures Involved:  Muscles Body Functions Affected: Movement Related Activities and Participation Affected: Mobility   Number of elements that affect the Plan of Care: 3: MODERATE COMPLEXITY   CLINICAL PRESENTATION:   Presentation: Evolving clinical presentation with changing clinical characteristics: MODERATE COMPLEXITY   CLINICAL DECISION MAKIN [facility] Mobility Inpatient Short Form  How much difficulty does the patient currently have. .. Unable A Lot A Little None   1.   Turning over in bed (including adjusting bedclothes, sheets and blankets)? ? 1   ? 2   ? 3   ? 4   2. Sitting down on and standing up from a chair with arms ( e.g., wheelchair, bedside commode, etc.)   ? 1   ? 2   ? 3   ? 4   3. Moving from lying on back to sitting on the side of the bed?   ? 1   ? 2   ? 3   ? 4   How much help from another person does the patient currently need. .. Total A Lot A Little None   4. Moving to and from a bed to a chair (including a wheelchair)? ? 1   ? 2   ? 3   ? 4   5. Need to walk in hospital room? ? 1   ? 2   ? 3   ? 4   6. Climbing 3-5 steps with a railing? ? 1   ? 2   ? 3   ? 4   © 2007, Trustees of [address] [place] [address] [address] [id number], under license to [employer]. All rights reserved      Score:  Initial: 20 Most Recent: X (Date: -- )    Interpretation of Tool:  Represents activities that are increasingly more difficult (i.e. Bed mobility, Transfers, Gait). Medical Necessity:     Patient is expected to demonstrate progress in functional technique   to increase independence with mobility and gait. .  Reason for Services/Other Comments:  Patient continues to require present interventions due to patient's inability to function at baseline. .   Use of outcome tool(s) and clinical judgement create a POC that gives a: Questionable prediction of patient's progress: MODERATE COMPLEXITY            TREATMENT:   (In addition to Assessment/Re-Assessment sessions the following treatments were rendered)   Pre-treatment Symptoms/Complaints:  none  Pain: Initial:   Pain Intensity 1: 0  Post Session:  good     Therapeutic Activity: (    10): Therapeutic activities including Bed transfers, Chair transfers and Ambulation on level ground to improve mobility. Required minimal   to promote motor control of bilateral, upper extremity(s), lower extremity(s).          Braces/Orthotics/Lines/Etc:   O2 Device: Nasal cannula  Treatment/Session Assessment:    Response to Treatment:  good   Interdisciplinary Collaboration: Registered Nurse  After treatment position/precautions:   Up in chair  Bed alarm/tab alert on  Call light within reach  RN notified   Compliance with Program/Exercises: Will assess as treatment progresses  Recommendations/Intent for next treatment session: \"Next visit will focus on advancements to more challenging activities and reduction in assistance provided\".   Total Treatment Duration:  PT Patient Time In/Time Out  Time In: 1120  Time Out: 310 [facility] ALLISON Yeung

## 2019-09-11 ENCOUNTER — ANESTHESIA EVENT (OUTPATIENT)
Dept: ENDOSCOPY | Age: 79
DRG: 268 | End: 2019-09-11
Payer: MEDICARE

## 2019-09-11 LAB
ABO + RH BLD: NORMAL
ANION GAP SERPL CALC-SCNC: 5 MMOL/L (ref 7–16)
BASOPHILS # BLD: 0 K/UL (ref 0–0.2)
BASOPHILS NFR BLD: 1 % (ref 0–2)
BLD PROD TYP BPU: NORMAL
BLOOD GROUP ANTIBODIES SERPL: NORMAL
BPU ID: NORMAL
BUN SERPL-MCNC: 21 MG/DL (ref 8–23)
CALCIUM SERPL-MCNC: 9.2 MG/DL (ref 8.3–10.4)
CHLORIDE SERPL-SCNC: 108 MMOL/L (ref 98–107)
CO2 SERPL-SCNC: 30 MMOL/L (ref 21–32)
CREAT SERPL-MCNC: 1.56 MG/DL (ref 0.8–1.5)
CROSSMATCH RESULT,%XM: NORMAL
DIFFERENTIAL METHOD BLD: ABNORMAL
EOSINOPHIL # BLD: 0.1 K/UL (ref 0–0.8)
EOSINOPHIL NFR BLD: 2 % (ref 0.5–7.8)
ERYTHROCYTE [DISTWIDTH] IN BLOOD BY AUTOMATED COUNT: 17 % (ref 11.9–14.6)
GLUCOSE SERPL-MCNC: 84 MG/DL (ref 65–100)
HCT VFR BLD AUTO: 29.9 % (ref 41.1–50.3)
HEMOCCULT STL QL: NEGATIVE
HGB BLD-MCNC: 9.1 G/DL (ref 13.6–17.2)
IMM GRANULOCYTES # BLD AUTO: 0 K/UL (ref 0–0.5)
IMM GRANULOCYTES NFR BLD AUTO: 0 % (ref 0–5)
LYMPHOCYTES # BLD: 1.4 K/UL (ref 0.5–4.6)
LYMPHOCYTES NFR BLD: 23 % (ref 13–44)
MCH RBC QN AUTO: 28.1 PG (ref 26.1–32.9)
MCHC RBC AUTO-ENTMCNC: 30.4 G/DL (ref 31.4–35)
MCV RBC AUTO: 92.3 FL (ref 79.6–97.8)
MM INDURATION POC: 0 MM (ref 0–5)
MM INDURATION POC: 0 MM (ref 0–5)
MONOCYTES # BLD: 0.8 K/UL (ref 0.1–1.3)
MONOCYTES NFR BLD: 14 % (ref 4–12)
NEUTS SEG # BLD: 3.5 K/UL (ref 1.7–8.2)
NEUTS SEG NFR BLD: 61 % (ref 43–78)
NRBC # BLD: 0 K/UL (ref 0–0.2)
PLATELET # BLD AUTO: 266 K/UL (ref 150–450)
PMV BLD AUTO: 10.9 FL (ref 9.4–12.3)
POTASSIUM SERPL-SCNC: 4.3 MMOL/L (ref 3.5–5.1)
PPD POC: NEGATIVE NEGATIVE
PPD POC: NEGATIVE NEGATIVE
RBC # BLD AUTO: 3.24 M/UL (ref 4.23–5.6)
SODIUM SERPL-SCNC: 143 MMOL/L (ref 136–145)
SPECIMEN EXP DATE BLD: NORMAL
STATUS OF UNIT,%ST: NORMAL
UNIT DIVISION, %UDIV: 0
WBC # BLD AUTO: 5.8 K/UL (ref 4.3–11.1)

## 2019-09-11 PROCEDURE — 65270000029 HC RM PRIVATE

## 2019-09-11 PROCEDURE — 77010033678 HC OXYGEN DAILY

## 2019-09-11 PROCEDURE — C9113 INJ PANTOPRAZOLE SODIUM, VIA: HCPCS | Performed by: INTERNAL MEDICINE

## 2019-09-11 PROCEDURE — 85025 COMPLETE CBC W/AUTO DIFF WBC: CPT

## 2019-09-11 PROCEDURE — 65660000000 HC RM CCU STEPDOWN

## 2019-09-11 PROCEDURE — 94640 AIRWAY INHALATION TREATMENT: CPT

## 2019-09-11 PROCEDURE — 94760 N-INVAS EAR/PLS OXIMETRY 1: CPT

## 2019-09-11 PROCEDURE — 74011000250 HC RX REV CODE- 250: Performed by: INTERNAL MEDICINE

## 2019-09-11 PROCEDURE — 74011250637 HC RX REV CODE- 250/637: Performed by: PHYSICIAN ASSISTANT

## 2019-09-11 PROCEDURE — 74011250636 HC RX REV CODE- 250/636: Performed by: INTERNAL MEDICINE

## 2019-09-11 PROCEDURE — 80048 BASIC METABOLIC PNL TOTAL CA: CPT

## 2019-09-11 PROCEDURE — 74011250637 HC RX REV CODE- 250/637: Performed by: INTERNAL MEDICINE

## 2019-09-11 PROCEDURE — 36415 COLL VENOUS BLD VENIPUNCTURE: CPT

## 2019-09-11 RX ORDER — SODIUM CHLORIDE, SODIUM LACTATE, POTASSIUM CHLORIDE, CALCIUM CHLORIDE 600; 310; 30; 20 MG/100ML; MG/100ML; MG/100ML; MG/100ML
100 INJECTION, SOLUTION INTRAVENOUS CONTINUOUS
Status: CANCELLED | OUTPATIENT
Start: 2019-09-11

## 2019-09-11 RX ORDER — POLYETHYLENE GLYCOL 3350 17 G/17G
255 POWDER, FOR SOLUTION ORAL ONCE
Status: COMPLETED | OUTPATIENT
Start: 2019-09-11 | End: 2019-09-11

## 2019-09-11 RX ORDER — BISACODYL 5 MG
10 TABLET, DELAYED RELEASE (ENTERIC COATED) ORAL ONCE
Status: COMPLETED | OUTPATIENT
Start: 2019-09-11 | End: 2019-09-11

## 2019-09-11 RX ADMIN — Medication 10 ML: at 22:19

## 2019-09-11 RX ADMIN — BRIMONIDINE TARTRATE 1 DROP: 2 SOLUTION OPHTHALMIC at 17:36

## 2019-09-11 RX ADMIN — Medication 10 ML: at 04:55

## 2019-09-11 RX ADMIN — BUDESONIDE 500 MCG: 0.5 INHALANT RESPIRATORY (INHALATION) at 20:27

## 2019-09-11 RX ADMIN — PANTOPRAZOLE SODIUM 40 MG: 40 INJECTION, POWDER, FOR SOLUTION INTRAVENOUS at 08:43

## 2019-09-11 RX ADMIN — PANTOPRAZOLE SODIUM 40 MG: 40 INJECTION, POWDER, FOR SOLUTION INTRAVENOUS at 22:19

## 2019-09-11 RX ADMIN — ALLOPURINOL 300 MG: 100 TABLET ORAL at 08:43

## 2019-09-11 RX ADMIN — BISACODYL 10 MG: 5 TABLET, COATED ORAL at 11:20

## 2019-09-11 RX ADMIN — BRIMONIDINE TARTRATE 1 DROP: 2 SOLUTION OPHTHALMIC at 08:43

## 2019-09-11 RX ADMIN — POLYETHYLENE GLYCOL 3350 255 G: 17 POWDER, FOR SOLUTION ORAL at 14:33

## 2019-09-11 RX ADMIN — BUDESONIDE 500 MCG: 0.5 INHALANT RESPIRATORY (INHALATION) at 07:40

## 2019-09-11 RX ADMIN — Medication 10 ML: at 13:38

## 2019-09-11 NOTE — PROGRESS NOTES
Patient A/Ox4.  1 unit blood completed this shift. Tolerated transfusion well. H/H post transfusion results: 9.0/29. 3. Patient had 3 small watery, mucous brown BM's. Currently on 3L NC with O2 sat 99%. Hourly rounds performed. All needs meet. Bed low/locked. Call light within reach. Patient denies needs at this time.   Will continue to monitor and report to oncoming RN

## 2019-09-11 NOTE — PROGRESS NOTES
Progress Note    Patient: Chanel Preciado MRN: [id number]  SSN: [id number]    YOB: 1940  Age: 70 y.o. Sex: male      Admit Date: 9/9/2019    LOS: 2 days     Subjective:     Patient admitted with anemia. Received 1 U of pRBCs yesterday. Feels better today. Will have EGD/colonoscopy tomorrow     Objective:     Vitals:    09/11/19 0740 09/11/19 0820 09/11/19 1106 09/11/19 1546   BP:  116/66 107/60 150/69   Pulse:  87 69 83   Resp:  17 17 17   Temp:  98.4 °F (36.9 °C) 99 °F (37.2 °C) 98.4 °F (36.9 °C)   SpO2: 90% 100% 100% 90%   Weight:       Height:            Intake and Output:  Current Shift: No intake/output data recorded. Last three shifts: 09/09 1901 - 09/11 0700  In: 470 [P.O.:120; I.V.:350]  Out: 390 [Urine:390]    Physical Exam:   GENERAL: alert, appears stated age  EYE: conjunctivae/corneas clear. LYMPHATIC: Cervical, supraclavicular, and axillary nodes normal.   THROAT & NECK: normal and no erythema or exudates noted. LUNG: clear to auscultation bilaterally  HEART: regular rate and rhythm, S1, S2 normal, no murmur, click, rub or gallop  ABDOMEN: soft, non-tender. Bowel sounds normal. No masses,  no organomegaly  EXTREMITIES:  extremities normal, atraumatic, no cyanosis or edema  SKIN: Normal.  NEUROLOGIC: No focal deficits   PSYCHIATRIC: non focal    Lab/Data Review: All lab results for the last 24 hours reviewed.          Assessment:     Principal Problem:    GI bleed (9/9/2019)    Active Problems:    COPD (chronic obstructive pulmonary disease) ([address] (5/2/2014)      Hypertension (6/1/2017)      A-fib ([address] (7/23/2018)      Anemia (3/18/2019)      Chronic respiratory failure with hypoxia ([address] (9/9/2019)      Heart failure with reduced ejection fraction ([address] (9/9/2019)      CKD (chronic kidney disease), stage III ([facility] (9/9/2019)        Plan:     -monitor VS and Hgb level   -seen by GI. EGD and colonoscopy to be done tomorrow     Signed By: Dion Ruvalcaba MD September 11, 2019

## 2019-09-11 NOTE — PROGRESS NOTES
Interdisciplinary Rounds completed 09/11/19. Nursing, Case Management, Physician and PT present. Plan of care reviewed and updated.     For EGD and COLO in am.

## 2019-09-11 NOTE — PROGRESS NOTES
Hourly rounds completed throughout this shift. Pt taking bowl prep for EGD and Colonoscopy tomorrow. Pt had 4 loose mucous-like bm. Pt resting in bed; denies needs at this time. Will continue to monitor and report to oncoming night shift nurse.

## 2019-09-11 NOTE — PROGRESS NOTES
[facility] Progress Note         Admit Date:  9/9/2019  Today's Date:  9/11/2019    CC:  AYAN    Subjective:     Patient reports having 2 loose BMS since yesterday evening. No rectal bleeding or melena. No N/V or abdominal pain. Denies SOB or CP. Got 1u PRBCs yesterday for Hgb 7.4 --> 9.0    Medications:   Current Facility-Administered Medications   Medication Dose Route Frequency    polyethylene glycol (MIRALAX) powder 255 g  255 g Oral ONCE    bisacodyl (DULCOLAX) tablet 10 mg  10 mg Oral ONCE    0.9% sodium chloride infusion 250 mL  250 mL IntraVENous PRN    albuterol-ipratropium (DUO-NEB) 2.5 MG-0.5 MG/3 ML  3 mL Nebulization Q6H PRN    allopurinol (ZYLOPRIM) tablet 300 mg  300 mg Oral DAILY    budesonide (PULMICORT) 500 mcg/2 ml nebulizer suspension  500 mcg Nebulization BID RT    sodium chloride (NS) flush 5-40 mL  5-40 mL IntraVENous Q8H    sodium chloride (NS) flush 5-40 mL  5-40 mL IntraVENous PRN    acetaminophen (TYLENOL) tablet 650 mg  650 mg Oral Q6H PRN    ondansetron (ZOFRAN) injection 4 mg  4 mg IntraVENous Q4H PRN    pantoprazole (PROTONIX) 40 mg in sodium chloride 0.9% 10 mL injection  40 mg IntraVENous Q12H    brimonidine (ALPHAGAN) 0.2 % ophthalmic solution 1 Drop  1 Drop Both Eyes BID    influenza vaccine 2019-20 (6 mos+)(PF) (FLUARIX/FLULAVAL/FLUZONE QUAD) injection 0.5 mL  0.5 mL IntraMUSCular PRIOR TO DISCHARGE       Review of Systems:  ROS was obtained, with pertinent positives as listed above. No chest pain or SOB. Diet:  Clear liquid diet    Objective:   Vitals:  Visit Vitals  /66 (BP 1 Location: Right arm, BP Patient Position: At rest)   Pulse 87   Temp 98.4 °F (36.9 °C)   Resp 17   Ht 5' 9\" (1.753 m)   Wt 78 kg (172 lb)   SpO2 100%   BMI 25.40 kg/m²     Intake/Output:  No intake/output data recorded. 09/09 1901 - 09/11 0700  In: 470 [P.O.:120;  I.V.:350]  Out: 390 [Urine:390]  Exam:  General appearance: alert, cooperative, no distress  Lungs: Decrease breath sounds, few wheezes, 3L oxygen via NC  Heart: regular rate and rhythm  Abdomen: soft, non-tender. Bowel sounds normal.   Extremities: extremities normal, atraumatic, no cyanosis or edema  Neuro:  alert and oriented    Data Review (Labs):    Recent Labs     09/11/19  0756 09/10/19  2323 09/10/19  0734 09/10/19  0214 09/09/19 2024 09/09/19  1434   WBC 5.8  --   --  6.3  --  5.6   HGB 9.1* 9.0* 8.0* 7.4* 8.3* 7.2*   HCT 29.9* 29.3* 26.4* 24.4* 27.8* 23.6*     --   --  268  --  261   MCV 92.3  --   --  92.8  --  92.2     --   --  139  --  141   K 4.3  --   --  4.7  --  5.1   *  --   --  106  --  107   CO2 30  --   --  31  --  30   BUN 21  --   --  27*  --  30*   CREA 1.56*  --   --  1.78*  --  1.82*   CA 9.2  --   --  9.2  --  8.5   GLU 84  --   --  83  --  99   AP  --   --   --   --   --  69   SGOT  --   --   --   --   --  17   ALT  --   --   --   --   --  9*   TBILI  --   --   --   --   --  0.1*   ALB  --   --   --   --   --  2.8*   TP  --   --   --   --   --  6.2*       Assessment:     Principal Problem:  GI bleed (9/9/2019)    Active Problems:  COPD (chronic obstructive pulmonary disease) ([facility]) (5/2/2014)  Hypertension (6/1/2017)  A-fib ([facility] (7/23/2018)  Anemia (3/18/2019)  Chronic respiratory failure with hypoxia ([facility]) (9/9/2019)  Heart failure with reduced ejection fraction ([facility]) (9/9/2019)  CKD (chronic kidney disease), stage III ([facility] (9/9/2019)     Patient is a 70 y.o. male with PMHx of A fib on Eliquis, AAA, arthritis, hx prostate cancer, CKD stage 3, COPD on O2, heart failure (EF 30-35% in 8/2018), HTN. HLD, PUD, PE, hx of colon polyps, who is seen for further evaluation of AYAN. Pt presented to ED 9/9/19 for fatigue and weakness. He has been receiving iron infusions as outpatient for AYAN secondary to CKD. He does have cardiomyopathy. He does take Eliquis. Rectal exam in ED with light brown heme positive stool. HGB at admission 7.2.  Baseline reported as 8-9. Labs 9/10/19: HGB 8.0, MCV 92.8, MCH 28.1, MCHC 30.3, RDW 17.2, BUN 27, Cr 1.78, troponin 0.06. He was admitted to hospitalist. He was started on IV pantoprazole 40mg BID and Eliquis was held. He has not received blood transfusion since arrival.     Last colonoscopy was 5/21/18 with Dr Lev Willoughby at [facility]. Findings include mild diverticulosis in transverse/left/sigmoid colon, diminutive polyps in proximal R colon, and medium pedunculated sigmoid polyp. Unable to access pathology at this time. Unknown when or if he had prior EGD. Last Eliquis dose was 9/9/19 AM. No current overt GI bleeding. Differential for anemia includes: AOCD, chronic blood loss, neoplastic process, PUD. Troponins 0.09. Patient denies any CP. Plan:     Prep today and plan for EGD and colonoscopy tomorrow 9/12. Continue PPI IV BID. Monitor HGB and transfuse as needed  Monitor for signs of overt GI bleeding  Defer trending troponins to primary team.   Check BNP?     Penny Valdovinos PA-C  [facility]

## 2019-09-12 ENCOUNTER — ANESTHESIA (OUTPATIENT)
Dept: ENDOSCOPY | Age: 79
DRG: 268 | End: 2019-09-12
Payer: MEDICARE

## 2019-09-12 LAB
ANION GAP SERPL CALC-SCNC: 7 MMOL/L (ref 7–16)
BASOPHILS # BLD: 0 K/UL (ref 0–0.2)
BASOPHILS NFR BLD: 0 % (ref 0–2)
BUN SERPL-MCNC: 13 MG/DL (ref 8–23)
CALCIUM SERPL-MCNC: 8.8 MG/DL (ref 8.3–10.4)
CHLORIDE SERPL-SCNC: 107 MMOL/L (ref 98–107)
CO2 SERPL-SCNC: 27 MMOL/L (ref 21–32)
CREAT SERPL-MCNC: 1.47 MG/DL (ref 0.8–1.5)
DIFFERENTIAL METHOD BLD: ABNORMAL
EOSINOPHIL # BLD: 0.1 K/UL (ref 0–0.8)
EOSINOPHIL NFR BLD: 1 % (ref 0.5–7.8)
ERYTHROCYTE [DISTWIDTH] IN BLOOD BY AUTOMATED COUNT: 16.6 % (ref 11.9–14.6)
GLUCOSE SERPL-MCNC: 91 MG/DL (ref 65–100)
HCT VFR BLD AUTO: 30.1 % (ref 41.1–50.3)
HGB BLD-MCNC: 9.3 G/DL (ref 13.6–17.2)
IMM GRANULOCYTES # BLD AUTO: 0 K/UL (ref 0–0.5)
IMM GRANULOCYTES NFR BLD AUTO: 0 % (ref 0–5)
LYMPHOCYTES # BLD: 1.7 K/UL (ref 0.5–4.6)
LYMPHOCYTES NFR BLD: 26 % (ref 13–44)
MCH RBC QN AUTO: 28.3 PG (ref 26.1–32.9)
MCHC RBC AUTO-ENTMCNC: 30.9 G/DL (ref 31.4–35)
MCV RBC AUTO: 91.5 FL (ref 79.6–97.8)
MONOCYTES # BLD: 0.9 K/UL (ref 0.1–1.3)
MONOCYTES NFR BLD: 14 % (ref 4–12)
NEUTS SEG # BLD: 3.9 K/UL (ref 1.7–8.2)
NEUTS SEG NFR BLD: 59 % (ref 43–78)
NRBC # BLD: 0 K/UL (ref 0–0.2)
PLATELET # BLD AUTO: 219 K/UL (ref 150–450)
PMV BLD AUTO: 10.8 FL (ref 9.4–12.3)
POTASSIUM SERPL-SCNC: 3.9 MMOL/L (ref 3.5–5.1)
RBC # BLD AUTO: 3.29 M/UL (ref 4.23–5.6)
SODIUM SERPL-SCNC: 141 MMOL/L (ref 136–145)
WBC # BLD AUTO: 6.7 K/UL (ref 4.3–11.1)

## 2019-09-12 PROCEDURE — 88312 SPECIAL STAINS GROUP 1: CPT

## 2019-09-12 PROCEDURE — 74011250636 HC RX REV CODE- 250/636

## 2019-09-12 PROCEDURE — 0DBL8ZZ EXCISION OF TRANSVERSE COLON, VIA NATURAL OR ARTIFICIAL OPENING ENDOSCOPIC: ICD-10-PCS | Performed by: INTERNAL MEDICINE

## 2019-09-12 PROCEDURE — 36415 COLL VENOUS BLD VENIPUNCTURE: CPT

## 2019-09-12 PROCEDURE — 88305 TISSUE EXAM BY PATHOLOGIST: CPT

## 2019-09-12 PROCEDURE — 0DBM8ZZ EXCISION OF DESCENDING COLON, VIA NATURAL OR ARTIFICIAL OPENING ENDOSCOPIC: ICD-10-PCS | Performed by: INTERNAL MEDICINE

## 2019-09-12 PROCEDURE — 76040000026: Performed by: INTERNAL MEDICINE

## 2019-09-12 PROCEDURE — 77030021593 HC FCPS BIOP ENDOSC BSC -A: Performed by: INTERNAL MEDICINE

## 2019-09-12 PROCEDURE — 99218 HC RM OBSERVATION: CPT

## 2019-09-12 PROCEDURE — 74011250637 HC RX REV CODE- 250/637: Performed by: INTERNAL MEDICINE

## 2019-09-12 PROCEDURE — 74011000250 HC RX REV CODE- 250

## 2019-09-12 PROCEDURE — 76060000032 HC ANESTHESIA 0.5 TO 1 HR: Performed by: INTERNAL MEDICINE

## 2019-09-12 PROCEDURE — 94640 AIRWAY INHALATION TREATMENT: CPT

## 2019-09-12 PROCEDURE — 77010033678 HC OXYGEN DAILY

## 2019-09-12 PROCEDURE — 0DBK8ZZ EXCISION OF ASCENDING COLON, VIA NATURAL OR ARTIFICIAL OPENING ENDOSCOPIC: ICD-10-PCS | Performed by: INTERNAL MEDICINE

## 2019-09-12 PROCEDURE — 94760 N-INVAS EAR/PLS OXIMETRY 1: CPT

## 2019-09-12 PROCEDURE — 74011000250 HC RX REV CODE- 250: Performed by: INTERNAL MEDICINE

## 2019-09-12 PROCEDURE — 85025 COMPLETE CBC W/AUTO DIFF WBC: CPT

## 2019-09-12 PROCEDURE — C9113 INJ PANTOPRAZOLE SODIUM, VIA: HCPCS | Performed by: INTERNAL MEDICINE

## 2019-09-12 PROCEDURE — 80048 BASIC METABOLIC PNL TOTAL CA: CPT

## 2019-09-12 PROCEDURE — 0DB68ZX EXCISION OF STOMACH, VIA NATURAL OR ARTIFICIAL OPENING ENDOSCOPIC, DIAGNOSTIC: ICD-10-PCS | Performed by: INTERNAL MEDICINE

## 2019-09-12 PROCEDURE — 74011250636 HC RX REV CODE- 250/636: Performed by: INTERNAL MEDICINE

## 2019-09-12 RX ORDER — ETOMIDATE 2 MG/ML
INJECTION INTRAVENOUS AS NEEDED
Status: DISCONTINUED | OUTPATIENT
Start: 2019-09-12 | End: 2019-09-12 | Stop reason: HOSPADM

## 2019-09-12 RX ORDER — METOPROLOL SUCCINATE 25 MG/1
12.5 TABLET, EXTENDED RELEASE ORAL DAILY
Status: DISCONTINUED | OUTPATIENT
Start: 2019-09-13 | End: 2019-09-13

## 2019-09-12 RX ORDER — SODIUM CHLORIDE, SODIUM LACTATE, POTASSIUM CHLORIDE, CALCIUM CHLORIDE 600; 310; 30; 20 MG/100ML; MG/100ML; MG/100ML; MG/100ML
INJECTION, SOLUTION INTRAVENOUS
Status: DISCONTINUED | OUTPATIENT
Start: 2019-09-12 | End: 2019-09-12 | Stop reason: HOSPADM

## 2019-09-12 RX ORDER — PROPOFOL 10 MG/ML
INJECTION, EMULSION INTRAVENOUS
Status: DISCONTINUED | OUTPATIENT
Start: 2019-09-12 | End: 2019-09-12 | Stop reason: HOSPADM

## 2019-09-12 RX ORDER — PROPOFOL 10 MG/ML
INJECTION, EMULSION INTRAVENOUS AS NEEDED
Status: DISCONTINUED | OUTPATIENT
Start: 2019-09-12 | End: 2019-09-12 | Stop reason: HOSPADM

## 2019-09-12 RX ORDER — LIDOCAINE HYDROCHLORIDE 20 MG/ML
INJECTION, SOLUTION EPIDURAL; INFILTRATION; INTRACAUDAL; PERINEURAL AS NEEDED
Status: DISCONTINUED | OUTPATIENT
Start: 2019-09-12 | End: 2019-09-12 | Stop reason: HOSPADM

## 2019-09-12 RX ADMIN — Medication 10 ML: at 13:50

## 2019-09-12 RX ADMIN — Medication 10 ML: at 21:05

## 2019-09-12 RX ADMIN — PROPOFOL 10 MG: 10 INJECTION, EMULSION INTRAVENOUS at 10:13

## 2019-09-12 RX ADMIN — BRIMONIDINE TARTRATE 1 DROP: 2 SOLUTION OPHTHALMIC at 17:14

## 2019-09-12 RX ADMIN — Medication 10 ML: at 05:51

## 2019-09-12 RX ADMIN — BUDESONIDE 500 MCG: 0.5 INHALANT RESPIRATORY (INHALATION) at 19:53

## 2019-09-12 RX ADMIN — PROPOFOL 140 MCG/KG/MIN: 10 INJECTION, EMULSION INTRAVENOUS at 10:18

## 2019-09-12 RX ADMIN — PROPOFOL 40 MG: 10 INJECTION, EMULSION INTRAVENOUS at 10:11

## 2019-09-12 RX ADMIN — ACETAMINOPHEN 650 MG: 325 TABLET, FILM COATED ORAL at 21:05

## 2019-09-12 RX ADMIN — ETOMIDATE 4 MG: 2 INJECTION INTRAVENOUS at 10:11

## 2019-09-12 RX ADMIN — SODIUM CHLORIDE, SODIUM LACTATE, POTASSIUM CHLORIDE, CALCIUM CHLORIDE: 600; 310; 30; 20 INJECTION, SOLUTION INTRAVENOUS at 10:07

## 2019-09-12 RX ADMIN — ALLOPURINOL 300 MG: 100 TABLET ORAL at 08:14

## 2019-09-12 RX ADMIN — BRIMONIDINE TARTRATE 1 DROP: 2 SOLUTION OPHTHALMIC at 08:20

## 2019-09-12 RX ADMIN — BUDESONIDE 500 MCG: 0.5 INHALANT RESPIRATORY (INHALATION) at 07:16

## 2019-09-12 RX ADMIN — PANTOPRAZOLE SODIUM 40 MG: 40 INJECTION, POWDER, FOR SOLUTION INTRAVENOUS at 08:22

## 2019-09-12 RX ADMIN — LIDOCAINE HYDROCHLORIDE 20 MG: 20 INJECTION, SOLUTION EPIDURAL; INFILTRATION; INTRACAUDAL; PERINEURAL at 10:11

## 2019-09-12 RX ADMIN — PANTOPRAZOLE SODIUM 40 MG: 40 INJECTION, POWDER, FOR SOLUTION INTRAVENOUS at 21:05

## 2019-09-12 NOTE — PROGRESS NOTES
Interdisciplinary Rounds completed 09/12/19. Nursing, Case Management, Physician and PT present. Plan of care reviewed and updated.     Probable d/c in am.

## 2019-09-12 NOTE — PROCEDURES
COLONOSCOPY    DATE of PROCEDURE: 9/12/2019    MEDICATION:  MAC      INSTRUMENT: PCF    PROCEDURE: After obtaining informed consent, the patient was placed in the left lateral position and sedated. The endoscope was advanced to the cecum where the appendiceal orifice and ileocecal valve were identified. Scope was then advanced to the terminal ileum for 10cm. On withdrawal, the colon was carefully visualized in a 360 degree fashion. Retroflexion was performed in the rectum. The patient was taken to the recovery area in stable condition. FINDINGS:  Rectum: normal  Sigmoid: Diverticulosis which is moderate. Descending Colon: Diverticulosis which is moderate. dimunitive polyps measuring < 5mm each (x 2) removed with cold forceps and sent for pathology  Transverse Colon: dimunitive polyps measuring < 5mm each (x 3) removed with cold forceps and sent for pathology  Ascending Colon: dimunitive polyps measuring < 5mm each (x 3) removed with cold forceps and sent for pathology  Cecum: normal  Terminal ileum: normal    Estimated blood loss: 0-minimal       ASSESSMENT/PLAN:  1. F/up pathology  2. Next colo pending health and pathology  3. Continue follow-up with hematology  4. As mentioned in EGD note, would hold off on eliquis if possible.   If it must be resumed then would wait 48 hrs sec to multiple polyps removed today      Dahlia Mesa, MD  [facility], [place]

## 2019-09-12 NOTE — PROGRESS NOTES
Problem: Falls - Risk of  Goal: *Absence of Falls  Description  Document Matthew Mosqueda Fall Risk and appropriate interventions in the flowsheet.   Outcome: Progressing Towards Goal  Note:   Fall Risk Interventions:  Mobility Interventions: PT Consult for mobility concerns, OT consult for ADLs, Patient to call before getting OOB    Mentation Interventions: Door open when patient unattended    Medication Interventions: Patient to call before getting OOB, Teach patient to arise slowly    Elimination Interventions: Call light in reach, Patient to call for help with toileting needs, Stay With Me (per policy), Toileting schedule/hourly rounds

## 2019-09-12 NOTE — PROGRESS NOTES
TRANSFER - IN REPORT:    Verbal report received from [facility] on [employer].  being received from  for ordered procedure      Report consisted of patients Situation, Background, Assessment and   Recommendations(SBAR). Information from the following report(s) SBAR and MAR was reviewed with the receiving nurse. Opportunity for questions and clarification was provided.

## 2019-09-12 NOTE — ROUTINE PROCESS
Pt transported back to [facility] via stretcher by keysah Jackson, on 3 liters NC, Pt a & O, VSS. DISPLAY PLAN FREE TEXT

## 2019-09-12 NOTE — ANESTHESIA POSTPROCEDURE EVALUATION
Procedure(s):  ESOPHAGOGASTRODUODENOSCOPY (EGD)  COLONOSCOPY/ 26 ROOM 614  ESOPHAGOGASTRODUODENAL (EGD) BIOPSY  ENDOSCOPIC POLYPECTOMY. total IV anesthesia    Anesthesia Post Evaluation        Patient location during evaluation: PACU  Patient participation: complete - patient participated  Level of consciousness: awake  Pain management: satisfactory to patient  Airway patency: patent  Anesthetic complications: no  Cardiovascular status: hemodynamically stable  Respiratory status: spontaneous ventilation  Hydration status: euvolemic  Post anesthesia nausea and vomiting:  none      Vitals Value Taken Time   /58 9/12/2019 11:32 AM   Temp 36 °C (96.8 °F) 9/12/2019 10:58 AM   Pulse 79 9/12/2019 11:38 AM   Resp 14 9/12/2019 11:22 AM   SpO2 98 % 9/12/2019 11:38 AM   Vitals shown include unvalidated device data.

## 2019-09-12 NOTE — PROGRESS NOTES
Utilization Review Physician has recommended this patient is most appropriate as Outpatient Status receiving Observation Services. The Attending provider agreed and an outpatient order was placed on the chart as the Attending Provider requested. The patient will be provided the documentation for a Condition Code 44, conversion from Inpatient to Outpatient Status, and questions answered. The MOON letter explaining the outpatient/observation services was given to patient with verbal explanation and verbal understanding was received about information given. Pt would not sign the letter. Unsigned copy placed in the patient's chart for scanning by HIS and unsigned copy left  with pt - for patient information. TRANG  [facility] notified.

## 2019-09-12 NOTE — PROGRESS NOTES
Progress Note    Patient: Greg Stokes MRN: [id number]  SSN: [id number]    YOB: 1940  Age: 70 y.o. Sex: male      Admit Date: 9/9/2019    LOS: 3 days     Subjective:     Patient admitted with anemia. Received 1 U of pRBCs on 9/10. EGD / colonoscopy today reported gastric polyps with some erosions and colonic polyps which were removed. Toprol XL resumed at a lower dose. GI recommended to continue holding eliquis for now. Objective:     Vitals:    09/12/19 1147 09/12/19 1203 09/12/19 1618 09/12/19 1845   BP: 135/69 137/71 151/79 130/77   Pulse: 70 73 90 82   Resp: 18 18 18 16   Temp:  98 °F (36.7 °C) 98.7 °F (37.1 °C) 99.3 °F (37.4 °C)   SpO2: 100% 100% 91% 100%   Weight:       Height:            Intake and Output:  Current Shift: No intake/output data recorded. Last three shifts: 09/11 0701 - 09/12 1900  In: 470 [P.O.:120; I.V.:350]  Out: 275 [Urine:275]    Physical Exam:   GENERAL: alert, appears stated age  EYE: conjunctivae/corneas clear. LYMPHATIC: Cervical, supraclavicular, and axillary nodes normal.   THROAT & NECK: normal and no erythema or exudates noted. LUNG: clear to auscultation bilaterally  HEART: regular rate and rhythm, S1, S2 normal, no murmur, click, rub or gallop  ABDOMEN: soft, non-tender. Bowel sounds normal. No masses,  no organomegaly  EXTREMITIES:  extremities normal, atraumatic, no cyanosis or edema  SKIN: Normal.  NEUROLOGIC: No focal deficits   PSYCHIATRIC: non focal    Lab/Data Review: All lab results for the last 24 hours reviewed.          Assessment:     Principal Problem:    GI bleed (9/9/2019)    Active Problems:    COPD (chronic obstructive pulmonary disease) ([address] (5/2/2014)      Hypertension (6/1/2017)      A-fib ([address] (7/23/2018)      Anemia (3/18/2019)      Chronic respiratory failure with hypoxia ([address] (9/9/2019)      Heart failure with reduced ejection fraction ([facility] (9/9/2019)      CKD (chronic kidney disease), stage III ([facility] (9/9/2019)        Plan:     -monitor VS and Hgb level   -EGD/ colonoscopy done today   -monitor clinical progress    Signed By: Margaret Downs MD     September 12, 2019

## 2019-09-12 NOTE — PROGRESS NOTES
TRANSFER - OUT REPORT:    Verbal report given to Vasu Nettles RN(name) on [employer].  being transferred to [facility](unit) for routine post - op       Report consisted of patients Situation, Background, Assessment and   Recommendations(SBAR). Information from the following report(s) SBAR was reviewed with the receiving nurse. Lines:   Peripheral IV 09/10/19 Left Antecubital (Active)   Site Assessment Clean, dry, & intact 9/12/2019  9:17 AM   Phlebitis Assessment 0 9/12/2019  9:17 AM   Infiltration Assessment 0 9/12/2019  9:17 AM   Dressing Status Clean, dry, & intact 9/12/2019  9:17 AM   Dressing Type Tape;Transparent 9/12/2019  9:17 AM   Hub Color/Line Status Pink;Patent; Flushed; Infusing 9/12/2019  9:17 AM   Alcohol Cap Used No 9/12/2019  6:58 AM        Opportunity for questions and clarification was provided.       Patient transported with:   O2 @ 3 liters

## 2019-09-12 NOTE — PROGRESS NOTES
TRANSFER - IN REPORT:    Verbal report received from Kimberlee RN(name) on [employer].  being received from GI lab(unit) for routine progression of care      Report consisted of patients Situation, Background, Assessment and   Recommendations(SBAR). Per Kimberlee pt's EGD show Gastric erosion, polyps and a bipopsy was done, colonoscopy showed diverticulosis and polyps. Continue to hold eliquis and give \"PPI 40 mg daily. Dr. Guillermo Mak notified    Information from the following report(s) SBAR was reviewed with the receiving nurse. Opportunity for questions and clarification was provided. Assessment completed upon patients arrival to unit and care assumed.

## 2019-09-12 NOTE — PROGRESS NOTES
Problem: Upper and Lower GI Bleed: Day 3  Goal: Activity/Safety  Outcome: Progressing Towards Goal  Goal: Treatments/Interventions/Procedures  Outcome: Progressing Towards Goal     Problem: Falls - Risk of  Goal: *Absence of Falls  Description  Document Reyna Ervin Fall Risk and appropriate interventions in the flowsheet.   Outcome: Progressing Towards Goal  Note:   Fall Risk Interventions:  Mobility Interventions: Patient to call before getting OOB    Mentation Interventions: Door open when patient unattended    Medication Interventions: Teach patient to arise slowly    Elimination Interventions: Call light in reach              Problem: Breathing Pattern - Ineffective  Goal: *Absence of hypoxia  Outcome: Progressing Towards Goal

## 2019-09-12 NOTE — PROGRESS NOTES
TRANSFER - OUT REPORT:    Verbal report given to Aleksey Ross RN(name) on [employer].  being transferred to GI Lab(unit) for routine progression of care       Report consisted of patients Situation, Background, Assessment and   Recommendations(SBAR). Information from the following report(s) SBAR was reviewed with the receiving nurse. Lines:   Peripheral IV 09/10/19 Left Antecubital (Active)   Site Assessment Clean, dry, & intact 9/12/2019  9:17 AM   Phlebitis Assessment 0 9/12/2019  9:17 AM   Infiltration Assessment 0 9/12/2019  9:17 AM   Dressing Status Clean, dry, & intact 9/12/2019  9:17 AM   Dressing Type Tape;Transparent 9/12/2019  9:17 AM   Hub Color/Line Status Pink;Patent; Flushed; Infusing 9/12/2019  9:17 AM   Alcohol Cap Used No 9/12/2019  6:58 AM        Opportunity for questions and clarification was provided.       Patient transported with:   [employer]

## 2019-09-12 NOTE — PROGRESS NOTES
Patient A/O x4. Bowel prep completed. Consent form signed and in chart. BMs yellow, clear this morning. NPO since 0000. O2 Sat 98% on 3 L NC. Hourly rounds performed. All needs meet. Bed low/locked. Call light within reach. Patient denies needs at this time.   Will continue to monitor and report to oncoming RN

## 2019-09-12 NOTE — INTERVAL H&P NOTE
H&P Update:  Paola Jj was seen and examined. History and physical has been reviewed. The patient has been examined. There have been no significant clinical changes since the completion of the originally dated History and Physical.    Patient has prepped for egd-colonoscopy for AYAN with 2 day prep. Proceed.  Eliquis held> 24 hours

## 2019-09-12 NOTE — PROCEDURES
Esophagogastroduodenoscopy    DATE of PROCEDURE: 9/12/2019    MEDICATIONS ADMINISTERED: MAC    INSTRUMENT: GIF    PROCEDURE:  After obtaining informed consent, the patient was placed in the left lateral position and sedated. The endoscope was advanced under direct vision without difficulty. The esophagus, stomach (including retroflexed views) and duodenum were evaluated. The patient was taken to the recovery area in stable condition. FINDINGS:  ESOPHAGUS:  NOrmal  Without ulcers or HH. Transient rings noted but no stricture  STOMACH:  In the fundus there were a few small gastric polyps < 3-4mm each with some erosions suggestive of prior bleeding. Biopsies taken from the polyp as well as antrum for h pylori  DUODENUM: Normal    Estimated blood loss: 0-minimal     PLAN:  1. PPI 40mg daily  2. F/up pathology  3.  Would prefer patient remain off eliquis given he has risk of gi bleeding further worsening Tristen Marquez MD  [facility], [place]

## 2019-09-13 ENCOUNTER — APPOINTMENT (OUTPATIENT)
Dept: CT IMAGING | Age: 79
DRG: 268 | End: 2019-09-13
Attending: INTERNAL MEDICINE
Payer: MEDICARE

## 2019-09-13 ENCOUNTER — APPOINTMENT (OUTPATIENT)
Dept: CT IMAGING | Age: 79
DRG: 268 | End: 2019-09-13
Attending: NURSE PRACTITIONER
Payer: MEDICARE

## 2019-09-13 ENCOUNTER — ANESTHESIA EVENT (OUTPATIENT)
Dept: SURGERY | Age: 79
DRG: 268 | End: 2019-09-13
Payer: MEDICARE

## 2019-09-13 LAB
ABO + RH BLD: NORMAL
ANION GAP SERPL CALC-SCNC: 6 MMOL/L (ref 7–16)
BASOPHILS # BLD: 0 K/UL (ref 0–0.2)
BASOPHILS NFR BLD: 0 % (ref 0–2)
BLOOD GROUP ANTIBODIES SERPL: NORMAL
BUN SERPL-MCNC: 11 MG/DL (ref 8–23)
CALCIUM SERPL-MCNC: 9 MG/DL (ref 8.3–10.4)
CHLORIDE SERPL-SCNC: 109 MMOL/L (ref 98–107)
CO2 SERPL-SCNC: 28 MMOL/L (ref 21–32)
CREAT SERPL-MCNC: 1.53 MG/DL (ref 0.8–1.5)
DIFFERENTIAL METHOD BLD: ABNORMAL
EOSINOPHIL # BLD: 0.1 K/UL (ref 0–0.8)
EOSINOPHIL NFR BLD: 2 % (ref 0.5–7.8)
ERYTHROCYTE [DISTWIDTH] IN BLOOD BY AUTOMATED COUNT: 16.3 % (ref 11.9–14.6)
GLUCOSE SERPL-MCNC: 95 MG/DL (ref 65–100)
HCT VFR BLD AUTO: 29.4 % (ref 41.1–50.3)
HGB BLD-MCNC: 9.3 G/DL (ref 13.6–17.2)
IMM GRANULOCYTES # BLD AUTO: 0 K/UL (ref 0–0.5)
IMM GRANULOCYTES NFR BLD AUTO: 0 % (ref 0–5)
LYMPHOCYTES # BLD: 2.2 K/UL (ref 0.5–4.6)
LYMPHOCYTES NFR BLD: 29 % (ref 13–44)
MCH RBC QN AUTO: 28.9 PG (ref 26.1–32.9)
MCHC RBC AUTO-ENTMCNC: 31.6 G/DL (ref 31.4–35)
MCV RBC AUTO: 91.3 FL (ref 79.6–97.8)
MONOCYTES # BLD: 1.1 K/UL (ref 0.1–1.3)
MONOCYTES NFR BLD: 15 % (ref 4–12)
NEUTS SEG # BLD: 4.1 K/UL (ref 1.7–8.2)
NEUTS SEG NFR BLD: 54 % (ref 43–78)
NRBC # BLD: 0 K/UL (ref 0–0.2)
PLATELET # BLD AUTO: 201 K/UL (ref 150–450)
PMV BLD AUTO: 10.1 FL (ref 9.4–12.3)
POTASSIUM SERPL-SCNC: 4 MMOL/L (ref 3.5–5.1)
RBC # BLD AUTO: 3.22 M/UL (ref 4.23–5.6)
SODIUM SERPL-SCNC: 143 MMOL/L (ref 136–145)
SPECIMEN EXP DATE BLD: NORMAL
WBC # BLD AUTO: 7.6 K/UL (ref 4.3–11.1)

## 2019-09-13 PROCEDURE — 74011636320 HC RX REV CODE- 636/320: Performed by: INTERNAL MEDICINE

## 2019-09-13 PROCEDURE — 74011000258 HC RX REV CODE- 258: Performed by: INTERNAL MEDICINE

## 2019-09-13 PROCEDURE — 77030019605

## 2019-09-13 PROCEDURE — 74011250637 HC RX REV CODE- 250/637: Performed by: INTERNAL MEDICINE

## 2019-09-13 PROCEDURE — 74011000250 HC RX REV CODE- 250: Performed by: INTERNAL MEDICINE

## 2019-09-13 PROCEDURE — 74011250636 HC RX REV CODE- 250/636: Performed by: INTERNAL MEDICINE

## 2019-09-13 PROCEDURE — 80048 BASIC METABOLIC PNL TOTAL CA: CPT

## 2019-09-13 PROCEDURE — C9113 INJ PANTOPRAZOLE SODIUM, VIA: HCPCS | Performed by: INTERNAL MEDICINE

## 2019-09-13 PROCEDURE — 51798 US URINE CAPACITY MEASURE: CPT

## 2019-09-13 PROCEDURE — 85025 COMPLETE CBC W/AUTO DIFF WBC: CPT

## 2019-09-13 PROCEDURE — 87040 BLOOD CULTURE FOR BACTERIA: CPT

## 2019-09-13 PROCEDURE — 94760 N-INVAS EAR/PLS OXIMETRY 1: CPT

## 2019-09-13 PROCEDURE — 74174 CTA ABD&PLVS W/CONTRAST: CPT

## 2019-09-13 PROCEDURE — 74176 CT ABD & PELVIS W/O CONTRAST: CPT

## 2019-09-13 PROCEDURE — 94640 AIRWAY INHALATION TREATMENT: CPT

## 2019-09-13 PROCEDURE — 99218 HC RM OBSERVATION: CPT

## 2019-09-13 PROCEDURE — 86900 BLOOD TYPING SEROLOGIC ABO: CPT

## 2019-09-13 PROCEDURE — 36415 COLL VENOUS BLD VENIPUNCTURE: CPT

## 2019-09-13 RX ORDER — PANTOPRAZOLE SODIUM 40 MG/1
40 TABLET, DELAYED RELEASE ORAL
Status: DISCONTINUED | OUTPATIENT
Start: 2019-09-14 | End: 2019-09-18 | Stop reason: HOSPADM

## 2019-09-13 RX ORDER — SODIUM CHLORIDE 0.9 % (FLUSH) 0.9 %
10 SYRINGE (ML) INJECTION
Status: COMPLETED | OUTPATIENT
Start: 2019-09-13 | End: 2019-09-13

## 2019-09-13 RX ORDER — AMLODIPINE BESYLATE 10 MG/1
10 TABLET ORAL DAILY
Status: DISCONTINUED | OUTPATIENT
Start: 2019-09-13 | End: 2019-09-14

## 2019-09-13 RX ORDER — METOPROLOL SUCCINATE 25 MG/1
25 TABLET, EXTENDED RELEASE ORAL
Status: COMPLETED | OUTPATIENT
Start: 2019-09-13 | End: 2019-09-13

## 2019-09-13 RX ORDER — METOPROLOL SUCCINATE 50 MG/1
50 TABLET, EXTENDED RELEASE ORAL DAILY
Status: DISCONTINUED | OUTPATIENT
Start: 2019-09-14 | End: 2019-09-13

## 2019-09-13 RX ORDER — SODIUM CHLORIDE 9 MG/ML
50 INJECTION, SOLUTION INTRAVENOUS CONTINUOUS
Status: DISCONTINUED | OUTPATIENT
Start: 2019-09-13 | End: 2019-09-14

## 2019-09-13 RX ORDER — METOPROLOL SUCCINATE 25 MG/1
25 TABLET, EXTENDED RELEASE ORAL
Status: DISCONTINUED | OUTPATIENT
Start: 2019-09-13 | End: 2019-09-13

## 2019-09-13 RX ORDER — METOPROLOL SUCCINATE 50 MG/1
50 TABLET, EXTENDED RELEASE ORAL DAILY
Status: DISCONTINUED | OUTPATIENT
Start: 2019-09-14 | End: 2019-09-18 | Stop reason: HOSPADM

## 2019-09-13 RX ADMIN — METOPROLOL SUCCINATE 25 MG: 25 TABLET, EXTENDED RELEASE ORAL at 20:35

## 2019-09-13 RX ADMIN — BRIMONIDINE TARTRATE 1 DROP: 2 SOLUTION OPHTHALMIC at 18:00

## 2019-09-13 RX ADMIN — ALLOPURINOL 300 MG: 100 TABLET ORAL at 09:13

## 2019-09-13 RX ADMIN — PANTOPRAZOLE SODIUM 40 MG: 40 INJECTION, POWDER, FOR SOLUTION INTRAVENOUS at 09:12

## 2019-09-13 RX ADMIN — Medication 10 ML: at 14:00

## 2019-09-13 RX ADMIN — Medication 10 ML: at 21:01

## 2019-09-13 RX ADMIN — BRIMONIDINE TARTRATE 1 DROP: 2 SOLUTION OPHTHALMIC at 09:00

## 2019-09-13 RX ADMIN — Medication 10 ML: at 16:39

## 2019-09-13 RX ADMIN — SODIUM CHLORIDE 100 ML: 900 INJECTION, SOLUTION INTRAVENOUS at 16:39

## 2019-09-13 RX ADMIN — ACETAMINOPHEN 650 MG: 325 TABLET, FILM COATED ORAL at 11:05

## 2019-09-13 RX ADMIN — BUDESONIDE 500 MCG: 0.5 INHALANT RESPIRATORY (INHALATION) at 20:13

## 2019-09-13 RX ADMIN — AMLODIPINE BESYLATE 10 MG: 10 TABLET ORAL at 20:35

## 2019-09-13 RX ADMIN — DIATRIZOATE MEGLUMINE AND DIATRIZOATE SODIUM 15 ML: 660; 100 LIQUID ORAL; RECTAL at 12:13

## 2019-09-13 RX ADMIN — IOPAMIDOL 100 ML: 755 INJECTION, SOLUTION INTRAVENOUS at 16:39

## 2019-09-13 RX ADMIN — METOPROLOL SUCCINATE 12.5 MG: 25 TABLET, EXTENDED RELEASE ORAL at 09:13

## 2019-09-13 RX ADMIN — Medication 10 ML: at 05:31

## 2019-09-13 RX ADMIN — BUDESONIDE 500 MCG: 0.5 INHALANT RESPIRATORY (INHALATION) at 07:17

## 2019-09-13 RX ADMIN — SODIUM CHLORIDE 100 ML/HR: 900 INJECTION, SOLUTION INTRAVENOUS at 15:52

## 2019-09-13 RX ADMIN — CEFTRIAXONE 2 G: 2 INJECTION, POWDER, FOR SOLUTION INTRAMUSCULAR; INTRAVENOUS at 20:35

## 2019-09-13 NOTE — PROGRESS NOTES
Interdisciplinary Rounds completed 09/13/19. Nursing, Case Management, Physician and PT present. Plan of care reviewed and updated.

## 2019-09-13 NOTE — PROGRESS NOTES
Pt symptomatic despite low grade of fever. Medicated per MAR. Pt states an improvement to chills.  Will continue to monitor

## 2019-09-13 NOTE — PROGRESS NOTES
[facility] Progress Note         Admit Date:  9/9/2019  Today's Date:  9/13/2019    CC:  AYAN    Subjective:     S/p EGD and colonoscopy yesterday 9/12. Had some lower abdominal discomfort overnight, resolved this morning. No N/V. Medications:   Current Facility-Administered Medications   Medication Dose Route Frequency    metoprolol succinate (TOPROL-XL) XL tablet 12.5 mg  12.5 mg Oral DAILY    0.9% sodium chloride infusion 250 mL  250 mL IntraVENous PRN    albuterol-ipratropium (DUO-NEB) 2.5 MG-0.5 MG/3 ML  3 mL Nebulization Q6H PRN    allopurinol (ZYLOPRIM) tablet 300 mg  300 mg Oral DAILY    budesonide (PULMICORT) 500 mcg/2 ml nebulizer suspension  500 mcg Nebulization BID RT    sodium chloride (NS) flush 5-40 mL  5-40 mL IntraVENous Q8H    sodium chloride (NS) flush 5-40 mL  5-40 mL IntraVENous PRN    acetaminophen (TYLENOL) tablet 650 mg  650 mg Oral Q6H PRN    ondansetron (ZOFRAN) injection 4 mg  4 mg IntraVENous Q4H PRN    pantoprazole (PROTONIX) 40 mg in sodium chloride 0.9% 10 mL injection  40 mg IntraVENous Q12H    brimonidine (ALPHAGAN) 0.2 % ophthalmic solution 1 Drop  1 Drop Both Eyes BID    influenza vaccine 2019-20 (6 mos+)(PF) (FLUARIX/FLULAVAL/FLUZONE QUAD) injection 0.5 mL  0.5 mL IntraMUSCular PRIOR TO DISCHARGE       Review of Systems:  ROS was obtained, with pertinent positives as listed above. No chest pain or SOB. Diet:  Regular diet    Objective:   Vitals:  Visit Vitals  /62 (BP 1 Location: Left arm, BP Patient Position: At rest)   Pulse 81   Temp 98.6 °F (37 °C)   Resp 18   Ht 5' 9\" (1.753 m)   Wt 77.8 kg (171 lb 9.6 oz)   SpO2 96%   BMI 25.34 kg/m²     Intake/Output:  09/13 0701 - 09/13 1900  In: 240 [P.O.:240]  Out: -   09/11 1901 - 09/13 0700  In: 470 [P.O.:120;  I.V.:350]  Out: 400 [Urine:400]  Exam:  General appearance: alert, cooperative, no distress  Lungs: Decrease breath sounds, 3L oxygen via NC  Heart: regular rate and rhythm  Abdomen: soft, non-tender. Bowel sounds normal.   Extremities: extremities normal, atraumatic, no cyanosis or edema  Neuro:  alert and oriented    Data Review (Labs):    Recent Labs     09/13/19  0553 09/12/19  0532 09/11/19  0756 09/10/19  2323   WBC 7.6 6.7 5.8  --    HGB 9.3* 9.3* 9.1* 9.0*   HCT 29.4* 30.1* 29.9* 29.3*    219 266  --    MCV 91.3 91.5 92.3  --     141 143  --    K 4.0 3.9 4.3  --    * 107 108*  --    CO2 28 27 30  --    BUN 11 13 21  --    CREA 1.53* 1.47 1.56*  --    CA 9.0 8.8 9.2  --    GLU 95 91 84  --        Assessment:     Principal Problem:  GI bleed (9/9/2019)    Active Problems:  COPD (chronic obstructive pulmonary disease) ([facility]) (5/2/2014)  Hypertension (6/1/2017)  [facility]fib ([facility] (7/23/2018)  Anemia (3/18/2019)  Chronic respiratory failure with hypoxia ([facility]) (9/9/2019)  Heart failure with reduced ejection fraction ([facility]) (9/9/2019)  CKD (chronic kidney disease), stage III ([facility] (9/9/2019)    Patient is a 70 y.o. male with PMHx of A fib on Eliquis, AAA, arthritis, hx prostate cancer, CKD stage 3, COPD on O2, heart failure (EF 30-35% in 8/2018), HTN. HLD, PUD, PE, hx of colon polyps, who is seen for further evaluation of AYAN. Pt presented to ED 9/9/19 for fatigue and weakness. He has been receiving iron infusions as outpatient for AYAN secondary to CKD. He does have cardiomyopathy. He does take Eliquis. Rectal exam in ED with light brown heme positive stool. HGB at admission 7.2. Baseline reported as 8-9. Labs 9/10/19: HGB 8.0, MCV 92.8, MCH 28.1, MCHC 30.3, RDW 17.2, BUN 27, Cr 1.78, troponin 0.06. He was admitted to hospitalist. He was started on IV pantoprazole 40mg BID and Eliquis was held. He has not received blood transfusion since arrival.     Last colonoscopy was 5/21/18 with Dr Evaristo Luis at [facility]. Findings include mild diverticulosis in transverse/left/sigmoid colon, diminutive polyps in proximal R colon, and medium pedunculated sigmoid polyp.  Unable to access pathology at this time. Unknown when or if he had prior EGD. Last Eliquis dose was 9/9/19 AM. No current overt GI bleeding. Differential for anemia includes: AOCD, chronic blood loss, neoplastic process, PUD. Troponins 0.09. Patient denies any CP. Colonoscopy 9/12 by Dr. Woods Kearns:  Rectum: normal  Sigmoid: Diverticulosis which is moderate. Descending Colon: Diverticulosis which is moderate. dimunitive polyps measuring < 5mm each (x 2) removed with cold forceps and sent for pathology  Transverse Colon: dimunitive polyps measuring < 5mm each (x 3) removed with cold forceps and sent for pathology  Ascending Colon: dimunitive polyps measuring < 5mm each (x 3) removed with cold forceps and sent for pathology  Cecum: normal  Terminal ileum: normal    EGD by Dr. Mihir Santiago 9/12  FINDINGS:  ESOPHAGUS:  NOrmal  Without ulcers or HH. Transient rings noted but no stricture  STOMACH:  In the fundus there were a few small gastric polyps < 3-4mm each with some erosions suggestive of prior bleeding. Biopsies taken from the polyp as well as antrum for h pylori  DUODENUM: Normal    Hgb stable in the 9s range    Plan:     Follow-up path. Continue PPI po daily. Would prefer patient to remain off Eliquis given the risk of GI bleeding further worsening ACD. If it must be resumed then would wait another 24 hrs since multiple polyps were removed yesterday. Will sign-off. Pls call with any questions. Outpatient follow-up in 3-4 wks.        Dulce Braxton PA-C  [facility]

## 2019-09-13 NOTE — PROGRESS NOTES
Patient seen and examined. Patient initially admitted for lower GI bleed status post colonoscopy. Noncontrast CT scan today showed a 4.8aneurysm. Stat CTA was performed, which I reviewed which show  a 5 cm aneurysm. No evidence of any rupture there is some retroperitoneal straining slightly in the lateral portion. Patient is hemodynamically stable denies any abdominal pain. I will plan to fix the aneurysm in the morning. Patient will be IV hydrated overnight. I discussed with the patient the risks and benefits of the procedure including bleeding, infection, and worsening kidney function. Patient will be n.p.o. after midnight. Patient states that he has no family that I can notify.     Anusha Guevara

## 2019-09-13 NOTE — ANESTHESIA PREPROCEDURE EVALUATION
Anesthetic History   No history of anesthetic complications            Review of Systems / Medical History  Patient summary reviewed and pertinent labs reviewed    Pulmonary    COPD (O2 dependent): severe      Smoker (quit 5 years)      Comments: Uses 3L nasal O2 essentially 24/7   Neuro/Psych   Within defined limits           Cardiovascular    Hypertension: well controlled      CHF  Dysrhythmias : atrial flutter and atrial fibrillation  PAD (4.3 cm AAA)    Exercise tolerance: <4 METS  Comments: Echo from [facility] in 2/2019:  · The left ventricular systolic function is decreased (45 - 50%). · Left ventricular global hypokinesis. · Grade I (mild) left ventricular diastolic dysfunction present,   consistent with impaired relaxation. · Mild tricuspid valve regurgitation.     Echo from last summer was read as EF 30-35%    Has apparently been off Eliquis >72 hours, was admitted 9/9 for w/u GIB   GI/Hepatic/Renal         Renal disease: CRI      Comments: GIB Endo/Other        Arthritis, cancer (prostate) and anemia     Other Findings   Comments: Glaucoma         Physical Exam    Airway  Mallampati: II  TM Distance: 4 - 6 cm  Neck ROM: normal range of motion   Mouth opening: Normal     Cardiovascular  Regular rate and rhythm,  S1 and S2 normal,  no murmur, click, rub, or gallop  Rhythm: regular  Rate: normal         Dental    Dentition: Edentulous, Full lower dentures and Full upper dentures     Pulmonary        Wheezes:bilateral    Prolonged expiration     Abdominal         Other Findings            Anesthetic Plan    ASA: 4  Anesthesia type: general    Monitoring Plan: Arterial line      Induction: Intravenous  Anesthetic plan and risks discussed with: Patient

## 2019-09-13 NOTE — CONSULTS
[address] . [address] FAX: [phone]    Vascular Surgery Consult    Subjective:      Lupe Roberto is a 70 y.o. male who presented to the ER on 9/9/19 for c/o near syncope and hypotension. Has a history of AYAN. Hgb down to 7.2 on admission concerning for GI bleed. We are asked to evaluate the patient today after CT scan performed for abdominal pain indicates a 4.8 cm AAA with possible leak. He denies ever being told he has an aneurysm and no family history of aneurysms. PMH: previous smoker, HTN, CKD stage 3, AYAN, COPD chronic hypoxia, A. Fib on Eliquis, h/o PE, CHF, prostate cancer, hyperlipidemia and oxygen dependent. He underwent an EGD/colonoscopy yesterday by GI and has been c/o abdominal since the procedure.      Past Medical History:   Diagnosis Date    A-fib [facility]) 5/2/2014    AAA (abdominal aortic aneurysm) without rupture ([address] 5/2/2014    3.2 on  2/14 [facility]    Arthritis     Cancer ([address]     hx prostate cancer    COPD (chronic obstructive pulmonary disease) ([address] 5/2/2014    Elevated prostate specific antigen (PSA)     Glaucoma 5/2/2014    Heart disease     Heart failure ([address]     Hyperlipemia 5/2/2014    Hypertension     Hypertrophy of prostate with urinary obstruction and other lower urinary tract symptoms (LUTS)     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    OA (osteoarthritis) 5/2/2014    Peptic ulcer disease 6/1/2017    Poor historian     Prostate cancer ([address]     Pulmonary embolus ([address] 6/1/2017    Supplemental oxygen dependent 5/2/2014    Warfarin anticoagulation 5/2/2014     Past Surgical History:   Procedure Laterality Date    COLONOSCOPY N/A 9/12/2019    COLONOSCOPY/ 26 ROOM 614 performed by Stanton Estrada [facility] at [facility] ENDOSCOPY    HX HEART CATHETERIZATION        Family History   Problem Relation Age of Onset    Cancer Mother     Heart Disease Father      Social History     Socioeconomic History    Marital status: SINGLE     Spouse name: Not on file    Number of children: Not on file    Years of education: Not on file    Highest education level: Not on file   Tobacco Use    Smoking status: Former Smoker     Packs/day: 2.00     Years: 40.00     Pack years: 80.00     Last attempt to quit: 2014     Years since quittin.1    Smokeless tobacco: Never Used    Tobacco comment: still taking Chantix    Substance and Sexual Activity    Alcohol use: No    Drug use: No    Sexual activity: Yes     Partners: Female     Birth control/protection: None      Current Facility-Administered Medications   Medication Dose Route Frequency    [START ON 2019] pantoprazole (PROTONIX) tablet 40 mg  40 mg Oral ACB    0.9% sodium chloride infusion  100 mL/hr IntraVENous CONTINUOUS    sodium chloride 0.9 % bolus infusion 100 mL  100 mL IntraVENous RAD ONCE    saline peripheral flush soln 10 mL  10 mL InterCATHeter RAD ONCE    iopamidol (ISOVUE-370) 76 % injection 100 mL  100 mL IntraVENous RAD ONCE    metoprolol succinate (TOPROL-XL) XL tablet 12.5 mg  12.5 mg Oral DAILY    0.9% sodium chloride infusion 250 mL  250 mL IntraVENous PRN    albuterol-ipratropium (DUO-NEB) 2.5 MG-0.5 MG/3 ML  3 mL Nebulization Q6H PRN    allopurinol (ZYLOPRIM) tablet 300 mg  300 mg Oral DAILY    budesonide (PULMICORT) 500 mcg/2 ml nebulizer suspension  500 mcg Nebulization BID RT    sodium chloride (NS) flush 5-40 mL  5-40 mL IntraVENous Q8H    sodium chloride (NS) flush 5-40 mL  5-40 mL IntraVENous PRN    acetaminophen (TYLENOL) tablet 650 mg  650 mg Oral Q6H PRN    ondansetron (ZOFRAN) injection 4 mg  4 mg IntraVENous Q4H PRN    brimonidine (ALPHAGAN) 0.2 % ophthalmic solution 1 Drop  1 Drop Both Eyes BID    influenza vaccine - (6 mos+)(PF) (FLUARIX/FLULAVAL/FLUZONE QUAD) injection 0.5 mL  0.5 mL IntraMUSCular PRIOR TO DISCHARGE      Allergies   Allergen Reactions    Statins-Hmg-Coa Reductase Inhibitors Myalgia     Muscle pain       Review of Systems:  A comprehensive review of systems was negative except for that written in the History of Present Illness. Objective:     Patient Vitals for the past 8 hrs:   BP Temp Pulse Resp SpO2   19 1054 145/65 (!) 101.5 °F (38.6 °C) 85 18 94 %     Temp (24hrs), Av.5 °F (37.5 °C), Min:98.6 °F (37 °C), Max:101.5 °F (38.6 °C)      Physical Exam:  GENERAL: alert, cooperative, no distress, appears stated age, LUNG: diminished, HEART: regular rate and rhythm, S1, S2 normal, no murmur, click, rub or gallop, ABDOMEN: soft, EXTREMITIES: palpable femoral and DP pulses bilaterally. Assessment/Plan:   Patient with abdominal pain post EGD/colonoscopy. CT scan indicates 4.8 cm abdominal aortic aneurysm concerning for leak/rupture. -STAT CTA abdomen/pelvis. Dr. Duenas Monday will review the CTA and make further recommendations. Signed By: Jessica Hurtado NP     2019       Elements of this note have been dictated using speech recognition software. As a result, errors of speech recognition may have occurred.

## 2019-09-13 NOTE — PROGRESS NOTES
Pt night uneventful after tylenol administration. Pt slept soundly this pm. NO s/sx of bleeding. Ambulating without gait disturbance or assistance. Hourly rounds performed through shift, pt denies needs at this time. Bed in low position and call light/ personal items within reach. Will continue to monitor and report to day shift nurse.

## 2019-09-13 NOTE — PROGRESS NOTES
Progress Note    Patient: Mylene Homans. MRN: [id number]  SSN: [id number]    YOB: 1940  Age: 70 y.o. Sex: male      Admit Date: 9/9/2019    LOS: 3 days     Subjective:     Patient had fever today. He continues complaining of supra-pubic pain. Bladder scan done to rule out urinary retention. It was WNL. Will order a CT scan of the abdomen and pelvis. Will follow report. Objective:     Vitals:    09/13/19 0600 09/13/19 0632 09/13/19 0719 09/13/19 1054   BP:  123/62  145/65   Pulse:  81  85   Resp:  18  18   Temp:  98.6 °F (37 °C)  (!) 101.5 °F (38.6 °C)   SpO2:  97% 96% 94%   Weight: 77.8 kg (171 lb 9.6 oz)      Height:            Intake and Output:  Current Shift: 09/13 0701 - 09/13 1900  In: 240 [P.O.:240]  Out: -   Last three shifts: 09/11 1901 - 09/13 0700  In: 470 [P.O.:120; I.V.:350]  Out: 400 [Urine:400]    Physical Exam:   GENERAL: alert, appears stated age  EYE: conjunctivae/corneas clear. LYMPHATIC: Cervical, supraclavicular, and axillary nodes normal.   THROAT & NECK: normal and no erythema or exudates noted. LUNG: clear to auscultation bilaterally  HEART: regular rate and rhythm, S1, S2 normal, no murmur, click, rub or gallop  ABDOMEN: supra-pubic tenderness   EXTREMITIES:  extremities normal, atraumatic, no cyanosis or edema  SKIN: Normal.  NEUROLOGIC: No focal deficits   PSYCHIATRIC: non focal    Lab/Data Review: All lab results for the last 24 hours reviewed. Assessment:     Principal Problem:    GI bleed (9/9/2019)    Active Problems:    COPD (chronic obstructive pulmonary disease) [facility] (5/2/2014)      Hypertension (6/1/2017)      A-fib ([facility] (7/23/2018)      Anemia (3/18/2019)      Chronic respiratory failure with hypoxia ([address] (9/9/2019)      Heart failure with reduced ejection fraction ([facility] (9/9/2019)      CKD (chronic kidney disease), stage III ([facility] (9/9/2019)        Plan:     -Monitor VS and Hgb level   -EGD/ colonoscopy done yesterday.  Polyps removed   -had fever today. Has suprapubic pain  -cultures ordered   -abdominal CT scan ordered     Addendum: CT scan reported a 5 cm infra renal aortic aneurysm. Vascular surgery consulted. CTA recommended. Patient was started on  IV fluids due to risk of nephrotoxicity  And CTA done. Study confirmed an infrarenal AA with retroperitoneal tissue stranding suggesting small retroperitoneal leak. Vascular surgery to perform surgery tomorrow am. Will keep pt NPO at midnight. Will resume his BP meds for better control of BP ( lisinopril will not be resumed for now due to CKD/ recent use of IV contrast). Empiric IV ceftriaxone until infection is ruled out.    Signed By: Heydi Goodman MD     September 13, 2019

## 2019-09-14 ENCOUNTER — ANESTHESIA (OUTPATIENT)
Dept: SURGERY | Age: 79
DRG: 268 | End: 2019-09-14
Payer: MEDICARE

## 2019-09-14 ENCOUNTER — APPOINTMENT (OUTPATIENT)
Dept: INTERVENTIONAL RADIOLOGY/VASCULAR | Age: 79
DRG: 268 | End: 2019-09-14
Attending: INTERNAL MEDICINE
Payer: MEDICARE

## 2019-09-14 ENCOUNTER — APPOINTMENT (OUTPATIENT)
Dept: GENERAL RADIOLOGY | Age: 79
DRG: 268 | End: 2019-09-14
Attending: FAMILY MEDICINE
Payer: MEDICARE

## 2019-09-14 PROBLEM — I71.40 AAA (ABDOMINAL AORTIC ANEURYSM): Status: ACTIVE | Noted: 2019-09-14

## 2019-09-14 LAB
ACT BLD: 219 SECS (ref 70–128)
ANION GAP SERPL CALC-SCNC: 7 MMOL/L (ref 7–16)
BASOPHILS # BLD: 0 K/UL (ref 0–0.2)
BASOPHILS NFR BLD: 0 % (ref 0–2)
BUN SERPL-MCNC: 10 MG/DL (ref 8–23)
CALCIUM SERPL-MCNC: 8.8 MG/DL (ref 8.3–10.4)
CHLORIDE SERPL-SCNC: 109 MMOL/L (ref 98–107)
CO2 SERPL-SCNC: 27 MMOL/L (ref 21–32)
CREAT SERPL-MCNC: 1.36 MG/DL (ref 0.8–1.5)
DIFFERENTIAL METHOD BLD: ABNORMAL
EOSINOPHIL # BLD: 0.1 K/UL (ref 0–0.8)
EOSINOPHIL NFR BLD: 1 % (ref 0.5–7.8)
ERYTHROCYTE [DISTWIDTH] IN BLOOD BY AUTOMATED COUNT: 16 % (ref 11.9–14.6)
GLUCOSE SERPL-MCNC: 88 MG/DL (ref 65–100)
HCT VFR BLD AUTO: 28.8 % (ref 41.1–50.3)
HGB BLD-MCNC: 9.1 G/DL (ref 13.6–17.2)
IMM GRANULOCYTES # BLD AUTO: 0 K/UL (ref 0–0.5)
IMM GRANULOCYTES NFR BLD AUTO: 0 % (ref 0–5)
LYMPHOCYTES # BLD: 1.6 K/UL (ref 0.5–4.6)
LYMPHOCYTES NFR BLD: 19 % (ref 13–44)
MCH RBC QN AUTO: 28.6 PG (ref 26.1–32.9)
MCHC RBC AUTO-ENTMCNC: 31.6 G/DL (ref 31.4–35)
MCV RBC AUTO: 90.6 FL (ref 79.6–97.8)
MONOCYTES # BLD: 1.4 K/UL (ref 0.1–1.3)
MONOCYTES NFR BLD: 16 % (ref 4–12)
NEUTS SEG # BLD: 5.4 K/UL (ref 1.7–8.2)
NEUTS SEG NFR BLD: 64 % (ref 43–78)
NRBC # BLD: 0 K/UL (ref 0–0.2)
PLATELET # BLD AUTO: 190 K/UL (ref 150–450)
PMV BLD AUTO: 10.7 FL (ref 9.4–12.3)
POTASSIUM SERPL-SCNC: 4.1 MMOL/L (ref 3.5–5.1)
RBC # BLD AUTO: 3.18 M/UL (ref 4.23–5.6)
SODIUM SERPL-SCNC: 143 MMOL/L (ref 136–145)
WBC # BLD AUTO: 8.4 K/UL (ref 4.3–11.1)

## 2019-09-14 PROCEDURE — 74011250636 HC RX REV CODE- 250/636

## 2019-09-14 PROCEDURE — C1769 GUIDE WIRE: HCPCS | Performed by: SURGERY

## 2019-09-14 PROCEDURE — 74011000250 HC RX REV CODE- 250: Performed by: SURGERY

## 2019-09-14 PROCEDURE — 77030013794 HC KT TRNSDUC BLD EDWD -B: Performed by: ANESTHESIOLOGY

## 2019-09-14 PROCEDURE — 77030012221 HC CATH FOL CRITCOR BARD -B: Performed by: SURGERY

## 2019-09-14 PROCEDURE — 77030005401 HC CATH RAD ARRO -A: Performed by: ANESTHESIOLOGY

## 2019-09-14 PROCEDURE — 74011250637 HC RX REV CODE- 250/637: Performed by: SURGERY

## 2019-09-14 PROCEDURE — 71045 X-RAY EXAM CHEST 1 VIEW: CPT

## 2019-09-14 PROCEDURE — 4A133B1 MONITORING OF ARTERIAL PRESSURE, PERIPHERAL, PERCUTANEOUS APPROACH: ICD-10-PCS | Performed by: ANESTHESIOLOGY

## 2019-09-14 PROCEDURE — 77030021532 HC CATH ANGI DX IMPRS MRTM -B: Performed by: SURGERY

## 2019-09-14 PROCEDURE — C1887 CATHETER, GUIDING: HCPCS

## 2019-09-14 PROCEDURE — 99218 HC RM OBSERVATION: CPT

## 2019-09-14 PROCEDURE — 77030019605

## 2019-09-14 PROCEDURE — 74011000258 HC RX REV CODE- 258

## 2019-09-14 PROCEDURE — 74011250637 HC RX REV CODE- 250/637: Performed by: INTERNAL MEDICINE

## 2019-09-14 PROCEDURE — 74011636320 HC RX REV CODE- 636/320: Performed by: SURGERY

## 2019-09-14 PROCEDURE — 77030005518 HC CATH URETH FOL 2W BARD -B: Performed by: SURGERY

## 2019-09-14 PROCEDURE — B41D1ZZ FLUOROSCOPY OF AORTA AND BILATERAL LOWER EXTREMITY ARTERIES USING LOW OSMOLAR CONTRAST: ICD-10-PCS | Performed by: SURGERY

## 2019-09-14 PROCEDURE — C1725 CATH, TRANSLUMIN NON-LASER: HCPCS | Performed by: SURGERY

## 2019-09-14 PROCEDURE — 76000 FLUOROSCOPY <1 HR PHYS/QHP: CPT

## 2019-09-14 PROCEDURE — 4A133J1 MONITORING OF ARTERIAL PULSE, PERIPHERAL, PERCUTANEOUS APPROACH: ICD-10-PCS | Performed by: ANESTHESIOLOGY

## 2019-09-14 PROCEDURE — 94640 AIRWAY INHALATION TREATMENT: CPT

## 2019-09-14 PROCEDURE — 74011250636 HC RX REV CODE- 250/636: Performed by: SURGERY

## 2019-09-14 PROCEDURE — 77030019940 HC BLNKT HYPOTHRM STRY -B: Performed by: ANESTHESIOLOGY

## 2019-09-14 PROCEDURE — 77030013058 HC DEV INFL ANGIO BSC -B: Performed by: SURGERY

## 2019-09-14 PROCEDURE — 74011000250 HC RX REV CODE- 250

## 2019-09-14 PROCEDURE — 77030039425 HC BLD LARYNG TRULITE DISP TELE -A: Performed by: ANESTHESIOLOGY

## 2019-09-14 PROCEDURE — 74011250636 HC RX REV CODE- 250/636: Performed by: INTERNAL MEDICINE

## 2019-09-14 PROCEDURE — 77030018673: Performed by: SURGERY

## 2019-09-14 PROCEDURE — 65620000000 HC RM CCU GENERAL

## 2019-09-14 PROCEDURE — 77030020263 HC SOL INJ SOD CL0.9% LFCR 1000ML

## 2019-09-14 PROCEDURE — 76060000036 HC ANESTHESIA 2.5 TO 3 HR: Performed by: SURGERY

## 2019-09-14 PROCEDURE — 74011000258 HC RX REV CODE- 258: Performed by: SURGERY

## 2019-09-14 PROCEDURE — 04V03D6 RESTRICT ABD AORTA, BIFURC, W INTRALUM DEV, PERC: ICD-10-PCS | Performed by: SURGERY

## 2019-09-14 PROCEDURE — 03HY32Z INSERTION OF MONITORING DEVICE INTO UPPER ARTERY, PERCUTANEOUS APPROACH: ICD-10-PCS | Performed by: ANESTHESIOLOGY

## 2019-09-14 PROCEDURE — 74011000250 HC RX REV CODE- 250: Performed by: INTERNAL MEDICINE

## 2019-09-14 PROCEDURE — 94760 N-INVAS EAR/PLS OXIMETRY 1: CPT

## 2019-09-14 PROCEDURE — 77030037088 HC TUBE ENDOTRACH ORAL NSL COVD-A: Performed by: ANESTHESIOLOGY

## 2019-09-14 PROCEDURE — 80048 BASIC METABOLIC PNL TOTAL CA: CPT

## 2019-09-14 PROCEDURE — 36415 COLL VENOUS BLD VENIPUNCTURE: CPT

## 2019-09-14 PROCEDURE — 77030020407 HC IV BLD WRMR ST 3M -A: Performed by: ANESTHESIOLOGY

## 2019-09-14 PROCEDURE — 76210000063 HC OR PH I REC FIRST 0.5 HR: Performed by: SURGERY

## 2019-09-14 PROCEDURE — 77030013292 HC BOWL MX PRSM J&J -A: Performed by: ANESTHESIOLOGY

## 2019-09-14 PROCEDURE — 85347 COAGULATION TIME ACTIVATED: CPT

## 2019-09-14 PROCEDURE — C1768 GRAFT, VASCULAR: HCPCS | Performed by: SURGERY

## 2019-09-14 PROCEDURE — 77030002996 HC SUT SLK J&J -A: Performed by: SURGERY

## 2019-09-14 PROCEDURE — 77030014008 HC SPNG HEMSTAT J&J -C: Performed by: SURGERY

## 2019-09-14 PROCEDURE — 77030008467 HC STPLR SKN COVD -B: Performed by: SURGERY

## 2019-09-14 PROCEDURE — 77030018836 HC SOL IRR NACL ICUM -A: Performed by: SURGERY

## 2019-09-14 PROCEDURE — 77030034888 HC SUT PROL 2 J&J -B: Performed by: SURGERY

## 2019-09-14 PROCEDURE — 77030031139 HC SUT VCRL2 J&J -A: Performed by: SURGERY

## 2019-09-14 PROCEDURE — 77030002933 HC SUT MCRYL J&J -A: Performed by: SURGERY

## 2019-09-14 PROCEDURE — 76010000171 HC OR TIME 2 TO 2.5 HR INTENSV-TIER 1: Performed by: SURGERY

## 2019-09-14 PROCEDURE — 85025 COMPLETE CBC W/AUTO DIFF WBC: CPT

## 2019-09-14 PROCEDURE — 77010033678 HC OXYGEN DAILY

## 2019-09-14 PROCEDURE — 77030002987 HC SUT PROL J&J -B: Performed by: SURGERY

## 2019-09-14 PROCEDURE — 77030033147 HC GRFT ENDOVSC TRNK IPSLAT WLGO -K5: Performed by: SURGERY

## 2019-09-14 PROCEDURE — 77030019908 HC STETH ESOPH SIMS -A: Performed by: ANESTHESIOLOGY

## 2019-09-14 PROCEDURE — 77030010512 HC APPL CLP LIG J&J -C: Performed by: SURGERY

## 2019-09-14 PROCEDURE — 77030040361 HC SLV COMPR DVT MDII -B

## 2019-09-14 PROCEDURE — 74011250636 HC RX REV CODE- 250/636: Performed by: ANESTHESIOLOGY

## 2019-09-14 PROCEDURE — 77030003629 HC NDL PERC VASC COOK -A: Performed by: SURGERY

## 2019-09-14 DEVICE — GRAFT EVAR L10CM DST DIA27MM FEP NIT CONTRALATERAL LEG IL: Type: IMPLANTABLE DEVICE | Site: AORTA | Status: FUNCTIONAL

## 2019-09-14 DEVICE — GRAFT EVAR L13CM AORT DIA31MM IL DIA14.5MM IPSILATERAL LEG: Type: IMPLANTABLE DEVICE | Site: AORTA | Status: FUNCTIONAL

## 2019-09-14 RX ORDER — SUCCINYLCHOLINE CHLORIDE 20 MG/ML
INJECTION INTRAMUSCULAR; INTRAVENOUS AS NEEDED
Status: DISCONTINUED | OUTPATIENT
Start: 2019-09-14 | End: 2019-09-14

## 2019-09-14 RX ORDER — SODIUM CHLORIDE, SODIUM LACTATE, POTASSIUM CHLORIDE, CALCIUM CHLORIDE 600; 310; 30; 20 MG/100ML; MG/100ML; MG/100ML; MG/100ML
75 INJECTION, SOLUTION INTRAVENOUS CONTINUOUS
Status: DISCONTINUED | OUTPATIENT
Start: 2019-09-14 | End: 2019-09-14 | Stop reason: HOSPADM

## 2019-09-14 RX ORDER — PROPOFOL 10 MG/ML
INJECTION, EMULSION INTRAVENOUS AS NEEDED
Status: DISCONTINUED | OUTPATIENT
Start: 2019-09-14 | End: 2019-09-14 | Stop reason: HOSPADM

## 2019-09-14 RX ORDER — NALOXONE HYDROCHLORIDE 0.4 MG/ML
0.1 INJECTION, SOLUTION INTRAMUSCULAR; INTRAVENOUS; SUBCUTANEOUS
Status: DISCONTINUED | OUTPATIENT
Start: 2019-09-14 | End: 2019-09-14 | Stop reason: HOSPADM

## 2019-09-14 RX ORDER — CEFAZOLIN SODIUM/WATER 2 G/20 ML
2 SYRINGE (ML) INTRAVENOUS ONCE
Status: COMPLETED | OUTPATIENT
Start: 2019-09-14 | End: 2019-09-14

## 2019-09-14 RX ORDER — SODIUM CHLORIDE, SODIUM LACTATE, POTASSIUM CHLORIDE, CALCIUM CHLORIDE 600; 310; 30; 20 MG/100ML; MG/100ML; MG/100ML; MG/100ML
75 INJECTION, SOLUTION INTRAVENOUS CONTINUOUS
Status: DISCONTINUED | OUTPATIENT
Start: 2019-09-14 | End: 2019-09-14

## 2019-09-14 RX ORDER — OXYCODONE HYDROCHLORIDE 5 MG/1
5 TABLET ORAL
Status: DISCONTINUED | OUTPATIENT
Start: 2019-09-14 | End: 2019-09-14 | Stop reason: HOSPADM

## 2019-09-14 RX ORDER — LIDOCAINE HYDROCHLORIDE 10 MG/ML
0.1 INJECTION INFILTRATION; PERINEURAL AS NEEDED
Status: DISCONTINUED | OUTPATIENT
Start: 2019-09-14 | End: 2019-09-18 | Stop reason: HOSPADM

## 2019-09-14 RX ORDER — AMLODIPINE BESYLATE 5 MG/1
5 TABLET ORAL DAILY
Status: DISCONTINUED | OUTPATIENT
Start: 2019-09-15 | End: 2019-09-14 | Stop reason: SDUPTHER

## 2019-09-14 RX ORDER — OXYCODONE HYDROCHLORIDE 5 MG/1
10 TABLET ORAL
Status: DISCONTINUED | OUTPATIENT
Start: 2019-09-14 | End: 2019-09-14 | Stop reason: HOSPADM

## 2019-09-14 RX ORDER — GLYCOPYRROLATE 0.2 MG/ML
INJECTION INTRAMUSCULAR; INTRAVENOUS AS NEEDED
Status: DISCONTINUED | OUTPATIENT
Start: 2019-09-14 | End: 2019-09-14 | Stop reason: HOSPADM

## 2019-09-14 RX ORDER — AMLODIPINE BESYLATE 5 MG/1
5 TABLET ORAL DAILY
Status: DISCONTINUED | OUTPATIENT
Start: 2019-09-14 | End: 2019-09-18 | Stop reason: HOSPADM

## 2019-09-14 RX ORDER — SODIUM CHLORIDE, SODIUM LACTATE, POTASSIUM CHLORIDE, CALCIUM CHLORIDE 600; 310; 30; 20 MG/100ML; MG/100ML; MG/100ML; MG/100ML
INJECTION, SOLUTION INTRAVENOUS
Status: DISCONTINUED | OUTPATIENT
Start: 2019-09-14 | End: 2019-09-14 | Stop reason: HOSPADM

## 2019-09-14 RX ORDER — FENTANYL CITRATE 50 UG/ML
INJECTION, SOLUTION INTRAMUSCULAR; INTRAVENOUS AS NEEDED
Status: DISCONTINUED | OUTPATIENT
Start: 2019-09-14 | End: 2019-09-14 | Stop reason: HOSPADM

## 2019-09-14 RX ORDER — HYDROMORPHONE HYDROCHLORIDE 2 MG/ML
0.5 INJECTION, SOLUTION INTRAMUSCULAR; INTRAVENOUS; SUBCUTANEOUS
Status: DISCONTINUED | OUTPATIENT
Start: 2019-09-14 | End: 2019-09-14 | Stop reason: HOSPADM

## 2019-09-14 RX ORDER — DIPHENHYDRAMINE HYDROCHLORIDE 50 MG/ML
12.5 INJECTION, SOLUTION INTRAMUSCULAR; INTRAVENOUS
Status: DISCONTINUED | OUTPATIENT
Start: 2019-09-14 | End: 2019-09-14 | Stop reason: HOSPADM

## 2019-09-14 RX ORDER — ISOFLURANE 1 ML/ML
LIQUID RESPIRATORY (INHALATION)
Status: DISCONTINUED | OUTPATIENT
Start: 2019-09-14 | End: 2019-09-14 | Stop reason: HOSPADM

## 2019-09-14 RX ORDER — LIDOCAINE HYDROCHLORIDE 20 MG/ML
INJECTION, SOLUTION EPIDURAL; INFILTRATION; INTRACAUDAL; PERINEURAL AS NEEDED
Status: DISCONTINUED | OUTPATIENT
Start: 2019-09-14 | End: 2019-09-14 | Stop reason: HOSPADM

## 2019-09-14 RX ORDER — NEOSTIGMINE METHYLSULFATE 1 MG/ML
INJECTION INTRAVENOUS AS NEEDED
Status: DISCONTINUED | OUTPATIENT
Start: 2019-09-14 | End: 2019-09-14 | Stop reason: HOSPADM

## 2019-09-14 RX ORDER — FLUMAZENIL 0.1 MG/ML
0.2 INJECTION INTRAVENOUS AS NEEDED
Status: DISCONTINUED | OUTPATIENT
Start: 2019-09-14 | End: 2019-09-14 | Stop reason: HOSPADM

## 2019-09-14 RX ORDER — SODIUM CHLORIDE 9 MG/ML
25 INJECTION, SOLUTION INTRAVENOUS CONTINUOUS
Status: DISCONTINUED | OUTPATIENT
Start: 2019-09-14 | End: 2019-09-15

## 2019-09-14 RX ORDER — HEPARIN SODIUM 1000 [USP'U]/ML
INJECTION, SOLUTION INTRAVENOUS; SUBCUTANEOUS AS NEEDED
Status: DISCONTINUED | OUTPATIENT
Start: 2019-09-14 | End: 2019-09-14 | Stop reason: HOSPADM

## 2019-09-14 RX ORDER — ESMOLOL HYDROCHLORIDE 10 MG/ML
INJECTION INTRAVENOUS AS NEEDED
Status: DISCONTINUED | OUTPATIENT
Start: 2019-09-14 | End: 2019-09-14 | Stop reason: HOSPADM

## 2019-09-14 RX ORDER — ROCURONIUM BROMIDE 10 MG/ML
INJECTION, SOLUTION INTRAVENOUS AS NEEDED
Status: DISCONTINUED | OUTPATIENT
Start: 2019-09-14 | End: 2019-09-14

## 2019-09-14 RX ADMIN — IPRATROPIUM BROMIDE AND ALBUTEROL SULFATE 3 ML: .5; 3 SOLUTION RESPIRATORY (INHALATION) at 03:35

## 2019-09-14 RX ADMIN — ESMOLOL HYDROCHLORIDE 15 MG: 10 INJECTION INTRAVENOUS at 09:48

## 2019-09-14 RX ADMIN — FENTANYL CITRATE 50 MCG: 50 INJECTION, SOLUTION INTRAMUSCULAR; INTRAVENOUS at 08:16

## 2019-09-14 RX ADMIN — SODIUM CHLORIDE 75 ML/HR: 900 INJECTION, SOLUTION INTRAVENOUS at 15:45

## 2019-09-14 RX ADMIN — FENTANYL CITRATE 50 MCG: 50 INJECTION, SOLUTION INTRAMUSCULAR; INTRAVENOUS at 08:21

## 2019-09-14 RX ADMIN — ISOFLURANE 1 %: 1 LIQUID RESPIRATORY (INHALATION) at 08:21

## 2019-09-14 RX ADMIN — PROPOFOL 150 MG: 10 INJECTION, EMULSION INTRAVENOUS at 08:16

## 2019-09-14 RX ADMIN — PROPOFOL 20 MG: 10 INJECTION, EMULSION INTRAVENOUS at 08:23

## 2019-09-14 RX ADMIN — AMLODIPINE BESYLATE 5 MG: 5 TABLET ORAL at 19:43

## 2019-09-14 RX ADMIN — PANTOPRAZOLE SODIUM 40 MG: 40 TABLET, DELAYED RELEASE ORAL at 05:41

## 2019-09-14 RX ADMIN — Medication 10 ML: at 14:24

## 2019-09-14 RX ADMIN — Medication 2 G: at 08:22

## 2019-09-14 RX ADMIN — ESMOLOL HYDROCHLORIDE 10 MG: 10 INJECTION INTRAVENOUS at 08:40

## 2019-09-14 RX ADMIN — NEOSTIGMINE METHYLSULFATE 3 MG: 1 INJECTION INTRAVENOUS at 10:10

## 2019-09-14 RX ADMIN — PROPOFOL 30 MG: 10 INJECTION, EMULSION INTRAVENOUS at 08:19

## 2019-09-14 RX ADMIN — HEPARIN SODIUM 9000 UNITS: 1000 INJECTION, SOLUTION INTRAVENOUS; SUBCUTANEOUS at 08:55

## 2019-09-14 RX ADMIN — Medication 10 ML: at 22:00

## 2019-09-14 RX ADMIN — BUDESONIDE 500 MCG: 0.5 INHALANT RESPIRATORY (INHALATION) at 23:21

## 2019-09-14 RX ADMIN — Medication 10 ML: at 05:41

## 2019-09-14 RX ADMIN — HYDROMORPHONE HYDROCHLORIDE 0.5 MG: 2 INJECTION INTRAMUSCULAR; INTRAVENOUS; SUBCUTANEOUS at 11:02

## 2019-09-14 RX ADMIN — METOPROLOL SUCCINATE 50 MG: 50 TABLET, EXTENDED RELEASE ORAL at 17:25

## 2019-09-14 RX ADMIN — SODIUM CHLORIDE, SODIUM LACTATE, POTASSIUM CHLORIDE, CALCIUM CHLORIDE: 600; 310; 30; 20 INJECTION, SOLUTION INTRAVENOUS at 08:26

## 2019-09-14 RX ADMIN — GLYCOPYRROLATE 0.4 MG: 0.2 INJECTION INTRAMUSCULAR; INTRAVENOUS at 10:10

## 2019-09-14 RX ADMIN — LIDOCAINE HYDROCHLORIDE 60 MG: 20 INJECTION, SOLUTION EPIDURAL; INFILTRATION; INTRACAUDAL; PERINEURAL at 08:16

## 2019-09-14 RX ADMIN — ACETAMINOPHEN 650 MG: 325 TABLET, FILM COATED ORAL at 17:28

## 2019-09-14 RX ADMIN — FENTANYL CITRATE 50 MCG: 50 INJECTION, SOLUTION INTRAMUSCULAR; INTRAVENOUS at 08:53

## 2019-09-14 RX ADMIN — BRIMONIDINE TARTRATE 1 DROP: 2 SOLUTION OPHTHALMIC at 18:08

## 2019-09-14 RX ADMIN — SODIUM CHLORIDE, SODIUM LACTATE, POTASSIUM CHLORIDE, AND CALCIUM CHLORIDE 75 ML/HR: 600; 310; 30; 20 INJECTION, SOLUTION INTRAVENOUS at 06:30

## 2019-09-14 RX ADMIN — FENTANYL CITRATE 50 MCG: 50 INJECTION, SOLUTION INTRAMUSCULAR; INTRAVENOUS at 08:42

## 2019-09-14 RX ADMIN — CEFTRIAXONE 2 G: 2 INJECTION, POWDER, FOR SOLUTION INTRAMUSCULAR; INTRAVENOUS at 19:44

## 2019-09-14 NOTE — PROGRESS NOTES
TRANSFER - OUT REPORT:    Verbal report given to SAULO Marquez on [employer].  being transferred to [facility] [phone] for routine progression of care       Report consisted of patients Situation, Background, Assessment and Recommendations(SBAR). Information from the following report(s) SBAR, Kardex, OR Summary, Intake/Output and MAR was reviewed with the receiving nurse. Opportunity for questions and clarification was provided.   Pt will be transported on monitor with RN once room is clean

## 2019-09-14 NOTE — BRIEF OP NOTE
[address]   [address] [facility]. [address]  [phone] FAX: [phone]    Brief Op Note Template Note    Pre-Op Diagnosis: AAA    Post-Op Diagnosis:  AAA    Procedures: Procedure(s):  AORTIC ABDOMINAL ANEURYSM REPAIR ENDOVASCULAR (EVAR) Patient in room [address]    Surgeon: Nancy Simmons MD    Assistants: Surgeon(s): Elizabeth Bernard MD      Anesthesia:  General     Findings: 5 cm aneurysm no  evidence of rupture    Tourniquet Time:  * No tourniquets in log *    Estimated Blood Loss:               Specimens:            Implants:    Implant Name Type Inv. Item Serial No.  Lot No. LRB No. Used Action   GRAFT TRNK SYS AAA [id number] --  - [id number]  GRAFT TRNK SYS AAA [id number] --  [id number] [employer]  N/A 1 Implanted   GRAFT ENDV EXCLUDER [id number] --  - [id number]  GRAFT ENDV EXCLUDER [id number] --  [id number] [employer]  N/A 1 Implanted       Complications: None               Signed: Nancy Simmons MD      Elements of this note have been dictated using speech recognition software. As a result, errors of speech recognition may have occurred.

## 2019-09-14 NOTE — PERIOP NOTES
TRANSFER - IN REPORT:    Verbal report received from [facility] on [employer]. being received from  for ordered procedure      Report consisted of patients Situation, Background, Assessment and Recommendations(SBAR). Information from the following report(s) SBAR, Kardex, MAR, Recent Results, Cardiac Rhythm NSR and Procedure Verification was reviewed with the receiving nurse. Opportunity for questions and clarification was provided. Assessment completed upon patients arrival to unit and care assumed.

## 2019-09-14 NOTE — PROGRESS NOTES
Patient status post endovascular repair of a 5 cm abdominal aortic aneurysm. Patient be transferred to the ICU for further care per primary team.  From vascular standpoint patient just needs 2 more doses antibiotics for prophylactic. Patient will keep postop dressing in place for 2 days we will plan to remove on Monday if patient is still in-house. Harper catheter can be removed this afternoon and patient can be restarted back on his diet and medication. Recommend starting anticoagulation from vascular standpoint in 2 days which will be Monday. If primary team needs to hold anticoagulation for more than that for patient's history of lower GI bleed okay from vascular standpoint. Patient can be up and out of bed no activity restrictions.     Ayse Donohue

## 2019-09-14 NOTE — PROGRESS NOTES
Progress Note    Patient: Williams Humphrey. MRN: [id number]  SSN: [id number]    YOB: 1940  Age: 70 y.o. Sex: male      Admit Date: 9/9/2019    LOS: 4 days     Subjective:   Mr. Annamaria Fields is a 71 yo male with PMH of CKD3, HFrEF (EF 30-35%), iron deficient anemia, COPD with chronic hypoxia, afib on eliquis,  who was  Evaluated in the ER on 9/9  with near syncope and hypotension. He received IVF bolus while in the field and BP normalized. HGB noted to be down to 7.2 from his baseline of 8-9. He denied jaiden GI bleeding. Seen by GI at admission. Received 1 U of pRBCs. Had EGD and colonoscopy on 9/12 with gastric and colon polyps removed. Patient complained of suprapubic pain on 9/13 and some fever which prompted a CT scan of the abdomen reporting a 5 cm AAA with perilesional tissue straining. Seen by vascular. Abdominal CT angiogram confirmed AAA. Vascular surgery scheduled patient to have surgery on 9/14. Patient had an endovascular  repair of a 5 cm  AA aneurysm today. Stable after the procedure. He was transferred to the ICU for proper monitoring. Will resume diet and BP meds. Will continue IV fluids due to history of CKD and recent use of IV contrast. Monitor Cr level. Will avoid nephrotoxic meds ( holding lisinopril for now). If stable, could go back to the floor tomorrow. Objective:     Vitals:    09/14/19 1338 09/14/19 1411 09/14/19 1416 09/14/19 1425   BP: 120/63 146/69 147/77    Pulse: 85 82 82    Resp: 23 30 26    Temp:  97.7 °F (36.5 °C)     SpO2:  95% 99%    Weight:    (!) 169.5 kg (373 lb 10.9 oz)   Height:    5' 9\" (1.753 m)        Intake and Output:  Current Shift: 09/14 0701 - 09/14 1900  In: 1280 [P.O.:180; I.V.:1100]  Out: 805 [Urine:705]  Last three shifts: 09/12 1901 - 09/14 0700  In: 240 [P.O.:240]  Out: 575 [Urine:575]    Physical Exam:   GENERAL: alert, appears stated age  EYE: conjunctivae/corneas clear.   LYMPHATIC: Cervical, supraclavicular, and axillary nodes normal.   THROAT & NECK: normal and no erythema or exudates noted. LUNG: clear to auscultation bilaterally  HEART: regular rate and rhythm, S1, S2 normal, no murmur, click, rub or gallop  ABDOMEN: supra-pubic tenderness   EXTREMITIES:  extremities normal, atraumatic, no cyanosis or edema  SKIN: Normal.  NEUROLOGIC: No focal deficits   PSYCHIATRIC: non focal    Lab/Data Review: All lab results for the last 24 hours reviewed. Assessment:     Principal Problem:    GI bleed (9/9/2019)    Active Problems:    COPD (chronic obstructive pulmonary disease) ([address] (5/2/2014)      Hypertension (6/1/2017)      A-fib ([address] (7/23/2018)      Anemia (3/18/2019)      Chronic respiratory failure with hypoxia ([address] (9/9/2019)      Heart failure with reduced ejection fraction ([address] (9/9/2019)      CKD (chronic kidney disease), stage III ([address] (9/9/2019)      AAA (abdominal aortic aneurysm) ([facility] (9/14/2019)        Plan:     -Monitor VS and Hgb level   -EGD/ colonoscopy done on 9/12. Polyps removed   -Continue holding Eliquis. Vascular surgery is OK with eliquis to be resumed on Monday if pt remains stable   -Harper cath to be removed in the am   -gentle hydration with IV fluids for 24 h. Patient needs IV hydration due to history of CKD and recent use of IV contrast. On the other hand he has CHF with reduced EF. Will need to monitor his hydration level to avoid fluid overload. Chest x ray and BNP in the am.   -vascular recommended 2 more doses of antibiotics for prophylaxis. Continue ceftriaxone. Follow cultures.  No more episodes of fever.   -can resume diet   -resume BP meds   -can remove A-line   -Albuterol nebs as needed   Signed By: Shonda Brown MD     September 14, 2019

## 2019-09-14 NOTE — PROGRESS NOTES
Bedside and Verbal shift change report given to Carlos Rodriguez (oncoming nurse) by Kai Contreras (offgoing nurse). Report included the following information SBAR, Kardex, ED Summary, OR Summary, Intake/Output, MAR, Recent Results, Cardiac Rhythm NSR and Alarm Parameters .     Bilat [facility] sites assessed- WNL  Pulses palpable

## 2019-09-14 NOTE — ANESTHESIA POSTPROCEDURE EVALUATION
Procedure(s):  AORTIC ABDOMINAL ANEURYSM REPAIR ENDOVASCULAR (EVAR) Patient in room 614. general    Anesthesia Post Evaluation      Multimodal analgesia: multimodal analgesia used between 6 hours prior to anesthesia start to PACU discharge  Patient location during evaluation: PACU  Patient participation: complete - patient participated  Level of consciousness: awake  Pain management: adequate  Airway patency: patent  Anesthetic complications: no  Cardiovascular status: acceptable and hemodynamically stable  Respiratory status: acceptable  Hydration status: acceptable  Comments: Acceptable for discharge from PACU. Post anesthesia nausea and vomiting:  none      Vitals Value Taken Time   /67 9/14/2019 11:48 AM   Temp     Pulse 80 9/14/2019 11:48 AM   Resp 11 9/14/2019 11:48 AM   SpO2 95 % 9/14/2019 11:48 AM   Vitals shown include unvalidated device data.

## 2019-09-14 NOTE — PROGRESS NOTES
Patient arrived to CCU, placed on monitor, VSS. Groin sites assessed with PACU RN, pulses present   DC art line per Dr. Yani weeks to remain in place until tomorrow am    Dual skin assessment: sacrum intact, allevyn placed. Bilat groin incisions with topical glue, telfa dressing, and tegaderm in place- negative for hematoma, no oozing, soft.

## 2019-09-14 NOTE — PROGRESS NOTES
TRANSFER - IN REPORT:    Verbal report received from Allen Hampton (name) on Stanton Sunshine.  being received from PACU (unit) for routine post - op      Report consisted of patients Situation, Background, Assessment and   Recommendations(SBAR). Information from the following report(s) SBAR, Kardex, ED Summary, OR Summary and Cardiac Rhythm NSR was reviewed with the receiving nurse. Opportunity for questions and clarification was provided. Assessment completed upon patients arrival to unit and care assumed.

## 2019-09-14 NOTE — PROGRESS NOTES
TRANSFER - OUT REPORT:    Verbal report given to Milvia(name) on Melissa Centeno.  being transferred to pre-op(unit) for ordered procedure       Report consisted of patients Situation, Background, Assessment and   Recommendations(SBAR). Information from the following report(s) SBAR, Kardex, Procedure Summary, Intake/Output, MAR, Recent Results, Med Rec Status, Cardiac Rhythm NSR, Pre Procedure Checklist and Procedure Verification was reviewed with the receiving nurse. Lines:   Peripheral IV 09/10/19 Left Antecubital (Active)   Site Assessment Clean, dry, & intact 9/14/2019  3:20 AM   Phlebitis Assessment 0 9/14/2019  3:20 AM   Infiltration Assessment 0 9/14/2019  3:20 AM   Dressing Status Clean, dry, & intact 9/14/2019  3:20 AM   Dressing Type Tape;Transparent 9/14/2019  3:20 AM   Hub Color/Line Status Pink 9/14/2019  3:20 AM   Alcohol Cap Used No 9/14/2019  3:20 AM       Peripheral IV 09/13/19 Posterior;Right Hand (Active)   Site Assessment Clean, dry, & intact 9/14/2019  3:20 AM   Phlebitis Assessment 0 9/14/2019  3:20 AM   Infiltration Assessment 0 9/14/2019  3:20 AM   Dressing Status Clean, dry, & intact 9/14/2019  3:20 AM   Dressing Type Tape;Transparent 9/14/2019  3:20 AM   Hub Color/Line Status Pink 9/14/2019  3:20 AM   Alcohol Cap Used No 9/14/2019  3:20 AM        Opportunity for questions and clarification was provided.       Patient transported with:   O2 @ 3 liters  Registered Nurse  [facility]

## 2019-09-14 NOTE — PROGRESS NOTES
Pt c/o trouble breathing. No output noted on am shift. 400 ml output for this shift. IV fluids running at 100 ml/hr. Slight expiratory wheeze. MD made aware. STAT cxray ordered. Respiratory called for breathing treatment.  Will continue to monitor

## 2019-09-15 ENCOUNTER — APPOINTMENT (OUTPATIENT)
Dept: GENERAL RADIOLOGY | Age: 79
DRG: 268 | End: 2019-09-15
Attending: INTERNAL MEDICINE
Payer: MEDICARE

## 2019-09-15 LAB
ANION GAP SERPL CALC-SCNC: 3 MMOL/L (ref 7–16)
ANION GAP SERPL CALC-SCNC: 3 MMOL/L (ref 7–16)
BASOPHILS # BLD: 0 K/UL (ref 0–0.2)
BASOPHILS NFR BLD: 0 % (ref 0–2)
BNP SERPL-MCNC: 315 PG/ML
BUN SERPL-MCNC: 18 MG/DL (ref 8–23)
BUN SERPL-MCNC: 19 MG/DL (ref 8–23)
CALCIUM SERPL-MCNC: 8.5 MG/DL (ref 8.3–10.4)
CALCIUM SERPL-MCNC: 8.6 MG/DL (ref 8.3–10.4)
CHLORIDE SERPL-SCNC: 107 MMOL/L (ref 98–107)
CHLORIDE SERPL-SCNC: 108 MMOL/L (ref 98–107)
CO2 SERPL-SCNC: 30 MMOL/L (ref 21–32)
CO2 SERPL-SCNC: 32 MMOL/L (ref 21–32)
CREAT SERPL-MCNC: 1.37 MG/DL (ref 0.8–1.5)
CREAT SERPL-MCNC: 1.53 MG/DL (ref 0.8–1.5)
DIFFERENTIAL METHOD BLD: ABNORMAL
EOSINOPHIL # BLD: 0 K/UL (ref 0–0.8)
EOSINOPHIL NFR BLD: 0 % (ref 0.5–7.8)
ERYTHROCYTE [DISTWIDTH] IN BLOOD BY AUTOMATED COUNT: 16.2 % (ref 11.9–14.6)
GLUCOSE SERPL-MCNC: 107 MG/DL (ref 65–100)
GLUCOSE SERPL-MCNC: 155 MG/DL (ref 65–100)
HCT VFR BLD AUTO: 27.6 % (ref 41.1–50.3)
HGB BLD-MCNC: 8.5 G/DL (ref 13.6–17.2)
IMM GRANULOCYTES # BLD AUTO: 0.1 K/UL (ref 0–0.5)
IMM GRANULOCYTES NFR BLD AUTO: 1 % (ref 0–5)
LYMPHOCYTES # BLD: 0.9 K/UL (ref 0.5–4.6)
LYMPHOCYTES NFR BLD: 6 % (ref 13–44)
MAGNESIUM SERPL-MCNC: 1.9 MG/DL (ref 1.8–2.4)
MCH RBC QN AUTO: 28.2 PG (ref 26.1–32.9)
MCHC RBC AUTO-ENTMCNC: 30.8 G/DL (ref 31.4–35)
MCV RBC AUTO: 91.7 FL (ref 79.6–97.8)
MONOCYTES # BLD: 1.4 K/UL (ref 0.1–1.3)
MONOCYTES NFR BLD: 10 % (ref 4–12)
NEUTS SEG # BLD: 12.7 K/UL (ref 1.7–8.2)
NEUTS SEG NFR BLD: 84 % (ref 43–78)
NRBC # BLD: 0 K/UL (ref 0–0.2)
PLATELET # BLD AUTO: 179 K/UL (ref 150–450)
PMV BLD AUTO: 10.7 FL (ref 9.4–12.3)
POTASSIUM SERPL-SCNC: 4.7 MMOL/L (ref 3.5–5.1)
POTASSIUM SERPL-SCNC: 4.9 MMOL/L (ref 3.5–5.1)
RBC # BLD AUTO: 3.01 M/UL (ref 4.23–5.6)
SODIUM SERPL-SCNC: 141 MMOL/L (ref 136–145)
SODIUM SERPL-SCNC: 142 MMOL/L (ref 136–145)
WBC # BLD AUTO: 15.1 K/UL (ref 4.3–11.1)

## 2019-09-15 PROCEDURE — 77010033678 HC OXYGEN DAILY

## 2019-09-15 PROCEDURE — 85025 COMPLETE CBC W/AUTO DIFF WBC: CPT

## 2019-09-15 PROCEDURE — 80048 BASIC METABOLIC PNL TOTAL CA: CPT

## 2019-09-15 PROCEDURE — 74011250636 HC RX REV CODE- 250/636: Performed by: INTERNAL MEDICINE

## 2019-09-15 PROCEDURE — 86580 TB INTRADERMAL TEST: CPT | Performed by: INTERNAL MEDICINE

## 2019-09-15 PROCEDURE — 83880 ASSAY OF NATRIURETIC PEPTIDE: CPT

## 2019-09-15 PROCEDURE — 94640 AIRWAY INHALATION TREATMENT: CPT

## 2019-09-15 PROCEDURE — 74011000250 HC RX REV CODE- 250: Performed by: SURGERY

## 2019-09-15 PROCEDURE — 94760 N-INVAS EAR/PLS OXIMETRY 1: CPT

## 2019-09-15 PROCEDURE — 77030040361 HC SLV COMPR DVT MDII -B

## 2019-09-15 PROCEDURE — 74011250637 HC RX REV CODE- 250/637: Performed by: SURGERY

## 2019-09-15 PROCEDURE — 74011000258 HC RX REV CODE- 258: Performed by: SURGERY

## 2019-09-15 PROCEDURE — 74011000250 HC RX REV CODE- 250: Performed by: INTERNAL MEDICINE

## 2019-09-15 PROCEDURE — 71045 X-RAY EXAM CHEST 1 VIEW: CPT

## 2019-09-15 PROCEDURE — 74011250637 HC RX REV CODE- 250/637: Performed by: INTERNAL MEDICINE

## 2019-09-15 PROCEDURE — 77030020263 HC SOL INJ SOD CL0.9% LFCR 1000ML

## 2019-09-15 PROCEDURE — 74011000302 HC RX REV CODE- 302: Performed by: INTERNAL MEDICINE

## 2019-09-15 PROCEDURE — 36415 COLL VENOUS BLD VENIPUNCTURE: CPT

## 2019-09-15 PROCEDURE — 83735 ASSAY OF MAGNESIUM: CPT

## 2019-09-15 PROCEDURE — 74011250636 HC RX REV CODE- 250/636: Performed by: SURGERY

## 2019-09-15 PROCEDURE — 65660000000 HC RM CCU STEPDOWN

## 2019-09-15 RX ORDER — FUROSEMIDE 40 MG/1
40 TABLET ORAL DAILY
Status: DISCONTINUED | OUTPATIENT
Start: 2019-09-16 | End: 2019-09-18 | Stop reason: HOSPADM

## 2019-09-15 RX ORDER — ALBUTEROL SULFATE 0.83 MG/ML
2.5 SOLUTION RESPIRATORY (INHALATION)
Status: DISCONTINUED | OUTPATIENT
Start: 2019-09-15 | End: 2019-09-16

## 2019-09-15 RX ORDER — FUROSEMIDE 10 MG/ML
40 INJECTION INTRAMUSCULAR; INTRAVENOUS ONCE
Status: COMPLETED | OUTPATIENT
Start: 2019-09-15 | End: 2019-09-15

## 2019-09-15 RX ADMIN — FUROSEMIDE 40 MG: 10 INJECTION, SOLUTION INTRAMUSCULAR; INTRAVENOUS at 16:01

## 2019-09-15 RX ADMIN — METOPROLOL SUCCINATE 50 MG: 50 TABLET, EXTENDED RELEASE ORAL at 07:15

## 2019-09-15 RX ADMIN — PANTOPRAZOLE SODIUM 40 MG: 40 TABLET, DELAYED RELEASE ORAL at 05:09

## 2019-09-15 RX ADMIN — ALBUTEROL SULFATE 2.5 MG: 2.5 SOLUTION RESPIRATORY (INHALATION) at 21:35

## 2019-09-15 RX ADMIN — BRIMONIDINE TARTRATE 1 DROP: 2 SOLUTION OPHTHALMIC at 16:05

## 2019-09-15 RX ADMIN — ALLOPURINOL 300 MG: 100 TABLET ORAL at 07:15

## 2019-09-15 RX ADMIN — ACETAMINOPHEN 650 MG: 325 TABLET, FILM COATED ORAL at 00:58

## 2019-09-15 RX ADMIN — CEFTRIAXONE 2 G: 2 INJECTION, POWDER, FOR SOLUTION INTRAMUSCULAR; INTRAVENOUS at 21:35

## 2019-09-15 RX ADMIN — Medication 10 ML: at 13:06

## 2019-09-15 RX ADMIN — BUDESONIDE 500 MCG: 0.5 INHALANT RESPIRATORY (INHALATION) at 21:35

## 2019-09-15 RX ADMIN — BUDESONIDE 500 MCG: 0.5 INHALANT RESPIRATORY (INHALATION) at 07:37

## 2019-09-15 RX ADMIN — BRIMONIDINE TARTRATE 1 DROP: 2 SOLUTION OPHTHALMIC at 07:21

## 2019-09-15 RX ADMIN — Medication 5 ML: at 05:44

## 2019-09-15 RX ADMIN — SODIUM CHLORIDE 75 ML/HR: 900 INJECTION, SOLUTION INTRAVENOUS at 05:44

## 2019-09-15 RX ADMIN — AMLODIPINE BESYLATE 5 MG: 5 TABLET ORAL at 21:35

## 2019-09-15 RX ADMIN — Medication 5 ML: at 21:36

## 2019-09-15 RX ADMIN — TUBERCULIN PURIFIED PROTEIN DERIVATIVE 5 UNITS: 5 INJECTION, SOLUTION INTRADERMAL at 16:01

## 2019-09-15 NOTE — PROGRESS NOTES
TRANSFER - IN REPORT:    Verbal report received from [address], RN(name) on [employer].  being received from ICU(unit) for routine progression of care      Report consisted of patients Situation, Background, Assessment and   Recommendations(SBAR). Information from the following report(s) SBAR, Kardex and MAR was reviewed with the receiving nurse. Opportunity for questions and clarification was provided. Assessment completed upon patients arrival to unit and care assumed.

## 2019-09-15 NOTE — PROGRESS NOTES
CM made an attempt to make contact with tgadriana Debra Sree) at [phone]. Patient requested that CM make contact with his to discuss referrals. CM will continue to follow.

## 2019-09-15 NOTE — OP NOTES
[address] [facility]  OPERATIVE REPORT    Name:  Shana Rabago  MR#:  [id number]  :  1940  ACCOUNT #:  [de-identified]  DATE OF SERVICE:  2019    CLINICAL SERVICE:  Vascular Surgery. PREOPERATIVE DIAGNOSIS:  Symptomatic 5 cm abdominal aortic aneurysm. POSTOPERATIVE DIAGNOSIS:  Symptomatic 5 cm abdominal aortic aneurysm. PROCEDURES PERFORMED:  1.  Bilateral common femoral artery cutdown with exposure and placement of catheter in aorta for aortogram.  2.  Endovascular repair of infrarenal abdominal aortic aneurysm with bifurcated modulated device (31 x 14 x 13 main body) via the left common femoral, right iliac extender measuring 27 x 10. SURGEON:  Maria Fernanda Munson MD    ASSISTANT:      ANESTHESIA:  General.    COMPLICATIONS:  None. SPECIMENS REMOVED:  None. IMPLANTS:      ESTIMATED BLOOD LOSS:  15 mL. CONTRAST:  50 mL. INDICATIONS FOR THE PROCEDURE:  This is a 16-year-old male who presented to the emergency department with weakness and vague abdominal pain with possible lower GI bleed. He underwent colonoscopy and upper endoscopy which showed some gastric polyps and colon polyps. Postprocedure, he had some worsening abdominal pain. He underwent a noncontrast CT scan which showed he had a 5 cm aneurysm which was concerning for retroperitoneal inflammation. A CTA was then ordered stat which did show a 5 cm aneurysm with some mild straining. Clinically, the patient was without any abdominal pain. He was watched overnight and secondary to the size and the concern of pain, we elected to proceed with the intravascular repair to rule out another source. PROCEDURE IN DETAIL:  After getting informed consent, the patient was brought to the operating room. Anesthesia was then induced. Preop antibiotics were given before skin incision. The patient's bilateral groins and abdomen were then prepped and draped in normal sterile fashion.   Oblique incision was made bilaterally over the common femoral artery to the inguinal ligament. We dissected down to the subcutaneous tissue where the common femoral artery was looped proximally and distally. The patient was then heparinized with 9000 units of heparin. We allowed it to circulate for 2 minutes. We then accessed both arteries percutaneously with 18 gauge needles and Glidewires were then placed up to the distal thoracic aorta. We exchanged the stiff wire over Kumpe catheter. An 18-St Lucian sheath was placed from the left common femoral artery and the 16-St Lucian sheath to the right. The main body was then brought up via the left with the 31 x 14 x 13. Both renal arteries were coming off the aorta the same. We did 15 mL of craniocaudal.  We did a subtraction aortogram marking out both renals. We did deploy the main body just below the renals. Then via the right common femoral the 15 St Lucian catheter. The Glidewire and the Kumpe catheter were used to cannulate the gate. We confirmed we were in the gate from the pigtail test.  The spin was 360. We then exchanged to a stiff wire and then did in Mont[place]grin projections splaying of the hypogastric arteries. We brought into field and deployed a 27 x 10 just above the hypogastric artery which was a bell bottom. We then completed our deployment of the main body from the left common femoral artery which deployed just above the hypogastric artery. With the occlusion balloon, we then ballooned over the proximal, distal and overlapping areas. Completion arteriogram showed both renal arteries patent and both hypogastric arteries patent without evidence of any type 1 or type 2 endoleak. There was only 2.5 cm graft in the left common femoral artery but it was just about 2-3 mm off the hypogastric and it was not sealed.   We did not feel that extending this to the left would be beneficial.  We then removed the catheters and the sheath and then closed both arteries with interrupted 5-0 Prolene sutures, 2-0 Vicryl for the subcutaneous, 3-0 for the subcutaneous and 4-0 for the skin. The patient was then extubated and returned to the PACU in stable condition.       MD NANDO [id number]  D:  09/14/2019 10:25  T:  09/14/2019 18:35  JOB #:  [id number]

## 2019-09-15 NOTE — PROGRESS NOTES
This RN notified by monitor room that patient had 11 beat run of Vtach. Notified Dr. Ami Burnett. New orders for stat BMP and Mag. Will continue to monitor.

## 2019-09-15 NOTE — PROGRESS NOTES
TRANSFER - OUT REPORT:    Verbal report given to Tram Chu RN on [employer].  being transferred to [address] [place] [address], remote telemetry (unit) for routine progression of care       Report consisted of patients Situation, Background, Assessment and   Recommendations(SBAR). Information from the following report(s) SBAR, Kardex, ED Summary, OR Summary, Procedure Summary, Intake/Output, MAR, Recent Results, Med Rec Status, Cardiac Rhythm NSR and Quality Measures was reviewed with the receiving nurse. Lines:   Peripheral IV 09/10/19 Left Antecubital (Active)   Site Assessment Clean, dry, & intact 9/15/2019  1:04 AM   Phlebitis Assessment 0 9/15/2019  1:04 AM   Infiltration Assessment 0 9/15/2019  1:04 AM   Dressing Status Clean, dry, & intact 9/15/2019  1:04 AM   Dressing Type Transparent 9/15/2019  1:04 AM   Hub Color/Line Status Pink;Flushed;Patent 9/15/2019  1:04 AM   Alcohol Cap Used No 9/15/2019  1:04 AM        Opportunity for questions and clarification was provided.       Patient transported with:   Monitor  O2 @ 3 liters  Registered Nurse

## 2019-09-15 NOTE — PROGRESS NOTES
09/15/19 0530   Musculoskeletal   LLE Weakness   RLE Weakness   Dual Skin Pressure Injury Assessment   Dual Skin Pressure Injury Assessment WDL   Second Care Provider (Based on [address] [place] [address]) Enrique Prakash RN   Skin Integumentary   Skin Integumentary (WDL) X   Skin Color Appropriate for ethnicity   Skin Condition/Temp Warm;Dry   Skin Integrity Incision (comment)   Turgor Epidermis thin w/ loss of subcut tissue   Hair Growth Present   Varicosities Absent    Pressure  Injury Documentation No Pressure Injury Noted-Pressure Ulcer Prevention Initiated   Wound Prevention and Protection Methods   Orientation of Wound Prevention Posterior   Location of Wound Prevention Sacrum/Coccyx   Dressing Present  Yes   Dressing Status Intact   Wound Offloading (Prevention Methods) Bed, pressure reduction mattress   Musculoskeletal   Musculoskeletal (WDL) WDL

## 2019-09-15 NOTE — PROGRESS NOTES
[facility] CRITICAL CARE OUTREACH NURSE PROGRESS REPORT      SUBJECTIVE: Called to assess patient secondary to transfer from CCU. MEWS Score: 1 (09/15/19 0531)  Vitals:    09/15/19 0500 09/15/19 0531 09/15/19 0740 09/15/19 0757   BP:  153/65  144/61   Pulse: 76 71  81   Resp: 19 20  22   Temp:  98.5 °F (36.9 °C)  98.3 °F (36.8 °C)   SpO2: 100% 100% 95% 91%   Weight:       Height:         LAB DATA:    Recent Labs     09/15/19  0314 09/14/19  0515 09/13/19  0553    143 143   K 4.9 4.1 4.0   * 109* 109*   CO2 30 27 28   AGAP 3* 7 6*   * 88 95   BUN 18 10 11   CREA 1.37 1.36 1.53*   GFRAA >60 >60 57*   GFRNA 54* 54* 47*   CA 8.5 8.8 9.0        Recent Labs     09/15/19  0314 09/14/19  0515 09/13/19  0553   WBC 15.1* 8.4 7.6   HGB 8.5* 9.1* 9.3*   HCT 27.6* 28.8* 29.4*    190 201          OBJECTIVE: On arrival to room, I found patient to be lying in bed watching TV, breakfast on bedside table, no acute distress noted. Pain Assessment  Pain Intensity 1: 0 (09/15/19 0747)  Pain Location 1: Other (comment)(R groin site)  Pain Intervention(s) 1: Medication (see MAR)  Patient Stated Pain Goal: 0                                 ASSESSMENT:  Patient a/ox4, denies pain or SOB at this time. VSS. Bilateral groin sites assessed, sites are soft, benign, no issues noted, dressings c/d/i. Expiratory wheezing present bilaterally upon auscultation, pt on 4L NC. PLAN:  Will continue to follow per outreach program protocol.      Daniela Hamm RN

## 2019-09-15 NOTE — PROGRESS NOTES
Date of Outreach Update:  Radha Frisk. was seen and assessed. MEWS Score: 1 (09/15/19 1157)  Vitals:    09/15/19 0531 09/15/19 0740 09/15/19 0757 09/15/19 1157   BP: 153/65  144/61 126/64   Pulse: 71  81 77   Resp: 20  22 20   Temp: 98.5 °F (36.9 °C)  98.3 °F (36.8 °C) 98.2 °F (36.8 °C)   SpO2: 100% 95% 91% 100%   Weight:       Height:             Pain Assessment  Pain Intensity 1: 0 (09/15/19 0747)  Pain Location 1: Other (comment)(R groin site)  Pain Intervention(s) 1: Medication (see MAR)  Patient Stated Pain Goal: 0      Previous Outreach assessment has been reviewed. There have been no significant clinical changes since the completion of the last dated Outreach assessment. Will continue to follow up per outreach protocol.     Signed By:   Jeffry Angulo RN    September 15, 2019 3:59 PM

## 2019-09-15 NOTE — PROGRESS NOTES
[address]   [address] [facility]. [address] FAX: [phone]         VASCULAR SURGERY FLOOR PROGRESS NOTE    Admit Date: 2019  POD: 1 Day Post-Op    Procedure:  Procedure(s):  AORTIC ABDOMINAL ANEURYSM REPAIR ENDOVASCULAR (EVAR) Patient in room 614    Subjective:     Patient has no new complaints. Objective:     Vitals:  Blood pressure 153/65, pulse 71, temperature 98.5 °F (36.9 °C), resp. rate 20, height 5' 9\" (1.753 m), weight (!) 373 lb 10.9 oz (169.5 kg), SpO2 100 %. Temp (24hrs), Av.5 °F (36.9 °C), Min:97.7 °F (36.5 °C), Max:99.3 °F (37.4 °C)      Intake / Output:    Intake/Output Summary (Last 24 hours) at 9/15/2019 0613  Last data filed at 9/15/2019 0500  Gross per 24 hour   Intake 1770 ml   Output 1555 ml   Net 215 ml       Physical Exam:    Constitutional: he appears well-developed. No distress. HENT:   Head: Atraumatic. Eyes: Pupils are equal, round, and reactive to light. Neck: Normal range of motion. Cardiovascular: Regular rhythm. Pulmonary/Chest: Effort normal and breath sounds normal. No respiratory distress. Abdominal: Soft. Bowel sounds are normal. he exhibits no distension. There is no tenderness. There is no guarding. No hernia. Musculoskeletal: Normal range of motion. Neurological: He is alert.  CN II- XII grossly intact  Vascular: Postop incision stable mild edema both feet are warm     Labs:   Recent Labs     09/15/19  0314 19  0515   HGB 8.5* 9.1*   WBC 15.1* 8.4   K 4.9 4.1   * 88       Data Review     Assessment:     Patient Active Problem List    Diagnosis Date Noted    AAA (abdominal aortic aneurysm) ([facility] 2019    GI bleed 2019    Chronic respiratory failure with hypoxia ([facility] 2019    Heart failure with reduced ejection fraction ([facility] 2019    CKD (chronic kidney disease), stage III (HCC) 2019    Absolute anemia 2019    Anemia 2019    Multiple pulmonary nodules 08/30/2018    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    Acute on chronic respiratory failure ([facility] 07/25/2018    COPD exacerbation ([facility] 07/23/2018    CHF exacerbation ([facility] 07/23/2018    CHF (congestive heart failure) ([facility] 07/23/2018    A-fib ([facility] 07/23/2018    History of pulmonary embolism 06/22/2017    Tobacco use disorder 06/01/2017    Hypertension 06/01/2017    Pulmonary embolus ([facility] 06/01/2017    Cardiomyopathy ([facility] 06/01/2017    Atrial flutter ([facility] 06/01/2017    Peptic ulcer disease 06/01/2017    Renal cysts, acquired, bilateral 05/18/2015    AAA (abdominal aortic aneurysm) without rupture (HCC) 05/02/2014    COPD (chronic obstructive pulmonary disease) ([facility] 05/02/2014    Hyperlipemia 05/02/2014    Glaucoma 05/02/2014    Supplemental oxygen dependent 05/02/2014    OA (osteoarthritis) 05/02/2014    Paroxysmal atrial fibrillation (HCC) 05/02/2014    Warfarin anticoagulation 05/02/2014       Plan/Recommendations/Medical Decision Making:     Status post endovascular repair of possible symptomatic abdominal aortic aneurysm  -DC Harper  -Keep postop dressing is on today can remove tomorrow  -DC IV fluids patient with chronic kidney disease only 50 cc of contrast used yesterday creatinine this morning 1.37  -Can be up and out of bed from vascular standpoint  -Restart full anticoagulation tomorrow if cleared by primary  -Plan to discharge home today patient can follow-up in vascular surgery office in 2 weeks for wound check patient can be up and out of bed no activity restrictions, keep incision dry for 3 days postop    Elements of this note have been dictated using speech recognition software. As a result, errors of speech recognition may have occurred.

## 2019-09-15 NOTE — PROGRESS NOTES
Progress Note    Patient: Keyshawn Chiu. MRN: [id number]  SSN: [id number]    YOB: 1940  Age: 70 y.o. Sex: male      Admit Date: 9/9/2019    LOS: 5 days     Subjective:     Mr. Oswaldo Avila is a 71 yo male with PMH of CKD3, HFrEF (EF 30-35%), iron deficient anemia, COPD with chronic hypoxia, afib on eliquis,  who was  Evaluated in the ER on 9/9  with near syncope and hypotension. He received IVF bolus while in the field and BP normalized. HGB noted to be down to 7.2 from his baseline of 8-9. He denied jaiden GI bleeding. Seen by GI at admission. Received 1 U of pRBCs. Had EGD and colonoscopy on 9/12 with gastric and colon polyps removed. Patient complained of suprapubic pain on 9/13 and some fever which prompted a CT scan of the abdomen reporting a 5 cm AAA with perilesional tissue straining. Seen by vascular. Abdominal CT angiogram confirmed AAA. Vascular surgery scheduled patient to have surgery on 9/14. Patient had an endovascular  repair of a 5 cm  AA aneurysm on 9/14. Stable after the procedure. He was transferred to the ICU for proper monitoring and stayed there for a few hours and then was transferred to the medical floor. He is very weak and needs lot of help even to get out of bed. Will request a new PT eval. Patient will need to go to a STR facility. Objective:     Vitals:    09/15/19 0531 09/15/19 0740 09/15/19 0757 09/15/19 1157   BP: 153/65  144/61 126/64   Pulse: 71  81 77   Resp: 20  22 20   Temp: 98.5 °F (36.9 °C)  98.3 °F (36.8 °C) 98.2 °F (36.8 °C)   SpO2: 100% 95% 91% 100%   Weight:       Height:            Intake and Output:  Current Shift: No intake/output data recorded. Last three shifts: 09/13 1901 - 09/15 0700  In: 9280 [P.O.:540; I.V.:1230]  Out: 2005 [Urine:1905]    Physical Exam:   GENERAL: alert, appears stated age  EYE: conjunctivae/corneas clear.   LYMPHATIC: Cervical, supraclavicular, and axillary nodes normal.   THROAT & NECK: normal and no erythema or exudates noted. LUNG: clear to auscultation bilaterally  HEART: regular rate and rhythm, S1, S2 normal, no murmur, click, rub or gallop  ABDOMEN: supra-pubic tenderness   EXTREMITIES:  extremities normal, atraumatic, no cyanosis or edema  SKIN: Normal.  NEUROLOGIC: No focal deficits   PSYCHIATRIC: non focal    Lab/Data Review: All lab results for the last 24 hours reviewed. Assessment:     Principal Problem:    GI bleed (9/9/2019)    Active Problems:    COPD (chronic obstructive pulmonary disease) ([address] (5/2/2014)      Hypertension (6/1/2017)      A-fib ([address] (7/23/2018)      Anemia (3/18/2019)      Chronic respiratory failure with hypoxia ([address] (9/9/2019)      Heart failure with reduced ejection fraction ([address] (9/9/2019)      CKD (chronic kidney disease), stage III ([address] (9/9/2019)      AAA (abdominal aortic aneurysm) ([address] (9/14/2019)        Plan:     -Continue holding Eliquis. Vascular surgery is OK with eliquis to be resumed tomorrow if pt remains stable   -Harper cath removed today   -clinically he seems to be congested this afternoon. He has worse wheezing and JVD. Will give him one dose of IV lasix and will resume his oral lasix tomorrow. - Continue ceftriaxone. Follow cultures. No more episodes of fever. May d/c antibiotics in the next 24-48 hours  -Albuterol nebs Q 6h   -patient is VERY weak. Will need STR. SW consulted. PT reconsulted.      Signed By: Chalice Severe, MD     September 15, 2019

## 2019-09-15 NOTE — PROGRESS NOTES
Pt up to bathroom at [place] [address]. No bowel movement. Pt able to walk with one person assist. Denied respiratory distress or abd pain. After back in bed pt had complaint of soreness in right surgical site. Sites remain soft, tender to touch, no drainage or bleeding noted on bandages.

## 2019-09-16 LAB
ANION GAP SERPL CALC-SCNC: 4 MMOL/L (ref 7–16)
BASOPHILS # BLD: 0 K/UL (ref 0–0.2)
BASOPHILS NFR BLD: 0 % (ref 0–2)
BUN SERPL-MCNC: 22 MG/DL (ref 8–23)
CALCIUM SERPL-MCNC: 8.6 MG/DL (ref 8.3–10.4)
CHLORIDE SERPL-SCNC: 109 MMOL/L (ref 98–107)
CO2 SERPL-SCNC: 30 MMOL/L (ref 21–32)
CREAT SERPL-MCNC: 1.42 MG/DL (ref 0.8–1.5)
DIFFERENTIAL METHOD BLD: ABNORMAL
EOSINOPHIL # BLD: 0 K/UL (ref 0–0.8)
EOSINOPHIL NFR BLD: 0 % (ref 0.5–7.8)
ERYTHROCYTE [DISTWIDTH] IN BLOOD BY AUTOMATED COUNT: 16.2 % (ref 11.9–14.6)
GLUCOSE SERPL-MCNC: 94 MG/DL (ref 65–100)
HCT VFR BLD AUTO: 26.7 % (ref 41.1–50.3)
HGB BLD-MCNC: 8.3 G/DL (ref 13.6–17.2)
IMM GRANULOCYTES # BLD AUTO: 0.1 K/UL (ref 0–0.5)
IMM GRANULOCYTES NFR BLD AUTO: 1 % (ref 0–5)
LYMPHOCYTES # BLD: 1.4 K/UL (ref 0.5–4.6)
LYMPHOCYTES NFR BLD: 11 % (ref 13–44)
MAGNESIUM SERPL-MCNC: 1.9 MG/DL (ref 1.8–2.4)
MCH RBC QN AUTO: 28.4 PG (ref 26.1–32.9)
MCHC RBC AUTO-ENTMCNC: 31.1 G/DL (ref 31.4–35)
MCV RBC AUTO: 91.4 FL (ref 79.6–97.8)
MM INDURATION POC: 0 MM (ref 0–5)
MONOCYTES # BLD: 1.8 K/UL (ref 0.1–1.3)
MONOCYTES NFR BLD: 14 % (ref 4–12)
NEUTS SEG # BLD: 9.9 K/UL (ref 1.7–8.2)
NEUTS SEG NFR BLD: 75 % (ref 43–78)
NRBC # BLD: 0 K/UL (ref 0–0.2)
PLATELET # BLD AUTO: 205 K/UL (ref 150–450)
PMV BLD AUTO: 11.2 FL (ref 9.4–12.3)
POTASSIUM SERPL-SCNC: 4.3 MMOL/L (ref 3.5–5.1)
PPD POC: NEGATIVE NEGATIVE
RBC # BLD AUTO: 2.92 M/UL (ref 4.23–5.6)
SODIUM SERPL-SCNC: 143 MMOL/L (ref 136–145)
WBC # BLD AUTO: 13.2 K/UL (ref 4.3–11.1)

## 2019-09-16 PROCEDURE — 74011250637 HC RX REV CODE- 250/637: Performed by: SURGERY

## 2019-09-16 PROCEDURE — 94760 N-INVAS EAR/PLS OXIMETRY 1: CPT

## 2019-09-16 PROCEDURE — 77010033678 HC OXYGEN DAILY

## 2019-09-16 PROCEDURE — 74011000250 HC RX REV CODE- 250: Performed by: INTERNAL MEDICINE

## 2019-09-16 PROCEDURE — 74011250637 HC RX REV CODE- 250/637: Performed by: INTERNAL MEDICINE

## 2019-09-16 PROCEDURE — 83735 ASSAY OF MAGNESIUM: CPT

## 2019-09-16 PROCEDURE — 36415 COLL VENOUS BLD VENIPUNCTURE: CPT

## 2019-09-16 PROCEDURE — 97164 PT RE-EVAL EST PLAN CARE: CPT

## 2019-09-16 PROCEDURE — 94640 AIRWAY INHALATION TREATMENT: CPT

## 2019-09-16 PROCEDURE — 85025 COMPLETE CBC W/AUTO DIFF WBC: CPT

## 2019-09-16 PROCEDURE — 74011000250 HC RX REV CODE- 250: Performed by: SURGERY

## 2019-09-16 PROCEDURE — 65660000000 HC RM CCU STEPDOWN

## 2019-09-16 PROCEDURE — 97530 THERAPEUTIC ACTIVITIES: CPT

## 2019-09-16 PROCEDURE — 80048 BASIC METABOLIC PNL TOTAL CA: CPT

## 2019-09-16 RX ORDER — ALBUTEROL SULFATE 0.83 MG/ML
2.5 SOLUTION RESPIRATORY (INHALATION) 3 TIMES DAILY
Status: DISCONTINUED | OUTPATIENT
Start: 2019-09-16 | End: 2019-09-18 | Stop reason: HOSPADM

## 2019-09-16 RX ADMIN — BRIMONIDINE TARTRATE 1 DROP: 2 SOLUTION OPHTHALMIC at 08:19

## 2019-09-16 RX ADMIN — Medication 5 ML: at 05:39

## 2019-09-16 RX ADMIN — BUDESONIDE 500 MCG: 0.5 INHALANT RESPIRATORY (INHALATION) at 07:22

## 2019-09-16 RX ADMIN — Medication 5 ML: at 21:26

## 2019-09-16 RX ADMIN — ALLOPURINOL 300 MG: 100 TABLET ORAL at 08:18

## 2019-09-16 RX ADMIN — ALBUTEROL SULFATE 2.5 MG: 2.5 SOLUTION RESPIRATORY (INHALATION) at 21:14

## 2019-09-16 RX ADMIN — FUROSEMIDE 40 MG: 40 TABLET ORAL at 08:18

## 2019-09-16 RX ADMIN — APIXABAN 5 MG: 5 TABLET, FILM COATED ORAL at 21:26

## 2019-09-16 RX ADMIN — Medication 10 ML: at 17:17

## 2019-09-16 RX ADMIN — ACETAMINOPHEN 650 MG: 325 TABLET, FILM COATED ORAL at 17:16

## 2019-09-16 RX ADMIN — ALBUTEROL SULFATE 2.5 MG: 2.5 SOLUTION RESPIRATORY (INHALATION) at 07:22

## 2019-09-16 RX ADMIN — AMLODIPINE BESYLATE 5 MG: 5 TABLET ORAL at 21:26

## 2019-09-16 RX ADMIN — ACETAMINOPHEN 650 MG: 325 TABLET, FILM COATED ORAL at 08:18

## 2019-09-16 RX ADMIN — PANTOPRAZOLE SODIUM 40 MG: 40 TABLET, DELAYED RELEASE ORAL at 05:39

## 2019-09-16 RX ADMIN — ALBUTEROL SULFATE 2.5 MG: 2.5 SOLUTION RESPIRATORY (INHALATION) at 01:14

## 2019-09-16 RX ADMIN — ACETAMINOPHEN 650 MG: 325 TABLET, FILM COATED ORAL at 00:08

## 2019-09-16 RX ADMIN — ALBUTEROL SULFATE 2.5 MG: 2.5 SOLUTION RESPIRATORY (INHALATION) at 14:20

## 2019-09-16 RX ADMIN — METOPROLOL SUCCINATE 50 MG: 50 TABLET, EXTENDED RELEASE ORAL at 08:18

## 2019-09-16 RX ADMIN — BRIMONIDINE TARTRATE 1 DROP: 2 SOLUTION OPHTHALMIC at 17:16

## 2019-09-16 NOTE — PROGRESS NOTES
Met with pt at bedside pt is agreeable to referrals to [facility] and [facility] for STR, will await response. PPD placed on Sunday will need to wait until 48 hour read completed prior to DC to facility.

## 2019-09-16 NOTE — PROGRESS NOTES
Problem: Patient Education: Go to Patient Education Activity  Goal: Patient/Family Education  Outcome: Progressing Towards Goal     Problem: Upper and Lower GI Bleed: Day 3  Goal: Activity/Safety  Outcome: Progressing Towards Goal  Goal: Diagnostic Test/Procedures  Outcome: Progressing Towards Goal  Goal: Nutrition/Diet  Outcome: Progressing Towards Goal  Goal: Discharge Planning  Outcome: Progressing Towards Goal  Goal: Medications  Outcome: Progressing Towards Goal  Goal: Psychosocial  Outcome: Progressing Towards Goal     Problem: Falls - Risk of  Goal: *Absence of Falls  Description  Document Denis Pereira Fall Risk and appropriate interventions in the flowsheet.   Outcome: Progressing Towards Goal  Note:   Fall Risk Interventions:  Mobility Interventions: Bed/chair exit alarm, Patient to call before getting OOB, PT Consult for mobility concerns    Mentation Interventions: Adequate sleep, hydration, pain control, Bed/chair exit alarm, Door open when patient unattended, Evaluate medications/consider consulting pharmacy, Increase mobility, Reorient patient, Update white board    Medication Interventions: Evaluate medications/consider consulting pharmacy, Patient to call before getting OOB, Bed/chair exit alarm    Elimination Interventions: Bed/chair exit alarm, Call light in reach, Patient to call for help with toileting needs, Urinal in reach              Problem: Breathing Pattern - Ineffective  Goal: *Absence of hypoxia  Outcome: Progressing Towards Goal

## 2019-09-16 NOTE — PROGRESS NOTES
Date of Outreach Update:  Jannet White was seen and assessed. MEWS Score: 1 (09/16/19 0801)  Vitals:    09/16/19 1045 09/16/19 1219 09/16/19 1421 09/16/19 1540   BP:  157/82  116/72   Pulse: 86 (!) 116  (!) 108   Resp:  18  22   Temp:  98.2 °F (36.8 °C)  98.2 °F (36.8 °C)   SpO2: 90% 96% 94% 98%   Weight:       Height:             Pain Assessment  Pain Intensity 1: 7 (09/16/19 1045)  Pain Location 1: Penis(s/p getting weeks removed)  Pain Intervention(s) 1: Medication (see MAR)  Patient Stated Pain Goal: 0      Previous Outreach assessment has been reviewed. There have been no significant clinical changes since the completion of the last dated Outreach assessment. Will continue to follow up per outreach protocol.     Signed By:   Jacobo Pérez RN    September 16, 2019 5:48 PM

## 2019-09-16 NOTE — PROGRESS NOTES
Date of Outreach Update:  Zackary Castillo. was seen and assessed. MEWS Score: 1 (09/16/19 0801)  Vitals:    09/16/19 0115 09/16/19 0341 09/16/19 0722 09/16/19 0801   BP:  117/67  134/53   Pulse:  74  92   Resp:  18  18   Temp:  98.1 °F (36.7 °C)  98.2 °F (36.8 °C)   SpO2: 97% 98% 94% 90%   Weight:       Height:             Pain Assessment  Pain Intensity 1: 0 (09/15/19 1945)  Pain Location 1: Other (comment)(R groin site)  Pain Intervention(s) 1: Medication (see MAR)  Patient Stated Pain Goal: 0      Previous Outreach assessment has been reviewed. There have been no significant clinical changes since the completion of the last dated Outreach assessment. Patient alert and oriented. Respirations even and unlabored on 3L NC. Reports burning with urination. Harper was just removed yesterday. Denies other complaints. VS, labs, and progress notes reviewed. Will continue to follow up per outreach protocol.     Signed By:   Giovanni Martinez RN    September 16, 2019 9:35 AM

## 2019-09-16 NOTE — PROGRESS NOTES
Progress Note    Patient: Brodie Heimlich. MRN: [id number]  SSN: [id number]    YOB: 1940  Age: 70 y.o. Sex: male      Admission Date: 9/9/2019    LOS: 6 days     2 Days Post-Op    PROCEDURE(S):  1. Bilateral common femoral artery cutdown with exposure and placement of catheter in aorta for aortogram.  2.  Endovascular repair of infrarenal abdominal aortic aneurysm with bifurcated modulated device (31 x 14 x 13 main body) via the left common femoral, right iliac extender measuring 27 x 10. Subjective:     Patient reports dysuria, denies abdominal pain, back pain, LE pain or dysesthesia. Objective:     Vitals:    09/16/19 0115 09/16/19 0341 09/16/19 0722 09/16/19 0801   BP:  117/67  134/53   Pulse:  74  92   Resp:  18  18   Temp:  98.1 °F (36.7 °C)  98.2 °F (36.8 °C)   SpO2: 97% 98% 94% 90%   Weight:       Height:            Intake and Output:  Current Shift: No intake/output data recorded.     Physical Exam:   GENERAL: alert, cooperative, no distress  LUNG: soft wheeze, normal respiratory effort  HEART: regular rate and rhythm  ABDOMEN: soft, nontender, protuberant and obese but not distended, bowel sounds normoactive  EXTREMITIES: warm, surgical dressings removed, bilateral groin incisions intact with tissue adhesive, mild erythema at left, minimal induration, no hematoma or drainage; feet sensorimotor intact  PULSES: dorsalis pedis brisk bilaterally    Lab/Data Review:  BMP:   Lab Results   Component Value Date/Time     09/16/2019 04:57 AM    K 4.3 09/16/2019 04:57 AM     (H) 09/16/2019 04:57 AM    CO2 30 09/16/2019 04:57 AM    AGAP 4 (L) 09/16/2019 04:57 AM    GLU 94 09/16/2019 04:57 AM    BUN 22 09/16/2019 04:57 AM    CREA 1.42 09/16/2019 04:57 AM    GFRAA >60 09/16/2019 04:57 AM    GFRNA 52 (L) 09/16/2019 04:57 AM     CBC:   Lab Results   Component Value Date/Time    WBC 13.2 (H) 09/16/2019 04:57 AM    HGB 8.3 (L) 09/16/2019 04:57 AM    HCT 26.7 (L) 09/16/2019 04:57 AM  09/16/2019 04:57 AM        Assessment / Plan / Recommendations / Medical Decision Making:     Principal Problem:    GI bleed (9/9/2019)    Active Problems:    COPD (chronic obstructive pulmonary disease) ([address] (5/2/2014)      Hypertension (6/1/2017)      A-fib ([address] (7/23/2018)      Anemia (3/18/2019)      Chronic respiratory failure with hypoxia ([address] (9/9/2019)      Heart failure with reduced ejection fraction ([address] (9/9/2019)      CKD (chronic kidney disease), stage III ([address] (9/9/2019)      AAA (abdominal aortic aneurysm) ([address] (9/14/2019)        Continue current care  Groin hygiene, incisional care  Mobilize: PT, OT  May restart anticoagulation if cleared by Primary  Follow up in office 9/27 (appt in EMR)      Signed By: Mackenzie Brody PA-C    September 16, 2019      Physician Assistant with [facility] Vascular Surgery  [place].  Ibrahima Garcia MD / Lanny Fletcher MD

## 2019-09-16 NOTE — PROGRESS NOTES
Progress Note    Patient: Tracie Olson MRN: [id number]  SSN: [id number]    YOB: 1940  Age: 70 y.o. Sex: male      Admit Date: 9/9/2019    LOS: 6 days     Subjective:     Mr. Jackie Tavera is a 71 yo male with PMH of CKD3, HFrEF (EF 30-35%), iron deficient anemia, COPD with chronic hypoxia, afib on eliquis,  who was  Evaluated in the ER on 9/9  with near syncope and hypotension. He received IVF bolus while in the field and BP normalized. HGB noted to be down to 7.2 from his baseline of 8-9. He denied jaiden GI bleeding. Seen by GI at admission. Received 1 U of pRBCs. Had EGD and colonoscopy on 9/12 with gastric and colon polyps removed. Patient complained of suprapubic pain on 9/13 and some fever which prompted a CT scan of the abdomen reporting a 5 cm AAA with perilesional tissue straining. Seen by vascular. Abdominal CT angiogram confirmed AAA. Vascular surgery scheduled patient to have surgery on 9/14. Patient had an endovascular  repair of a 5 cm  AA aneurysm on 9/14. Stable after the procedure. He was transferred to the ICU for proper monitoring and stayed there for a few hours and then was transferred to the medical floor. He is very weak and needs lot of help even to get out of bed. Patient will need to go to a STR facility. Oral anticoagulation resumed today. Objective:     Vitals:    09/16/19 1045 09/16/19 1219 09/16/19 1421 09/16/19 1540   BP:  157/82  116/72   Pulse: 86 (!) 116  (!) 108   Resp:  18  22   Temp:  98.2 °F (36.8 °C)  98.2 °F (36.8 °C)   SpO2: 90% 96% 94% 98%   Weight:       Height:            Intake and Output:  Current Shift: No intake/output data recorded. Last three shifts: 09/15 0701 - 09/16 1900  In: 480 [P.O.:480]  Out: 1050 [Urine:550]    Physical Exam:   GENERAL: alert, appears stated age  EYE: conjunctivae/corneas clear. LYMPHATIC: Cervical, supraclavicular, and axillary nodes normal.   THROAT & NECK: normal and no erythema or exudates noted. LUNG: clear to auscultation bilaterally  HEART: regular rate and rhythm, S1, S2 normal, no murmur, click, rub or gallop  ABDOMEN: supra-pubic tenderness   EXTREMITIES:  extremities normal, atraumatic, no cyanosis or edema  SKIN: Normal.  NEUROLOGIC: No focal deficits   PSYCHIATRIC: non focal    Lab/Data Review: All lab results for the last 24 hours reviewed. Assessment:     Principal Problem:    GI bleed (9/9/2019)    Active Problems:    COPD (chronic obstructive pulmonary disease) ([address] (5/2/2014)      Hypertension (6/1/2017)      A-fib ([address] (7/23/2018)      Anemia (3/18/2019)      Chronic respiratory failure with hypoxia ([address] (9/9/2019)      Heart failure with reduced ejection fraction ([address] (9/9/2019)      CKD (chronic kidney disease), stage III ([address] (9/9/2019)      AAA (abdominal aortic aneurysm) ([address] (9/14/2019)        Plan:     -No evidence of active bleeding. Eliquis resumed today ( chronic Afib)   -History of CHF. Lasix resumed   -No evidence of active infection. D/C ceftriaxone.   -Albuterol nebs Q 6h ( history of COPD)   -patient is VERY weak. Will need STR.      Signed By: Yamileth Mitchell MD     September 16, 2019

## 2019-09-16 NOTE — PROGRESS NOTES
Problem: Mobility Impaired (Adult and Pediatric)  Goal: *Acute Goals and Plan of Care  Goals Updated 9/16/19 PT Re-evaluation:    Acute PT Goals:  (1.)Santiago Benjamin. will move from supine to sit and sit to supine , scoot up and down and roll side to side with INDEPENDENT within 7 treatment day(s). (2.)Santiago Benjamin. will transfer from bed to chair and chair to bed with MODIFIED INDEPENDENCE using the least restrictive device within 7 treatment day(s). (3.)Santiago Benjamin. will ambulate with MODIFIED INDEPENDENCE for 1000 feet with the least restrictive device with normal vital sign response within 7 treatment day(s). (4.)Santiago Benjamin. will perform standing static and dynamic balance activities x 25 minutes with MODIFIED INDEPENDENCE to improve safety and activity tolerance within 7 treatment day(s). (5.)Santiago Benjamin. will perform bilateral lower extremity exercises x 20 min for HEP with INDEPENDENCE to improve strength, endurance, and functional mobility within 7 treatment day(s).      PHYSICAL THERAPY: Daily Note, Re-evaluation and AM 9/16/2019  INPATIENT: PT Visit Days : 1  Payor: [address] [place],9D / Plan: [employer] [address] / Product Type: [employer] /       NAME/AGE/GENDER: Ralph Luque is a 70 y.o. male   PRIMARY DIAGNOSIS: GI bleed [K92.2]  GI bleed [K92.2]  AAA (abdominal aortic aneurysm) ([facility]) [I71.4] GI bleed   GI bleed    Procedure(s) (LRB):  AORTIC ABDOMINAL ANEURYSM REPAIR ENDOVASCULAR (EVAR) Patient in room 614 (N/A)  2 Days Post-Op  ICD-10: Treatment Diagnosis:    · Generalized Muscle Weakness (M62.81)  · Difficulty in walking, Not elsewhere classified (R26.2)  · Other abnormalities of gait and mobility (R26.89)  · Dizziness and Giddiness (R42)   Precaution/Allergies:  Statins-hmg-coa reductase inhibitors      ASSESSMENT:     Mr. Nichelle Ac is a 70year old M who presented to hospital on 9/9/16 with weakness and progressive dizziness over past few days. He was evaluated by PT on 9/10/19. Pt underwent EGD and colonoscopy on 9/12/19. On 9/13/19 due to suprapubic pain pt had CT performed, found to have a 5cm intrarenal aortic aneurysm (AAA). Underwent endovascular repair on 9/14/19. Pt now appropriate for PT re-evaluation to assess current mobility and discharge recommendations. Goals have been updated to reflect pt's current level of function. Prior to hospital admission pt lives alone in a one story apartment with no step(s) to enter (uses elevator). Prior to admission Mr. Shaylee Brennan uses a rolling walker for mobility and is on 3L O2 at baseline. Upon entering, pt sitting up at edge of bed, agreeable to PT re-evaluation and treatment. he reports 7/10 pain due to having weeks catheter recently removed - appears to be quite uncomfortable; RN notified. Pt sitting EOB with good sitting balance control, frequently leaning and shifting outside base of support due to discomfort. Sit > stand with CGA/Min A hand held assist. PT assisted pt to bedside chair Min A hand held assist - pt with decreased step length and antalgic gait pattern. VS assessed: HR 84 bpm, O2 86% on 3L O2; PT increased O2 to 4L via nc just during mobility to ensure safe participation. PT instructed pt proper breathing pattern. Sit <> stand several trials with BUE assist at arm rests; and supported standing to increase upright tolerance. Gait x 30 ft with CGA/Min A hand held assist, cues for safety, pacing, and breathing technique. Pt presents with decreased activity tolerance, both due to SOB as well as pain level. Pt returned to bedside chair, but looked very uncomfortable and was frequently shifting around, so PT assisted pt x5 ft CGA back to edge of bed, and then pt performed sit > supine with modified independence utilizing bed rail. At end of session pt resting in bed with all needs within reach, RN notified.  Pt presents as functioning below his baseline, with deficits in mobility including transfers, gait, balance, and activity tolerance. Pt will benefit from skilled therapy services to address stated deficits to promote return to highest level of function, independence, and safety. Will continue therapy efforts. This section established at most recent assessment   PROBLEM LIST (Impairments causing functional limitations):  1. Decreased Transfer Abilities  2. Decreased Ambulation Ability/Technique  3. Decreased Balance  4. Increased Pain  5. Decreased Activity Tolerance  6. Increased Fatigue  7. Increased Shortness of Breath   INTERVENTIONS PLANNED: (Benefits and precautions of physical therapy have been discussed with the patient.)  1. Balance Exercise  2. Bed Mobility  3. Family Education  4. Gait Training  5. Home Exercise Program (HEP)  6. Neuromuscular Re-education/Strengthening  7. Range of Motion (ROM)  8. Therapeutic Activites  9. Therapeutic Exercise/Strengthening  10. Transfer Training     TREATMENT PLAN: Frequency/Duration: 3 times a week for duration of hospital stay  Rehabilitation Potential For Stated Goals: Good     REHAB RECOMMENDATIONS (at time of discharge pending progress):    Placement: It is my opinion, based on this patient's performance to date, that Mr. Trev Urbina may benefit from intensive therapy at [address] after discharge due to the functional deficits listed above that are likely to improve with skilled rehabilitation and concerns that he/she may be unsafe to be unsupervised at home due to medical complications and mobility deficits including increased pain, decreased activity tolerance, and overall decreased balance and mobility which puts him at increase risk of functional decline and/or falling. .  Equipment:    None at this time            HISTORY:   History of Present Injury/Illness (Reason for Referral):   Most Recent HPI: Mr. Trev Urbina is a 69 yo male with PMH of CKD3, HFrEF (EF 30-35%), iron deficient anemia, COPD with chronic hypoxia, afib on eliquis,  who was  Evaluated in the ER on 9/9  with near syncope and hypotension. He received IVF bolus while in the field and BP normalized. HGB noted to be down to 7.2 from his baseline of 8-9. He denied jaiden GI bleeding. Seen by GI at admission. Received 1 U of pRBCs. Had EGD and colonoscopy on 9/12 with gastric and colon polyps removed. Patient complained of suprapubic pain on 9/13 and some fever which prompted a CT scan of the abdomen reporting a 5 cm AAA with perilesional tissue straining. Seen by vascular. Abdominal CT angiogram confirmed AAA. Vascular surgery scheduled patient to have surgery on 9/14. Patient had an endovascular  repair of a 5 cm  AA aneurysm on 9/14. Stable after the procedure. He was transferred to the ICU for proper monitoring and stayed there for a few hours and then was transferred to the medical floor. He is very weak and needs lot of help even to get out of bed. Will request a new PT eval. Patient will need to go to a STR facility. \"  Past Medical History/Comorbidities:   Mr. 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  He also has no past medical history of Autoimmune disease ([address] Dementia, Liver disease, Neurological disorder, Other ill-defined conditions(799.89), Psychiatric disorder, Seizures ([facility] or Sleep disorder. Mr. Khadijah Trevizo  has a past surgical history that includes hx heart catheterization and colonoscopy (N/A, 9/12/2019). Social History/Living Environment:   Home Environment: Apartment  # Steps to Enter: (elevator)  One/Two Story Residence: One story  Living Alone: Yes  Support Systems: None  Patient Expects to be Discharged to[de-identified] Apartment  Current DME Used/Available at Home: Dylan Beath, rollator, Shower chair, Oxygen, portable  Tub or Shower Type: Tub/Shower combination  Prior Level of Function/Work/Activity:  Independent with rollator and O2  Age, COPD    Number of Personal Factors/Comorbidities that affect the Plan of Care: 1-2: MODERATE COMPLEXITY   EXAMINATION:   Most Recent Physical Functioning:   Gross Assessment:  AROM: Within functional limits  PROM: Within functional limits  Strength: Within functional limits               Posture:     Balance:  Sitting: Intact  Standing: Impaired  Standing - Static: Good  Standing - Dynamic : Fair Bed Mobility:  Rolling: Modified independent  Supine to Sit: Modified independent  Sit to Supine: Modified independent  Scooting: Modified independent  Wheelchair Mobility:     Transfers:  Sit to Stand: Contact guard assistance  Stand to Sit: Contact guard assistance  Bed to Chair: Contact guard assistance  Interventions: Tactile cues; Safety awareness training;Verbal cues  Gait:     Base of Support: Center of gravity altered  Speed/Cassie: Shuffled; Slow  Step Length: Left shortened;Right shortened  Gait Abnormalities: Antalgic;Decreased step clearance; Path deviations; Shuffling gait;Trunk sway increased  Distance (ft): 30 Feet (ft)  Assistive Device: (hand held assist)  Ambulation - Level of Assistance: Contact guard assistance;Minimal assistance(hand held)      Body Structures Involved:  1. Muscles Body Functions Affected:  1. Movement Related Activities and Participation Affected:  1.  Mobility   Number of elements that affect the Plan of Care: 3: MODERATE COMPLEXITY   CLINICAL PRESENTATION:   Presentation: Evolving clinical presentation with changing clinical characteristics: MODERATE COMPLEXITY   CLINICAL DECISION MAKIN [facility] Mobility Inpatient Short Form  How much difficulty does the patient currently have. .. Unable A Lot A Little None   1. Turning over in bed (including adjusting bedclothes, sheets and blankets)? ? 1   ? 2   ? 3   ? 4   2. Sitting down on and standing up from a chair with arms ( e.g., wheelchair, bedside commode, etc.)   ? 1   ? 2   ? 3   ? 4   3. Moving from lying on back to sitting on the side of the bed?   ? 1   ? 2   ? 3   ? 4   How much help from another person does the patient currently need. .. Total A Lot A Little None   4. Moving to and from a bed to a chair (including a wheelchair)? ? 1   ? 2   ? 3   ? 4   5. Need to walk in hospital room? ? 1   ? 2   ? 3   ? 4   6. Climbing 3-5 steps with a railing? ? 1   ? 2   ? 3   ? 4   © , Trustees of [address] [place] [address] [address] [address], under license to [employer]. All rights reserved      Score:  Initial: 20 Most Recent: 18 (Date: 19)    Interpretation of Tool:  Represents activities that are increasingly more difficult (i.e. Bed mobility, Transfers, Gait). Medical Necessity:     · Patient is expected to demonstrate progress in functional technique  ·  to increase independence with mobility and gait. · .  Reason for Services/Other Comments:  · Patient continues to require present interventions due to patient's inability to function at baseline. · .   Use of outcome tool(s) and clinical judgement create a POC that gives a: Questionable prediction of patient's progress: MODERATE COMPLEXITY        TREATMENT:   (In addition to Assessment/Re-Assessment sessions the following treatments were rendered)   Pre-treatment Symptoms/Complaints:   \"It hurts so bad\"  Pain: Initial:   Pain Intensity 1: 7  Pain Location 1: Penis(s/p getting weeks removed)  Post Session:  Continuing to grimace & demonstrate discomfort - RN notified. PT Reassessment Table:   Initial Physical Functioning: Most Recent Physical Functioning:   Gross Assessment:  AROM: Within functional limits  PROM: Within functional limits  Strength: Within functional limits  Sensation: Intact Gross Assessment:   AROM: Within functional limits  PROM: Within functional limits  Strength: Within functional limits   Balance:   Sitting: Intact  Standing: With support, Impaired  Standing - Static: Good  Standing - Dynamic : Fair Balance:   Sitting: Intact  Standing: Impaired  Standing - Static: Good  Standing - Dynamic : Fair   Bed Mobility:   Rolling: Independent  Supine to Sit: Supervision  Sit to Supine: Independent  Scooting: Modified independent Bed Mobility:   Rolling: Modified independent  Supine to Sit: Modified independent  Sit to Supine: Modified independent  Scooting: Modified independent   Transfers:   Sit to Stand: Supervision  Stand to Sit: Supervision  Bed to Chair: Supervision  Interventions: Tactile cues, Safety awareness training, Verbal cues Transfers:   Sit to Stand: Contact guard assistance  Stand to Sit: Contact guard assistance  Bed to Chair: Contact guard assistance  Interventions: Tactile cues; Safety awareness training;Verbal cues   Gait:   Base of Support: Center of gravity altered  Speed/Cassie: Shuffled, Slow  Step Length: Right shortened, Left shortened  Gait Abnormalities: Antalgic, Decreased step clearance, Path deviations, Shuffling gait, Trunk sway increased  Ambulation - Level of Assistance: Stand-by assistance, Contact guard assistance  Distance (ft): 600 Feet (ft)  Assistive Device: Walker, rolling Gait:   Base of Support: Center of gravity altered  Speed/Cassie: Shuffled; Slow  Step Length: Left shortened;Right shortened  Gait Abnormalities: Antalgic;Decreased step clearance; Path deviations; Shuffling gait;Trunk sway increased  Ambulation - Level of Assistance: Contact guard assistance;Minimal assistance(hand held)  Distance (ft): 30 Feet (ft)  Assistive Device: (hand held assist)     Therapeutic Activity: (   23 Minutes): Therapeutic activities including Bed transfers, Chair transfers, Toilet transfers, Ambulation on level ground and supported standing activities to improve mobility, strength, balance, coordination and activity tolerance. Required minimal   to promote static and dynamic balance in standing and promote coordination of bilateral, upper extremity(s), lower extremity(s) and appropriate breathing pattern. Braces/Orthotics/Lines/Etc:   · O2 Device: Nasal cannula  Treatment/Session Assessment:    · Response to Treatment:  Limited by pain and SOB. Required to be bumped up to 4L during mobility for oxygen to remain above 90% with activity despite cues for breathing technique and activity pacing. Pt returned back to 3L O2 at end of session. · Interdisciplinary Collaboration:   o Physical Therapist  o Registered Nurse  · After treatment position/precautions:   o Supine in bed  o Bed/Chair-wheels locked  o Bed in low position  o Call light within reach  o RN notified   · Compliance with Program/Exercises: Will assess as treatment progresses  · Recommendations/Intent for next treatment session: \"Next visit will focus on advancements to more challenging activities and reduction in assistance provided\".     Total Treatment Duration:  PT Patient Time In/Time Out  Time In: 2973  Time Out: [facility]

## 2019-09-16 NOTE — PROGRESS NOTES
Date of Outreach Update:  Melissa Zuniga was seen and assessed. Patient sitting up in bed watching the news. He is alert and oriented and very pleasant. His only complaint is painful urination since the catheter was removed. Lung sounds clear. Spoke with Primary RN who has no concerns at this time. MEWS Score: 1 (09/16/19 0341)  Vitals:    09/15/19 2139 09/15/19 2320 09/16/19 0115 09/16/19 0341   BP:  127/74  117/67   Pulse:  85  74   Resp:  18  18   Temp:  99.8 °F (37.7 °C)  98.1 °F (36.7 °C)   SpO2: 99% 98% 97% 98%   Weight:       Height:             Pain Assessment  Pain Intensity 1: 0 (09/15/19 1945)  Pain Location 1: Other (comment)(R groin site)  Pain Intervention(s) 1: Medication (see MAR)  Patient Stated Pain Goal: 0      Previous Outreach assessment has been reviewed. There have been no significant clinical changes since the completion of the last dated Outreach assessment. Will continue to follow up per outreach protocol.     Signed By:   Joselyn Montes RN    September 16, 2019 5:25 AM

## 2019-09-16 NOTE — PROGRESS NOTES
OUTREACH NURSE PROGRESS REPORT    SUBJECTIVE: In to assess patient secondary to transfer from ICU. Clemencia Santana. was seen and focused assessment complete. MEWS Score: 3 (09/16/19 1540)  Vitals:    09/16/19 1045 09/16/19 1219 09/16/19 1421 09/16/19 1540   BP:  157/82  116/72   Pulse: 86 (!) 116  (!) 108   Resp:  18  22   Temp:  98.2 °F (36.8 °C)  98.2 °F (36.8 °C)   SpO2: 90% 96% 94% 98%   Weight:       Height:              OBJECTIVE: On arrival to room, I found patient to be resting in bed, watching TV. ASSESSMENT:   Pt alert and oriented, no pain or distress noted, lungs CTA. 02 sat 98% on 4L NC. Pt resting quietly and voices no complaints at this time. PLAN:  Will continue to follow per outreach program protocol. Joel Abdi RN.

## 2019-09-17 LAB
ANION GAP SERPL CALC-SCNC: 10 MMOL/L (ref 7–16)
APPEARANCE UR: ABNORMAL
BACTERIA URNS QL MICRO: 0 /HPF
BILIRUB UR QL: NEGATIVE
BUN SERPL-MCNC: 28 MG/DL (ref 8–23)
CALCIUM SERPL-MCNC: 9.3 MG/DL (ref 8.3–10.4)
CASTS URNS QL MICRO: ABNORMAL /LPF
CHLORIDE SERPL-SCNC: 106 MMOL/L (ref 98–107)
CO2 SERPL-SCNC: 27 MMOL/L (ref 21–32)
COLOR UR: YELLOW
CREAT SERPL-MCNC: 1.57 MG/DL (ref 0.8–1.5)
EPI CELLS #/AREA URNS HPF: ABNORMAL /HPF
ERYTHROCYTE [DISTWIDTH] IN BLOOD BY AUTOMATED COUNT: 16.3 % (ref 11.9–14.6)
GLUCOSE SERPL-MCNC: 98 MG/DL (ref 65–100)
GLUCOSE UR STRIP.AUTO-MCNC: NEGATIVE MG/DL
HCT VFR BLD AUTO: 27.9 % (ref 41.1–50.3)
HGB BLD-MCNC: 8.6 G/DL (ref 13.6–17.2)
HGB UR QL STRIP: NEGATIVE
KETONES UR QL STRIP.AUTO: NEGATIVE MG/DL
LEUKOCYTE ESTERASE UR QL STRIP.AUTO: NEGATIVE
MCH RBC QN AUTO: 27.9 PG (ref 26.1–32.9)
MCHC RBC AUTO-ENTMCNC: 30.8 G/DL (ref 31.4–35)
MCV RBC AUTO: 90.6 FL (ref 79.6–97.8)
MM INDURATION POC: 0 MM (ref 0–5)
NITRITE UR QL STRIP.AUTO: NEGATIVE
NRBC # BLD: 0.03 K/UL (ref 0–0.2)
OTHER OBSERVATIONS,UCOM: ABNORMAL
PH UR STRIP: 5.5 [PH] (ref 5–9)
PLATELET # BLD AUTO: 264 K/UL (ref 150–450)
PMV BLD AUTO: 11.7 FL (ref 9.4–12.3)
POTASSIUM SERPL-SCNC: 4.1 MMOL/L (ref 3.5–5.1)
PPD POC: NEGATIVE NEGATIVE
PROT UR STRIP-MCNC: 30 MG/DL
RBC # BLD AUTO: 3.08 M/UL (ref 4.23–5.6)
SODIUM SERPL-SCNC: 143 MMOL/L (ref 136–145)
SP GR UR REFRACTOMETRY: 1.02 (ref 1–1.02)
UROBILINOGEN UR QL STRIP.AUTO: 0.2 EU/DL (ref 0.2–1)
WBC # BLD AUTO: 13.3 K/UL (ref 4.3–11.1)

## 2019-09-17 PROCEDURE — 74011000250 HC RX REV CODE- 250: Performed by: SURGERY

## 2019-09-17 PROCEDURE — 94640 AIRWAY INHALATION TREATMENT: CPT

## 2019-09-17 PROCEDURE — 74011250637 HC RX REV CODE- 250/637: Performed by: SURGERY

## 2019-09-17 PROCEDURE — 74011250637 HC RX REV CODE- 250/637: Performed by: INTERNAL MEDICINE

## 2019-09-17 PROCEDURE — 94760 N-INVAS EAR/PLS OXIMETRY 1: CPT

## 2019-09-17 PROCEDURE — 81001 URINALYSIS AUTO W/SCOPE: CPT

## 2019-09-17 PROCEDURE — 65660000000 HC RM CCU STEPDOWN

## 2019-09-17 PROCEDURE — 77010033678 HC OXYGEN DAILY

## 2019-09-17 PROCEDURE — 74011000250 HC RX REV CODE- 250: Performed by: INTERNAL MEDICINE

## 2019-09-17 PROCEDURE — 51798 US URINE CAPACITY MEASURE: CPT

## 2019-09-17 PROCEDURE — 85027 COMPLETE CBC AUTOMATED: CPT

## 2019-09-17 PROCEDURE — 80048 BASIC METABOLIC PNL TOTAL CA: CPT

## 2019-09-17 PROCEDURE — 36415 COLL VENOUS BLD VENIPUNCTURE: CPT

## 2019-09-17 PROCEDURE — 77030011943

## 2019-09-17 RX ORDER — LISINOPRIL 5 MG/1
10 TABLET ORAL DAILY
Status: DISCONTINUED | OUTPATIENT
Start: 2019-09-18 | End: 2019-09-18 | Stop reason: HOSPADM

## 2019-09-17 RX ORDER — TAMSULOSIN HYDROCHLORIDE 0.4 MG/1
0.4 CAPSULE ORAL DAILY
Status: DISCONTINUED | OUTPATIENT
Start: 2019-09-17 | End: 2019-09-18 | Stop reason: HOSPADM

## 2019-09-17 RX ADMIN — BRIMONIDINE TARTRATE 1 DROP: 2 SOLUTION OPHTHALMIC at 08:28

## 2019-09-17 RX ADMIN — ALLOPURINOL 300 MG: 100 TABLET ORAL at 08:27

## 2019-09-17 RX ADMIN — ACETAMINOPHEN 650 MG: 325 TABLET, FILM COATED ORAL at 03:02

## 2019-09-17 RX ADMIN — Medication 5 ML: at 05:43

## 2019-09-17 RX ADMIN — APIXABAN 5 MG: 5 TABLET, FILM COATED ORAL at 08:27

## 2019-09-17 RX ADMIN — FUROSEMIDE 40 MG: 40 TABLET ORAL at 08:27

## 2019-09-17 RX ADMIN — Medication 5 ML: at 21:01

## 2019-09-17 RX ADMIN — APIXABAN 5 MG: 5 TABLET, FILM COATED ORAL at 21:01

## 2019-09-17 RX ADMIN — AMLODIPINE BESYLATE 5 MG: 5 TABLET ORAL at 21:01

## 2019-09-17 RX ADMIN — METOPROLOL SUCCINATE 50 MG: 50 TABLET, EXTENDED RELEASE ORAL at 08:27

## 2019-09-17 RX ADMIN — BUDESONIDE 500 MCG: 0.5 INHALANT RESPIRATORY (INHALATION) at 20:14

## 2019-09-17 RX ADMIN — ALBUTEROL SULFATE 2.5 MG: 2.5 SOLUTION RESPIRATORY (INHALATION) at 20:14

## 2019-09-17 RX ADMIN — BUDESONIDE 500 MCG: 0.5 INHALANT RESPIRATORY (INHALATION) at 08:00

## 2019-09-17 RX ADMIN — BRIMONIDINE TARTRATE 1 DROP: 2 SOLUTION OPHTHALMIC at 18:43

## 2019-09-17 RX ADMIN — PANTOPRAZOLE SODIUM 40 MG: 40 TABLET, DELAYED RELEASE ORAL at 05:40

## 2019-09-17 RX ADMIN — Medication 5 ML: at 18:43

## 2019-09-17 RX ADMIN — ALBUTEROL SULFATE 2.5 MG: 2.5 SOLUTION RESPIRATORY (INHALATION) at 09:00

## 2019-09-17 RX ADMIN — TAMSULOSIN HYDROCHLORIDE 0.4 MG: 0.4 CAPSULE ORAL at 18:42

## 2019-09-17 RX ADMIN — ALBUTEROL SULFATE 2.5 MG: 2.5 SOLUTION RESPIRATORY (INHALATION) at 15:48

## 2019-09-17 NOTE — PROGRESS NOTES
Progress Note    Patient: Deandre Trujillo MRN: [id number]  SSN: [id number]    YOB: 1940  Age: 70 y.o. Sex: male      Admit Date: 9/9/2019    LOS: 7 days     Subjective:     Mr. Khadijah Trevizo is a 69 yo male with PMH of CKD3, HFrEF (EF 30-35%), iron deficient anemia, COPD with chronic hypoxia (3LNC), afib on eliquis,  who was evaluated in the ER on 9/9  with near syncope and hypotension. He received IVF bolus while in the field and BP normalized. Hgb down to 7.2 from his baseline of 8-9. Seen by GI, underwent EGD and colonoscopy on 9/12 with gastric and colon polyps removed. Patient complained of suprapubic pain on 9/13 and some fever which prompted a CT scan of the abdomen reporting a 5 cm AAA with stranding concerning for leak. Abdominal CT angiogram confirmed AAA with slow leak. Vascular surgery consulted, s/p endovascular repair of a 5 cm  AAA on 9/14. Objective:     Pt reports burning with urination that began after Harper removed 2-3 days ago. Denies F/C. SOB at baseline. Vitals:    09/17/19 0612 09/17/19 0755 09/17/19 0859 09/17/19 1119   BP:  114/50  127/66   Pulse:  81  (!) 102   Resp:  17  20   Temp:  98.4 °F (36.9 °C)  98.3 °F (36.8 °C)   SpO2:  100% 99% 98%   Weight: 76.5 kg (168 lb 9.6 oz)      Height:            Intake and Output:  Current Shift: No intake/output data recorded. Last three shifts: 09/15 1901 - 09/17 0700  In: 480 [P.O.:480]  Out: 2285 [Urine:1785]    Physical Exam:   GENERAL: alert, appears stated age  LUNG: decreased BS throughout  HEART: regular rate and rhythm, S1, S2 normal, no murmur, click, rub or gallop  ABDOMEN: nontender, nondistended  EXTREMITIES:  extremities normal, atraumatic, no cyanosis or edema  SKIN: Normal.  NEUROLOGIC: No focal deficits   PSYCHIATRIC: non focal    Lab/Data Review: All lab results for the last 24 hours reviewed.          Assessment:     Principal Problem:    GI bleed (9/9/2019)    Active Problems:    COPD (chronic obstructive pulmonary disease) ([facility] (5/2/2014)      Hypertension (6/1/2017)      A-fib ([facility] (7/23/2018)      Anemia (3/18/2019)      Chronic respiratory failure with hypoxia ([facility] (9/9/2019)      Heart failure with reduced ejection fraction ([facility] (9/9/2019)      CKD (chronic kidney disease), stage III ([facility] (9/9/2019)      AAA (abdominal aortic aneurysm) ([facility] (9/14/2019)        Plan:     Symptomatic 5cm AAA with leak  S/p endovascular repair 9/14.  - f/u as outpt with Dr. Dimitri Ambrocio in 2 weeks    Dysuria  - check UA    Chronic Afib  - Eliquis    CKD, stage 3  Cr stable around baseline.     Chronic systolic heart failure  EF 30-35% per ECHO 8/18.  - on home Lasix  - cont Toprol, resume Lisinopril    Chronic hypoxic resp failure  COPD, not in exacerbation  - wean O2 to home O2 3L NC  - cont inhalers    Liver lesions  Incidentally seen on CT.   - needs outpt US to ensure benign nature    Proph - Eliquis  Full code  Dispo - medically stable for d/c, awaiting STR    Signed By: Graydon Klinefelter, MD     September 17, 2019

## 2019-09-17 NOTE — PROGRESS NOTES
Nutrition  Reason for assessment: Length of stay    Assessment:   Food/Nutrition Patient History:  Symptomatic AAA with leak s/p endovascular repair, dysuria, liver lesions. PMH remarkable for chronic afib, CKD, CHF. Pt up to bedside. Lunch tray in front of him with ~25% consumed. He reports anxious about eating \"scared it'll come back up. \"  He is generally a poor historian regarding his usual intake. Reports he is drinking fluids without difficulty. Awaiting STR placement. DIET REGULAR      Anthropometrics:Height: 5' 9\" (175.3 cm),  Weight: 76.5 kg (168 lb 9.6 oz), Weight Source: Standing scale (comment), Body mass index is 24.9 kg/m². BMI class of normal weight. Macronutrient needs: 73 kg IBW  EER:  3675-6971 kcal /day (25-30 kcal/kg)  EPR:  58-73 grams protein/day (0.8-1 grams/kg)    Intake/Comparative Standards: Recorded meal(s): %. Current tray ~25% consumed. This potentially meets ~25-50% of kcal and ~25-50% of protein needs    Nutrition Diagnosis: Inadequate oral intake related to inconsistent po as evidenced by varied po intake meeting 25-5-% of estimated needs. Intervention:  Meals and snacks: Continue current diet. Nutrition Supplement Therapy: Ensure Enlive TID (strawberry or chocolate)   Coordination of Nutrition Care: Yaquelin Braxton RN. Discharge Nutrition Plan: Too soon to determine.      [place],  [address] [facility] [address] [facility] [address], [address]

## 2019-09-17 NOTE — PROGRESS NOTES
Problem: Patient Education: Go to Patient Education Activity  Goal: Patient/Family Education  Outcome: Progressing Towards Goal     Problem: Falls - Risk of  Goal: *Absence of Falls  Description  Document Miguel Whatley Fall Risk and appropriate interventions in the flowsheet.   Outcome: Progressing Towards Goal  Note:   Fall Risk Interventions:  Mobility Interventions: Bed/chair exit alarm, Patient to call before getting OOB    Mentation Interventions: Adequate sleep, hydration, pain control, Bed/chair exit alarm, Door open when patient unattended, Evaluate medications/consider consulting pharmacy, Increase mobility, Reorient patient, Update white board    Medication Interventions: Evaluate medications/consider consulting pharmacy, Patient to call before getting OOB, Teach patient to arise slowly, Bed/chair exit alarm    Elimination Interventions: Bed/chair exit alarm, Call light in reach, Patient to call for help with toileting needs, Urinal in reach, Toilet paper/wipes in reach              Problem: Patient Education: Go to Patient Education Activity  Goal: Patient/Family Education  Outcome: Progressing Towards Goal     Problem: Breathing Pattern - Ineffective  Goal: *Absence of hypoxia  Outcome: Progressing Towards Goal

## 2019-09-17 NOTE — PROGRESS NOTES
Patient seen and examined. No acute issues. Bilateral groins stable. Palpable pedal pulses. Patient to continue dry dressing at all times to the left groin. Can restart anticoagulation per primary team.  If discharge patient can follow-up in 2 weeks for postop visit.     Brandt Seamans

## 2019-09-17 NOTE — INTERDISCIPLINARY ROUNDS
Interdisciplinary team rounds were held 9/17/2019 with the following team members:Care Management, Nursing, Occupational Therapy and Physician. Plan of care discussed. See clinical pathway and/or care plan for interventions and desired outcomes.

## 2019-09-17 NOTE — PROGRESS NOTES
Date of Outreach Update:  Jannet White was seen and assessed. MEWS Score: 3 (09/16/19 1540)  Vitals:    09/16/19 1540 09/16/19 2010 09/16/19 2115 09/16/19 2331   BP: 116/72 121/55  135/54   Pulse: (!) 108 87  (!) 105   Resp: 22 20  20   Temp: 98.2 °F (36.8 °C) 98.6 °F (37 °C)  98 °F (36.7 °C)   SpO2: 98% 100% 99% 96%   Weight:       Height:              Previous Outreach assessment has been reviewed. There have been no significant clinical changes since the completion of the last dated Outreach assessment. Will continue to follow up per outreach protocol.     Signed By:   Jayden Lucas RN    September 17, 2019 1:18 AM

## 2019-09-18 VITALS
SYSTOLIC BLOOD PRESSURE: 105 MMHG | HEIGHT: 69 IN | TEMPERATURE: 97.9 F | WEIGHT: 168.6 LBS | HEART RATE: 91 BPM | OXYGEN SATURATION: 99 % | RESPIRATION RATE: 18 BRPM | BODY MASS INDEX: 24.97 KG/M2 | DIASTOLIC BLOOD PRESSURE: 53 MMHG

## 2019-09-18 PROCEDURE — 94760 N-INVAS EAR/PLS OXIMETRY 1: CPT

## 2019-09-18 PROCEDURE — 74011000250 HC RX REV CODE- 250: Performed by: SURGERY

## 2019-09-18 PROCEDURE — 74011250637 HC RX REV CODE- 250/637: Performed by: SURGERY

## 2019-09-18 PROCEDURE — 77010033678 HC OXYGEN DAILY

## 2019-09-18 PROCEDURE — 74011250637 HC RX REV CODE- 250/637: Performed by: INTERNAL MEDICINE

## 2019-09-18 PROCEDURE — 94640 AIRWAY INHALATION TREATMENT: CPT

## 2019-09-18 PROCEDURE — 90686 IIV4 VACC NO PRSV 0.5 ML IM: CPT | Performed by: SURGERY

## 2019-09-18 PROCEDURE — 74011250636 HC RX REV CODE- 250/636: Performed by: SURGERY

## 2019-09-18 PROCEDURE — 74011000250 HC RX REV CODE- 250: Performed by: INTERNAL MEDICINE

## 2019-09-18 PROCEDURE — 97530 THERAPEUTIC ACTIVITIES: CPT

## 2019-09-18 PROCEDURE — 90471 IMMUNIZATION ADMIN: CPT

## 2019-09-18 RX ORDER — TAMSULOSIN HYDROCHLORIDE 0.4 MG/1
0.4 CAPSULE ORAL DAILY
Qty: 30 CAP | Refills: 0 | Status: SHIPPED | OUTPATIENT
Start: 2019-09-19 | End: 2020-04-15 | Stop reason: SDUPTHER

## 2019-09-18 RX ADMIN — PANTOPRAZOLE SODIUM 40 MG: 40 TABLET, DELAYED RELEASE ORAL at 05:03

## 2019-09-18 RX ADMIN — METOPROLOL SUCCINATE 50 MG: 50 TABLET, EXTENDED RELEASE ORAL at 09:12

## 2019-09-18 RX ADMIN — BRIMONIDINE TARTRATE 1 DROP: 2 SOLUTION OPHTHALMIC at 09:15

## 2019-09-18 RX ADMIN — ALLOPURINOL 300 MG: 100 TABLET ORAL at 09:12

## 2019-09-18 RX ADMIN — LISINOPRIL 10 MG: 5 TABLET ORAL at 09:12

## 2019-09-18 RX ADMIN — Medication 5 ML: at 05:03

## 2019-09-18 RX ADMIN — INFLUENZA VIRUS VACCINE 0.5 ML: 15; 15; 15; 15 SUSPENSION INTRAMUSCULAR at 10:41

## 2019-09-18 RX ADMIN — ALBUTEROL SULFATE 2.5 MG: 2.5 SOLUTION RESPIRATORY (INHALATION) at 08:55

## 2019-09-18 RX ADMIN — FUROSEMIDE 40 MG: 40 TABLET ORAL at 09:12

## 2019-09-18 RX ADMIN — APIXABAN 5 MG: 5 TABLET, FILM COATED ORAL at 09:12

## 2019-09-18 RX ADMIN — BUDESONIDE 500 MCG: 0.5 INHALANT RESPIRATORY (INHALATION) at 08:55

## 2019-09-18 RX ADMIN — ACETAMINOPHEN 650 MG: 325 TABLET, FILM COATED ORAL at 07:53

## 2019-09-18 RX ADMIN — TAMSULOSIN HYDROCHLORIDE 0.4 MG: 0.4 CAPSULE ORAL at 09:12

## 2019-09-18 NOTE — PROGRESS NOTES
Pt approved for admission to [address] [place] [address] for STR today, pt will be going to room 110 W phone [phone] ems transport arranged with Portia Leone for 1pm today, pt made aware and is agreeable asked to have son contacted, message left for Valdo Vargas listed in chart as son of pending transport.

## 2019-09-18 NOTE — PROGRESS NOTES
Pt noted for DC to STR today per md orders, message sent to facility to see if pt has been approved by insurance to go today.

## 2019-09-18 NOTE — PROGRESS NOTES
TRANSFER - OUT REPORT: 
 
Verbal report given to Senthil(name) on Clemencia Santana.  being transferred to [address] [place] [address] 110[address](unit) for routine progression of care Report consisted of patients Situation, Background, Assessment and  
Recommendations(SBAR). Information from the following report(s) SBAR, Kardex, OR Summary, Intake/Output, MAR, Accordion and Recent Results was reviewed with the receiving nurse. Lines:  
Peripheral IV 09/10/19 Left Antecubital (Active) Site Assessment Clean, dry, & intact 9/18/2019  7:46 AM  
Phlebitis Assessment 0 9/18/2019  7:46 AM  
Infiltration Assessment 0 9/18/2019  7:46 AM  
Dressing Status Clean, dry, & intact 9/18/2019  7:46 AM  
Dressing Type Tape;Trach dressing 9/18/2019  7:46 AM  
Hub Color/Line Status Capped 9/18/2019  7:46 AM  
Alcohol Cap Used No 9/18/2019  7:46 AM  
  
 
Opportunity for questions and clarification was provided. Patient transported with: 
 O2 @ 4 liters

## 2019-09-18 NOTE — PROGRESS NOTES
Patient seen and examined. No acute issues overnight. Both groin incision stable. Recommend continue dry dressings in groin at all times. Patient is able to get groin wet, do not scrub the surgical incision. The patient has no activity restriction from vascular standpoint. Discharge and follow-up in 2 weeks for postop check.     Rufus Schmitz

## 2019-09-18 NOTE — DISCHARGE SUMMARY
Hospitalist Discharge Summary     Patient ID:  Norberto Coombs  [id number]  70 y.o.  1940  Admit date: 9/9/2019  2:23 PM  Discharge date and time: 9/18/2019  Attending: Candelaria Ewing MD  PCP:  Karine Self MD  Treatment Team: Attending Provider: Candelaria Ewing MD; Utilization Review: Melvin Dykes RN; Consulting Provider: Nedra Elliott MD; Primary Nurse: Sammy Huynh; Care Manager: Indio Abraham RN; Charge Nurse: Isela Cleveland; Physical Therapy Assistant: Laron Garcia PTA    Principal Diagnosis GI bleed   Principal Problem:    GI bleed (9/9/2019)    Active Problems:    COPD (chronic obstructive pulmonary disease) ([address] (5/2/2014)      Hypertension (6/1/2017)      A-fib ([address] (7/23/2018)      Anemia (3/18/2019)      Chronic respiratory failure with hypoxia ([address] (9/9/2019)      Heart failure with reduced ejection fraction ([address] (9/9/2019)      CKD (chronic kidney disease), stage III ([address] (9/9/2019)      AAA (abdominal aortic aneurysm) ([address] (9/14/2019)             Hospital Course:  Please refer to the admission H&P for details of presentation. In summary, the patient is a 69 yo male with PMH of CKD3, HFrEF (EF 30-35%), iron deficient anemia, COPD with chronic hypoxia (3LNC), afib on eliquis,  who was evaluated in the ER on 9/9  with near syncope and hypotension. He received IVF bolus while in the field and BP normalized. Hgb down to 7.2 from his baseline of 8-9. Seen by GI, underwent EGD and colonoscopy on 9/12 with gastric and colon polyps removed. Patient complained of suprapubic pain on 9/13 and some fever which prompted a CT scan of the abdomen reporting a 5 cm AAA with stranding concerning for leak. Abdominal CT angiogram confirmed AAA with slow leak. Vascular surgery consulted, s/p endovascular repair of a 5 cm  AAA on 9/14. Symptomatic 5cm AAA with leak  S/p endovascular repair 9/14.  - recommend continue dry dressings in groin at all times. Patient is able to get groin wet, do not scrub the surgical incision. The patient has no activity restriction from vascular standpoint.   - f/u as outpt with Dr. Neftali Spann in 2 weeks     Urinary retention  - flomax     Chronic Afib  - Eliquis     CKD, stage 3  Cr stable around baseline. Anemia  Likely due to AAA \"leak\" +/- erosions seen on EGD.     Chronic systolic heart failure  EF 30-35% per ECHO 8/18.  - on home Lasix  - cont Toprol, Lisinopril     Chronic hypoxic resp failure  COPD, not in exacerbation  - home O2 3L NC  - cont inhalers     Liver lesions  Incidentally seen on CT.   - needs outpt US to ensure benign nature    Significant Diagnostic Studies:     XR CHEST SNGL V   Final Result      XR CHEST SNGL V   Final Result   IMPRESSION: Normal chest.      CTA ABD PELV W WO CONT   Final Result   Impression:  Fusiform infrarenal abdominal aortic aneurysm measures 4.8 x 4.9   cm. Periaortic, retroperitoneal, stranding density suggests aneurysm leak or   retroperitoneal inflammation. Given the clinical description of abdominal pain,   ongoing, slow aneurysm leak is most likely. Multiple low density liver lesions. Recommend abdominal ultrasound on an   elective basis to confirm that these are cysts. These images were reviewed with Dr. Alvarez Speaker at the time of dictation. CT ABD PELV WO CONT   Final Result   Addendum 1 of 1   Addendum: Addendum:       Impression should read as follows:   1. Large infrarenal abdominal aortic aneurysm measuring up to 4.8 cm with   minimal strandy changes in the surrounding retroperitoneal fat concerning    for   vascular leak.          Final      XR CHEST PORT   Final Result      IR FLUOROSCOPY < 1 HOUR    (Results Pending)       Labs: Results:       Chemistry Recent Labs     09/17/19  0618 09/16/19  0457 09/15/19  1934   GLU 98 94 107*    143 142   K 4.1 4.3 4.7    109* 107   CO2 27 30 32   BUN 28* 22 19   CREA 1.57* 1.42 1.53*   CA 9.3 8.6 8.6   AGAP 10 4* 3*      CBC w/Diff Recent Labs     09/17/19  0618 09/16/19  0457   WBC 13.3* 13.2*   RBC 3.08* 2.92*   HGB 8.6* 8.3*   HCT 27.9* 26.7*    205   GRANS  --  75   LYMPH  --  11*   EOS  --  0*      Cardiac Enzymes No results for input(s): CPK, CKND1, ALISHA in the last 72 hours. No lab exists for component: CKRMB, TROIP   Coagulation No results for input(s): PTP, INR, APTT in the last 72 hours. No lab exists for component: INREXT    Lipid Panel Lab Results   Component Value Date/Time    Cholesterol, total 172 08/13/2019 08:57 AM    HDL Cholesterol 53 08/13/2019 08:57 AM    LDL, calculated 98 08/13/2019 08:57 AM    VLDL, calculated 21 08/13/2019 08:57 AM    Triglyceride 105 08/13/2019 08:57 AM      BNP No results for input(s): BNPP in the last 72 hours. Liver Enzymes No results for input(s): TP, ALB, TBIL, AP, SGOT, GPT in the last 72 hours. No lab exists for component: DBIL   Thyroid Studies Lab Results   Component Value Date/Time    TSH 0.992 03/27/2019 11:03 AM            Discharge Exam:  Visit Vitals  /55 (BP 1 Location: Left arm, BP Patient Position: At rest)   Pulse 98   Temp 99.2 °F (37.3 °C)   Resp 18   Ht 5' 9\" (1.753 m)   Wt 76.5 kg (168 lb 9.6 oz)   SpO2 98%   BMI 24.90 kg/m²     GENERAL: alert, appears stated age  LUNG: decreased BS throughout  HEART: regular rate and rhythm, S1, S2 normal, no murmur, click, rub or gallop  ABDOMEN: nontender, nondistended  EXTREMITIES:  extremities normal, atraumatic, no cyanosis or edema  SKIN: Normal.  NEUROLOGIC: No focal deficits   PSYCHIATRIC: non focal    Disposition: SNF  Discharge Condition: stable  Patient Instructions:   Current Discharge Medication List      START taking these medications    Details   tamsulosin (FLOMAX) 0.4 mg capsule Take 1 Cap by mouth daily.   Qty: 30 Cap, Refills: 0         CONTINUE these medications which have NOT CHANGED    Details   albuterol-ipratropium (DUO-NEB) 2.5 mg-0.5 mg/3 ml nebu 3 mL by Nebulization route every six (6) hours as needed. Qty: 30 Nebule, Refills: 0      famotidine (PEPCID) 40 mg tablet Take 40 mg by mouth daily. furosemide (LASIX) 40 mg tablet Take 1 Tab by mouth daily. Qty: 30 Tab, Refills: 11    Associated Diagnoses: Congestive heart failure, unspecified HF chronicity, unspecified heart failure type (HCC)      nitroglycerin (NITROSTAT) 0.4 mg SL tablet 1 Tab by SubLINGual route every five (5) minutes as needed for Chest Pain. Qty: 1 Bottle, Refills: 11      rosuvastatin (CRESTOR) 40 mg tablet Take 40 mg by mouth nightly. docusate sodium (COLACE) 100 mg capsule Take 100 mg by mouth daily. amLODIPine (NORVASC) 10 mg tablet Take  by mouth daily. budesonide (PULMICORT) 0.5 mg/2 mL nbsp 2 mL by Nebulization route two (2) times a day. Qty: 1 Each, Refills: 0      metoprolol succinate (TOPROL-XL) 50 mg XL tablet Take 1 Tab by mouth daily. Qty: 30 Tab, Refills: 11      apixaban (ELIQUIS) 5 mg tablet Take 1 Tab by mouth two (2) times a day. Qty: 60 Tab, Refills: 11    Associated Diagnoses: Atrial fibrillation and flutter ([address] Paroxysmal atrial fibrillation ([address] Acute on chronic combined systolic and diastolic congestive heart failure ([address] Typical atrial flutter (HCC)      lisinopril (PRINIVIL, ZESTRIL) 10 mg tablet Take 1 Tab by mouth daily. Qty: 30 Tab, Refills: 0      fluticasone-salmeterol (ADVAIR) 250-50 mcg/dose diskus inhaler Take 1 Puff by inhalation. allopurinol (ZYLOPRIM) 300 mg tablet Take  by mouth daily. OXYGEN-AIR DELIVERY SYSTEMS Use as instructed. Use as instructed. ferrous sulfate (IRON) 325 mg (65 mg iron) tablet Take  by mouth Daily (before breakfast). brimonidine (ALPHAGAN P) 0.1 % ophthalmic solution every eight (8) hours.              Activity: PT/OT Eval and Treat  Diet: Cardiac Diet  Wound Care: As directed    Follow-up with staff MD in 3-5 days, vasc surg in 2 weeks  ·     Time spent to discharge patient 35 minutes  Signed:  Ashutosh Wilkinson MD  9/18/2019  9:45 AM

## 2019-09-18 NOTE — PROGRESS NOTES
Problem: Mobility Impaired (Adult and Pediatric) Goal: *Acute Goals and Plan of Care Goals Updated 9/16/19 PT Re-evaluation: 
 
Acute PT Goals: (1.)Santiago Donna Bruins. will move from supine to sit and sit to supine , scoot up and down and roll side to side with INDEPENDENT within 7 treatment day(s). (2.)Santiago Donna Bruins. will transfer from bed to chair and chair to bed with MODIFIED INDEPENDENCE using the least restrictive device within 7 treatment day(s). (3.)Santiago Donna Bruins. will ambulate with MODIFIED INDEPENDENCE for 1000 feet with the least restrictive device with normal vital sign response within 7 treatment day(s). (4.)Santiago Donna Bruins. will perform standing static and dynamic balance activities x 25 minutes with MODIFIED INDEPENDENCE to improve safety and activity tolerance within 7 treatment day(s). (5.)Santiago Donna Bruins. will perform bilateral lower extremity exercises x 20 min for HEP with INDEPENDENCE to improve strength, endurance, and functional mobility within 7 treatment day(s). PHYSICAL THERAPY: Daily Note and AM 9/18/2019 INPATIENT: PT Visit Days : 2 Payor: Monica Luis / Plan: [address] / Product Type: [employer] Care Medicare /   
  
NAME/AGE/GENDER: Melissa Zuniga is a 70 y.o. male PRIMARY DIAGNOSIS: GI bleed [K92.2] GI bleed [K92.2] AAA (abdominal aortic aneurysm) ([facility]) [I71.4] GI bleed GI bleed Procedure(s) (LRB): 
AORTIC ABDOMINAL ANEURYSM REPAIR ENDOVASCULAR (EVAR) Patient in room 614 (N/A) 4 Days Post-Op ICD-10: Treatment Diagnosis:  
 · Generalized Muscle Weakness (M62.81) · Difficulty in walking, Not elsewhere classified (R26.2) · Other abnormalities of gait and mobility (R26.89) · Dizziness and Giddiness (R42) Precaution/Allergies: 
Statins-hmg-coa reductase inhibitors ASSESSMENT:  
 
Mr. Doug Davila is a 70year old M who presented to hospital on 9/9/16 with weakness and progressive dizziness over past few days. He was evaluated by PT on 9/10/19. Pt underwent EGD and colonoscopy on 9/12/19. On 9/13/19 due to suprapubic pain pt had CT performed, found to have a 5cm intrarenal aortic aneurysm (AAA). Underwent endovascular repair on 9/14/19. Pt now appropriate for PT re-evaluation to assess current mobility and discharge recommendations. Goals have been updated to reflect pt's current level of function. Prior to hospital admission pt lives alone in a one story apartment with no step(s) to enter (uses elevator). Prior to admission Mr. Jason Page uses a rolling walker for mobility and is on 3L O2 at baseline. Patient was supine upon contact and agreeable to PT with some gentle encouragement. Patient requires additional time throughout treatment due to post op soreness and generalized weakness/fatigue. Patient performs supine to sit with mod I and transfer to standing with CGA. Once standing patient ambulates 250' with rolling walker, at a slow, steady gait speed with several short standing rest breaks due to fatigue. Patient returns to his room where he participates in static/dynamic sitting/standing ADL activities demonstrating good/fair static standing balance and fair dynamic standing balance. Patient requires increased assistance once fatigued. Patient requested to remain sitting EOB post treatment. Overall slow progress towards physical therapy goals. Patient's goals listed above are still appropriate. Will continue skilled PT to address remaining deficits. This section established at most recent assessment PROBLEM LIST (Impairments causing functional limitations): 1. Decreased Transfer Abilities 2. Decreased Ambulation Ability/Technique 3. Decreased Balance 4. Increased Pain 5. Decreased Activity Tolerance 6. Increased Fatigue 7. Increased Shortness of Breath  INTERVENTIONS PLANNED: (Benefits and precautions of physical therapy have been discussed with the patient.) 1. Balance Exercise 2. Bed Mobility 3. Family Education 4. Gait Training 5. Home Exercise Program (HEP) 6. Neuromuscular Re-education/Strengthening 7. Range of Motion (ROM) 8. Therapeutic Activites 9. Therapeutic Exercise/Strengthening 10. Transfer Training TREATMENT PLAN: Frequency/Duration: 3 times a week for duration of hospital stay Rehabilitation Potential For Stated Goals: Good REHAB RECOMMENDATIONS (at time of discharge pending progress):   
Placement: It is my opinion, based on this patient's performance to date, that Mr. Gifford Hashimoto may benefit from intensive therapy at a [address] [place] [address] after discharge due to the functional deficits listed above that are likely to improve with skilled rehabilitation and concerns that he/she may be unsafe to be unsupervised at home due to medical complications and mobility deficits including increased pain, decreased activity tolerance, and overall decreased balance and mobility which puts him at increase risk of functional decline and/or falling. [place] Ace Equipment:  
? None at this time HISTORY:  
History of Present Injury/Illness (Reason for Referral): Most Recent HPI: Mr. Gifford Hashimoto is a 71 yo male with PMH of CKD3, HFrEF (EF 30-35%), iron deficient anemia, COPD with chronic hypoxia, afib on eliquis,  who was  Evaluated in the ER on 9/9  with near syncope and hypotension. He received IVF bolus while in the field and BP normalized. HGB noted to be down to 7.2 from his baseline of 8-9. He denied jaiden GI bleeding. Seen by GI at admission. Received 1 U of pRBCs. Had EGD and colonoscopy on 9/12 with gastric and colon polyps removed. Patient complained of suprapubic pain on 9/13 and some fever which prompted a CT scan of the abdomen reporting a 5 cm AAA with perilesional tissue straining. Seen by vascular. Abdominal CT angiogram confirmed AAA. Vascular surgery scheduled patient to have surgery on 9/14. Patient had an endovascular  repair of a 5 cm  AA aneurysm on 9/14. Stable after the procedure. He was transferred to the ICU for proper monitoring and stayed there for a few hours and then was transferred to the medical floor. He is very weak and needs lot of help even to get out of bed. Will request a new PT eval. Patient will need to go to a STR facility. \" 
Past Medical History/Comorbidities:  
Mr. 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 He also has no past medical history of Autoimmune disease ([address] Dementia, Liver disease, Neurological disorder, Other ill-defined conditions(799.89), Psychiatric disorder, Seizures ([address] or Sleep disorder. Mr. Nichelle Ac  has a past surgical history that includes hx heart catheterization and colonoscopy (N/A, 9/12/2019). Social History/Living Environment:  
Home Environment: Apartment # Steps to Enter: (elevator) One/Two Story Residence: One story Living Alone: Yes Support Systems: None Patient Expects to be Discharged to[de-identified] [facility] Current DME Used/Available at Home: Noralyn Petra, rollator, Shower chair, Oxygen, portable Tub or Shower Type: Tub/Shower combination Prior Level of Function/Work/Activity: 
Independent with rollator and O2 Age, COPD   
 Number of Personal Factors/Comorbidities that affect the Plan of Care: 1-2: MODERATE COMPLEXITY EXAMINATION:  
Most Recent Physical Functioning:  
Gross Assessment: 
  
         
  
Posture: 
  
Balance: 
Sitting: Intact Standing: Impaired Standing - Static: Good;Fair 
Standing - Dynamic : Fair Bed Mobility: 
Supine to Sit: Modified independent Wheelchair Mobility: 
  
Transfers: 
Sit to Stand: Contact guard assistance Stand to Sit: Contact guard assistance Gait: 
  
Base of Support: Center of gravity altered Speed/Cassie: Shuffled; Slow Step Length: Left shortened;Right shortened Gait Abnormalities: Decreased step clearance;Trunk sway increased; Path deviations Distance (ft): 250 Feet (ft) Assistive Device: Walker, rolling Ambulation - Level of Assistance: Contact guard assistance Interventions: Tactile cues; Verbal cues; Safety awareness training Body Structures Involved: 1. Muscles Body Functions Affected: 1. Movement Related Activities and Participation Affected: 1. Mobility Number of elements that affect the Plan of Care: 3: MODERATE COMPLEXITY CLINICAL PRESENTATION:  
Presentation: Evolving clinical presentation with changing clinical characteristics: MODERATE COMPLEXITY CLINICAL DECISION MAKING:  
[facility] 6 Clicks Basic Mobility Inpatient Short Form How much difficulty does the patient currently have. .. Unable A Lot A Little None 1. Turning over in bed (including adjusting bedclothes, sheets and blankets)? ? 1   ? 2   ? 3   ? 4  
2. Sitting down on and standing up from a chair with arms ( e.g., wheelchair, bedside commode, etc.)   ? 1   ? 2   ? 3   ? 4  
3. Moving from lying on back to sitting on the side of the bed?   ? 1   ? 2   ? 3   ? 4 How much help from another person does the patient currently need. .. Total A Lot A Little None 4. Moving to and from a bed to a chair (including a wheelchair)?    ? 1   ? 2   ? 3   ? 4  
 5.  Need to walk in hospital room? ? 1   ? 2   ? 3   ? 4  
6. Climbing 3-5 steps with a railing? ? 1   ? 2   ? 3   ? 4  
© 2007, Trustees of [address] [place] [address] [address] [address], under license to [employer]. All rights reserved Score:  Initial: 20 Most Recent: 18 (Date: 9/16/19) Interpretation of Tool:  Represents activities that are increasingly more difficult (i.e. Bed mobility, Transfers, Gait). Medical Necessity:    
· Patient is expected to demonstrate progress in functional technique ·  to increase independence with mobility and gait. · . Reason for Services/Other Comments: 
· Patient continues to require present interventions due to patient's inability to function at baseline. · . Use of outcome tool(s) and clinical judgement create a POC that gives a: Questionable prediction of patient's progress: MODERATE COMPLEXITY  
  
 
TREATMENT:  
(In addition to Assessment/Re-Assessment sessions the following treatments were rendered) Pre-treatment Symptoms/Complaints:  See above Pain: Initial:  
Pain Intensity 1: 0  Post Session: 0 Therapeutic Activity: (   23 Minutes): Therapeutic activities including Bed mobility training, transfer training, ambulation on level ground, static/dynamic sitting/standing balance activities, instruction in sequencing with rolling walker, scooting, and patient education to improve mobility, strength, balance, coordination and activity tolerance. Required minimal Tactile cues; Verbal cues; Safety awareness training to promote static and dynamic balance in standing and promote coordination of bilateral, upper extremity(s), lower extremity(s). Braces/Orthotics/Lines/Etc:  
· O2 Device: Nasal cannula Treatment/Session Assessment:   
· Response to Treatment:  See above · Interdisciplinary Collaboration:  
o Physical Therapy Assistant 
o Registered Nurse · After treatment position/precautions:  
o Bed/Chair-wheels locked 
o Bed in low position o Call light within reach 
o RN notified 
o Seated EOB · Compliance with Program/Exercises: Will assess as treatment progresses · Recommendations/Intent for next treatment session: \"Next visit will focus on advancements to more challenging activities and reduction in assistance provided\". Total Treatment Duration: PT Patient Time In/Time Out Time In: 0930 Time Out: 0825 Ren Lees, PTA

## 2019-09-18 NOTE — PROGRESS NOTES
Problem: Patient Education: Go to Patient Education Activity  Goal: Patient/Family Education  Outcome: Progressing Towards Goal     Problem: Upper and Lower GI Bleed: Day 3  Goal: Activity/Safety  Outcome: Progressing Towards Goal  Goal: Diagnostic Test/Procedures  Outcome: Progressing Towards Goal  Goal: Nutrition/Diet  Outcome: Progressing Towards Goal  Goal: Discharge Planning  Outcome: Progressing Towards Goal  Goal: Medications  Outcome: Progressing Towards Goal  Goal: Treatments/Interventions/Procedures  Outcome: Progressing Towards Goal  Goal: Psychosocial  Outcome: Progressing Towards Goal     Problem: Falls - Risk of  Goal: *Absence of Falls  Description  Document Kaveh Pickering Fall Risk and appropriate interventions in the flowsheet.   Outcome: Progressing Towards Goal  Note:   Fall Risk Interventions:  Mobility Interventions: Bed/chair exit alarm, Patient to call before getting OOB    Mentation Interventions: Adequate sleep, hydration, pain control, Bed/chair exit alarm, Door open when patient unattended, Evaluate medications/consider consulting pharmacy, Increase mobility, Reorient patient, Update white board    Medication Interventions: Patient to call before getting OOB, Teach patient to arise slowly    Elimination Interventions: Call light in reach, Patient to call for help with toileting needs, Urinal in reach              Problem: Patient Education: Go to Patient Education Activity  Goal: Patient/Family Education  Outcome: Progressing Towards Goal     Problem: Breathing Pattern - Ineffective  Goal: *Absence of hypoxia  Outcome: Progressing Towards Goal

## 2019-09-19 ENCOUNTER — PATIENT OUTREACH (OUTPATIENT)
Dept: CASE MANAGEMENT | Age: 79
End: 2019-09-19

## 2019-09-19 NOTE — PROGRESS NOTES
This note will not be viewable in [address] [facility] outreach postponed for 21 days due to discharge to non-preferred network SNF [facility]) for STR.

## 2019-09-23 ENCOUNTER — APPOINTMENT (OUTPATIENT)
Dept: GENERAL RADIOLOGY | Age: 79
End: 2019-09-23
Attending: EMERGENCY MEDICINE
Payer: MEDICARE

## 2019-09-23 ENCOUNTER — HOSPITAL ENCOUNTER (EMERGENCY)
Age: 79
Discharge: HOME | End: 2019-09-23
Attending: EMERGENCY MEDICINE
Payer: MEDICARE

## 2019-09-23 VITALS
WEIGHT: 168 LBS | BODY MASS INDEX: 24.88 KG/M2 | RESPIRATION RATE: 34 BRPM | SYSTOLIC BLOOD PRESSURE: 114 MMHG | HEART RATE: 88 BPM | TEMPERATURE: 99 F | OXYGEN SATURATION: 100 % | DIASTOLIC BLOOD PRESSURE: 51 MMHG | HEIGHT: 69 IN

## 2019-09-23 DIAGNOSIS — D64.9 ANEMIA, UNSPECIFIED TYPE: Primary | ICD-10-CM

## 2019-09-23 DIAGNOSIS — R53.83 FATIGUE, UNSPECIFIED TYPE: ICD-10-CM

## 2019-09-23 LAB
AMPHET UR QL SCN: NEGATIVE
ANION GAP SERPL CALC-SCNC: 1 MMOL/L (ref 7–16)
ATRIAL RATE: 416 BPM
BARBITURATES UR QL SCN: NEGATIVE
BASOPHILS # BLD: 0 K/UL (ref 0–0.2)
BASOPHILS NFR BLD: 0 % (ref 0–2)
BENZODIAZ UR QL: NEGATIVE
BUN SERPL-MCNC: 31 MG/DL (ref 8–23)
CALCIUM SERPL-MCNC: 8.9 MG/DL (ref 8.3–10.4)
CALCULATED R AXIS, ECG10: 70 DEGREES
CALCULATED T AXIS, ECG11: 80 DEGREES
CANNABINOIDS UR QL SCN: NEGATIVE
CHLORIDE SERPL-SCNC: 106 MMOL/L (ref 98–107)
CO2 SERPL-SCNC: 38 MMOL/L (ref 21–32)
COCAINE UR QL SCN: NEGATIVE
CREAT SERPL-MCNC: 1.45 MG/DL (ref 0.8–1.5)
DIAGNOSIS, 93000: NORMAL
DIFFERENTIAL METHOD BLD: ABNORMAL
EOSINOPHIL # BLD: 0.1 K/UL (ref 0–0.8)
EOSINOPHIL NFR BLD: 2 % (ref 0.5–7.8)
ERYTHROCYTE [DISTWIDTH] IN BLOOD BY AUTOMATED COUNT: 16.4 % (ref 11.9–14.6)
GLUCOSE SERPL-MCNC: 102 MG/DL (ref 65–100)
HCT VFR BLD AUTO: 26.3 % (ref 41.1–50.3)
HGB BLD-MCNC: 7.9 G/DL (ref 13.6–17.2)
IMM GRANULOCYTES # BLD AUTO: 0 K/UL (ref 0–0.5)
IMM GRANULOCYTES NFR BLD AUTO: 1 % (ref 0–5)
LYMPHOCYTES # BLD: 1.4 K/UL (ref 0.5–4.6)
LYMPHOCYTES NFR BLD: 20 % (ref 13–44)
MAGNESIUM SERPL-MCNC: 2.4 MG/DL (ref 1.8–2.4)
MCH RBC QN AUTO: 27.8 PG (ref 26.1–32.9)
MCHC RBC AUTO-ENTMCNC: 30 G/DL (ref 31.4–35)
MCV RBC AUTO: 92.6 FL (ref 79.6–97.8)
METHADONE UR QL: NEGATIVE
MONOCYTES # BLD: 1 K/UL (ref 0.1–1.3)
MONOCYTES NFR BLD: 14 % (ref 4–12)
NEUTS SEG # BLD: 4.4 K/UL (ref 1.7–8.2)
NEUTS SEG NFR BLD: 63 % (ref 43–78)
NRBC # BLD: 0 K/UL (ref 0–0.2)
OPIATES UR QL: NEGATIVE
PCP UR QL: NEGATIVE
PLATELET # BLD AUTO: 505 K/UL (ref 150–450)
PMV BLD AUTO: 10.2 FL (ref 9.4–12.3)
POTASSIUM SERPL-SCNC: 4.1 MMOL/L (ref 3.5–5.1)
Q-T INTERVAL, ECG07: 356 MS
QRS DURATION, ECG06: 62 MS
QTC CALCULATION (BEZET), ECG08: 405 MS
RBC # BLD AUTO: 2.84 M/UL (ref 4.23–5.6)
SODIUM SERPL-SCNC: 145 MMOL/L (ref 136–145)
TROPONIN I BLD-MCNC: 0 NG/ML (ref 0.02–0.05)
VENTRICULAR RATE, ECG03: 78 BPM
WBC # BLD AUTO: 7 K/UL (ref 4.3–11.1)

## 2019-09-23 PROCEDURE — 80048 BASIC METABOLIC PNL TOTAL CA: CPT

## 2019-09-23 PROCEDURE — 74011000250 HC RX REV CODE- 250: Performed by: EMERGENCY MEDICINE

## 2019-09-23 PROCEDURE — 99284 EMERGENCY DEPT VISIT MOD MDM: CPT | Performed by: EMERGENCY MEDICINE

## 2019-09-23 PROCEDURE — 84484 ASSAY OF TROPONIN QUANT: CPT

## 2019-09-23 PROCEDURE — 81003 URINALYSIS AUTO W/O SCOPE: CPT | Performed by: EMERGENCY MEDICINE

## 2019-09-23 PROCEDURE — 99285 EMERGENCY DEPT VISIT HI MDM: CPT | Performed by: EMERGENCY MEDICINE

## 2019-09-23 PROCEDURE — 83735 ASSAY OF MAGNESIUM: CPT

## 2019-09-23 PROCEDURE — 93005 ELECTROCARDIOGRAM TRACING: CPT | Performed by: EMERGENCY MEDICINE

## 2019-09-23 PROCEDURE — 94640 AIRWAY INHALATION TREATMENT: CPT

## 2019-09-23 PROCEDURE — 85025 COMPLETE CBC W/AUTO DIFF WBC: CPT

## 2019-09-23 PROCEDURE — 80307 DRUG TEST PRSMV CHEM ANLYZR: CPT

## 2019-09-23 PROCEDURE — 71046 X-RAY EXAM CHEST 2 VIEWS: CPT

## 2019-09-23 RX ORDER — IPRATROPIUM BROMIDE AND ALBUTEROL SULFATE 2.5; .5 MG/3ML; MG/3ML
3 SOLUTION RESPIRATORY (INHALATION)
Status: COMPLETED | OUTPATIENT
Start: 2019-09-23 | End: 2019-09-23

## 2019-09-23 RX ADMIN — IPRATROPIUM BROMIDE AND ALBUTEROL SULFATE 3 ML: .5; 3 SOLUTION RESPIRATORY (INHALATION) at 11:27

## 2019-09-23 NOTE — ED PROVIDER NOTES
HPI: 
70 male brought in from rehab facility with feeling of generalized fatigue, weakness, diarrhea. Denies any chest pain, shortness of breath. No urinary pain. Recently in the hospital and had abdominal aneurysm with leak repair. He denies any tingling or numbness in the lower extremities. No pain over the groin at his surgical sites. No constipation but very loose stool. Denies any vomiting. Is not aware that he has had any fever. He denies any chest pain. He has COPD on Oxygen. Has chronic shortness of breath. Has used albuterol. ROS Constitutional: No fever, no chills Skin: no rash Eye:  
ENMT:  
Respiratory: + shortness of breath, no cough Cardiovascular: No chest pain, no palpitations Gastrointestinal: No vomiting, no nausea, + diarrhea, no abdominal pain : No dysuria MSK: No back pain, no muscle pain, no joint pain Neuro: No headache, no change in mental status, no numbness, no tingling, + weakness Psych:  
Endocrine:  
All other review of systems positive per history of present illness and the above otherwise negative or noncontributory. Visit Vitals /62 (BP 1 Location: Left arm, BP Patient Position: At rest) Pulse 81 Temp 99 °F (37.2 °C) Resp 18 Ht 5' 9\" (1.753 m) Wt 76.2 kg (168 lb) SpO2 (P) 100% BMI 24.81 kg/m² Past Medical History:  
Diagnosis Date  A-fib ([address] 5/2/2014  AAA (abdominal aortic aneurysm) without rupture ([address] 5/2/2014  
 3.2 on US 2/14 [facility]  Arthritis  Cancer ([address]   
 hx prostate cancer  COPD (chronic obstructive pulmonary disease) ([address] 5/2/2014  Elevated prostate specific antigen (PSA)  Glaucoma 5/2/2014  Heart disease  Heart failure ([address]  Hyperlipemia 5/2/2014  Hypertension  Hypertrophy of prostate with urinary obstruction and other lower urinary tract symptoms (LUTS)  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  OA (osteoarthritis) 5/2/2014  Peptic ulcer disease 6/1/2017  Poor historian  Prostate cancer [facility]  Pulmonary embolus ([facility] 2017  Supplemental oxygen dependent 2014  Warfarin anticoagulation 2014 Past Surgical History:  
Procedure Laterality Date  COLONOSCOPY N/A 2019 COLONOSCOPY/ 26 ROOM 614 performed by Yaquelin Castaneda MD at [facility] 24 Prior to Admission Medications Prescriptions Last Dose Informant Patient Reported? Taking? OXYGEN-AIR DELIVERY SYSTEMS   Yes No  
Sig: Use as instructed. Use as instructed. albuterol-ipratropium (DUO-NEB) 2.5 mg-0.5 mg/3 ml nebu   No No  
Sig: 3 mL by Nebulization route every six (6) hours as needed. allopurinol (ZYLOPRIM) 300 mg tablet   Yes No  
Sig: Take  by mouth daily. amLODIPine (NORVASC) 10 mg tablet   Yes No  
Sig: Take  by mouth daily. apixaban (ELIQUIS) 5 mg tablet   No No  
Sig: Take 1 Tab by mouth two (2) times a day. brimonidine (ALPHAGAN P) 0.1 % ophthalmic solution   Yes No  
Sig: every eight (8) hours. budesonide (PULMICORT) 0.5 mg/2 mL nbsp   No No  
Si mL by Nebulization route two (2) times a day. docusate sodium (COLACE) 100 mg capsule   Yes No  
Sig: Take 100 mg by mouth daily. famotidine (PEPCID) 40 mg tablet   Yes No  
Sig: Take 40 mg by mouth daily. ferrous sulfate (IRON) 325 mg (65 mg iron) tablet   Yes No  
Sig: Take  by mouth Daily (before breakfast). fluticasone-salmeterol (ADVAIR) 250-50 mcg/dose diskus inhaler   Yes No  
Sig: Take 1 Puff by inhalation. furosemide (LASIX) 40 mg tablet   No No  
Sig: Take 1 Tab by mouth daily. lisinopril (PRINIVIL, ZESTRIL) 10 mg tablet   No No  
Sig: Take 1 Tab by mouth daily. metoprolol succinate (TOPROL-XL) 50 mg XL tablet   No No  
Sig: Take 1 Tab by mouth daily. nitroglycerin (NITROSTAT) 0.4 mg SL tablet   No No  
Si Tab by SubLINGual route every five (5) minutes as needed for Chest Pain.   
rosuvastatin (CRESTOR) 40 mg tablet   Yes No  
 Sig: Take 40 mg by mouth nightly. tamsulosin (FLOMAX) 0.4 mg capsule   No No  
Sig: Take 1 Cap by mouth daily. Facility-Administered Medications: None Adult Exam  
General: alert, no acute distress Head: normocephalic, atraumatic ENT: moist mucous membranes Neck: supple, non-tender; full range of motion Cardiovascular: regular rate and rhythm, normal peripheral perfusion, no edema, equal pulses in upper and lower extremity Respiratory: Diminished breath sound bilaterally. On 4 L nasal cannula at 95%. No wheezing Gastrointestinal: soft, non-tender; no rebound or guarding, no peritoneal signs, no distension Surgical wound on bilateral groin is clean, dry, intact without any tenderness. Back: non-tender, full range of motion Musculoskeletal: normal range of motion, normal strength, no gross deformities Neurological: alert and oriented x 4, no gross focal deficits; normal speech Psychiatric: cooperative; appropriate mood and affect MDM: EKG rate of 78. Normal axis. Sinus rhythm. PVC noted. No STEMI or ischemic changes noted. Currently not in A. Fib Blood pressure is 139/62 here. Reported by rehab facility 90/36. Not tachycardic. We will give DuoNeb. Will reassess. He has no abdominal pain. There was also secondary report of hemoglobin of 7.6 with lab from 9/20 compared to 8.6 from 9/17 
 
2:02 PM 
Patient in x-ray still. Awaiting for patient to come back for reassessment. Chest x-ray unremarkable. According to nursing staff he stated he wanted to go home prior to going to x-ray. His urine is negative for any signs of infection. 2:19 PM 
No signs of infectious etiology. Stable for discharge. He would like to go home. Unsure why he was here for so long to begin with. Xr Chest Sngl V Result Date: 9/15/2019 EXAM: TEMPORARY INDICATION: Pulmonary edema COMPARISON: 9/14/2019 FINDINGS: A portable AP radiograph of the chest was obtained at 0420 hours. The patient is on a cardiac monitor. The lungs are clear. The cardiac and mediastinal contours and pulmonary vascularity are normal.  The bones and soft tissues are grossly within normal limits. IMPRESSION: Normal chest. 
 
Xr Chest Sngl V Result Date: 9/14/2019 EXAM: XR CHEST SNGL V INDICATION: SOB, trouble breathing COMPARISON: 9/9/2019 FINDINGS: A portable AP radiograph of the chest was obtained at 0328 hours. The patient is on a cardiac monitor. The lungs are clear. The cardiac and mediastinal contours and pulmonary vascularity are normal.  The bones and soft tissues are grossly within normal limits. IMPRESSION: Normal chest. 
 
Xr Chest Pa Lat Result Date: 9/23/2019 PA AND LATERAL CHEST X-RAY. Clinical Indication: Weakness, recent abdominal surgery Comparison: Chest x-ray dated 9/15/2019 Findings: 2 views of the chest submitted demonstrate the cardiac silhouette and mediastinum to be unremarkable. There is no pleural effusion or pneumothorax. The lungs are hyperexpanded but clear. The bones are unremarkable. IMPRESSION: Hyperexpanded lungs, otherwise no acute abnormality. Ir Fluoroscopy < 1 Hour Result Date: 9/18/2019 Administrative Report IMPRESSION: Fluoroscopy for greater than 60 minutes was provided in the operating room. Ct Abd Pelv Wo Cont Addendum Date: 9/13/2019 Addendum: Addendum: Impression should read as follows: 1. Large infrarenal abdominal aortic aneurysm measuring up to 4.8 cm with minimal strandy changes in the surrounding retroperitoneal fat concerning for vascular leak. Result Date: 9/13/2019 Clinical History: The patient is a 70years year old Male presenting with symptoms of lower abdominal pain/ had colonoscopy / polypectomy yesterday Comparison:  None. Technique:   Thin slice axial images were obtained through the abdomen and pelvis without intravenous and with oral contrast. Coronal reformatted images were also provided for review. All CT scans at this facility are performed using dose reduction/dose modulation techniques, as appropriate the performed exam, including the following: Automated Exposure Control; Adjustment of the mA and/or kV according to patient size (this includes techniques or standardized protocols for targeted exams where dose is matched to indication/reason for exam); and Use of Iterative Reconstruction Technique. Radiation Exposure Indices: Reference Air Kerma (Ka,r) = 542 mGy-cm Findings:  Abdomen: Lung bases: Clear without evidence of focal infiltrate, consolidation, or effusion. Liver: There are multiple small probable cysts scattered throughout the liver the largest in the medial right lower lobe measuring approximately 2 cm. Biliary: Cystectomy. No evidence of intra or extrahepatic biliary dilatation. Pancreas: Normal in size and contour without focal lesion. Spleen: Normal in size and contour without focal lesion. Adrenal glands: Normal in size without focal lesion. Kidneys: Symmetric without evidence of hydronephrosis or nephrolithiasis. There are also cysts in both kidneys. Bowel: There are sigmoid colonic diverticula without evidence of active diverticulitis. Contrast fills a normal appendix. Retroperitoneum/vasculature: No evidence of significant adenopathy. There is a large infrarenal abdominal aortic aneurysm maximally measuring 4.8 cm in diameter with slight strandy changes in the surrounding retroperitoneal fat the proximal iliac vessels are also ectatic particularly on the right measuring 2.2 cm. Abdominal soft tissues: Unremarkable. Osseous structures: No acute osseous abnormality. Pelvis: Unremarkable bladder. No significant adenopathy or free fluid. . The prostate is unremarkable IMPRESSION: 1.  Large infrarenal abdominal aortic aneurysm measuring up to 4.8 cm with minimal strandy changes in the surrounding retroperitoneal fat concerning for bile leak. 2. Incidental sigmoid diverticula. 3. Probable hepatic and renal cysts. [facility] The critical results contained in this report were communicated to the patient's hospitalist  by  Dr Beatriz Hale on 9/13/2019 3:38 PM. Critical results were communicated as outlined in Section II.C.2.a.i of the ACR Practice Guideline for Communication of Diagnostic Imaging Findings. CPT code(s): X5941487, [id number] Cta Abd Pelv W Wo Cont Result Date: 9/13/2019 Title:  CT arteriogram of the abdomen and pelvis. Indication:  Known abdominal aortic aneurysm in a patient with new abdominal pain. Unenhanced CT scan earlier in the day demonstrates stranding density in the periaortic retroperitoneal fat. Technique: Axial images of the abdomen and pelvis were obtained after the administration of intravenous iodinated contrast media. Contrast was used to best identify the arterial structures. Images were reviewed on a separate, free standing, three-dimensional workstation as per the referring physicians request.  All CT scans at this facility are performed using dose reduction/dose modulation techniques, as appropriate the performed exam, including the following: Automated Exposure Control; Adjustment of the mA and/or kV according to patient size (this includes techniques or standardized protocols for targeted exams where dose is matched to indication/reason for exam); and Use of Iterative Reconstruction Technique. Comparison: Unenhanced CT 9/13/2019. Findings: Chest:  The visualized lung bases demonstrate hyperexpanded lungs consistent with emphysema. Coronary artery calcification. Abdomen:  Multiple, low density, nonenhancing, rounded lesions scattered throughout both lobes of the liver. These most likely represent cysts. Gallbladder is not visualized. Unremarkable pancreas, spleen and adrenal glands. Symmetric nephrograms. Multiple, rounded, low-density cystic lesions in the kidneys. No hydronephrosis. Oral contrast from the earlier examination has passed into the colon. There are no dilated bowel loops. There is sigmoid diverticulosis. Musculoskeletal:  No destructive bone lesion. Vascular:  Atherosclerotic calcification and wall thickening in the visualized portion of descending thoracic aorta and in the perimesenteric aorta. There is a fusiform infrarenal abdominal aortic aneurysm with a thick thrombus rind. The aneurysm measures 4.8 x 4.9 cm (AP by transverse). There is anterior angulation of the aneurysm neck. There is an increased, stranding the, density in the retroperitoneal fat associated with the aneurysm. This stranding density extends to the level of the common iliac arteries. This finding is suggestive of aneurysm leak or retroperitoneal inflammation. The aneurysm extends into the common iliac arteries. The right common iliac artery measures 2.1 cm. The left common iliac artery measures 1.4 cm. Although narrowed, both internal iliac arteries are patent. There is fairly extensive atherosclerotic disease in the tortuous external iliac and common femoral arteries. However, there is no high-grade stenosis in these vessels. The origins of the superficial femoral and profunda femoral arteries are patent. Diseased, but patent celiac artery. Patent splenic and hepatic arteries. Patent superior mesenteric artery. Patent inferior mesenteric artery (arising from the aneurysm). 40% narrowing of the main right renal artery. Stenotic accessory right renal artery arises adjacent to the main renal artery. Diseased, but patent, single left renal artery. Impression:  Fusiform infrarenal abdominal aortic aneurysm measures 4.8 x 4.9 cm. Periaortic, retroperitoneal, stranding density suggests aneurysm leak or retroperitoneal inflammation. Given the clinical description of abdominal pain, ongoing, slow aneurysm leak is most likely.  Multiple low density liver lesions. Recommend abdominal ultrasound on an elective basis to confirm that these are cysts. These images were reviewed with Dr. Hua Camilo at the time of dictation. Xr Chest [facility] Result Date: 9/9/2019 History: Hypotension Exam: portable chest Comparison: 9/13/2018 Findings: No focal alveolar infiltrate or pleural effusion. No change in the appearance of the mediastinal contour or osseous structures. Impressions: Stable portable chest  
 
 
 
Recent Results (from the past 12 hour(s)) EKG, 12 LEAD, INITIAL Collection Time: 09/23/19 11:21 AM  
Result Value Ref Range Ventricular Rate 78 BPM  
 Atrial Rate 416 BPM  
 QRS Duration 62 ms Q-T Interval 356 ms  
 QTC Calculation (Bezet) 405 ms Calculated R Axis 70 degrees Calculated T Axis 80 degrees Diagnosis    
  !! AGE AND GENDER SPECIFIC ECG ANALYSIS !! 
nsr with occasional Premature ventricular complexes Nonspecific ST and T wave abnormality Abnormal ECG When compared with ECG of 09-SEP-2019 14:36, 
ST no longer elevated in Inferior leads Confirmed by Nuria Muro MD (), MARISOL ROBLERO ([id number]) on 9/23/2019 12:59:56 PM 
  
CBC WITH AUTOMATED DIFF Collection Time: 09/23/19 11:25 AM  
Result Value Ref Range WBC 7.0 4.3 - 11.1 K/uL  
 RBC 2.84 (L) 4.23 - 5.6 M/uL HGB 7.9 (L) 13.6 - 17.2 g/dL HCT 26.3 (L) 41.1 - 50.3 % MCV 92.6 79.6 - 97.8 FL  
 MCH 27.8 26.1 - 32.9 PG  
 MCHC 30.0 (L) 31.4 - 35.0 g/dL  
 RDW 16.4 (H) 11.9 - 14.6 % PLATELET 140 (H) 159 - 450 K/uL MPV 10.2 9.4 - 12.3 FL ABSOLUTE NRBC 0.00 0.0 - 0.2 K/uL  
 DF AUTOMATED NEUTROPHILS 63 43 - 78 % LYMPHOCYTES 20 13 - 44 % MONOCYTES 14 (H) 4.0 - 12.0 % EOSINOPHILS 2 0.5 - 7.8 % BASOPHILS 0 0.0 - 2.0 % IMMATURE GRANULOCYTES 1 0.0 - 5.0 %  
 ABS. NEUTROPHILS 4.4 1.7 - 8.2 K/UL  
 ABS. LYMPHOCYTES 1.4 0.5 - 4.6 K/UL  
 ABS. MONOCYTES 1.0 0.1 - 1.3 K/UL  
 ABS. EOSINOPHILS 0.1 0.0 - 0.8 K/UL  
 ABS. BASOPHILS 0.0 0.0 - 0.2 K/UL ABS. IMM. GRANS. 0.0 0.0 - 0.5 K/UL METABOLIC PANEL, BASIC Collection Time: 09/23/19 11:25 AM  
Result Value Ref Range Sodium 145 136 - 145 mmol/L Potassium 4.1 3.5 - 5.1 mmol/L Chloride 106 98 - 107 mmol/L  
 CO2 38 (H) 21 - 32 mmol/L Anion gap 1 (L) 7 - 16 mmol/L Glucose 102 (H) 65 - 100 mg/dL BUN 31 (H) 8 - 23 MG/DL Creatinine 1.45 0.8 - 1.5 MG/DL  
 GFR est AA >60 >60 ml/min/1.73m2 GFR est non-AA 50 (L) >60 ml/min/1.73m2 Calcium 8.9 8.3 - 10.4 MG/DL MAGNESIUM Collection Time: 09/23/19 11:25 AM  
Result Value Ref Range Magnesium 2.4 1.8 - 2.4 mg/dL POC TROPONIN-I Collection Time: 09/23/19 11:43 AM  
Result Value Ref Range Troponin-I (POC) 0 (L) 0.02 - 0.05 ng/ml DRUG SCREEN, URINE Collection Time: 09/23/19 12:23 PM  
Result Value Ref Range PCP(PHENCYCLIDINE) NEGATIVE BENZODIAZEPINES NEGATIVE     
 COCAINE NEGATIVE AMPHETAMINES NEGATIVE METHADONE NEGATIVE     
 THC (TH-CANNABINOL) NEGATIVE     
 OPIATES NEGATIVE     
 BARBITURATES NEGATIVE Xr Chest Sngl V Result Date: 9/15/2019 EXAM: TEMPORARY INDICATION: Pulmonary edema COMPARISON: 9/14/2019 FINDINGS: A portable AP radiograph of the chest was obtained at 0420 hours. The patient is on a cardiac monitor. The lungs are clear. The cardiac and mediastinal contours and pulmonary vascularity are normal.  The bones and soft tissues are grossly within normal limits. IMPRESSION: Normal chest. 
 
Xr Chest Sngl V Result Date: 9/14/2019 EXAM: XR CHEST SNGL V INDICATION: SOB, trouble breathing COMPARISON: 9/9/2019 FINDINGS: A portable AP radiograph of the chest was obtained at 0328 hours. The patient is on a cardiac monitor. The lungs are clear. The cardiac and mediastinal contours and pulmonary vascularity are normal.  The bones and soft tissues are grossly within normal limits. IMPRESSION: Normal chest. 
 
Xr Chest Pa Lat Result Date: 9/23/2019 PA AND LATERAL CHEST X-RAY. Clinical Indication: Weakness, recent abdominal surgery Comparison: Chest x-ray dated 9/15/2019 Findings: 2 views of the chest submitted demonstrate the cardiac silhouette and mediastinum to be unremarkable. There is no pleural effusion or pneumothorax. The lungs are hyperexpanded but clear. The bones are unremarkable. IMPRESSION: Hyperexpanded lungs, otherwise no acute abnormality. Ir Fluoroscopy < 1 Hour Result Date: 9/18/2019 Administrative Report IMPRESSION: Fluoroscopy for greater than 60 minutes was provided in the operating room. Ct Abd Pelv Wo Cont Addendum Date: 9/13/2019 Addendum: Addendum: Impression should read as follows: 1. Large infrarenal abdominal aortic aneurysm measuring up to 4.8 cm with minimal strandy changes in the surrounding retroperitoneal fat concerning for vascular leak. Result Date: 9/13/2019 Clinical History: The patient is a 70years year old Male presenting with symptoms of lower abdominal pain/ had colonoscopy / polypectomy yesterday Comparison:  None. Technique: Thin slice axial images were obtained through the abdomen and pelvis without intravenous and with oral contrast.  Coronal reformatted images were also provided for review. All CT scans at this facility are performed using dose reduction/dose modulation techniques, as appropriate the performed exam, including the following: Automated Exposure Control; Adjustment of the mA and/or kV according to patient size (this includes techniques or standardized protocols for targeted exams where dose is matched to indication/reason for exam); and Use of Iterative Reconstruction Technique. Radiation Exposure Indices: Reference Air Kerma (Ka,r) = 542 mGy-cm Findings:  Abdomen: Lung bases: Clear without evidence of focal infiltrate, consolidation, or effusion. Liver:  There are multiple small probable cysts scattered throughout the liver the largest in the medial right lower lobe measuring approximately 2 cm. Biliary: Cystectomy. No evidence of intra or extrahepatic biliary dilatation. Pancreas: Normal in size and contour without focal lesion. Spleen: Normal in size and contour without focal lesion. Adrenal glands: Normal in size without focal lesion. Kidneys: Symmetric without evidence of hydronephrosis or nephrolithiasis. There are also cysts in both kidneys. Bowel: There are sigmoid colonic diverticula without evidence of active diverticulitis. Contrast fills a normal appendix. Retroperitoneum/vasculature: No evidence of significant adenopathy. There is a large infrarenal abdominal aortic aneurysm maximally measuring 4.8 cm in diameter with slight strandy changes in the surrounding retroperitoneal fat the proximal iliac vessels are also ectatic particularly on the right measuring 2.2 cm. Abdominal soft tissues: Unremarkable. Osseous structures: No acute osseous abnormality. Pelvis: Unremarkable bladder. No significant adenopathy or free fluid. . The prostate is unremarkable IMPRESSION: 1. Large infrarenal abdominal aortic aneurysm measuring up to 4.8 cm with minimal strandy changes in the surrounding retroperitoneal fat concerning for bile leak. 2. Incidental sigmoid diverticula. 3. Probable hepatic and renal cysts. [facility] The critical results contained in this report were communicated to the patient's hospitalist  by  Dr Teja Pang on 9/13/2019 3:38 PM. Critical results were communicated as outlined in Section II.C.2.a.i of the ACR Practice Guideline for Communication of Diagnostic Imaging Findings. CPT code(s): V3844035, [id number] Cta Abd Pelv W Wo Cont Result Date: 9/13/2019 Title:  CT arteriogram of the abdomen and pelvis. Indication:  Known abdominal aortic aneurysm in a patient with new abdominal pain.   Unenhanced CT scan earlier in the day demonstrates stranding density in the periaortic retroperitoneal fat. Technique: Axial images of the abdomen and pelvis were obtained after the administration of intravenous iodinated contrast media. Contrast was used to best identify the arterial structures. Images were reviewed on a separate, free standing, three-dimensional workstation as per the referring physicians request.  All CT scans at this facility are performed using dose reduction/dose modulation techniques, as appropriate the performed exam, including the following: Automated Exposure Control; Adjustment of the mA and/or kV according to patient size (this includes techniques or standardized protocols for targeted exams where dose is matched to indication/reason for exam); and Use of Iterative Reconstruction Technique. Comparison: Unenhanced CT 9/13/2019. Findings: Chest:  The visualized lung bases demonstrate hyperexpanded lungs consistent with emphysema. Coronary artery calcification. Abdomen:  Multiple, low density, nonenhancing, rounded lesions scattered throughout both lobes of the liver. These most likely represent cysts. Gallbladder is not visualized. Unremarkable pancreas, spleen and adrenal glands. Symmetric nephrograms. Multiple, rounded, low-density cystic lesions in the kidneys. No hydronephrosis. Oral contrast from the earlier examination has passed into the colon. There are no dilated bowel loops. There is sigmoid diverticulosis. Musculoskeletal:  No destructive bone lesion. Vascular:  Atherosclerotic calcification and wall thickening in the visualized portion of descending thoracic aorta and in the perimesenteric aorta. There is a fusiform infrarenal abdominal aortic aneurysm with a thick thrombus rind. The aneurysm measures 4.8 x 4.9 cm (AP by transverse). There is anterior angulation of the aneurysm neck. There is an increased, stranding the, density in the retroperitoneal fat associated with the aneurysm.   This stranding density extends to the level of the common iliac arteries. This finding is suggestive of aneurysm leak or retroperitoneal inflammation. The aneurysm extends into the common iliac arteries. The right common iliac artery measures 2.1 cm. The left common iliac artery measures 1.4 cm. Although narrowed, both internal iliac arteries are patent. There is fairly extensive atherosclerotic disease in the tortuous external iliac and common femoral arteries. However, there is no high-grade stenosis in these vessels. The origins of the superficial femoral and profunda femoral arteries are patent. Diseased, but patent celiac artery. Patent splenic and hepatic arteries. Patent superior mesenteric artery. Patent inferior mesenteric artery (arising from the aneurysm). 40% narrowing of the main right renal artery. Stenotic accessory right renal artery arises adjacent to the main renal artery. Diseased, but patent, single left renal artery. Impression:  Fusiform infrarenal abdominal aortic aneurysm measures 4.8 x 4.9 cm. Periaortic, retroperitoneal, stranding density suggests aneurysm leak or retroperitoneal inflammation. Given the clinical description of abdominal pain, ongoing, slow aneurysm leak is most likely. Multiple low density liver lesions. Recommend abdominal ultrasound on an elective basis to confirm that these are cysts. These images were reviewed with Dr. Leena Trejo at the time of dictation. Xr Chest [facility] Result Date: 9/9/2019 History: Hypotension Exam: portable chest Comparison: 9/13/2018 Findings: No focal alveolar infiltrate or pleural effusion. No change in the appearance of the mediastinal contour or osseous structures. Impressions: Stable portable chest  
 
 
Dragon voice recognition software was used to create this note. Although the note has been reviewed and corrected where necessary, additional errors may have been overlooked and remain in the text.

## 2019-09-23 NOTE — ED TRIAGE NOTES
Pt arrives via [facility]. Pt has triple A repair 9/13/19. EMS report pt c/o weakness and fatigue, and BP of 90/40 at facility, and low Hgb. Pt a-fib. BP: 106/60 HR:88 
O2: 100 4L O2 
BGL:117

## 2019-09-23 NOTE — DISCHARGE INSTRUCTIONS
You have anemia. Your level is roughly the same as your baseline. I do not feel you need emergent blood transfusion today. Chest x-ray did not show pneumonia. Urine did not show an infection. Please follow-up with your outpatient doctor for possible blood transfusion if needed.

## 2019-09-23 NOTE — PROGRESS NOTES
Patient with recent admit and discharge from [facility] (8-7-21 to 9-18-19) with transfer to [address] [place] [address] - Dr. Giordano Notice.

## 2019-10-05 ENCOUNTER — HOSPITAL ENCOUNTER (OUTPATIENT)
Dept: INFUSION THERAPY | Age: 79
Discharge: HOME | End: 2019-10-05
Payer: MEDICARE

## 2019-10-05 VITALS
DIASTOLIC BLOOD PRESSURE: 68 MMHG | TEMPERATURE: 98.2 F | HEART RATE: 101 BPM | SYSTOLIC BLOOD PRESSURE: 124 MMHG | OXYGEN SATURATION: 98 % | RESPIRATION RATE: 18 BRPM

## 2019-10-05 DIAGNOSIS — D64.9 ANEMIA, UNSPECIFIED TYPE: ICD-10-CM

## 2019-10-05 PROCEDURE — 77030018667 ADMN ST IV BLD FENW -A

## 2019-10-05 PROCEDURE — P9016 RBC LEUKOCYTES REDUCED: HCPCS

## 2019-10-05 PROCEDURE — 86900 BLOOD TYPING SEROLOGIC ABO: CPT

## 2019-10-05 PROCEDURE — 74011250636 HC RX REV CODE- 250/636

## 2019-10-05 PROCEDURE — 36430 TRANSFUSION BLD/BLD COMPNT: CPT

## 2019-10-05 PROCEDURE — 86923 COMPATIBILITY TEST ELECTRIC: CPT

## 2019-10-05 RX ORDER — SODIUM CHLORIDE 9 MG/ML
250 INJECTION, SOLUTION INTRAVENOUS AS NEEDED
Status: DISCONTINUED | OUTPATIENT
Start: 2019-10-05 | End: 2019-10-09 | Stop reason: HOSPADM

## 2019-10-05 RX ORDER — SODIUM CHLORIDE 0.9 % (FLUSH) 0.9 %
10-30 SYRINGE (ML) INJECTION AS NEEDED
Status: DISCONTINUED | OUTPATIENT
Start: 2019-10-05 | End: 2019-10-09 | Stop reason: HOSPADM

## 2019-10-05 RX ADMIN — SODIUM CHLORIDE 250 ML: 900 INJECTION, SOLUTION INTRAVENOUS at 11:50

## 2019-10-05 NOTE — PROGRESS NOTES
Arrived to the [facility]. 2 units of PRBC's completed. Patient tolerated well Any issues or concerns during appointment:No 
Patient has no future appointments in hospitals @ this time. Awaiting transport to Rehab with [facility]. Discharged via wheelchair accompanied by attendant

## 2019-10-06 LAB
ABO + RH BLD: NORMAL
BLD PROD TYP BPU: NORMAL
BLD PROD TYP BPU: NORMAL
BLOOD GROUP ANTIBODIES SERPL: NORMAL
BPU ID: NORMAL
BPU ID: NORMAL
CROSSMATCH RESULT,%XM: NORMAL
CROSSMATCH RESULT,%XM: NORMAL
SPECIMEN EXP DATE BLD: NORMAL
STATUS OF UNIT,%ST: NORMAL
STATUS OF UNIT,%ST: NORMAL
UNIT DIVISION, %UDIV: 0
UNIT DIVISION, %UDIV: 0

## 2019-10-11 ENCOUNTER — HOME HEALTH ADMISSION (OUTPATIENT)
Dept: HOME HEALTH SERVICES | Facility: HOME HEALTH | Age: 79
End: 2019-10-11

## 2019-10-15 ENCOUNTER — PATIENT OUTREACH (OUTPATIENT)
Dept: CASE MANAGEMENT | Age: 79
End: 2019-10-15

## 2019-10-15 NOTE — PROGRESS NOTES
This note will not be viewable in [address] 1st Attempt to contact patient for [facility] call, no answer on son's phone number;message left on V/M requesting a return call . Will attempt to contact patient again within 24 hours

## 2019-10-16 ENCOUNTER — PATIENT OUTREACH (OUTPATIENT)
Dept: CASE MANAGEMENT | Age: 79
End: 2019-10-16

## 2019-10-16 NOTE — PROGRESS NOTES
This note will not be viewable in [address] 2nd attempt to contact patient for [facility] call, no answer, on mobile number . Will attempt 3rd call within 5 business days.

## 2019-10-24 ENCOUNTER — PATIENT OUTREACH (OUTPATIENT)
Dept: CASE MANAGEMENT | Age: 79
End: 2019-10-24

## 2019-10-24 NOTE — PROGRESS NOTES
This note will not be viewable in [address] 3rd attempt to contact patient for [facility] Call. No answer, on mobile number and unable to leave a message . Episode will be closed at this time as [address] has been unsuccessful in reaching the patient.

## 2019-11-27 ENCOUNTER — HOSPITAL ENCOUNTER (OUTPATIENT)
Dept: LAB | Age: 79
Discharge: HOME | End: 2019-11-27
Payer: MEDICARE

## 2019-11-27 DIAGNOSIS — D50.0 IRON DEFICIENCY ANEMIA DUE TO CHRONIC BLOOD LOSS: ICD-10-CM

## 2019-11-27 DIAGNOSIS — D63.1 ANEMIA DUE TO STAGE 3 CHRONIC KIDNEY DISEASE (HCC): ICD-10-CM

## 2019-11-27 DIAGNOSIS — N18.30 ANEMIA DUE TO STAGE 3 CHRONIC KIDNEY DISEASE (HCC): ICD-10-CM

## 2019-11-27 DIAGNOSIS — D64.9 ANEMIA, UNSPECIFIED TYPE: ICD-10-CM

## 2019-11-27 LAB
ALBUMIN SERPL-MCNC: 2.9 G/DL (ref 3.2–4.6)
ALBUMIN/GLOB SERPL: 0.6 {RATIO} (ref 1.2–3.5)
ALP SERPL-CCNC: 79 U/L (ref 50–136)
ALT SERPL-CCNC: 9 U/L (ref 12–65)
ANION GAP SERPL CALC-SCNC: 7 MMOL/L (ref 7–16)
AST SERPL-CCNC: 15 U/L (ref 15–37)
BASOPHILS # BLD: 0 K/UL (ref 0–0.2)
BASOPHILS NFR BLD: 0 % (ref 0–2)
BILIRUB SERPL-MCNC: 0.3 MG/DL (ref 0.2–1.1)
BUN SERPL-MCNC: 21 MG/DL (ref 8–23)
CALCIUM SERPL-MCNC: 9.5 MG/DL (ref 8.3–10.4)
CHLORIDE SERPL-SCNC: 98 MMOL/L (ref 98–107)
CO2 SERPL-SCNC: 34 MMOL/L (ref 21–32)
CREAT SERPL-MCNC: 1.8 MG/DL (ref 0.8–1.5)
DIFFERENTIAL METHOD BLD: ABNORMAL
EOSINOPHIL # BLD: 0.2 K/UL (ref 0–0.8)
EOSINOPHIL NFR BLD: 3 % (ref 0.5–7.8)
ERYTHROCYTE [DISTWIDTH] IN BLOOD BY AUTOMATED COUNT: 18.5 % (ref 11.9–14.6)
FERRITIN SERPL-MCNC: 122 NG/ML (ref 8–388)
GLOBULIN SER CALC-MCNC: 5 G/DL (ref 2.3–3.5)
GLUCOSE SERPL-MCNC: 98 MG/DL (ref 65–100)
HCT VFR BLD AUTO: 29 % (ref 41.1–50.3)
HGB BLD-MCNC: 8.8 G/DL (ref 13.6–17.2)
IMM GRANULOCYTES # BLD AUTO: 0 K/UL (ref 0–0.5)
IMM GRANULOCYTES NFR BLD AUTO: 1 % (ref 0–5)
IRON SATN MFR SERPL: 15 %
IRON SERPL-MCNC: 41 UG/DL (ref 35–150)
LYMPHOCYTES # BLD: 1.6 K/UL (ref 0.5–4.6)
LYMPHOCYTES NFR BLD: 23 % (ref 13–44)
MCH RBC QN AUTO: 26.2 PG (ref 26.1–32.9)
MCHC RBC AUTO-ENTMCNC: 30.3 G/DL (ref 31.4–35)
MCV RBC AUTO: 86.3 FL (ref 79.6–97.8)
MONOCYTES # BLD: 1 K/UL (ref 0.1–1.3)
MONOCYTES NFR BLD: 14 % (ref 4–12)
NEUTS SEG # BLD: 4.1 K/UL (ref 1.7–8.2)
NEUTS SEG NFR BLD: 59 % (ref 43–78)
NRBC # BLD: 0 K/UL (ref 0–0.2)
PLATELET # BLD AUTO: 411 K/UL (ref 150–450)
PMV BLD AUTO: 9 FL (ref 9.4–12.3)
POTASSIUM SERPL-SCNC: 3.9 MMOL/L (ref 3.5–5.1)
PROT SERPL-MCNC: 7.9 G/DL (ref 6.3–8.2)
RBC # BLD AUTO: 3.36 M/UL (ref 4.23–5.67)
SODIUM SERPL-SCNC: 139 MMOL/L (ref 136–145)
TIBC SERPL-MCNC: 276 UG/DL (ref 250–450)
WBC # BLD AUTO: 6.9 K/UL (ref 4.3–11.1)

## 2019-11-27 PROCEDURE — 80053 COMPREHEN METABOLIC PANEL: CPT

## 2019-11-27 PROCEDURE — 84238 ASSAY NONENDOCRINE RECEPTOR: CPT

## 2019-11-27 PROCEDURE — 82728 ASSAY OF FERRITIN: CPT

## 2019-11-27 PROCEDURE — 36415 COLL VENOUS BLD VENIPUNCTURE: CPT

## 2019-11-27 PROCEDURE — 85025 COMPLETE CBC W/AUTO DIFF WBC: CPT

## 2019-11-27 PROCEDURE — 83540 ASSAY OF IRON: CPT

## 2019-11-29 LAB — TRANSFERRIN SERPL-SCNC: 42.9 NMOL/L (ref 12.2–27.3)

## 2019-12-05 ENCOUNTER — HOSPITAL ENCOUNTER (OUTPATIENT)
Dept: INFUSION THERAPY | Age: 79
Discharge: HOME | End: 2019-12-05
Payer: MEDICARE

## 2019-12-05 VITALS
TEMPERATURE: 98.3 F | OXYGEN SATURATION: 91 % | HEART RATE: 89 BPM | RESPIRATION RATE: 18 BRPM | SYSTOLIC BLOOD PRESSURE: 102 MMHG | DIASTOLIC BLOOD PRESSURE: 52 MMHG

## 2019-12-05 DIAGNOSIS — D50.8 OTHER IRON DEFICIENCY ANEMIA: Primary | ICD-10-CM

## 2019-12-05 PROCEDURE — 96365 THER/PROPH/DIAG IV INF INIT: CPT

## 2019-12-05 PROCEDURE — 96361 HYDRATE IV INFUSION ADD-ON: CPT

## 2019-12-05 PROCEDURE — 74011000258 HC RX REV CODE- 258: Performed by: NURSE PRACTITIONER

## 2019-12-05 PROCEDURE — 74011250636 HC RX REV CODE- 250/636: Performed by: NURSE PRACTITIONER

## 2019-12-05 RX ORDER — SODIUM CHLORIDE 0.9 % (FLUSH) 0.9 %
10 SYRINGE (ML) INJECTION AS NEEDED
Status: ACTIVE | OUTPATIENT
Start: 2019-12-05 | End: 2019-12-05

## 2019-12-05 RX ADMIN — Medication 10 ML: at 09:15

## 2019-12-05 RX ADMIN — FERUMOXYTOL 510 MG: 510 INJECTION INTRAVENOUS at 09:31

## 2019-12-05 RX ADMIN — SODIUM CHLORIDE 500 ML: 900 INJECTION, SOLUTION INTRAVENOUS at 09:15

## 2019-12-05 NOTE — PROGRESS NOTES
Pt arrived via wheelchair today at [phone], to receive IV iron. Pt tolerated without difficulty. Patient discharged via wheelchair accompanied by caregiver. Instructed to notify physician of any problems, questions or concerns. Allowed opportunity for patient/family to ask questions. Verbalized understanding. Next appointment is Dec 12 at 0830 with Aleena Jenkins.

## 2019-12-12 ENCOUNTER — HOSPITAL ENCOUNTER (OUTPATIENT)
Dept: INFUSION THERAPY | Age: 79
Discharge: HOME | End: 2019-12-12
Payer: MEDICARE

## 2019-12-12 VITALS
DIASTOLIC BLOOD PRESSURE: 48 MMHG | OXYGEN SATURATION: 100 % | RESPIRATION RATE: 18 BRPM | TEMPERATURE: 98.9 F | HEART RATE: 96 BPM | SYSTOLIC BLOOD PRESSURE: 104 MMHG

## 2019-12-12 DIAGNOSIS — D50.8 OTHER IRON DEFICIENCY ANEMIA: Primary | ICD-10-CM

## 2019-12-12 PROCEDURE — 96365 THER/PROPH/DIAG IV INF INIT: CPT

## 2019-12-12 PROCEDURE — 96374 THER/PROPH/DIAG INJ IV PUSH: CPT

## 2019-12-12 PROCEDURE — 74011000258 HC RX REV CODE- 258: Performed by: INTERNAL MEDICINE

## 2019-12-12 PROCEDURE — 96361 HYDRATE IV INFUSION ADD-ON: CPT

## 2019-12-12 PROCEDURE — 74011250636 HC RX REV CODE- 250/636: Performed by: INTERNAL MEDICINE

## 2019-12-12 RX ORDER — SODIUM CHLORIDE 0.9 % (FLUSH) 0.9 %
10 SYRINGE (ML) INJECTION AS NEEDED
Status: ACTIVE | OUTPATIENT
Start: 2019-12-12 | End: 2019-12-12

## 2019-12-12 RX ADMIN — FERUMOXYTOL 510 MG: 510 INJECTION INTRAVENOUS at 09:22

## 2019-12-12 RX ADMIN — SODIUM CHLORIDE 500 ML: 900 INJECTION, SOLUTION INTRAVENOUS at 09:38

## 2019-12-12 NOTE — PROGRESS NOTES
Arrived to the [facility]. Feraheme infusion completed. Patient tolerated well. Any issues or concerns during appointment: none. Patient aware of next infusion appointment not scheduled at this time. Discharged in wheelchair pushed by friend.

## 2019-12-17 ENCOUNTER — APPOINTMENT (OUTPATIENT)
Dept: GENERAL RADIOLOGY | Age: 79
DRG: 871 | End: 2019-12-17
Attending: EMERGENCY MEDICINE
Payer: MEDICARE

## 2019-12-17 ENCOUNTER — HOSPITAL ENCOUNTER (INPATIENT)
Age: 79
LOS: 2 days | Discharge: HOME | DRG: 871 | End: 2019-12-19
Attending: EMERGENCY MEDICINE | Admitting: HOSPITALIST
Payer: MEDICARE

## 2019-12-17 DIAGNOSIS — R04.2 COUGH WITH HEMOPTYSIS: ICD-10-CM

## 2019-12-17 DIAGNOSIS — J18.9 COMMUNITY ACQUIRED PNEUMONIA OF RIGHT LOWER LOBE OF LUNG: ICD-10-CM

## 2019-12-17 DIAGNOSIS — D64.9 SYMPTOMATIC ANEMIA: ICD-10-CM

## 2019-12-17 DIAGNOSIS — K92.1 GASTROINTESTINAL HEMORRHAGE WITH MELENA: Primary | ICD-10-CM

## 2019-12-17 LAB
ALBUMIN SERPL-MCNC: 2.6 G/DL (ref 3.2–4.6)
ALBUMIN/GLOB SERPL: 0.5 {RATIO} (ref 1.2–3.5)
ALP SERPL-CCNC: 72 U/L (ref 50–136)
ALT SERPL-CCNC: 8 U/L (ref 12–65)
ANION GAP SERPL CALC-SCNC: 8 MMOL/L (ref 7–16)
AST SERPL-CCNC: 25 U/L (ref 15–37)
ATRIAL RATE: 90 BPM
BASOPHILS # BLD: 0.1 K/UL (ref 0–0.2)
BASOPHILS NFR BLD: 1 % (ref 0–2)
BILIRUB SERPL-MCNC: 0.4 MG/DL (ref 0.2–1.1)
BUN SERPL-MCNC: 25 MG/DL (ref 8–23)
CALCIUM SERPL-MCNC: 9.2 MG/DL (ref 8.3–10.4)
CALCULATED P AXIS, ECG09: 72 DEGREES
CALCULATED R AXIS, ECG10: 73 DEGREES
CALCULATED T AXIS, ECG11: 77 DEGREES
CHLORIDE SERPL-SCNC: 102 MMOL/L (ref 98–107)
CO2 SERPL-SCNC: 30 MMOL/L (ref 21–32)
CREAT SERPL-MCNC: 1.93 MG/DL (ref 0.8–1.5)
DIAGNOSIS, 93000: NORMAL
DIFFERENTIAL METHOD BLD: ABNORMAL
EOSINOPHIL # BLD: 0.2 K/UL (ref 0–0.8)
EOSINOPHIL NFR BLD: 3 % (ref 0.5–7.8)
ERYTHROCYTE [DISTWIDTH] IN BLOOD BY AUTOMATED COUNT: 18.6 % (ref 11.9–14.6)
GLOBULIN SER CALC-MCNC: 5.3 G/DL (ref 2.3–3.5)
GLUCOSE SERPL-MCNC: 111 MG/DL (ref 65–100)
HCT VFR BLD AUTO: 25.1 % (ref 41.1–50.3)
HCT VFR BLD AUTO: 27.1 % (ref 41.1–50.3)
HGB BLD-MCNC: 7.5 G/DL (ref 13.6–17.2)
HGB BLD-MCNC: 8.3 G/DL (ref 13.6–17.2)
IMM GRANULOCYTES # BLD AUTO: 0.1 K/UL (ref 0–0.5)
IMM GRANULOCYTES NFR BLD AUTO: 1 % (ref 0–5)
INR PPP: 1.5
LACTATE BLD-SCNC: 1.26 MMOL/L (ref 0.5–1.9)
LYMPHOCYTES # BLD: 1.8 K/UL (ref 0.5–4.6)
LYMPHOCYTES NFR BLD: 21 % (ref 13–44)
MCH RBC QN AUTO: 27.4 PG (ref 26.1–32.9)
MCHC RBC AUTO-ENTMCNC: 30.6 G/DL (ref 31.4–35)
MCV RBC AUTO: 89.4 FL (ref 79.6–97.8)
MONOCYTES # BLD: 0.9 K/UL (ref 0.1–1.3)
MONOCYTES NFR BLD: 11 % (ref 4–12)
NEUTS SEG # BLD: 5.2 K/UL (ref 1.7–8.2)
NEUTS SEG NFR BLD: 64 % (ref 43–78)
NRBC # BLD: 0 K/UL (ref 0–0.2)
P-R INTERVAL, ECG05: 178 MS
PLATELET # BLD AUTO: 370 K/UL (ref 150–450)
PMV BLD AUTO: 10.1 FL (ref 9.4–12.3)
POTASSIUM SERPL-SCNC: 4 MMOL/L (ref 3.5–5.1)
PROCALCITONIN SERPL-MCNC: 0.22 NG/ML
PROT SERPL-MCNC: 7.9 G/DL (ref 6.3–8.2)
PROTHROMBIN TIME: 18.1 SEC (ref 11.7–14.5)
Q-T INTERVAL, ECG07: 330 MS
QRS DURATION, ECG06: 78 MS
QTC CALCULATION (BEZET), ECG08: 403 MS
RBC # BLD AUTO: 3.03 M/UL (ref 4.23–5.6)
SODIUM SERPL-SCNC: 140 MMOL/L (ref 136–145)
VENTRICULAR RATE, ECG03: 90 BPM
WBC # BLD AUTO: 8.3 K/UL (ref 4.3–11.1)

## 2019-12-17 PROCEDURE — 96361 HYDRATE IV INFUSION ADD-ON: CPT | Performed by: EMERGENCY MEDICINE

## 2019-12-17 PROCEDURE — 84145 PROCALCITONIN (PCT): CPT

## 2019-12-17 PROCEDURE — 85018 HEMOGLOBIN: CPT

## 2019-12-17 PROCEDURE — 94640 AIRWAY INHALATION TREATMENT: CPT

## 2019-12-17 PROCEDURE — 87040 BLOOD CULTURE FOR BACTERIA: CPT

## 2019-12-17 PROCEDURE — P9016 RBC LEUKOCYTES REDUCED: HCPCS

## 2019-12-17 PROCEDURE — 74011000250 HC RX REV CODE- 250: Performed by: HOSPITALIST

## 2019-12-17 PROCEDURE — 86923 COMPATIBILITY TEST ELECTRIC: CPT

## 2019-12-17 PROCEDURE — 99285 EMERGENCY DEPT VISIT HI MDM: CPT | Performed by: EMERGENCY MEDICINE

## 2019-12-17 PROCEDURE — 94761 N-INVAS EAR/PLS OXIMETRY MLT: CPT

## 2019-12-17 PROCEDURE — 87070 CULTURE OTHR SPECIMN AEROBIC: CPT

## 2019-12-17 PROCEDURE — 77030040361 HC SLV COMPR DVT MDII -B

## 2019-12-17 PROCEDURE — 65660000000 HC RM CCU STEPDOWN

## 2019-12-17 PROCEDURE — 77010033678 HC OXYGEN DAILY

## 2019-12-17 PROCEDURE — 77030027138 HC INCENT SPIROMETER -A

## 2019-12-17 PROCEDURE — 83605 ASSAY OF LACTIC ACID: CPT

## 2019-12-17 PROCEDURE — 93005 ELECTROCARDIOGRAM TRACING: CPT | Performed by: EMERGENCY MEDICINE

## 2019-12-17 PROCEDURE — 30233N1 TRANSFUSION OF NONAUTOLOGOUS RED BLOOD CELLS INTO PERIPHERAL VEIN, PERCUTANEOUS APPROACH: ICD-10-PCS | Performed by: HOSPITALIST

## 2019-12-17 PROCEDURE — 85610 PROTHROMBIN TIME: CPT

## 2019-12-17 PROCEDURE — 71045 X-RAY EXAM CHEST 1 VIEW: CPT

## 2019-12-17 PROCEDURE — 74011250637 HC RX REV CODE- 250/637: Performed by: HOSPITALIST

## 2019-12-17 PROCEDURE — 96375 TX/PRO/DX INJ NEW DRUG ADDON: CPT | Performed by: EMERGENCY MEDICINE

## 2019-12-17 PROCEDURE — 74011000258 HC RX REV CODE- 258: Performed by: EMERGENCY MEDICINE

## 2019-12-17 PROCEDURE — 94760 N-INVAS EAR/PLS OXIMETRY 1: CPT

## 2019-12-17 PROCEDURE — 96374 THER/PROPH/DIAG INJ IV PUSH: CPT | Performed by: EMERGENCY MEDICINE

## 2019-12-17 PROCEDURE — 36430 TRANSFUSION BLD/BLD COMPNT: CPT

## 2019-12-17 PROCEDURE — 85025 COMPLETE CBC W/AUTO DIFF WBC: CPT

## 2019-12-17 PROCEDURE — 86900 BLOOD TYPING SEROLOGIC ABO: CPT

## 2019-12-17 PROCEDURE — 80053 COMPREHEN METABOLIC PANEL: CPT

## 2019-12-17 PROCEDURE — 74011250636 HC RX REV CODE- 250/636: Performed by: EMERGENCY MEDICINE

## 2019-12-17 PROCEDURE — 74011000250 HC RX REV CODE- 250: Performed by: EMERGENCY MEDICINE

## 2019-12-17 RX ORDER — SODIUM CHLORIDE 0.9 % (FLUSH) 0.9 %
5-40 SYRINGE (ML) INJECTION AS NEEDED
Status: DISCONTINUED | OUTPATIENT
Start: 2019-12-17 | End: 2019-12-19 | Stop reason: HOSPADM

## 2019-12-17 RX ORDER — IPRATROPIUM BROMIDE AND ALBUTEROL SULFATE 2.5; .5 MG/3ML; MG/3ML
3 SOLUTION RESPIRATORY (INHALATION)
Status: DISCONTINUED | OUTPATIENT
Start: 2019-12-17 | End: 2019-12-19 | Stop reason: HOSPADM

## 2019-12-17 RX ORDER — MIRTAZAPINE 15 MG/1
15 TABLET, FILM COATED ORAL
Status: DISCONTINUED | OUTPATIENT
Start: 2019-12-17 | End: 2019-12-19 | Stop reason: HOSPADM

## 2019-12-17 RX ORDER — ALLOPURINOL 100 MG/1
200 TABLET ORAL DAILY
Status: DISCONTINUED | OUTPATIENT
Start: 2019-12-18 | End: 2019-12-19 | Stop reason: HOSPADM

## 2019-12-17 RX ORDER — MORPHINE SULFATE 2 MG/ML
2 INJECTION, SOLUTION INTRAMUSCULAR; INTRAVENOUS
Status: DISCONTINUED | OUTPATIENT
Start: 2019-12-17 | End: 2019-12-19 | Stop reason: HOSPADM

## 2019-12-17 RX ORDER — BUDESONIDE 0.5 MG/2ML
500 INHALANT ORAL 2 TIMES DAILY
Status: DISCONTINUED | OUTPATIENT
Start: 2019-12-17 | End: 2019-12-19 | Stop reason: HOSPADM

## 2019-12-17 RX ORDER — ROSUVASTATIN CALCIUM 20 MG/1
20 TABLET, COATED ORAL
Status: DISCONTINUED | OUTPATIENT
Start: 2019-12-17 | End: 2019-12-19 | Stop reason: HOSPADM

## 2019-12-17 RX ORDER — NALOXONE HYDROCHLORIDE 0.4 MG/ML
0.4 INJECTION, SOLUTION INTRAMUSCULAR; INTRAVENOUS; SUBCUTANEOUS AS NEEDED
Status: DISCONTINUED | OUTPATIENT
Start: 2019-12-17 | End: 2019-12-19 | Stop reason: HOSPADM

## 2019-12-17 RX ORDER — MELATONIN
2000 DAILY
Status: DISCONTINUED | OUTPATIENT
Start: 2019-12-18 | End: 2019-12-19 | Stop reason: HOSPADM

## 2019-12-17 RX ORDER — BENZONATATE 100 MG/1
100 CAPSULE ORAL
Status: DISCONTINUED | OUTPATIENT
Start: 2019-12-17 | End: 2019-12-19 | Stop reason: HOSPADM

## 2019-12-17 RX ORDER — SODIUM CHLORIDE 0.9 % (FLUSH) 0.9 %
5-40 SYRINGE (ML) INJECTION EVERY 8 HOURS
Status: DISCONTINUED | OUTPATIENT
Start: 2019-12-17 | End: 2019-12-19 | Stop reason: HOSPADM

## 2019-12-17 RX ORDER — IPRATROPIUM BROMIDE AND ALBUTEROL SULFATE 2.5; .5 MG/3ML; MG/3ML
3 SOLUTION RESPIRATORY (INHALATION)
Status: COMPLETED | OUTPATIENT
Start: 2019-12-17 | End: 2019-12-17

## 2019-12-17 RX ORDER — ONDANSETRON 2 MG/ML
4 INJECTION INTRAMUSCULAR; INTRAVENOUS
Status: DISCONTINUED | OUTPATIENT
Start: 2019-12-17 | End: 2019-12-19 | Stop reason: HOSPADM

## 2019-12-17 RX ORDER — BRIMONIDINE TARTRATE 2 MG/ML
1 SOLUTION/ DROPS OPHTHALMIC 2 TIMES DAILY
Status: DISCONTINUED | OUTPATIENT
Start: 2019-12-17 | End: 2019-12-19 | Stop reason: HOSPADM

## 2019-12-17 RX ORDER — ARFORMOTEROL TARTRATE 15 UG/2ML
15 SOLUTION RESPIRATORY (INHALATION)
Status: DISCONTINUED | OUTPATIENT
Start: 2019-12-17 | End: 2019-12-19 | Stop reason: HOSPADM

## 2019-12-17 RX ORDER — ACETAMINOPHEN 325 MG/1
650 TABLET ORAL
Status: DISCONTINUED | OUTPATIENT
Start: 2019-12-17 | End: 2019-12-19 | Stop reason: HOSPADM

## 2019-12-17 RX ORDER — GUAIFENESIN 600 MG/1
600 TABLET, EXTENDED RELEASE ORAL EVERY 12 HOURS
Status: DISCONTINUED | OUTPATIENT
Start: 2019-12-17 | End: 2019-12-19 | Stop reason: HOSPADM

## 2019-12-17 RX ORDER — METOPROLOL SUCCINATE 50 MG/1
50 TABLET, EXTENDED RELEASE ORAL DAILY
Status: DISCONTINUED | OUTPATIENT
Start: 2019-12-18 | End: 2019-12-19 | Stop reason: HOSPADM

## 2019-12-17 RX ORDER — DIPHENHYDRAMINE HCL 25 MG
25 CAPSULE ORAL
Status: DISCONTINUED | OUTPATIENT
Start: 2019-12-17 | End: 2019-12-19 | Stop reason: HOSPADM

## 2019-12-17 RX ORDER — ADHESIVE BANDAGE
30 BANDAGE TOPICAL DAILY PRN
Status: DISCONTINUED | OUTPATIENT
Start: 2019-12-17 | End: 2019-12-19 | Stop reason: HOSPADM

## 2019-12-17 RX ORDER — FUROSEMIDE 10 MG/ML
40 INJECTION INTRAMUSCULAR; INTRAVENOUS
Status: ACTIVE | OUTPATIENT
Start: 2019-12-17 | End: 2019-12-18

## 2019-12-17 RX ORDER — NITROGLYCERIN 0.4 MG/1
0.4 TABLET SUBLINGUAL
Status: DISCONTINUED | OUTPATIENT
Start: 2019-12-17 | End: 2019-12-19 | Stop reason: HOSPADM

## 2019-12-17 RX ORDER — HYDROCODONE BITARTRATE AND ACETAMINOPHEN 5; 325 MG/1; MG/1
1 TABLET ORAL
Status: DISCONTINUED | OUTPATIENT
Start: 2019-12-17 | End: 2019-12-19 | Stop reason: HOSPADM

## 2019-12-17 RX ORDER — SODIUM CHLORIDE 9 MG/ML
250 INJECTION, SOLUTION INTRAVENOUS AS NEEDED
Status: DISCONTINUED | OUTPATIENT
Start: 2019-12-17 | End: 2019-12-19 | Stop reason: HOSPADM

## 2019-12-17 RX ORDER — TAMSULOSIN HYDROCHLORIDE 0.4 MG/1
0.4 CAPSULE ORAL
Status: DISCONTINUED | OUTPATIENT
Start: 2019-12-17 | End: 2019-12-19 | Stop reason: HOSPADM

## 2019-12-17 RX ADMIN — AZITHROMYCIN 500 MG: 500 INJECTION, POWDER, LYOPHILIZED, FOR SOLUTION INTRAVENOUS at 13:13

## 2019-12-17 RX ADMIN — CEFTRIAXONE 2 G: 2 INJECTION, POWDER, FOR SOLUTION INTRAMUSCULAR; INTRAVENOUS at 13:12

## 2019-12-17 RX ADMIN — IPRATROPIUM BROMIDE AND ALBUTEROL SULFATE 3 ML: .5; 3 SOLUTION RESPIRATORY (INHALATION) at 12:34

## 2019-12-17 RX ADMIN — SODIUM CHLORIDE 500 ML: 900 INJECTION, SOLUTION INTRAVENOUS at 12:15

## 2019-12-17 RX ADMIN — Medication 10 ML: at 22:30

## 2019-12-17 RX ADMIN — TAMSULOSIN HYDROCHLORIDE 0.4 MG: 0.4 CAPSULE ORAL at 02:35

## 2019-12-17 RX ADMIN — Medication 10 ML: at 17:29

## 2019-12-17 RX ADMIN — BUDESONIDE 500 MCG: 0.5 INHALANT RESPIRATORY (INHALATION) at 22:30

## 2019-12-17 RX ADMIN — ROSUVASTATIN CALCIUM 20 MG: 20 TABLET, FILM COATED ORAL at 02:35

## 2019-12-17 RX ADMIN — ARFORMOTEROL TARTRATE 15 MCG: 15 SOLUTION RESPIRATORY (INHALATION) at 22:30

## 2019-12-17 RX ADMIN — MIRTAZAPINE 15 MG: 15 TABLET, FILM COATED ORAL at 02:35

## 2019-12-17 NOTE — ED NOTES
TRANSFER - OUT REPORT: 
 
Verbal report given to Isacc Gayle RN(name) on [employer].  being transferred to 208(unit) for routine progression of care Report consisted of patients Situation, Background, Assessment and  
Recommendations(SBAR). Information from the following report(s) ED Summary was reviewed with the receiving nurse. Lines:  
Peripheral IV 12/17/19 Left;Upper Arm (Active) Peripheral IV 12/17/19 Right Antecubital (Active) Site Assessment Clean, dry, & intact 12/17/2019  1:08 PM  
Phlebitis Assessment 0 12/17/2019  1:08 PM  
Infiltration Assessment 0 12/17/2019  1:08 PM  
Dressing Status Clean, dry, & intact 12/17/2019  1:08 PM  
   
Peripheral IV 12/17/19 Left Hand (Active) Site Assessment Clean, dry, & intact 12/17/2019  1:08 PM  
Phlebitis Assessment 0 12/17/2019  1:08 PM  
Infiltration Assessment 0 12/17/2019  1:08 PM  
Dressing Status Clean, dry, & intact 12/17/2019  1:08 PM  
  
 
Opportunity for questions and clarification was provided. Patient transported with: 
 O2 @ 3 liters

## 2019-12-17 NOTE — H&P (VIEW-ONLY)
[facility] Consult Note Primary GI Physician: Dr Faizan Naqvi Referring Provider:  Dr Marleny Tay Consult Date:  12/17/2019 Admit Date:  12/17/2019 Chief Complaint:  Anemia and Dark stool Subjective:  
 
History of Present Illness:  Patient is a 70 y.o. male with PMH of  A fib on Eliquis, AAA, arthritis, hx prostate cancer, CKD stage 3, COPD on O2, heart failure (EF 30-35% in 8/2018), HTN. HLD, PUD, PE, hx of colon polyps, who is seen in consultation at the request of Dr. Kyree Resendiz for anemia and dark stool. His hgb has fluctuated between 8-11 and he has had off and on dark stool. He underwent EGD/COLO by Dr Maribel Herrera 9/2019 showed diverticulosis, benign gastric polyps, and colonic polyps (HP focally acutely inflamed) with some erosions in the stomach. He was scheduled for an out patient capsule endoscopy 12/23/19. He has been followed by [facility] hematology for anemia. He was last seen 11/27/19. He had Feraheme infusions 12/5/19 and 12/12/19. He reports dark stool for the past 2-3 days. He denies epigastric pain or significant GERD on Pepcid daily. He denies NSAID or current ETOH use. H denies other GI complaints. Hgb on admission is 8.2 with normal plt of 370. BUN 25 and Cr 1.93 elevated int he setting of CKD. He also reports increasing SOB w/o CP. He is wheezing and has been dx with left lobe pneumonia for which azithromycin and rocephin are ordered. He was hypotensive and tachycardic in ER both improved with IV fluid bolus. PMH: 
Past Medical History:  
Diagnosis Date  A-fib ([address] 5/2/2014  AAA (abdominal aortic aneurysm) without rupture ([address] 5/2/2014  
 3.2 on  2/14 [facility]  Arthritis  Cancer ([address]   
 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 
Past Surgical History:  
Procedure Laterality Date  COLONOSCOPY N/A 9/12/2019 COLONOSCOPY/ 26 ROOM 614 performed by Stanton Estrada MD at [facility]  09/14/2019 Bilateral common femoral artery cutdown with exposure and placement of catheter in aorta for aortogram,Endovascular repair of infrarenal abdominal aortic aneurysm with bifurcated modulated device (31 x 14 x 13 main body) via the left common femoral, right iliac extender measuring 27 x 10. Allergies: Allergies Allergen Reactions  Statins-Hmg-Coa Reductase Inhibitors Myalgia Muscle pain  
 
 
Home Medications: 
Prior to Admission medications Medication Sig Start Date End Date Taking? Authorizing Provider  
metoprolol succinate (TOPROL-XL) 50 mg XL tablet Take 1 Tab by mouth daily. 12/4/19   Jing Clarke MD  
acetaminophen (TYLENOL) 500 mg tablet TAKE 2 TABLETS BY MOUTH 3 TIMES A DAY 10/13/19   Provider, Historical  
albuterol (PROVENTIL VENTOLIN) 2.5 mg /3 mL (0.083 %) nebu  9/14/19   Provider, Historical  
cholecalciferol (VITAMIN D3) 2,000 unit cap capsule TAKE 1 CAPSULE BY MOUTH EVERY DAY 8/20/19   Provider, Historical  
mirtazapine (REMERON) 15 mg tablet TAKE 1 TABLET BY MOUTH AT BEDTIME 10/13/19   Provider, Historical  
brimonidine (ALPHAGAN) 0.15 % ophthalmic solution brimonidine 0.15 % eye drops    Provider, Historical  
tamsulosin (FLOMAX) 0.4 mg capsule Take 1 Cap by mouth daily.  9/19/19   Alton Johnson MD  
 famotidine (PEPCID) 40 mg tablet Take 40 mg by mouth daily. Provider, Historical  
furosemide (LASIX) 40 mg tablet Take 1 Tab by mouth daily. 3/1/19   India Hammonds MD  
nitroglycerin (NITROSTAT) 0.4 mg SL tablet 1 Tab by SubLINGual route every five (5) minutes as needed for Chest Pain. 2/8/19   Sharlene Kate MD  
rosuvastatin (CRESTOR) 40 mg tablet Take 40 mg by mouth nightly. Provider, Historical  
docusate sodium (COLACE) 100 mg capsule Take 100 mg by mouth daily. Provider, Historical  
amLODIPine (NORVASC) 10 mg tablet Take  by mouth daily. Provider, Historical  
budesonide (PULMICORT) 0.5 mg/2 mL nbsp 2 mL by Nebulization route two (2) times a day. 9/15/18   Thai Kolb, DO  
albuterol-ipratropium (DUO-NEB) 2.5 mg-0.5 mg/3 ml nebu 3 mL by Nebulization route every six (6) hours as needed. 9/15/18   Thai Duraning, DO  
apixaban (ELIQUIS) 5 mg tablet Take 1 Tab by mouth two (2) times a day. 9/1/18   EDGAR Doherty  
lisinopril (PRINIVIL, ZESTRIL) 10 mg tablet Take 1 Tab by mouth daily. 7/29/18   Patricia Love NP  
fluticasone-salmeterol (ADVAIR) 250-50 mcg/dose diskus inhaler Take 1 Puff by inhalation. 5/15/18   OtherGrayson MD  
allopurinol (ZYLOPRIM) 300 mg tablet Take  by mouth daily. Provider, Historical  
OXYGEN-AIR DELIVERY SYSTEMS Use as instructed. Use as instructed. 9/15/15   Provider, Historical  
brimonidine (ALPHAGAN P) 0.1 % ophthalmic solution every eight (8) hours. Provider, Historical  
 
 
Hospital Medications: No current facility-administered medications for this encounter. Current Outpatient Medications Medication Sig  
 metoprolol succinate (TOPROL-XL) 50 mg XL tablet Take 1 Tab by mouth daily.  acetaminophen (TYLENOL) 500 mg tablet TAKE 2 TABLETS BY MOUTH 3 TIMES A DAY  albuterol (PROVENTIL VENTOLIN) 2.5 mg /3 mL (0.083 %) nebu  cholecalciferol (VITAMIN D3) 2,000 unit cap capsule TAKE 1 CAPSULE BY MOUTH EVERY DAY  
  mirtazapine (REMERON) 15 mg tablet TAKE 1 TABLET BY MOUTH AT BEDTIME  brimonidine (ALPHAGAN) 0.15 % ophthalmic solution brimonidine 0.15 % eye drops  tamsulosin (FLOMAX) 0.4 mg capsule Take 1 Cap by mouth daily.  famotidine (PEPCID) 40 mg tablet Take 40 mg by mouth daily.  furosemide (LASIX) 40 mg tablet Take 1 Tab by mouth daily.  nitroglycerin (NITROSTAT) 0.4 mg SL tablet 1 Tab by SubLINGual route every five (5) minutes as needed for Chest Pain.  rosuvastatin (CRESTOR) 40 mg tablet Take 40 mg by mouth nightly.  docusate sodium (COLACE) 100 mg capsule Take 100 mg by mouth daily.  amLODIPine (NORVASC) 10 mg tablet Take  by mouth daily.  budesonide (PULMICORT) 0.5 mg/2 mL nbsp 2 mL by Nebulization route two (2) times a day.  albuterol-ipratropium (DUO-NEB) 2.5 mg-0.5 mg/3 ml nebu 3 mL by Nebulization route every six (6) hours as needed.  apixaban (ELIQUIS) 5 mg tablet Take 1 Tab by mouth two (2) times a day.  lisinopril (PRINIVIL, ZESTRIL) 10 mg tablet Take 1 Tab by mouth daily.  fluticasone-salmeterol (ADVAIR) 250-50 mcg/dose diskus inhaler Take 1 Puff by inhalation.  allopurinol (ZYLOPRIM) 300 mg tablet Take  by mouth daily.  OXYGEN-AIR DELIVERY SYSTEMS Use as instructed. Use as instructed.  brimonidine (ALPHAGAN P) 0.1 % ophthalmic solution every eight (8) hours. Social History: 
Social History Tobacco Use  Smoking status: Former Smoker Packs/day: 2.00 Years: 40.00 Pack years: 80.00 Last attempt to quit: 2014 Years since quittin.3  Smokeless tobacco: Never Used  Tobacco comment: still taking Chantix Substance Use Topics  Alcohol use: No  
 
 
Pt denies any history of drug use, blood transfusions, or tattoos. Family History: 
Family History Problem Relation Age of Onset  Cancer Mother  Heart Disease Father Review of Systems: A detailed 10 system ROS is obtained, with pertinent positives as listed above. All others are negative. Diet:  NPO Objective:  
 
Physical Exam: 
Vitals: 
Visit Vitals /57 Pulse 93 Temp 98.1 °F (36.7 °C) Resp (!) 31 Ht 5' 9\" (1.753 m) Wt 75.3 kg (166 lb) SpO2 96% BMI 24.51 kg/m² Gen:  Pt is alert, cooperative, no acute distress Skin:  Extremities and face reveal no rashes. HEENT: Sclerae anicteric. Extra-occular muscles are intact. No oral ulcers. No abnormal pigmentation of the lips. The neck is supple. Cardiovascular: Regular rate and rhythm. No murmurs, gallops, or rubs. Respiratory:  Wheeze left anterior GI:  Abdomen nondistended, soft, and nontender. Normal active bowel sounds. No enlargement of the liver or spleen. No masses palpable. Rectal:  Deferred Musculoskeletal:  No pitting edema of the lower legs. Neurological:  Gross memory appears intact. Patient is alert and oriented. Psychiatric:  Mood appears appropriate with judgement intact. Laboratory:   
Recent Labs 12/17/19 
1126 WBC 8.3 HGB 8.3* HCT 27.1*  
 MCV 89.4   
K 4.0  
 CO2 30 BUN 25* CREA 1.93* CA 9.2 * AP 72 SGOT 25 ALT 8*  
TBILI 0.4 ALB 2.6* TP 7.9 PTP 18.1* INR 1.5 Assessment:  
 
Active Problems: 
  Anemia (3/18/2019) Pneumonia (12/17/2019) 70 y.o. male with PMH of  A fib on Eliquis, AAA, arthritis, hx prostate cancer, CKD stage 3, COPD on O2, heart failure (EF 30-35% in 8/2018), HTN. HLD, PUD, PE, hx of colon polyps admitted w SOB, pneumonia see in consult for dark stools and anemia with a hgb of 8.2 with normal plt of 370. BUN 25 and Cr 1.93 elevated int he setting of CKD. He underwent EGD/COLO by Dr Marianela Hess 9/2019 showed diverticulosis, benign gastric polyps, and colonic polyps (HP focally acutely inflamed) with some erosions in the stomach.  He has out patient capsule endoscopy scheduled 12/23/19. He reports off and on dark stools but denies NSAID or ETOH use. Plan: - Clear liquid diet, NPO after MN 
- EGD tomorrow to evaluate for sources of UGIB such as PUD or AVM's - PPI BID ALEJANDRA De Santiago Patient is seen and examined in collaboration with Dr. Aishwarya Gray. Assessment and plan as per Dr. John Rizvi.

## 2019-12-17 NOTE — H&P
H&P 
 
 
Patient: Daylin Olivares. Sex: male             MRN: [id number] YOB: 1940      Age:  70 y.o. Chief Complaint:  Weakness, cough, black stools HPI This is a 63-year-old gentleman with multiple medical problems including systolic CHF with EF of 30 to 35%, chronic COPD, chronic respiratory failure on home oxygen, iron deficiency anemia, AAA status post endovascular repair in September 2019, gastritis, GI bleed status post EGD and colonoscopy in September 2019, paroxysmal A. fib, prostate cancer, dyslipidemia, BPH, hypertension, CKD stage III, came to the emergency room with complaints of generalized weakness, cough with bloody sputum over the last 1 week. Patient started having cough almost 1 week ago, progressively getting worse, worse with exertion, somewhat better with rest, associated with small amount of blood in the sputum over the last couple of days. No fever or chills. No chest pain. Complains of shortness of breath with exertion but okay at rest.  No abdominal pain. Complains of decreased appetite and has not been eating or drinking much in the last 1 week. Complains of generalized weakness and fatigue which has been gradually worsening over the last 1 week. Patient has noticed black stools in the last 2 to 3 days. On initial evaluation in the emergency room patient was found to be hypotensive and tachycardic. Blood pressure improved after IV fluid bolus. Patient's hemoglobin dropped from 10.5 in November to 8.3 today. He was also found to have right lower lobe pneumonia. Blood cultures were done and patient was admitted to hospitalist service for further evaluation and treatment. Review of Systems Comprehensive 10 point ROS is done, and pertinent positives & negatives per HPI, rest of them are negative. Past Medical History:  
Diagnosis Date  [facility] 5/2/2014  AAA (abdominal aortic aneurysm) without rupture ([address] 2014  
 3.2 on [place]  [facility]  Arthritis  Cancer ([address]   
 hx prostate cancer  COPD (chronic obstructive pulmonary disease) ([address] 2014  Elevated prostate specific antigen (PSA)  Glaucoma 2014  Heart disease  Heart failure ([address]  Hyperlipemia 2014  Hypertension  Hypertrophy of prostate with urinary obstruction and other lower urinary tract symptoms (LUTS)  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  OA (osteoarthritis) 2014  Peptic ulcer disease 2017  Poor historian  Prostate cancer ([address]  Pulmonary embolus ([address] 2017  Supplemental oxygen dependent 2014  Warfarin anticoagulation 2014 Past Surgical History:  
Procedure Laterality Date  COLONOSCOPY N/A 2019 COLONOSCOPY/ 26 ROOM 614 performed by Kenyon Rg MD at [address] [place] [address]  2019 Bilateral common femoral artery cutdown with exposure and placement of catheter in aorta for aortogram,Endovascular repair of infrarenal abdominal aortic aneurysm with bifurcated modulated device (31 x 14 x 13 main body) via the left common femoral, right iliac extender measuring 27 x 10. Family History Problem Relation Age of Onset  Cancer Mother  Heart Disease Father Social History Socioeconomic History  Marital status: SINGLE Spouse name: Not on file  Number of children: Not on file  Years of education: Not on file  Highest education level: Not on file Tobacco Use  Smoking status: Former Smoker Packs/day: 2.00 Years: 40.00 Pack years: 80.00 Last attempt to quit: 2014 Years since quittin.3  Smokeless tobacco: Never Used  Tobacco comment: still taking Chantix Substance and Sexual Activity  Alcohol use: No  
 Drug use: No  
 Sexual activity: Yes  
 Partners: Female Birth control/protection: None Allergies Allergen Reactions  Statins-Hmg-Coa Reductase Inhibitors Myalgia Muscle pain Prior to Admission medications Medication Sig Start Date End Date Taking? Authorizing Provider  
metoprolol succinate (TOPROL-XL) 50 mg XL tablet Take 1 Tab by mouth daily. 12/4/19   Jing Clarke MD  
acetaminophen (TYLENOL) 500 mg tablet TAKE 2 TABLETS BY MOUTH 3 TIMES A DAY 10/13/19   Provider, Historical  
albuterol (PROVENTIL VENTOLIN) 2.5 mg /3 mL (0.083 %) nebu  9/14/19   Provider, Historical  
cholecalciferol (VITAMIN D3) 2,000 unit cap capsule TAKE 1 CAPSULE BY MOUTH EVERY DAY 8/20/19   Provider, Historical  
mirtazapine (REMERON) 15 mg tablet TAKE 1 TABLET BY MOUTH AT BEDTIME 10/13/19   Provider, Historical  
brimonidine (ALPHAGAN) 0.15 % ophthalmic solution brimonidine 0.15 % eye drops    Provider, Historical  
tamsulosin (FLOMAX) 0.4 mg capsule Take 1 Cap by mouth daily. 9/19/19   Alton Johnson MD  
famotidine (PEPCID) 40 mg tablet Take 40 mg by mouth daily. Provider, Historical  
furosemide (LASIX) 40 mg tablet Take 1 Tab by mouth daily. 3/1/19   Joe Hammonds MD  
nitroglycerin (NITROSTAT) 0.4 mg SL tablet 1 Tab by SubLINGual route every five (5) minutes as needed for Chest Pain. 2/8/19   Jing Clarke MD  
rosuvastatin (CRESTOR) 40 mg tablet Take 40 mg by mouth nightly. Provider, Historical  
docusate sodium (COLACE) 100 mg capsule Take 100 mg by mouth daily. Provider, Historical  
amLODIPine (NORVASC) 10 mg tablet Take  by mouth daily. Provider, Historical  
budesonide (PULMICORT) 0.5 mg/2 mL nbsp 2 mL by Nebulization route two (2) times a day. 9/15/18   Neita Shutters, DO  
albuterol-ipratropium (DUO-NEB) 2.5 mg-0.5 mg/3 ml nebu 3 mL by Nebulization route every six (6) hours as needed. 9/15/18   Neita Shutters, DO  
apixaban (ELIQUIS) 5 mg tablet Take 1 Tab by mouth two (2) times a day. 18   EDGAR Leong  
lisinopril (PRINIVIL, ZESTRIL) 10 mg tablet Take 1 Tab by mouth daily. 18   Jono Donnelly NP  
fluticasone-salmeterol (ADVAIR) 250-50 mcg/dose diskus inhaler Take 1 Puff by inhalation. 5/15/18   Grayson Ureña MD  
allopurinol (ZYLOPRIM) 300 mg tablet Take  by mouth daily. Provider, Historical  
OXYGEN-AIR DELIVERY SYSTEMS Use as instructed. Use as instructed. 9/15/15   Provider, Historical  
brimonidine (ALPHAGAN P) 0.1 % ophthalmic solution every eight (8) hours. Provider, Historical  
 
 
 
Physical Exam  
 
Visit Vitals /58 Pulse 90 Temp 98.1 °F (36.7 °C) Resp 24 Ht 5' 9\" (1.753 m) Wt 75.3 kg (166 lb) SpO2 96% BMI 24.51 kg/m² Temp (24hrs), Av.1 °F (36.7 °C), Min:98.1 °F (36.7 °C), Max:98.1 °F (36.7 °C) Oxygen Therapy O2 Sat (%): 96 % (19 1239) Pulse via Oximetry: 88 beats per minute (19 1239) O2 Device: Nasal cannula (19 1239) O2 Flow Rate (L/min): 3 l/min (19 1239) No intake or output data in the 24 hours ending 19 1347 General: Conscious, No acute distress at rest 
Eyes:  SONAL, pallor present but no icterus. HENT:             Oral Mucosa is Moist, No sinus tenderness Neck:               Supple, No JVD Lungs:  Diminished air entry with crackles in the right base but otherwise clear to auscultation. No significant wheezing Heart:  S1 S2 regular Abdomen: Soft, Positive bowel sounds, NTND, No guarding/rigidity/rebound tend. Extremities: No pedal edema Neurologic:  AAOX3, No acute FND, Motor: LUE: 5/5, LLE: 5/5, RUE: 5/5, RLE: 5/5 Skin:                No acute rashes Musculoskeletal: No Acute findings Psych:             Appropriate mood LAB Recent Results (from the past 24 hour(s)) CBC WITH AUTOMATED DIFF Collection Time: 19 11:26 AM  
Result Value Ref Range WBC 8.3 4.3 - 11.1 K/uL  
 RBC 3.03 (L) 4.23 - 5.6 M/uL HGB 8.3 (L) 13.6 - 17.2 g/dL HCT 27.1 (L) 41.1 - 50.3 % MCV 89.4 79.6 - 97.8 FL  
 MCH 27.4 26.1 - 32.9 PG  
 MCHC 30.6 (L) 31.4 - 35.0 g/dL  
 RDW 18.6 (H) 11.9 - 14.6 % PLATELET 521 463 - 851 K/uL MPV 10.1 9.4 - 12.3 FL ABSOLUTE NRBC 0.00 0.0 - 0.2 K/uL  
 DF AUTOMATED NEUTROPHILS 64 43 - 78 % LYMPHOCYTES 21 13 - 44 % MONOCYTES 11 4.0 - 12.0 % EOSINOPHILS 3 0.5 - 7.8 % BASOPHILS 1 0.0 - 2.0 % IMMATURE GRANULOCYTES 1 0.0 - 5.0 %  
 ABS. NEUTROPHILS 5.2 1.7 - 8.2 K/UL  
 ABS. LYMPHOCYTES 1.8 0.5 - 4.6 K/UL  
 ABS. MONOCYTES 0.9 0.1 - 1.3 K/UL  
 ABS. EOSINOPHILS 0.2 0.0 - 0.8 K/UL  
 ABS. BASOPHILS 0.1 0.0 - 0.2 K/UL  
 ABS. IMM. GRANS. 0.1 0.0 - 0.5 K/UL METABOLIC PANEL, COMPREHENSIVE Collection Time: 12/17/19 11:26 AM  
Result Value Ref Range Sodium 140 136 - 145 mmol/L Potassium 4.0 3.5 - 5.1 mmol/L Chloride 102 98 - 107 mmol/L  
 CO2 30 21 - 32 mmol/L Anion gap 8 7 - 16 mmol/L Glucose 111 (H) 65 - 100 mg/dL BUN 25 (H) 8 - 23 MG/DL Creatinine 1.93 (H) 0.8 - 1.5 MG/DL  
 GFR est AA 44 (L) >60 ml/min/1.73m2 GFR est non-AA 36 (L) >60 ml/min/1.73m2 Calcium 9.2 8.3 - 10.4 MG/DL Bilirubin, total 0.4 0.2 - 1.1 MG/DL  
 ALT (SGPT) 8 (L) 12 - 65 U/L  
 AST (SGOT) 25 15 - 37 U/L Alk. phosphatase 72 50 - 136 U/L Protein, total 7.9 6.3 - 8.2 g/dL Albumin 2.6 (L) 3.2 - 4.6 g/dL Globulin 5.3 (H) 2.3 - 3.5 g/dL A-G Ratio 0.5 (L) 1.2 - 3.5 TYPE & SCREEN Collection Time: 12/17/19 11:26 AM  
Result Value Ref Range Crossmatch Expiration 12/20/2019 ABO/Rh(D) B POSITIVE Antibody screen NEG PROTHROMBIN TIME + INR Collection Time: 12/17/19 11:26 AM  
Result Value Ref Range Prothrombin time 18.1 (H) 11.7 - 14.5 sec INR 1.5 PROCALCITONIN Collection Time: 12/17/19 11:26 AM  
Result Value Ref Range Procalcitonin 0.22 ng/mL EKG, 12 LEAD, INITIAL Collection Time: 12/17/19 12:31 PM  
Result Value Ref Range  Ventricular Rate 90 BPM  
 Atrial Rate 90 BPM  
 P-R Interval 178 ms QRS Duration 78 ms Q-T Interval 330 ms QTC Calculation (Bezet) 403 ms Calculated P Axis 72 degrees Calculated R Axis 73 degrees Calculated T Axis 77 degrees Diagnosis    
  !! AGE AND GENDER SPECIFIC ECG ANALYSIS !! Normal sinus rhythm Septal infarct (cited on or before 17-DEC-2019) Abnormal ECG When compared with ECG of 17-DEC-2019 12:30, No significant change was found POC LACTIC ACID Collection Time: 12/17/19  1:09 PM  
Result Value Ref Range Lactic Acid (POC) 1.26 0.5 - 1.9 mmol/L IMAGING:  
 
No results found. All Micro Results Procedure Component Value Units Date/Time CULTURE, BLOOD [[id number]] Collected:  12/17/19 1303 Order Status:  Completed Specimen:  Blood Updated:  12/17/19 1333 CULTURE, BLOOD [[id number]] Collected:  12/17/19 1306 Order Status:  Completed Specimen:  Blood Updated:  12/17/19 1333 EKG: personally reviewed and shows normal sinus rhythm with no acute ST-T changes compared to old EKG. CXR: Personally reviewed and shows right lower lobe infiltrate. Assessment/Plan Active Problems: 
  Pneumonia (12/17/2019) 1. Right lower lobe pneumonia, probably community-acquired. Started on IV ceftriaxone and azithromycin. Will get sputum cultures. Follow-up blood cultures. 2.  Chronic COPD, chronic respiratory failure on home oxygen. No wheezing on exam.  Keep him on Brovana, budesonide, Spiriva and use PRN DuoNeb's. No steroids currently. 3.  Iron deficiency anemia: Patient is probably losing some blood from upper GI as patient is having black stools and also some amount of blood from hemoptysis. We will transfuse 2 units of PRBC as patient was hypotensive on presentation. Will monitor H&H closely. Hold Eliquis. If patient develops any large volume hemoptysis or GI bleed, may give FFP. 4.  Possible upper GI bleed. Keep him on IV Protonix.   Check stool occult. GI consulted. Will keep him n.p.o. after midnight. As per the patient he was supposed to get outpatient capsule endoscopy this coming Monday. Last EGD was in September 2019. 
 
5.  Mild hemoptysis. Hold anticoagulation and monitor for now. Continue IV antibiotics for pneumonia. 6.  History of AAA status post endovascular repair in September 2019 7. Paroxysmal A. fib: Continue metoprolol for rate control. Hold anticoagulation secondary to bleeding. 8.  BPH 9. CKD stage III: Hold Lasix and lisinopril as creatinine worsening over the last several days. 10.  Hypotension: Likely from hypovolemia. Improved with IV fluids. Hold blood pressure medications and monitor. 11.  Chronic systolic CHF with EF 30 to 35%. Monitor for fluid overload. Hold Lasix for now. May give IV Lasix after blood transfusion. Patient is admitted to inpatient status, likely needs more than 2 midnights of hospital stay secondary to above-mentioned medical problems. GI consulted. All the pertinent investigative studies and treatment plan was discussed with the patient. Further recommendations as per the clinical course. DVT prophylaxis: SCDs Code status: CODE STATUS discussed with the patient and he wants to be DNR/DNI. Risk: High risk patient secondary to above-mentioned medical problems, at risk for respiratory failure, hypovolemic shock. Rhett Mackey MD 
December 17, 2019

## 2019-12-17 NOTE — PROGRESS NOTES
TRANSFER - IN REPORT: 
 
Verbal report received from April on [address]  being received from ER for routine progression of care Report consisted of patients Situation, Background, Assessment and  
Recommendations(SBAR). Information from the following report(s) Kardex was reviewed with the receiving nurse. Opportunity for questions and clarification was provided. Assessment completed upon patients arrival to unit and care assumed.

## 2019-12-17 NOTE — CONSULTS
[facility] Consult Note Primary GI Physician: Dr Grupo Parada Referring Provider:  Dr Anoop Ziegler Consult Date:  12/17/2019 Admit Date:  12/17/2019 Chief Complaint:  Anemia and Dark stool Subjective:  
 
History of Present Illness:  Patient is a 70 y.o. male with PMH of  A fib on Eliquis, AAA, arthritis, hx prostate cancer, CKD stage 3, COPD on O2, heart failure (EF 30-35% in 8/2018), HTN. HLD, PUD, PE, hx of colon polyps, who is seen in consultation at the request of Dr. Jay Martinez for anemia and dark stool. His hgb has fluctuated between 8-11 and he has had off and on dark stool. He underwent EGD/COLO by Dr Amy Mcdonough 9/2019 showed diverticulosis, benign gastric polyps, and colonic polyps (HP focally acutely inflamed) with some erosions in the stomach. He was scheduled for an out patient capsule endoscopy 12/23/19. He has been followed by [facility] hematology for anemia. He was last seen 11/27/19. He had Feraheme infusions 12/5/19 and 12/12/19. He reports dark stool for the past 2-3 days. He denies epigastric pain or significant GERD on Pepcid daily. He denies NSAID or current ETOH use. H denies other GI complaints. Hgb on admission is 8.2 with normal plt of 370. BUN 25 and Cr 1.93 elevated int he setting of CKD. He also reports increasing SOB w/o CP. He is wheezing and has been dx with left lobe pneumonia for which azithromycin and rocephin are ordered. He was hypotensive and tachycardic in ER both improved with IV fluid bolus. PMH: 
Past Medical History:  
Diagnosis Date  A-fib ([address] 5/2/2014  AAA (abdominal aortic aneurysm) without rupture ([address] 5/2/2014  
 3.2 on  2/14 [facility]  Arthritis  Cancer ([address]   
 hx prostate cancer  COPD (chronic obstructive pulmonary disease) ([address] 5/2/2014  Elevated prostate specific antigen (PSA)  Glaucoma 5/2/2014  Heart disease  Heart failure ([address]  Hyperlipemia 5/2/2014  Hypertension  Hypertrophy of prostate with urinary obstruction and other lower urinary tract symptoms (LUTS)  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[facility] 7/25/2018  OA (osteoarthritis) 5/2/2014  Peptic ulcer disease 6/1/2017  Poor historian  Prostate cancer ([facility]  Pulmonary embolus ([facility] 6/1/2017  Supplemental oxygen dependent 5/2/2014  Warfarin anticoagulation 5/2/2014 PSH: 
Past Surgical History:  
Procedure Laterality Date  COLONOSCOPY N/A 9/12/2019 COLONOSCOPY/ 26 ROOM 614 performed by Christiano Anderson MD at [facility]  09/14/2019 Bilateral common femoral artery cutdown with exposure and placement of catheter in aorta for aortogram,Endovascular repair of infrarenal abdominal aortic aneurysm with bifurcated modulated device (31 x 14 x 13 main body) via the left common femoral, right iliac extender measuring 27 x 10. Allergies: Allergies Allergen Reactions  Statins-Hmg-Coa Reductase Inhibitors Myalgia Muscle pain  
 
 
Home Medications: 
Prior to Admission medications Medication Sig Start Date End Date Taking? Authorizing Provider  
metoprolol succinate (TOPROL-XL) 50 mg XL tablet Take 1 Tab by mouth daily. 12/4/19   Sharlene Kate MD  
acetaminophen (TYLENOL) 500 mg tablet TAKE 2 TABLETS BY MOUTH 3 TIMES A DAY 10/13/19   Provider, Historical  
albuterol (PROVENTIL VENTOLIN) 2.5 mg /3 mL (0.083 %) nebu  9/14/19   Provider, Historical  
cholecalciferol (VITAMIN D3) 2,000 unit cap capsule TAKE 1 CAPSULE BY MOUTH EVERY DAY 8/20/19   Provider, Historical  
mirtazapine (REMERON) 15 mg tablet TAKE 1 TABLET BY MOUTH AT BEDTIME 10/13/19   Provider, Historical  
brimonidine (ALPHAGAN) 0.15 % ophthalmic solution brimonidine 0.15 % eye drops    Provider, Historical  
tamsulosin (FLOMAX) 0.4 mg capsule Take 1 Cap by mouth daily.  9/19/19   Apple, Jacqulyn Hodgkins, MD  
 famotidine (PEPCID) 40 mg tablet Take 40 mg by mouth daily. Provider, Historical  
furosemide (LASIX) 40 mg tablet Take 1 Tab by mouth daily. 3/1/19   Agustín Hammonds MD  
nitroglycerin (NITROSTAT) 0.4 mg SL tablet 1 Tab by SubLINGual route every five (5) minutes as needed for Chest Pain. 2/8/19   Nikolas Urbina MD  
rosuvastatin (CRESTOR) 40 mg tablet Take 40 mg by mouth nightly. Provider, Historical  
docusate sodium (COLACE) 100 mg capsule Take 100 mg by mouth daily. Provider, Historical  
amLODIPine (NORVASC) 10 mg tablet Take  by mouth daily. Provider, Historical  
budesonide (PULMICORT) 0.5 mg/2 mL nbsp 2 mL by Nebulization route two (2) times a day. 9/15/18   Hui Appl, DO  
albuterol-ipratropium (DUO-NEB) 2.5 mg-0.5 mg/3 ml nebu 3 mL by Nebulization route every six (6) hours as needed. 9/15/18   Hui Appl, DO  
apixaban (ELIQUIS) 5 mg tablet Take 1 Tab by mouth two (2) times a day. 9/1/18   EDGAR Hope  
lisinopril (PRINIVIL, ZESTRIL) 10 mg tablet Take 1 Tab by mouth daily. 7/29/18   Aleshia Hyde NP  
fluticasone-salmeterol (ADVAIR) 250-50 mcg/dose diskus inhaler Take 1 Puff by inhalation. 5/15/18   Other, MD Grayson  
allopurinol (ZYLOPRIM) 300 mg tablet Take  by mouth daily. Provider, Historical  
OXYGEN-AIR DELIVERY SYSTEMS Use as instructed. Use as instructed. 9/15/15   Provider, Historical  
brimonidine (ALPHAGAN P) 0.1 % ophthalmic solution every eight (8) hours. Provider, Historical  
 
 
Hospital Medications: No current facility-administered medications for this encounter. Current Outpatient Medications Medication Sig  
 metoprolol succinate (TOPROL-XL) 50 mg XL tablet Take 1 Tab by mouth daily.  acetaminophen (TYLENOL) 500 mg tablet TAKE 2 TABLETS BY MOUTH 3 TIMES A DAY  albuterol (PROVENTIL VENTOLIN) 2.5 mg /3 mL (0.083 %) nebu  cholecalciferol (VITAMIN D3) 2,000 unit cap capsule TAKE 1 CAPSULE BY MOUTH EVERY DAY  
  mirtazapine (REMERON) 15 mg tablet TAKE 1 TABLET BY MOUTH AT BEDTIME  brimonidine (ALPHAGAN) 0.15 % ophthalmic solution brimonidine 0.15 % eye drops  tamsulosin (FLOMAX) 0.4 mg capsule Take 1 Cap by mouth daily.  famotidine (PEPCID) 40 mg tablet Take 40 mg by mouth daily.  furosemide (LASIX) 40 mg tablet Take 1 Tab by mouth daily.  nitroglycerin (NITROSTAT) 0.4 mg SL tablet 1 Tab by SubLINGual route every five (5) minutes as needed for Chest Pain.  rosuvastatin (CRESTOR) 40 mg tablet Take 40 mg by mouth nightly.  docusate sodium (COLACE) 100 mg capsule Take 100 mg by mouth daily.  amLODIPine (NORVASC) 10 mg tablet Take  by mouth daily.  budesonide (PULMICORT) 0.5 mg/2 mL nbsp 2 mL by Nebulization route two (2) times a day.  albuterol-ipratropium (DUO-NEB) 2.5 mg-0.5 mg/3 ml nebu 3 mL by Nebulization route every six (6) hours as needed.  apixaban (ELIQUIS) 5 mg tablet Take 1 Tab by mouth two (2) times a day.  lisinopril (PRINIVIL, ZESTRIL) 10 mg tablet Take 1 Tab by mouth daily.  fluticasone-salmeterol (ADVAIR) 250-50 mcg/dose diskus inhaler Take 1 Puff by inhalation.  allopurinol (ZYLOPRIM) 300 mg tablet Take  by mouth daily.  OXYGEN-AIR DELIVERY SYSTEMS Use as instructed. Use as instructed.  brimonidine (ALPHAGAN P) 0.1 % ophthalmic solution every eight (8) hours. Social History: 
Social History Tobacco Use  Smoking status: Former Smoker Packs/day: 2.00 Years: 40.00 Pack years: 80.00 Last attempt to quit: 2014 Years since quittin.3  Smokeless tobacco: Never Used  Tobacco comment: still taking Chantix Substance Use Topics  Alcohol use: No  
 
 
Pt denies any history of drug use, blood transfusions, or tattoos. Family History: 
Family History Problem Relation Age of Onset  Cancer Mother  Heart Disease Father Review of Systems: A detailed 10 system ROS is obtained, with pertinent positives as listed above. All others are negative. Diet:  NPO Objective:  
 
Physical Exam: 
Vitals: 
Visit Vitals /57 Pulse 93 Temp 98.1 °F (36.7 °C) Resp (!) 31 Ht 5' 9\" (1.753 m) Wt 75.3 kg (166 lb) SpO2 96% BMI 24.51 kg/m² Gen:  Pt is alert, cooperative, no acute distress Skin:  Extremities and face reveal no rashes. HEENT: Sclerae anicteric. Extra-occular muscles are intact. No oral ulcers. No abnormal pigmentation of the lips. The neck is supple. Cardiovascular: Regular rate and rhythm. No murmurs, gallops, or rubs. Respiratory:  Wheeze left anterior GI:  Abdomen nondistended, soft, and nontender. Normal active bowel sounds. No enlargement of the liver or spleen. No masses palpable. Rectal:  Deferred Musculoskeletal:  No pitting edema of the lower legs. Neurological:  Gross memory appears intact. Patient is alert and oriented. Psychiatric:  Mood appears appropriate with judgement intact. Laboratory:   
Recent Labs 12/17/19 
1126 WBC 8.3 HGB 8.3* HCT 27.1*  
 MCV 89.4   
K 4.0  
 CO2 30 BUN 25* CREA 1.93* CA 9.2 * AP 72 SGOT 25 ALT 8*  
TBILI 0.4 ALB 2.6* TP 7.9 PTP 18.1* INR 1.5 Assessment:  
 
Active Problems: 
  Anemia (3/18/2019) Pneumonia (12/17/2019) 70 y.o. male with PMH of  A fib on Eliquis, AAA, arthritis, hx prostate cancer, CKD stage 3, COPD on O2, heart failure (EF 30-35% in 8/2018), HTN. HLD, PUD, PE, hx of colon polyps admitted w SOB, pneumonia see in consult for dark stools and anemia with a hgb of 8.2 with normal plt of 370. BUN 25 and Cr 1.93 elevated int he setting of CKD. He underwent EGD/COLO by Dr Nic Persaud 9/2019 showed diverticulosis, benign gastric polyps, and colonic polyps (HP focally acutely inflamed) with some erosions in the stomach.  He has out patient capsule endoscopy scheduled 12/23/19. He reports off and on dark stools but denies NSAID or ETOH use. Plan: - Clear liquid diet, NPO after MN 
- EGD tomorrow to evaluate for sources of UGIB such as PUD or AVM's - PPI BID ALEJANDRA Johnson Patient is seen and examined in collaboration with Dr. Dino Segovia. Assessment and plan as per Dr. Abby Seals.

## 2019-12-17 NOTE — ED TRIAGE NOTES
Patient complains of dark tarry stool, spitting up blood (cough and vomiting per patient), tiredness, poor appetite, and increased shortness of breath for 2 weeks. Patient states he gets iron infusions for anemia.

## 2019-12-17 NOTE — ED PROVIDER NOTES
Presents with complaint of weakness, dark stool and coughing up blood since Saturday. Patient denies any abdominal pain or diarrhea. He denies any chest pain or increased shortness of breath. He is on oxygen at all times for history of COPD and CHF. He reports no appetite. When placed in the bed patient had a blood pressure of 79 and 500 cc fluids were ordered. He is currently taking his Eliquis. Patient has a history of GI bleed. The history is provided by the patient. Melena The current episode started 3 to 5 days ago. The problem occurs continuously. The problem has been unchanged. The patient is experiencing no pain. Associated symptoms include coughing. Pertinent negatives include no fever, no abdominal pain, no diarrhea, no headaches and no difficulty breathing. He has been eating less than usual.  
  
 
Past Medical History:  
Diagnosis Date  A-fib ([address] 5/2/2014  AAA (abdominal aortic aneurysm) without rupture ([address] 5/2/2014  
 3.2 on  2/14 [facility]  Arthritis  Cancer ([address]   
 hx prostate cancer  COPD (chronic obstructive pulmonary disease) ([address] 5/2/2014  Elevated prostate specific antigen (PSA)  Glaucoma 5/2/2014  Heart disease  Heart failure ([address]  Hyperlipemia 5/2/2014  Hypertension  Hypertrophy of prostate with urinary obstruction and other lower urinary tract symptoms (LUTS)  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  OA (osteoarthritis) 5/2/2014  Peptic ulcer disease 6/1/2017  Poor historian  Prostate cancer ([address]  Pulmonary embolus ([address] 6/1/2017  Supplemental oxygen dependent 5/2/2014  Warfarin anticoagulation 5/2/2014 Past Surgical History:  
Procedure Laterality Date  COLONOSCOPY N/A 9/12/2019 COLONOSCOPY/ 26 ROOM 614 performed by Melvin Pereira MD at [facility]  09/14/2019 Bilateral common femoral artery cutdown with exposure and placement of catheter in aorta for aortogram,Endovascular repair of infrarenal abdominal aortic aneurysm with bifurcated modulated device (31 x 14 x 13 main body) via the left common femoral, right iliac extender measuring 27 x 10. Family History:  
Problem Relation Age of Onset  Cancer Mother  Heart Disease Father Social History Socioeconomic History  Marital status: SINGLE Spouse name: Not on file  Number of children: Not on file  Years of education: Not on file  Highest education level: Not on file Occupational History  Not on file Social Needs  Financial resource strain: Not on file  Food insecurity:  
  Worry: Not on file Inability: Not on file  Transportation needs:  
  Medical: Not on file Non-medical: Not on file Tobacco Use  Smoking status: Former Smoker Packs/day: 2.00 Years: 40.00 Pack years: 80.00 Last attempt to quit: 2014 Years since quittin.3  Smokeless tobacco: Never Used  Tobacco comment: still taking Chantix Substance and Sexual Activity  Alcohol use: No  
 Drug use: No  
 Sexual activity: Yes  
  Partners: Female Birth control/protection: None Lifestyle  Physical activity:  
  Days per week: Not on file Minutes per session: Not on file  Stress: Not on file Relationships  Social connections:  
  Talks on phone: Not on file Gets together: Not on file Attends Yarsanism service: Not on file Active member of club or organization: Not on file Attends meetings of clubs or organizations: Not on file Relationship status: Not on file  Intimate partner violence:  
  Fear of current or ex partner: Not on file Emotionally abused: Not on file Physically abused: Not on file Forced sexual activity: Not on file Other Topics Concern  Not on file Social History Narrative  Not on file ALLERGIES: Statins-hmg-coa reductase inhibitors Review of Systems Constitutional: Negative for chills and fever. Respiratory: Positive for cough. Gastrointestinal: Positive for melena. Negative for abdominal pain and diarrhea. Neurological: Negative for headaches. All other systems reviewed and are negative. Vitals:  
 12/17/19 1208 12/17/19 1212 12/17/19 1213 12/17/19 1214 BP: (!) 79/45   106/55 Pulse: 93 95 100 Resp:      
Temp:      
SpO2: 95% 97% 98% Weight:      
Height:      
      
 
Physical Exam 
Vitals signs and nursing note reviewed. Constitutional:   
   General: He is not in acute distress. Appearance: Normal appearance. He is well-developed. He is ill-appearing (Chronically). He is not diaphoretic. HENT:  
   Head: Normocephalic and atraumatic. Mouth/Throat:  
   Mouth: Mucous membranes are dry. Eyes:  
   Comments: Wearing sunglasses Neck: Musculoskeletal: Normal range of motion and neck supple. Cardiovascular:  
   Rate and Rhythm: Normal rate and regular rhythm. Heart sounds: Murmur present. Pulmonary:  
   Effort: Pulmonary effort is normal. No respiratory distress. Breath sounds: Wheezing (Right greater than left but bilateral) present. Abdominal:  
   General: There is no distension. Palpations: Abdomen is soft. Tenderness: There is no tenderness. Genitourinary: 
   Rectum: Guaiac result positive (dark maroon stool). Musculoskeletal: Normal range of motion. General: No swelling. Right lower leg: No edema. Left lower leg: No edema. Skin: 
   General: Skin is warm and dry. Capillary Refill: Capillary refill takes less than 2 seconds. Neurological:  
   General: No focal deficit present. Mental Status: He is alert and oriented to person, place, and time. Cranial Nerves: No cranial nerve deficit.   
Psychiatric:     
   Mood and Affect: Mood normal.     
 Behavior: Behavior normal.  
 
  
 
MDM Number of Diagnoses or Management Options Diagnosis management comments: Pt with GIB and PNA on CXR. PNA likely cause of hemoptysis. BP responded to fluids. Admit to hospitalist.   
 
  
Amount and/or Complexity of Data Reviewed Clinical lab tests: reviewed and ordered Decide to obtain previous medical records or to obtain history from someone other than the patient: yes (Family member) Review and summarize past medical records: yes Discuss the patient with other providers: yes Independent visualization of images, tracings, or specimens: yes (Sinus rhythm ) Risk of Complications, Morbidity, and/or Mortality Presenting problems: high Diagnostic procedures: moderate Management options: high Patient Progress Patient progress: stable Procedures

## 2019-12-17 NOTE — PROGRESS NOTES
12/17/19 1620 Dual Skin Pressure Injury Assessment Dual Skin Pressure Injury Assessment WDL Second Care Provider (Based on [address] [place] [address]) Irene Clarke Skin Integumentary Skin Integumentary (WDL) WDL Skin Color Appropriate for ethnicity Skin Condition/Temp Dry; Warm  
Skin Integrity Intact Wound Prevention and Protection Methods Orientation of Wound Prevention Posterior Location of Wound Prevention Sacrum/Coccyx Dressing Present  No  
Wound Offloading (Prevention Methods) Repositioning

## 2019-12-18 ENCOUNTER — ANESTHESIA EVENT (OUTPATIENT)
Dept: ENDOSCOPY | Age: 79
DRG: 871 | End: 2019-12-18
Payer: MEDICARE

## 2019-12-18 ENCOUNTER — ANESTHESIA (OUTPATIENT)
Dept: ENDOSCOPY | Age: 79
DRG: 871 | End: 2019-12-18
Payer: MEDICARE

## 2019-12-18 LAB
ANION GAP SERPL CALC-SCNC: 5 MMOL/L (ref 7–16)
BASOPHILS # BLD: 0 K/UL (ref 0–0.2)
BASOPHILS NFR BLD: 0 % (ref 0–2)
BUN SERPL-MCNC: 23 MG/DL (ref 8–23)
CALCIUM SERPL-MCNC: 9.2 MG/DL (ref 8.3–10.4)
CHLORIDE SERPL-SCNC: 105 MMOL/L (ref 98–107)
CO2 SERPL-SCNC: 31 MMOL/L (ref 21–32)
CREAT SERPL-MCNC: 1.81 MG/DL (ref 0.8–1.5)
DIFFERENTIAL METHOD BLD: ABNORMAL
EOSINOPHIL # BLD: 0.2 K/UL (ref 0–0.8)
EOSINOPHIL NFR BLD: 2 % (ref 0.5–7.8)
ERYTHROCYTE [DISTWIDTH] IN BLOOD BY AUTOMATED COUNT: 17.4 % (ref 11.9–14.6)
GLUCOSE SERPL-MCNC: 92 MG/DL (ref 65–100)
HCT VFR BLD AUTO: 30.1 % (ref 41.1–50.3)
HGB BLD-MCNC: 9.5 G/DL (ref 13.6–17.2)
IMM GRANULOCYTES # BLD AUTO: 0.1 K/UL (ref 0–0.5)
IMM GRANULOCYTES NFR BLD AUTO: 1 % (ref 0–5)
INR PPP: 1.3
LYMPHOCYTES # BLD: 2 K/UL (ref 0.5–4.6)
LYMPHOCYTES NFR BLD: 20 % (ref 13–44)
MAGNESIUM SERPL-MCNC: 1.9 MG/DL (ref 1.8–2.4)
MCH RBC QN AUTO: 27.6 PG (ref 26.1–32.9)
MCHC RBC AUTO-ENTMCNC: 31.6 G/DL (ref 31.4–35)
MCV RBC AUTO: 87.5 FL (ref 79.6–97.8)
MONOCYTES # BLD: 1.3 K/UL (ref 0.1–1.3)
MONOCYTES NFR BLD: 14 % (ref 4–12)
NEUTS SEG # BLD: 6.1 K/UL (ref 1.7–8.2)
NEUTS SEG NFR BLD: 64 % (ref 43–78)
NRBC # BLD: 0 K/UL (ref 0–0.2)
PHOSPHATE SERPL-MCNC: 3.4 MG/DL (ref 2.3–3.7)
PLATELET # BLD AUTO: 303 K/UL (ref 150–450)
PMV BLD AUTO: 10.4 FL (ref 9.4–12.3)
POTASSIUM SERPL-SCNC: 3.2 MMOL/L (ref 3.5–5.1)
PROTHROMBIN TIME: 16.1 SEC (ref 11.7–14.5)
RBC # BLD AUTO: 3.44 M/UL (ref 4.23–5.6)
SODIUM SERPL-SCNC: 141 MMOL/L (ref 136–145)
WBC # BLD AUTO: 9.6 K/UL (ref 4.3–11.1)

## 2019-12-18 PROCEDURE — 74011250636 HC RX REV CODE- 250/636: Performed by: NURSE ANESTHETIST, CERTIFIED REGISTERED

## 2019-12-18 PROCEDURE — 74011000258 HC RX REV CODE- 258: Performed by: INTERNAL MEDICINE

## 2019-12-18 PROCEDURE — 84100 ASSAY OF PHOSPHORUS: CPT

## 2019-12-18 PROCEDURE — 74011000250 HC RX REV CODE- 250: Performed by: NURSE ANESTHETIST, CERTIFIED REGISTERED

## 2019-12-18 PROCEDURE — 88312 SPECIAL STAINS GROUP 1: CPT

## 2019-12-18 PROCEDURE — C9113 INJ PANTOPRAZOLE SODIUM, VIA: HCPCS | Performed by: HOSPITALIST

## 2019-12-18 PROCEDURE — 77030004927 HC CATH ELECHEMSTAS BSC -C: Performed by: INTERNAL MEDICINE

## 2019-12-18 PROCEDURE — 85025 COMPLETE CBC W/AUTO DIFF WBC: CPT

## 2019-12-18 PROCEDURE — 97161 PT EVAL LOW COMPLEX 20 MIN: CPT

## 2019-12-18 PROCEDURE — 97530 THERAPEUTIC ACTIVITIES: CPT

## 2019-12-18 PROCEDURE — 77010033678 HC OXYGEN DAILY

## 2019-12-18 PROCEDURE — 74011250636 HC RX REV CODE- 250/636: Performed by: INTERNAL MEDICINE

## 2019-12-18 PROCEDURE — 0DB68ZZ EXCISION OF STOMACH, VIA NATURAL OR ARTIFICIAL OPENING ENDOSCOPIC: ICD-10-PCS | Performed by: INTERNAL MEDICINE

## 2019-12-18 PROCEDURE — 85610 PROTHROMBIN TIME: CPT

## 2019-12-18 PROCEDURE — 83735 ASSAY OF MAGNESIUM: CPT

## 2019-12-18 PROCEDURE — 80048 BASIC METABOLIC PNL TOTAL CA: CPT

## 2019-12-18 PROCEDURE — 0W3P8ZZ CONTROL BLEEDING IN GASTROINTESTINAL TRACT, VIA NATURAL OR ARTIFICIAL OPENING ENDOSCOPIC: ICD-10-PCS | Performed by: INTERNAL MEDICINE

## 2019-12-18 PROCEDURE — 76060000031 HC ANESTHESIA FIRST 0.5 HR: Performed by: INTERNAL MEDICINE

## 2019-12-18 PROCEDURE — 88305 TISSUE EXAM BY PATHOLOGIST: CPT

## 2019-12-18 PROCEDURE — 94760 N-INVAS EAR/PLS OXIMETRY 1: CPT

## 2019-12-18 PROCEDURE — 65660000000 HC RM CCU STEPDOWN

## 2019-12-18 PROCEDURE — 74011250637 HC RX REV CODE- 250/637: Performed by: INTERNAL MEDICINE

## 2019-12-18 PROCEDURE — 74011250637 HC RX REV CODE- 250/637: Performed by: HOSPITALIST

## 2019-12-18 PROCEDURE — 94640 AIRWAY INHALATION TREATMENT: CPT

## 2019-12-18 PROCEDURE — 74011000250 HC RX REV CODE- 250: Performed by: HOSPITALIST

## 2019-12-18 PROCEDURE — 36415 COLL VENOUS BLD VENIPUNCTURE: CPT

## 2019-12-18 PROCEDURE — 77030013991 HC SNR POLYP ENDOSC BSC -A: Performed by: INTERNAL MEDICINE

## 2019-12-18 PROCEDURE — 76040000025: Performed by: INTERNAL MEDICINE

## 2019-12-18 PROCEDURE — 74011250636 HC RX REV CODE- 250/636: Performed by: HOSPITALIST

## 2019-12-18 RX ORDER — LIDOCAINE HYDROCHLORIDE 20 MG/ML
INJECTION, SOLUTION EPIDURAL; INFILTRATION; INTRACAUDAL; PERINEURAL AS NEEDED
Status: DISCONTINUED | OUTPATIENT
Start: 2019-12-18 | End: 2019-12-18 | Stop reason: HOSPADM

## 2019-12-18 RX ORDER — PROPOFOL 10 MG/ML
INJECTION, EMULSION INTRAVENOUS
Status: DISCONTINUED | OUTPATIENT
Start: 2019-12-18 | End: 2019-12-18 | Stop reason: HOSPADM

## 2019-12-18 RX ORDER — PROPOFOL 10 MG/ML
INJECTION, EMULSION INTRAVENOUS AS NEEDED
Status: DISCONTINUED | OUTPATIENT
Start: 2019-12-18 | End: 2019-12-18 | Stop reason: HOSPADM

## 2019-12-18 RX ORDER — SODIUM CHLORIDE, SODIUM LACTATE, POTASSIUM CHLORIDE, CALCIUM CHLORIDE 600; 310; 30; 20 MG/100ML; MG/100ML; MG/100ML; MG/100ML
1000 INJECTION, SOLUTION INTRAVENOUS CONTINUOUS
Status: DISCONTINUED | OUTPATIENT
Start: 2019-12-18 | End: 2019-12-18 | Stop reason: HOSPADM

## 2019-12-18 RX ORDER — GLYCOPYRROLATE 0.2 MG/ML
INJECTION INTRAMUSCULAR; INTRAVENOUS AS NEEDED
Status: DISCONTINUED | OUTPATIENT
Start: 2019-12-18 | End: 2019-12-18 | Stop reason: HOSPADM

## 2019-12-18 RX ORDER — AZITHROMYCIN 250 MG/1
500 TABLET, FILM COATED ORAL DAILY
Status: DISCONTINUED | OUTPATIENT
Start: 2019-12-18 | End: 2019-12-19 | Stop reason: HOSPADM

## 2019-12-18 RX ADMIN — GLYCOPYRROLATE 0.1 MG: 0.2 INJECTION INTRAMUSCULAR; INTRAVENOUS at 10:47

## 2019-12-18 RX ADMIN — ARFORMOTEROL TARTRATE 15 MCG: 15 SOLUTION RESPIRATORY (INHALATION) at 19:42

## 2019-12-18 RX ADMIN — BENZOCAINE AND MENTHOL 1 LOZENGE: 15; 2.6 LOZENGE ORAL at 16:18

## 2019-12-18 RX ADMIN — MIRTAZAPINE 15 MG: 15 TABLET, FILM COATED ORAL at 02:35

## 2019-12-18 RX ADMIN — TAMSULOSIN HYDROCHLORIDE 0.4 MG: 0.4 CAPSULE ORAL at 21:21

## 2019-12-18 RX ADMIN — Medication 10 ML: at 21:22

## 2019-12-18 RX ADMIN — PROPOFOL 40 MG: 10 INJECTION, EMULSION INTRAVENOUS at 10:46

## 2019-12-18 RX ADMIN — PROPOFOL 20 MG: 10 INJECTION, EMULSION INTRAVENOUS at 10:55

## 2019-12-18 RX ADMIN — LIDOCAINE HYDROCHLORIDE 80 MG: 20 INJECTION, SOLUTION EPIDURAL; INFILTRATION; INTRACAUDAL; PERINEURAL at 10:46

## 2019-12-18 RX ADMIN — HYDROCODONE BITARTRATE AND ACETAMINOPHEN 1 TABLET: 5; 325 TABLET ORAL at 14:05

## 2019-12-18 RX ADMIN — GUAIFENESIN 600 MG: 600 TABLET ORAL at 21:21

## 2019-12-18 RX ADMIN — PHENYLEPHRINE HYDROCHLORIDE 100 MCG: 10 INJECTION INTRAVENOUS at 11:05

## 2019-12-18 RX ADMIN — ROSUVASTATIN CALCIUM 20 MG: 20 TABLET, FILM COATED ORAL at 21:21

## 2019-12-18 RX ADMIN — GUAIFENESIN 600 MG: 600 TABLET ORAL at 08:19

## 2019-12-18 RX ADMIN — BUDESONIDE 500 MCG: 0.5 INHALANT RESPIRATORY (INHALATION) at 07:55

## 2019-12-18 RX ADMIN — Medication 10 ML: at 13:11

## 2019-12-18 RX ADMIN — BUDESONIDE 500 MCG: 0.5 INHALANT RESPIRATORY (INHALATION) at 19:42

## 2019-12-18 RX ADMIN — ALLOPURINOL 200 MG: 100 TABLET ORAL at 08:20

## 2019-12-18 RX ADMIN — AZITHROMYCIN 500 MG: 250 TABLET, FILM COATED ORAL at 13:08

## 2019-12-18 RX ADMIN — PROPOFOL 30 MG: 10 INJECTION, EMULSION INTRAVENOUS at 10:52

## 2019-12-18 RX ADMIN — SODIUM CHLORIDE 40 MG: 9 INJECTION, SOLUTION INTRAMUSCULAR; INTRAVENOUS; SUBCUTANEOUS at 08:34

## 2019-12-18 RX ADMIN — METOPROLOL SUCCINATE 50 MG: 50 TABLET, EXTENDED RELEASE ORAL at 08:19

## 2019-12-18 RX ADMIN — TAMSULOSIN HYDROCHLORIDE 0.4 MG: 0.4 CAPSULE ORAL at 02:35

## 2019-12-18 RX ADMIN — ROSUVASTATIN CALCIUM 20 MG: 20 TABLET, FILM COATED ORAL at 02:35

## 2019-12-18 RX ADMIN — MIRTAZAPINE 15 MG: 15 TABLET, FILM COATED ORAL at 21:21

## 2019-12-18 RX ADMIN — PROPOFOL 120 MCG/KG/MIN: 10 INJECTION, EMULSION INTRAVENOUS at 10:46

## 2019-12-18 RX ADMIN — VITAMIN D, TAB 1000IU (100/BT) 2 TABLET: 25 TAB at 08:19

## 2019-12-18 RX ADMIN — BRIMONIDINE TARTRATE 1 DROP: 2 SOLUTION/ DROPS OPHTHALMIC at 08:24

## 2019-12-18 RX ADMIN — CEFTRIAXONE 2 G: 2 INJECTION, POWDER, FOR SOLUTION INTRAMUSCULAR; INTRAVENOUS at 13:09

## 2019-12-18 RX ADMIN — SODIUM CHLORIDE 40 MG: 9 INJECTION, SOLUTION INTRAMUSCULAR; INTRAVENOUS; SUBCUTANEOUS at 21:21

## 2019-12-18 RX ADMIN — ARFORMOTEROL TARTRATE 15 MCG: 15 SOLUTION RESPIRATORY (INHALATION) at 07:55

## 2019-12-18 RX ADMIN — SODIUM CHLORIDE, SODIUM LACTATE, POTASSIUM CHLORIDE, AND CALCIUM CHLORIDE 1000 ML: 600; 310; 30; 20 INJECTION, SOLUTION INTRAVENOUS at 09:50

## 2019-12-18 RX ADMIN — PHENYLEPHRINE HYDROCHLORIDE 100 MCG: 10 INJECTION INTRAVENOUS at 10:50

## 2019-12-18 RX ADMIN — BRIMONIDINE TARTRATE 1 DROP: 2 SOLUTION/ DROPS OPHTHALMIC at 21:22

## 2019-12-18 NOTE — PROGRESS NOTES
TRANSFER - OUT REPORT: 
 
Verbal report given to SAULO Marquez(name) on [employer].  being transferred to 2nd floor(unit) for routine post - op Report consisted of patients Situation, Background, Assessment and  
Recommendations(SBAR). Information from the following report(s) SBAR, Kardex, Procedure Summary, Intake/Output and MAR was reviewed with the receiving nurse. Lines:  
Peripheral IV 12/17/19 Left;Upper Arm (Active) Site Assessment Clean, dry, & intact 12/18/2019  9:00 AM  
Phlebitis Assessment 0 12/18/2019  9:00 AM  
Infiltration Assessment 0 12/18/2019  9:00 AM  
Dressing Status Clean, dry, & intact 12/18/2019  9:00 AM  
Dressing Type Tape;Transparent 12/18/2019  9:00 AM  
Hub Color/Line Status Pink;Flushed; Infusing 12/18/2019  9:00 AM  
   
Peripheral IV 12/17/19 Right Antecubital (Active) Site Assessment Clean, dry, & intact 12/18/2019  7:40 AM  
Phlebitis Assessment 0 12/18/2019  7:40 AM  
Infiltration Assessment 0 12/18/2019  7:40 AM  
Dressing Status Clean, dry, & intact 12/18/2019  7:40 AM  
Dressing Type Tape;Transparent 12/18/2019  7:40 AM  
Hub Color/Line Status Pink;Flushed;Capped 12/18/2019  7:40 AM  
   
Peripheral IV 12/17/19 Left Hand (Active) Site Assessment Clean, dry, & intact 12/18/2019  7:40 AM  
Phlebitis Assessment 0 12/18/2019  7:40 AM  
Infiltration Assessment 0 12/18/2019  7:40 AM  
Dressing Status Clean, dry, & intact 12/18/2019  7:40 AM  
Dressing Type Tape;Transparent 12/18/2019  7:40 AM  
Hub Color/Line Status Blue;Flushed;Capped 12/18/2019  7:40 AM  
  
 
Opportunity for questions and clarification was provided. Patient transported with: 
 O2 @ 3 liters

## 2019-12-18 NOTE — INTERVAL H&P NOTE
H&P Update: 
Esterfartun Jo. was seen and examined. History and physical has been reviewed. The patient has been examined.  There have been no significant clinical changes since the completion of the originally dated History and Physical.

## 2019-12-18 NOTE — PROGRESS NOTES
TRANSFER - IN REPORT: 
 
Verbal report received from Óscar(name) on [employer].  being received from GI lab(unit) for routine post - op Report consisted of patients Situation, Background, Assessment and  
Recommendations(SBAR). Information from the following report(s) SBAR, Kardex and Procedure Summary was reviewed with the receiving nurse. Opportunity for questions and clarification was provided. Assessment completed upon patients arrival to unit and care assumed.

## 2019-12-18 NOTE — PROGRESS NOTES
Nutrition Reason for assessment: Referral received from nursing admission Malnutrition Screening Tool Recently Lost Weight Without Trying: Yes If Yes, How Much Weight Loss: 2-13 lbs Eating Poorly Due to Decreased Appetite: Yes Assessment:  
Diet: DIET NUTRITIONAL SUPPLEMENTS All Meals; [employer] DIET REGULAR Food/Nutrition Patient History:  PMH: CHF, CKD, chronic a fib, recent AAA repair. Presented with weakness, cough and black stool; EGD today with findings of Small avms of fundus and duodenal bulb and 
minor lesser curvature inflammatory polyps. Pt is unable to provide any diet or appetite history. He has \"cotton mouth\" now and is ready to eat some lunch. He says he got a scale from his insurance company and he started weighing himself about 5 months ago. He started at 172# and now he is down to 161#. He has noticed his clothes fit has become loose. Anthropometrics:Height: 5' 9\" (175.3 cm),  Weight: 75.3 kg (166 lb)-unknown source, Body mass index is 24.51 kg/m². BMI class of normal weight. Weight hx per EMR ( Based on [employer] [facility] functionality, RD cannot know if these weight are actual versus stated): WT / BMI WEIGHT  
11/27/2019 169 lb 12.8 oz  
11/11/2019 171 lb  
11/5/2019 168 lb  
9/23/2019 168 lb  
9/17/2019 168 lb 9.6 oz  
8/28/2019 176 lb 2 oz 8/13/2019 177 lb  
6/28/2019 174 lb  
5/30/2019 173 lb 9.6 oz  
5/15/2019 175 lb 12.8 oz  
5/15/2019 176 lb 11.2 oz  
5/13/2019 177 lb  
5/9/2019 177 lb  
3/27/2019 178 lb  
3/18/2019 174 lb  
3/14/2019 174 lb  
3/1/2019 178 lb  
1/16/2019 173 lb  
 ~4.6% change in wt within ~ 6 months. This does not meet ASPEN criteria for malnutrition. Macronutrient needs:(using IBW (Ideal body weight) 72.7 kg)-CKD EER:  3684-2561 kcal /day (25-30 kcal/kg IBW) 
EPR:  58-95 grams protein/day (0.8-1.3 grams/kg IBW) Intake/Comparative Standards: Insufficient data Nutrition Diagnosis: Predicted inadequate oral intake r/t decreased ability to consume sufficient oral intake as evidenced by purported decreased po and weight loss as above Intervention: 
Meals and snacks: Continue current diet. Nutrition Supplement Therapy: Continue Ensure [employer] tid Discharge nutrition plan: Too soon to determine. Layne Dumont, [address] [place] [address], [address] [place] [address]

## 2019-12-18 NOTE — ANESTHESIA PREPROCEDURE EVALUATION
Anesthetic History   No history of anesthetic complications            Review of Systems / Medical History  Patient summary reviewed and pertinent labs reviewed    Pulmonary    COPD (O2 dependent): severe      Smoker (quit 5 years)         Neuro/Psych   Within defined limits           Cardiovascular    Hypertension: well controlled      CHF  Dysrhythmias : atrial flutter and atrial fibrillation  PAD (4.3 cm AAA)    Exercise tolerance: <4 METS  Comments: Ef 30 to 35% on RASHEEDA   GI/Hepatic/Renal         Renal disease: CRI      Comments: GIB Endo/Other        Arthritis, cancer (prostate) and anemia     Other Findings   Comments: Glaucoma         Physical Exam    Airway  Mallampati: II  TM Distance: 4 - 6 cm  Neck ROM: normal range of motion   Mouth opening: Normal     Cardiovascular  Regular rate and rhythm,  S1 and S2 normal,  no murmur, click, rub, or gallop  Rhythm: regular  Rate: normal         Dental    Dentition: Edentulous, Full lower dentures and Full upper dentures     Pulmonary        Wheezes:bilateral         Abdominal         Other Findings            Anesthetic Plan    ASA: 4  Anesthesia type: total IV anesthesia          Induction: Intravenous  Anesthetic plan and risks discussed with: Patient

## 2019-12-18 NOTE — PROGRESS NOTES
Hospitalist Note Admit Date:  2019 12:01 PM  
Name:  Teresa Cadena. Age:  70 y.o. 
:  1940 MRN:  [id number] PCP:  Bimal Aguilar MD 
Treatment Team: Attending Provider: Zechariah Ortiz MD; Consulting Provider: Delfina Bautista MD; Utilization Review: Mackenzie Hanson RN; Physical Therapist: Nadia Crenshaw DPT; Care Manager: Steve Mejía RN 
 
HPI/Subjective:  
Mr. Mannie San is a 69 y/o AAM with a h/o chronic sCHF, O2 dependent COPD, AYAN, AAA s/p EVAR 2019, GIB, AFib, prostate cancer, BPH, HLD, CKD, HTN who was admitted on  with sepsis 2/2 CAP. He was started on antibiotics. Hb slightly below baseline so GI was consulted. : Doing well. Hb stable at 9.5. S/p EGD today with AVMs and gastric polyps. Breathing is better. Denies bleeding. ROS neg otherwise. Objective:  
 
Patient Vitals for the past 24 hrs: 
 Temp Pulse Resp BP SpO2  
19 1152 97.6 °F (36.4 °C) 76 16 111/61 93 % 19 1133  76 16 96/61 98 % 19 1127  80 16 90/53 96 % 19 1121  79 15 (!) 77/37 90 % 19 1105 97 °F (36.1 °C) 71 17 (!) 85/49 95 % 19 1045  63 15 100/57 96 % 19 0940 98.4 °F (36.9 °C) 65 18 105/56 98 % 19 0755     98 % 19 0740 99.6 °F (37.6 °C) 70 18 132/70 99 % 19 0410 98 °F (36.7 °C) 84 18 141/70 100 % 19 0145 99.8 °F (37.7 °C) 75 18 130/66 98 % 19 0044 98 °F (36.7 °C) 79 20 115/64 97 % 19 2344 100.2 °F (37.9 °C) 82 18 115/67 96 % 19 2329 99.5 °F (37.5 °C) 74 18 125/73 99 % 19 2233     99 % 19 1955 99.8 °F (37.7 °C) 80 20 97/52 100 % 19 1837 99.5 °F (37.5 °C) 81 20 92/55   
19 1820 99.5 °F (37.5 °C) 86 20 95/58 95 % 19 1717 98.1 °F (36.7 °C) 84 21 111/58 96 % 19 1600    108/70 99 % 19 1545    104/59 98 % 19 1515    100/56 98 % 19 1500    109/55 98 % 12/17/19 1445    98/57 99 % Oxygen Therapy O2 Sat (%): 93 % (12/18/19 1152) Pulse via Oximetry: 77 beats per minute (12/18/19 1133) O2 Device: Nasal cannula (12/18/19 1133) O2 Flow Rate (L/min): 3 l/min (12/18/19 1133) Estimated body mass index is 24.51 kg/m² as calculated from the following: 
  Height as of this encounter: 5' 9\" (1.753 m). Weight as of this encounter: 75.3 kg (166 lb). Intake/Output Summary (Last 24 hours) at 12/18/2019 1441 Last data filed at 12/18/2019 1152 Gross per 24 hour Intake 1911.5 ml Output 718 ml Net 1193.5 ml  
   
*Note that automatically entered I/Os may not be accurate; dependent on patient compliance with collection and accurate  by techs. General:    Well nourished. Alert. CV:   RRR. No murmur, rub, or gallop. Lungs:   Mild crackles rt base, 3L NC O2. Abdomen:   Soft, nontender, nondistended. Extremities: Warm and dry. No cyanosis or edema. Skin:     No rashes or jaundice. Neuro:  No gross focal deficits Data Review: 
I have reviewed all labs, meds, and studies from the last 24 hours: 
 
Recent Results (from the past 24 hour(s)) CULTURE, RESPIRATORY/SPUTUM/BRONCH W GRAM STAIN Collection Time: 12/17/19 11:42 PM  
Result Value Ref Range Special Requests: NO SPECIAL REQUESTS    
 GRAM STAIN 6 TO 12 WBCS SEEN PER OIF   
 GRAM STAIN 0 TO 2 EPITHELIAL CELLS SEEN PER OIF   
 GRAM STAIN 2+ MUCUS PRESENT    
 GRAM STAIN MANY GRAM POSITIVE COCCI    
 GRAM STAIN MODERATE GRAM NEGATIVE RODS    
 GRAM STAIN FEW GRAM POSITIVE RODS Culture result:     
  NO GROWTH AFTER SHORT PERIOD OF INCUBATION. FURTHER RESULTS TO FOLLOW AFTER OVERNIGHT INCUBATION. CBC WITH AUTOMATED DIFF Collection Time: 12/18/19  5:19 AM  
Result Value Ref Range WBC 9.6 4.3 - 11.1 K/uL  
 RBC 3.44 (L) 4.23 - 5.6 M/uL HGB 9.5 (L) 13.6 - 17.2 g/dL HCT 30.1 (L) 41.1 - 50.3 %  MCV 87.5 79.6 - 97.8 FL  
 MCH 27.6 26.1 - 32.9 PG  
 MCHC 31.6 31.4 - 35.0 g/dL  
 RDW 17.4 (H) 11.9 - 14.6 % PLATELET 781 137 - 628 K/uL MPV 10.4 9.4 - 12.3 FL ABSOLUTE NRBC 0.00 0.0 - 0.2 K/uL  
 DF AUTOMATED NEUTROPHILS 64 43 - 78 % LYMPHOCYTES 20 13 - 44 % MONOCYTES 14 (H) 4.0 - 12.0 % EOSINOPHILS 2 0.5 - 7.8 % BASOPHILS 0 0.0 - 2.0 % IMMATURE GRANULOCYTES 1 0.0 - 5.0 %  
 ABS. NEUTROPHILS 6.1 1.7 - 8.2 K/UL  
 ABS. LYMPHOCYTES 2.0 0.5 - 4.6 K/UL  
 ABS. MONOCYTES 1.3 0.1 - 1.3 K/UL  
 ABS. EOSINOPHILS 0.2 0.0 - 0.8 K/UL  
 ABS. BASOPHILS 0.0 0.0 - 0.2 K/UL  
 ABS. IMM. GRANS. 0.1 0.0 - 0.5 K/UL METABOLIC PANEL, BASIC Collection Time: 12/18/19  5:19 AM  
Result Value Ref Range Sodium 141 136 - 145 mmol/L Potassium 3.2 (L) 3.5 - 5.1 mmol/L Chloride 105 98 - 107 mmol/L  
 CO2 31 21 - 32 mmol/L Anion gap 5 (L) 7 - 16 mmol/L Glucose 92 65 - 100 mg/dL BUN 23 8 - 23 MG/DL Creatinine 1.81 (H) 0.8 - 1.5 MG/DL  
 GFR est AA 47 (L) >60 ml/min/1.73m2 GFR est non-AA 39 (L) >60 ml/min/1.73m2 Calcium 9.2 8.3 - 10.4 MG/DL PROTHROMBIN TIME + INR Collection Time: 12/18/19  5:19 AM  
Result Value Ref Range Prothrombin time 16.1 (H) 11.7 - 14.5 sec INR 1.3 MAGNESIUM Collection Time: 12/18/19  5:19 AM  
Result Value Ref Range Magnesium 1.9 1.8 - 2.4 mg/dL PHOSPHORUS Collection Time: 12/18/19  5:19 AM  
Result Value Ref Range Phosphorus 3.4 2.3 - 3.7 MG/DL All Micro Results Procedure Component Value Units Date/Time CULTURE, RESPIRATORY/SPUTUM/BRONCH Fredy Shoemaker [[id number]] Collected:  12/17/19 5572 Order Status:  Completed Specimen:  Sputum Updated:  12/18/19 114 Special Requests: NO SPECIAL REQUESTS     
  GRAM STAIN 6 TO 12 WBCS SEEN PER OIF  
   0 TO 2 EPITHELIAL CELLS SEEN PER OIF  
   2+ MUCUS PRESENT     
   MANY GRAM POSITIVE COCCI MODERATE GRAM NEGATIVE RODS  
     
   FEW GRAM POSITIVE RODS Culture result: NO GROWTH AFTER SHORT PERIOD OF INCUBATION. FURTHER RESULTS TO FOLLOW AFTER OVERNIGHT INCUBATION. CULTURE, BLOOD [[id number]] Collected:  12/17/19 1306 Order Status:  Completed Specimen:  Blood Updated:  12/18/19 1540 Special Requests: --     
  LEFT 
HAND Culture result: NO GROWTH AFTER 16 HOURS     
 CULTURE, BLOOD [[id number]] Collected:  12/17/19 1303 Order Status:  Completed Specimen:  Blood Updated:  12/18/19 6904 Special Requests: --     
  RIGHT Antecubital 
  
  Culture result: NO GROWTH AFTER 16 HOURS Current Meds: 
Current Facility-Administered Medications Medication Dose Route Frequency  azithromycin (ZITHROMAX) tablet 500 mg  500 mg Oral DAILY  albuterol-ipratropium (DUO-NEB) 2.5 MG-0.5 MG/3 ML  3 mL Nebulization Q4H PRN  
 allopurinol (ZYLOPRIM) tablet 200 mg  200 mg Oral DAILY  brimonidine (ALPHAGAN) 0.2 % ophthalmic solution 1 Drop  1 Drop Both Eyes BID  budesonide (PULMICORT) 500 mcg/2 ml nebulizer suspension  500 mcg Nebulization BID  cholecalciferol (VITAMIN D3) (1000 Units /25 mcg) tablet 2 Tab  2,000 Units Oral DAILY  metoprolol succinate (TOPROL-XL) XL tablet 50 mg  50 mg Oral DAILY  mirtazapine (REMERON) tablet 15 mg  15 mg Oral QHS  nitroglycerin (NITROSTAT) tablet 0.4 mg  0.4 mg SubLINGual Q5MIN PRN  
 rosuvastatin (CRESTOR) tablet 20 mg  20 mg Oral QHS  tamsulosin (FLOMAX) capsule 0.4 mg  0.4 mg Oral QHS  pantoprazole (PROTONIX) 40 mg in 0.9% sodium chloride 10 mL injection  40 mg IntraVENous Q12H  
 arformoterol (BROVANA) neb solution 15 mcg  15 mcg Nebulization BID RT  
 cefTRIAXone (ROCEPHIN) 2 g in 0.9% sodium chloride (MBP/ADV) 50 mL  2 g IntraVENous Q24H  
 furosemide (LASIX) injection 40 mg  40 mg IntraVENous ONCE PRN  
 0.9% sodium chloride infusion 250 mL  250 mL IntraVENous PRN  
 sodium chloride (NS) flush 5-40 mL  5-40 mL IntraVENous Q8H  
  sodium chloride (NS) flush 5-40 mL  5-40 mL IntraVENous PRN  
 acetaminophen (TYLENOL) tablet 650 mg  650 mg Oral Q4H PRN  
 HYDROcodone-acetaminophen (NORCO) 5-325 mg per tablet 1 Tab  1 Tab Oral Q4H PRN  
 morphine injection 2 mg  2 mg IntraVENous Q4H PRN  
 naloxone (NARCAN) injection 0.4 mg  0.4 mg IntraVENous PRN  
 diphenhydrAMINE (BENADRYL) capsule 25 mg  25 mg Oral Q4H PRN  
 ondansetron (ZOFRAN) injection 4 mg  4 mg IntraVENous Q4H PRN  
 magnesium hydroxide (MILK OF MAGNESIA) 400 mg/5 mL oral suspension 30 mL  30 mL Oral DAILY PRN  
 guaiFENesin ER (MUCINEX) tablet 600 mg  600 mg Oral Q12H  
 benzonatate (TESSALON) capsule 100 mg  100 mg Oral TID PRN Other Studies: No results found for this visit on 12/17/19. No results found. Assessment and Plan:  
 
Hospital Problems as of 12/18/2019 Date Reviewed: 12/18/2019 Codes Class Noted - Resolved POA * (Principal) Pneumonia ICD-10-CM: J18.9 ICD-9-CM: 625  12/17/2019 - Present Yes  
   
 AAA (abdominal aortic aneurysm) (HCC) ICD-10-CM: I71.4 ICD-9-CM: 441.4  9/14/2019 - Present Yes Heart failure with reduced ejection fraction (HCC) (Chronic) ICD-10-CM: I50.20 ICD-9-CM: 428.20  9/9/2019 - Present Yes  
   
 CKD (chronic kidney disease), stage III (HCC) (Chronic) ICD-10-CM: N18.3 ICD-9-CM: 585.3  9/9/2019 - Present Yes Anemia ICD-10-CM: D64.9 ICD-9-CM: 285.9  3/18/2019 - Present Yes A-fib [facility]) ICD-10-CM: I48.91 
ICD-9-CM: 427.31  7/23/2018 - Present Yes Hypertension ICD-10-CM: I10 
ICD-9-CM: 401.9  6/1/2017 - Present Yes Supplemental oxygen dependent (Chronic) ICD-10-CM: Z99.81 ICD-9-CM: V46.2  5/2/2014 - Present Yes Plan: # Sepsis 2/2 CAP 
 - Sepsis resolved. Con't ceftriaxone/azithro. - No change to O2 needs (requires at baseline) # Acute UGIB 
 - EGD with AVMs and inflammatory polyps - Con't holding Eliquis, resume tomorrow? - Hb stable #  COPD 
 - Con't supp O2, nebs, Spiriva # H/o AAA 
 - S/p EVAR 9/2019 # AFib 
 - Eliquis on hold currently. # CKD 
 - Near baseline, if no change tomorrow restart ACEi, etc 
 
# HTN 
 - As above. # Chronic sCHF 
 - Euvolemic. DC planning/Dispo: Home when able. Diet:  DIET NUTRITIONAL SUPPLEMENTS 
DIET REGULAR 
DVT ppx: SCDs Signed: 
Mike Bertrand MD

## 2019-12-18 NOTE — PROGRESS NOTES
PHYSICAL THERAPY: Initial Assessment, Discharge, and PM 12/18/2019 INPATIENT:   
Payor: Zion Palacioser / Plan: [address] / Product Type: Managed Care Medicare /   
  
NAME/AGE/GENDER: Zackary Lobo is a 70 y.o. male PRIMARY DIAGNOSIS: Pneumonia [J18.9] Pneumonia Pneumonia Procedure(s) (LRB): ESOPHAGOGASTRODUODENOSCOPY (EGD)/ BMI 25TO BE ADMITTED 12/17 (N/A) BICAP (N/A) ENDOSCOPIC POLYPECTOMY (N/A) Day of Surgery ICD-10: Treatment Diagnosis:  
 Generalized Muscle Weakness (M62.81) Difficulty in walking, Not elsewhere classified (R26.2) Precaution/Allergies: 
Statins-hmg-coa reductase inhibitors ASSESSMENT:  
 
Mr. Matt Izquierdo presents sitting EOB at arrival stating he feels much better today than yesterday. He reports he has IND getting to bathroom today. On 3L at home and kept 3L for session. He stood IND and ambulated 200ft with RW SUP. He stated no difficulty with breathing, when returning to room his sat level was 90%, but recovered to 93% when cued to breath in thru nose. When returning to room he cont to sit EOB to watch TV. Pt is IND with mobility and will be discharged from service at this time due to IND. Anticipate home discharge when medically cleared. This section established at most recent assessment PROBLEM LIST (Impairments causing functional limitations): 
Decreased Cognition INTERVENTIONS PLANNED: (Benefits and precautions of physical therapy have been discussed with the patient.) NA   
 
TREATMENT PLAN: Frequency/Duration: 1 time a week for 1 week Rehabilitation Potential For Stated Goals: Excellent REHAB RECOMMENDATIONS (at time of discharge pending progress):   
Placement: It is my opinion, based on this patient's performance to date, that Mr. Matt Izquierdo may benefit from being discharged with NO further skilled therapy due to a proven ability to function at baseline. Equipment:  
None at this time HISTORY:  
History of Present Injury/Illness (Reason for Referral): 
69 y/o AAM with a h/o chronic sCHF, O2 dependent COPD, AYAN, AAA s/p EVAR 9/2019, GIB, AFib, prostate cancer, BPH, HLD, CKD, HTN who was admitted on 12/17 with sepsis 2/2 CAP. He was started on antibiotics. Hb slightly below baseline so GI was consulted. Past Medical History/Comorbidities:  
Mr. 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 He also has no past medical history of Autoimmune disease ([address] Dementia, Liver disease, Neurological disorder, Other ill-defined conditions(799.89), Psychiatric disorder, Seizures ([address] or Sleep disorder. Mr. Josselyn Kaufman  has a past surgical history that includes hx heart catheterization; colonoscopy (N/A, 9/12/2019); vascular surgery procedure unlist (09/14/2019); and egd (12/18/2019). Social History/Living Environment:  
Home Environment: Apartment One/Two Story Residence: One story Living Alone: No 
Support Systems: Other (comments)(aide) Patient Expects to be Discharged to[de-identified] [facility] Current DME Used/Available at Home: Corinth Pietro, rollator Prior Level of Function/Work/Activity: 
Pt reports he is IND living in apartment, uses 3L O2 at home and rollator.   
  
Number of Personal Factors/Comorbidities that affect the Plan of Care: 0: LOW COMPLEXITY EXAMINATION:  
Most Recent Physical Functioning:  
Gross Assessment: 
AROM: Within functional limits Strength: Within functional limits Coordination: Within functional limits Tone: Normal 
Sensation: Intact Posture: 
  
Balance: 
Sitting: Intact Standing: Impaired Standing - Static: Good;Fair 
Standing - Dynamic : Good;Fair Bed Mobility: 
Rolling: Independent Supine to Sit: Independent Sit to Supine: Independent Scooting: Independent Wheelchair Mobility: 
  
Transfers: 
Sit to Stand: Supervision Stand to Sit: Supervision Bed to Chair: Supervision Gait: 
  
Distance (ft): 200 Feet (ft) Assistive Device: Walker, rolling Ambulation - Level of Assistance: Supervision Body Structures Involved: 
Nerves Heart Lungs Body Functions Affected: 
Mental 
Sensory/Pain Respiratory Activities and Participation Affected: 
General Tasks and Demands Mobility Self Care Domestic Life Community, Social and [place] Number of elements that affect the Plan of Care: 1-2: LOW COMPLEXITY CLINICAL PRESENTATION:  
Presentation: Stable and uncomplicated: LOW COMPLEXITY CLINICAL DECISION MAKIN [facility] [address] [address] AM-PAC 6 Clicks Basic Mobility Inpatient Short Form How much difficulty does the patient currently have. .. Unable A Lot A Little None 1. Turning over in bed (including adjusting bedclothes, sheets and blankets)? [] 1   [] 2   [] 3   [x] 4  
2. Sitting down on and standing up from a chair with arms ( e.g., wheelchair, bedside commode, etc.)   [] 1   [] 2   [] 3   [x] 4  
3. Moving from lying on back to sitting on the side of the bed? [] 1   [] 2   [] 3   [x] 4 How much help from another person does the patient currently need. .. Total A Lot A Little None 4. Moving to and from a bed to a chair (including a wheelchair)? [] 1   [] 2   [] 3   [x] 4  
5. Need to walk in hospital room? [] 1   [] 2   [] 3   [x] 4 6.  Climbing 3-5 steps with a railing? [] 1   [] 2   [] 3   [x] 4  
© 2007, Trustees of [address] [place] [address] [address] [address], under license to [employer]. All rights reserved Score:  Initial: 24 Most Recent: X (Date: -- ) Interpretation of Tool:  Represents activities that are increasingly more difficult (i.e. Bed mobility, Transfers, Gait). Medical Necessity:    
NA. 
Reason for Services/Other Comments: 
NA.  
Use of outcome tool(s) and clinical judgement create a POC that gives a: Clear prediction of patient's progress: LOW COMPLEXITY  
  
 
 
 
TREATMENT:  
(In addition to Assessment/Re-Assessment sessions the following treatments were rendered) Pre-treatment Symptoms/Complaints:  none Pain: Initial:  
Pain Intensity 1: 0  Post Session:  0 Therapeutic Activity: (    25min):  Therapeutic activities including Bed transfers, Chair transfers, and Ambulation on level ground to improve mobility, strength, balance, and coordination. Required moderate   to promote static and dynamic balance in standing, promote coordination of bilateral, lower extremity(s), and promote motor control of bilateral, lower extremity(s). Braces/Orthotics/Lines/Etc:  
3L  
O2 Device: Nasal cannula Treatment/Session Assessment:   
Response to Treatment:  see above Interdisciplinary Collaboration:  
Physical Therapist 
Registered Nurse After treatment position/precautions:  
Call light within reach Sitting EOB Compliance with Program/Exercises: Will assess as treatment progresses Recommendations/Intent for next treatment session: \"Next visit will focus on advancements to more challenging activities and reduction in assistance provided\". Total Treatment Duration: PT Patient Time In/Time Out Time In: 1500 Time Out: 1540 Suman Vikas, DPT

## 2019-12-18 NOTE — PROGRESS NOTES
TRANSFER - OUT REPORT: 
 
Verbal report given to Johanna(name) on Zulma Smith.  being transferred to GI lab(unit) for ordered procedure Report consisted of patients Situation, Background, Assessment and  
Recommendations(SBAR). Information from the following report(s) SBAR, Kardex, Intake/Output, MAR and Recent Results was reviewed with the receiving nurse. Opportunity for questions and clarification was provided. Patient transported with: 
 [employer]

## 2019-12-18 NOTE — CDMP QUERY
Pt admitted with Pneumonia and melena. Pt noted to have hypotension. If possible, please document in the progress notes and d/c summary if you are evaluating and / or treating any of the following: 
 
? Sepsis, present on admission ? Sepsis, not present on admission ? No Sepsis, suspected ? SIRS with organ involvement SIRS without organ involvement  
? Other, please specify ? Clinically unable to determine The medical record reflects the following: 
   Risk Factors: Chronic COPD; chronic respiratory failure; CKD stage III;  
   Clinical Indicators:  
--12/17 H&P stating, \" Right lower lobe pneumonia, probably community-acquired. Started on IV ceftriaxone and azithromycin. Will get sputum cultures. Follow-up blood cultures. \" and \" Hypotension: Likely from hypovolemia. Improved with IV fluids. Hold blood pressure medications and monitor. \" 
--12/17 WBC 8.3; Cr 1.93; LA 1.26 
--12/17 @1122  
--12/17 @1208 BP 79/45 
--12/17 @1345 RR 31 Treatment: IVF, IV ab, pancultures, blood transfusions Thank you, Bill Sung, [address] [place] [address] RN 
[phone]

## 2019-12-18 NOTE — PROCEDURES
ESOPHAGOGASTRODUODENOSCOPY 
 
DATE of PROCEDURE: 12/18/2019 PT NAME: Jannet Notice.     [id number] MEDICATION:   MAC INSTRUMENT: GIFH 190 SPECIAL PROCEDURE: bicap, snare BLOOD LOSS- 0 or min. SPEC- yes IMPLANT- none PROCEDURE:  Standard EGD ASSESSMENT: 
1. Small avms of fundus and duodenal bulb- bicap 2. Minor lesser curvature inflammatory polyps- snare PLAN:  
1. Follow hgb 2. F/U inpt Gosia Munroe MD

## 2019-12-18 NOTE — PROGRESS NOTES
TRANSFER - IN REPORT: 
 
Verbal report received from Damian Aguirre RN(name) on [employer].  being received from 208(unit) for ordered procedure Report consisted of patients Situation, Background, Assessment and  
Recommendations(SBAR). Information from the following report(s) SBAR, MAR, Recent Results, Med Rec Status and Pre Procedure Checklist was reviewed with the receiving nurse. Opportunity for questions and clarification was provided.

## 2019-12-18 NOTE — PROGRESS NOTES
Spoke to Mr. Raimundo Moya in room 208 about Case Management and discharge planning. He lives in his own apartment in the [place] at [address] [place] [address], and has a [employer] [facility] aide Monday through Friday for 3 hours each day. He is fairly independent with ADLs, with use of a walker and supplemental oxygen at 3 lpm via NC. No additional discharge needs identified, but Case Management to follow and assist.   
 
Care Management Interventions PCP Verified by CM: Yes Current Support Network: Own Home, Has Personal Caregivers

## 2019-12-19 VITALS
TEMPERATURE: 98 F | BODY MASS INDEX: 24.56 KG/M2 | HEART RATE: 98 BPM | OXYGEN SATURATION: 96 % | SYSTOLIC BLOOD PRESSURE: 137 MMHG | DIASTOLIC BLOOD PRESSURE: 61 MMHG | HEIGHT: 69 IN | WEIGHT: 165.8 LBS | RESPIRATION RATE: 18 BRPM

## 2019-12-19 PROBLEM — K31.819 AVM (ARTERIOVENOUS MALFORMATION) OF STOMACH, ACQUIRED: Status: RESOLVED | Noted: 2019-12-19 | Resolved: 2019-12-19

## 2019-12-19 PROBLEM — K31.819 AVM (ARTERIOVENOUS MALFORMATION) OF STOMACH, ACQUIRED: Status: ACTIVE | Noted: 2019-12-19

## 2019-12-19 PROBLEM — K31.7 GASTRIC POLYPS: Status: RESOLVED | Noted: 2019-12-19 | Resolved: 2019-12-19

## 2019-12-19 PROBLEM — J18.9 PNEUMONIA: Status: RESOLVED | Noted: 2019-12-17 | Resolved: 2019-12-19

## 2019-12-19 PROBLEM — K31.7 GASTRIC POLYPS: Status: ACTIVE | Noted: 2019-12-19

## 2019-12-19 LAB
ANION GAP SERPL CALC-SCNC: 5 MMOL/L (ref 7–16)
BUN SERPL-MCNC: 32 MG/DL (ref 8–23)
CALCIUM SERPL-MCNC: 8.5 MG/DL (ref 8.3–10.4)
CHLORIDE SERPL-SCNC: 104 MMOL/L (ref 98–107)
CO2 SERPL-SCNC: 33 MMOL/L (ref 21–32)
CREAT SERPL-MCNC: 1.68 MG/DL (ref 0.8–1.5)
GLUCOSE SERPL-MCNC: 90 MG/DL (ref 65–100)
HCT VFR BLD AUTO: 29.1 % (ref 41.1–50.3)
HGB BLD-MCNC: 9.1 G/DL (ref 13.6–17.2)
POTASSIUM SERPL-SCNC: 3.6 MMOL/L (ref 3.5–5.1)
SODIUM SERPL-SCNC: 142 MMOL/L (ref 136–145)

## 2019-12-19 PROCEDURE — 74011250637 HC RX REV CODE- 250/637: Performed by: INTERNAL MEDICINE

## 2019-12-19 PROCEDURE — 94640 AIRWAY INHALATION TREATMENT: CPT

## 2019-12-19 PROCEDURE — 80048 BASIC METABOLIC PNL TOTAL CA: CPT

## 2019-12-19 PROCEDURE — 74011000250 HC RX REV CODE- 250: Performed by: HOSPITALIST

## 2019-12-19 PROCEDURE — 74011250637 HC RX REV CODE- 250/637: Performed by: HOSPITALIST

## 2019-12-19 PROCEDURE — 77010033678 HC OXYGEN DAILY

## 2019-12-19 PROCEDURE — 94760 N-INVAS EAR/PLS OXIMETRY 1: CPT

## 2019-12-19 PROCEDURE — 85018 HEMOGLOBIN: CPT

## 2019-12-19 PROCEDURE — 36415 COLL VENOUS BLD VENIPUNCTURE: CPT

## 2019-12-19 RX ORDER — PANTOPRAZOLE SODIUM 40 MG/1
40 TABLET, DELAYED RELEASE ORAL
Status: DISCONTINUED | OUTPATIENT
Start: 2019-12-19 | End: 2019-12-19 | Stop reason: HOSPADM

## 2019-12-19 RX ORDER — LEVOFLOXACIN 750 MG/1
750 TABLET ORAL
Qty: 2 TAB | Refills: 0 | Status: SHIPPED | OUTPATIENT
Start: 2019-12-19 | End: 2019-12-22

## 2019-12-19 RX ORDER — PANTOPRAZOLE SODIUM 40 MG/1
40 TABLET, DELAYED RELEASE ORAL
Qty: 30 TAB | Refills: 0 | Status: SHIPPED | OUTPATIENT
Start: 2019-12-20 | End: 2020-01-08

## 2019-12-19 RX ORDER — GUAIFENESIN 600 MG/1
1200 TABLET, EXTENDED RELEASE ORAL EVERY 12 HOURS
Qty: 20 TAB | Refills: 0 | Status: SHIPPED | OUTPATIENT
Start: 2019-12-19 | End: 2019-12-24

## 2019-12-19 RX ADMIN — METOPROLOL SUCCINATE 50 MG: 50 TABLET, EXTENDED RELEASE ORAL at 09:09

## 2019-12-19 RX ADMIN — PANTOPRAZOLE SODIUM 40 MG: 40 TABLET, DELAYED RELEASE ORAL at 09:09

## 2019-12-19 RX ADMIN — VITAMIN D, TAB 1000IU (100/BT) 2 TABLET: 25 TAB at 09:08

## 2019-12-19 RX ADMIN — BRIMONIDINE TARTRATE 1 DROP: 2 SOLUTION/ DROPS OPHTHALMIC at 09:10

## 2019-12-19 RX ADMIN — AZITHROMYCIN 500 MG: 250 TABLET, FILM COATED ORAL at 09:09

## 2019-12-19 RX ADMIN — BUDESONIDE 500 MCG: 0.5 INHALANT RESPIRATORY (INHALATION) at 07:37

## 2019-12-19 RX ADMIN — ALLOPURINOL 200 MG: 100 TABLET ORAL at 09:09

## 2019-12-19 RX ADMIN — GUAIFENESIN 600 MG: 600 TABLET ORAL at 09:09

## 2019-12-19 RX ADMIN — ARFORMOTEROL TARTRATE 15 MCG: 15 SOLUTION RESPIRATORY (INHALATION) at 07:37

## 2019-12-19 NOTE — DISCHARGE INSTRUCTIONS
Upper GI Endoscopy: What to Expect at [address] [place] [address]  After you have an endoscopy, you will stay at the hospital or clinic for 1 to 2 hours. This will allow the medicine to wear off. You will be able to go home after your doctor or nurse checks to make sure you are not having any problems. You may have to stay overnight if you had treatment during the test. You may have a sore throat for a day or two after the test.  This care sheet gives you a general idea about what to expect after the test.  How can you care for yourself at home? Activity  · Rest as much as you need to after you go home. · You should be able to go back to your usual activities the day after the test.  Diet  · Follow your doctor's directions for eating after the test.  · Drink plenty of fluids (unless your doctor has told you not to). Medications  · If you have a sore throat the day after the test, use an over-the-counter spray to numb your throat. Follow-up care is a key part of your treatment and safety. Be sure to make and go to all appointments, and call your doctor if you are having problems. It's also a good idea to know your test results and keep a list of the medicines you take. When should you call for help? Call 911 anytime you think you may need emergency care. For example, call if:    · You passed out (loses consciousness).     · You have trouble breathing.     · You pass maroon or bloody stools.    Call your doctor now or seek immediate medical care if:    · You have pain that does not get better after your take pain medicine.     · You have new or worse belly pain.     · You have blood in your stools.     · You are sick to your stomach and cannot keep fluids down.     · You have a fever.     · You cannot pass stools or gas.    Watch closely for changes in your health, and be sure to contact your doctor if:    · Your throat still hurts after a day or two.     · You do not get better as expected.    Where can you learn more?  Go to http://airam-shanelle.info/. Enter ([phone] [phone] in the search box to learn more about \"Upper GI Endoscopy: What to Expect at Home. \"  Current as of: November 7, 2018  Content Version: 12.2  © 1121-2333 [employer]. Care instructions adapted under license by [employer] (which disclaims liability or warranty for this information). If you have questions about a medical condition or this instruction, always ask your healthcare professional. [address] any warranty or liability for your use of this information. Pneumonia: Care Instructions  Your Care Instructions    Pneumonia is an infection of the lungs. Most cases are caused by infections from bacteria or viruses. Pneumonia may be mild or very severe. If it is caused by bacteria, you will be treated with antibiotics. It may take a few weeks to a few months to recover fully from pneumonia, depending on how sick you were and whether your overall health is good. Follow-up care is a key part of your treatment and safety. Be sure to make and go to all appointments, and call your doctor if you are having problems. It's also a good idea to know your test results and keep a list of the medicines you take. How can you care for yourself at home? · Take your antibiotics exactly as directed. Do not stop taking the medicine just because you are feeling better. You need to take the full course of antibiotics. · Take your medicines exactly as prescribed. Call your doctor if you think you are having a problem with your medicine. · Get plenty of rest and sleep. You may feel weak and tired for a while, but your energy level will improve with time. · To prevent dehydration, drink plenty of fluids, enough so that your urine is light yellow or clear like water. Choose water and other caffeine-free clear liquids until you feel better.  If you have kidney, heart, or liver disease and have to limit fluids, talk with your doctor before you increase the amount of fluids you drink. · Take care of your cough so you can rest. A cough that brings up mucus from your lungs is common with pneumonia. It is one way your body gets rid of the infection. But if coughing keeps you from resting or causes severe fatigue and chest-wall pain, talk to your doctor. He or she may suggest that you take a medicine to reduce the cough. · Use a vaporizer or humidifier to add moisture to your bedroom. Follow the directions for cleaning the machine. · Do not smoke or allow others to smoke around you. Smoke will make your cough last longer. If you need help quitting, talk to your doctor about stop-smoking programs and medicines. These can increase your chances of quitting for good. · Take an over-the-counter pain medicine, such as acetaminophen (Tylenol), ibuprofen (Advil, Motrin), or naproxen (Aleve). Read and follow all instructions on the label. · Do not take two or more pain medicines at the same time unless the doctor told you to. Many pain medicines have acetaminophen, which is Tylenol. Too much acetaminophen (Tylenol) can be harmful. · If you were given a spirometer to measure how well your lungs are working, use it as instructed. This can help your doctor tell how your recovery is going. · To prevent pneumonia in the future, talk to your doctor about getting a flu vaccine (once a year) and a pneumococcal vaccine (one time only for most people). When should you call for help? Call 911 anytime you think you may need emergency care.  For example, call if:    · You have severe trouble breathing.    Call your doctor now or seek immediate medical care if:    · You cough up dark brown or bloody mucus (sputum).     · You have new or worse trouble breathing.     · You are dizzy or lightheaded, or you feel like you may faint.    Watch closely for changes in your health, and be sure to contact your doctor if:    · You have a new or higher fever.     · You are coughing more deeply or more often.     · You are not getting better after 2 days (48 hours).     · You do not get better as expected. Where can you learn more? Go to http://ariam-shanelle.info/. Enter 01.84.63.10.33 in the search box to learn more about \"Pneumonia: Care Instructions. \"  Current as of: June 9, 2019  Content Version: 12.2  © 9900-4942 [employer]. Care instructions adapted under license by [employer] (which disclaims liability or warranty for this information). If you have questions about a medical condition or this instruction, always ask your healthcare professional. [place] [address] any warranty or liability for your use of this information. DISCHARGE SUMMARY from Nurse    PATIENT INSTRUCTIONS:    After general anesthesia or intravenous sedation, for 24 hours or while taking prescription Narcotics:  · Limit your activities  · Do not drive and operate hazardous machinery  · Do not make important personal or business decisions  · Do  not drink alcoholic beverages  · If you have not urinated within 8 hours after discharge, please contact your surgeon on call. Report the following to your surgeon:  · Excessive pain, swelling, redness or odor of or around the surgical area  · Temperature over 100.5  · Nausea and vomiting lasting longer than 4 hours or if unable to take medications  · Any signs of decreased circulation or nerve impairment to extremity: change in color, persistent  numbness, tingling, coldness or increase pain  · Any questions    What to do at Home:  Recommended activity: Activity as tolerated. If you experience any of the following symptoms:  Fever greater than 100.5/ chills,  Shortness of breath/ wheezing,  Uncontrolled pain or nausea/ vomiting,  please follow up with your doctor. *  Please give a list of your current medications to your Primary Care Provider.     *  Please update this list whenever your medications are discontinued, doses are      changed, or new medications (including over-the-counter products) are added. *  Please carry medication information at all times in case of emergency situations. These are general instructions for a healthy lifestyle:    No smoking/ No tobacco products/ Avoid exposure to second hand smoke  Surgeon General's Warning:  Quitting smoking now greatly reduces serious risk to your health. Obesity, smoking, and sedentary lifestyle greatly increases your risk for illness    A healthy diet, regular physical exercise & weight monitoring are important for maintaining a healthy lifestyle    You may be retaining fluid if you have a history of heart failure or if you experience any of the following symptoms:  Weight gain of 3 pounds or more overnight or 5 pounds in a week, increased swelling in our hands or feet or shortness of breath while lying flat in bed. Please call your doctor as soon as you notice any of these symptoms; do not wait until your next office visit. The discharge information has been reviewed with the patient. The patient verbalized understanding. Discharge medications reviewed with the patient and appropriate educational materials and side effects teaching were provided.   ___________________________________________________________________________________________________________________________________

## 2019-12-19 NOTE — PROGRESS NOTES
Initial visit by  to convey care and concern and encourage patient that  services are available if desired. Mr. Jason Page was resting and I did not wake him. Provided 's business card at door for future reference. Chaplains remain available for support. Kenneth Barone MDiv Board Certified [employer]

## 2019-12-19 NOTE — PROGRESS NOTES
Problem: Falls - Risk of 
Goal: *Absence of Falls Description Document Karen Mena Fall Risk and appropriate interventions in the flowsheet. Outcome: Progressing Towards Goal 
Note: Fall Risk Interventions: 
Mobility Interventions: Communicate number of staff needed for ambulation/transfer, Patient to call before getting OOB Medication Interventions: Evaluate medications/consider consulting pharmacy, Patient to call before getting OOB, Teach patient to arise slowly Elimination Interventions: Call light in reach, Patient to call for help with toileting needs, Stay With Me (per policy), Toilet paper/wipes in reach

## 2019-12-19 NOTE — PROGRESS NOTES
12/19/2019 Admit Date: 12/17/2019 Subjective: CHIEF COMPLAINT- anemia HPI Maddy Saldivar Diet-reg Appetite-good Jenna  Vomiting-no Pain-no BM-yes Bleeding-no Medications-reviewed and adjusted as appropriate IV FLUIDS-reviewed FAM HX-per H&P SH-per H&P 
 tob-former 
          etoh-no Past Medical History:  
Diagnosis Date  A-fib ([address] 5/2/2014  AAA (abdominal aortic aneurysm) without rupture ([address] 5/2/2014  
 3.2 on US 2/14 [facility][1]  Arthritis  Cancer ([address]   
 hx prostate cancer  COPD (chronic obstructive pulmonary disease) ([address] 5/2/2014  Elevated prostate specific antigen (PSA)  Glaucoma 5/2/2014  Heart disease  Heart failure ([address]  Hyperlipemia 5/2/2014  Hypertension  Hypertrophy of prostate with urinary obstruction and other lower urinary tract symptoms (LUTS)  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  OA (osteoarthritis) 5/2/2014  Peptic ulcer disease 6/1/2017  Poor historian  Prostate cancer ([address]  Pulmonary embolus ([address] 6/1/2017  Supplemental oxygen dependent 5/2/2014  Warfarin anticoagulation 5/2/2014 Past Surgical History:  
Procedure Laterality Date  COLONOSCOPY N/A 9/12/2019 COLONOSCOPY/ 26 ROOM 614 performed by Felice Stevens MD at [facility] ENDOSCOPY  EGD  12/18/2019  HX HEART CATHETERIZATION    
 VASCULAR SURGERY PROCEDURE UNLIST  09/14/2019 Bilateral common femoral artery cutdown with exposure and placement of catheter in aorta for aortogram,Endovascular repair of infrarenal abdominal aortic aneurysm with bifurcated modulated device (31 x 14 x 13 main body) via the left common femoral, right iliac extender measuring 27 x 10. ROS-- 
               RESP-neg CARDIAC-neg -neg Further ROS as per PMH and PSH- see problem list     
                   
 
Objective:  
 
Visit Vitals /65 Pulse 78 Temp 98.6 °F (37 °C) Resp 18 Ht 5' 9\" (1.753 m) Wt 75.2 kg (165 lb 12.8 oz) SpO2 95% BMI 24.48 kg/m² Intake/Output Summary (Last 24 hours) at 12/19/2019 6733 Last data filed at 12/19/2019 5609 Gross per 24 hour Intake 400 ml Output 453 ml Net -53 ml EXAM:   
 NEURO-a&o HEENT-wnl LUNGS-clear Jin Kidd ABD-soft , min tenderness, no rebound, good bs EXT-no edema LABS- 
Lab Results Component Value Date/Time WBC 9.6 12/18/2019 05:19 AM  
 RBC 3.44 (L) 12/18/2019 05:19 AM  
 HGB 9.1 (L) 12/19/2019 05:03 AM  
 HCT 29.1 (L) 12/19/2019 05:03 AM  
 PLATELET 831 14/62/9028 05:19 AM  
 
Lab Results Component Value Date/Time Sodium 142 12/19/2019 05:03 AM  
 Potassium 3.6 12/19/2019 05:03 AM  
 Chloride 104 12/19/2019 05:03 AM  
 CO2 33 (H) 12/19/2019 05:03 AM  
 Anion gap 5 (L) 12/19/2019 05:03 AM  
 Glucose 90 12/19/2019 05:03 AM  
 BUN 32 (H) 12/19/2019 05:03 AM  
 Creatinine 1.68 (H) 12/19/2019 05:03 AM  
 GFR est AA 51 (L) 12/19/2019 05:03 AM  
 GFR est non-AA 42 (L) 12/19/2019 05:03 AM  
 Calcium 8.5 12/19/2019 05:03 AM  
 Magnesium 1.9 12/18/2019 05:19 AM  
 Phosphorus 3.4 12/18/2019 05:19 AM  
 Albumin 2.6 (L) 12/17/2019 11:26 AM  
 Bilirubin, total 0.4 12/17/2019 11:26 AM  
 Protein, total 7.9 12/17/2019 11:26 AM  
 Globulin 5.3 (H) 12/17/2019 11:26 AM  
 A-G Ratio 0.5 (L) 12/17/2019 11:26 AM  
 AST (SGOT) 25 12/17/2019 11:26 AM  
 ALT (SGPT) 8 (L) 12/17/2019 11:26 AM  
 
 
   TRANSFUSION- 2 units Assessment:  
 
Principal Problem: 
  Pneumonia (12/17/2019) Active Problems: 
  Supplemental oxygen dependent (5/2/2014) Hypertension (6/1/2017) A-fib ([address] (7/23/2018) Anemia (3/18/2019) Heart failure with reduced ejection fraction ([address] (9/9/2019) CKD (chronic kidney disease), stage III ([address] (9/9/2019) AAA (abdominal aortic aneurysm) ([address] (9/14/2019) AVM (arteriovenous malformation) of stomach, acquired (12/19/2019) Gastric polyps (12/19/2019) 
 
hgb stable post egd Possibly more avms in SB Will hold on pill cam for now Plan: PPI for 1 month Will arrange office f/u Will sign off PT SEEN AND EXAMINED AND PLAN DISCUSSED AND IMPLEMENTED.  
Rafael Hudson MD

## 2019-12-19 NOTE — DISCHARGE SUMMARY
Hospitalist Discharge Summary Admit Date:  2019 12:01 PM  
Name:  Jcarlos Page. Age:  70 y.o. 
:  1940 MRN:  [id number] PCP:  Zayra Merrill MD 
Treatment Team: Attending Provider: Gabriele Coyle MD; Utilization Review: Giorgio Hess RN; Care Manager: Lia Alvarez RN; Primary Nurse: Sita Dobbs; Occupational Therapist: Jane Cockayne, OT Problem List for this Hospitalization: 
Hospital Problems as of 2019 Date Reviewed: 2019 Codes Class Noted - Resolved POA  
 AAA (abdominal aortic aneurysm) ([facility] ICD-10-CM: I71.4 ICD-9-CM: 441.4  2019 - Present Yes Chronic hypoxemic respiratory failure (HCC) (Chronic) ICD-10-CM: J96.11 
ICD-9-CM: 518.83, 799.02  2019 - Present Unknown Heart failure with reduced ejection fraction (HCC) (Chronic) ICD-10-CM: I50.20 ICD-9-CM: 428.20  2019 - Present Yes  
   
 CKD (chronic kidney disease), stage III (HCC) (Chronic) ICD-10-CM: N18.3 ICD-9-CM: 585.3  2019 - Present Yes Anemia ICD-10-CM: D64.9 ICD-9-CM: 285.9  3/18/2019 - Present Yes A-fib [facility]) ICD-10-CM: I48.91 
ICD-9-CM: 427.31  2018 - Present Yes Hypertension ICD-10-CM: I10 
ICD-9-CM: 401.9  2017 - Present Yes Supplemental oxygen dependent (Chronic) ICD-10-CM: Z99.81 ICD-9-CM: V46.2  2014 - Present Yes RESOLVED: AVM (arteriovenous malformation) of stomach, acquired ICD-10-CM: K27.26 ICD-9-CM: 537.82  2019 - 2019 Yes RESOLVED: Gastric polyps ICD-10-CM: K31.7 ICD-9-CM: 211.1  2019 - 2019 Yes * (Principal) RESOLVED: Pneumonia ICD-10-CM: J18.9 ICD-9-CM: 332  2019 - 2019 Yes Hospital Course: Mr. Citlalli Andujar is a 69 y/o AAM with a h/o chronic sCHF, O2 dependent COPD, AYAN, AAA s/p EVAR 2019, GIB, AFib on Eliquis (CHADSVASc 4), prostate cancer, BPH, HLD, CKD, HTN who was admitted on 12/17 with sepsis 2/2 CAP. He was started on antibiotics. Hb slightly below baseline so GI was consulted. He underwent EGD on 12/18 which showed small AVMs of the fundus and duodenal bulb, these were cauterized, and inflammatory gastric polyps that were snared. His Hb remained stable. He had no further bleeding and is tolerating his diet. He is on his baseline of 3L NC O2. GI recommends to resume Eliquis in 5 days (Monday 12/23). I discussed the case with Cardiology NP who recommends to resume Eliquis in 5 days and follow up in the office in 2 weeks for consideration of Watchman. Hospital course was otherwise unremarkable and he is stable at the time of discharge. Disposition: Home or Self Care Activity: Activity as tolerated Diet: DIET NUTRITIONAL SUPPLEMENTS All Meals; Ensure [employer] DIET REGULAR Code Status: DNR Follow up instructions, discharge meds at bottom of this note. Plan was discussed with patient, nursing, CM, Cardiology NP [facility] [address] [place] [address],[address]. All questions answered. Patient was stable at time of discharge. Patient will call a physician or return if any concerns. Discharge summary was sent to PCP electronically via \"Comm Mgt\" link in [facility], if possible. Diagnostic Imaging/Tests:  
Xr Chest [facility] Result Date: 12/17/2019 History: Hemoptysis and shortness of breath, 2 weeks duration Exam: portable chest Comparison: 9/23/2019 Findings: There is a new focal airspace opacity at the right lung base. No change in the appearance of the mediastinal contour or osseous structures. Impressions: New focal airspace opacity at the right lung base. Echocardiogram results: No results found for this visit on 12/17/19. Procedures done this admission: 
Procedure(s): ESOPHAGOGASTRODUODENOSCOPY (EGD)/ BMI 25TO BE ADMITTED 12/17 BICAP 
ENDOSCOPIC POLYPECTOMY All Micro Results Procedure Component Value Units Date/Time CULTURE, BLOOD [[id number]] Collected:  12/17/19 1303 Order Status:  Completed Specimen:  Blood Updated:  12/19/19 1816 Special Requests: --     
  RIGHT Antecubital 
  
  Culture result: NO GROWTH 2 DAYS     
 CULTURE, BLOOD [[id number]] Collected:  12/17/19 1306 Order Status:  Completed Specimen:  Blood Updated:  12/19/19 3290 Special Requests: --     
  LEFT 
HAND Culture result: NO GROWTH 2 DAYS     
 CULTURE, RESPIRATORY/SPUTUM/BRONCH Trupti Dhaliwal [[id number]] Collected:  12/17/19 2342 Order Status:  Completed Specimen:  Sputum Updated:  12/19/19 7705 Special Requests: NO SPECIAL REQUESTS     
  GRAM STAIN 6 TO 12 WBCS SEEN PER OIF  
   0 TO 2 EPITHELIAL CELLS SEEN PER OIF  
   2+ MUCUS PRESENT     
   MANY GRAM POSITIVE COCCI MODERATE GRAM NEGATIVE RODS  
     
   FEW GRAM POSITIVE RODS Culture result: MODERATE NORMAL RESPIRATORY NARCISA Labs: Results:  
   
BMP, Mg, Phos Recent Labs 12/19/19 
0503 12/18/19 
0519 12/17/19 
1126  141 140  
K 3.6 3.2* 4.0  
 105 102 CO2 33* 31 30 AGAP 5* 5* 8  
BUN 32* 23 25* CREA 1.68* 1.81* 1.93* CA 8.5 9.2 9.2 GLU 90 92 111* MG  --  1.9  --   
PHOS  --  3.4  --   
  
CBC Recent Labs 12/19/19 
0503 12/18/19 
0519 12/17/19 
1427 12/17/19 
1126 WBC  --  9.6  --  8.3  
RBC  --  3.44*  --  3.03* HGB 9.1* 9.5* 7.5* 8.3* HCT 29.1* 30.1* 25.1* 27.1*  
PLT  --  303  --  370 GRANS  --  64  --  64  
LYMPH  --  20  --  21  
EOS  --  2  --  3 MONOS  --  14*  --  11  
BASOS  --  0  --  1 IG  --  1  --  1 ANEU  --  6.1  --  5.2 ABL  --  2.0  --  1.8 JEANNE  --  0.2  --  0.2 ABM  --  1.3  --  0.9 ABB  --  0.0  --  0.1 AIG  --  0.1  --  0.1 LFT Recent Labs 12/17/19 
1126 SGOT 25 ALT 8* AP 72  
TP 7.9 ALB 2.6*  
GLOB 5.3* AGRAT 0.5* Cardiac Testing Lab Results Component Value Date/Time   (H) 09/15/2019 03:14 AM  
 BNP 1,516 09/13/2018 08:18 PM  
 BNP 1,067 08/30/2018 07:08 AM  
 BNP 7 12/21/2014 08:45 PM  
 Troponin-I, Qt. 0.09 (H) 09/10/2019 11:09 AM  
 Troponin-I, Qt. 0.06 (H) 09/10/2019 02:14 AM  
 Troponin-I, Qt. <0.02 (L) 09/09/2019 08:24 PM  
  
Coagulation Tests Lab Results Component Value Date/Time Prothrombin time 16.1 (H) 12/18/2019 05:19 AM  
 Prothrombin time 18.1 (H) 12/17/2019 11:26 AM  
 Prothrombin time 13.7 08/30/2018 12:49 PM  
 INR 1.3 12/18/2019 05:19 AM  
 INR 1.5 12/17/2019 11:26 AM  
 INR 1.1 08/30/2018 12:49 PM  
 aPTT 73.1 (H) 08/31/2018 01:40 AM  
 aPTT 80.6 (H) 08/30/2018 06:34 PM  
 aPTT 91.1 (HH) 08/30/2018 04:29 PM  
  
A1c No results found for: HBA1C, HGBE8, LMF0GWHR Lipid Panel Lab Results Component Value Date/Time Cholesterol, total 172 08/13/2019 08:57 AM  
 HDL Cholesterol 53 08/13/2019 08:57 AM  
 LDL, calculated 98 08/13/2019 08:57 AM  
 VLDL, calculated 21 08/13/2019 08:57 AM  
 Triglyceride 105 08/13/2019 08:57 AM  
  
Thyroid Panel Lab Results Component Value Date/Time TSH 0.992 03/27/2019 11:03 AM  
 TSH 1.250 03/01/2019 08:46 AM  
    
Most Recent UA Lab Results Component Value Date/Time Color YELLOW 09/17/2019 01:40 PM  
 Appearance CLOUDY 09/17/2019 01:40 PM  
 Specific gravity 1.016 09/17/2019 01:40 PM  
 pH (UA) 5.5 09/17/2019 01:40 PM  
 Protein 30 (A) 09/17/2019 01:40 PM  
 Glucose NEGATIVE  09/17/2019 01:40 PM  
 Ketone NEGATIVE  09/17/2019 01:40 PM  
 Bilirubin NEGATIVE  09/17/2019 01:40 PM  
 Blood NEGATIVE  09/17/2019 01:40 PM  
 Urobilinogen 0.2 09/17/2019 01:40 PM  
 Nitrites NEGATIVE  09/17/2019 01:40 PM  
 Leukocyte Esterase NEGATIVE  09/17/2019 01:40 PM  
 WBC 0-3 09/10/2019 03:00 AM  
 RBC 0 09/10/2019 03:00 AM  
 Epithelial cells 0-3 09/17/2019 01:40 PM  
 Bacteria 0 09/17/2019 01:40 PM  
 Casts 3-5 09/17/2019 01:40 PM  
 Other observations RESULTS VERIFIED MANUALLY 09/17/2019 01:40 PM  
  
 
Allergies Allergen Reactions  Statins-Hmg-Coa Reductase Inhibitors Myalgia Muscle pain Immunization History Administered Date(s) Administered  Influenza Vaccine (Quad) PF 09/15/2018, 09/18/2019  TB Skin Test (PPD) Intradermal 09/14/2018, 09/09/2019, 09/15/2019 All Labs from Last 24 Hrs: 
Recent Results (from the past 24 hour(s)) HGB & HCT Collection Time: 12/19/19  5:03 AM  
Result Value Ref Range HGB 9.1 (L) 13.6 - 17.2 g/dL HCT 29.1 (L) 41.1 - 50.3 % METABOLIC PANEL, BASIC Collection Time: 12/19/19  5:03 AM  
Result Value Ref Range Sodium 142 136 - 145 mmol/L Potassium 3.6 3.5 - 5.1 mmol/L Chloride 104 98 - 107 mmol/L  
 CO2 33 (H) 21 - 32 mmol/L Anion gap 5 (L) 7 - 16 mmol/L Glucose 90 65 - 100 mg/dL BUN 32 (H) 8 - 23 MG/DL Creatinine 1.68 (H) 0.8 - 1.5 MG/DL  
 GFR est AA 51 (L) >60 ml/min/1.73m2 GFR est non-AA 42 (L) >60 ml/min/1.73m2 Calcium 8.5 8.3 - 10.4 MG/DL Current Med List in Hospital:  
Current Facility-Administered Medications Medication Dose Route Frequency  pantoprazole (PROTONIX) tablet 40 mg  40 mg Oral ACB  azithromycin (ZITHROMAX) tablet 500 mg  500 mg Oral DAILY  benzocaine-menthol (CEPACOL) lozenge  1 Lozenge Oral Q2H PRN  
 albuterol-ipratropium (DUO-NEB) 2.5 MG-0.5 MG/3 ML  3 mL Nebulization Q4H PRN  
 allopurinol (ZYLOPRIM) tablet 200 mg  200 mg Oral DAILY  brimonidine (ALPHAGAN) 0.2 % ophthalmic solution 1 Drop  1 Drop Both Eyes BID  budesonide (PULMICORT) 500 mcg/2 ml nebulizer suspension  500 mcg Nebulization BID  cholecalciferol (VITAMIN D3) (1000 Units /25 mcg) tablet 2 Tab  2,000 Units Oral DAILY  metoprolol succinate (TOPROL-XL) XL tablet 50 mg  50 mg Oral DAILY  mirtazapine (REMERON) tablet 15 mg  15 mg Oral QHS  nitroglycerin (NITROSTAT) tablet 0.4 mg  0.4 mg SubLINGual Q5MIN PRN  
 rosuvastatin (CRESTOR) tablet 20 mg  20 mg Oral QHS  tamsulosin (FLOMAX) capsule 0.4 mg  0.4 mg Oral QHS  arformoterol (BROVANA) neb solution 15 mcg  15 mcg Nebulization BID RT  
 cefTRIAXone (ROCEPHIN) 2 g in 0.9% sodium chloride (MBP/ADV) 50 mL  2 g IntraVENous Q24H  
 0.9% sodium chloride infusion 250 mL  250 mL IntraVENous PRN  
 sodium chloride (NS) flush 5-40 mL  5-40 mL IntraVENous Q8H  
 sodium chloride (NS) flush 5-40 mL  5-40 mL IntraVENous PRN  
 acetaminophen (TYLENOL) tablet 650 mg  650 mg Oral Q4H PRN  
 HYDROcodone-acetaminophen (NORCO) 5-325 mg per tablet 1 Tab  1 Tab Oral Q4H PRN  
 morphine injection 2 mg  2 mg IntraVENous Q4H PRN  
 naloxone (NARCAN) injection 0.4 mg  0.4 mg IntraVENous PRN  
 diphenhydrAMINE (BENADRYL) capsule 25 mg  25 mg Oral Q4H PRN  
 ondansetron (ZOFRAN) injection 4 mg  4 mg IntraVENous Q4H PRN  
 magnesium hydroxide (MILK OF MAGNESIA) 400 mg/5 mL oral suspension 30 mL  30 mL Oral DAILY PRN  
 guaiFENesin ER (MUCINEX) tablet 600 mg  600 mg Oral Q12H  
 benzonatate (TESSALON) capsule 100 mg  100 mg Oral TID PRN Discharge Exam: 
Patient Vitals for the past 24 hrs: 
 Temp Pulse Resp BP SpO2  
12/19/19 0801 98.6 °F (37 °C) 78 18 137/65 95 % 12/19/19 0737     94 % 12/19/19 0416 99.6 °F (37.6 °C) 87 18 141/66 97 % 12/18/19 2327 98.9 °F (37.2 °C) 78 18 129/51 96 % 12/18/19 1946 98.9 °F (37.2 °C) 71 18 (!) 135/93 99 % 12/18/19 1943     99 % 12/18/19 1619 97.8 °F (36.6 °C) 71 18 106/45 98 % 12/18/19 1152 97.6 °F (36.4 °C) 76 16 111/61 93 % 12/18/19 1133  76 16 96/61 98 % 12/18/19 1127  80 16 90/53 96 % 12/18/19 1121  79 15 (!) 77/37 90 % 12/18/19 1105 97 °F (36.1 °C) 71 17 (!) 85/49 95 % 12/18/19 1045  63 15 100/57 96 % Oxygen Therapy O2 Sat (%): 95 % (12/19/19 0801) Pulse via Oximetry: 84 beats per minute (12/19/19 0737) O2 Device: Nasal cannula (12/19/19 0737) O2 Flow Rate (L/min): 3 l/min(pt wears 3L at home) (12/19/19 0737) Estimated body mass index is 24.48 kg/m² as calculated from the following: Height as of this encounter: 5' 9\" (1.753 m). Weight as of this encounter: 75.2 kg (165 lb 12.8 oz). Intake/Output Summary (Last 24 hours) at 12/19/2019 1044 Last data filed at 12/19/2019 3751 Gross per 24 hour Intake 400 ml Output 453 ml Net -53 ml *Note that automatically entered I/Os may not be accurate; dependent on patient compliance with collection and accurate  by assistants. General:    Well nourished. Alert. Eyes:   Normal sclerae. Extraocular movements intact. ENT:  Normocephalic, atraumatic. Moist mucous membranes CV:   Regular rate and rhythm. No murmur, rub, or gallop. Lungs:  Few b/l rhonchi, 3L NC O2. No distress. Abdomen: Soft, nontender, nondistended. Bowel sounds normal.  
Extremities: Warm and dry. No cyanosis or edema. Neurologic: CN II-XII grossly intact. Sensation intact. Skin:     No rashes or jaundice. Psych:  Normal mood and affect. Discharge Info:  
Current Discharge Medication List  
  
START taking these medications Details  
pantoprazole (PROTONIX) 40 mg tablet Take 1 Tab by mouth Daily (before breakfast). Qty: 30 Tab, Refills: 0  
  
levoFLOXacin (LEVAQUIN) 750 mg tablet Take 1 Tab by mouth every fourty-eight (48) hours for 2 doses. Qty: 2 Tab, Refills: 0  
  
guaiFENesin ER (MUCINEX) 600 mg ER tablet Take 2 Tabs by mouth every twelve (12) hours for 5 days. Qty: 20 Tab, Refills: 0 CONTINUE these medications which have NOT CHANGED Details  
metoprolol succinate (TOPROL-XL) 50 mg XL tablet Take 1 Tab by mouth daily. Qty: 30 Tab, Refills: 3  
  
albuterol (PROVENTIL VENTOLIN) 2.5 mg /3 mL (0.083 %) nebu   
  
cholecalciferol (VITAMIN D3) 2,000 unit cap capsule TAKE 1 CAPSULE BY MOUTH EVERY DAY Refills: 2  
  
mirtazapine (REMERON) 15 mg tablet TAKE 1 TABLET BY MOUTH AT BEDTIME Refills: 0  
  
tamsulosin (FLOMAX) 0.4 mg capsule Take 1 Cap by mouth daily. Qty: 30 Cap, Refills: 0 furosemide (LASIX) 40 mg tablet Take 1 Tab by mouth daily. Qty: 30 Tab, Refills: 11  
 Associated Diagnoses: Congestive heart failure, unspecified HF chronicity, unspecified heart failure type (HCC)  
  
rosuvastatin (CRESTOR) 40 mg tablet Take 40 mg by mouth nightly. docusate sodium (COLACE) 100 mg capsule Take 100 mg by mouth daily. amLODIPine (NORVASC) 10 mg tablet Take  by mouth daily. budesonide (PULMICORT) 0.5 mg/2 mL nbsp 2 mL by Nebulization route two (2) times a day. Qty: 1 Each, Refills: 0  
  
albuterol-ipratropium (DUO-NEB) 2.5 mg-0.5 mg/3 ml nebu 3 mL by Nebulization route every six (6) hours as needed. Qty: 30 Nebule, Refills: 0  
  
apixaban (ELIQUIS) 5 mg tablet Take 1 Tab by mouth two (2) times a day. Qty: 60 Tab, Refills: 11  
 Associated Diagnoses: Atrial fibrillation and flutter ([address] Paroxysmal atrial fibrillation ([address] Acute on chronic combined systolic and diastolic congestive heart failure ([address] Typical atrial flutter (HCC)  
  
lisinopril (PRINIVIL, ZESTRIL) 10 mg tablet Take 1 Tab by mouth daily. Qty: 30 Tab, Refills: 0  
  
fluticasone-salmeterol (ADVAIR) 250-50 mcg/dose diskus inhaler Take 1 Puff by inhalation. allopurinol (ZYLOPRIM) 300 mg tablet Take  by mouth daily. OXYGEN-AIR DELIVERY SYSTEMS Use as instructed. Use as instructed. brimonidine (ALPHAGAN P) 0.1 % ophthalmic solution every eight (8) hours. nitroglycerin (NITROSTAT) 0.4 mg SL tablet 1 Tab by SubLINGual route every five (5) minutes as needed for Chest Pain. Qty: 1 Bottle, Refills: 11 STOP taking these medications  
  
 famotidine (PEPCID) 40 mg tablet Comments:  
Reason for Stopping: Follow Up Orders: Follow-up Appointments Procedures  FOLLOW UP VISIT Appointment in: Other (Specify) PCP -- 1 week hospital f/u Cardiology -- Dr. Payal Gilmore 2 weeks, discuss Watchman PCP -- 1 week hospital f/u 
Cardiology -- Dr. Payal Gilmore 2 weeks, discuss Anu Standing Status:   Standing Number of Occurrences:   1 Order Specific Question:   Appointment in Answer: Other (Specify) Follow-up Information Follow up With Specialties Details Why Contact Info Portia Dobson MD Family Practice In 1 week Hospital follow up. Pneumonia, GI bleed. [address] [facility] [address] [facility] [address] 
[phone] Heydi Hodgson MD Cardiology In 2 weeks Discuss Watchman procedure (on Eliquis, h/o GI bleed) [address] [facility] [address] 
[phone] Resume Eliquis on Monday 12/23/2019. Time spent in patient discharge planning and coordination 35 minutes.  
 
Signed: 
Nancy Jordan MD

## 2019-12-19 NOTE — ANESTHESIA POSTPROCEDURE EVALUATION
Procedure(s): ESOPHAGOGASTRODUODENOSCOPY (EGD)/ BMI 25TO BE ADMITTED 12/17 BICAP 
ENDOSCOPIC POLYPECTOMY. total IV anesthesia Anesthesia Post Evaluation Multimodal analgesia: multimodal analgesia used between 6 hours prior to anesthesia start to PACU discharge Patient location during evaluation: PACU Patient participation: complete - patient participated Level of consciousness: awake Pain management: adequate Airway patency: patent Anesthetic complications: no 
Cardiovascular status: acceptable Respiratory status: acceptable Hydration status: acceptable Post anesthesia nausea and vomiting:  none Vitals Value Taken Time /61 12/19/2019 11:05 AM  
Temp 36.7 °C (98 °F) 12/19/2019 11:05 AM  
Pulse 98 12/19/2019 11:05 AM  
Resp 18 12/19/2019 11:05 AM  
SpO2 96 % 12/19/2019 11:05 AM

## 2019-12-20 LAB
BACTERIA SPEC CULT: NORMAL
GRAM STN SPEC: NORMAL
SERVICE CMNT-IMP: NORMAL

## 2019-12-21 LAB
ABO + RH BLD: NORMAL
BLD PROD TYP BPU: NORMAL
BLOOD GROUP ANTIBODIES SERPL: NORMAL
BPU ID: NORMAL
CROSSMATCH RESULT,%XM: NORMAL
SPECIMEN EXP DATE BLD: NORMAL
STATUS OF UNIT,%ST: NORMAL
UNIT DIVISION, %UDIV: 0

## 2020-01-01 ENCOUNTER — HOSPITAL ENCOUNTER (OUTPATIENT)
Dept: LAB | Age: 80
Discharge: HOME | End: 2020-09-16
Payer: MEDICARE

## 2020-01-01 ENCOUNTER — HOSPITAL ENCOUNTER (EMERGENCY)
Age: 80
Discharge: HOME | End: 2020-12-21
Attending: EMERGENCY MEDICINE
Payer: MEDICARE

## 2020-01-01 ENCOUNTER — APPOINTMENT (OUTPATIENT)
Dept: GENERAL RADIOLOGY | Age: 80
End: 2020-01-01
Attending: EMERGENCY MEDICINE
Payer: MEDICARE

## 2020-01-01 VITALS
TEMPERATURE: 98.2 F | RESPIRATION RATE: 20 BRPM | WEIGHT: 180 LBS | HEIGHT: 69 IN | OXYGEN SATURATION: 93 % | HEART RATE: 65 BPM | SYSTOLIC BLOOD PRESSURE: 167 MMHG | DIASTOLIC BLOOD PRESSURE: 72 MMHG | BODY MASS INDEX: 26.66 KG/M2

## 2020-01-01 DIAGNOSIS — D50.8 OTHER IRON DEFICIENCY ANEMIA: ICD-10-CM

## 2020-01-01 DIAGNOSIS — I48.0 PAROXYSMAL ATRIAL FIBRILLATION (HCC): Primary | ICD-10-CM

## 2020-01-01 LAB
ALBUMIN SERPL-MCNC: 3 G/DL (ref 3.2–4.6)
ALBUMIN SERPL-MCNC: 3.1 G/DL (ref 3.2–4.6)
ALBUMIN/GLOB SERPL: 0.8 {RATIO} (ref 1.2–3.5)
ALBUMIN/GLOB SERPL: 0.8 {RATIO} (ref 1.2–3.5)
ALP SERPL-CCNC: 78 U/L (ref 50–136)
ALP SERPL-CCNC: 89 U/L (ref 50–136)
ALT SERPL-CCNC: 12 U/L (ref 12–65)
ALT SERPL-CCNC: 14 U/L (ref 12–65)
ANION GAP SERPL CALC-SCNC: 3 MMOL/L (ref 7–16)
ANION GAP SERPL CALC-SCNC: 4 MMOL/L (ref 7–16)
AST SERPL-CCNC: 11 U/L (ref 15–37)
AST SERPL-CCNC: 14 U/L (ref 15–37)
ATRIAL RATE: 174 BPM
BASOPHILS # BLD: 0 K/UL (ref 0–0.2)
BASOPHILS NFR BLD: 1 % (ref 0–2)
BILIRUB SERPL-MCNC: 0.2 MG/DL (ref 0.2–1.1)
BILIRUB SERPL-MCNC: 0.2 MG/DL (ref 0.2–1.1)
BNP SERPL-MCNC: 849 PG/ML
BUN SERPL-MCNC: 17 MG/DL (ref 8–23)
BUN SERPL-MCNC: 21 MG/DL (ref 8–23)
CALCIUM SERPL-MCNC: 9.1 MG/DL (ref 8.3–10.4)
CALCIUM SERPL-MCNC: 9.1 MG/DL (ref 8.3–10.4)
CALCULATED R AXIS, ECG10: 75 DEGREES
CALCULATED T AXIS, ECG11: 72 DEGREES
CHLORIDE SERPL-SCNC: 105 MMOL/L (ref 98–107)
CHLORIDE SERPL-SCNC: 107 MMOL/L (ref 98–107)
CO2 SERPL-SCNC: 30 MMOL/L (ref 21–32)
CO2 SERPL-SCNC: 32 MMOL/L (ref 21–32)
CREAT SERPL-MCNC: 1.6 MG/DL (ref 0.8–1.5)
CREAT SERPL-MCNC: 1.68 MG/DL (ref 0.8–1.5)
DIAGNOSIS, 93000: NORMAL
DIFFERENTIAL METHOD BLD: ABNORMAL
EOSINOPHIL # BLD: 0.2 K/UL (ref 0–0.8)
EOSINOPHIL NFR BLD: 3 % (ref 0.5–7.8)
ERYTHROCYTE [DISTWIDTH] IN BLOOD BY AUTOMATED COUNT: 15.3 % (ref 11.9–14.6)
ERYTHROCYTE [DISTWIDTH] IN BLOOD BY AUTOMATED COUNT: 16.2 % (ref 11.9–14.6)
FERRITIN SERPL-MCNC: 164 NG/ML (ref 8–388)
GLOBULIN SER CALC-MCNC: 3.8 G/DL (ref 2.3–3.5)
GLOBULIN SER CALC-MCNC: 3.9 G/DL (ref 2.3–3.5)
GLUCOSE SERPL-MCNC: 152 MG/DL (ref 65–100)
GLUCOSE SERPL-MCNC: 169 MG/DL (ref 65–100)
HCT VFR BLD AUTO: 29.5 % (ref 41.1–50.3)
HCT VFR BLD AUTO: 34.1 % (ref 41.1–50.3)
HGB BLD-MCNC: 10.1 G/DL (ref 13.6–17.2)
HGB BLD-MCNC: 8.7 G/DL (ref 13.6–17.2)
IMM GRANULOCYTES # BLD AUTO: 0 K/UL (ref 0–0.5)
IMM GRANULOCYTES NFR BLD AUTO: 1 % (ref 0–5)
INR PPP: 0.9
IRON SATN MFR SERPL: 28 %
IRON SERPL-MCNC: 82 UG/DL (ref 35–150)
LYMPHOCYTES # BLD: 1.8 K/UL (ref 0.5–4.6)
LYMPHOCYTES NFR BLD: 32 % (ref 13–44)
MAGNESIUM SERPL-MCNC: 2 MG/DL (ref 1.8–2.4)
MCH RBC QN AUTO: 26.8 PG (ref 26.1–32.9)
MCH RBC QN AUTO: 26.9 PG (ref 26.1–32.9)
MCHC RBC AUTO-ENTMCNC: 29.5 G/DL (ref 31.4–35)
MCHC RBC AUTO-ENTMCNC: 29.6 G/DL (ref 31.4–35)
MCV RBC AUTO: 90.5 FL (ref 79.6–97.8)
MCV RBC AUTO: 91.3 FL (ref 79.6–97.8)
MONOCYTES # BLD: 0.6 K/UL (ref 0.1–1.3)
MONOCYTES NFR BLD: 11 % (ref 4–12)
NEUTS SEG # BLD: 3 K/UL (ref 1.7–8.2)
NEUTS SEG NFR BLD: 52 % (ref 43–78)
NRBC # BLD: 0 K/UL (ref 0–0.2)
NRBC # BLD: 0 K/UL (ref 0–0.2)
PHOSPHATE SERPL-MCNC: 2.7 MG/DL (ref 2.3–3.7)
PLATELET # BLD AUTO: 257 K/UL (ref 150–450)
PLATELET # BLD AUTO: 262 K/UL (ref 150–450)
PMV BLD AUTO: 10.5 FL (ref 9.4–12.3)
PMV BLD AUTO: 9.9 FL (ref 9.4–12.3)
POTASSIUM SERPL-SCNC: 3.9 MMOL/L (ref 3.5–5.1)
POTASSIUM SERPL-SCNC: 4.3 MMOL/L (ref 3.5–5.1)
PROT SERPL-MCNC: 6.8 G/DL (ref 6.3–8.2)
PROT SERPL-MCNC: 7 G/DL (ref 6.3–8.2)
PROTHROMBIN TIME: 12.6 SEC (ref 12.5–14.7)
Q-T INTERVAL, ECG07: 362 MS
QRS DURATION, ECG06: 78 MS
QTC CALCULATION (BEZET), ECG08: 454 MS
RBC # BLD AUTO: 3.23 M/UL (ref 4.23–5.6)
RBC # BLD AUTO: 3.77 M/UL (ref 4.23–5.67)
SODIUM SERPL-SCNC: 140 MMOL/L (ref 136–145)
SODIUM SERPL-SCNC: 141 MMOL/L (ref 136–145)
TIBC SERPL-MCNC: 293 UG/DL (ref 250–450)
TRANSFERRIN SERPL-SCNC: 22.4 NMOL/L (ref 12.2–27.3)
TROPONIN-HIGH SENSITIVITY: 22.4 PG/ML (ref 0–14)
TROPONIN-HIGH SENSITIVITY: 27.2 PG/ML (ref 0–14)
VENTRICULAR RATE, ECG03: 95 BPM
WBC # BLD AUTO: 5.7 K/UL (ref 4.3–11.1)
WBC # BLD AUTO: 6.5 K/UL (ref 4.3–11.1)

## 2020-01-01 PROCEDURE — 85610 PROTHROMBIN TIME: CPT

## 2020-01-01 PROCEDURE — 71045 X-RAY EXAM CHEST 1 VIEW: CPT

## 2020-01-01 PROCEDURE — 84100 ASSAY OF PHOSPHORUS: CPT

## 2020-01-01 PROCEDURE — 83735 ASSAY OF MAGNESIUM: CPT

## 2020-01-01 PROCEDURE — 85027 COMPLETE CBC AUTOMATED: CPT

## 2020-01-01 PROCEDURE — 80053 COMPREHEN METABOLIC PANEL: CPT

## 2020-01-01 PROCEDURE — 93005 ELECTROCARDIOGRAM TRACING: CPT | Performed by: EMERGENCY MEDICINE

## 2020-01-01 PROCEDURE — 99284 EMERGENCY DEPT VISIT MOD MDM: CPT | Performed by: INTERNAL MEDICINE

## 2020-01-01 PROCEDURE — 85025 COMPLETE CBC W/AUTO DIFF WBC: CPT

## 2020-01-01 PROCEDURE — 83880 ASSAY OF NATRIURETIC PEPTIDE: CPT

## 2020-01-01 PROCEDURE — 99285 EMERGENCY DEPT VISIT HI MDM: CPT

## 2020-01-01 PROCEDURE — 83540 ASSAY OF IRON: CPT

## 2020-01-01 PROCEDURE — 82728 ASSAY OF FERRITIN: CPT

## 2020-01-01 PROCEDURE — 84484 ASSAY OF TROPONIN QUANT: CPT

## 2020-01-01 PROCEDURE — 84238 ASSAY NONENDOCRINE RECEPTOR: CPT

## 2020-01-01 PROCEDURE — 36415 COLL VENOUS BLD VENIPUNCTURE: CPT

## 2020-01-01 RX ORDER — DILTIAZEM HYDROCHLORIDE 120 MG/1
120 TABLET, FILM COATED ORAL DAILY
Qty: 90 TAB | Refills: 3 | Status: SHIPPED | OUTPATIENT
Start: 2020-01-01 | End: 2021-01-01 | Stop reason: SDUPTHER

## 2020-01-01 RX ORDER — METOPROLOL SUCCINATE 50 MG/1
50 TABLET, EXTENDED RELEASE ORAL DAILY
Qty: 90 TAB | Refills: 3 | Status: SHIPPED | OUTPATIENT
Start: 2020-01-01 | End: 2021-01-01 | Stop reason: SDUPTHER

## 2020-01-08 ENCOUNTER — HOSPITAL ENCOUNTER (OUTPATIENT)
Dept: LAB | Age: 80
End: 2020-01-08
Attending: INTERNAL MEDICINE
Payer: COMMERCIAL

## 2020-01-08 DIAGNOSIS — K92.2 GASTROINTESTINAL HEMORRHAGE, UNSPECIFIED GASTROINTESTINAL HEMORRHAGE TYPE: ICD-10-CM

## 2020-01-09 ENCOUNTER — HOSPITAL ENCOUNTER (OUTPATIENT)
Dept: LAB | Age: 80
Discharge: HOME | End: 2020-01-09
Payer: COMMERCIAL

## 2020-01-09 LAB
ALBUMIN SERPL-MCNC: 3 G/DL (ref 3.2–4.6)
ALBUMIN/GLOB SERPL: 0.6 {RATIO} (ref 1.2–3.5)
ALP SERPL-CCNC: 103 U/L (ref 50–136)
ALT SERPL-CCNC: 11 U/L (ref 12–65)
ANION GAP SERPL CALC-SCNC: 7 MMOL/L (ref 7–16)
AST SERPL-CCNC: 11 U/L (ref 15–37)
BILIRUB SERPL-MCNC: 0.2 MG/DL (ref 0.2–1.1)
BUN SERPL-MCNC: 19 MG/DL (ref 8–23)
CALCIUM SERPL-MCNC: 9.1 MG/DL (ref 8.3–10.4)
CHLORIDE SERPL-SCNC: 102 MMOL/L (ref 98–107)
CO2 SERPL-SCNC: 34 MMOL/L (ref 21–32)
CREAT SERPL-MCNC: 1.5 MG/DL (ref 0.8–1.5)
FERRITIN SERPL-MCNC: 447 NG/ML (ref 8–388)
GLOBULIN SER CALC-MCNC: 4.9 G/DL (ref 2.3–3.5)
GLUCOSE SERPL-MCNC: 89 MG/DL (ref 65–100)
IRON SATN MFR SERPL: 28 %
IRON SERPL-MCNC: 68 UG/DL (ref 35–150)
POTASSIUM SERPL-SCNC: 4.1 MMOL/L (ref 3.5–5.1)
PROT SERPL-MCNC: 7.9 G/DL (ref 6.3–8.2)
SODIUM SERPL-SCNC: 143 MMOL/L (ref 136–145)
TIBC SERPL-MCNC: 243 UG/DL (ref 250–450)

## 2020-01-09 PROCEDURE — 36415 COLL VENOUS BLD VENIPUNCTURE: CPT

## 2020-01-09 PROCEDURE — 80053 COMPREHEN METABOLIC PANEL: CPT

## 2020-01-09 PROCEDURE — 82728 ASSAY OF FERRITIN: CPT

## 2020-01-09 PROCEDURE — 83540 ASSAY OF IRON: CPT

## 2020-01-22 ENCOUNTER — HOSPITAL ENCOUNTER (OUTPATIENT)
Dept: LAB | Age: 80
Discharge: HOME | End: 2020-01-22
Payer: COMMERCIAL

## 2020-01-22 DIAGNOSIS — D50.0 IRON DEFICIENCY ANEMIA DUE TO CHRONIC BLOOD LOSS: ICD-10-CM

## 2020-01-22 LAB
ALBUMIN SERPL-MCNC: 3.1 G/DL (ref 3.2–4.6)
ALBUMIN/GLOB SERPL: 0.7 {RATIO} (ref 1.2–3.5)
ALP SERPL-CCNC: 86 U/L (ref 50–136)
ALT SERPL-CCNC: 10 U/L (ref 12–65)
ANION GAP SERPL CALC-SCNC: 4 MMOL/L (ref 7–16)
AST SERPL-CCNC: 13 U/L (ref 15–37)
BASOPHILS # BLD: 0 K/UL (ref 0–0.2)
BASOPHILS NFR BLD: 1 % (ref 0–2)
BILIRUB SERPL-MCNC: 0.2 MG/DL (ref 0.2–1.1)
BUN SERPL-MCNC: 15 MG/DL (ref 8–23)
CALCIUM SERPL-MCNC: 9.3 MG/DL (ref 8.3–10.4)
CHLORIDE SERPL-SCNC: 102 MMOL/L (ref 98–107)
CO2 SERPL-SCNC: 35 MMOL/L (ref 21–32)
CREAT SERPL-MCNC: 1.56 MG/DL (ref 0.8–1.5)
DIFFERENTIAL METHOD BLD: ABNORMAL
EOSINOPHIL # BLD: 0.2 K/UL (ref 0–0.8)
EOSINOPHIL NFR BLD: 3 % (ref 0.5–7.8)
ERYTHROCYTE [DISTWIDTH] IN BLOOD BY AUTOMATED COUNT: 17 % (ref 11.9–14.6)
FERRITIN SERPL-MCNC: 396 NG/ML (ref 8–388)
GLOBULIN SER CALC-MCNC: 4.3 G/DL (ref 2.3–3.5)
GLUCOSE SERPL-MCNC: 107 MG/DL (ref 65–100)
HCT VFR BLD AUTO: 34.9 % (ref 41.1–50.3)
HGB BLD-MCNC: 10.5 G/DL (ref 13.6–17.2)
IMM GRANULOCYTES # BLD AUTO: 0 K/UL (ref 0–0.5)
IMM GRANULOCYTES NFR BLD AUTO: 0 % (ref 0–5)
IRON SATN MFR SERPL: 27 %
IRON SERPL-MCNC: 72 UG/DL (ref 35–150)
LYMPHOCYTES # BLD: 1.8 K/UL (ref 0.5–4.6)
LYMPHOCYTES NFR BLD: 29 % (ref 13–44)
MCH RBC QN AUTO: 26.8 PG (ref 26.1–32.9)
MCHC RBC AUTO-ENTMCNC: 30.1 G/DL (ref 31.4–35)
MCV RBC AUTO: 89 FL (ref 79.6–97.8)
MONOCYTES # BLD: 0.7 K/UL (ref 0.1–1.3)
MONOCYTES NFR BLD: 11 % (ref 4–12)
NEUTS SEG # BLD: 3.5 K/UL (ref 1.7–8.2)
NEUTS SEG NFR BLD: 56 % (ref 43–78)
NRBC # BLD: 0 K/UL (ref 0–0.2)
PLATELET # BLD AUTO: 209 K/UL (ref 150–450)
PMV BLD AUTO: 10 FL (ref 9.4–12.3)
POTASSIUM SERPL-SCNC: 4.1 MMOL/L (ref 3.5–5.1)
PROT SERPL-MCNC: 7.4 G/DL (ref 6.3–8.2)
RBC # BLD AUTO: 3.92 M/UL (ref 4.23–5.67)
SODIUM SERPL-SCNC: 141 MMOL/L (ref 136–145)
TIBC SERPL-MCNC: 263 UG/DL (ref 250–450)
WBC # BLD AUTO: 6.3 K/UL (ref 4.3–11.1)

## 2020-01-22 PROCEDURE — 83540 ASSAY OF IRON: CPT

## 2020-01-22 PROCEDURE — 36415 COLL VENOUS BLD VENIPUNCTURE: CPT

## 2020-01-22 PROCEDURE — 80053 COMPREHEN METABOLIC PANEL: CPT

## 2020-01-22 PROCEDURE — 85025 COMPLETE CBC W/AUTO DIFF WBC: CPT

## 2020-01-22 PROCEDURE — 82728 ASSAY OF FERRITIN: CPT

## 2020-02-24 ENCOUNTER — TELEPHONE (OUTPATIENT)
Dept: CARDIAC CATH/INVASIVE PROCEDURES | Age: 80
End: 2020-02-24

## 2020-02-24 NOTE — TELEPHONE ENCOUNTER
Dr. Cheri Banuelos spoke with patient's pulmonary doctor who stated that patient is not a good candidate for general anesthesia due to lung issues. Because of this, the patient is not a candidate for the watchman. This information was given to the patient, as well as, his , Vineet Bowling. They do not have any questions at this time.

## 2020-04-09 ENCOUNTER — HOSPITAL ENCOUNTER (EMERGENCY)
Age: 80
Discharge: HOME | End: 2020-04-09
Attending: EMERGENCY MEDICINE
Payer: MEDICARE

## 2020-04-09 ENCOUNTER — APPOINTMENT (OUTPATIENT)
Dept: GENERAL RADIOLOGY | Age: 80
End: 2020-04-09
Attending: EMERGENCY MEDICINE
Payer: MEDICARE

## 2020-04-09 VITALS
OXYGEN SATURATION: 100 % | SYSTOLIC BLOOD PRESSURE: 135 MMHG | RESPIRATION RATE: 23 BRPM | WEIGHT: 174 LBS | HEIGHT: 69 IN | BODY MASS INDEX: 25.77 KG/M2 | HEART RATE: 110 BPM | TEMPERATURE: 98.8 F | DIASTOLIC BLOOD PRESSURE: 73 MMHG

## 2020-04-09 DIAGNOSIS — J44.1 ACUTE EXACERBATION OF CHRONIC OBSTRUCTIVE PULMONARY DISEASE (COPD) (HCC): Primary | ICD-10-CM

## 2020-04-09 LAB
ALBUMIN SERPL-MCNC: 3.3 G/DL (ref 3.2–4.6)
ALBUMIN/GLOB SERPL: 0.8 {RATIO} (ref 1.2–3.5)
ALP SERPL-CCNC: 80 U/L (ref 50–136)
ALT SERPL-CCNC: 11 U/L (ref 12–65)
ANION GAP SERPL CALC-SCNC: 3 MMOL/L (ref 7–16)
AST SERPL-CCNC: 19 U/L (ref 15–37)
ATRIAL RATE: 111 BPM
BILIRUB SERPL-MCNC: 0.3 MG/DL (ref 0.2–1.1)
BUN SERPL-MCNC: 19 MG/DL (ref 8–23)
CALCIUM SERPL-MCNC: 9 MG/DL (ref 8.3–10.4)
CALCULATED P AXIS, ECG09: 86 DEGREES
CALCULATED R AXIS, ECG10: 76 DEGREES
CALCULATED T AXIS, ECG11: 88 DEGREES
CHLORIDE SERPL-SCNC: 101 MMOL/L (ref 98–107)
CO2 SERPL-SCNC: 37 MMOL/L (ref 21–32)
CREAT SERPL-MCNC: 1.73 MG/DL (ref 0.8–1.5)
DIAGNOSIS, 93000: NORMAL
ERYTHROCYTE [DISTWIDTH] IN BLOOD BY AUTOMATED COUNT: 16.2 % (ref 11.9–14.6)
GLOBULIN SER CALC-MCNC: 4.4 G/DL (ref 2.3–3.5)
GLUCOSE SERPL-MCNC: 99 MG/DL (ref 65–100)
HCT VFR BLD AUTO: 36.5 % (ref 41.1–50.3)
HGB BLD-MCNC: 11.3 G/DL (ref 13.6–17.2)
MCH RBC QN AUTO: 27.7 PG (ref 26.1–32.9)
MCHC RBC AUTO-ENTMCNC: 31 G/DL (ref 31.4–35)
MCV RBC AUTO: 89.5 FL (ref 79.6–97.8)
NRBC # BLD: 0 K/UL (ref 0–0.2)
P-R INTERVAL, ECG05: 164 MS
PLATELET # BLD AUTO: 265 K/UL (ref 150–450)
PMV BLD AUTO: 10.9 FL (ref 9.4–12.3)
POTASSIUM SERPL-SCNC: 4.1 MMOL/L (ref 3.5–5.1)
PROT SERPL-MCNC: 7.7 G/DL (ref 6.3–8.2)
Q-T INTERVAL, ECG07: 324 MS
QRS DURATION, ECG06: 76 MS
QTC CALCULATION (BEZET), ECG08: 440 MS
RBC # BLD AUTO: 4.08 M/UL (ref 4.23–5.6)
SODIUM SERPL-SCNC: 141 MMOL/L (ref 136–145)
TROPONIN I SERPL-MCNC: <0.02 NG/ML (ref 0.02–0.05)
VENTRICULAR RATE, ECG03: 111 BPM
WBC # BLD AUTO: 5.6 K/UL (ref 4.3–11.1)

## 2020-04-09 PROCEDURE — 71045 X-RAY EXAM CHEST 1 VIEW: CPT

## 2020-04-09 PROCEDURE — 74011636637 HC RX REV CODE- 636/637: Performed by: EMERGENCY MEDICINE

## 2020-04-09 PROCEDURE — 99285 EMERGENCY DEPT VISIT HI MDM: CPT

## 2020-04-09 PROCEDURE — 94644 CONT INHLJ TX 1ST HOUR: CPT

## 2020-04-09 PROCEDURE — 80053 COMPREHEN METABOLIC PANEL: CPT

## 2020-04-09 PROCEDURE — 74011000250 HC RX REV CODE- 250: Performed by: EMERGENCY MEDICINE

## 2020-04-09 PROCEDURE — 84484 ASSAY OF TROPONIN QUANT: CPT

## 2020-04-09 PROCEDURE — 85027 COMPLETE CBC AUTOMATED: CPT

## 2020-04-09 PROCEDURE — 93005 ELECTROCARDIOGRAM TRACING: CPT | Performed by: EMERGENCY MEDICINE

## 2020-04-09 RX ORDER — ALBUTEROL SULFATE 0.83 MG/ML
5 SOLUTION RESPIRATORY (INHALATION)
Status: COMPLETED | OUTPATIENT
Start: 2020-04-09 | End: 2020-04-09

## 2020-04-09 RX ORDER — PREDNISONE 20 MG/1
20 TABLET ORAL DAILY
Qty: 3 TAB | Refills: 0 | Status: SHIPPED | OUTPATIENT
Start: 2020-04-09 | End: 2020-04-12

## 2020-04-09 RX ADMIN — PREDNISONE 60 MG: 10 TABLET ORAL at 11:09

## 2020-04-09 RX ADMIN — ALBUTEROL SULFATE 5 MG: 2.5 SOLUTION RESPIRATORY (INHALATION) at 11:11

## 2020-04-09 NOTE — ED NOTES
I have reviewed discharge instructions with the patient. The patient verbalized understanding. Patient left ED via Discharge Method: EMS to Home with NARENDRA Reyes Opportunity for questions and clarification provided. Patient given 2 scripts. No esign To continue your aftercare when you leave the hospital, you may receive an automated call from our care team to check in on how you are doing. This is a free service and part of our promise to provide the best care and service to meet your aftercare needs.  If you have questions, or wish to unsubscribe from this service please call [phone]. Thank you for Choosing our [facility] Emergency Department.

## 2020-04-09 NOTE — ED TRIAGE NOTES
Ems called to home for increased heart rate and increased work of breathing. Wears 3L O2 all the time. Shob when walking. Rales in bases and slightly diminished lung sounds per EMS. 100% on 5L O2. Hx of CHF, COPD, former smoker. No pain/n/v. Has productive cough with yellow sputum production. 18g RH. bgl 147, 98.7 oral, hr 120.

## 2020-04-09 NOTE — ED PROVIDER NOTES
29-year-old gentleman presents with concerns about wheezing and shortness of breath that is gotten gradually worse over the past couple of days. He says he had no fevers or chills. He tried some treatment without success. No nausea, vomiting, or diarrhea. No other associated symptoms. Elements of this note were created using speech recognition software. As such, errors of speech recognition may be present. Past Medical History:  
Diagnosis Date  A-fib ([address] 5/2/2014  AAA (abdominal aortic aneurysm) without rupture ([address] 5/2/2014  
 3.2 on US 2/14 [facility]  Arthritis  Cancer ([address]   
 hx prostate cancer  COPD (chronic obstructive pulmonary disease) ([address] 5/2/2014  Elevated prostate specific antigen (PSA)  Glaucoma 5/2/2014  Heart disease  Heart failure ([address]  Hyperlipemia 5/2/2014  Hypertension  Hypertrophy of prostate with urinary obstruction and other lower urinary tract symptoms (LUTS)  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  OA (osteoarthritis) 5/2/2014  Peptic ulcer disease 6/1/2017  Poor historian  Prostate cancer ([address]  Pulmonary embolus ([address] 6/1/2017  Supplemental oxygen dependent 5/2/2014  Warfarin anticoagulation 5/2/2014 Past Surgical History:  
Procedure Laterality Date  COLONOSCOPY N/A 9/12/2019 COLONOSCOPY/ 26 ROOM 614 performed by Jl Perez MD at [facility] ENDOSCOPY  EGD  12/18/2019  HX HEART CATHETERIZATION    
 VASCULAR SURGERY PROCEDURE UNLIST  09/14/2019 Bilateral common femoral artery cutdown with exposure and placement of catheter in aorta for aortogram,Endovascular repair of infrarenal abdominal aortic aneurysm with bifurcated modulated device (31 x 14 x 13 main body) via the left common femoral, right iliac extender measuring 27 x 10. Family History:  
Problem Relation Age of Onset  Cancer Mother  Heart Disease Father Social History Socioeconomic History  Marital status: SINGLE Spouse name: Not on file  Number of children: Not on file  Years of education: Not on file  Highest education level: Not on file Occupational History  Not on file Social Needs  Financial resource strain: Not on file  Food insecurity Worry: Not on file Inability: Not on file  Transportation needs Medical: Not on file Non-medical: Not on file Tobacco Use  Smoking status: Former Smoker Packs/day: 2.00 Years: 40.00 Pack years: 80.00 Last attempt to quit: 2014 Years since quittin.7  Smokeless tobacco: Never Used  Tobacco comment: still taking Chantix Substance and Sexual Activity  Alcohol use: No  
 Drug use: No  
 Sexual activity: Yes  
  Partners: Female Birth control/protection: None Lifestyle  Physical activity Days per week: Not on file Minutes per session: Not on file  Stress: Not on file Relationships  Social connections Talks on phone: Not on file Gets together: Not on file Attends Quaker service: Not on file Active member of club or organization: Not on file Attends meetings of clubs or organizations: Not on file Relationship status: Not on file  Intimate partner violence Fear of current or ex partner: Not on file Emotionally abused: Not on file Physically abused: Not on file Forced sexual activity: Not on file Other Topics Concern  Not on file Social History Narrative  Not on file ALLERGIES: Statins-hmg-coa reductase inhibitors Review of Systems Constitutional: Negative for chills and fever. HENT: Negative for congestion, rhinorrhea and sinus pain. Respiratory: Positive for chest tightness, shortness of breath and wheezing. Cardiovascular: Positive for chest pain. Gastrointestinal: Negative for nausea and vomiting. Endocrine: Negative for cold intolerance and heat intolerance. Neurological: Negative for dizziness, syncope and light-headedness. Vitals:  
 04/09/20 1112 04/09/20 1221 04/09/20 1241 04/09/20 1256 BP:  152/68 135/73 Pulse:  (!) 108 (!) 105 Resp:      
Temp:      
SpO2: 100% 100% 100% 100% Weight:      
Height:      
      
 
Physical Exam 
Vitals signs and nursing note reviewed. Constitutional:   
   Appearance: He is well-developed. HENT:  
   Head: Normocephalic and atraumatic. Eyes:  
   Conjunctiva/sclera: Conjunctivae normal.  
   Pupils: Pupils are equal, round, and reactive to light. Neck: Musculoskeletal: Normal range of motion and neck supple. Cardiovascular:  
   Rate and Rhythm: Normal rate and regular rhythm. Heart sounds: Normal heart sounds. Pulmonary:  
   Effort: Pulmonary effort is normal. No respiratory distress. Breath sounds: Wheezing present. Chest:  
   Chest wall: No tenderness. Abdominal:  
   General: Bowel sounds are normal.  
   Palpations: Abdomen is soft. Musculoskeletal: Normal range of motion. Skin: 
   General: Skin is warm and dry. Neurological:  
   Mental Status: He is alert and oriented to person, place, and time. MDM Number of Diagnoses or Management Options Acute exacerbation of chronic obstructive pulmonary disease (COPD) [facility]):  
 
ED Course as of Apr 09 1415 Thu Apr 09, 2020  
1237 Patient said he is feeling much better. His oxygen sats have remained stable on his normal home oxygen. I will discharge him home.  
 [AC] ED Course User Index [AC] Jemima Triplett MD  
 
 
Procedures

## 2020-04-10 ENCOUNTER — PATIENT OUTREACH (OUTPATIENT)
Dept: CASE MANAGEMENT | Age: 80
End: 2020-04-10

## 2020-04-10 NOTE — PROGRESS NOTES
Patient contacted regarding recent discharge and COVID-19 risk Care and patient . Transition Nurse/ Ambulatory Care Manager contacted the caregiver Brissa Lopez with At [facility] y telephone to perform post discharge assessment. Verified name and  with patient as identifiers. Patient has following risk factors of: CVD,Afib,CKD,COPD,CHF,Diabetes Mariel Leonor CTN/ACM reviewed discharge instructions, medical action plan and red flags related to discharge diagnosis. Reviewed and educated them on any new and changed medications related to discharge diagnosis. Conversation with patient and caregiver Mariel Madera Patient states he continues to have some SOB. No more than normal no cough,myalgia and denies fever. Lee caregiver with At [facility] is in the home 4 hours per day. He was not aware that Mr. Daja Esqueda was seen in the ED yesterday and states he will check on him today to make sure he does not need anything since his discharge. Advised obtaining a 90-day supply of all daily and as-needed medications. Education provided regarding infection prevention, and signs and symptoms of COVID-19 and when to seek medical attention with caregiver who verbalized understanding. Discussed exposure protocols and quarantine from [address] you at higher risk for severe illness 2019 and given an opportunity for questions and concerns. The caregiver agrees to contact the COVID-19 hotline [phone] or PCP office for questions related to their healthcare. CTN/ACM provided contact information for future reference. From CDC: Are you at higher risk for severe illness?  Wash your hands often.  Avoid close contact (6 feet, which is about two arm lengths) with people who are sick.  Put distance between yourself and other people if COVID-19 is spreading in your community.  Clean and disinfect frequently touched surfaces.  Avoid all cruise travel and non-essential air travel.  Call your healthcare professional if you have concerns about COVID-19 and your underlying condition or if you are sick. For more information on steps you can take to protect yourself, see CDC's How to Protect Yourself Patient/family/caregiver given information for [employer] and agrees to enroll no Patient's preferred e-mail:  No 
Patient's preferred phone number: Patient's  number [phone] and Caregiver's number [phone] Based on Loop alert triggers, patient will be contacted by nurse care manager for worsening symptoms. Plan for follow-up call in 5-7 days based on severity of symptoms and risk factors

## 2020-04-15 ENCOUNTER — PATIENT OUTREACH (OUTPATIENT)
Dept: CASE MANAGEMENT | Age: 80
End: 2020-04-15

## 2020-04-15 NOTE — PROGRESS NOTES
COVID-19 Screening Follow-up Note Patient contacted regarding COVID-19 risk and screening. Care Transition Nurse/ Ambulatory Care Manager contacted the patient by telephone to perform follow-up assessment. Verified name and  with patient as identifiers. Patient has following risk factors of: CVD,Afib,CKD,COPD,CHF,Type 2 Diabetes Symptoms reviewed with patient who verbalized the following symptoms: Patient states he conitinues to have SOB  This has improved with current dose of Prednisone. Denies fever,cough,myalgia. Virtual visit with PCP Dr. Kristie Ralph 2020 with next virtual visit scheduled for 2020. Continues with eliazar Yanes with At [facility] 4 hours daily. Due to no new or worsening symptoms encounter was not routed to provider for escalation. Education provided regarding infection prevention, and signs and symptoms of COVID-19 and when to seek medical attention with patient who verbalized understanding. Discussed exposure protocols and quarantine from [address] you at higher risk for severe illness  and given an opportunity for questions and concerns. The patient agrees to contact the COVID-19 hotline [phone] or PCP office for questions related to their healthcare. CTN/ACM provided contact information for future reference. From CDC: Are you at higher risk for severe illness?  Wash your hands often.  Avoid close contact (6 feet, which is about two arm lengths) with people who are sick.  Put distance between yourself and other people if COVID-19 is spreading in your community.  Clean and disinfect frequently touched surfaces.  Avoid all cruise travel and non-essential air travel.  Call your healthcare professional if you have concerns about COVID-19 and your underlying condition or if you are sick. For more information on steps you can take to protect yourself, see CDC's How to Protect Yourself Plan for follow-up call in 5-7 days based on severity of symptoms and risk factors

## 2020-04-24 ENCOUNTER — PATIENT OUTREACH (OUTPATIENT)
Dept: CASE MANAGEMENT | Age: 80
End: 2020-04-24

## 2020-04-24 NOTE — PROGRESS NOTES
Patient resolved from Transition of Care episode on 4/24/2020  Patient/family has been provided the following resources and education related to COVID-19:                         Signs, symptoms and red flags related to COVID-19            CDC exposure and quarantine guidelines            [employer] exposure contact - [phone]            Contact for their local Department of Health                 Patient currently reports that the following symptoms have improved:  Patient denies fever,cough,SOB,myalgia. .    No further outreach scheduled with this CTN/ACM. Episode of Care resolved. Patient has this CTN/ACM contact information if future needs arise.

## 2020-06-09 ENCOUNTER — HOSPITAL ENCOUNTER (OUTPATIENT)
Dept: LAB | Age: 80
Discharge: HOME | End: 2020-06-09
Payer: MEDICARE

## 2020-06-09 DIAGNOSIS — D50.8 OTHER IRON DEFICIENCY ANEMIA: ICD-10-CM

## 2020-06-09 LAB
ALBUMIN SERPL-MCNC: 3.3 G/DL (ref 3.2–4.6)
ALBUMIN/GLOB SERPL: 0.8 {RATIO} (ref 1.2–3.5)
ALP SERPL-CCNC: 100 U/L (ref 50–136)
ALT SERPL-CCNC: 14 U/L (ref 12–65)
ANION GAP SERPL CALC-SCNC: 3 MMOL/L (ref 7–16)
AST SERPL-CCNC: 17 U/L (ref 15–37)
BASOPHILS # BLD: 0 K/UL (ref 0–0.2)
BASOPHILS NFR BLD: 1 % (ref 0–2)
BILIRUB SERPL-MCNC: 0.3 MG/DL (ref 0.2–1.1)
BUN SERPL-MCNC: 16 MG/DL (ref 8–23)
CALCIUM SERPL-MCNC: 8.9 MG/DL (ref 8.3–10.4)
CHLORIDE SERPL-SCNC: 106 MMOL/L (ref 98–107)
CO2 SERPL-SCNC: 31 MMOL/L (ref 21–32)
CREAT SERPL-MCNC: 1.6 MG/DL (ref 0.8–1.5)
DIFFERENTIAL METHOD BLD: ABNORMAL
EOSINOPHIL # BLD: 0.2 K/UL (ref 0–0.8)
EOSINOPHIL NFR BLD: 3 % (ref 0.5–7.8)
ERYTHROCYTE [DISTWIDTH] IN BLOOD BY AUTOMATED COUNT: 16.1 % (ref 11.9–14.6)
FERRITIN SERPL-MCNC: 191 NG/ML (ref 8–388)
GLOBULIN SER CALC-MCNC: 4.1 G/DL (ref 2.3–3.5)
GLUCOSE SERPL-MCNC: 86 MG/DL (ref 65–100)
HCT VFR BLD AUTO: 34.1 % (ref 41.1–50.3)
HGB BLD-MCNC: 10.5 G/DL (ref 13.6–17.2)
IMM GRANULOCYTES # BLD AUTO: 0 K/UL (ref 0–0.5)
IMM GRANULOCYTES NFR BLD AUTO: 0 % (ref 0–5)
IRON SATN MFR SERPL: 21 %
IRON SERPL-MCNC: 60 UG/DL (ref 35–150)
LYMPHOCYTES # BLD: 1.7 K/UL (ref 0.5–4.6)
LYMPHOCYTES NFR BLD: 29 % (ref 13–44)
MCH RBC QN AUTO: 27.6 PG (ref 26.1–32.9)
MCHC RBC AUTO-ENTMCNC: 30.8 G/DL (ref 31.4–35)
MCV RBC AUTO: 89.7 FL (ref 79.6–97.8)
MONOCYTES # BLD: 0.6 K/UL (ref 0.1–1.3)
MONOCYTES NFR BLD: 10 % (ref 4–12)
NEUTS SEG # BLD: 3.2 K/UL (ref 1.7–8.2)
NEUTS SEG NFR BLD: 57 % (ref 43–78)
NRBC # BLD: 0 K/UL (ref 0–0.2)
PLATELET # BLD AUTO: 223 K/UL (ref 150–450)
PMV BLD AUTO: 9.5 FL (ref 9.4–12.3)
POTASSIUM SERPL-SCNC: 4.6 MMOL/L (ref 3.5–5.1)
PROT SERPL-MCNC: 7.4 G/DL (ref 6.3–8.2)
RBC # BLD AUTO: 3.8 M/UL (ref 4.23–5.67)
SODIUM SERPL-SCNC: 140 MMOL/L (ref 136–145)
TIBC SERPL-MCNC: 289 UG/DL (ref 250–450)
WBC # BLD AUTO: 5.7 K/UL (ref 4.3–11.1)

## 2020-06-09 PROCEDURE — 83540 ASSAY OF IRON: CPT

## 2020-06-09 PROCEDURE — 82728 ASSAY OF FERRITIN: CPT

## 2020-06-09 PROCEDURE — 80053 COMPREHEN METABOLIC PANEL: CPT

## 2020-06-09 PROCEDURE — 36415 COLL VENOUS BLD VENIPUNCTURE: CPT

## 2020-06-09 PROCEDURE — 85025 COMPLETE CBC W/AUTO DIFF WBC: CPT

## 2020-06-09 PROCEDURE — 84238 ASSAY NONENDOCRINE RECEPTOR: CPT

## 2020-06-10 LAB — TRANSFERRIN SERPL-SCNC: 29.7 NMOL/L (ref 12.2–27.3)

## 2020-06-29 ENCOUNTER — APPOINTMENT (OUTPATIENT)
Dept: GENERAL RADIOLOGY | Age: 80
End: 2020-06-29
Payer: MEDICARE

## 2020-06-29 ENCOUNTER — HOSPITAL ENCOUNTER (EMERGENCY)
Age: 80
Discharge: HOME | End: 2020-06-29
Payer: MEDICARE

## 2020-06-29 VITALS
HEART RATE: 116 BPM | OXYGEN SATURATION: 100 % | RESPIRATION RATE: 18 BRPM | TEMPERATURE: 99 F | DIASTOLIC BLOOD PRESSURE: 74 MMHG | SYSTOLIC BLOOD PRESSURE: 157 MMHG | HEIGHT: 69 IN | BODY MASS INDEX: 26.36 KG/M2 | WEIGHT: 178 LBS

## 2020-06-29 DIAGNOSIS — H10.32 ACUTE CONJUNCTIVITIS OF LEFT EYE, UNSPECIFIED ACUTE CONJUNCTIVITIS TYPE: ICD-10-CM

## 2020-06-29 DIAGNOSIS — J44.1 ACUTE EXACERBATION OF CHRONIC OBSTRUCTIVE PULMONARY DISEASE (COPD) (HCC): Primary | ICD-10-CM

## 2020-06-29 LAB
ALBUMIN SERPL-MCNC: 3.2 G/DL (ref 3.2–4.6)
ALBUMIN/GLOB SERPL: 0.7 {RATIO} (ref 1.2–3.5)
ALP SERPL-CCNC: 100 U/L (ref 50–136)
ALT SERPL-CCNC: 14 U/L (ref 12–65)
ANION GAP SERPL CALC-SCNC: 4 MMOL/L (ref 7–16)
AST SERPL-CCNC: 19 U/L (ref 15–37)
ATRIAL RATE: 112 BPM
BASOPHILS # BLD: 0.1 K/UL (ref 0–0.2)
BASOPHILS NFR BLD: 1 % (ref 0–2)
BILIRUB SERPL-MCNC: 0.3 MG/DL (ref 0.2–1.1)
BNP SERPL-MCNC: 452 PG/ML
BUN SERPL-MCNC: 15 MG/DL (ref 8–23)
CALCIUM SERPL-MCNC: 8.9 MG/DL (ref 8.3–10.4)
CALCULATED P AXIS, ECG09: 82 DEGREES
CALCULATED R AXIS, ECG10: 73 DEGREES
CALCULATED T AXIS, ECG11: 89 DEGREES
CHLORIDE SERPL-SCNC: 104 MMOL/L (ref 98–107)
CO2 SERPL-SCNC: 32 MMOL/L (ref 21–32)
CREAT SERPL-MCNC: 1.42 MG/DL (ref 0.8–1.5)
DIAGNOSIS, 93000: NORMAL
DIFFERENTIAL METHOD BLD: ABNORMAL
EOSINOPHIL # BLD: 0.2 K/UL (ref 0–0.8)
EOSINOPHIL NFR BLD: 3 % (ref 0.5–7.8)
ERYTHROCYTE [DISTWIDTH] IN BLOOD BY AUTOMATED COUNT: 16.1 % (ref 11.9–14.6)
GLOBULIN SER CALC-MCNC: 4.3 G/DL (ref 2.3–3.5)
GLUCOSE SERPL-MCNC: 125 MG/DL (ref 65–100)
HCT VFR BLD AUTO: 36.4 % (ref 41.1–50.3)
HGB BLD-MCNC: 10.4 G/DL (ref 13.6–17.2)
IMM GRANULOCYTES # BLD AUTO: 0 K/UL (ref 0–0.5)
IMM GRANULOCYTES NFR BLD AUTO: 0 % (ref 0–5)
LACTATE SERPL-SCNC: 1.1 MMOL/L (ref 0.4–2)
LYMPHOCYTES # BLD: 4.2 K/UL (ref 0.5–4.6)
LYMPHOCYTES NFR BLD: 53 % (ref 13–44)
MCH RBC QN AUTO: 26 PG (ref 26.1–32.9)
MCHC RBC AUTO-ENTMCNC: 28.6 G/DL (ref 31.4–35)
MCV RBC AUTO: 91 FL (ref 79.6–97.8)
MONOCYTES # BLD: 0.7 K/UL (ref 0.1–1.3)
MONOCYTES NFR BLD: 9 % (ref 4–12)
NEUTS SEG # BLD: 2.7 K/UL (ref 1.7–8.2)
NEUTS SEG NFR BLD: 35 % (ref 43–78)
NRBC # BLD: 0 K/UL (ref 0–0.2)
P-R INTERVAL, ECG05: 158 MS
PLATELET # BLD AUTO: 318 K/UL (ref 150–450)
PMV BLD AUTO: 10.5 FL (ref 9.4–12.3)
POTASSIUM SERPL-SCNC: 4.2 MMOL/L (ref 3.5–5.1)
PROT SERPL-MCNC: 7.5 G/DL (ref 6.3–8.2)
Q-T INTERVAL, ECG07: 298 MS
QRS DURATION, ECG06: 80 MS
QTC CALCULATION (BEZET), ECG08: 406 MS
RBC # BLD AUTO: 4 M/UL (ref 4.23–5.6)
SODIUM SERPL-SCNC: 140 MMOL/L (ref 136–145)
TROPONIN-HIGH SENSITIVITY: 23.9 PG/ML (ref 0–14)
VENTRICULAR RATE, ECG03: 112 BPM
WBC # BLD AUTO: 7.9 K/UL (ref 4.3–11.1)

## 2020-06-29 PROCEDURE — 94640 AIRWAY INHALATION TREATMENT: CPT

## 2020-06-29 PROCEDURE — 83605 ASSAY OF LACTIC ACID: CPT

## 2020-06-29 PROCEDURE — 85025 COMPLETE CBC W/AUTO DIFF WBC: CPT

## 2020-06-29 PROCEDURE — 80053 COMPREHEN METABOLIC PANEL: CPT

## 2020-06-29 PROCEDURE — 74011000250 HC RX REV CODE- 250

## 2020-06-29 PROCEDURE — 71045 X-RAY EXAM CHEST 1 VIEW: CPT

## 2020-06-29 PROCEDURE — 93005 ELECTROCARDIOGRAM TRACING: CPT

## 2020-06-29 PROCEDURE — 83880 ASSAY OF NATRIURETIC PEPTIDE: CPT

## 2020-06-29 PROCEDURE — 84484 ASSAY OF TROPONIN QUANT: CPT

## 2020-06-29 PROCEDURE — 99285 EMERGENCY DEPT VISIT HI MDM: CPT

## 2020-06-29 PROCEDURE — 74011250636 HC RX REV CODE- 250/636

## 2020-06-29 PROCEDURE — 96374 THER/PROPH/DIAG INJ IV PUSH: CPT

## 2020-06-29 RX ORDER — IPRATROPIUM BROMIDE AND ALBUTEROL SULFATE 2.5; .5 MG/3ML; MG/3ML
3 SOLUTION RESPIRATORY (INHALATION)
Status: COMPLETED | OUTPATIENT
Start: 2020-06-29 | End: 2020-06-29

## 2020-06-29 RX ORDER — POLYMYXIN B SULFATE AND TRIMETHOPRIM 1; 10000 MG/ML; [USP'U]/ML
1 SOLUTION OPHTHALMIC EVERY 4 HOURS
Qty: 10 ML | Refills: 0 | Status: ON HOLD | OUTPATIENT
Start: 2020-06-29 | End: 2021-01-01

## 2020-06-29 RX ORDER — PREDNISONE 10 MG/1
TABLET ORAL
Qty: 21 TAB | Refills: 0 | Status: SHIPPED | OUTPATIENT
Start: 2020-06-29 | End: 2020-01-01

## 2020-06-29 RX ADMIN — IPRATROPIUM BROMIDE AND ALBUTEROL SULFATE 3 ML: .5; 3 SOLUTION RESPIRATORY (INHALATION) at 06:51

## 2020-06-29 RX ADMIN — METHYLPREDNISOLONE SODIUM SUCCINATE 125 MG: 125 INJECTION, POWDER, FOR SOLUTION INTRAMUSCULAR; INTRAVENOUS at 06:33

## 2020-06-29 NOTE — DISCHARGE INSTRUCTIONS
Patient Education        Learning About COPD and Clearing Your Lungs  How does clearing your lungs affect COPD? When you have COPD, you may have too much mucus in your lungs. Learning to clear your lungs may help you save energy and improves your breathing. It may also help prevent lung infections. There are three ways to clear your lungs:  · Controlled coughing  · Postural drainage  · Chest percussion  How do you do controlled coughing? Coughing is how your body tries to get rid of mucus. But the kind of coughing you cannot control makes things worse. It causes your airways to close. It also traps the mucus in your lungs. Controlled coughing comes from deep in your lungs. It loosens mucus and moves it though your airways. It is best to do it after you use your inhaler or other medicine. 1. Sit on the edge of a chair. Keep both feet on the floor. 2. Lean forward a little. Relax. 3. Breathe in slowly through your nose. Fold your arms over your belly. 4. Lean forward as you breathe out. Push your arms against your belly. 5. Cough 2 or 3 times as you exhale with your mouth slightly open. Make the coughs short and sharp. Push on your belly with your arms as you cough. The first cough brings the mucus through the lung airways. The next coughs bring it up and out. 6. Inhale again, but do it slowly and gently through your nose. Do not take quick or deep breaths through your mouth. It can block the mucus coming out of the lungs. It also can cause uncontrolled coughing. 7. Rest, and repeat if you need to. How do you do postural drainage? Postural drainage means lying down in different positions to help drain mucus from your lungs. Hold each position for 5 minutes. Do it about 30 minutes after you use your inhaler. Make sure you have an empty stomach. If you need to cough, sit up and do controlled coughing. 1. To drain the front of your lungs: Make sure your chest is lower than your hips.  Put two pillows under your hips. Use a small pillow under your head. Keep your arms at your sides. Then follow these instructions for breathing:  ? Breathe in: With one hand on your belly and the other on your chest, breathe in. Push your belly out as far as possible. You should be able to feel the hand on your belly move out, while the hand on your chest should not move. ? Breathe out: When you breathe out, you should be able to feel the hand on your belly move in. This is called diaphragmatic breathing. You will use it in the other drainage positions too. 2. To drain the sides of your lungs: Place two or three pillows under your hips. Use a small pillow under your head. Make sure your chest is lower than your hips. Use diaphragmatic breathing. After 5 or 10 minutes, switch sides. 3. To drain the back of your lungs: Place two or three pillows under your hips. Use a small pillow under your head. Kneel over the pillows. Place your arms by your head. Use diaphragmatic breathing. How do you do chest percussion? Chest percussion means that you lightly tap your chest and back. The tapping loosens the mucus in your lungs. · Cup your hand, and lightly tap your chest and back. · Ask your doctor where the best spots are to tap. Avoid your spine and breastbone. · It may be easier to have someone do the tapping for you. Follow-up care is a key part of your treatment and safety. Be sure to make and go to all appointments, and call your doctor if you are having problems. It's also a good idea to know your test results and keep a list of the medicines you take. Where can you learn more? Go to http://airam-shanelle.info/  Enter Q948 in the search box to learn more about \"Learning About COPD and Clearing Your Lungs. \"  Current as of: February 24, 2020               Content Version: 12.5  © 5802-8297 Healthwise, Incorporated.    Care instructions adapted under license by [employer] (which disclaims liability or warranty for this information). If you have questions about a medical condition or this instruction, always ask your healthcare professional. [place] [address] any warranty or liability for your use of this information.

## 2020-06-29 NOTE — ED PROVIDER NOTES
79-year-old male complaining of shortness of breath. Patient has a long history of COPD has supplemental oxygen at home also has nebulizers at home states is been using them through the night without relief. He denies fevers chills denies cough or sputum production. The history is provided by the patient. Past Medical History:  
Diagnosis Date  A-fib ([address] 5/2/2014  AAA (abdominal aortic aneurysm) without rupture ([address] 5/2/2014  
 3.2 on US 2/14 [facility]  Arthritis  Cancer ([address]   
 hx prostate cancer  COPD (chronic obstructive pulmonary disease) ([address] 5/2/2014  Elevated prostate specific antigen (PSA)  Glaucoma 5/2/2014  Heart disease  Heart failure ([address]  Hyperlipemia 5/2/2014  Hypertension  Hypertrophy of prostate with urinary obstruction and other lower urinary tract symptoms (LUTS)  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  OA (osteoarthritis) 5/2/2014  Peptic ulcer disease 6/1/2017  Poor historian  Prostate cancer ([address]  Pulmonary embolus ([address] 6/1/2017  Supplemental oxygen dependent 5/2/2014  Warfarin anticoagulation 5/2/2014 Past Surgical History:  
Procedure Laterality Date  COLONOSCOPY N/A 9/12/2019 COLONOSCOPY/ 26 ROOM 614 performed by Nelly Diaz MD at [facility] ENDOSCOPY  EGD  12/18/2019  HX HEART CATHETERIZATION    
 VASCULAR SURGERY PROCEDURE UNLIST  09/14/2019 Bilateral common femoral artery cutdown with exposure and placement of catheter in aorta for aortogram,Endovascular repair of infrarenal abdominal aortic aneurysm with bifurcated modulated device (31 x 14 x 13 main body) via the left common femoral, right iliac extender measuring 27 x 10. Family History:  
Problem Relation Age of Onset  Cancer Mother  Heart Disease Father Social History Socioeconomic History  Marital status: SINGLE Spouse name: Not on file  Number of children: Not on file  Years of education: Not on file  Highest education level: Not on file Occupational History  Not on file Social Needs  Financial resource strain: Not on file  Food insecurity Worry: Not on file Inability: Not on file  Transportation needs Medical: Not on file Non-medical: Not on file Tobacco Use  Smoking status: Former Smoker Packs/day: 2.00 Years: 40.00 Pack years: 80.00 Last attempt to quit: 2014 Years since quittin.9  Smokeless tobacco: Never Used  Tobacco comment: still taking Chantix Substance and Sexual Activity  Alcohol use: No  
 Drug use: No  
 Sexual activity: Yes  
  Partners: Female Birth control/protection: None Lifestyle  Physical activity Days per week: Not on file Minutes per session: Not on file  Stress: Not on file Relationships  Social connections Talks on phone: Not on file Gets together: Not on file Attends Congregational service: Not on file Active member of club or organization: Not on file Attends meetings of clubs or organizations: Not on file Relationship status: Not on file  Intimate partner violence Fear of current or ex partner: Not on file Emotionally abused: Not on file Physically abused: Not on file Forced sexual activity: Not on file Other Topics Concern  Not on file Social History Narrative  Not on file ALLERGIES: Statins-hmg-coa reductase inhibitors Review of Systems Constitutional: Negative. Negative for activity change. HENT: Negative. Eyes: Negative. Respiratory: Negative. Cardiovascular: Negative. Gastrointestinal: Negative. Genitourinary: Negative. Musculoskeletal: Negative. Skin: Negative. Neurological: Negative. Psychiatric/Behavioral: Negative. All other systems reviewed and are negative. Vitals:  
 20 6491 20 0559 20 2900 BP: 139/76  156/71 Pulse: (!) 117 (!) 115 (!) 116 Resp: 23 18 18 Temp: 99 °F (37.2 °C) SpO2: 98% 99% 99% Weight: 80.7 kg (178 lb) Height: 5' 9\" (1.753 m) Physical Exam 
Vitals signs and nursing note reviewed. Constitutional:   
   General: He is not in acute distress. Appearance: He is well-developed. He is not diaphoretic. HENT:  
   Head: Normocephalic and atraumatic. Right Ear: External ear normal.  
   Left Ear: External ear normal.  
   Nose: Nose normal.  
   Mouth/Throat:  
   Pharynx: No oropharyngeal exudate. Eyes:  
   General: No scleral icterus. Right eye: No discharge. Left eye: Discharge present. Conjunctiva/sclera: Conjunctivae normal.  
   Pupils: Pupils are equal, round, and reactive to light. Neck: Musculoskeletal: Normal range of motion and neck supple. Vascular: No JVD. Trachea: No tracheal deviation. Cardiovascular:  
   Rate and Rhythm: Normal rate and regular rhythm. Pulmonary:  
   Effort: Respiratory distress present. Breath sounds: No stridor. Examination of the right-middle field reveals wheezing. Examination of the right-lower field reveals decreased breath sounds. Examination of the left-lower field reveals decreased breath sounds. Decreased breath sounds and wheezing present. Chest:  
   Chest wall: No tenderness. Abdominal:  
   General: Bowel sounds are normal. There is no distension. Palpations: Abdomen is soft. There is no mass. Tenderness: There is no abdominal tenderness. Musculoskeletal: Normal range of motion. General: No tenderness. Skin: 
   General: Skin is warm and dry. Coloration: Skin is not pale. Findings: No erythema or rash. Neurological:  
   Mental Status: He is alert and oriented to person, place, and time. Cranial Nerves: No cranial nerve deficit. Psychiatric:     
   Behavior: Behavior normal.     
   Thought Content:  Thought content normal.  
 
  
 
 MDM 
Number of Diagnoses or Management Options Diagnosis management comments: Mild respiratory distress however improved markedly in the ED. Patient reevaluation was not having any respiratory distress no chest pain. Patient states \"that last one did good and I am ready to go \" Procedures

## 2020-06-29 NOTE — ED NOTES
I have reviewed discharge instructions with the patient. The patient verbalized understanding. Patient left ED via Discharge Method: ambulatory to Home with self. Opportunity for questions and clarification provided. Patient given 2 scripts. To continue your aftercare when you leave the hospital, you may receive an automated call from our care team to check in on how you are doing. This is a free service and part of our promise to provide the best care and service to meet your aftercare needs.  If you have questions, or wish to unsubscribe from this service please call [phone]. Thank you for Choosing our [facility] Emergency Department.

## 2020-06-29 NOTE — ED TRIAGE NOTES
Patient arrived via Medic 31 with mask in place. Per EMS patient has been short of breath x1 hour. Patient administered home breathing treatment with no relief. Initial oxygen saturation on EMS arrival was 96% RA. Patient received 0.25mg terbutaline SQ en route. . Patient states that he wears 4L of oxygen at all times.

## 2020-06-30 ENCOUNTER — PATIENT OUTREACH (OUTPATIENT)
Dept: CASE MANAGEMENT | Age: 80
End: 2020-06-30

## 2020-06-30 NOTE — PROGRESS NOTES
Initial call in follow up to ED visit 6/29 Referral via [facility] ED Response Call List 
Presented to ED with shortness of breath History of COPD on chronic oxygen 4L continues No cough, no fever Call patient - he texted back he was unable to talk right now. Left message requesting return call. Plan to make another call tomorrow

## 2020-07-01 ENCOUNTER — PATIENT OUTREACH (OUTPATIENT)
Dept: CASE MANAGEMENT | Age: 80
End: 2020-07-01

## 2020-08-27 NOTE — ED NOTES
Pt was wearing a mask when brought in by EMS Quality 110: Preventive Care And Screening: Influenza Immunization: Influenza Immunization Administered during Influenza season Quality 226: Preventive Care And Screening: Tobacco Use: Screening And Cessation Intervention: Patient screened for tobacco use and is an ex/non-smoker Detail Level: Detailed Quality 130: Documentation Of Current Medications In The Medical Record: Current Medications Documented

## 2020-09-24 PROBLEM — I26.99 PULMONARY EMBOLUS (HCC): Status: RESOLVED | Noted: 2017-06-01 | Resolved: 2020-01-01

## 2020-09-24 PROBLEM — I50.9 CHF EXACERBATION (HCC): Status: RESOLVED | Noted: 2018-07-23 | Resolved: 2020-01-01

## 2020-09-24 PROBLEM — J44.1 COPD EXACERBATION (HCC): Status: RESOLVED | Noted: 2018-07-23 | Resolved: 2020-01-01

## 2020-12-21 PROBLEM — Z87.19 HISTORY OF GI BLEED: Status: ACTIVE | Noted: 2020-01-01

## 2020-12-21 PROBLEM — I48.91 ATRIAL FIBRILLATION WITH RAPID VENTRICULAR RESPONSE (HCC): Status: ACTIVE | Noted: 2020-01-01

## 2020-12-21 PROBLEM — D50.9 IRON DEFICIENCY ANEMIA: Status: ACTIVE | Noted: 2020-01-01

## 2020-12-21 PROBLEM — I50.22 CHRONIC SYSTOLIC HEART FAILURE (HCC): Status: ACTIVE | Noted: 2020-01-01

## 2020-12-21 NOTE — ED TRIAGE NOTES
Pt arrives via ems from home. Called out for \"butterflies\" in chest with dizziness and weakness. HR 160s-180s with ems. pt received 20mg Caredizem and 500cc ns with ems. Pt reported he felt better to ems after receiving cardizem. Pt reports hx afib and copd. Normally wears 3lpm nc. reports sob, denies any pain.

## 2020-12-21 NOTE — ED NOTES
I have reviewed discharge instructions with the patient. The patient verbalized understanding. Patient left ED via Discharge Method: ambulatory to Home with caregiver Opportunity for questions and clarification provided. Patient given 1 scripts. To continue your aftercare when you leave the hospital, you may receive an automated call from our care team to check in on how you are doing. This is a free service and part of our promise to provide the best care and service to meet your aftercare needs.  If you have questions, or wish to unsubscribe from this service please call [phone]. Thank you for Choosing our [address] [place] [address] Emergency Department.

## 2020-12-21 NOTE — CONSULTS
[address] [place] [address] 
CONSULTATION Name:  London Rodriguez 
MR#:  [id number] :  1940 ACCOUNT #:  [de-identified] DATE OF SERVICE:  2020 HISTORY OF PRESENT ILLNESS:  This is a 72-year-old gentleman who we were asked to see in the emergency room for fast atrial fibrillation. He was in his usual state of health until yesterday when he started experiencing dizziness. He called EMS. He was found to be in rapid AFib. He was brought to the emergency room. He was given IV diltiazem. He has been here overnight doing well without any recurrent symptoms. His fast AFib is now down into a normal range. He has a past history of AFib. He is not on any oral anticoagulation due to recurrent anemia. He has been turned down for a Watchman device due to COPD. He has severe COPD in addition to his AFib. FAMILY HISTORY:  Noncontributory. SOCIAL HISTORY:  Outlined in previous record. MEDICATIONS:  His most recent list of medications in 2020 included: 1. Pepcid. 2.  Toprol. 3.  Amlodipine. 4.  Zyloprim. 5.  Crestor. 6.  Flomax. 7.  Nitroglycerin. 8.  MiraLax. 9.  Lasix. 10.  Bactrim. 11.  Iron tablets. 12.  Albuterol. 13.  Vitamin D3. 
14.  Pulmicort. 15.  DuoNeb. 16.  Zestril. 17.  Alphagan. ALLERGIES:  STATINS. REVIEW OF SYSTEMS:  Otherwise noncontributory. No fever, chills, headache, diplopia, tinnitus, epistaxis, dysphagia, abdominal pain, melena, flank pain, dysuria, rash, adenopathy, or signs or symptoms of stroke. PHYSICAL EXAMINATION: 
VITAL SIGNS:  His heart rate is 90, his rhythm is AFib. GENERAL:  He is a well-developed, well-nourished, chronically ill gentleman in no acute distress. HEENT:  Grossly negative. No jaundice or drainage. NECK:  Neck veins are flat. LUNGS:  Clear. HEART:  Irregular rhythm. ABDOMEN:  Soft. Nontender. No masses. EXTREMITIES:  Pulses are intact. There is no edema. LABORATORY DATA:  His hemoglobin is 8.7, platelets 867. BUN is 21, creatinine 1.68. ProBNP 849. Troponin 22.4. IMPRESSION:  Dizziness which I suspect is related to poorly controlled atrial fibrillation. PLAN:  He has to stop his amlodipine and start Cardizem . Early followup will be established. Su Nolan MD 
 
 
[id number] 
D:  12/21/2020 12:47 
T:  12/21/2020 14:38 JOB #:  [id number]

## 2020-12-21 NOTE — CONSULTS
[facility] Cardiology Consult Date of  Admission: 12/21/2020  7:25 AM  
 
Primary Care Physician: Mylene Xavier MD 
Primary Cardiologist: Dr Charlee Parsons Referring Physician: Dr Fatmata Walters Consulting Physician: Dr Charlee Parsons 
 
CC/Reason for consult: a fib Padmini Officer. is a 68 y.o. male seen in the er w a fib. He has a h/o COPD on 3L O2 by NC, h/o MAC infection, R lung mass being monitored, h/o GI bleed Oct and Dec 2020 due to AVMs, chronic AYAN on IV iron, prostate cancer and sHF w echo 2-2019 w EF 45-50% w mild MR. LHC in 2006 w mild CAD. He has a h/o PAF on ASA due to recurrent bleeding, he had been evaluated for Watchman but has not pursued due to the risk of general anesthesia w underlying pulmonary disease. He woke last night w dizziness and felt like he may pass out. He did not lose consciousness, had no CP, palpitations, irregular HR. His SOB is unchanged. No increased LE edema. No h/o PE/DVT. Bc he thought he may pass out he called EMS who noted pt to be in a fib w rate 160-180, was given Cardizem 20 mg and bolus of fluids and pt was transported to the ER. In the ER his WBC is 6.5, hgb 8.7, platelets 678, , K 3.9, cr 1.68, HS trop 22, mag 2, pBNP 849, CXR unremarkable, BP initially 114/57. EKG showed a fib w rate 95, on monitor he remains a fib w rate . BP now 147/58. Dizziness much improved at this point. Patient Active Problem List  
Diagnosis Code  AAA (abdominal aortic aneurysm) without rupture (HCC) I71.4  
 COPD (chronic obstructive pulmonary disease) (HCC) J44.9  Hyperlipemia E78.5  Glaucoma H40.9  Supplemental oxygen dependent Z99.81  
 OA (osteoarthritis) M19.90  Paroxysmal atrial fibrillation (HCC) I48.0  Renal cysts, acquired, bilateral N28.1  Tobacco use disorder F17.200  Hypertension I10  
 Cardiomyopathy ([address] I42.9  Atrial flutter (HCC) I48.92  
 Peptic ulcer disease K27.9  History of pulmonary embolism Z86.711  A-fib (HCC) I48.91  
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  
 GI bleed K92.2  Chronic hypoxemic respiratory failure (HCC) J96.11  
 Heart failure with reduced ejection fraction (HCC) I50.20  CKD (chronic kidney disease), stage III N18.30  AAA (abdominal aortic aneurysm) (HCC) I71.4  Chronic systolic heart failure (HCC) I50.22  
 Atrial fibrillation with rapid ventricular response (HCC) I48.91  
 History of GI bleed Z87.19  
 Iron deficiency anemia D50.9 Past Medical History:  
Diagnosis Date  A-fib ([address] 5/2/2014  AAA (abdominal aortic aneurysm) without rupture ([address] 5/2/2014  
 3.2 on US 2/14 [facility]  Arthritis  Cancer ([address]   
 hx prostate cancer  COPD (chronic obstructive pulmonary disease) ([address] 5/2/2014  Elevated prostate specific antigen (PSA)  Glaucoma 5/2/2014  Heart disease  Heart failure ([address]  Hyperlipemia 5/2/2014  Hypertension  Hypertrophy of prostate with urinary obstruction and other lower urinary tract symptoms (LUTS)  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  OA (osteoarthritis) 5/2/2014  Peptic ulcer disease 6/1/2017  Poor historian  Prostate cancer ([address]  Pulmonary embolus ([address] 6/1/2017  Supplemental oxygen dependent 5/2/2014  Warfarin anticoagulation 5/2/2014 Past Surgical History:  
Procedure Laterality Date  COLONOSCOPY N/A 9/12/2019 COLONOSCOPY/ 26 ROOM 614 performed by Xuan Hallman MD at [facility] ENDOSCOPY  EGD  12/18/2019  HX HEART CATHETERIZATION    
 VASCULAR SURGERY PROCEDURE UNLIST  09/14/2019 Bilateral common femoral artery cutdown with exposure and placement of catheter in aorta for aortogram,Endovascular repair of infrarenal abdominal aortic aneurysm with bifurcated modulated device (31 x 14 x 13 main body) via the left common femoral, right iliac extender measuring 27 x 10. Allergies Allergen Reactions  Statins-Hmg-Coa Reductase Inhibitors Myalgia Muscle pain Family History Problem Relation Age of Onset  Cancer Mother  Heart Disease Father Social History Tobacco Use  Smoking status: Former Smoker Packs/day: 2.00 Years: 40.00 Pack years: 80.00 Quit date: 2014 Years since quittin.4  Smokeless tobacco: Never Used  Tobacco comment: still taking Chantix Substance Use Topics  Alcohol use: No  
  
 
No current facility-administered medications for this encounter. Current Outpatient Medications Medication Sig  
 famotidine (PEPCID) 40 mg tablet Take 1 Tab by mouth daily.  metoprolol succinate (TOPROL-XL) 50 mg XL tablet Take 1 Tab by mouth daily.  amLODIPine (Norvasc) 2.5 mg tablet Take 1 Tab by mouth daily.  allopurinoL (ZYLOPRIM) 300 mg tablet Take 1 Tab by mouth daily.  rosuvastatin (Crestor) 40 mg tablet Take 1 Tab by mouth nightly.  tamsulosin (FLOMAX) 0.4 mg capsule Take 1 Cap by mouth daily.  nitroglycerin (NITROSTAT) 0.4 mg SL tablet 1 Tab by SubLINGual route every five (5) minutes as needed for Chest Pain (call EMS if pain fails to resolve after 2 tabs). Indications: after 15 minutes or 3 doses, if chest pain persists, contact EMS/911  
 polyethylene glycol (MIRALAX) 17 gram/dose powder Take 17 g by mouth daily.  furosemide (Lasix) 40 mg tablet Take 1 Tab by mouth daily.  trimethoprim-polymyxin b (POLYTRIM) ophthalmic solution Administer 1 Drop to both eyes every four (4) hours.  ferrous sulfate (SLOW FE) 142 mg (45 mg iron) ER tablet Take  by mouth Daily (before breakfast).  fluticasone propion-salmeterol (WIXELA INHUB) 250-50 mcg/dose diskus inhaler Take 1 Puff by inhalation every twelve (12) hours.  albuterol (PROVENTIL VENTOLIN) 2.5 mg /3 mL (0.083 %) nebu  cholecalciferol (VITAMIN D3) 2,000 unit cap capsule TAKE 1 CAPSULE BY MOUTH EVERY DAY  docusate sodium (COLACE) 100 mg capsule Take 100 mg by mouth daily.  budesonide (PULMICORT) 0.5 mg/2 mL nbsp 2 mL by Nebulization route two (2) times a day.  albuterol-ipratropium (DUO-NEB) 2.5 mg-0.5 mg/3 ml nebu 3 mL by Nebulization route every six (6) hours as needed.  lisinopril (PRINIVIL, ZESTRIL) 10 mg tablet Take 1 Tab by mouth daily.  OXYGEN-AIR DELIVERY SYSTEMS Use as instructed. Use as instructed.  brimonidine (ALPHAGAN P) 0.1 % ophthalmic solution every eight (8) hours. Review of Symptoms: 
General: no weight change,  no weakness, fever or chills Skin: no rashes, lumps, or other skin changes HEENT: no headache, dizziness, lightheadedness, vision changes, hearing changes, tinnitus, vertigo, sinus pressure/pain, bleeding gums, sore throat, or hoarseness Neck: no swollen glands, goiter, pain or stiffness Respiratory: no cough, sputum, hemoptysis, + chronic dyspnea unchanged, no wheezing Cardiovascular: + as per HPI Gastrointestinal: + h/o GI bleed Urinary: no frequency, urgency , hematuria, burning/pain with urination, recent flank pain, polyuria, nocturia, or difficulty urinating Peripheral Vascular: no claudication, leg cramps, prior DVTs, swelling of calves, legs, or feet, color change, or swelling with redness or tenderness Musculoskeletal: no muscle or joint pain/stiffness, joint swelling, erythema of joints, or back pain Psychiatric: no depression or excessive stress Neurological: no sensory or motor loss, seizures, syncope, tremors, numbness, no dementia Hematologic: + h/o AYAN Endocrine: no thyroid problems, heat or cold intolerance, excessive sweating, polyuria, polydipsia, no diabetes. Physical Exam 
Vitals:  
 12/21/20 2761 12/21/20 8688 12/21/20 7652 12/21/20 6653 BP:  134/65 Pulse: (!) 103 (!) 102 99 98 Resp: 23 22 17 19 Temp:      
SpO2:    100% Weight:      
Height:      
 
 
Physical Exam: 
General: Well Developed, Well Nourished, No Acute Distress, 3L O2 by NC 
HEENT: pupils equal and round, no abnormalities noted Neck: supple, no JVD, no carotid bruits Heart: S1S2 irregularly irregular Lungs: Clear throughout auscultation bilaterally without adventitious sounds Abd: soft, nontender, nondistended, with good bowel sounds Ext: warm, no edema Skin: warm and dry Psychiatric: Normal mood and affect Neurologic: Alert and oriented X 3 Cardiographics Telemetry: a fib w rate  Labs:  
Recent Labs  
  12/21/20 
0350   
K 3.9 MG 2.0  
BUN 21  
CREA 1.68* * WBC 6.5 HGB 8.7* HCT 29.5*  
 INR 0.9 Assessment/Plan: 
 
 Assessment:  
Atrial fibrillation with rapid ventricular response ([address] (12/21/2020)- pt w rapid a fib despite toprol XL 50 mg every day, will stop norvasc, add cardizem  every day (e-scribed to pt pharmacy), cont ASA, f/u with Dr Neeraj Bee Friday Jan 22 at 10:00[facility] office COPD (chronic obstructive pulmonary disease) ([address] (5/2/2014)- 3L O2 by NC, cont home meds Paroxysmal atrial fibrillation ([address] (5/2/2014)- as above Hypertension (6/1/2017)- toprol, norvasc, and lisinopril Chronic systolic heart failure ([address] (12/21/2020)- EF 45-50%, cont BB, ACE History of GI bleed (12/21/2020)- pepcid Iron deficiency anemia (12/21/2020)- IV iron followed by hematology Thank you very much for this referral. We appreciate the opportunity to participate in this patient's care. We will follow along with above stated plan. Ju Waddell PA-C Consulting MD: Neeraj Bee

## 2020-12-21 NOTE — ED PROVIDER NOTES
79-year-old male presents via EMS with a chief complaint of lightheadedness Patient states sometime this morning, while it was still dark out, he was walking in his kitchen when he felt like he might pass out. He did not fall or lose consciousness this morning. EMS reports the patient was in A. fib on their arrival 
Patient given Cardizem rate is improved symptoms have also improved dramatically. Patient has a longstanding history of COPD and is oxygen dependent. States that his breathing feels \"pretty good\" this morning. The history is provided by the patient and the EMS personnel. Irregular Heart Beat This is a chronic problem. The current episode started 1 to 2 hours ago. The problem has been gradually improving. The problem occurs constantly. The problem is associated with an unknown factor. Associated symptoms include irregular heartbeat, dizziness, weakness and cough. Pertinent negatives include no diaphoresis, no fever, no numbness, no chest pain, no chest pressure, no syncope, no abdominal pain, no nausea, no vomiting, no headaches, no back pain, no leg pain, no shortness of breath and no sputum production. Risk factors include cardiac disease, a sendentary lifestyle, male gender and hypertension. He has tried nothing for the symptoms. His past medical history is significant for heart disease, hypertension and atrial fibrillation. Past Medical History:  
Diagnosis Date  A-fib ([address] 5/2/2014  AAA (abdominal aortic aneurysm) without rupture ([address] 5/2/2014  
 3.2 on US 2/14 [facility]  Arthritis  Cancer ([address]   
 hx prostate cancer  COPD (chronic obstructive pulmonary disease) ([address] 5/2/2014  Elevated prostate specific antigen (PSA)  Glaucoma 5/2/2014  Heart disease  Heart failure ([address]  Hyperlipemia 5/2/2014  Hypertension  Hypertrophy of prostate with urinary obstruction and other lower urinary tract symptoms (LUTS)  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[facility] 2018  OA (osteoarthritis) 2014  Peptic ulcer disease 2017  Poor historian  Prostate cancer ([facility]  Pulmonary embolus ([facility] 2017  Supplemental oxygen dependent 2014  Warfarin anticoagulation 2014 Past Surgical History:  
Procedure Laterality Date  COLONOSCOPY N/A 2019 COLONOSCOPY/ 26 ROOM 614 performed by [facility] MD at [facility] ENDOSCOPY  EGD  2019  HX HEART CATHETERIZATION    
 VASCULAR SURGERY PROCEDURE UNLIST  2019 Bilateral common femoral artery cutdown with exposure and placement of catheter in aorta for aortogram,Endovascular repair of infrarenal abdominal aortic aneurysm with bifurcated modulated device (31 x 14 x 13 main body) via the left common femoral, right iliac extender measuring 27 x 10. Family History:  
Problem Relation Age of Onset  Cancer Mother  Heart Disease Father Social History Socioeconomic History  Marital status: SINGLE Spouse name: Not on file  Number of children: Not on file  Years of education: Not on file  Highest education level: Not on file Occupational History  Not on file Social Needs  Financial resource strain: Not on file  Food insecurity Worry: Not on file Inability: Not on file  Transportation needs Medical: Not on file Non-medical: Not on file Tobacco Use  Smoking status: Former Smoker Packs/day: 2.00 Years: 40.00 Pack years: 80.00 Quit date: 2014 Years since quittin.4  Smokeless tobacco: Never Used  Tobacco comment: still taking Chantix Substance and Sexual Activity  Alcohol use: No  
 Drug use: No  
 Sexual activity: Yes  
  Partners: Female Birth control/protection: None Lifestyle  Physical activity Days per week: Not on file Minutes per session: Not on file  Stress: Not on file Relationships  Social connections Talks on phone: Not on file Gets together: Not on file Attends Yazdanism service: Not on file Active member of club or organization: Not on file Attends meetings of clubs or organizations: Not on file Relationship status: Not on file  Intimate partner violence Fear of current or ex partner: Not on file Emotionally abused: Not on file Physically abused: Not on file Forced sexual activity: Not on file Other Topics Concern  Not on file Social History Narrative  Not on file ALLERGIES: Statins-hmg-coa reductase inhibitors Review of Systems Constitutional: Negative for activity change, chills, diaphoresis and fever. HENT: Negative for dental problem, hearing loss, nosebleeds, rhinorrhea and sore throat. Eyes: Negative for pain, discharge, redness and visual disturbance. Respiratory: Positive for cough. Negative for sputum production, chest tightness and shortness of breath. Cardiovascular: Positive for palpitations. Negative for chest pain, leg swelling and syncope. Gastrointestinal: Negative for abdominal pain, constipation, diarrhea, nausea and vomiting. Endocrine: Negative for cold intolerance, heat intolerance, polydipsia and polyuria. Genitourinary: Negative for dysuria and flank pain. Musculoskeletal: Negative for arthralgias, back pain, joint swelling, myalgias and neck pain. Skin: Negative for pallor and rash. Allergic/Immunologic: Negative for environmental allergies and food allergies. Neurological: Positive for dizziness and weakness. Negative for tremors, light-headedness, numbness and headaches. Hematological: Negative for adenopathy. Does not bruise/bleed easily. Psychiatric/Behavioral: Negative for confusion and dysphoric mood. The patient is not nervous/anxious and is not hyperactive. All other systems reviewed and are negative. Vitals:  
 12/21/20 2635 BP: (!) 114/57 Pulse: 100 Resp: 18 Temp: 98.4 °F (36.9 °C) SpO2: 99% Weight: 81.6 kg (180 lb) Height: 5' 9\" (1.753 m) Physical Exam 
Vitals signs and nursing note reviewed. Constitutional:   
   General: He is in acute distress. Appearance: He is well-developed and normal weight. HENT:  
   Head: Normocephalic and atraumatic. Right Ear: External ear normal.  
   Left Ear: External ear normal.  
   Mouth/Throat:  
   Pharynx: No oropharyngeal exudate. Eyes:  
   General: No scleral icterus. Conjunctiva/sclera: Conjunctivae normal.  
   Pupils: Pupils are equal, round, and reactive to light. Neck: Musculoskeletal: Normal range of motion and neck supple. Thyroid: No thyromegaly. Vascular: No JVD. Cardiovascular:  
   Rate and Rhythm: Tachycardia present. Rhythm irregular. Heart sounds: Normal heart sounds. No murmur. No friction rub. No gallop. Pulmonary:  
   Effort: Pulmonary effort is normal. No respiratory distress. Breath sounds: Wheezing and rhonchi present. Abdominal:  
   General: Bowel sounds are normal. There is no distension. Palpations: Abdomen is soft. Tenderness: There is no abdominal tenderness. Musculoskeletal: Normal range of motion. General: No tenderness or deformity. Skin: 
   General: Skin is warm and dry. Capillary Refill: Capillary refill takes less than 2 seconds. Findings: No rash. Neurological:  
   General: No focal deficit present. Mental Status: He is alert and oriented to person, place, and time. Cranial Nerves: No cranial nerve deficit. Sensory: No sensory deficit. Motor: No abnormal muscle tone. Coordination: Coordination normal.  
Psychiatric:     
   Mood and Affect: Mood normal.     
   Behavior: Behavior normal.     
   Thought Content: Thought content normal.     
   Judgment: Judgment normal.  
 
  
 
MDM Number of Diagnoses or Management Options Paroxysmal atrial fibrillation ([facility]): established and worsening Diagnosis management comments: EKG reviewed A. fib at 95 bpm 
Normal axis Single PVC noted No acute ischemic changes Patient maintaining 99% O2 sat on his home 3 L nasal cannula. 9:36 AM 
Patient maintaining heart rate between 90 and 110 bpm.  Remains in atrial fibrillation Reviewed case with midlevel covering cardiology They will see the patient in the ER for evaluation 11:02 AM 
Cardiology evaluation complete They will add Cardizem Office follow-up with Dr. Kailey Storey Amount and/or Complexity of Data Reviewed Clinical lab tests: ordered and reviewed Tests in the radiology section of CPT®: ordered and reviewed Tests in the medicine section of CPT®: reviewed and ordered Review and summarize past medical records: yes Risk of Complications, Morbidity, and/or Mortality Presenting problems: moderate Diagnostic procedures: moderate Management options: moderate General comments: Elements of this note have been dictated via voice recognition software. Text and phrases may be limited by the accuracy of the software. The chart has been reviewed, but errors may still be present. Patient Progress Patient progress: improved Procedures

## 2020-12-21 NOTE — DISCHARGE INSTRUCTIONS
Start Cardizem as directed by your cardiologist  Drink plenty of fluids  Continue Toprol  Call your doctor/follow up doctor to set up appointment for recheck visit  Return to ER for any worsening symptoms or new problems which may arise

## 2021-01-01 ENCOUNTER — HOME CARE VISIT (OUTPATIENT)
Dept: SCHEDULING | Facility: HOME HEALTH | Age: 81
End: 2021-01-01
Payer: MEDICARE

## 2021-01-01 ENCOUNTER — APPOINTMENT (OUTPATIENT)
Dept: GENERAL RADIOLOGY | Age: 81
DRG: 329 | End: 2021-01-01
Attending: INTERNAL MEDICINE
Payer: MEDICARE

## 2021-01-01 ENCOUNTER — ANESTHESIA (OUTPATIENT)
Dept: SURGERY | Age: 81
DRG: 329 | End: 2021-01-01
Payer: MEDICARE

## 2021-01-01 ENCOUNTER — HOSPITAL ENCOUNTER (INPATIENT)
Age: 81
LOS: 7 days | Discharge: NURSING, REHAB OR LONG-TERM CARE | DRG: 227 | End: 2021-07-14
Attending: EMERGENCY MEDICINE | Admitting: INTERNAL MEDICINE
Payer: MEDICARE

## 2021-01-01 ENCOUNTER — ANESTHESIA EVENT (OUTPATIENT)
Dept: CARDIAC CATH/INVASIVE PROCEDURES | Age: 81
DRG: 227 | End: 2021-01-01
Payer: MEDICARE

## 2021-01-01 ENCOUNTER — HOSPITAL ENCOUNTER (INPATIENT)
Age: 81
LOS: 4 days | Discharge: ANOTHER HOSPITAL | DRG: 811 | End: 2021-09-04
Attending: FAMILY MEDICINE | Admitting: INTERNAL MEDICINE
Payer: MEDICARE

## 2021-01-01 ENCOUNTER — HOSPITAL ENCOUNTER (INPATIENT)
Age: 81
LOS: 3 days | Discharge: HOSPICE | DRG: 329 | End: 2021-09-08
Attending: INTERNAL MEDICINE | Admitting: HOSPITALIST
Payer: MEDICARE

## 2021-01-01 ENCOUNTER — HOSPITAL ENCOUNTER (INPATIENT)
Dept: NUCLEAR MEDICINE | Age: 81
Discharge: HOME | DRG: 811 | End: 2021-09-04
Attending: INTERNAL MEDICINE
Payer: MEDICARE

## 2021-01-01 ENCOUNTER — HOME CARE VISIT (OUTPATIENT)
Dept: HOME HEALTH SERVICES | Facility: HOME HEALTH | Age: 81
End: 2021-01-01
Payer: MEDICARE

## 2021-01-01 ENCOUNTER — APPOINTMENT (OUTPATIENT)
Dept: GENERAL RADIOLOGY | Age: 81
DRG: 811 | End: 2021-01-01
Payer: MEDICARE

## 2021-01-01 ENCOUNTER — HOSPITAL ENCOUNTER (OUTPATIENT)
Dept: LAB | Age: 81
Discharge: HOME | End: 2021-03-08
Payer: MEDICARE

## 2021-01-01 ENCOUNTER — TELEPHONE (OUTPATIENT)
Dept: HOME HEALTH SERVICES | Facility: HOME HEALTH | Age: 81
End: 2021-01-01

## 2021-01-01 ENCOUNTER — HOSPITAL ENCOUNTER (OUTPATIENT)
Dept: NUCLEAR MEDICINE | Age: 81
Discharge: HOME | DRG: 811 | End: 2021-08-31
Attending: PHYSICIAN ASSISTANT
Payer: MEDICARE

## 2021-01-01 ENCOUNTER — ANESTHESIA EVENT (OUTPATIENT)
Dept: SURGERY | Age: 81
DRG: 329 | End: 2021-01-01
Payer: MEDICARE

## 2021-01-01 ENCOUNTER — APPOINTMENT (OUTPATIENT)
Dept: NON INVASIVE DIAGNOSTICS | Age: 81
DRG: 189 | End: 2021-01-01
Attending: INTERNAL MEDICINE
Payer: MEDICARE

## 2021-01-01 ENCOUNTER — HOSPITAL ENCOUNTER (EMERGENCY)
Age: 81
Discharge: HOME | End: 2021-08-01
Payer: MEDICARE

## 2021-01-01 ENCOUNTER — APPOINTMENT (OUTPATIENT)
Dept: CT IMAGING | Age: 81
DRG: 190 | End: 2021-01-01
Attending: INTERNAL MEDICINE
Payer: MEDICARE

## 2021-01-01 ENCOUNTER — APPOINTMENT (OUTPATIENT)
Dept: CT IMAGING | Age: 81
DRG: 189 | End: 2021-01-01
Attending: STUDENT IN AN ORGANIZED HEALTH CARE EDUCATION/TRAINING PROGRAM
Payer: MEDICARE

## 2021-01-01 ENCOUNTER — HOSPITAL ENCOUNTER (INPATIENT)
Age: 81
LOS: 5 days | Discharge: HOME HEALTH | DRG: 189 | End: 2021-06-14
Attending: STUDENT IN AN ORGANIZED HEALTH CARE EDUCATION/TRAINING PROGRAM | Admitting: INTERNAL MEDICINE
Payer: MEDICARE

## 2021-01-01 ENCOUNTER — HOSPICE ADMISSION (OUTPATIENT)
Dept: HOSPICE | Facility: HOSPICE | Age: 81
End: 2021-01-01
Payer: MEDICARE

## 2021-01-01 ENCOUNTER — APPOINTMENT (OUTPATIENT)
Dept: GENERAL RADIOLOGY | Age: 81
DRG: 190 | End: 2021-01-01
Attending: EMERGENCY MEDICINE
Payer: MEDICARE

## 2021-01-01 ENCOUNTER — HOSPITAL ENCOUNTER (EMERGENCY)
Age: 81
Discharge: HOME | End: 2021-07-21
Attending: EMERGENCY MEDICINE
Payer: MEDICARE

## 2021-01-01 ENCOUNTER — ANESTHESIA (OUTPATIENT)
Dept: CARDIAC CATH/INVASIVE PROCEDURES | Age: 81
DRG: 227 | End: 2021-01-01
Payer: MEDICARE

## 2021-01-01 ENCOUNTER — HOSPITAL ENCOUNTER (INPATIENT)
Age: 81
LOS: 4 days | Discharge: HOME HEALTH | DRG: 190 | End: 2021-07-05
Attending: EMERGENCY MEDICINE | Admitting: FAMILY MEDICINE
Payer: MEDICARE

## 2021-01-01 ENCOUNTER — HOSPITAL ENCOUNTER (INPATIENT)
Age: 81
LOS: 2 days | End: 2021-09-10
Attending: INTERNAL MEDICINE | Admitting: INTERNAL MEDICINE

## 2021-01-01 ENCOUNTER — HOSPITAL ENCOUNTER (EMERGENCY)
Age: 81
Discharge: ANOTHER HOSPITAL | DRG: 811 | End: 2021-08-31
Payer: MEDICARE

## 2021-01-01 ENCOUNTER — APPOINTMENT (OUTPATIENT)
Dept: INTERVENTIONAL RADIOLOGY/VASCULAR | Age: 81
DRG: 329 | End: 2021-01-01
Attending: RADIOLOGY
Payer: MEDICARE

## 2021-01-01 ENCOUNTER — APPOINTMENT (OUTPATIENT)
Dept: GENERAL RADIOLOGY | Age: 81
DRG: 190 | End: 2021-01-01
Attending: INTERNAL MEDICINE
Payer: MEDICARE

## 2021-01-01 ENCOUNTER — HOSPITAL ENCOUNTER (OUTPATIENT)
Dept: GENERAL RADIOLOGY | Age: 81
Discharge: HOME | End: 2021-08-01
Attending: EMERGENCY MEDICINE

## 2021-01-01 ENCOUNTER — APPOINTMENT (OUTPATIENT)
Dept: GENERAL RADIOLOGY | Age: 81
DRG: 811 | End: 2021-01-01
Attending: ANESTHESIOLOGY
Payer: MEDICARE

## 2021-01-01 ENCOUNTER — HOSPITAL ENCOUNTER (OUTPATIENT)
Age: 81
Setting detail: OBSERVATION
Discharge: HOME HEALTH | End: 2021-05-20
Attending: EMERGENCY MEDICINE | Admitting: INTERNAL MEDICINE
Payer: MEDICARE

## 2021-01-01 ENCOUNTER — HOSPITAL ENCOUNTER (EMERGENCY)
Age: 81
Discharge: HOME | End: 2021-08-25
Attending: EMERGENCY MEDICINE
Payer: MEDICARE

## 2021-01-01 ENCOUNTER — HOSPITAL ENCOUNTER (OUTPATIENT)
Dept: INFUSION THERAPY | Age: 81
Discharge: HOME | End: 2021-03-29
Payer: MEDICARE

## 2021-01-01 ENCOUNTER — HOSPITAL ENCOUNTER (OUTPATIENT)
Dept: INFUSION THERAPY | Age: 81
Discharge: HOME | End: 2021-03-22
Payer: MEDICARE

## 2021-01-01 ENCOUNTER — APPOINTMENT (OUTPATIENT)
Dept: GENERAL RADIOLOGY | Age: 81
End: 2021-01-01
Attending: EMERGENCY MEDICINE
Payer: MEDICARE

## 2021-01-01 ENCOUNTER — HOME HEALTH ADMISSION (OUTPATIENT)
Dept: HOME HEALTH SERVICES | Facility: HOME HEALTH | Age: 81
End: 2021-01-01
Payer: MEDICARE

## 2021-01-01 ENCOUNTER — HOSPITAL ENCOUNTER (OUTPATIENT)
Dept: LAB | Age: 81
Discharge: HOME | End: 2021-05-12
Payer: MEDICARE

## 2021-01-01 ENCOUNTER — APPOINTMENT (OUTPATIENT)
Dept: GENERAL RADIOLOGY | Age: 81
DRG: 189 | End: 2021-01-01
Attending: STUDENT IN AN ORGANIZED HEALTH CARE EDUCATION/TRAINING PROGRAM
Payer: MEDICARE

## 2021-01-01 ENCOUNTER — APPOINTMENT (OUTPATIENT)
Dept: GENERAL RADIOLOGY | Age: 81
DRG: 227 | End: 2021-01-01
Attending: INTERNAL MEDICINE
Payer: MEDICARE

## 2021-01-01 ENCOUNTER — HOSPITAL ENCOUNTER (OUTPATIENT)
Dept: LAB | Age: 81
Discharge: HOME | End: 2021-03-17
Payer: MEDICARE

## 2021-01-01 ENCOUNTER — APPOINTMENT (OUTPATIENT)
Dept: GENERAL RADIOLOGY | Age: 81
DRG: 329 | End: 2021-01-01
Attending: ANESTHESIOLOGY
Payer: MEDICARE

## 2021-01-01 ENCOUNTER — APPOINTMENT (OUTPATIENT)
Dept: GENERAL RADIOLOGY | Age: 81
DRG: 811 | End: 2021-01-01
Attending: INTERNAL MEDICINE
Payer: MEDICARE

## 2021-01-01 VITALS
SYSTOLIC BLOOD PRESSURE: 146 MMHG | DIASTOLIC BLOOD PRESSURE: 82 MMHG | TEMPERATURE: 97.8 F | RESPIRATION RATE: 17 BRPM | OXYGEN SATURATION: 94 % | HEART RATE: 98 BPM

## 2021-01-01 VITALS
HEART RATE: 66 BPM | DIASTOLIC BLOOD PRESSURE: 60 MMHG | RESPIRATION RATE: 18 BRPM | TEMPERATURE: 98.6 F | WEIGHT: 162 LBS | SYSTOLIC BLOOD PRESSURE: 100 MMHG | BODY MASS INDEX: 23.92 KG/M2 | OXYGEN SATURATION: 96 %

## 2021-01-01 VITALS
RESPIRATION RATE: 22 BRPM | HEART RATE: 89 BPM | DIASTOLIC BLOOD PRESSURE: 60 MMHG | OXYGEN SATURATION: 98 % | SYSTOLIC BLOOD PRESSURE: 120 MMHG | TEMPERATURE: 98.1 F

## 2021-01-01 VITALS
OXYGEN SATURATION: 100 % | DIASTOLIC BLOOD PRESSURE: 74 MMHG | RESPIRATION RATE: 18 BRPM | HEART RATE: 70 BPM | SYSTOLIC BLOOD PRESSURE: 142 MMHG | TEMPERATURE: 98.8 F

## 2021-01-01 VITALS
RESPIRATION RATE: 16 BRPM | TEMPERATURE: 98.8 F | SYSTOLIC BLOOD PRESSURE: 93 MMHG | HEART RATE: 107 BPM | WEIGHT: 157 LBS | DIASTOLIC BLOOD PRESSURE: 46 MMHG | HEIGHT: 69 IN | BODY MASS INDEX: 23.25 KG/M2 | OXYGEN SATURATION: 100 %

## 2021-01-01 VITALS
OXYGEN SATURATION: 97 % | RESPIRATION RATE: 18 BRPM | SYSTOLIC BLOOD PRESSURE: 110 MMHG | HEART RATE: 76 BPM | TEMPERATURE: 97.8 F | DIASTOLIC BLOOD PRESSURE: 60 MMHG

## 2021-01-01 VITALS
SYSTOLIC BLOOD PRESSURE: 130 MMHG | RESPIRATION RATE: 18 BRPM | TEMPERATURE: 98.1 F | HEART RATE: 76 BPM | OXYGEN SATURATION: 96 % | DIASTOLIC BLOOD PRESSURE: 70 MMHG

## 2021-01-01 VITALS
OXYGEN SATURATION: 98 % | HEART RATE: 74 BPM | DIASTOLIC BLOOD PRESSURE: 66 MMHG | RESPIRATION RATE: 18 BRPM | TEMPERATURE: 98.4 F | SYSTOLIC BLOOD PRESSURE: 116 MMHG

## 2021-01-01 VITALS
SYSTOLIC BLOOD PRESSURE: 122 MMHG | HEART RATE: 70 BPM | DIASTOLIC BLOOD PRESSURE: 50 MMHG | WEIGHT: 174.2 LBS | RESPIRATION RATE: 18 BRPM | TEMPERATURE: 98.3 F | OXYGEN SATURATION: 98 % | HEIGHT: 69 IN | BODY MASS INDEX: 25.8 KG/M2

## 2021-01-01 VITALS
BODY MASS INDEX: 24.53 KG/M2 | TEMPERATURE: 98.7 F | HEART RATE: 62 BPM | WEIGHT: 165.6 LBS | DIASTOLIC BLOOD PRESSURE: 58 MMHG | HEIGHT: 69 IN | OXYGEN SATURATION: 98 % | SYSTOLIC BLOOD PRESSURE: 101 MMHG | RESPIRATION RATE: 24 BRPM

## 2021-01-01 VITALS
RESPIRATION RATE: 25 BRPM | SYSTOLIC BLOOD PRESSURE: 126 MMHG | OXYGEN SATURATION: 97 % | WEIGHT: 161 LBS | BODY MASS INDEX: 23.85 KG/M2 | DIASTOLIC BLOOD PRESSURE: 59 MMHG | TEMPERATURE: 98.3 F | HEIGHT: 69 IN | HEART RATE: 73 BPM

## 2021-01-01 VITALS
RESPIRATION RATE: 18 BRPM | SYSTOLIC BLOOD PRESSURE: 116 MMHG | OXYGEN SATURATION: 99 % | DIASTOLIC BLOOD PRESSURE: 58 MMHG | HEART RATE: 64 BPM | TEMPERATURE: 98.1 F

## 2021-01-01 VITALS
RESPIRATION RATE: 16 BRPM | DIASTOLIC BLOOD PRESSURE: 50 MMHG | SYSTOLIC BLOOD PRESSURE: 122 MMHG | TEMPERATURE: 97.4 F | HEART RATE: 69 BPM

## 2021-01-01 VITALS
HEART RATE: 61 BPM | BODY MASS INDEX: 24.51 KG/M2 | OXYGEN SATURATION: 94 % | WEIGHT: 166 LBS | SYSTOLIC BLOOD PRESSURE: 130 MMHG | RESPIRATION RATE: 18 BRPM | DIASTOLIC BLOOD PRESSURE: 69 MMHG | TEMPERATURE: 97.4 F

## 2021-01-01 VITALS
SYSTOLIC BLOOD PRESSURE: 130 MMHG | BODY MASS INDEX: 23.6 KG/M2 | HEART RATE: 80 BPM | DIASTOLIC BLOOD PRESSURE: 69 MMHG | RESPIRATION RATE: 18 BRPM | HEIGHT: 69 IN | TEMPERATURE: 98.4 F | WEIGHT: 159.3 LBS | OXYGEN SATURATION: 100 %

## 2021-01-01 VITALS
HEIGHT: 69 IN | HEART RATE: 70 BPM | WEIGHT: 162 LBS | DIASTOLIC BLOOD PRESSURE: 54 MMHG | SYSTOLIC BLOOD PRESSURE: 110 MMHG | RESPIRATION RATE: 20 BRPM | OXYGEN SATURATION: 100 % | TEMPERATURE: 97.9 F | BODY MASS INDEX: 23.99 KG/M2

## 2021-01-01 VITALS
HEART RATE: 91 BPM | TEMPERATURE: 98 F | RESPIRATION RATE: 20 BRPM | DIASTOLIC BLOOD PRESSURE: 72 MMHG | SYSTOLIC BLOOD PRESSURE: 172 MMHG | OXYGEN SATURATION: 95 %

## 2021-01-01 VITALS
RESPIRATION RATE: 22 BRPM | DIASTOLIC BLOOD PRESSURE: 70 MMHG | HEART RATE: 78 BPM | TEMPERATURE: 98.4 F | SYSTOLIC BLOOD PRESSURE: 120 MMHG | OXYGEN SATURATION: 94 %

## 2021-01-01 VITALS
RESPIRATION RATE: 14 BRPM | HEIGHT: 69 IN | WEIGHT: 187.39 LBS | HEART RATE: 122 BPM | TEMPERATURE: 98.2 F | DIASTOLIC BLOOD PRESSURE: 78 MMHG | BODY MASS INDEX: 27.76 KG/M2 | OXYGEN SATURATION: 98 % | SYSTOLIC BLOOD PRESSURE: 110 MMHG

## 2021-01-01 VITALS
RESPIRATION RATE: 20 BRPM | SYSTOLIC BLOOD PRESSURE: 100 MMHG | TEMPERATURE: 97.5 F | DIASTOLIC BLOOD PRESSURE: 62 MMHG | HEART RATE: 144 BPM

## 2021-01-01 VITALS
HEART RATE: 73 BPM | TEMPERATURE: 98 F | BODY MASS INDEX: 23.7 KG/M2 | WEIGHT: 160 LBS | HEIGHT: 69 IN | DIASTOLIC BLOOD PRESSURE: 63 MMHG | SYSTOLIC BLOOD PRESSURE: 115 MMHG | OXYGEN SATURATION: 95 % | RESPIRATION RATE: 18 BRPM

## 2021-01-01 VITALS
TEMPERATURE: 98.1 F | DIASTOLIC BLOOD PRESSURE: 57 MMHG | SYSTOLIC BLOOD PRESSURE: 121 MMHG | OXYGEN SATURATION: 100 % | RESPIRATION RATE: 20 BRPM | BODY MASS INDEX: 26.73 KG/M2 | HEART RATE: 73 BPM | WEIGHT: 181 LBS

## 2021-01-01 VITALS
DIASTOLIC BLOOD PRESSURE: 73 MMHG | RESPIRATION RATE: 20 BRPM | HEIGHT: 69 IN | BODY MASS INDEX: 25.2 KG/M2 | OXYGEN SATURATION: 93 % | WEIGHT: 170.1 LBS | TEMPERATURE: 98.1 F | HEART RATE: 71 BPM | SYSTOLIC BLOOD PRESSURE: 135 MMHG

## 2021-01-01 VITALS
DIASTOLIC BLOOD PRESSURE: 54 MMHG | OXYGEN SATURATION: 100 % | TEMPERATURE: 98.2 F | SYSTOLIC BLOOD PRESSURE: 143 MMHG | HEART RATE: 73 BPM | RESPIRATION RATE: 18 BRPM

## 2021-01-01 VITALS
TEMPERATURE: 97.7 F | HEART RATE: 62 BPM | SYSTOLIC BLOOD PRESSURE: 122 MMHG | OXYGEN SATURATION: 98 % | DIASTOLIC BLOOD PRESSURE: 72 MMHG | RESPIRATION RATE: 19 BRPM

## 2021-01-01 DIAGNOSIS — J44.9 CHRONIC OBSTRUCTIVE PULMONARY DISEASE, UNSPECIFIED COPD TYPE (HCC): Chronic | ICD-10-CM

## 2021-01-01 DIAGNOSIS — I50.22 CHRONIC SYSTOLIC HEART FAILURE (HCC): ICD-10-CM

## 2021-01-01 DIAGNOSIS — K92.2 ACUTE GI BLEEDING: Primary | ICD-10-CM

## 2021-01-01 DIAGNOSIS — D62 ACUTE BLOOD LOSS ANEMIA: ICD-10-CM

## 2021-01-01 DIAGNOSIS — R06.00 ACUTE DYSPNEA: ICD-10-CM

## 2021-01-01 DIAGNOSIS — N17.9 AKI (ACUTE KIDNEY INJURY) (HCC): ICD-10-CM

## 2021-01-01 DIAGNOSIS — N28.9 ACUTE ON CHRONIC RENAL INSUFFICIENCY: ICD-10-CM

## 2021-01-01 DIAGNOSIS — I50.42 CHRONIC COMBINED SYSTOLIC AND DIASTOLIC HEART FAILURE (HCC): ICD-10-CM

## 2021-01-01 DIAGNOSIS — J44.1 ACUTE EXACERBATION OF CHRONIC OBSTRUCTIVE PULMONARY DISEASE (COPD) (HCC): ICD-10-CM

## 2021-01-01 DIAGNOSIS — E44.1 MILD PROTEIN-CALORIE MALNUTRITION (HCC): ICD-10-CM

## 2021-01-01 DIAGNOSIS — Z51.5 END OF LIFE CARE: ICD-10-CM

## 2021-01-01 DIAGNOSIS — K92.2 LOWER GI BLEED: ICD-10-CM

## 2021-01-01 DIAGNOSIS — J96.22 ACUTE ON CHRONIC RESPIRATORY FAILURE WITH HYPOXIA AND HYPERCAPNIA (HCC): ICD-10-CM

## 2021-01-01 DIAGNOSIS — R06.00 DYSPNEA, UNSPECIFIED TYPE: ICD-10-CM

## 2021-01-01 DIAGNOSIS — Z51.5 ENCOUNTER FOR PALLIATIVE CARE: ICD-10-CM

## 2021-01-01 DIAGNOSIS — J96.11 CHRONIC HYPOXEMIC RESPIRATORY FAILURE (HCC): Chronic | ICD-10-CM

## 2021-01-01 DIAGNOSIS — I42.9 CARDIOMYOPATHY, UNSPECIFIED TYPE (HCC): ICD-10-CM

## 2021-01-01 DIAGNOSIS — I48.92 ATRIAL FLUTTER WITH RAPID VENTRICULAR RESPONSE (HCC): ICD-10-CM

## 2021-01-01 DIAGNOSIS — K92.1 GASTROINTESTINAL HEMORRHAGE WITH MELENA: ICD-10-CM

## 2021-01-01 DIAGNOSIS — I50.9 CONGESTIVE HEART FAILURE, UNSPECIFIED HF CHRONICITY, UNSPECIFIED HEART FAILURE TYPE (HCC): ICD-10-CM

## 2021-01-01 DIAGNOSIS — N18.9 ACUTE ON CHRONIC RENAL INSUFFICIENCY: ICD-10-CM

## 2021-01-01 DIAGNOSIS — F41.9 ANXIETY: ICD-10-CM

## 2021-01-01 DIAGNOSIS — I50.20 HEART FAILURE WITH REDUCED EJECTION FRACTION (HCC): Chronic | ICD-10-CM

## 2021-01-01 DIAGNOSIS — J96.21 ACUTE ON CHRONIC RESPIRATORY FAILURE WITH HYPOXIA AND HYPERCAPNIA (HCC): ICD-10-CM

## 2021-01-01 DIAGNOSIS — J44.1 COPD EXACERBATION (HCC): Primary | ICD-10-CM

## 2021-01-01 DIAGNOSIS — D50.9 IRON DEFICIENCY ANEMIA, UNSPECIFIED IRON DEFICIENCY ANEMIA TYPE: ICD-10-CM

## 2021-01-01 DIAGNOSIS — D69.6 THROMBOCYTOPENIA (HCC): ICD-10-CM

## 2021-01-01 DIAGNOSIS — N18.32 STAGE 3B CHRONIC KIDNEY DISEASE (HCC): Chronic | ICD-10-CM

## 2021-01-01 DIAGNOSIS — D50.8 OTHER IRON DEFICIENCY ANEMIA: ICD-10-CM

## 2021-01-01 DIAGNOSIS — I50.9 HEART FAILURE, UNSPECIFIED HF CHRONICITY, UNSPECIFIED HEART FAILURE TYPE (HCC): ICD-10-CM

## 2021-01-01 DIAGNOSIS — I48.92 ATRIAL FLUTTER, UNSPECIFIED TYPE (HCC): ICD-10-CM

## 2021-01-01 DIAGNOSIS — J96.21 ACUTE ON CHRONIC RESPIRATORY FAILURE WITH HYPOXIA (HCC): ICD-10-CM

## 2021-01-01 DIAGNOSIS — R57.8 HEMORRHAGIC SHOCK (HCC): ICD-10-CM

## 2021-01-01 DIAGNOSIS — I42.8 NICM (NONISCHEMIC CARDIOMYOPATHY) (HCC): ICD-10-CM

## 2021-01-01 DIAGNOSIS — I10 ESSENTIAL HYPERTENSION: ICD-10-CM

## 2021-01-01 DIAGNOSIS — I50.9 ACUTE ON CHRONIC CONGESTIVE HEART FAILURE, UNSPECIFIED HEART FAILURE TYPE (HCC): Primary | ICD-10-CM

## 2021-01-01 DIAGNOSIS — R19.5 HEME POSITIVE STOOL: Primary | ICD-10-CM

## 2021-01-01 DIAGNOSIS — I48.92 ATRIAL FLUTTER, PAROXYSMAL (HCC): Primary | ICD-10-CM

## 2021-01-01 DIAGNOSIS — J95.821 RESPIRATORY FAILURE, POST-OPERATIVE (HCC): ICD-10-CM

## 2021-01-01 DIAGNOSIS — I48.91 ATRIAL FIBRILLATION WITH RAPID VENTRICULAR RESPONSE (HCC): ICD-10-CM

## 2021-01-01 DIAGNOSIS — D50.9 IRON DEFICIENCY ANEMIA, UNSPECIFIED IRON DEFICIENCY ANEMIA TYPE: Primary | ICD-10-CM

## 2021-01-01 DIAGNOSIS — J96.22 ACUTE ON CHRONIC RESPIRATORY FAILURE WITH HYPERCAPNIA (HCC): Primary | ICD-10-CM

## 2021-01-01 DIAGNOSIS — I71.40 AAA (ABDOMINAL AORTIC ANEURYSM) WITHOUT RUPTURE: Chronic | ICD-10-CM

## 2021-01-01 DIAGNOSIS — I50.22 CHRONIC SYSTOLIC CONGESTIVE HEART FAILURE (HCC): ICD-10-CM

## 2021-01-01 DIAGNOSIS — J44.1 ACUTE EXACERBATION OF CHRONIC OBSTRUCTIVE PULMONARY DISEASE (COPD) (HCC): Primary | ICD-10-CM

## 2021-01-01 LAB
ABO + RH BLD: NORMAL
ACC. NO. FROM MICRO ORDER, ACCP: ABNORMAL
ALBUMIN SERPL-MCNC: 1.5 G/DL (ref 3.2–4.6)
ALBUMIN SERPL-MCNC: 1.8 G/DL (ref 3.2–4.6)
ALBUMIN SERPL-MCNC: 2.1 G/DL (ref 3.2–4.6)
ALBUMIN SERPL-MCNC: 2.1 G/DL (ref 3.2–4.6)
ALBUMIN SERPL-MCNC: 2.7 G/DL (ref 3.2–4.6)
ALBUMIN SERPL-MCNC: 2.9 G/DL (ref 3.2–4.6)
ALBUMIN SERPL-MCNC: 2.9 G/DL (ref 3.2–4.6)
ALBUMIN SERPL-MCNC: 3 G/DL (ref 3.2–4.6)
ALBUMIN SERPL-MCNC: 3.1 G/DL (ref 3.2–4.6)
ALBUMIN SERPL-MCNC: 3.2 G/DL (ref 3.2–4.6)
ALBUMIN SERPL-MCNC: 3.2 G/DL (ref 3.2–4.6)
ALBUMIN SERPL-MCNC: 3.3 G/DL (ref 3.2–4.6)
ALBUMIN SERPL-MCNC: 3.4 G/DL (ref 3.2–4.6)
ALBUMIN SERPL-MCNC: 3.6 G/DL (ref 3.2–4.6)
ALBUMIN/GLOB SERPL: 0.5 {RATIO} (ref 1.2–3.5)
ALBUMIN/GLOB SERPL: 0.7 {RATIO} (ref 1.2–3.5)
ALBUMIN/GLOB SERPL: 0.7 {RATIO} (ref 1.2–3.5)
ALBUMIN/GLOB SERPL: 0.8 {RATIO} (ref 1.2–3.5)
ALBUMIN/GLOB SERPL: 0.9 {RATIO} (ref 1.2–3.5)
ALBUMIN/GLOB SERPL: 1 {RATIO} (ref 1.2–3.5)
ALBUMIN/GLOB SERPL: 1.1 {RATIO} (ref 1.2–3.5)
ALP SERPL-CCNC: 113 U/L (ref 50–136)
ALP SERPL-CCNC: 24 U/L (ref 50–136)
ALP SERPL-CCNC: 31 U/L (ref 50–136)
ALP SERPL-CCNC: 32 U/L (ref 50–136)
ALP SERPL-CCNC: 42 U/L (ref 50–136)
ALP SERPL-CCNC: 67 U/L (ref 50–136)
ALP SERPL-CCNC: 70 U/L (ref 50–136)
ALP SERPL-CCNC: 75 U/L (ref 50–136)
ALP SERPL-CCNC: 82 U/L (ref 50–136)
ALP SERPL-CCNC: 84 U/L (ref 50–136)
ALP SERPL-CCNC: 84 U/L (ref 50–136)
ALP SERPL-CCNC: 88 U/L (ref 50–136)
ALP SERPL-CCNC: 91 U/L (ref 50–136)
ALP SERPL-CCNC: 91 U/L (ref 50–136)
ALP SERPL-CCNC: 95 U/L (ref 50–136)
ALP SERPL-CCNC: 97 U/L (ref 50–136)
ALT SERPL-CCNC: 10 U/L (ref 12–65)
ALT SERPL-CCNC: 12 U/L (ref 12–65)
ALT SERPL-CCNC: 13 U/L (ref 12–65)
ALT SERPL-CCNC: 13 U/L (ref 12–65)
ALT SERPL-CCNC: 14 U/L (ref 12–65)
ALT SERPL-CCNC: 16 U/L (ref 12–65)
ALT SERPL-CCNC: 16 U/L (ref 12–65)
ALT SERPL-CCNC: 19 U/L (ref 12–65)
ALT SERPL-CCNC: 7 U/L (ref 12–65)
ALT SERPL-CCNC: 78 U/L (ref 12–65)
ALT SERPL-CCNC: 9 U/L (ref 12–65)
ALT SERPL-CCNC: <6 U/L (ref 12–65)
ALT SERPL-CCNC: <6 U/L (ref 12–65)
ALT SERPL-CCNC: ABNORMAL U/L (ref 12–65)
AMMONIA PLAS-SCNC: 19 UMOL/L (ref 11–32)
AMPHET UR QL SCN: NEGATIVE
ANION GAP SERPL CALC-SCNC: 0 MMOL/L (ref 7–16)
ANION GAP SERPL CALC-SCNC: 1 MMOL/L (ref 7–16)
ANION GAP SERPL CALC-SCNC: 2 MMOL/L (ref 7–16)
ANION GAP SERPL CALC-SCNC: 3 MMOL/L (ref 7–16)
ANION GAP SERPL CALC-SCNC: 4 MMOL/L (ref 7–16)
ANION GAP SERPL CALC-SCNC: 5 MMOL/L (ref 7–16)
ANION GAP SERPL CALC-SCNC: 6 MMOL/L (ref 7–16)
ANION GAP SERPL CALC-SCNC: 7 MMOL/L (ref 7–16)
ANION GAP SERPL CALC-SCNC: 8 MMOL/L (ref 7–16)
ANION GAP SERPL CALC-SCNC: ABNORMAL MMOL/L (ref 7–16)
ANION GAP SERPL CALC-SCNC: ABNORMAL MMOL/L (ref 7–16)
APPEARANCE UR: CLEAR
ARTERIAL PATENCY WRIST A: ABNORMAL
ARTERIAL PATENCY WRIST A: POSITIVE
AST SERPL-CCNC: 11 U/L (ref 15–37)
AST SERPL-CCNC: 12 U/L (ref 15–37)
AST SERPL-CCNC: 13 U/L (ref 15–37)
AST SERPL-CCNC: 14 U/L (ref 15–37)
AST SERPL-CCNC: 14 U/L (ref 15–37)
AST SERPL-CCNC: 16 U/L (ref 15–37)
AST SERPL-CCNC: 20 U/L (ref 15–37)
AST SERPL-CCNC: 23 U/L (ref 15–37)
AST SERPL-CCNC: 31 U/L (ref 15–37)
AST SERPL-CCNC: 39 U/L (ref 15–37)
AST SERPL-CCNC: 8 U/L (ref 15–37)
AST SERPL-CCNC: 8 U/L (ref 15–37)
AST SERPL-CCNC: ABNORMAL U/L (ref 15–37)
ATRIAL RATE: 117 BPM
ATRIAL RATE: 312 BPM
ATRIAL RATE: 326 BPM
ATRIAL RATE: 340 BPM
ATRIAL RATE: 72 BPM
ATRIAL RATE: 87 BPM
ATRIAL RATE: 91 BPM
BACTERIA SPEC CULT: ABNORMAL
BACTERIA SPEC CULT: ABNORMAL
BACTERIA SPEC CULT: NORMAL
BARBITURATES UR QL SCN: NEGATIVE
BASE DEFICIT BLD-SCNC: 12.1 MMOL/L
BASE DEFICIT BLD-SCNC: 4.3 MMOL/L
BASE DEFICIT BLD-SCNC: 5.1 MMOL/L
BASE DEFICIT BLD-SCNC: 7.2 MMOL/L
BASE DEFICIT BLD-SCNC: 7.7 MMOL/L
BASE EXCESS BLD CALC-SCNC: 1.5 MMOL/L
BASE EXCESS BLD CALC-SCNC: 3.5 MMOL/L
BASE EXCESS BLD CALC-SCNC: 6.3 MMOL/L
BASE EXCESS BLD CALC-SCNC: 7.1 MMOL/L
BASOPHILS # BLD: 0 K/UL (ref 0–0.2)
BASOPHILS # BLD: 0.1 K/UL (ref 0–0.2)
BASOPHILS NFR BLD: 0 % (ref 0–2)
BASOPHILS NFR BLD: 1 % (ref 0–2)
BDY SITE: ABNORMAL
BENZODIAZ UR QL: NEGATIVE
BILIRUB SERPL-MCNC: 0.1 MG/DL (ref 0.2–1.1)
BILIRUB SERPL-MCNC: 0.2 MG/DL (ref 0.2–1.1)
BILIRUB SERPL-MCNC: 0.3 MG/DL (ref 0.2–1.1)
BILIRUB SERPL-MCNC: 0.4 MG/DL (ref 0.2–1.1)
BILIRUB SERPL-MCNC: 0.4 MG/DL (ref 0.2–1.1)
BILIRUB SERPL-MCNC: 0.5 MG/DL (ref 0.2–1.1)
BILIRUB SERPL-MCNC: ABNORMAL MG/DL (ref 0.2–1.1)
BILIRUB UR QL: NEGATIVE
BLD PROD TYP BPU: NORMAL
BLOOD GROUP ANTIBODIES SERPL: NORMAL
BNP SERPL-MCNC: 1982 PG/ML
BNP SERPL-MCNC: 3875 PG/ML
BNP SERPL-MCNC: 4322 PG/ML
BNP SERPL-MCNC: 5145 PG/ML
BNP SERPL-MCNC: 545 PG/ML
BNP SERPL-MCNC: 7705 PG/ML
BPU ID: NORMAL
BUN SERPL-MCNC: 15 MG/DL (ref 8–23)
BUN SERPL-MCNC: 18 MG/DL (ref 8–23)
BUN SERPL-MCNC: 18 MG/DL (ref 8–23)
BUN SERPL-MCNC: 19 MG/DL (ref 8–23)
BUN SERPL-MCNC: 20 MG/DL (ref 8–23)
BUN SERPL-MCNC: 22 MG/DL (ref 8–23)
BUN SERPL-MCNC: 23 MG/DL (ref 8–23)
BUN SERPL-MCNC: 25 MG/DL (ref 8–23)
BUN SERPL-MCNC: 25 MG/DL (ref 8–23)
BUN SERPL-MCNC: 26 MG/DL (ref 8–23)
BUN SERPL-MCNC: 26 MG/DL (ref 8–23)
BUN SERPL-MCNC: 27 MG/DL (ref 8–23)
BUN SERPL-MCNC: 29 MG/DL (ref 8–23)
BUN SERPL-MCNC: 30 MG/DL (ref 8–23)
BUN SERPL-MCNC: 31 MG/DL (ref 8–23)
BUN SERPL-MCNC: 31 MG/DL (ref 8–23)
BUN SERPL-MCNC: 32 MG/DL (ref 8–23)
BUN SERPL-MCNC: 32 MG/DL (ref 8–23)
BUN SERPL-MCNC: 34 MG/DL (ref 8–23)
BUN SERPL-MCNC: 36 MG/DL (ref 8–23)
BUN SERPL-MCNC: 36 MG/DL (ref 8–23)
BUN SERPL-MCNC: 37 MG/DL (ref 8–23)
BUN SERPL-MCNC: 38 MG/DL (ref 8–23)
BUN SERPL-MCNC: 39 MG/DL (ref 8–23)
BUN SERPL-MCNC: 41 MG/DL (ref 8–23)
BUN SERPL-MCNC: 42 MG/DL (ref 8–23)
BUN SERPL-MCNC: 43 MG/DL (ref 8–23)
BUN SERPL-MCNC: 43 MG/DL (ref 8–23)
BUN SERPL-MCNC: 47 MG/DL (ref 8–23)
BUN SERPL-MCNC: 48 MG/DL (ref 8–23)
BUN SERPL-MCNC: 54 MG/DL (ref 8–23)
BUN SERPL-MCNC: 55 MG/DL (ref 8–23)
BUN SERPL-MCNC: 58 MG/DL (ref 8–23)
BUN SERPL-MCNC: 64 MG/DL (ref 8–23)
BUN SERPL-MCNC: 68 MG/DL (ref 8–23)
BUN SERPL-MCNC: 69 MG/DL (ref 8–23)
CA-I BLD-MCNC: 1.27 MMOL/L (ref 1.12–1.32)
CALCIUM SERPL-MCNC: 6.5 MG/DL (ref 8.3–10.4)
CALCIUM SERPL-MCNC: 7.7 MG/DL (ref 8.3–10.4)
CALCIUM SERPL-MCNC: 7.7 MG/DL (ref 8.3–10.4)
CALCIUM SERPL-MCNC: 7.9 MG/DL (ref 8.3–10.4)
CALCIUM SERPL-MCNC: 8.2 MG/DL (ref 8.3–10.4)
CALCIUM SERPL-MCNC: 8.2 MG/DL (ref 8.3–10.4)
CALCIUM SERPL-MCNC: 8.3 MG/DL (ref 8.3–10.4)
CALCIUM SERPL-MCNC: 8.4 MG/DL (ref 8.3–10.4)
CALCIUM SERPL-MCNC: 8.5 MG/DL (ref 8.3–10.4)
CALCIUM SERPL-MCNC: 8.6 MG/DL (ref 8.3–10.4)
CALCIUM SERPL-MCNC: 8.7 MG/DL (ref 8.3–10.4)
CALCIUM SERPL-MCNC: 8.7 MG/DL (ref 8.3–10.4)
CALCIUM SERPL-MCNC: 8.8 MG/DL (ref 8.3–10.4)
CALCIUM SERPL-MCNC: 8.8 MG/DL (ref 8.3–10.4)
CALCIUM SERPL-MCNC: 8.9 MG/DL (ref 8.3–10.4)
CALCIUM SERPL-MCNC: 9 MG/DL (ref 8.3–10.4)
CALCIUM SERPL-MCNC: 9.1 MG/DL (ref 8.3–10.4)
CALCIUM SERPL-MCNC: 9.1 MG/DL (ref 8.3–10.4)
CALCIUM SERPL-MCNC: 9.2 MG/DL (ref 8.3–10.4)
CALCIUM SERPL-MCNC: 9.2 MG/DL (ref 8.3–10.4)
CALCIUM SERPL-MCNC: 9.4 MG/DL (ref 8.3–10.4)
CALCIUM SERPL-MCNC: 9.5 MG/DL (ref 8.3–10.4)
CALCULATED P AXIS, ECG09: -91 DEGREES
CALCULATED P AXIS, ECG09: -96 DEGREES
CALCULATED P AXIS, ECG09: 65 DEGREES
CALCULATED P AXIS, ECG09: 73 DEGREES
CALCULATED P AXIS, ECG09: 80 DEGREES
CALCULATED P AXIS, ECG09: 87 DEGREES
CALCULATED R AXIS, ECG10: -87 DEGREES
CALCULATED R AXIS, ECG10: -95 DEGREES
CALCULATED R AXIS, ECG10: -95 DEGREES
CALCULATED R AXIS, ECG10: -96 DEGREES
CALCULATED R AXIS, ECG10: -97 DEGREES
CALCULATED R AXIS, ECG10: 63 DEGREES
CALCULATED R AXIS, ECG10: 68 DEGREES
CALCULATED R AXIS, ECG10: 73 DEGREES
CALCULATED R AXIS, ECG10: 77 DEGREES
CALCULATED T AXIS, ECG11: 73 DEGREES
CALCULATED T AXIS, ECG11: 75 DEGREES
CALCULATED T AXIS, ECG11: 84 DEGREES
CALCULATED T AXIS, ECG11: 85 DEGREES
CALCULATED T AXIS, ECG11: 87 DEGREES
CALCULATED T AXIS, ECG11: 88 DEGREES
CALCULATED T AXIS, ECG11: 89 DEGREES
CALCULATED T AXIS, ECG11: 89 DEGREES
CALCULATED T AXIS, ECG11: 92 DEGREES
CANNABINOIDS UR QL SCN: NEGATIVE
CHLORIDE SERPL-SCNC: 102 MMOL/L (ref 98–107)
CHLORIDE SERPL-SCNC: 102 MMOL/L (ref 98–107)
CHLORIDE SERPL-SCNC: 103 MMOL/L (ref 98–107)
CHLORIDE SERPL-SCNC: 104 MMOL/L (ref 98–107)
CHLORIDE SERPL-SCNC: 104 MMOL/L (ref 98–107)
CHLORIDE SERPL-SCNC: 105 MMOL/L (ref 98–107)
CHLORIDE SERPL-SCNC: 106 MMOL/L (ref 98–107)
CHLORIDE SERPL-SCNC: 106 MMOL/L (ref 98–107)
CHLORIDE SERPL-SCNC: 107 MMOL/L (ref 98–107)
CHLORIDE SERPL-SCNC: 108 MMOL/L (ref 98–107)
CHLORIDE SERPL-SCNC: 109 MMOL/L (ref 98–107)
CHLORIDE SERPL-SCNC: 111 MMOL/L (ref 98–107)
CHLORIDE SERPL-SCNC: 111 MMOL/L (ref 98–107)
CHLORIDE SERPL-SCNC: 112 MMOL/L (ref 98–107)
CHLORIDE SERPL-SCNC: 115 MMOL/L (ref 98–107)
CHLORIDE SERPL-SCNC: 116 MMOL/L (ref 98–107)
CHLORIDE SERPL-SCNC: 119 MMOL/L (ref 98–107)
CHLORIDE SERPL-SCNC: 122 MMOL/L (ref 98–107)
CHLORIDE SERPL-SCNC: 99 MMOL/L (ref 98–107)
CO2 BLD-SCNC: 22 MMOL/L (ref 13–23)
CO2 SERPL-SCNC: 17 MMOL/L (ref 21–32)
CO2 SERPL-SCNC: 19 MMOL/L (ref 21–32)
CO2 SERPL-SCNC: 23 MMOL/L (ref 21–32)
CO2 SERPL-SCNC: 24 MMOL/L (ref 21–32)
CO2 SERPL-SCNC: 25 MMOL/L (ref 21–32)
CO2 SERPL-SCNC: 27 MMOL/L (ref 21–32)
CO2 SERPL-SCNC: 28 MMOL/L (ref 21–32)
CO2 SERPL-SCNC: 29 MMOL/L (ref 21–32)
CO2 SERPL-SCNC: 30 MMOL/L (ref 21–32)
CO2 SERPL-SCNC: 31 MMOL/L (ref 21–32)
CO2 SERPL-SCNC: 31 MMOL/L (ref 21–32)
CO2 SERPL-SCNC: 32 MMOL/L (ref 21–32)
CO2 SERPL-SCNC: 33 MMOL/L (ref 21–32)
CO2 SERPL-SCNC: 34 MMOL/L (ref 21–32)
CO2 SERPL-SCNC: 35 MMOL/L (ref 21–32)
CO2 SERPL-SCNC: 35 MMOL/L (ref 21–32)
CO2 SERPL-SCNC: 36 MMOL/L (ref 21–32)
CO2 SERPL-SCNC: 36 MMOL/L (ref 21–32)
CO2 SERPL-SCNC: 37 MMOL/L (ref 21–32)
CO2 SERPL-SCNC: 37 MMOL/L (ref 21–32)
CO2 SERPL-SCNC: 38 MMOL/L (ref 21–32)
CO2 SERPL-SCNC: 39 MMOL/L (ref 21–32)
COCAINE UR QL SCN: NEGATIVE
COLOR UR: YELLOW
COVID-19 RAPID TEST, COVR: NOT DETECTED
COVID-19 RAPID TEST, COVR: NOT DETECTED
CREAT SERPL-MCNC: 1.08 MG/DL (ref 0.8–1.5)
CREAT SERPL-MCNC: 1.21 MG/DL (ref 0.8–1.5)
CREAT SERPL-MCNC: 1.28 MG/DL (ref 0.8–1.5)
CREAT SERPL-MCNC: 1.29 MG/DL (ref 0.8–1.5)
CREAT SERPL-MCNC: 1.31 MG/DL (ref 0.8–1.5)
CREAT SERPL-MCNC: 1.39 MG/DL (ref 0.8–1.5)
CREAT SERPL-MCNC: 1.44 MG/DL (ref 0.8–1.5)
CREAT SERPL-MCNC: 1.46 MG/DL (ref 0.8–1.5)
CREAT SERPL-MCNC: 1.5 MG/DL (ref 0.8–1.5)
CREAT SERPL-MCNC: 1.54 MG/DL (ref 0.8–1.5)
CREAT SERPL-MCNC: 1.6 MG/DL (ref 0.8–1.5)
CREAT SERPL-MCNC: 1.62 MG/DL (ref 0.8–1.5)
CREAT SERPL-MCNC: 1.63 MG/DL (ref 0.8–1.5)
CREAT SERPL-MCNC: 1.65 MG/DL (ref 0.8–1.5)
CREAT SERPL-MCNC: 1.68 MG/DL (ref 0.8–1.5)
CREAT SERPL-MCNC: 1.7 MG/DL (ref 0.8–1.5)
CREAT SERPL-MCNC: 1.75 MG/DL (ref 0.8–1.5)
CREAT SERPL-MCNC: 1.79 MG/DL (ref 0.8–1.5)
CREAT SERPL-MCNC: 1.8 MG/DL (ref 0.8–1.5)
CREAT SERPL-MCNC: 1.81 MG/DL (ref 0.8–1.5)
CREAT SERPL-MCNC: 1.82 MG/DL (ref 0.8–1.5)
CREAT SERPL-MCNC: 1.83 MG/DL (ref 0.8–1.5)
CREAT SERPL-MCNC: 1.86 MG/DL (ref 0.8–1.5)
CREAT SERPL-MCNC: 1.86 MG/DL (ref 0.8–1.5)
CREAT SERPL-MCNC: 1.9 MG/DL (ref 0.8–1.5)
CREAT SERPL-MCNC: 1.92 MG/DL (ref 0.8–1.5)
CREAT SERPL-MCNC: 2.01 MG/DL (ref 0.8–1.5)
CREAT SERPL-MCNC: 2.12 MG/DL (ref 0.8–1.5)
CREAT SERPL-MCNC: 2.23 MG/DL (ref 0.8–1.5)
CREAT SERPL-MCNC: 2.28 MG/DL (ref 0.8–1.5)
CREAT SERPL-MCNC: 2.32 MG/DL (ref 0.8–1.5)
CREAT SERPL-MCNC: 2.65 MG/DL (ref 0.8–1.5)
CREAT SERPL-MCNC: 3.49 MG/DL (ref 0.8–1.5)
CROSSMATCH RESULT,%XM: NORMAL
DIAGNOSIS, 93000: NORMAL
DIFFERENTIAL METHOD BLD: ABNORMAL
ECHO AO ROOT DIAM: 2.6 CM
ECHO AV AREA PEAK VELOCITY: 2.04 CM2
ECHO AV AREA/BSA PEAK VELOCITY: 1.1 CM2/M2
ECHO AV PEAK GRADIENT: 9 MMHG
ECHO AV PEAK VELOCITY: 150 CM/S
ECHO LV E' LATERAL VELOCITY: 7.25 CM/S
ECHO LV E' SEPTAL VELOCITY: 10.5 CM/S
ECHO LV E' SEPTAL VELOCITY: 10.5 CM/S
ECHO LV E' SEPTAL VELOCITY: 7.25 CM/S
ECHO LV INTERNAL DIMENSION DIASTOLIC: 5.2 CM (ref 4.2–5.9)
ECHO LV INTERNAL DIMENSION SYSTOLIC: 4.1 CM
ECHO LV IVSD: 0.9 CM (ref 0.6–1)
ECHO LV MASS 2D: 169 G (ref 88–224)
ECHO LV MASS INDEX 2D: 88.5 G/M2 (ref 49–115)
ECHO LV POSTERIOR WALL DIASTOLIC: 0.9 CM (ref 0.6–1)
ECHO LVOT DIAM: 2 CM
ECHO LVOT PEAK GRADIENT: 4 MMHG
ECHO MV A VELOCITY: 111 CM/S
ECHO MV E VELOCITY: 83.9 CM/S
ECHO MV E/A RATIO: 0.76
ECHO MV E/E' LATERAL: 11.57
ECHO PV REGURGITANT MAX VELOCITY: 271 CM/S
ECHO PV REGURGITANT MAX VELOCITY: 97.3 CM/S
ECHO RV INTERNAL DIMENSION: 2.5 CM
ECHO RV TAPSE: 2.44 CM (ref 1.5–2)
ECHO TV REGURGITANT PEAK GRADIENT: 29 MMHG
EOSINOPHIL # BLD: 0 K/UL (ref 0–0.8)
EOSINOPHIL # BLD: 0.1 K/UL (ref 0–0.8)
EOSINOPHIL # BLD: 0.2 K/UL (ref 0–0.8)
EOSINOPHIL # BLD: 0.3 K/UL (ref 0–0.8)
EOSINOPHIL NFR BLD: 0 % (ref 0.5–7.8)
EOSINOPHIL NFR BLD: 1 % (ref 0.5–7.8)
EOSINOPHIL NFR BLD: 2 % (ref 0.5–7.8)
EOSINOPHIL NFR BLD: 3 % (ref 0.5–7.8)
EOSINOPHIL NFR BLD: 4 % (ref 0.5–7.8)
EOSINOPHIL NFR BLD: 4 % (ref 0.5–7.8)
ERYTHROCYTE [DISTWIDTH] IN BLOOD BY AUTOMATED COUNT: 16.2 % (ref 11.9–14.6)
ERYTHROCYTE [DISTWIDTH] IN BLOOD BY AUTOMATED COUNT: 16.3 % (ref 11.9–14.6)
ERYTHROCYTE [DISTWIDTH] IN BLOOD BY AUTOMATED COUNT: 16.3 % (ref 11.9–14.6)
ERYTHROCYTE [DISTWIDTH] IN BLOOD BY AUTOMATED COUNT: 16.4 % (ref 11.9–14.6)
ERYTHROCYTE [DISTWIDTH] IN BLOOD BY AUTOMATED COUNT: 16.5 % (ref 11.9–14.6)
ERYTHROCYTE [DISTWIDTH] IN BLOOD BY AUTOMATED COUNT: 16.6 % (ref 11.9–14.6)
ERYTHROCYTE [DISTWIDTH] IN BLOOD BY AUTOMATED COUNT: 16.7 % (ref 11.9–14.6)
ERYTHROCYTE [DISTWIDTH] IN BLOOD BY AUTOMATED COUNT: 16.7 % (ref 11.9–14.6)
ERYTHROCYTE [DISTWIDTH] IN BLOOD BY AUTOMATED COUNT: 16.8 % (ref 11.9–14.6)
ERYTHROCYTE [DISTWIDTH] IN BLOOD BY AUTOMATED COUNT: 16.9 % (ref 11.9–14.6)
ERYTHROCYTE [DISTWIDTH] IN BLOOD BY AUTOMATED COUNT: 17 % (ref 11.9–14.6)
ERYTHROCYTE [DISTWIDTH] IN BLOOD BY AUTOMATED COUNT: 17 % (ref 11.9–14.6)
ERYTHROCYTE [DISTWIDTH] IN BLOOD BY AUTOMATED COUNT: 17.1 % (ref 11.9–14.6)
ERYTHROCYTE [DISTWIDTH] IN BLOOD BY AUTOMATED COUNT: 17.2 % (ref 11.9–14.6)
ERYTHROCYTE [DISTWIDTH] IN BLOOD BY AUTOMATED COUNT: 17.3 % (ref 11.9–14.6)
ERYTHROCYTE [DISTWIDTH] IN BLOOD BY AUTOMATED COUNT: 18.3 % (ref 11.9–14.6)
ERYTHROCYTE [DISTWIDTH] IN BLOOD BY AUTOMATED COUNT: 19.2 % (ref 11.9–14.6)
ERYTHROCYTE [DISTWIDTH] IN BLOOD BY AUTOMATED COUNT: 19.8 % (ref 11.9–14.6)
FERRITIN SERPL-MCNC: 410 NG/ML (ref 8–388)
FERRITIN SERPL-MCNC: 65 NG/ML (ref 8–388)
FIO2, L/MIN - FIO2P: 4
GAS FLOW.O2 O2 DELIVERY SYS: ABNORMAL L/MIN
GLOBULIN SER CALC-MCNC: 2 G/DL (ref 2.3–3.5)
GLOBULIN SER CALC-MCNC: 2.2 G/DL (ref 2.3–3.5)
GLOBULIN SER CALC-MCNC: 2.2 G/DL (ref 2.3–3.5)
GLOBULIN SER CALC-MCNC: 2.8 G/DL (ref 2.3–3.5)
GLOBULIN SER CALC-MCNC: 3.1 G/DL (ref 2.3–3.5)
GLOBULIN SER CALC-MCNC: 3.4 G/DL (ref 2.3–3.5)
GLOBULIN SER CALC-MCNC: 3.5 G/DL (ref 2.3–3.5)
GLOBULIN SER CALC-MCNC: 3.6 G/DL (ref 2.3–3.5)
GLOBULIN SER CALC-MCNC: 3.7 G/DL (ref 2.3–3.5)
GLOBULIN SER CALC-MCNC: 3.7 G/DL (ref 2.3–3.5)
GLOBULIN SER CALC-MCNC: 3.8 G/DL (ref 2.3–3.5)
GLOBULIN SER CALC-MCNC: 4 G/DL (ref 2.3–3.5)
GLOBULIN SER CALC-MCNC: 4.1 G/DL (ref 2.3–3.5)
GLOBULIN SER CALC-MCNC: 4.2 G/DL (ref 2.3–3.5)
GLOBULIN SER CALC-MCNC: 4.3 G/DL (ref 2.3–3.5)
GLOBULIN SER CALC-MCNC: 5.6 G/DL (ref 2.3–3.5)
GLUCOSE BLD STRIP.AUTO-MCNC: 111 MG/DL (ref 65–100)
GLUCOSE BLD STRIP.AUTO-MCNC: 112 MG/DL (ref 65–100)
GLUCOSE BLD STRIP.AUTO-MCNC: 118 MG/DL (ref 65–100)
GLUCOSE BLD STRIP.AUTO-MCNC: 118 MG/DL (ref 65–100)
GLUCOSE BLD STRIP.AUTO-MCNC: 119 MG/DL (ref 65–100)
GLUCOSE BLD STRIP.AUTO-MCNC: 120 MG/DL (ref 65–100)
GLUCOSE BLD STRIP.AUTO-MCNC: 125 MG/DL (ref 65–100)
GLUCOSE BLD STRIP.AUTO-MCNC: 128 MG/DL (ref 65–100)
GLUCOSE BLD STRIP.AUTO-MCNC: 160 MG/DL (ref 65–100)
GLUCOSE BLD STRIP.AUTO-MCNC: 74 MG/DL (ref 65–100)
GLUCOSE BLD STRIP.AUTO-MCNC: 80 MG/DL (ref 65–100)
GLUCOSE BLD STRIP.AUTO-MCNC: 80 MG/DL (ref 65–100)
GLUCOSE BLD STRIP.AUTO-MCNC: 89 MG/DL (ref 65–100)
GLUCOSE SERPL-MCNC: 100 MG/DL (ref 65–100)
GLUCOSE SERPL-MCNC: 101 MG/DL (ref 65–100)
GLUCOSE SERPL-MCNC: 104 MG/DL (ref 65–100)
GLUCOSE SERPL-MCNC: 104 MG/DL (ref 65–100)
GLUCOSE SERPL-MCNC: 105 MG/DL (ref 65–100)
GLUCOSE SERPL-MCNC: 106 MG/DL (ref 65–100)
GLUCOSE SERPL-MCNC: 108 MG/DL (ref 65–100)
GLUCOSE SERPL-MCNC: 110 MG/DL (ref 65–100)
GLUCOSE SERPL-MCNC: 112 MG/DL (ref 65–100)
GLUCOSE SERPL-MCNC: 114 MG/DL (ref 65–100)
GLUCOSE SERPL-MCNC: 115 MG/DL (ref 65–100)
GLUCOSE SERPL-MCNC: 118 MG/DL (ref 65–100)
GLUCOSE SERPL-MCNC: 119 MG/DL (ref 65–100)
GLUCOSE SERPL-MCNC: 120 MG/DL (ref 65–100)
GLUCOSE SERPL-MCNC: 122 MG/DL (ref 65–100)
GLUCOSE SERPL-MCNC: 129 MG/DL (ref 65–100)
GLUCOSE SERPL-MCNC: 132 MG/DL (ref 65–100)
GLUCOSE SERPL-MCNC: 134 MG/DL (ref 65–100)
GLUCOSE SERPL-MCNC: 135 MG/DL (ref 65–100)
GLUCOSE SERPL-MCNC: 136 MG/DL (ref 65–100)
GLUCOSE SERPL-MCNC: 136 MG/DL (ref 65–100)
GLUCOSE SERPL-MCNC: 141 MG/DL (ref 65–100)
GLUCOSE SERPL-MCNC: 147 MG/DL (ref 65–100)
GLUCOSE SERPL-MCNC: 152 MG/DL (ref 65–100)
GLUCOSE SERPL-MCNC: 155 MG/DL (ref 65–100)
GLUCOSE SERPL-MCNC: 167 MG/DL (ref 65–100)
GLUCOSE SERPL-MCNC: 184 MG/DL (ref 65–100)
GLUCOSE SERPL-MCNC: 81 MG/DL (ref 65–100)
GLUCOSE SERPL-MCNC: 86 MG/DL (ref 65–100)
GLUCOSE SERPL-MCNC: 88 MG/DL (ref 65–100)
GLUCOSE SERPL-MCNC: 92 MG/DL (ref 65–100)
GLUCOSE SERPL-MCNC: 93 MG/DL (ref 65–100)
GLUCOSE SERPL-MCNC: 94 MG/DL (ref 65–100)
GLUCOSE SERPL-MCNC: 97 MG/DL (ref 65–100)
GLUCOSE SERPL-MCNC: 98 MG/DL (ref 65–100)
GLUCOSE SERPL-MCNC: 99 MG/DL (ref 65–100)
GLUCOSE UR STRIP.AUTO-MCNC: NEGATIVE MG/DL
GRAM STN SPEC: ABNORMAL
HCO3 BLD-SCNC: 16.4 MMOL/L (ref 22–26)
HCO3 BLD-SCNC: 18.7 MMOL/L (ref 22–26)
HCO3 BLD-SCNC: 20.1 MMOL/L (ref 22–26)
HCO3 BLD-SCNC: 20.8 MMOL/L (ref 22–26)
HCO3 BLD-SCNC: 22.1 MMOL/L (ref 22–26)
HCO3 BLD-SCNC: 30.6 MMOL/L (ref 22–26)
HCO3 BLD-SCNC: 30.9 MMOL/L (ref 22–26)
HCO3 BLD-SCNC: 34.2 MMOL/L (ref 22–26)
HCO3 BLD-SCNC: 36.4 MMOL/L (ref 22–26)
HCT VFR BLD AUTO: 19.1 % (ref 41.1–50.3)
HCT VFR BLD AUTO: 22.2 % (ref 41.1–50.3)
HCT VFR BLD AUTO: 22.8 % (ref 41.1–50.3)
HCT VFR BLD AUTO: 24.7 % (ref 41.1–50.3)
HCT VFR BLD AUTO: 24.9 % (ref 41.1–50.3)
HCT VFR BLD AUTO: 25.4 % (ref 41.1–50.3)
HCT VFR BLD AUTO: 26.5 % (ref 41.1–50.3)
HCT VFR BLD AUTO: 26.6 % (ref 41.1–50.3)
HCT VFR BLD AUTO: 26.8 % (ref 41.1–50.3)
HCT VFR BLD AUTO: 26.9 % (ref 41.1–50.3)
HCT VFR BLD AUTO: 27.3 % (ref 41.1–50.3)
HCT VFR BLD AUTO: 28.5 % (ref 41.1–50.3)
HCT VFR BLD AUTO: 28.7 % (ref 41.1–50.3)
HCT VFR BLD AUTO: 28.8 % (ref 41.1–50.3)
HCT VFR BLD AUTO: 28.9 % (ref 41.1–50.3)
HCT VFR BLD AUTO: 29.7 % (ref 41.1–50.3)
HCT VFR BLD AUTO: 30.3 % (ref 41.1–50.3)
HCT VFR BLD AUTO: 30.9 % (ref 41.1–50.3)
HCT VFR BLD AUTO: 31.3 % (ref 41.1–50.3)
HCT VFR BLD AUTO: 31.4 % (ref 41.1–50.3)
HCT VFR BLD AUTO: 31.9 % (ref 41.1–50.3)
HCT VFR BLD AUTO: 31.9 % (ref 41.1–50.3)
HCT VFR BLD AUTO: 32.2 % (ref 41.1–50.3)
HCT VFR BLD AUTO: 32.4 % (ref 41.1–50.3)
HCT VFR BLD AUTO: 32.6 % (ref 41.1–50.3)
HCT VFR BLD AUTO: 32.9 % (ref 41.1–50.3)
HCT VFR BLD AUTO: 32.9 % (ref 41.1–50.3)
HCT VFR BLD AUTO: 33.2 % (ref 41.1–50.3)
HCT VFR BLD AUTO: 33.6 % (ref 41.1–50.3)
HCT VFR BLD AUTO: 33.8 % (ref 41.1–50.3)
HCT VFR BLD AUTO: 33.9 % (ref 41.1–50.3)
HCT VFR BLD AUTO: 34.5 % (ref 41.1–50.3)
HCT VFR BLD AUTO: 34.6 % (ref 41.1–50.3)
HCT VFR BLD AUTO: 34.9 % (ref 41.1–50.3)
HCT VFR BLD AUTO: 35.2 % (ref 41.1–50.3)
HCT VFR BLD AUTO: 35.3 % (ref 41.1–50.3)
HCT VFR BLD AUTO: 35.5 % (ref 41.1–50.3)
HCT VFR BLD AUTO: 36 %
HCT VFR BLD AUTO: 37 % (ref 41.1–50.3)
HCT VFR BLD AUTO: 38.2 % (ref 41.1–50.3)
HCT VFR BLD AUTO: 38.3 % (ref 41.1–50.3)
HGB BLD-MCNC: 10 G/DL (ref 13.6–17.2)
HGB BLD-MCNC: 10.2 G/DL (ref 13.6–17.2)
HGB BLD-MCNC: 10.5 G/DL (ref 13.6–17.2)
HGB BLD-MCNC: 10.5 G/DL (ref 13.6–17.2)
HGB BLD-MCNC: 10.6 G/DL (ref 13.6–17.2)
HGB BLD-MCNC: 10.7 G/DL (ref 13.6–17.2)
HGB BLD-MCNC: 10.7 G/DL (ref 13.6–17.2)
HGB BLD-MCNC: 11.2 G/DL (ref 13.6–17.2)
HGB BLD-MCNC: 11.8 G/DL (ref 13.6–17.2)
HGB BLD-MCNC: 5.8 G/DL (ref 13.6–17.2)
HGB BLD-MCNC: 6.5 G/DL (ref 13.6–17.2)
HGB BLD-MCNC: 6.9 G/DL (ref 13.6–17.2)
HGB BLD-MCNC: 7.3 G/DL (ref 13.6–17.2)
HGB BLD-MCNC: 7.4 G/DL (ref 13.6–17.2)
HGB BLD-MCNC: 7.5 G/DL (ref 13.6–17.2)
HGB BLD-MCNC: 7.7 G/DL (ref 13.6–17.2)
HGB BLD-MCNC: 7.8 G/DL (ref 13.6–17.2)
HGB BLD-MCNC: 7.9 G/DL (ref 13.6–17.2)
HGB BLD-MCNC: 8.3 G/DL (ref 13.6–17.2)
HGB BLD-MCNC: 8.4 G/DL (ref 13.6–17.2)
HGB BLD-MCNC: 8.4 G/DL (ref 13.6–17.2)
HGB BLD-MCNC: 8.6 G/DL (ref 13.6–17.2)
HGB BLD-MCNC: 8.7 G/DL (ref 13.6–17.2)
HGB BLD-MCNC: 8.7 G/DL (ref 13.6–17.2)
HGB BLD-MCNC: 8.9 G/DL (ref 13.6–17.2)
HGB BLD-MCNC: 9 G/DL (ref 13.6–17.2)
HGB BLD-MCNC: 9.1 G/DL (ref 13.6–17.2)
HGB BLD-MCNC: 9.2 G/DL (ref 13.6–17.2)
HGB BLD-MCNC: 9.2 G/DL (ref 13.6–17.2)
HGB BLD-MCNC: 9.3 G/DL (ref 13.6–17.2)
HGB BLD-MCNC: 9.4 G/DL (ref 13.6–17.2)
HGB BLD-MCNC: 9.4 G/DL (ref 13.6–17.2)
HGB BLD-MCNC: 9.5 G/DL (ref 13.6–17.2)
HGB BLD-MCNC: 9.5 G/DL (ref 13.6–17.2)
HGB BLD-MCNC: 9.6 G/DL (ref 13.6–17.2)
HGB BLD-MCNC: 9.6 G/DL (ref 13.6–17.2)
HGB BLD-MCNC: 9.7 G/DL (ref 13.6–17.2)
HGB BLD-MCNC: 9.7 G/DL (ref 13.6–17.2)
HGB BLD-MCNC: 9.8 G/DL (ref 13.6–17.2)
HGB BLD-MCNC: 9.8 G/DL (ref 13.6–17.2)
HGB BLD-MCNC: 9.9 G/DL (ref 13.6–17.2)
HGB BLD-MCNC: 9.9 G/DL (ref 13.6–17.2)
HGB UR QL STRIP: NEGATIVE
HISTORY CHECKED?,CKHIST: NORMAL
IMM GRANULOCYTES # BLD AUTO: 0 K/UL (ref 0–0.5)
IMM GRANULOCYTES # BLD AUTO: 0.1 K/UL (ref 0–0.5)
IMM GRANULOCYTES # BLD AUTO: 0.2 K/UL (ref 0–0.5)
IMM GRANULOCYTES # BLD AUTO: 0.3 K/UL (ref 0–0.5)
IMM GRANULOCYTES # BLD AUTO: 0.4 K/UL (ref 0–0.5)
IMM GRANULOCYTES # BLD AUTO: 0.5 K/UL (ref 0–0.5)
IMM GRANULOCYTES NFR BLD AUTO: 0 % (ref 0–5)
IMM GRANULOCYTES NFR BLD AUTO: 1 % (ref 0–5)
IMM GRANULOCYTES NFR BLD AUTO: 2 % (ref 0–5)
IMM GRANULOCYTES NFR BLD AUTO: 3 % (ref 0–5)
IMM GRANULOCYTES NFR BLD AUTO: 3 % (ref 0–5)
IMM GRANULOCYTES NFR BLD AUTO: 4 % (ref 0–5)
IMM GRANULOCYTES NFR BLD AUTO: 4 % (ref 0–5)
IMM GRANULOCYTES NFR BLD AUTO: 5 % (ref 0–5)
INR PPP: 0.9
INR PPP: 1
INTERPRETATION: ABNORMAL
IRON SATN MFR SERPL: 13 %
IRON SATN MFR SERPL: 16 %
IRON SERPL-MCNC: 43 UG/DL (ref 35–150)
IRON SERPL-MCNC: 46 UG/DL (ref 35–150)
KETONES UR QL STRIP.AUTO: NEGATIVE MG/DL
LACTATE SERPL-SCNC: 1.1 MMOL/L (ref 0.4–2)
LACTATE SERPL-SCNC: 1.2 MMOL/L (ref 0.4–2)
LACTATE SERPL-SCNC: 1.2 MMOL/L (ref 0.4–2)
LEUKOCYTE ESTERASE UR QL STRIP.AUTO: NEGATIVE
LYMPHOCYTES # BLD: 0.6 K/UL (ref 0.5–4.6)
LYMPHOCYTES # BLD: 0.7 K/UL (ref 0.5–4.6)
LYMPHOCYTES # BLD: 0.8 K/UL (ref 0.5–4.6)
LYMPHOCYTES # BLD: 0.9 K/UL (ref 0.5–4.6)
LYMPHOCYTES # BLD: 1 K/UL (ref 0.5–4.6)
LYMPHOCYTES # BLD: 1.1 K/UL (ref 0.5–4.6)
LYMPHOCYTES # BLD: 1.1 K/UL (ref 0.5–4.6)
LYMPHOCYTES # BLD: 1.3 K/UL (ref 0.5–4.6)
LYMPHOCYTES # BLD: 1.3 K/UL (ref 0.5–4.6)
LYMPHOCYTES # BLD: 1.5 K/UL (ref 0.5–4.6)
LYMPHOCYTES # BLD: 1.7 K/UL (ref 0.5–4.6)
LYMPHOCYTES # BLD: 1.7 K/UL (ref 0.5–4.6)
LYMPHOCYTES # BLD: 1.8 K/UL (ref 0.5–4.6)
LYMPHOCYTES # BLD: 1.8 K/UL (ref 0.5–4.6)
LYMPHOCYTES # BLD: 1.9 K/UL (ref 0.5–4.6)
LYMPHOCYTES # BLD: 1.9 K/UL (ref 0.5–4.6)
LYMPHOCYTES # BLD: 2 K/UL (ref 0.5–4.6)
LYMPHOCYTES # BLD: 2.3 K/UL (ref 0.5–4.6)
LYMPHOCYTES # BLD: 2.3 K/UL (ref 0.5–4.6)
LYMPHOCYTES # BLD: 2.7 K/UL (ref 0.5–4.6)
LYMPHOCYTES # BLD: 3.3 K/UL (ref 0.5–4.6)
LYMPHOCYTES NFR BLD: 10 % (ref 13–44)
LYMPHOCYTES NFR BLD: 10 % (ref 13–44)
LYMPHOCYTES NFR BLD: 12 % (ref 13–44)
LYMPHOCYTES NFR BLD: 14 % (ref 13–44)
LYMPHOCYTES NFR BLD: 14 % (ref 13–44)
LYMPHOCYTES NFR BLD: 15 % (ref 13–44)
LYMPHOCYTES NFR BLD: 16 % (ref 13–44)
LYMPHOCYTES NFR BLD: 16 % (ref 13–44)
LYMPHOCYTES NFR BLD: 18 % (ref 13–44)
LYMPHOCYTES NFR BLD: 19 % (ref 13–44)
LYMPHOCYTES NFR BLD: 20 % (ref 13–44)
LYMPHOCYTES NFR BLD: 24 % (ref 13–44)
LYMPHOCYTES NFR BLD: 3 % (ref 13–44)
LYMPHOCYTES NFR BLD: 30 % (ref 13–44)
LYMPHOCYTES NFR BLD: 31 % (ref 13–44)
LYMPHOCYTES NFR BLD: 32 % (ref 13–44)
LYMPHOCYTES NFR BLD: 33 % (ref 13–44)
LYMPHOCYTES NFR BLD: 34 % (ref 13–44)
LYMPHOCYTES NFR BLD: 35 % (ref 13–44)
LYMPHOCYTES NFR BLD: 35 % (ref 13–44)
LYMPHOCYTES NFR BLD: 45 % (ref 13–44)
LYMPHOCYTES NFR BLD: 5 % (ref 13–44)
LYMPHOCYTES NFR BLD: 8 % (ref 13–44)
MAGNESIUM SERPL-MCNC: 2 MG/DL (ref 1.8–2.4)
MAGNESIUM SERPL-MCNC: 2 MG/DL (ref 1.8–2.4)
MAGNESIUM SERPL-MCNC: 2.1 MG/DL (ref 1.8–2.4)
MAGNESIUM SERPL-MCNC: 2.2 MG/DL (ref 1.8–2.4)
MAGNESIUM SERPL-MCNC: 2.2 MG/DL (ref 1.8–2.4)
MAGNESIUM SERPL-MCNC: 4.6 MG/DL (ref 1.8–2.4)
MCH RBC QN AUTO: 25.5 PG (ref 26.1–32.9)
MCH RBC QN AUTO: 26.4 PG (ref 26.1–32.9)
MCH RBC QN AUTO: 26.5 PG (ref 26.1–32.9)
MCH RBC QN AUTO: 26.6 PG (ref 26.1–32.9)
MCH RBC QN AUTO: 26.6 PG (ref 26.1–32.9)
MCH RBC QN AUTO: 26.7 PG (ref 26.1–32.9)
MCH RBC QN AUTO: 26.8 PG (ref 26.1–32.9)
MCH RBC QN AUTO: 26.8 PG (ref 26.1–32.9)
MCH RBC QN AUTO: 26.9 PG (ref 26.1–32.9)
MCH RBC QN AUTO: 26.9 PG (ref 26.1–32.9)
MCH RBC QN AUTO: 27 PG (ref 26.1–32.9)
MCH RBC QN AUTO: 27.1 PG (ref 26.1–32.9)
MCH RBC QN AUTO: 27.1 PG (ref 26.1–32.9)
MCH RBC QN AUTO: 27.2 PG (ref 26.1–32.9)
MCH RBC QN AUTO: 27.2 PG (ref 26.1–32.9)
MCH RBC QN AUTO: 27.3 PG (ref 26.1–32.9)
MCH RBC QN AUTO: 27.4 PG (ref 26.1–32.9)
MCH RBC QN AUTO: 27.5 PG (ref 26.1–32.9)
MCH RBC QN AUTO: 27.5 PG (ref 26.1–32.9)
MCH RBC QN AUTO: 27.6 PG (ref 26.1–32.9)
MCH RBC QN AUTO: 27.7 PG (ref 26.1–32.9)
MCH RBC QN AUTO: 28 PG (ref 26.1–32.9)
MCH RBC QN AUTO: 28.2 PG (ref 26.1–32.9)
MCH RBC QN AUTO: 28.3 PG (ref 26.1–32.9)
MCH RBC QN AUTO: 28.4 PG (ref 26.1–32.9)
MCH RBC QN AUTO: 28.7 PG (ref 26.1–32.9)
MCH RBC QN AUTO: 29.1 PG (ref 26.1–32.9)
MCHC RBC AUTO-ENTMCNC: 28.6 G/DL (ref 31.4–35)
MCHC RBC AUTO-ENTMCNC: 28.8 G/DL (ref 31.4–35)
MCHC RBC AUTO-ENTMCNC: 28.9 G/DL (ref 31.4–35)
MCHC RBC AUTO-ENTMCNC: 29.1 G/DL (ref 31.4–35)
MCHC RBC AUTO-ENTMCNC: 29.2 G/DL (ref 31.4–35)
MCHC RBC AUTO-ENTMCNC: 29.2 G/DL (ref 31.4–35)
MCHC RBC AUTO-ENTMCNC: 29.3 G/DL (ref 31.4–35)
MCHC RBC AUTO-ENTMCNC: 29.4 G/DL (ref 31.4–35)
MCHC RBC AUTO-ENTMCNC: 29.4 G/DL (ref 31.4–35)
MCHC RBC AUTO-ENTMCNC: 29.5 G/DL (ref 31.4–35)
MCHC RBC AUTO-ENTMCNC: 29.5 G/DL (ref 31.4–35)
MCHC RBC AUTO-ENTMCNC: 29.6 G/DL (ref 31.4–35)
MCHC RBC AUTO-ENTMCNC: 29.8 G/DL (ref 31.4–35)
MCHC RBC AUTO-ENTMCNC: 29.9 G/DL (ref 31.4–35)
MCHC RBC AUTO-ENTMCNC: 30 G/DL (ref 31.4–35)
MCHC RBC AUTO-ENTMCNC: 30.1 G/DL (ref 31.4–35)
MCHC RBC AUTO-ENTMCNC: 30.2 G/DL (ref 31.4–35)
MCHC RBC AUTO-ENTMCNC: 30.3 G/DL (ref 31.4–35)
MCHC RBC AUTO-ENTMCNC: 30.4 G/DL (ref 31.4–35)
MCHC RBC AUTO-ENTMCNC: 30.8 G/DL (ref 31.4–35)
MCHC RBC AUTO-ENTMCNC: 31.1 G/DL (ref 31.4–35)
MCHC RBC AUTO-ENTMCNC: 31.2 G/DL (ref 31.4–35)
MCHC RBC AUTO-ENTMCNC: 31.3 G/DL (ref 31.4–35)
MCHC RBC AUTO-ENTMCNC: 31.9 G/DL (ref 31.4–35)
MCHC RBC AUTO-ENTMCNC: 32 G/DL (ref 31.4–35)
MCHC RBC AUTO-ENTMCNC: 32.5 G/DL (ref 31.4–35)
MCHC RBC AUTO-ENTMCNC: 32.9 G/DL (ref 31.4–35)
MCHC RBC AUTO-ENTMCNC: 32.9 G/DL (ref 31.4–35)
MCV RBC AUTO: 86.2 FL (ref 79.6–97.8)
MCV RBC AUTO: 86.3 FL (ref 79.6–97.8)
MCV RBC AUTO: 87.8 FL (ref 79.6–97.8)
MCV RBC AUTO: 88.2 FL (ref 79.6–97.8)
MCV RBC AUTO: 88.3 FL (ref 79.6–97.8)
MCV RBC AUTO: 88.4 FL (ref 79.6–97.8)
MCV RBC AUTO: 88.5 FL (ref 79.6–97.8)
MCV RBC AUTO: 88.5 FL (ref 79.6–97.8)
MCV RBC AUTO: 88.9 FL (ref 79.6–97.8)
MCV RBC AUTO: 88.9 FL (ref 79.6–97.8)
MCV RBC AUTO: 89.1 FL (ref 79.6–97.8)
MCV RBC AUTO: 89.2 FL (ref 79.6–97.8)
MCV RBC AUTO: 89.6 FL (ref 79.6–97.8)
MCV RBC AUTO: 89.8 FL (ref 79.6–97.8)
MCV RBC AUTO: 90 FL (ref 79.6–97.8)
MCV RBC AUTO: 90.3 FL (ref 79.6–97.8)
MCV RBC AUTO: 90.7 FL (ref 79.6–97.8)
MCV RBC AUTO: 90.7 FL (ref 79.6–97.8)
MCV RBC AUTO: 90.9 FL (ref 79.6–97.8)
MCV RBC AUTO: 91.1 FL (ref 79.6–97.8)
MCV RBC AUTO: 91.2 FL (ref 79.6–97.8)
MCV RBC AUTO: 91.3 FL (ref 79.6–97.8)
MCV RBC AUTO: 91.3 FL (ref 79.6–97.8)
MCV RBC AUTO: 91.4 FL (ref 79.6–97.8)
MCV RBC AUTO: 92 FL (ref 79.6–97.8)
MCV RBC AUTO: 92.3 FL (ref 79.6–97.8)
MCV RBC AUTO: 92.4 FL (ref 79.6–97.8)
MCV RBC AUTO: 92.8 FL (ref 79.6–97.8)
MCV RBC AUTO: 93.1 FL (ref 79.6–97.8)
MCV RBC AUTO: 93.3 FL (ref 79.6–97.8)
MCV RBC AUTO: 94.2 FL (ref 79.6–97.8)
MCV RBC AUTO: 94.5 FL (ref 79.6–97.8)
MECA (METHICILLIN-RESISTANCE GENES), MRGP: NOT DETECTED
METHADONE UR QL: NEGATIVE
MM INDURATION POC: 0 MM (ref 0–5)
MONOCYTES # BLD: 0 K/UL (ref 0.1–1.3)
MONOCYTES # BLD: 0.1 K/UL (ref 0.1–1.3)
MONOCYTES # BLD: 0.4 K/UL (ref 0.1–1.3)
MONOCYTES # BLD: 0.4 K/UL (ref 0.1–1.3)
MONOCYTES # BLD: 0.5 K/UL (ref 0.1–1.3)
MONOCYTES # BLD: 0.5 K/UL (ref 0.1–1.3)
MONOCYTES # BLD: 0.6 K/UL (ref 0.1–1.3)
MONOCYTES # BLD: 0.7 K/UL (ref 0.1–1.3)
MONOCYTES # BLD: 0.8 K/UL (ref 0.1–1.3)
MONOCYTES # BLD: 0.8 K/UL (ref 0.1–1.3)
MONOCYTES # BLD: 0.9 K/UL (ref 0.1–1.3)
MONOCYTES # BLD: 1 K/UL (ref 0.1–1.3)
MONOCYTES # BLD: 1.1 K/UL (ref 0.1–1.3)
MONOCYTES # BLD: 1.2 K/UL (ref 0.1–1.3)
MONOCYTES # BLD: 1.5 K/UL (ref 0.1–1.3)
MONOCYTES # BLD: 1.6 K/UL (ref 0.1–1.3)
MONOCYTES # BLD: 1.7 K/UL (ref 0.1–1.3)
MONOCYTES # BLD: 1.8 K/UL (ref 0.1–1.3)
MONOCYTES NFR BLD: 1 % (ref 4–12)
MONOCYTES NFR BLD: 10 % (ref 4–12)
MONOCYTES NFR BLD: 11 % (ref 4–12)
MONOCYTES NFR BLD: 11 % (ref 4–12)
MONOCYTES NFR BLD: 12 % (ref 4–12)
MONOCYTES NFR BLD: 13 % (ref 4–12)
MONOCYTES NFR BLD: 13 % (ref 4–12)
MONOCYTES NFR BLD: 14 % (ref 4–12)
MONOCYTES NFR BLD: 16 % (ref 4–12)
MONOCYTES NFR BLD: 2 % (ref 4–12)
MONOCYTES NFR BLD: 3 % (ref 4–12)
MONOCYTES NFR BLD: 5 % (ref 4–12)
MONOCYTES NFR BLD: 6 % (ref 4–12)
MONOCYTES NFR BLD: 7 % (ref 4–12)
MONOCYTES NFR BLD: 8 % (ref 4–12)
MONOCYTES NFR BLD: 9 % (ref 4–12)
NEUTS SEG # BLD: 10.3 K/UL (ref 1.7–8.2)
NEUTS SEG # BLD: 13.4 K/UL (ref 1.7–8.2)
NEUTS SEG # BLD: 18.8 K/UL (ref 1.7–8.2)
NEUTS SEG # BLD: 19.7 K/UL (ref 1.7–8.2)
NEUTS SEG # BLD: 2.4 K/UL (ref 1.7–8.2)
NEUTS SEG # BLD: 2.6 K/UL (ref 1.7–8.2)
NEUTS SEG # BLD: 2.7 K/UL (ref 1.7–8.2)
NEUTS SEG # BLD: 2.8 K/UL (ref 1.7–8.2)
NEUTS SEG # BLD: 2.9 K/UL (ref 1.7–8.2)
NEUTS SEG # BLD: 3.4 K/UL (ref 1.7–8.2)
NEUTS SEG # BLD: 3.5 K/UL (ref 1.7–8.2)
NEUTS SEG # BLD: 3.9 K/UL (ref 1.7–8.2)
NEUTS SEG # BLD: 4 K/UL (ref 1.7–8.2)
NEUTS SEG # BLD: 4.4 K/UL (ref 1.7–8.2)
NEUTS SEG # BLD: 4.7 K/UL (ref 1.7–8.2)
NEUTS SEG # BLD: 5.4 K/UL (ref 1.7–8.2)
NEUTS SEG # BLD: 5.6 K/UL (ref 1.7–8.2)
NEUTS SEG # BLD: 6.7 K/UL (ref 1.7–8.2)
NEUTS SEG # BLD: 7.4 K/UL (ref 1.7–8.2)
NEUTS SEG # BLD: 7.4 K/UL (ref 1.7–8.2)
NEUTS SEG # BLD: 7.8 K/UL (ref 1.7–8.2)
NEUTS SEG # BLD: 8 K/UL (ref 1.7–8.2)
NEUTS SEG # BLD: 9.7 K/UL (ref 1.7–8.2)
NEUTS SEG NFR BLD: 44 % (ref 43–78)
NEUTS SEG NFR BLD: 47 % (ref 43–78)
NEUTS SEG NFR BLD: 49 % (ref 43–78)
NEUTS SEG NFR BLD: 51 % (ref 43–78)
NEUTS SEG NFR BLD: 52 % (ref 43–78)
NEUTS SEG NFR BLD: 53 % (ref 43–78)
NEUTS SEG NFR BLD: 54 % (ref 43–78)
NEUTS SEG NFR BLD: 54 % (ref 43–78)
NEUTS SEG NFR BLD: 57 % (ref 43–78)
NEUTS SEG NFR BLD: 61 % (ref 43–78)
NEUTS SEG NFR BLD: 64 % (ref 43–78)
NEUTS SEG NFR BLD: 70 % (ref 43–78)
NEUTS SEG NFR BLD: 70 % (ref 43–78)
NEUTS SEG NFR BLD: 72 % (ref 43–78)
NEUTS SEG NFR BLD: 77 % (ref 43–78)
NEUTS SEG NFR BLD: 77 % (ref 43–78)
NEUTS SEG NFR BLD: 79 % (ref 43–78)
NEUTS SEG NFR BLD: 82 % (ref 43–78)
NEUTS SEG NFR BLD: 83 % (ref 43–78)
NEUTS SEG NFR BLD: 85 % (ref 43–78)
NEUTS SEG NFR BLD: 85 % (ref 43–78)
NEUTS SEG NFR BLD: 88 % (ref 43–78)
NEUTS SEG NFR BLD: 88 % (ref 43–78)
NITRITE UR QL STRIP.AUTO: NEGATIVE
NRBC # BLD: 0 K/UL (ref 0–0.2)
NRBC # BLD: 0.02 K/UL (ref 0–0.2)
NRBC # BLD: 0.03 K/UL (ref 0–0.2)
NRBC # BLD: 0.04 K/UL (ref 0–0.2)
NRBC # BLD: 0.04 K/UL (ref 0–0.2)
NRBC # BLD: 0.05 K/UL (ref 0–0.2)
NRBC # BLD: 0.05 K/UL (ref 0–0.2)
NRBC # BLD: 0.06 K/UL (ref 0–0.2)
NRBC # BLD: 0.08 K/UL (ref 0–0.2)
NRBC # BLD: 0.11 K/UL (ref 0–0.2)
NRBC # BLD: 0.11 K/UL (ref 0–0.2)
NRBC # BLD: 0.12 K/UL (ref 0–0.2)
NRBC # BLD: 0.12 K/UL (ref 0–0.2)
NRBC # BLD: 0.14 K/UL (ref 0–0.2)
NRBC # BLD: 0.15 K/UL (ref 0–0.2)
NRBC # BLD: 0.17 K/UL (ref 0–0.2)
NRBC # BLD: 0.28 K/UL (ref 0–0.2)
NRBC # BLD: 0.36 K/UL (ref 0–0.2)
NRBC # BLD: 0.52 K/UL (ref 0–0.2)
NRBC # BLD: 1.15 K/UL (ref 0–0.2)
O2/TOTAL GAS SETTING VFR VENT: 100 %
O2/TOTAL GAS SETTING VFR VENT: 28 %
O2/TOTAL GAS SETTING VFR VENT: 30 %
O2/TOTAL GAS SETTING VFR VENT: 30 %
O2/TOTAL GAS SETTING VFR VENT: 35 %
OPIATES UR QL: NEGATIVE
P-R INTERVAL, ECG05: 136 MS
P-R INTERVAL, ECG05: 174 MS
P-R INTERVAL, ECG05: 176 MS
P-R INTERVAL, ECG05: 188 MS
PCO2 BLD: 40.8 MMHG (ref 35–45)
PCO2 BLD: 41.5 MMHG (ref 35–45)
PCO2 BLD: 45.9 MMHG (ref 35–45)
PCO2 BLD: 47.6 MMHG (ref 35–45)
PCO2 BLD: 48.2 MMHG (ref 35–45)
PCO2 BLD: 60.1 MMHG (ref 35–45)
PCO2 BLD: 67.8 MMHG (ref 35–45)
PCO2 BLD: 67.9 MMHG (ref 35–45)
PCO2 BLD: 76.3 MMHG (ref 35–45)
PCP UR QL: NEGATIVE
PEEP RESPIRATORY: 6 CMH2O
PEEP RESPIRATORY: 8 CMH2O
PH BLD: 7.15 [PH] (ref 7.35–7.45)
PH BLD: 7.23 [PH] (ref 7.35–7.45)
PH BLD: 7.26 [PH] (ref 7.35–7.45)
PH BLD: 7.27 [PH] (ref 7.35–7.45)
PH BLD: 7.29 [PH] (ref 7.35–7.45)
PH BLD: 7.29 [PH] (ref 7.35–7.45)
PH BLD: 7.31 [PH] (ref 7.35–7.45)
PH BLD: 7.31 [PH] (ref 7.35–7.45)
PH BLD: 7.32 [PH] (ref 7.35–7.45)
PH UR STRIP: 5 [PH] (ref 5–9)
PHOSPHATE SERPL-MCNC: 3.6 MG/DL (ref 2.3–3.7)
PLATELET # BLD AUTO: 113 K/UL (ref 150–450)
PLATELET # BLD AUTO: 115 K/UL (ref 150–450)
PLATELET # BLD AUTO: 132 K/UL (ref 150–450)
PLATELET # BLD AUTO: 148 K/UL (ref 150–450)
PLATELET # BLD AUTO: 165 K/UL (ref 150–450)
PLATELET # BLD AUTO: 168 K/UL (ref 150–450)
PLATELET # BLD AUTO: 174 K/UL (ref 150–450)
PLATELET # BLD AUTO: 182 K/UL (ref 150–450)
PLATELET # BLD AUTO: 191 K/UL (ref 150–450)
PLATELET # BLD AUTO: 194 K/UL (ref 150–450)
PLATELET # BLD AUTO: 197 K/UL (ref 150–450)
PLATELET # BLD AUTO: 209 K/UL (ref 150–450)
PLATELET # BLD AUTO: 211 K/UL (ref 150–450)
PLATELET # BLD AUTO: 212 K/UL (ref 150–450)
PLATELET # BLD AUTO: 214 K/UL (ref 150–450)
PLATELET # BLD AUTO: 215 K/UL (ref 150–450)
PLATELET # BLD AUTO: 218 K/UL (ref 150–450)
PLATELET # BLD AUTO: 220 K/UL (ref 150–450)
PLATELET # BLD AUTO: 227 K/UL (ref 150–450)
PLATELET # BLD AUTO: 233 K/UL (ref 150–450)
PLATELET # BLD AUTO: 236 K/UL (ref 150–450)
PLATELET # BLD AUTO: 237 K/UL (ref 150–450)
PLATELET # BLD AUTO: 258 K/UL (ref 150–450)
PLATELET # BLD AUTO: 259 K/UL (ref 150–450)
PLATELET # BLD AUTO: 259 K/UL (ref 150–450)
PLATELET # BLD AUTO: 262 K/UL (ref 150–450)
PLATELET # BLD AUTO: 270 K/UL (ref 150–450)
PLATELET # BLD AUTO: 277 K/UL (ref 150–450)
PLATELET # BLD AUTO: 301 K/UL (ref 150–450)
PLATELET # BLD AUTO: 345 K/UL (ref 150–450)
PLATELET # BLD AUTO: 392 K/UL (ref 150–450)
PLATELET # BLD AUTO: 67 K/UL (ref 150–450)
PLATELET # BLD AUTO: 78 K/UL (ref 150–450)
PLATELET # BLD AUTO: 81 K/UL (ref 150–450)
PLATELET # BLD AUTO: 81 K/UL (ref 150–450)
PLATELET # BLD AUTO: 84 K/UL (ref 150–450)
PLATELET COMMENTS,PCOM: ABNORMAL
PMV BLD AUTO: 10 FL (ref 9.4–12.3)
PMV BLD AUTO: 10 FL (ref 9.4–12.3)
PMV BLD AUTO: 10.1 FL (ref 9.4–12.3)
PMV BLD AUTO: 10.1 FL (ref 9.4–12.3)
PMV BLD AUTO: 10.2 FL (ref 9.4–12.3)
PMV BLD AUTO: 10.2 FL (ref 9.4–12.3)
PMV BLD AUTO: 10.3 FL (ref 9.4–12.3)
PMV BLD AUTO: 10.3 FL (ref 9.4–12.3)
PMV BLD AUTO: 10.4 FL (ref 9.4–12.3)
PMV BLD AUTO: 10.5 FL (ref 9.4–12.3)
PMV BLD AUTO: 10.7 FL (ref 9.4–12.3)
PMV BLD AUTO: 10.8 FL (ref 9.4–12.3)
PMV BLD AUTO: 10.9 FL (ref 9.4–12.3)
PMV BLD AUTO: 11 FL (ref 9.4–12.3)
PMV BLD AUTO: 11 FL (ref 9.4–12.3)
PMV BLD AUTO: 11.1 FL (ref 9.4–12.3)
PMV BLD AUTO: 11.2 FL (ref 9.4–12.3)
PMV BLD AUTO: 11.3 FL (ref 9.4–12.3)
PMV BLD AUTO: 11.3 FL (ref 9.4–12.3)
PMV BLD AUTO: 11.4 FL (ref 9.4–12.3)
PMV BLD AUTO: 11.4 FL (ref 9.4–12.3)
PMV BLD AUTO: 11.6 FL (ref 9.4–12.3)
PMV BLD AUTO: 11.6 FL (ref 9.4–12.3)
PMV BLD AUTO: 11.7 FL (ref 9.4–12.3)
PMV BLD AUTO: 9.5 FL (ref 9.4–12.3)
PMV BLD AUTO: 9.9 FL (ref 9.4–12.3)
PO2 BLD: 123 MMHG (ref 75–100)
PO2 BLD: 475 MMHG (ref 75–100)
PO2 BLD: 65 MMHG (ref 75–100)
PO2 BLD: 69 MMHG (ref 75–100)
PO2 BLD: 79 MMHG (ref 75–100)
PO2 BLD: 85 MMHG (ref 75–100)
PO2 BLD: 87 MMHG (ref 75–100)
PO2 BLD: 89 MMHG (ref 75–100)
PO2 BLD: 92 MMHG (ref 75–100)
POTASSIUM BLD-SCNC: 5.1 MMOL/L (ref 3.5–5.1)
POTASSIUM SERPL-SCNC: 3.6 MMOL/L (ref 3.5–5.1)
POTASSIUM SERPL-SCNC: 4 MMOL/L (ref 3.5–5.1)
POTASSIUM SERPL-SCNC: 4 MMOL/L (ref 3.5–5.1)
POTASSIUM SERPL-SCNC: 4.1 MMOL/L (ref 3.5–5.1)
POTASSIUM SERPL-SCNC: 4.1 MMOL/L (ref 3.5–5.1)
POTASSIUM SERPL-SCNC: 4.2 MMOL/L (ref 3.5–5.1)
POTASSIUM SERPL-SCNC: 4.3 MMOL/L (ref 3.5–5.1)
POTASSIUM SERPL-SCNC: 4.4 MMOL/L (ref 3.5–5.1)
POTASSIUM SERPL-SCNC: 4.5 MMOL/L (ref 3.5–5.1)
POTASSIUM SERPL-SCNC: 4.6 MMOL/L (ref 3.5–5.1)
POTASSIUM SERPL-SCNC: 4.7 MMOL/L (ref 3.5–5.1)
POTASSIUM SERPL-SCNC: 4.7 MMOL/L (ref 3.5–5.1)
POTASSIUM SERPL-SCNC: 4.8 MMOL/L (ref 3.5–5.1)
POTASSIUM SERPL-SCNC: 4.9 MMOL/L (ref 3.5–5.1)
POTASSIUM SERPL-SCNC: 5 MMOL/L (ref 3.5–5.1)
POTASSIUM SERPL-SCNC: 5 MMOL/L (ref 3.5–5.1)
POTASSIUM SERPL-SCNC: 5.1 MMOL/L (ref 3.5–5.1)
POTASSIUM SERPL-SCNC: 5.1 MMOL/L (ref 3.5–5.1)
POTASSIUM SERPL-SCNC: 5.2 MMOL/L (ref 3.5–5.1)
POTASSIUM SERPL-SCNC: 5.3 MMOL/L (ref 3.5–5.1)
POTASSIUM SERPL-SCNC: 5.4 MMOL/L (ref 3.5–5.1)
POTASSIUM SERPL-SCNC: 5.5 MMOL/L (ref 3.5–5.1)
POTASSIUM SERPL-SCNC: 5.7 MMOL/L (ref 3.5–5.1)
POTASSIUM SERPL-SCNC: >10 MMOL/L (ref 3.5–5.1)
PPD POC: NEGATIVE NEGATIVE
PRESSURE SUPPORT SETTING VENT: 8 CMH2O
PROCALCITONIN SERPL-MCNC: 0.08 NG/ML
PROCALCITONIN SERPL-MCNC: 0.09 NG/ML
PROT SERPL-MCNC: 3.7 G/DL (ref 6.3–8.2)
PROT SERPL-MCNC: 4 G/DL (ref 6.3–8.2)
PROT SERPL-MCNC: 4.1 G/DL (ref 6.3–8.2)
PROT SERPL-MCNC: 4.9 G/DL (ref 6.3–8.2)
PROT SERPL-MCNC: 6.1 G/DL (ref 6.3–8.2)
PROT SERPL-MCNC: 6.1 G/DL (ref 6.3–8.2)
PROT SERPL-MCNC: 6.5 G/DL (ref 6.3–8.2)
PROT SERPL-MCNC: 6.6 G/DL (ref 6.3–8.2)
PROT SERPL-MCNC: 6.6 G/DL (ref 6.3–8.2)
PROT SERPL-MCNC: 6.9 G/DL (ref 6.3–8.2)
PROT SERPL-MCNC: 7.1 G/DL (ref 6.3–8.2)
PROT SERPL-MCNC: 7.2 G/DL (ref 6.3–8.2)
PROT SERPL-MCNC: 7.4 G/DL (ref 6.3–8.2)
PROT SERPL-MCNC: 7.5 G/DL (ref 6.3–8.2)
PROT SERPL-MCNC: 7.7 G/DL (ref 6.3–8.2)
PROT SERPL-MCNC: 8.6 G/DL (ref 6.3–8.2)
PROT UR STRIP-MCNC: NEGATIVE MG/DL
PROTHROMBIN TIME: 12.8 SEC (ref 12.6–14.5)
PROTHROMBIN TIME: 13.6 SEC (ref 12.6–14.5)
Q-T INTERVAL, ECG07: 336 MS
Q-T INTERVAL, ECG07: 342 MS
Q-T INTERVAL, ECG07: 344 MS
Q-T INTERVAL, ECG07: 344 MS
Q-T INTERVAL, ECG07: 384 MS
Q-T INTERVAL, ECG07: 428 MS
Q-T INTERVAL, ECG07: 442 MS
Q-T INTERVAL, ECG07: 444 MS
Q-T INTERVAL, ECG07: 454 MS
QRS DURATION, ECG06: 114 MS
QRS DURATION, ECG06: 118 MS
QRS DURATION, ECG06: 124 MS
QRS DURATION, ECG06: 132 MS
QRS DURATION, ECG06: 138 MS
QRS DURATION, ECG06: 64 MS
QRS DURATION, ECG06: 68 MS
QRS DURATION, ECG06: 94 MS
QRS DURATION, ECG06: 96 MS
QTC CALCULATION (BEZET), ECG08: 401 MS
QTC CALCULATION (BEZET), ECG08: 406 MS
QTC CALCULATION (BEZET), ECG08: 420 MS
QTC CALCULATION (BEZET), ECG08: 462 MS
QTC CALCULATION (BEZET), ECG08: 468 MS
QTC CALCULATION (BEZET), ECG08: 477 MS
QTC CALCULATION (BEZET), ECG08: 477 MS
QTC CALCULATION (BEZET), ECG08: 479 MS
QTC CALCULATION (BEZET), ECG08: 490 MS
RBC # BLD AUTO: 2.07 M/UL (ref 4.23–5.6)
RBC # BLD AUTO: 2.58 M/UL (ref 4.23–5.6)
RBC # BLD AUTO: 2.75 M/UL (ref 4.23–5.6)
RBC # BLD AUTO: 2.85 M/UL (ref 4.23–5.6)
RBC # BLD AUTO: 3.05 M/UL (ref 4.23–5.6)
RBC # BLD AUTO: 3.07 M/UL (ref 4.23–5.6)
RBC # BLD AUTO: 3.07 M/UL (ref 4.23–5.6)
RBC # BLD AUTO: 3.11 M/UL (ref 4.23–5.6)
RBC # BLD AUTO: 3.13 M/UL (ref 4.23–5.6)
RBC # BLD AUTO: 3.19 M/UL (ref 4.23–5.6)
RBC # BLD AUTO: 3.32 M/UL (ref 4.23–5.6)
RBC # BLD AUTO: 3.35 M/UL (ref 4.23–5.6)
RBC # BLD AUTO: 3.4 M/UL (ref 4.23–5.6)
RBC # BLD AUTO: 3.4 M/UL (ref 4.23–5.6)
RBC # BLD AUTO: 3.43 M/UL (ref 4.23–5.6)
RBC # BLD AUTO: 3.43 M/UL (ref 4.23–5.6)
RBC # BLD AUTO: 3.46 M/UL (ref 4.23–5.6)
RBC # BLD AUTO: 3.5 M/UL (ref 4.23–5.6)
RBC # BLD AUTO: 3.55 M/UL (ref 4.23–5.6)
RBC # BLD AUTO: 3.56 M/UL (ref 4.23–5.6)
RBC # BLD AUTO: 3.61 M/UL (ref 4.23–5.6)
RBC # BLD AUTO: 3.67 M/UL (ref 4.23–5.6)
RBC # BLD AUTO: 3.69 M/UL (ref 4.23–5.6)
RBC # BLD AUTO: 3.71 M/UL (ref 4.23–5.6)
RBC # BLD AUTO: 3.72 M/UL (ref 4.23–5.6)
RBC # BLD AUTO: 3.79 M/UL (ref 4.23–5.6)
RBC # BLD AUTO: 3.82 M/UL (ref 4.23–5.6)
RBC # BLD AUTO: 3.84 M/UL (ref 4.23–5.67)
RBC # BLD AUTO: 3.9 M/UL (ref 4.23–5.6)
RBC # BLD AUTO: 3.96 M/UL (ref 4.23–5.6)
RBC # BLD AUTO: 3.97 M/UL (ref 4.23–5.6)
RBC # BLD AUTO: 3.99 M/UL (ref 4.23–5.6)
RBC # BLD AUTO: 4.01 M/UL (ref 4.23–5.6)
RBC # BLD AUTO: 4.06 M/UL (ref 4.23–5.6)
RBC # BLD AUTO: 4.19 M/UL (ref 4.23–5.6)
RBC # BLD AUTO: 4.31 M/UL (ref 4.23–5.6)
RBC MORPH BLD: ABNORMAL
RBC MORPH BLD: ABNORMAL
SAO2 % BLD: 100 % (ref 95–98)
SAO2 % BLD: 89.7 % (ref 95–98)
SAO2 % BLD: 89.8 % (ref 95–98)
SAO2 % BLD: 92.8 % (ref 95–98)
SAO2 % BLD: 95 % (ref 95–98)
SAO2 % BLD: 95.2 % (ref 95–98)
SAO2 % BLD: 95.5 % (ref 95–98)
SAO2 % BLD: 96 %
SAO2 % BLD: 98.2 % (ref 95–98)
SERVICE CMNT-IMP: ABNORMAL
SERVICE CMNT-IMP: NORMAL
SODIUM BLD-SCNC: 139 MMOL/L (ref 136–145)
SODIUM SERPL-SCNC: 130 MMOL/L (ref 138–145)
SODIUM SERPL-SCNC: 138 MMOL/L (ref 136–145)
SODIUM SERPL-SCNC: 138 MMOL/L (ref 138–145)
SODIUM SERPL-SCNC: 139 MMOL/L (ref 136–145)
SODIUM SERPL-SCNC: 140 MMOL/L (ref 136–145)
SODIUM SERPL-SCNC: 140 MMOL/L (ref 138–145)
SODIUM SERPL-SCNC: 140 MMOL/L (ref 138–145)
SODIUM SERPL-SCNC: 141 MMOL/L (ref 136–145)
SODIUM SERPL-SCNC: 141 MMOL/L (ref 138–145)
SODIUM SERPL-SCNC: 142 MMOL/L (ref 136–145)
SODIUM SERPL-SCNC: 142 MMOL/L (ref 138–145)
SODIUM SERPL-SCNC: 143 MMOL/L (ref 136–145)
SODIUM SERPL-SCNC: 143 MMOL/L (ref 138–145)
SODIUM SERPL-SCNC: 143 MMOL/L (ref 138–145)
SODIUM SERPL-SCNC: 144 MMOL/L (ref 136–145)
SODIUM SERPL-SCNC: 146 MMOL/L (ref 136–145)
SODIUM SERPL-SCNC: 146 MMOL/L (ref 136–145)
SODIUM SERPL-SCNC: 147 MMOL/L (ref 136–145)
SODIUM SERPL-SCNC: 147 MMOL/L (ref 136–145)
SOURCE, COVRS: NORMAL
SOURCE, COVRS: NORMAL
SP GR UR REFRACTOMETRY: 1.02 (ref 1–1.02)
SPECIMEN EXP DATE BLD: NORMAL
SPECIMEN SITE: ABNORMAL
SPECIMEN TYPE: ABNORMAL
STAPHYLOCOCCUS, STAPP: DETECTED
STATUS OF UNIT,%ST: NORMAL
T4 FREE SERPL-MCNC: 1.3 NG/DL (ref 0.78–1.46)
TIBC SERPL-MCNC: 292 UG/DL (ref 250–450)
TIBC SERPL-MCNC: 325 UG/DL (ref 250–450)
TOTAL RESP. RATE, ITRR: 20
TOTAL RESP. RATE, ITRR: 20
TROPONIN-HIGH SENSITIVITY: 113.3 PG/ML (ref 0–14)
TROPONIN-HIGH SENSITIVITY: 116.9 PG/ML (ref 0–14)
TROPONIN-HIGH SENSITIVITY: 129.8 PG/ML (ref 0–14)
TROPONIN-HIGH SENSITIVITY: 33.4 PG/ML (ref 0–14)
TROPONIN-HIGH SENSITIVITY: 39.9 PG/ML (ref 0–14)
TROPONIN-HIGH SENSITIVITY: 47.6 PG/ML (ref 0–14)
TROPONIN-HIGH SENSITIVITY: 49.6 PG/ML (ref 0–14)
TROPONIN-HIGH SENSITIVITY: 50.7 PG/ML (ref 0–14)
TROPONIN-HIGH SENSITIVITY: 56.6 PG/ML (ref 0–14)
TROPONIN-HIGH SENSITIVITY: 59.6 PG/ML (ref 0–14)
TROPONIN-HIGH SENSITIVITY: 60.3 PG/ML (ref 0–14)
TSH SERPL DL<=0.005 MIU/L-ACNC: 0.54 UIU/ML (ref 0.36–3.74)
UNIT DIVISION, %UDIV: 0
UROBILINOGEN UR QL STRIP.AUTO: 0.2 EU/DL (ref 0.2–1)
VANCOMYCIN SERPL-MCNC: 14.8 UG/ML
VANCOMYCIN SERPL-MCNC: 19.7 UG/ML
VENTILATION MODE VENT: ABNORMAL
VENTRICULAR RATE, ECG03: 117 BPM
VENTRICULAR RATE, ECG03: 70 BPM
VENTRICULAR RATE, ECG03: 75 BPM
VENTRICULAR RATE, ECG03: 82 BPM
VENTRICULAR RATE, ECG03: 84 BPM
VENTRICULAR RATE, ECG03: 87 BPM
VENTRICULAR RATE, ECG03: 91 BPM
VIT B12 SERPL-MCNC: 417 PG/ML (ref 193–986)
VT SETTING VENT: 500 ML
VT SETTING VENT: 533 ML
WBC # BLD AUTO: 10 K/UL (ref 4.3–11.1)
WBC # BLD AUTO: 10.5 K/UL (ref 4.3–11.1)
WBC # BLD AUTO: 10.6 K/UL (ref 4.3–11.1)
WBC # BLD AUTO: 11.1 K/UL (ref 4.3–11.1)
WBC # BLD AUTO: 11.9 K/UL (ref 4.3–11.1)
WBC # BLD AUTO: 12.3 K/UL (ref 4.3–11.1)
WBC # BLD AUTO: 12.6 K/UL (ref 4.3–11.1)
WBC # BLD AUTO: 14.3 K/UL (ref 4.3–11.1)
WBC # BLD AUTO: 15.2 K/UL (ref 4.3–11.1)
WBC # BLD AUTO: 15.8 K/UL (ref 4.3–11.1)
WBC # BLD AUTO: 15.9 K/UL (ref 4.3–11.1)
WBC # BLD AUTO: 22.2 K/UL (ref 4.3–11.1)
WBC # BLD AUTO: 22.3 K/UL (ref 4.3–11.1)
WBC # BLD AUTO: 3.5 K/UL (ref 4.3–11.1)
WBC # BLD AUTO: 4.8 K/UL (ref 4.3–11.1)
WBC # BLD AUTO: 5.3 K/UL (ref 4.3–11.1)
WBC # BLD AUTO: 5.3 K/UL (ref 4.3–11.1)
WBC # BLD AUTO: 6 K/UL (ref 4.3–11.1)
WBC # BLD AUTO: 6 K/UL (ref 4.3–11.1)
WBC # BLD AUTO: 6.6 K/UL (ref 4.3–11.1)
WBC # BLD AUTO: 6.6 K/UL (ref 4.3–11.1)
WBC # BLD AUTO: 6.8 K/UL (ref 4.3–11.1)
WBC # BLD AUTO: 7 K/UL (ref 4.3–11.1)
WBC # BLD AUTO: 7.1 K/UL (ref 4.3–11.1)
WBC # BLD AUTO: 7.4 K/UL (ref 4.3–11.1)
WBC # BLD AUTO: 7.7 K/UL (ref 4.3–11.1)
WBC # BLD AUTO: 8.7 K/UL (ref 4.3–11.1)
WBC # BLD AUTO: 8.7 K/UL (ref 4.3–11.1)
WBC # BLD AUTO: 8.8 K/UL (ref 4.3–11.1)
WBC # BLD AUTO: 8.9 K/UL (ref 4.3–11.1)
WBC # BLD AUTO: 9 K/UL (ref 4.3–11.1)
WBC # BLD AUTO: 9 K/UL (ref 4.3–11.1)
WBC # BLD AUTO: 9.3 K/UL (ref 4.3–11.1)
WBC # BLD AUTO: 9.9 K/UL (ref 4.3–11.1)
WBC MORPH BLD: ABNORMAL

## 2021-01-01 PROCEDURE — 71045 X-RAY EXAM CHEST 1 VIEW: CPT

## 2021-01-01 PROCEDURE — 85018 HEMOGLOBIN: CPT

## 2021-01-01 PROCEDURE — 74011250637 HC RX REV CODE- 250/637: Performed by: STUDENT IN AN ORGANIZED HEALTH CARE EDUCATION/TRAINING PROGRAM

## 2021-01-01 PROCEDURE — 83880 ASSAY OF NATRIURETIC PEPTIDE: CPT

## 2021-01-01 PROCEDURE — 74011000250 HC RX REV CODE- 250: Performed by: INTERNAL MEDICINE

## 2021-01-01 PROCEDURE — C1894 INTRO/SHEATH, NON-LASER: HCPCS

## 2021-01-01 PROCEDURE — 36430 TRANSFUSION BLD/BLD COMPNT: CPT

## 2021-01-01 PROCEDURE — 80053 COMPREHEN METABOLIC PANEL: CPT

## 2021-01-01 PROCEDURE — 77010033678 HC OXYGEN DAILY

## 2021-01-01 PROCEDURE — 65660000000 HC RM CCU STEPDOWN

## 2021-01-01 PROCEDURE — 85025 COMPLETE CBC W/AUTO DIFF WBC: CPT

## 2021-01-01 PROCEDURE — 94640 AIRWAY INHALATION TREATMENT: CPT

## 2021-01-01 PROCEDURE — 77030041279 HC DRSG PRMSL AG MDII -B

## 2021-01-01 PROCEDURE — 74011250637 HC RX REV CODE- 250/637: Performed by: NURSE PRACTITIONER

## 2021-01-01 PROCEDURE — 82803 BLOOD GASES ANY COMBINATION: CPT

## 2021-01-01 PROCEDURE — 76010000172 HC OR TIME 2.5 TO 3 HR INTENSV-TIER 1: Performed by: SURGERY

## 2021-01-01 PROCEDURE — 74011000250 HC RX REV CODE- 250: Performed by: FAMILY MEDICINE

## 2021-01-01 PROCEDURE — 94664 DEMO&/EVAL PT USE INHALER: CPT

## 2021-01-01 PROCEDURE — 74011250636 HC RX REV CODE- 250/636: Performed by: NURSE PRACTITIONER

## 2021-01-01 PROCEDURE — 74011000250 HC RX REV CODE- 250

## 2021-01-01 PROCEDURE — 78278 ACUTE GI BLOOD LOSS IMAGING: CPT

## 2021-01-01 PROCEDURE — G0151 HHCP-SERV OF PT,EA 15 MIN: HCPCS

## 2021-01-01 PROCEDURE — G0299 HHS/HOSPICE OF RN EA 15 MIN: HCPCS

## 2021-01-01 PROCEDURE — 36245 INS CATH ABD/L-EXT ART 1ST: CPT

## 2021-01-01 PROCEDURE — 74011250637 HC RX REV CODE- 250/637: Performed by: SURGERY

## 2021-01-01 PROCEDURE — 2709999900 HC NON-CHARGEABLE SUPPLY

## 2021-01-01 PROCEDURE — 02HK3KZ INSERTION OF DEFIBRILLATOR LEAD INTO RIGHT VENTRICLE, PERCUTANEOUS APPROACH: ICD-10-PCS | Performed by: STUDENT IN AN ORGANIZED HEALTH CARE EDUCATION/TRAINING PROGRAM

## 2021-01-01 PROCEDURE — 94760 N-INVAS EAR/PLS OXIMETRY 1: CPT

## 2021-01-01 PROCEDURE — 96376 TX/PRO/DX INJ SAME DRUG ADON: CPT

## 2021-01-01 PROCEDURE — 77030029359 HC PRB ESOPH TEMP CATH ANTM -F: Performed by: ANESTHESIOLOGY

## 2021-01-01 PROCEDURE — 87635 SARS-COV-2 COVID-19 AMP PRB: CPT

## 2021-01-01 PROCEDURE — 80048 BASIC METABOLIC PNL TOTAL CA: CPT

## 2021-01-01 PROCEDURE — 82962 GLUCOSE BLOOD TEST: CPT

## 2021-01-01 PROCEDURE — 36415 COLL VENOUS BLD VENIPUNCTURE: CPT

## 2021-01-01 PROCEDURE — 99232 SBSQ HOSP IP/OBS MODERATE 35: CPT | Performed by: INTERNAL MEDICINE

## 2021-01-01 PROCEDURE — 85014 HEMATOCRIT: CPT

## 2021-01-01 PROCEDURE — C9113 INJ PANTOPRAZOLE SODIUM, VIA: HCPCS | Performed by: NURSE PRACTITIONER

## 2021-01-01 PROCEDURE — 74011000258 HC RX REV CODE- 258: Performed by: ANESTHESIOLOGY

## 2021-01-01 PROCEDURE — 97535 SELF CARE MNGMENT TRAINING: CPT

## 2021-01-01 PROCEDURE — 96365 THER/PROPH/DIAG IV INF INIT: CPT

## 2021-01-01 PROCEDURE — 74011000250 HC RX REV CODE- 250: Performed by: NURSE PRACTITIONER

## 2021-01-01 PROCEDURE — 33249 INSJ/RPLCMT DEFIB W/LEAD(S): CPT | Performed by: INTERNAL MEDICINE

## 2021-01-01 PROCEDURE — 94660 CPAP INITIATION&MGMT: CPT

## 2021-01-01 PROCEDURE — 83735 ASSAY OF MAGNESIUM: CPT

## 2021-01-01 PROCEDURE — 33225 L VENTRIC PACING LEAD ADD-ON: CPT | Performed by: INTERNAL MEDICINE

## 2021-01-01 PROCEDURE — 94761 N-INVAS EAR/PLS OXIMETRY MLT: CPT

## 2021-01-01 PROCEDURE — 77030013794 HC KT TRNSDUC BLD EDWD -B: Performed by: ANESTHESIOLOGY

## 2021-01-01 PROCEDURE — 74011250636 HC RX REV CODE- 250/636: Performed by: EMERGENCY MEDICINE

## 2021-01-01 PROCEDURE — 87040 BLOOD CULTURE FOR BACTERIA: CPT

## 2021-01-01 PROCEDURE — C1769 GUIDE WIRE: HCPCS

## 2021-01-01 PROCEDURE — C1892 INTRO/SHEATH,FIXED,PEEL-AWAY: HCPCS | Performed by: INTERNAL MEDICINE

## 2021-01-01 PROCEDURE — 74011636637 HC RX REV CODE- 636/637: Performed by: FAMILY MEDICINE

## 2021-01-01 PROCEDURE — 99024 POSTOP FOLLOW-UP VISIT: CPT | Performed by: INTERNAL MEDICINE

## 2021-01-01 PROCEDURE — 74011000636 HC RX REV CODE- 636: Performed by: STUDENT IN AN ORGANIZED HEALTH CARE EDUCATION/TRAINING PROGRAM

## 2021-01-01 PROCEDURE — 74011250637 HC RX REV CODE- 250/637: Performed by: PHYSICIAN ASSISTANT

## 2021-01-01 PROCEDURE — 99223 1ST HOSP IP/OBS HIGH 75: CPT | Performed by: INTERNAL MEDICINE

## 2021-01-01 PROCEDURE — 65610000001 HC ROOM ICU GENERAL

## 2021-01-01 PROCEDURE — 74011250636 HC RX REV CODE- 250/636: Performed by: STUDENT IN AN ORGANIZED HEALTH CARE EDUCATION/TRAINING PROGRAM

## 2021-01-01 PROCEDURE — C1898 LEAD, PMKR, OTHER THAN TRANS: HCPCS | Performed by: INTERNAL MEDICINE

## 2021-01-01 PROCEDURE — C1887 CATHETER, GUIDING: HCPCS

## 2021-01-01 PROCEDURE — 96374 THER/PROPH/DIAG INJ IV PUSH: CPT

## 2021-01-01 PROCEDURE — 82947 ASSAY GLUCOSE BLOOD QUANT: CPT

## 2021-01-01 PROCEDURE — 77030005401 HC CATH RAD ARRO -A: Performed by: ANESTHESIOLOGY

## 2021-01-01 PROCEDURE — 74011250636 HC RX REV CODE- 250/636: Performed by: INTERNAL MEDICINE

## 2021-01-01 PROCEDURE — 74011250637 HC RX REV CODE- 250/637: Performed by: INTERNAL MEDICINE

## 2021-01-01 PROCEDURE — 74011250636 HC RX REV CODE- 250/636: Performed by: NURSE ANESTHETIST, CERTIFIED REGISTERED

## 2021-01-01 PROCEDURE — 65610000006 HC RM INTENSIVE CARE

## 2021-01-01 PROCEDURE — 97161 PT EVAL LOW COMPLEX 20 MIN: CPT

## 2021-01-01 PROCEDURE — 74011000250 HC RX REV CODE- 250: Performed by: SURGERY

## 2021-01-01 PROCEDURE — G0155 HHCP-SVS OF CSW,EA 15 MIN: HCPCS

## 2021-01-01 PROCEDURE — 5A1935Z RESPIRATORY VENTILATION, LESS THAN 24 CONSECUTIVE HOURS: ICD-10-PCS | Performed by: INTERNAL MEDICINE

## 2021-01-01 PROCEDURE — 3E043XZ INTRODUCTION OF VASOPRESSOR INTO CENTRAL VEIN, PERCUTANEOUS APPROACH: ICD-10-PCS | Performed by: SURGERY

## 2021-01-01 PROCEDURE — 74011250636 HC RX REV CODE- 250/636

## 2021-01-01 PROCEDURE — 74011636637 HC RX REV CODE- 636/637: Performed by: NURSE PRACTITIONER

## 2021-01-01 PROCEDURE — 36600 WITHDRAWAL OF ARTERIAL BLOOD: CPT

## 2021-01-01 PROCEDURE — 76060000036 HC ANESTHESIA 2.5 TO 3 HR: Performed by: SURGERY

## 2021-01-01 PROCEDURE — 0BH17EZ INSERTION OF ENDOTRACHEAL AIRWAY INTO TRACHEA, VIA NATURAL OR ARTIFICIAL OPENING: ICD-10-PCS | Performed by: INTERNAL MEDICINE

## 2021-01-01 PROCEDURE — A4565 SLINGS: HCPCS | Performed by: INTERNAL MEDICINE

## 2021-01-01 PROCEDURE — 74011000302 HC RX REV CODE- 302: Performed by: PHYSICIAN ASSISTANT

## 2021-01-01 PROCEDURE — 36592 COLLECT BLOOD FROM PICC: CPT

## 2021-01-01 PROCEDURE — 74011636637 HC RX REV CODE- 636/637: Performed by: INTERNAL MEDICINE

## 2021-01-01 PROCEDURE — 85027 COMPLETE CBC AUTOMATED: CPT

## 2021-01-01 PROCEDURE — 86580 TB INTRADERMAL TEST: CPT | Performed by: PHYSICIAN ASSISTANT

## 2021-01-01 PROCEDURE — 93005 ELECTROCARDIOGRAM TRACING: CPT | Performed by: STUDENT IN AN ORGANIZED HEALTH CARE EDUCATION/TRAINING PROGRAM

## 2021-01-01 PROCEDURE — 74011000258 HC RX REV CODE- 258: Performed by: NURSE PRACTITIONER

## 2021-01-01 PROCEDURE — 74011000258 HC RX REV CODE- 258: Performed by: EMERGENCY MEDICINE

## 2021-01-01 PROCEDURE — G0378 HOSPITAL OBSERVATION PER HR: HCPCS

## 2021-01-01 PROCEDURE — 74011000250 HC RX REV CODE- 250: Performed by: STUDENT IN AN ORGANIZED HEALTH CARE EDUCATION/TRAINING PROGRAM

## 2021-01-01 PROCEDURE — 74011250637 HC RX REV CODE- 250/637: Performed by: FAMILY MEDICINE

## 2021-01-01 PROCEDURE — 96375 TX/PRO/DX INJ NEW DRUG ADDON: CPT

## 2021-01-01 PROCEDURE — 86901 BLOOD TYPING SEROLOGIC RH(D): CPT

## 2021-01-01 PROCEDURE — 84443 ASSAY THYROID STIM HORMONE: CPT

## 2021-01-01 PROCEDURE — 74011000250 HC RX REV CODE- 250: Performed by: RADIOLOGY

## 2021-01-01 PROCEDURE — APPSS15 APP SPLIT SHARED TIME 0-15 MINUTES: Performed by: NURSE PRACTITIONER

## 2021-01-01 PROCEDURE — 0656 HSPC GENERAL INPATIENT

## 2021-01-01 PROCEDURE — 82728 ASSAY OF FERRITIN: CPT

## 2021-01-01 PROCEDURE — 94002 VENT MGMT INPAT INIT DAY: CPT

## 2021-01-01 PROCEDURE — 400013 HH SOC

## 2021-01-01 PROCEDURE — 87205 SMEAR GRAM STAIN: CPT

## 2021-01-01 PROCEDURE — 74011250636 HC RX REV CODE- 250/636: Performed by: ANESTHESIOLOGY

## 2021-01-01 PROCEDURE — 97530 THERAPEUTIC ACTIVITIES: CPT

## 2021-01-01 PROCEDURE — 99285 EMERGENCY DEPT VISIT HI MDM: CPT

## 2021-01-01 PROCEDURE — C1769 GUIDE WIRE: HCPCS | Performed by: INTERNAL MEDICINE

## 2021-01-01 PROCEDURE — 93005 ELECTROCARDIOGRAM TRACING: CPT

## 2021-01-01 PROCEDURE — 2709999900 HC NON-CHARGEABLE SUPPLY: Performed by: SURGERY

## 2021-01-01 PROCEDURE — 74011000258 HC RX REV CODE- 258: Performed by: SURGERY

## 2021-01-01 PROCEDURE — P9016 RBC LEUKOCYTES REDUCED: HCPCS

## 2021-01-01 PROCEDURE — 94762 N-INVAS EAR/PLS OXIMTRY CONT: CPT

## 2021-01-01 PROCEDURE — 82140 ASSAY OF AMMONIA: CPT

## 2021-01-01 PROCEDURE — 94644 CONT INHLJ TX 1ST HOUR: CPT

## 2021-01-01 PROCEDURE — 77030036731 HC STPLR ENDOSC J&J -F: Performed by: SURGERY

## 2021-01-01 PROCEDURE — 93005 ELECTROCARDIOGRAM TRACING: CPT | Performed by: FAMILY MEDICINE

## 2021-01-01 PROCEDURE — 84132 ASSAY OF SERUM POTASSIUM: CPT

## 2021-01-01 PROCEDURE — 74011000272 HC RX REV CODE- 272: Performed by: INTERNAL MEDICINE

## 2021-01-01 PROCEDURE — L1830 KO IMMOB CANVAS LONG PRE OTS: HCPCS

## 2021-01-01 PROCEDURE — 74011000250 HC RX REV CODE- 250: Performed by: ANESTHESIOLOGY

## 2021-01-01 PROCEDURE — 77030040361 HC SLV COMPR DVT MDII -B

## 2021-01-01 PROCEDURE — P9047 ALBUMIN (HUMAN), 25%, 50ML: HCPCS | Performed by: INTERNAL MEDICINE

## 2021-01-01 PROCEDURE — 74011000250 HC RX REV CODE- 250: Performed by: EMERGENCY MEDICINE

## 2021-01-01 PROCEDURE — 93005 ELECTROCARDIOGRAM TRACING: CPT | Performed by: EMERGENCY MEDICINE

## 2021-01-01 PROCEDURE — 80202 ASSAY OF VANCOMYCIN: CPT

## 2021-01-01 PROCEDURE — 77030008462 HC STPLR SKN PROX J&J -A: Performed by: SURGERY

## 2021-01-01 PROCEDURE — 84145 PROCALCITONIN (PCT): CPT

## 2021-01-01 PROCEDURE — 74011250636 HC RX REV CODE- 250/636: Performed by: SURGERY

## 2021-01-01 PROCEDURE — 77030027138 HC INCENT SPIROMETER -A

## 2021-01-01 PROCEDURE — 81003 URINALYSIS AUTO W/O SCOPE: CPT

## 2021-01-01 PROCEDURE — 97165 OT EVAL LOW COMPLEX 30 MIN: CPT

## 2021-01-01 PROCEDURE — 44141 PARTIAL REMOVAL OF COLON: CPT | Performed by: SURGERY

## 2021-01-01 PROCEDURE — 30233N1 TRANSFUSION OF NONAUTOLOGOUS RED BLOOD CELLS INTO PERIPHERAL VEIN, PERCUTANEOUS APPROACH: ICD-10-PCS | Performed by: INTERNAL MEDICINE

## 2021-01-01 PROCEDURE — 92610 EVALUATE SWALLOWING FUNCTION: CPT

## 2021-01-01 PROCEDURE — 77030021532 HC CATH ANGI DX IMPRS MRTM -B

## 2021-01-01 PROCEDURE — 84484 ASSAY OF TROPONIN QUANT: CPT

## 2021-01-01 PROCEDURE — 77030002912 HC SUT ETHBND J&J -A: Performed by: INTERNAL MEDICINE

## 2021-01-01 PROCEDURE — C1900 LEAD, CORONARY VENOUS: HCPCS | Performed by: INTERNAL MEDICINE

## 2021-01-01 PROCEDURE — 77030036998 HC STPLR CRV CUT CNTOUR J&J -F: Performed by: SURGERY

## 2021-01-01 PROCEDURE — C9113 INJ PANTOPRAZOLE SODIUM, VIA: HCPCS | Performed by: INTERNAL MEDICINE

## 2021-01-01 PROCEDURE — 93650 ICAR CATH ABLTJ AV NODE FUNC: CPT | Performed by: INTERNAL MEDICINE

## 2021-01-01 PROCEDURE — 75726 ARTERY X-RAYS ABDOMEN: CPT

## 2021-01-01 PROCEDURE — 77030038552 HC DRN WND MDII -A: Performed by: SURGERY

## 2021-01-01 PROCEDURE — C1777 LEAD, AICD, ENDO SINGLE COIL: HCPCS | Performed by: INTERNAL MEDICINE

## 2021-01-01 PROCEDURE — 84439 ASSAY OF FREE THYROXINE: CPT

## 2021-01-01 PROCEDURE — 77030002996 HC SUT SLK J&J -A: Performed by: SURGERY

## 2021-01-01 PROCEDURE — 71260 CT THORAX DX C+: CPT

## 2021-01-01 PROCEDURE — 74011000258 HC RX REV CODE- 258: Performed by: INTERNAL MEDICINE

## 2021-01-01 PROCEDURE — 83605 ASSAY OF LACTIC ACID: CPT

## 2021-01-01 PROCEDURE — 77030008771 HC TU NG SALEM SUMP -A: Performed by: SURGERY

## 2021-01-01 PROCEDURE — 83540 ASSAY OF IRON: CPT

## 2021-01-01 PROCEDURE — 77030004565 HC CATH ANGI DX TMPO CARD -B

## 2021-01-01 PROCEDURE — 76210000022 HC REC RM PH II 1.5 TO 2 HR: Performed by: INTERNAL MEDICINE

## 2021-01-01 PROCEDURE — 77030002966 HC SUT PDS J&J -A: Performed by: SURGERY

## 2021-01-01 PROCEDURE — 77030020407 HC IV BLD WRMR ST 3M -A: Performed by: ANESTHESIOLOGY

## 2021-01-01 PROCEDURE — 82607 VITAMIN B-12: CPT

## 2021-01-01 PROCEDURE — 86923 COMPATIBILITY TEST ELECTRIC: CPT

## 2021-01-01 PROCEDURE — 44139 MOBILIZATION OF COLON: CPT | Performed by: SURGERY

## 2021-01-01 PROCEDURE — 97166 OT EVAL MOD COMPLEX 45 MIN: CPT

## 2021-01-01 PROCEDURE — 77030013140 HC MSK NEB VYRM -A

## 2021-01-01 PROCEDURE — C1882 AICD, OTHER THAN SING/DUAL: HCPCS | Performed by: INTERNAL MEDICINE

## 2021-01-01 PROCEDURE — G0152 HHCP-SERV OF OT,EA 15 MIN: HCPCS

## 2021-01-01 PROCEDURE — 77030008703 HC TU ET UNCUF COVD -A: Performed by: ANESTHESIOLOGY

## 2021-01-01 PROCEDURE — 77030011264 HC ELECTRD BLD EXT COVD -A: Performed by: SURGERY

## 2021-01-01 PROCEDURE — 77030040830 HC CATH URETH FOL MDII -A

## 2021-01-01 PROCEDURE — 77030011248 HC DRN WND RESERV CARD -A: Performed by: SURGERY

## 2021-01-01 PROCEDURE — 02H63KZ INSERTION OF DEFIBRILLATOR LEAD INTO RIGHT ATRIUM, PERCUTANEOUS APPROACH: ICD-10-PCS | Performed by: STUDENT IN AN ORGANIZED HEALTH CARE EDUCATION/TRAINING PROGRAM

## 2021-01-01 PROCEDURE — 85610 PROTHROMBIN TIME: CPT

## 2021-01-01 PROCEDURE — 77030003029 HC SUT VCRL J&J -B: Performed by: SURGERY

## 2021-01-01 PROCEDURE — C1894 INTRO/SHEATH, NON-LASER: HCPCS | Performed by: INTERNAL MEDICINE

## 2021-01-01 PROCEDURE — 88307 TISSUE EXAM BY PATHOLOGIST: CPT

## 2021-01-01 PROCEDURE — 74011250636 HC RX REV CODE- 250/636: Performed by: FAMILY MEDICINE

## 2021-01-01 PROCEDURE — 77030031139 HC SUT VCRL2 J&J -A: Performed by: INTERNAL MEDICINE

## 2021-01-01 PROCEDURE — 74022 RADEX COMPL AQT ABD SERIES: CPT

## 2021-01-01 PROCEDURE — 5A09357 ASSISTANCE WITH RESPIRATORY VENTILATION, LESS THAN 24 CONSECUTIVE HOURS, CONTINUOUS POSITIVE AIRWAY PRESSURE: ICD-10-PCS | Performed by: STUDENT IN AN ORGANIZED HEALTH CARE EDUCATION/TRAINING PROGRAM

## 2021-01-01 PROCEDURE — C1751 CATH, INF, PER/CENT/MIDLINE: HCPCS | Performed by: INTERNAL MEDICINE

## 2021-01-01 PROCEDURE — 74011000258 HC RX REV CODE- 258: Performed by: FAMILY MEDICINE

## 2021-01-01 PROCEDURE — 77030020782 HC GWN BAIR PAWS FLX 3M -B: Performed by: ANESTHESIOLOGY

## 2021-01-01 PROCEDURE — 77030008462 HC STPLR SKN PROX J&J -A: Performed by: INTERNAL MEDICINE

## 2021-01-01 PROCEDURE — 93005 ELECTROCARDIOGRAM TRACING: CPT | Performed by: INTERNAL MEDICINE

## 2021-01-01 PROCEDURE — 77030040361 HC SLV COMPR DVT MDII -B: Performed by: SURGERY

## 2021-01-01 PROCEDURE — 94003 VENT MGMT INPAT SUBQ DAY: CPT

## 2021-01-01 PROCEDURE — 0DTG0ZZ RESECTION OF LEFT LARGE INTESTINE, OPEN APPROACH: ICD-10-PCS | Performed by: SURGERY

## 2021-01-01 PROCEDURE — 99024 POSTOP FOLLOW-UP VISIT: CPT | Performed by: NURSE PRACTITIONER

## 2021-01-01 PROCEDURE — 77030037088 HC TUBE ENDOTRACH ORAL NSL COVD-A: Performed by: ANESTHESIOLOGY

## 2021-01-01 PROCEDURE — 70450 CT HEAD/BRAIN W/O DYE: CPT

## 2021-01-01 PROCEDURE — 87150 DNA/RNA AMPLIFIED PROBE: CPT

## 2021-01-01 PROCEDURE — 77030034850

## 2021-01-01 PROCEDURE — 77030040922 HC BLNKT HYPOTHRM STRY -A: Performed by: ANESTHESIOLOGY

## 2021-01-01 PROCEDURE — 30243N1 TRANSFUSION OF NONAUTOLOGOUS RED BLOOD CELLS INTO CENTRAL VEIN, PERCUTANEOUS APPROACH: ICD-10-PCS | Performed by: INTERNAL MEDICINE

## 2021-01-01 PROCEDURE — 3336500001 HSPC ELECTION

## 2021-01-01 PROCEDURE — 51798 US URINE CAPACITY MEASURE: CPT

## 2021-01-01 PROCEDURE — 76450000000

## 2021-01-01 PROCEDURE — 02583ZZ DESTRUCTION OF CONDUCTION MECHANISM, PERCUTANEOUS APPROACH: ICD-10-PCS | Performed by: STUDENT IN AN ORGANIZED HEALTH CARE EDUCATION/TRAINING PROGRAM

## 2021-01-01 PROCEDURE — 99291 CRITICAL CARE FIRST HOUR: CPT | Performed by: INTERNAL MEDICINE

## 2021-01-01 PROCEDURE — 74011000636 HC RX REV CODE- 636: Performed by: INTERNAL MEDICINE

## 2021-01-01 PROCEDURE — 75774 ARTERY X-RAY EACH VESSEL: CPT

## 2021-01-01 PROCEDURE — 77030008477 HC STYL SATN SLP COVD -A: Performed by: ANESTHESIOLOGY

## 2021-01-01 PROCEDURE — 76060000036 HC ANESTHESIA 2.5 TO 3 HR: Performed by: INTERNAL MEDICINE

## 2021-01-01 PROCEDURE — 77030022704 HC SUT VLOC COVD -B: Performed by: INTERNAL MEDICINE

## 2021-01-01 PROCEDURE — 0D1L0Z4 BYPASS TRANSVERSE COLON TO CUTANEOUS, OPEN APPROACH: ICD-10-PCS | Performed by: SURGERY

## 2021-01-01 PROCEDURE — 77030013292 HC BOWL MX PRSM J&J -A: Performed by: ANESTHESIOLOGY

## 2021-01-01 PROCEDURE — C8929 TTE W OR WO FOL WCON,DOPPLER: HCPCS

## 2021-01-01 PROCEDURE — C1732 CATH, EP, DIAG/ABL, 3D/VECT: HCPCS | Performed by: INTERNAL MEDICINE

## 2021-01-01 PROCEDURE — 97112 NEUROMUSCULAR REEDUCATION: CPT

## 2021-01-01 PROCEDURE — C1760 CLOSURE DEV, VASC: HCPCS

## 2021-01-01 PROCEDURE — 74011250636 HC RX REV CODE- 250/636: Performed by: HOSPITALIST

## 2021-01-01 PROCEDURE — 36246 INS CATH ABD/L-EXT ART 2ND: CPT

## 2021-01-01 PROCEDURE — 0JH609Z INSERTION OF CARDIAC RESYNCHRONIZATION DEFIBRILLATOR PULSE GENERATOR INTO CHEST SUBCUTANEOUS TISSUE AND FASCIA, OPEN APPROACH: ICD-10-PCS | Performed by: STUDENT IN AN ORGANIZED HEALTH CARE EDUCATION/TRAINING PROGRAM

## 2021-01-01 PROCEDURE — C1887 CATHETER, GUIDING: HCPCS | Performed by: INTERNAL MEDICINE

## 2021-01-01 PROCEDURE — 84100 ASSAY OF PHOSPHORUS: CPT

## 2021-01-01 PROCEDURE — 74011000258 HC RX REV CODE- 258: Performed by: STUDENT IN AN ORGANIZED HEALTH CARE EDUCATION/TRAINING PROGRAM

## 2021-01-01 PROCEDURE — B4141ZZ FLUOROSCOPY OF SUPERIOR MESENTERIC ARTERY USING LOW OSMOLAR CONTRAST: ICD-10-PCS | Performed by: RADIOLOGY

## 2021-01-01 PROCEDURE — 03HY32Z INSERTION OF MONITORING DEVICE INTO UPPER ARTERY, PERCUTANEOUS APPROACH: ICD-10-PCS | Performed by: ANESTHESIOLOGY

## 2021-01-01 PROCEDURE — 77030039425 HC BLD LARYNG TRULITE DISP TELE -A: Performed by: ANESTHESIOLOGY

## 2021-01-01 PROCEDURE — 02580ZZ DESTRUCTION OF CONDUCTION MECHANISM, OPEN APPROACH: ICD-10-PCS | Performed by: STUDENT IN AN ORGANIZED HEALTH CARE EDUCATION/TRAINING PROGRAM

## 2021-01-01 PROCEDURE — 92526 ORAL FUNCTION THERAPY: CPT

## 2021-01-01 PROCEDURE — 65270000029 HC RM PRIVATE

## 2021-01-01 PROCEDURE — 02HV33Z INSERTION OF INFUSION DEVICE INTO SUPERIOR VENA CAVA, PERCUTANEOUS APPROACH: ICD-10-PCS | Performed by: ANESTHESIOLOGY

## 2021-01-01 PROCEDURE — 99239 HOSP IP/OBS DSCHRG MGMT >30: CPT | Performed by: INTERNAL MEDICINE

## 2021-01-01 PROCEDURE — 80307 DRUG TEST PRSMV CHEM ANLYZR: CPT

## 2021-01-01 PROCEDURE — 74011250636 HC RX REV CODE- 250/636: Performed by: RADIOLOGY

## 2021-01-01 PROCEDURE — 74011000636 HC RX REV CODE- 636: Performed by: RADIOLOGY

## 2021-01-01 PROCEDURE — C9113 INJ PANTOPRAZOLE SODIUM, VIA: HCPCS

## 2021-01-01 PROCEDURE — 99222 1ST HOSP IP/OBS MODERATE 55: CPT | Performed by: SURGERY

## 2021-01-01 PROCEDURE — 77030019908 HC STETH ESOPH SIMS -A: Performed by: ANESTHESIOLOGY

## 2021-01-01 PROCEDURE — 77030035291 HC TBNG PMP SMARTABLATE J&J -B: Performed by: INTERNAL MEDICINE

## 2021-01-01 PROCEDURE — 86580 TB INTRADERMAL TEST: CPT | Performed by: INTERNAL MEDICINE

## 2021-01-01 PROCEDURE — 77030040830 HC CATH URETH FOL MDII -A: Performed by: SURGERY

## 2021-01-01 DEVICE — IMPLANTABLE DEVICE
Type: IMPLANTABLE DEVICE | Status: FUNCTIONAL
Brand: ACTICOR 7 HF-T QP DF4 IS4 PROMRI

## 2021-01-01 DEVICE — IMPLANTABLE DEVICE
Type: IMPLANTABLE DEVICE | Status: FUNCTIONAL
Brand: PLEXA PROMRI

## 2021-01-01 DEVICE — IMPLANTABLE DEVICE
Type: IMPLANTABLE DEVICE | Status: FUNCTIONAL
Brand: SENTUS PROMRI OTW QP L-85/49

## 2021-01-01 DEVICE — IMPLANTABLE DEVICE
Type: IMPLANTABLE DEVICE | Status: FUNCTIONAL
Brand: SOLIA S 53

## 2021-01-01 RX ORDER — IPRATROPIUM BROMIDE AND ALBUTEROL SULFATE 2.5; .5 MG/3ML; MG/3ML
3 SOLUTION RESPIRATORY (INHALATION) 3 TIMES DAILY
Status: DISCONTINUED | OUTPATIENT
Start: 2021-01-01 | End: 2021-01-01

## 2021-01-01 RX ORDER — ONDANSETRON 2 MG/ML
4 INJECTION INTRAMUSCULAR; INTRAVENOUS
Status: DISCONTINUED | OUTPATIENT
Start: 2021-01-01 | End: 2021-01-01 | Stop reason: HOSPADM

## 2021-01-01 RX ORDER — HALOPERIDOL 5 MG/ML
2 INJECTION INTRAMUSCULAR
Status: DISCONTINUED | OUTPATIENT
Start: 2021-01-01 | End: 2021-01-01 | Stop reason: HOSPADM

## 2021-01-01 RX ORDER — IPRATROPIUM BROMIDE AND ALBUTEROL SULFATE 2.5; .5 MG/3ML; MG/3ML
3 SOLUTION RESPIRATORY (INHALATION)
Status: DISCONTINUED | OUTPATIENT
Start: 2021-01-01 | End: 2021-01-01

## 2021-01-01 RX ORDER — SODIUM CHLORIDE 0.9 % (FLUSH) 0.9 %
5-40 SYRINGE (ML) INJECTION EVERY 8 HOURS
Status: DISCONTINUED | OUTPATIENT
Start: 2021-01-01 | End: 2021-01-01 | Stop reason: HOSPADM

## 2021-01-01 RX ORDER — FUROSEMIDE 10 MG/ML
40 INJECTION INTRAMUSCULAR; INTRAVENOUS DAILY
Status: DISCONTINUED | OUTPATIENT
Start: 2021-01-01 | End: 2021-01-01 | Stop reason: HOSPADM

## 2021-01-01 RX ORDER — FUROSEMIDE 10 MG/ML
40 INJECTION INTRAMUSCULAR; INTRAVENOUS
Status: COMPLETED | OUTPATIENT
Start: 2021-01-01 | End: 2021-01-01

## 2021-01-01 RX ORDER — METOPROLOL SUCCINATE 100 MG/1
100 TABLET, EXTENDED RELEASE ORAL 2 TIMES DAILY
Status: DISCONTINUED | OUTPATIENT
Start: 2021-01-01 | End: 2021-01-01 | Stop reason: HOSPADM

## 2021-01-01 RX ORDER — SODIUM CHLORIDE 9 MG/ML
250 INJECTION, SOLUTION INTRAVENOUS AS NEEDED
Status: DISCONTINUED | OUTPATIENT
Start: 2021-01-01 | End: 2021-01-01

## 2021-01-01 RX ORDER — DILTIAZEM HYDROCHLORIDE 5 MG/ML
10 INJECTION INTRAVENOUS ONCE
Status: COMPLETED | OUTPATIENT
Start: 2021-01-01 | End: 2021-01-01

## 2021-01-01 RX ORDER — ALBUTEROL SULFATE 0.83 MG/ML
10 SOLUTION RESPIRATORY (INHALATION)
Status: COMPLETED | OUTPATIENT
Start: 2021-01-01 | End: 2021-01-01

## 2021-01-01 RX ORDER — ACETAMINOPHEN 325 MG/1
650 TABLET ORAL
Status: DISCONTINUED | OUTPATIENT
Start: 2021-01-01 | End: 2021-01-01 | Stop reason: HOSPADM

## 2021-01-01 RX ORDER — NOREPINEPHRINE BITARTRATE/D5W 4MG/250ML
.5-3 PLASTIC BAG, INJECTION (ML) INTRAVENOUS
Status: DISCONTINUED | OUTPATIENT
Start: 2021-01-01 | End: 2021-01-01

## 2021-01-01 RX ORDER — SODIUM CHLORIDE 0.9 % (FLUSH) 0.9 %
5-10 SYRINGE (ML) INJECTION EVERY 8 HOURS
Status: DISCONTINUED | OUTPATIENT
Start: 2021-01-01 | End: 2021-01-01 | Stop reason: HOSPADM

## 2021-01-01 RX ORDER — IPRATROPIUM BROMIDE AND ALBUTEROL SULFATE 2.5; .5 MG/3ML; MG/3ML
3 SOLUTION RESPIRATORY (INHALATION)
Status: COMPLETED | OUTPATIENT
Start: 2021-01-01 | End: 2021-01-01

## 2021-01-01 RX ORDER — ALBUTEROL SULFATE 0.83 MG/ML
2.5 SOLUTION RESPIRATORY (INHALATION)
Status: DISCONTINUED | OUTPATIENT
Start: 2021-01-01 | End: 2021-01-01

## 2021-01-01 RX ORDER — SODIUM CHLORIDE 9 MG/ML
250 INJECTION, SOLUTION INTRAVENOUS AS NEEDED
Status: DISCONTINUED | OUTPATIENT
Start: 2021-01-01 | End: 2021-01-01 | Stop reason: HOSPADM

## 2021-01-01 RX ORDER — VANCOMYCIN 1.75 GRAM/500 ML IN 0.9 % SODIUM CHLORIDE INTRAVENOUS
1750 ONCE
Status: COMPLETED | OUTPATIENT
Start: 2021-01-01 | End: 2021-01-01

## 2021-01-01 RX ORDER — DOCUSATE SODIUM 100 MG/1
100 CAPSULE, LIQUID FILLED ORAL DAILY
Status: DISCONTINUED | OUTPATIENT
Start: 2021-01-01 | End: 2021-01-01 | Stop reason: HOSPADM

## 2021-01-01 RX ORDER — LABETALOL HYDROCHLORIDE 5 MG/ML
10 INJECTION, SOLUTION INTRAVENOUS
Status: DISCONTINUED | OUTPATIENT
Start: 2021-01-01 | End: 2021-01-01 | Stop reason: HOSPADM

## 2021-01-01 RX ORDER — DILTIAZEM HYDROCHLORIDE 30 MG/1
120 TABLET, FILM COATED ORAL DAILY
Status: DISCONTINUED | OUTPATIENT
Start: 2021-01-01 | End: 2021-01-01 | Stop reason: HOSPADM

## 2021-01-01 RX ORDER — DILTIAZEM HYDROCHLORIDE 30 MG/1
30 TABLET, FILM COATED ORAL EVERY 6 HOURS
Status: DISCONTINUED | OUTPATIENT
Start: 2021-01-01 | End: 2021-01-01

## 2021-01-01 RX ORDER — ONDANSETRON 2 MG/ML
4 INJECTION INTRAMUSCULAR; INTRAVENOUS
Status: CANCELLED | OUTPATIENT
Start: 2021-01-01

## 2021-01-01 RX ORDER — METOPROLOL SUCCINATE 100 MG/1
100 TABLET, EXTENDED RELEASE ORAL 2 TIMES DAILY
Qty: 60 TABLET | Refills: 1 | Status: ON HOLD | OUTPATIENT
Start: 2021-01-01 | End: 2021-01-01 | Stop reason: SDUPTHER

## 2021-01-01 RX ORDER — NITROGLYCERIN 0.4 MG/1
0.4 TABLET SUBLINGUAL AS NEEDED
Status: DISCONTINUED | OUTPATIENT
Start: 2021-01-01 | End: 2021-01-01 | Stop reason: HOSPADM

## 2021-01-01 RX ORDER — ALBUTEROL SULFATE 0.83 MG/ML
2.5 SOLUTION RESPIRATORY (INHALATION)
Status: DISCONTINUED | OUTPATIENT
Start: 2021-01-01 | End: 2021-01-01 | Stop reason: HOSPADM

## 2021-01-01 RX ORDER — ALBUTEROL SULFATE 0.83 MG/ML
SOLUTION RESPIRATORY (INHALATION)
Status: COMPLETED
Start: 2021-01-01 | End: 2021-01-01

## 2021-01-01 RX ORDER — SODIUM CHLORIDE 0.9 % (FLUSH) 0.9 %
10 SYRINGE (ML) INJECTION
Status: COMPLETED | OUTPATIENT
Start: 2021-01-01 | End: 2021-01-01

## 2021-01-01 RX ORDER — BRIMONIDINE TARTRATE 2 MG/ML
1 SOLUTION/ DROPS OPHTHALMIC 2 TIMES DAILY
Status: DISCONTINUED | OUTPATIENT
Start: 2021-01-01 | End: 2021-01-01 | Stop reason: HOSPADM

## 2021-01-01 RX ORDER — ROCURONIUM BROMIDE 10 MG/ML
INJECTION, SOLUTION INTRAVENOUS AS NEEDED
Status: DISCONTINUED | OUTPATIENT
Start: 2021-01-01 | End: 2021-01-01 | Stop reason: HOSPADM

## 2021-01-01 RX ORDER — ALLOPURINOL 100 MG/1
50 TABLET ORAL DAILY
Status: DISCONTINUED | OUTPATIENT
Start: 2021-01-01 | End: 2021-01-01 | Stop reason: HOSPADM

## 2021-01-01 RX ORDER — LISINOPRIL 5 MG/1
10 TABLET ORAL DAILY
Status: DISCONTINUED | OUTPATIENT
Start: 2021-01-01 | End: 2021-01-01

## 2021-01-01 RX ORDER — ASPIRIN 81 MG/1
81 TABLET ORAL DAILY
Status: DISCONTINUED | OUTPATIENT
Start: 2021-01-01 | End: 2021-01-01 | Stop reason: HOSPADM

## 2021-01-01 RX ORDER — SODIUM POLYSTYRENE SULFONATE 15 G/60ML
15 SUSPENSION ORAL; RECTAL
Status: COMPLETED | OUTPATIENT
Start: 2021-01-01 | End: 2021-01-01

## 2021-01-01 RX ORDER — FENTANYL 25 UG/1
1 PATCH TRANSDERMAL
Status: DISCONTINUED | OUTPATIENT
Start: 2021-01-01 | End: 2021-01-01 | Stop reason: HOSPADM

## 2021-01-01 RX ORDER — ALLOPURINOL 300 MG/1
300 TABLET ORAL DAILY
Status: DISCONTINUED | OUTPATIENT
Start: 2021-01-01 | End: 2021-01-01 | Stop reason: HOSPADM

## 2021-01-01 RX ORDER — FUROSEMIDE 40 MG/1
40 TABLET ORAL 2 TIMES DAILY
Qty: 14 TABLET | Refills: 3 | Status: SHIPPED | OUTPATIENT
Start: 2021-01-01 | End: 2021-01-01

## 2021-01-01 RX ORDER — PREDNISONE 20 MG/1
20 TABLET ORAL
Status: COMPLETED | OUTPATIENT
Start: 2021-01-01 | End: 2021-01-01

## 2021-01-01 RX ORDER — SODIUM BICARBONATE 84 MG/ML
50 INJECTION, SOLUTION INTRAVENOUS ONCE
Status: COMPLETED | OUTPATIENT
Start: 2021-01-01 | End: 2021-01-01

## 2021-01-01 RX ORDER — ALLOPURINOL 300 MG/1
300 TABLET ORAL DAILY
COMMUNITY

## 2021-01-01 RX ORDER — ACETAMINOPHEN 650 MG/1
650 SUPPOSITORY RECTAL
Status: DISCONTINUED | OUTPATIENT
Start: 2021-01-01 | End: 2021-01-01 | Stop reason: HOSPADM

## 2021-01-01 RX ORDER — INSULIN LISPRO 100 [IU]/ML
INJECTION, SOLUTION INTRAVENOUS; SUBCUTANEOUS EVERY 4 HOURS
Status: DISCONTINUED | OUTPATIENT
Start: 2021-01-01 | End: 2021-01-01 | Stop reason: HOSPADM

## 2021-01-01 RX ORDER — CEFAZOLIN SODIUM/WATER 2 G/20 ML
2 SYRINGE (ML) INTRAVENOUS EVERY 8 HOURS
Status: DISCONTINUED | OUTPATIENT
Start: 2021-01-01 | End: 2021-01-01

## 2021-01-01 RX ORDER — NITROGLYCERIN 0.4 MG/1
0.4 TABLET SUBLINGUAL
Status: DISCONTINUED | OUTPATIENT
Start: 2021-01-01 | End: 2021-01-01 | Stop reason: HOSPADM

## 2021-01-01 RX ORDER — ALLOPURINOL 300 MG/1
300 TABLET ORAL DAILY
Status: DISCONTINUED | OUTPATIENT
Start: 2021-01-01 | End: 2021-01-01

## 2021-01-01 RX ORDER — CEFAZOLIN SODIUM/WATER 2 G/20 ML
2 SYRINGE (ML) INTRAVENOUS
Status: DISPENSED | OUTPATIENT
Start: 2021-01-01 | End: 2021-01-01

## 2021-01-01 RX ORDER — PROPOFOL 10 MG/ML
INJECTION, EMULSION INTRAVENOUS AS NEEDED
Status: DISCONTINUED | OUTPATIENT
Start: 2021-01-01 | End: 2021-01-01 | Stop reason: HOSPADM

## 2021-01-01 RX ORDER — METOPROLOL TARTRATE 5 MG/5ML
5 INJECTION INTRAVENOUS
Status: DISCONTINUED | OUTPATIENT
Start: 2021-01-01 | End: 2021-01-01 | Stop reason: HOSPADM

## 2021-01-01 RX ORDER — FUROSEMIDE 10 MG/ML
40 INJECTION INTRAMUSCULAR; INTRAVENOUS ONCE
Status: COMPLETED | OUTPATIENT
Start: 2021-01-01 | End: 2021-01-01

## 2021-01-01 RX ORDER — SODIUM CHLORIDE 9 MG/ML
500 INJECTION, SOLUTION INTRAVENOUS CONTINUOUS
Status: DISCONTINUED | OUTPATIENT
Start: 2021-01-01 | End: 2021-01-01 | Stop reason: HOSPADM

## 2021-01-01 RX ORDER — SODIUM CHLORIDE 0.9 % (FLUSH) 0.9 %
5-40 SYRINGE (ML) INJECTION AS NEEDED
Status: DISCONTINUED | OUTPATIENT
Start: 2021-01-01 | End: 2021-01-01 | Stop reason: HOSPADM

## 2021-01-01 RX ORDER — ALBUTEROL SULFATE 0.83 MG/ML
2.5 SOLUTION RESPIRATORY (INHALATION) EVERY 12 HOURS
Status: DISCONTINUED | OUTPATIENT
Start: 2021-01-01 | End: 2021-01-01

## 2021-01-01 RX ORDER — SODIUM CHLORIDE 0.9 % (FLUSH) 0.9 %
10 SYRINGE (ML) INJECTION AS NEEDED
Status: DISCONTINUED | OUTPATIENT
Start: 2021-01-01 | End: 2021-01-01 | Stop reason: HOSPADM

## 2021-01-01 RX ORDER — LANOLIN ALCOHOL/MO/W.PET/CERES
1 CREAM (GRAM) TOPICAL 2 TIMES DAILY WITH MEALS
Status: CANCELLED | OUTPATIENT
Start: 2021-01-01

## 2021-01-01 RX ORDER — GUAIFENESIN 600 MG/1
600 TABLET, EXTENDED RELEASE ORAL 2 TIMES DAILY
Status: DISCONTINUED | OUTPATIENT
Start: 2021-01-01 | End: 2021-01-01 | Stop reason: HOSPADM

## 2021-01-01 RX ORDER — MORPHINE SULFATE 2 MG/ML
2 INJECTION, SOLUTION INTRAMUSCULAR; INTRAVENOUS
Status: COMPLETED | OUTPATIENT
Start: 2021-01-01 | End: 2021-01-01

## 2021-01-01 RX ORDER — PANTOPRAZOLE SODIUM 40 MG/1
40 TABLET, DELAYED RELEASE ORAL
Status: DISCONTINUED | OUTPATIENT
Start: 2021-01-01 | End: 2021-01-01

## 2021-01-01 RX ORDER — HYDROMORPHONE HYDROCHLORIDE 1 MG/ML
0.5 INJECTION, SOLUTION INTRAMUSCULAR; INTRAVENOUS; SUBCUTANEOUS
Status: DISCONTINUED | OUTPATIENT
Start: 2021-01-01 | End: 2021-01-01

## 2021-01-01 RX ORDER — METOPROLOL SUCCINATE 25 MG/1
50 TABLET, EXTENDED RELEASE ORAL 2 TIMES DAILY
Status: DISCONTINUED | OUTPATIENT
Start: 2021-01-01 | End: 2021-01-01

## 2021-01-01 RX ORDER — LIDOCAINE HYDROCHLORIDE AND EPINEPHRINE 10; 10 MG/ML; UG/ML
INJECTION, SOLUTION INFILTRATION; PERINEURAL AS NEEDED
Status: DISCONTINUED | OUTPATIENT
Start: 2021-01-01 | End: 2021-01-01 | Stop reason: HOSPADM

## 2021-01-01 RX ORDER — ALBUTEROL SULFATE 0.83 MG/ML
2.5 SOLUTION RESPIRATORY (INHALATION)
Status: CANCELLED | OUTPATIENT
Start: 2021-01-01

## 2021-01-01 RX ORDER — OXYCODONE HYDROCHLORIDE 5 MG/1
5 TABLET ORAL
Status: DISCONTINUED | OUTPATIENT
Start: 2021-01-01 | End: 2021-01-01

## 2021-01-01 RX ORDER — FENTANYL CITRATE 50 UG/ML
25-100 INJECTION, SOLUTION INTRAMUSCULAR; INTRAVENOUS
Status: DISCONTINUED | OUTPATIENT
Start: 2021-01-01 | End: 2021-01-01

## 2021-01-01 RX ORDER — DEXAMETHASONE 2 MG/1
TABLET ORAL
Qty: 32 TABLET | Refills: 0 | Status: ON HOLD | OUTPATIENT
Start: 2021-01-01 | End: 2021-01-01

## 2021-01-01 RX ORDER — FUROSEMIDE 10 MG/ML
80 INJECTION INTRAMUSCULAR; INTRAVENOUS EVERY 12 HOURS
Status: DISCONTINUED | OUTPATIENT
Start: 2021-01-01 | End: 2021-01-01 | Stop reason: HOSPADM

## 2021-01-01 RX ORDER — TAMSULOSIN HYDROCHLORIDE 0.4 MG/1
0.4 CAPSULE ORAL DAILY
Status: DISCONTINUED | OUTPATIENT
Start: 2021-01-01 | End: 2021-01-01 | Stop reason: HOSPADM

## 2021-01-01 RX ORDER — POLYETHYLENE GLYCOL 3350 17 G/17G
17 POWDER, FOR SOLUTION ORAL DAILY PRN
Status: DISCONTINUED | OUTPATIENT
Start: 2021-01-01 | End: 2021-01-01 | Stop reason: HOSPADM

## 2021-01-01 RX ORDER — PROPOFOL 10 MG/ML
INJECTION, EMULSION INTRAVENOUS
Status: DISCONTINUED
Start: 2021-01-01 | End: 2021-01-01 | Stop reason: CLARIF

## 2021-01-01 RX ORDER — FUROSEMIDE 40 MG/1
40 TABLET ORAL DAILY
Qty: 90 TABLET | Refills: 3 | Status: SHIPPED | OUTPATIENT
Start: 2021-01-01 | End: 2021-01-01

## 2021-01-01 RX ORDER — FAMOTIDINE 20 MG/1
40 TABLET, FILM COATED ORAL DAILY
Status: DISCONTINUED | OUTPATIENT
Start: 2021-01-01 | End: 2021-01-01 | Stop reason: HOSPADM

## 2021-01-01 RX ORDER — METOPROLOL SUCCINATE 50 MG/1
50 TABLET, EXTENDED RELEASE ORAL DAILY
Status: DISCONTINUED | OUTPATIENT
Start: 2021-01-01 | End: 2021-01-01

## 2021-01-01 RX ORDER — SODIUM CHLORIDE 0.9 % (FLUSH) 0.9 %
5-10 SYRINGE (ML) INJECTION AS NEEDED
Status: DISCONTINUED | OUTPATIENT
Start: 2021-01-01 | End: 2021-01-01 | Stop reason: HOSPADM

## 2021-01-01 RX ORDER — FUROSEMIDE 20 MG/1
20 TABLET ORAL DAILY
Status: DISCONTINUED | OUTPATIENT
Start: 2021-01-01 | End: 2021-01-01 | Stop reason: HOSPADM

## 2021-01-01 RX ORDER — FAMOTIDINE 20 MG/1
40 TABLET, FILM COATED ORAL DAILY
Status: DISCONTINUED | OUTPATIENT
Start: 2021-01-01 | End: 2021-01-01 | Stop reason: SDUPTHER

## 2021-01-01 RX ORDER — HYDRALAZINE HYDROCHLORIDE 20 MG/ML
20 INJECTION INTRAMUSCULAR; INTRAVENOUS
Status: DISCONTINUED | OUTPATIENT
Start: 2021-01-01 | End: 2021-01-01 | Stop reason: HOSPADM

## 2021-01-01 RX ORDER — PROMETHAZINE HYDROCHLORIDE 25 MG/1
12.5 TABLET ORAL
Status: DISCONTINUED | OUTPATIENT
Start: 2021-01-01 | End: 2021-01-01 | Stop reason: HOSPADM

## 2021-01-01 RX ORDER — SODIUM CHLORIDE 9 MG/ML
75 INJECTION, SOLUTION INTRAVENOUS CONTINUOUS
Status: DISCONTINUED | OUTPATIENT
Start: 2021-01-01 | End: 2021-01-01

## 2021-01-01 RX ORDER — TAMSULOSIN HYDROCHLORIDE 0.4 MG/1
0.4 CAPSULE ORAL DAILY
Status: CANCELLED | OUTPATIENT
Start: 2021-01-01

## 2021-01-01 RX ORDER — LISINOPRIL 5 MG/1
10 TABLET ORAL DAILY
Status: DISCONTINUED | OUTPATIENT
Start: 2021-01-01 | End: 2021-01-01 | Stop reason: HOSPADM

## 2021-01-01 RX ORDER — LISINOPRIL 10 MG/1
10 TABLET ORAL DAILY
Qty: 30 TABLET | Refills: 3 | Status: SHIPPED | OUTPATIENT
Start: 2021-01-01

## 2021-01-01 RX ORDER — FLUTICASONE PROPIONATE 110 UG/1
1 AEROSOL, METERED RESPIRATORY (INHALATION) DAILY
Status: DISCONTINUED | OUTPATIENT
Start: 2021-01-01 | End: 2021-01-01 | Stop reason: HOSPADM

## 2021-01-01 RX ORDER — PANTOPRAZOLE SODIUM 40 MG/1
40 TABLET, DELAYED RELEASE ORAL
Status: DISCONTINUED | OUTPATIENT
Start: 2021-01-01 | End: 2021-01-01 | Stop reason: HOSPADM

## 2021-01-01 RX ORDER — PREDNISONE 5 MG/1
10 TABLET ORAL
Status: DISCONTINUED | OUTPATIENT
Start: 2021-01-01 | End: 2021-01-01 | Stop reason: SDUPTHER

## 2021-01-01 RX ORDER — METOPROLOL SUCCINATE 50 MG/1
50 TABLET, EXTENDED RELEASE ORAL DAILY
Status: DISCONTINUED | OUTPATIENT
Start: 2021-01-01 | End: 2021-01-01 | Stop reason: HOSPADM

## 2021-01-01 RX ORDER — PREDNISONE 10 MG/1
TABLET ORAL
Qty: 21 TABLET | Refills: 0 | Status: SHIPPED | OUTPATIENT
Start: 2021-01-01 | End: 2021-01-01 | Stop reason: ALTCHOICE

## 2021-01-01 RX ORDER — POLYETHYLENE GLYCOL 3350 17 G/17G
17 POWDER, FOR SOLUTION ORAL DAILY
Status: DISCONTINUED | OUTPATIENT
Start: 2021-01-01 | End: 2021-01-01 | Stop reason: HOSPADM

## 2021-01-01 RX ORDER — DILTIAZEM HYDROCHLORIDE 60 MG/1
120 TABLET, FILM COATED ORAL DAILY
Status: DISCONTINUED | OUTPATIENT
Start: 2021-01-01 | End: 2021-01-01 | Stop reason: HOSPADM

## 2021-01-01 RX ORDER — BUDESONIDE 0.5 MG/2ML
500 INHALANT ORAL 2 TIMES DAILY
Status: DISCONTINUED | OUTPATIENT
Start: 2021-01-01 | End: 2021-01-01

## 2021-01-01 RX ORDER — SODIUM CHLORIDE, SODIUM LACTATE, POTASSIUM CHLORIDE, CALCIUM CHLORIDE 600; 310; 30; 20 MG/100ML; MG/100ML; MG/100ML; MG/100ML
75 INJECTION, SOLUTION INTRAVENOUS CONTINUOUS
Status: DISCONTINUED | OUTPATIENT
Start: 2021-01-01 | End: 2021-01-01

## 2021-01-01 RX ORDER — SODIUM CHLORIDE 9 MG/ML
25 INJECTION, SOLUTION INTRAVENOUS CONTINUOUS
Status: ACTIVE | OUTPATIENT
Start: 2021-01-01 | End: 2021-01-01

## 2021-01-01 RX ORDER — PREDNISONE 20 MG/1
40 TABLET ORAL
Qty: 5 TABLET | Refills: 0 | Status: SHIPPED | OUTPATIENT
Start: 2021-01-01 | End: 2021-01-01 | Stop reason: ALTCHOICE

## 2021-01-01 RX ORDER — LANOLIN ALCOHOL/MO/W.PET/CERES
1 CREAM (GRAM) TOPICAL 2 TIMES DAILY WITH MEALS
Status: DISCONTINUED | OUTPATIENT
Start: 2021-01-01 | End: 2021-01-01 | Stop reason: HOSPADM

## 2021-01-01 RX ORDER — SODIUM CHLORIDE 9 MG/ML
75 INJECTION, SOLUTION INTRAVENOUS ONCE
Status: COMPLETED | OUTPATIENT
Start: 2021-01-01 | End: 2021-01-01

## 2021-01-01 RX ORDER — FLUTICASONE PROPIONATE 110 UG/1
1 AEROSOL, METERED RESPIRATORY (INHALATION) DAILY
Status: CANCELLED | OUTPATIENT
Start: 2021-01-01

## 2021-01-01 RX ORDER — ROSUVASTATIN CALCIUM 20 MG/1
40 TABLET, COATED ORAL
Status: DISCONTINUED | OUTPATIENT
Start: 2021-01-01 | End: 2021-01-01 | Stop reason: HOSPADM

## 2021-01-01 RX ORDER — ALLOPURINOL 100 MG/1
300 TABLET ORAL DAILY
Status: DISCONTINUED | OUTPATIENT
Start: 2021-01-01 | End: 2021-01-01 | Stop reason: HOSPADM

## 2021-01-01 RX ORDER — IPRATROPIUM BROMIDE AND ALBUTEROL SULFATE 2.5; .5 MG/3ML; MG/3ML
3 SOLUTION RESPIRATORY (INHALATION)
Status: DISCONTINUED | OUTPATIENT
Start: 2021-01-01 | End: 2021-01-01 | Stop reason: HOSPADM

## 2021-01-01 RX ORDER — SODIUM CHLORIDE 9 MG/ML
250 INJECTION, SOLUTION INTRAVENOUS AS NEEDED
Status: CANCELLED | OUTPATIENT
Start: 2021-01-01

## 2021-01-01 RX ORDER — FENTANYL CITRATE 50 UG/ML
INJECTION, SOLUTION INTRAMUSCULAR; INTRAVENOUS AS NEEDED
Status: DISCONTINUED | OUTPATIENT
Start: 2021-01-01 | End: 2021-01-01 | Stop reason: HOSPADM

## 2021-01-01 RX ORDER — SODIUM BICARBONATE 84 MG/ML
INJECTION, SOLUTION INTRAVENOUS
Status: DISCONTINUED
Start: 2021-01-01 | End: 2021-01-01 | Stop reason: WASHOUT

## 2021-01-01 RX ORDER — NOREPINEPHRINE BIT/0.9 % NACL 4MG/250ML
PLASTIC BAG, INJECTION (ML) INTRAVENOUS
Status: DISCONTINUED
Start: 2021-01-01 | End: 2021-01-01 | Stop reason: HOSPADM

## 2021-01-01 RX ORDER — HYDROCORTISONE SODIUM SUCCINATE 100 MG/2ML
50 INJECTION, POWDER, FOR SOLUTION INTRAMUSCULAR; INTRAVENOUS EVERY 6 HOURS
Status: DISCONTINUED | OUTPATIENT
Start: 2021-01-01 | End: 2021-01-01 | Stop reason: HOSPADM

## 2021-01-01 RX ORDER — IPRATROPIUM BROMIDE AND ALBUTEROL SULFATE 2.5; .5 MG/3ML; MG/3ML
SOLUTION RESPIRATORY (INHALATION)
Status: COMPLETED
Start: 2021-01-01 | End: 2021-01-01

## 2021-01-01 RX ORDER — LATANOPROST 50 UG/ML
1 SOLUTION/ DROPS OPHTHALMIC
Status: DISCONTINUED | OUTPATIENT
Start: 2021-01-01 | End: 2021-01-01 | Stop reason: HOSPADM

## 2021-01-01 RX ORDER — ONDANSETRON 4 MG/1
4 TABLET, ORALLY DISINTEGRATING ORAL
Status: DISCONTINUED | OUTPATIENT
Start: 2021-01-01 | End: 2021-01-01 | Stop reason: HOSPADM

## 2021-01-01 RX ORDER — CALCIUM CHLORIDE INJECTION 100 MG/ML
INJECTION, SOLUTION INTRAVENOUS AS NEEDED
Status: DISCONTINUED | OUTPATIENT
Start: 2021-01-01 | End: 2021-01-01 | Stop reason: HOSPADM

## 2021-01-01 RX ORDER — SODIUM CHLORIDE 0.9 % (FLUSH) 0.9 %
10 SYRINGE (ML) INJECTION AS NEEDED
Status: ACTIVE | OUTPATIENT
Start: 2021-01-01 | End: 2021-01-01

## 2021-01-01 RX ORDER — HEPARIN 100 UNIT/ML
300 SYRINGE INTRAVENOUS AS NEEDED
Status: DISCONTINUED | OUTPATIENT
Start: 2021-01-01 | End: 2021-01-01 | Stop reason: HOSPADM

## 2021-01-01 RX ORDER — METOPROLOL SUCCINATE 100 MG/1
100 TABLET, EXTENDED RELEASE ORAL 2 TIMES DAILY
Status: DISCONTINUED | OUTPATIENT
Start: 2021-01-01 | End: 2021-01-01

## 2021-01-01 RX ORDER — PREDNISONE 5 MG/1
10 TABLET ORAL
Status: COMPLETED | OUTPATIENT
Start: 2021-01-01 | End: 2021-01-01

## 2021-01-01 RX ORDER — GLYCOPYRROLATE 0.2 MG/ML
0.2 INJECTION INTRAMUSCULAR; INTRAVENOUS
Status: DISCONTINUED | OUTPATIENT
Start: 2021-01-01 | End: 2021-01-01 | Stop reason: HOSPADM

## 2021-01-01 RX ORDER — PREDNISONE 20 MG/1
40 TABLET ORAL
Status: DISCONTINUED | OUTPATIENT
Start: 2021-01-01 | End: 2021-01-01 | Stop reason: HOSPADM

## 2021-01-01 RX ORDER — LATANOPROST 50 UG/ML
1 SOLUTION/ DROPS OPHTHALMIC EVERY EVENING
Status: DISCONTINUED | OUTPATIENT
Start: 2021-01-01 | End: 2021-01-01 | Stop reason: HOSPADM

## 2021-01-01 RX ORDER — FUROSEMIDE 40 MG/1
40 TABLET ORAL DAILY
Status: DISCONTINUED | OUTPATIENT
Start: 2021-01-01 | End: 2021-01-01 | Stop reason: HOSPADM

## 2021-01-01 RX ORDER — SODIUM CHLORIDE 9 MG/ML
75 INJECTION, SOLUTION INTRAVENOUS CONTINUOUS
Status: CANCELLED | OUTPATIENT
Start: 2021-01-01

## 2021-01-01 RX ORDER — LIDOCAINE HYDROCHLORIDE 20 MG/ML
20-300 INJECTION, SOLUTION INFILTRATION; PERINEURAL
Status: DISCONTINUED | OUTPATIENT
Start: 2021-01-01 | End: 2021-01-01

## 2021-01-01 RX ORDER — HEPARIN 100 UNIT/ML
500 SYRINGE INTRAVENOUS
Status: DISCONTINUED | OUTPATIENT
Start: 2021-01-01 | End: 2021-01-01 | Stop reason: HOSPADM

## 2021-01-01 RX ORDER — LATANOPROST 50 UG/ML
1 SOLUTION/ DROPS OPHTHALMIC
Status: CANCELLED | OUTPATIENT
Start: 2021-01-01

## 2021-01-01 RX ORDER — MIDAZOLAM HYDROCHLORIDE 1 MG/ML
.5-2 INJECTION, SOLUTION INTRAMUSCULAR; INTRAVENOUS
Status: DISCONTINUED | OUTPATIENT
Start: 2021-01-01 | End: 2021-01-01

## 2021-01-01 RX ORDER — ALBUMIN HUMAN 250 G/1000ML
25 SOLUTION INTRAVENOUS EVERY 4 HOURS
Status: COMPLETED | OUTPATIENT
Start: 2021-01-01 | End: 2021-01-01

## 2021-01-01 RX ORDER — FENTANYL CITRATE 50 UG/ML
50 INJECTION, SOLUTION INTRAMUSCULAR; INTRAVENOUS
Status: DISCONTINUED | OUTPATIENT
Start: 2021-01-01 | End: 2021-01-01

## 2021-01-01 RX ORDER — DOCUSATE SODIUM 100 MG/1
100 CAPSULE, LIQUID FILLED ORAL DAILY
Status: CANCELLED | OUTPATIENT
Start: 2021-01-01

## 2021-01-01 RX ORDER — HYDROMORPHONE HYDROCHLORIDE 1 MG/ML
1 INJECTION, SOLUTION INTRAMUSCULAR; INTRAVENOUS; SUBCUTANEOUS
Status: DISCONTINUED | OUTPATIENT
Start: 2021-01-01 | End: 2021-01-01 | Stop reason: HOSPADM

## 2021-01-01 RX ORDER — ALLOPURINOL 300 MG/1
300 TABLET ORAL DAILY
Status: CANCELLED | OUTPATIENT
Start: 2021-01-01

## 2021-01-01 RX ORDER — SODIUM CHLORIDE 9 MG/ML
100 INJECTION, SOLUTION INTRAVENOUS CONTINUOUS
Status: DISCONTINUED | OUTPATIENT
Start: 2021-01-01 | End: 2021-01-01 | Stop reason: HOSPADM

## 2021-01-01 RX ORDER — HEPARIN SODIUM 200 [USP'U]/100ML
1000-8000 INJECTION, SOLUTION INTRAVENOUS CONTINUOUS
Status: DISCONTINUED | OUTPATIENT
Start: 2021-01-01 | End: 2021-01-01

## 2021-01-01 RX ORDER — HYDROMORPHONE HYDROCHLORIDE 2 MG/ML
0.5 INJECTION, SOLUTION INTRAMUSCULAR; INTRAVENOUS; SUBCUTANEOUS
Status: DISCONTINUED | OUTPATIENT
Start: 2021-01-01 | End: 2021-01-01

## 2021-01-01 RX ORDER — SODIUM CHLORIDE 9 MG/ML
100 INJECTION, SOLUTION INTRAVENOUS CONTINUOUS
Status: DISCONTINUED | OUTPATIENT
Start: 2021-01-01 | End: 2021-01-01

## 2021-01-01 RX ORDER — METOPROLOL SUCCINATE 25 MG/1
100 TABLET, EXTENDED RELEASE ORAL DAILY
Status: DISCONTINUED | OUTPATIENT
Start: 2021-01-01 | End: 2021-01-01

## 2021-01-01 RX ORDER — METOPROLOL SUCCINATE 100 MG/1
100 TABLET, EXTENDED RELEASE ORAL DAILY
Status: DISCONTINUED | OUTPATIENT
Start: 2021-01-01 | End: 2021-01-01 | Stop reason: HOSPADM

## 2021-01-01 RX ORDER — CEFAZOLIN SODIUM/WATER 2 G/20 ML
SYRINGE (ML) INTRAVENOUS AS NEEDED
Status: DISCONTINUED | OUTPATIENT
Start: 2021-01-01 | End: 2021-01-01 | Stop reason: HOSPADM

## 2021-01-01 RX ORDER — PANTOPRAZOLE SODIUM 40 MG/1
40 TABLET, DELAYED RELEASE ORAL
Status: CANCELLED | OUTPATIENT
Start: 2021-01-01

## 2021-01-01 RX ORDER — HEPARIN 100 UNIT/ML
300 SYRINGE INTRAVENOUS EVERY 12 HOURS
Status: DISCONTINUED | OUTPATIENT
Start: 2021-01-01 | End: 2021-01-01 | Stop reason: HOSPADM

## 2021-01-01 RX ORDER — ALBUTEROL SULFATE 0.83 MG/ML
2.5 SOLUTION RESPIRATORY (INHALATION)
Qty: 30 NEBULE | Refills: 1 | Status: SHIPPED | OUTPATIENT
Start: 2021-01-01

## 2021-01-01 RX ORDER — CEFAZOLIN SODIUM/WATER 2 G/20 ML
2 SYRINGE (ML) INTRAVENOUS EVERY 8 HOURS
Status: COMPLETED | OUTPATIENT
Start: 2021-01-01 | End: 2021-01-01

## 2021-01-01 RX ORDER — HYDROMORPHONE HYDROCHLORIDE 2 MG/ML
1 INJECTION, SOLUTION INTRAMUSCULAR; INTRAVENOUS; SUBCUTANEOUS
Status: DISCONTINUED | OUTPATIENT
Start: 2021-01-01 | End: 2021-01-01

## 2021-01-01 RX ORDER — MIDAZOLAM HYDROCHLORIDE 1 MG/ML
INJECTION, SOLUTION INTRAMUSCULAR; INTRAVENOUS AS NEEDED
Status: DISCONTINUED | OUTPATIENT
Start: 2021-01-01 | End: 2021-01-01 | Stop reason: HOSPADM

## 2021-01-01 RX ORDER — BUDESONIDE 0.5 MG/2ML
500 INHALANT ORAL
Status: DISCONTINUED | OUTPATIENT
Start: 2021-01-01 | End: 2021-01-01 | Stop reason: HOSPADM

## 2021-01-01 RX ORDER — BRIMONIDINE TARTRATE 2 MG/ML
1 SOLUTION/ DROPS OPHTHALMIC 2 TIMES DAILY
Status: CANCELLED | OUTPATIENT
Start: 2021-01-01

## 2021-01-01 RX ORDER — ROSUVASTATIN CALCIUM 20 MG/1
40 TABLET, COATED ORAL
Status: DISCONTINUED | OUTPATIENT
Start: 2021-01-01 | End: 2021-01-01

## 2021-01-01 RX ORDER — LEVALBUTEROL INHALATION SOLUTION 1.25 MG/3ML
1.25 SOLUTION RESPIRATORY (INHALATION) EVERY 12 HOURS
Status: DISCONTINUED | OUTPATIENT
Start: 2021-01-01 | End: 2021-01-01 | Stop reason: CLARIF

## 2021-01-01 RX ORDER — OXYCODONE HYDROCHLORIDE 5 MG/1
5 TABLET ORAL
Status: DISCONTINUED | OUTPATIENT
Start: 2021-01-01 | End: 2021-01-01 | Stop reason: HOSPADM

## 2021-01-01 RX ORDER — AMLODIPINE BESYLATE 2.5 MG/1
2.5 TABLET ORAL DAILY
COMMUNITY
End: 2021-01-01

## 2021-01-01 RX ORDER — METRONIDAZOLE 500 MG/100ML
500 INJECTION, SOLUTION INTRAVENOUS
Status: COMPLETED | OUTPATIENT
Start: 2021-01-01 | End: 2021-01-01

## 2021-01-01 RX ORDER — SODIUM CHLORIDE, SODIUM LACTATE, POTASSIUM CHLORIDE, CALCIUM CHLORIDE 600; 310; 30; 20 MG/100ML; MG/100ML; MG/100ML; MG/100ML
INJECTION, SOLUTION INTRAVENOUS
Status: DISCONTINUED | OUTPATIENT
Start: 2021-01-01 | End: 2021-01-01 | Stop reason: HOSPADM

## 2021-01-01 RX ORDER — LIDOCAINE HYDROCHLORIDE 20 MG/ML
INJECTION, SOLUTION EPIDURAL; INFILTRATION; INTRACAUDAL; PERINEURAL AS NEEDED
Status: DISCONTINUED | OUTPATIENT
Start: 2021-01-01 | End: 2021-01-01 | Stop reason: HOSPADM

## 2021-01-01 RX ORDER — DILTIAZEM HYDROCHLORIDE 30 MG/1
60 TABLET, FILM COATED ORAL
Status: DISCONTINUED | OUTPATIENT
Start: 2021-01-01 | End: 2021-01-01

## 2021-01-01 RX ORDER — LIDOCAINE HYDROCHLORIDE 10 MG/ML
INJECTION INFILTRATION; PERINEURAL AS NEEDED
Status: DISCONTINUED | OUTPATIENT
Start: 2021-01-01 | End: 2021-01-01 | Stop reason: HOSPADM

## 2021-01-01 RX ORDER — PREDNISONE 20 MG/1
40 TABLET ORAL
Status: COMPLETED | OUTPATIENT
Start: 2021-01-01 | End: 2021-01-01

## 2021-01-01 RX ORDER — SODIUM CHLORIDE 0.9 % (FLUSH) 0.9 %
5-40 SYRINGE (ML) INJECTION AS NEEDED
Status: DISCONTINUED | OUTPATIENT
Start: 2021-01-01 | End: 2021-01-01

## 2021-01-01 RX ORDER — SODIUM CHLORIDE 0.9 % (FLUSH) 0.9 %
5-40 SYRINGE (ML) INJECTION AS NEEDED
Status: CANCELLED | OUTPATIENT
Start: 2021-01-01

## 2021-01-01 RX ORDER — BUDESONIDE 0.5 MG/2ML
500 INHALANT ORAL
Status: DISCONTINUED | OUTPATIENT
Start: 2021-01-01 | End: 2021-01-01

## 2021-01-01 RX ORDER — SODIUM CHLORIDE 0.9 % (FLUSH) 0.9 %
5-40 SYRINGE (ML) INJECTION EVERY 8 HOURS
Status: DISCONTINUED | OUTPATIENT
Start: 2021-01-01 | End: 2021-01-01

## 2021-01-01 RX ORDER — SODIUM CHLORIDE 0.9 % (FLUSH) 0.9 %
5-40 SYRINGE (ML) INJECTION EVERY 8 HOURS
Status: CANCELLED | OUTPATIENT
Start: 2021-01-01

## 2021-01-01 RX ORDER — SODIUM CHLORIDE 0.9 % (FLUSH) 0.9 %
10 SYRINGE (ML) INJECTION EVERY 12 HOURS
Status: DISCONTINUED | OUTPATIENT
Start: 2021-01-01 | End: 2021-01-01 | Stop reason: HOSPADM

## 2021-01-01 RX ORDER — NOREPINEPHRINE BIT/0.9 % NACL 4MG/250ML
.5-3 PLASTIC BAG, INJECTION (ML) INTRAVENOUS
Status: DISCONTINUED | OUTPATIENT
Start: 2021-01-01 | End: 2021-01-01 | Stop reason: HOSPADM

## 2021-01-01 RX ORDER — HYDROCODONE BITARTRATE AND ACETAMINOPHEN 5; 325 MG/1; MG/1
1 TABLET ORAL
Status: DISCONTINUED | OUTPATIENT
Start: 2021-01-01 | End: 2021-01-01 | Stop reason: HOSPADM

## 2021-01-01 RX ORDER — LORAZEPAM 2 MG/ML
1 INJECTION INTRAMUSCULAR
Status: DISCONTINUED | OUTPATIENT
Start: 2021-01-01 | End: 2021-01-01 | Stop reason: HOSPADM

## 2021-01-01 RX ORDER — ASPIRIN 81 MG/1
81 TABLET ORAL DAILY
Status: DISCONTINUED | OUTPATIENT
Start: 2021-01-01 | End: 2021-01-01

## 2021-01-01 RX ORDER — SODIUM BICARBONATE 84 MG/ML
100 INJECTION, SOLUTION INTRAVENOUS ONCE
Status: COMPLETED | OUTPATIENT
Start: 2021-01-01 | End: 2021-01-01

## 2021-01-01 RX ORDER — PREDNISONE 20 MG/1
TABLET ORAL
Qty: 10 TABLET | Refills: 0 | Status: ON HOLD | OUTPATIENT
Start: 2021-01-01 | End: 2021-01-01

## 2021-01-01 RX ORDER — METOPROLOL SUCCINATE 100 MG/1
100 TABLET, EXTENDED RELEASE ORAL DAILY
Qty: 30 TABLET | Refills: 1 | Status: SHIPPED | OUTPATIENT
Start: 2021-01-01

## 2021-01-01 RX ORDER — FAMOTIDINE 20 MG/1
20 TABLET, FILM COATED ORAL DAILY
Status: DISCONTINUED | OUTPATIENT
Start: 2021-01-01 | End: 2021-01-01 | Stop reason: HOSPADM

## 2021-01-01 RX ORDER — ETOMIDATE 2 MG/ML
INJECTION INTRAVENOUS AS NEEDED
Status: DISCONTINUED | OUTPATIENT
Start: 2021-01-01 | End: 2021-01-01 | Stop reason: HOSPADM

## 2021-01-01 RX ORDER — FAMOTIDINE 20 MG/1
40 TABLET, FILM COATED ORAL DAILY
Status: DISCONTINUED | OUTPATIENT
Start: 2021-01-01 | End: 2021-01-01

## 2021-01-01 RX ORDER — SODIUM BICARBONATE 1 MEQ/ML
50 SYRINGE (ML) INTRAVENOUS ONCE
Status: COMPLETED | OUTPATIENT
Start: 2021-01-01 | End: 2021-01-01

## 2021-01-01 RX ORDER — HEPARIN SODIUM 5000 [USP'U]/ML
5000 INJECTION, SOLUTION INTRAVENOUS; SUBCUTANEOUS EVERY 12 HOURS
Status: DISCONTINUED | OUTPATIENT
Start: 2021-01-01 | End: 2021-01-01

## 2021-01-01 RX ADMIN — METRONIDAZOLE 500 MG: 500 INJECTION, SOLUTION INTRAVENOUS at 16:32

## 2021-01-01 RX ADMIN — TAMSULOSIN HYDROCHLORIDE 0.4 MG: 0.4 CAPSULE ORAL at 09:01

## 2021-01-01 RX ADMIN — Medication 10 ML: at 21:14

## 2021-01-01 RX ADMIN — Medication 10 ML: at 21:16

## 2021-01-01 RX ADMIN — PHENYLEPHRINE HYDROCHLORIDE 200 MCG: 10 INJECTION INTRAVENOUS at 18:24

## 2021-01-01 RX ADMIN — SODIUM BICARBONATE 100 MEQ: 84 INJECTION, SOLUTION INTRAVENOUS at 02:00

## 2021-01-01 RX ADMIN — FLUTICASONE PROPIONATE 1 PUFF: 110 AEROSOL, METERED RESPIRATORY (INHALATION) at 18:16

## 2021-01-01 RX ADMIN — IPRATROPIUM BROMIDE AND ALBUTEROL SULFATE 3 ML: .5; 3 SOLUTION RESPIRATORY (INHALATION) at 20:12

## 2021-01-01 RX ADMIN — DOCUSATE SODIUM 100 MG: 100 CAPSULE, LIQUID FILLED ORAL at 08:53

## 2021-01-01 RX ADMIN — GUAIFENESIN 600 MG: 600 TABLET ORAL at 08:44

## 2021-01-01 RX ADMIN — IPRATROPIUM BROMIDE AND ALBUTEROL SULFATE 3 ML: .5; 3 SOLUTION RESPIRATORY (INHALATION) at 20:06

## 2021-01-01 RX ADMIN — PREDNISONE 40 MG: 20 TABLET ORAL at 09:15

## 2021-01-01 RX ADMIN — FAMOTIDINE 20 MG: 20 TABLET ORAL at 09:15

## 2021-01-01 RX ADMIN — HYDROMORPHONE HYDROCHLORIDE 0.5 MG: 1 INJECTION, SOLUTION INTRAMUSCULAR; INTRAVENOUS; SUBCUTANEOUS at 23:59

## 2021-01-01 RX ADMIN — LATANOPROST 1 DROP: 50 SOLUTION OPHTHALMIC at 17:31

## 2021-01-01 RX ADMIN — SODIUM CHLORIDE 1000 ML: 900 INJECTION, SOLUTION INTRAVENOUS at 02:00

## 2021-01-01 RX ADMIN — DOCUSATE SODIUM 100 MG: 100 CAPSULE, LIQUID FILLED ORAL at 08:19

## 2021-01-01 RX ADMIN — LATANOPROST 1 DROP: 50 SOLUTION OPHTHALMIC at 21:16

## 2021-01-01 RX ADMIN — METOPROLOL SUCCINATE 50 MG: 50 TABLET, EXTENDED RELEASE ORAL at 08:46

## 2021-01-01 RX ADMIN — DILTIAZEM HYDROCHLORIDE 60 MG: 30 TABLET, FILM COATED ORAL at 09:35

## 2021-01-01 RX ADMIN — SODIUM CHLORIDE 25 ML/HR: 9 INJECTION, SOLUTION INTRAVENOUS at 09:10

## 2021-01-01 RX ADMIN — ALLOPURINOL 300 MG: 300 TABLET ORAL at 08:47

## 2021-01-01 RX ADMIN — HYDROCORTISONE SODIUM SUCCINATE 50 MG: 100 INJECTION, POWDER, FOR SOLUTION INTRAMUSCULAR; INTRAVENOUS at 05:32

## 2021-01-01 RX ADMIN — FAMOTIDINE 40 MG: 20 TABLET, FILM COATED ORAL at 10:17

## 2021-01-01 RX ADMIN — TAMSULOSIN HYDROCHLORIDE 0.4 MG: 0.4 CAPSULE ORAL at 08:31

## 2021-01-01 RX ADMIN — ALLOPURINOL 300 MG: 300 TABLET ORAL at 09:05

## 2021-01-01 RX ADMIN — SODIUM CHLORIDE 40 MG: 9 INJECTION, SOLUTION INTRAMUSCULAR; INTRAVENOUS; SUBCUTANEOUS at 17:37

## 2021-01-01 RX ADMIN — Medication 10 ML: at 13:28

## 2021-01-01 RX ADMIN — METOPROLOL SUCCINATE 100 MG: 100 TABLET, EXTENDED RELEASE ORAL at 08:21

## 2021-01-01 RX ADMIN — IPRATROPIUM BROMIDE AND ALBUTEROL SULFATE 3 ML: .5; 3 SOLUTION RESPIRATORY (INHALATION) at 17:03

## 2021-01-01 RX ADMIN — FUROSEMIDE 40 MG: 40 TABLET ORAL at 08:51

## 2021-01-01 RX ADMIN — BUDESONIDE 500 MCG: 0.5 INHALANT RESPIRATORY (INHALATION) at 19:52

## 2021-01-01 RX ADMIN — Medication 4 MCG/MIN: at 21:40

## 2021-01-01 RX ADMIN — GUAIFENESIN 600 MG: 600 TABLET ORAL at 17:39

## 2021-01-01 RX ADMIN — PHENYLEPHRINE HYDROCHLORIDE 35 MCG/MIN: 10 INJECTION INTRAVENOUS at 01:21

## 2021-01-01 RX ADMIN — HYDROCORTISONE SODIUM SUCCINATE 50 MG: 100 INJECTION, POWDER, FOR SOLUTION INTRAMUSCULAR; INTRAVENOUS at 05:07

## 2021-01-01 RX ADMIN — FERROUS SULFATE TAB 325 MG (65 MG ELEMENTAL FE) 325 MG: 325 (65 FE) TAB at 09:13

## 2021-01-01 RX ADMIN — ALBUTEROL SULFATE 2.5 MG: 2.5 SOLUTION RESPIRATORY (INHALATION) at 08:34

## 2021-01-01 RX ADMIN — DILTIAZEM HYDROCHLORIDE 60 MG: 30 TABLET, FILM COATED ORAL at 12:08

## 2021-01-01 RX ADMIN — ALBUTEROL SULFATE 10 MG: 2.5 SOLUTION RESPIRATORY (INHALATION) at 10:02

## 2021-01-01 RX ADMIN — TAMSULOSIN HYDROCHLORIDE 0.4 MG: 0.4 CAPSULE ORAL at 10:16

## 2021-01-01 RX ADMIN — FENTANYL CITRATE 25 MCG: 50 INJECTION, SOLUTION INTRAMUSCULAR; INTRAVENOUS at 09:53

## 2021-01-01 RX ADMIN — MORPHINE SULFATE 2 MG: 2 INJECTION, SOLUTION INTRAMUSCULAR; INTRAVENOUS at 02:46

## 2021-01-01 RX ADMIN — Medication 10 ML: at 14:21

## 2021-01-01 RX ADMIN — LIDOCAINE HYDROCHLORIDE 40 MG: 20 INJECTION, SOLUTION EPIDURAL; INFILTRATION; INTRACAUDAL; PERINEURAL at 18:24

## 2021-01-01 RX ADMIN — Medication 5 ML: at 05:12

## 2021-01-01 RX ADMIN — Medication 10 ML: at 14:23

## 2021-01-01 RX ADMIN — POLYETHYLENE GLYCOL 3350 17 G: 17 POWDER, FOR SOLUTION ORAL at 09:02

## 2021-01-01 RX ADMIN — DILTIAZEM HYDROCHLORIDE 60 MG: 30 TABLET, FILM COATED ORAL at 06:04

## 2021-01-01 RX ADMIN — IPRATROPIUM BROMIDE AND ALBUTEROL SULFATE 3 ML: .5; 3 SOLUTION RESPIRATORY (INHALATION) at 14:44

## 2021-01-01 RX ADMIN — Medication 10 ML: at 14:28

## 2021-01-01 RX ADMIN — LISINOPRIL 10 MG: 5 TABLET ORAL at 09:19

## 2021-01-01 RX ADMIN — ASPIRIN 81 MG: 81 TABLET ORAL at 08:31

## 2021-01-01 RX ADMIN — HYDROMORPHONE HYDROCHLORIDE 0.5 MG: 2 INJECTION INTRAMUSCULAR; INTRAVENOUS; SUBCUTANEOUS at 11:09

## 2021-01-01 RX ADMIN — Medication 10 ML: at 06:45

## 2021-01-01 RX ADMIN — PROPOFOL 75 MCG/KG/MIN: 10 INJECTION, EMULSION INTRAVENOUS at 09:56

## 2021-01-01 RX ADMIN — POLYETHYLENE GLYCOL 3350 17 G: 17 POWDER, FOR SOLUTION ORAL at 17:23

## 2021-01-01 RX ADMIN — Medication 5 ML: at 13:26

## 2021-01-01 RX ADMIN — Medication 10 ML: at 22:09

## 2021-01-01 RX ADMIN — Medication 10 ML: at 05:54

## 2021-01-01 RX ADMIN — TAMSULOSIN HYDROCHLORIDE 0.4 MG: 0.4 CAPSULE ORAL at 09:15

## 2021-01-01 RX ADMIN — ALBUTEROL SULFATE 2.5 MG: 2.5 SOLUTION RESPIRATORY (INHALATION) at 11:44

## 2021-01-01 RX ADMIN — GUAIFENESIN 600 MG: 600 TABLET ORAL at 09:05

## 2021-01-01 RX ADMIN — Medication 10 ML: at 21:54

## 2021-01-01 RX ADMIN — BRIMONIDINE TARTRATE 1 DROP: 2 SOLUTION OPHTHALMIC at 21:43

## 2021-01-01 RX ADMIN — FUROSEMIDE 20 MG: 20 TABLET ORAL at 08:54

## 2021-01-01 RX ADMIN — Medication 10 ML: at 21:33

## 2021-01-01 RX ADMIN — LATANOPROST 1 DROP: 50 SOLUTION OPHTHALMIC at 22:40

## 2021-01-01 RX ADMIN — Medication 10 ML: at 05:34

## 2021-01-01 RX ADMIN — HYDROMORPHONE HYDROCHLORIDE 1 MG: 2 INJECTION, SOLUTION INTRAMUSCULAR; INTRAVENOUS; SUBCUTANEOUS at 22:29

## 2021-01-01 RX ADMIN — ASPIRIN 81 MG: 81 TABLET ORAL at 08:13

## 2021-01-01 RX ADMIN — BRIMONIDINE TARTRATE 1 DROP: 2 SOLUTION OPHTHALMIC at 08:54

## 2021-01-01 RX ADMIN — BRIMONIDINE TARTRATE 1 DROP: 2 SOLUTION OPHTHALMIC at 20:46

## 2021-01-01 RX ADMIN — Medication 5 ML: at 14:00

## 2021-01-01 RX ADMIN — TAMSULOSIN HYDROCHLORIDE 0.4 MG: 0.4 CAPSULE ORAL at 09:43

## 2021-01-01 RX ADMIN — TAMSULOSIN HYDROCHLORIDE 0.4 MG: 0.4 CAPSULE ORAL at 09:05

## 2021-01-01 RX ADMIN — ALBUTEROL SULFATE 2.5 MG: 2.5 SOLUTION RESPIRATORY (INHALATION) at 02:28

## 2021-01-01 RX ADMIN — LISINOPRIL 10 MG: 5 TABLET ORAL at 09:01

## 2021-01-01 RX ADMIN — LISINOPRIL 10 MG: 5 TABLET ORAL at 08:20

## 2021-01-01 RX ADMIN — METHYLPREDNISOLONE SODIUM SUCCINATE 125 MG: 125 INJECTION, POWDER, FOR SOLUTION INTRAMUSCULAR; INTRAVENOUS at 02:36

## 2021-01-01 RX ADMIN — DILTIAZEM HYDROCHLORIDE 120 MG: 60 TABLET, FILM COATED ORAL at 10:23

## 2021-01-01 RX ADMIN — Medication 5 ML: at 07:03

## 2021-01-01 RX ADMIN — Medication 10 ML: at 13:52

## 2021-01-01 RX ADMIN — FUROSEMIDE 40 MG: 10 INJECTION, SOLUTION INTRAMUSCULAR; INTRAVENOUS at 09:21

## 2021-01-01 RX ADMIN — IPRATROPIUM BROMIDE AND ALBUTEROL SULFATE 3 ML: .5; 3 SOLUTION RESPIRATORY (INHALATION) at 20:04

## 2021-01-01 RX ADMIN — PIPERACILLIN AND TAZOBACTAM 3.38 G: 3; .375 INJECTION, POWDER, LYOPHILIZED, FOR SOLUTION INTRAVENOUS; PARENTERAL at 07:00

## 2021-01-01 RX ADMIN — SODIUM CHLORIDE 40 MG: 9 INJECTION, SOLUTION INTRAMUSCULAR; INTRAVENOUS; SUBCUTANEOUS at 05:32

## 2021-01-01 RX ADMIN — METHYLPREDNISOLONE SODIUM SUCCINATE 40 MG: 40 INJECTION, POWDER, FOR SOLUTION INTRAMUSCULAR; INTRAVENOUS at 21:39

## 2021-01-01 RX ADMIN — FAMOTIDINE 20 MG: 20 TABLET ORAL at 08:47

## 2021-01-01 RX ADMIN — ASPIRIN 81 MG: 81 TABLET ORAL at 09:01

## 2021-01-01 RX ADMIN — DILTIAZEM HYDROCHLORIDE 120 MG: 30 TABLET, FILM COATED ORAL at 09:19

## 2021-01-01 RX ADMIN — LISINOPRIL 10 MG: 5 TABLET ORAL at 08:13

## 2021-01-01 RX ADMIN — GUAIFENESIN 600 MG: 600 TABLET ORAL at 08:31

## 2021-01-01 RX ADMIN — IPRATROPIUM BROMIDE AND ALBUTEROL SULFATE 3 ML: .5; 3 SOLUTION RESPIRATORY (INHALATION) at 03:03

## 2021-01-01 RX ADMIN — BRIMONIDINE TARTRATE 1 DROP: 2 SOLUTION OPHTHALMIC at 22:01

## 2021-01-01 RX ADMIN — ALBUTEROL SULFATE 2.5 MG: 2.5 SOLUTION RESPIRATORY (INHALATION) at 07:17

## 2021-01-01 RX ADMIN — BRIMONIDINE TARTRATE 1 DROP: 2 SOLUTION OPHTHALMIC at 08:13

## 2021-01-01 RX ADMIN — BUDESONIDE 500 MCG: 0.5 INHALANT RESPIRATORY (INHALATION) at 20:33

## 2021-01-01 RX ADMIN — FUROSEMIDE 20 MG: 20 TABLET ORAL at 09:37

## 2021-01-01 RX ADMIN — Medication 5 ML: at 01:14

## 2021-01-01 RX ADMIN — DOCUSATE SODIUM 100 MG: 100 CAPSULE, LIQUID FILLED ORAL at 09:05

## 2021-01-01 RX ADMIN — HYDROMORPHONE HYDROCHLORIDE 0.5 MG: 1 INJECTION, SOLUTION INTRAMUSCULAR; INTRAVENOUS; SUBCUTANEOUS at 02:00

## 2021-01-01 RX ADMIN — NOREPINEPHRINE BITARTRATE 4 MCG/MIN: 1 INJECTION, SOLUTION, CONCENTRATE INTRAVENOUS at 21:30

## 2021-01-01 RX ADMIN — SODIUM CHLORIDE, PRESERVATIVE FREE 10 ML: 5 INJECTION INTRAVENOUS at 19:08

## 2021-01-01 RX ADMIN — DOCUSATE SODIUM 100 MG: 100 CAPSULE, LIQUID FILLED ORAL at 08:46

## 2021-01-01 RX ADMIN — SODIUM CHLORIDE 75 ML/HR: 900 INJECTION, SOLUTION INTRAVENOUS at 16:00

## 2021-01-01 RX ADMIN — SODIUM CHLORIDE 100 ML: 900 INJECTION, SOLUTION INTRAVENOUS at 12:59

## 2021-01-01 RX ADMIN — ASPIRIN 81 MG: 81 TABLET ORAL at 09:21

## 2021-01-01 RX ADMIN — MIDAZOLAM 0.5 MG: 1 INJECTION INTRAMUSCULAR; INTRAVENOUS at 10:37

## 2021-01-01 RX ADMIN — METHYLPREDNISOLONE SODIUM SUCCINATE 40 MG: 40 INJECTION, POWDER, FOR SOLUTION INTRAMUSCULAR; INTRAVENOUS at 05:57

## 2021-01-01 RX ADMIN — ALBUTEROL SULFATE 2.5 MG: 2.5 SOLUTION RESPIRATORY (INHALATION) at 11:21

## 2021-01-01 RX ADMIN — LATANOPROST 1 DROP: 50 SOLUTION OPHTHALMIC at 22:51

## 2021-01-01 RX ADMIN — ROSUVASTATIN 40 MG: 20 TABLET, FILM COATED ORAL at 21:40

## 2021-01-01 RX ADMIN — BRIMONIDINE TARTRATE 1 DROP: 2 SOLUTION OPHTHALMIC at 08:39

## 2021-01-01 RX ADMIN — Medication 10 ML: at 05:30

## 2021-01-01 RX ADMIN — FUROSEMIDE 20 MG: 20 TABLET ORAL at 09:40

## 2021-01-01 RX ADMIN — SODIUM CHLORIDE 40 MG: 9 INJECTION, SOLUTION INTRAMUSCULAR; INTRAVENOUS; SUBCUTANEOUS at 10:17

## 2021-01-01 RX ADMIN — PIPERACILLIN AND TAZOBACTAM 3.38 G: 3; .375 INJECTION, POWDER, LYOPHILIZED, FOR SOLUTION INTRAVENOUS; PARENTERAL at 17:00

## 2021-01-01 RX ADMIN — ALBUTEROL SULFATE 2.5 MG: 2.5 SOLUTION RESPIRATORY (INHALATION) at 17:24

## 2021-01-01 RX ADMIN — TAMSULOSIN HYDROCHLORIDE 0.4 MG: 0.4 CAPSULE ORAL at 08:51

## 2021-01-01 RX ADMIN — Medication 10 ML: at 15:32

## 2021-01-01 RX ADMIN — SODIUM CHLORIDE, PRESERVATIVE FREE 10 ML: 5 INJECTION INTRAVENOUS at 23:59

## 2021-01-01 RX ADMIN — METOPROLOL SUCCINATE 50 MG: 50 TABLET, EXTENDED RELEASE ORAL at 09:14

## 2021-01-01 RX ADMIN — SODIUM CHLORIDE 75 ML/HR: 900 INJECTION, SOLUTION INTRAVENOUS at 08:43

## 2021-01-01 RX ADMIN — BRIMONIDINE TARTRATE 1 DROP: 2 SOLUTION OPHTHALMIC at 22:09

## 2021-01-01 RX ADMIN — LATANOPROST 1 DROP: 50 SOLUTION OPHTHALMIC at 21:43

## 2021-01-01 RX ADMIN — DILTIAZEM HYDROCHLORIDE 120 MG: 30 TABLET, FILM COATED ORAL at 08:46

## 2021-01-01 RX ADMIN — ALLOPURINOL 300 MG: 300 TABLET ORAL at 09:15

## 2021-01-01 RX ADMIN — FAMOTIDINE 40 MG: 20 TABLET, FILM COATED ORAL at 09:42

## 2021-01-01 RX ADMIN — PREDNISONE 20 MG: 20 TABLET ORAL at 17:41

## 2021-01-01 RX ADMIN — HYDROMORPHONE HYDROCHLORIDE 0.5 MG: 1 INJECTION, SOLUTION INTRAMUSCULAR; INTRAVENOUS; SUBCUTANEOUS at 20:35

## 2021-01-01 RX ADMIN — SODIUM CHLORIDE 5 MG/HR: 900 INJECTION, SOLUTION INTRAVENOUS at 14:55

## 2021-01-01 RX ADMIN — Medication 10 ML: at 05:40

## 2021-01-01 RX ADMIN — ASPIRIN 81 MG: 81 TABLET ORAL at 09:37

## 2021-01-01 RX ADMIN — SODIUM CHLORIDE 120 MCG: 900 INJECTION, SOLUTION INTRAVENOUS at 10:39

## 2021-01-01 RX ADMIN — METOPROLOL SUCCINATE 100 MG: 100 TABLET, EXTENDED RELEASE ORAL at 09:05

## 2021-01-01 RX ADMIN — DILTIAZEM HYDROCHLORIDE 10 MG: 5 INJECTION INTRAVENOUS at 13:21

## 2021-01-01 RX ADMIN — SODIUM CHLORIDE, PRESERVATIVE FREE 10 ML: 5 INJECTION INTRAVENOUS at 02:00

## 2021-01-01 RX ADMIN — LATANOPROST 1 DROP: 50 SOLUTION OPHTHALMIC at 21:15

## 2021-01-01 RX ADMIN — TAMSULOSIN HYDROCHLORIDE 0.4 MG: 0.4 CAPSULE ORAL at 08:46

## 2021-01-01 RX ADMIN — BUDESONIDE 500 MCG: 0.5 INHALANT RESPIRATORY (INHALATION) at 08:31

## 2021-01-01 RX ADMIN — SODIUM CHLORIDE, PRESERVATIVE FREE 10 ML: 5 INJECTION INTRAVENOUS at 09:00

## 2021-01-01 RX ADMIN — BUDESONIDE 500 MCG: 0.5 INHALANT RESPIRATORY (INHALATION) at 19:23

## 2021-01-01 RX ADMIN — SODIUM CHLORIDE, PRESERVATIVE FREE 10 ML: 5 INJECTION INTRAVENOUS at 06:33

## 2021-01-01 RX ADMIN — Medication 10 ML: at 22:02

## 2021-01-01 RX ADMIN — Medication 10 ML: at 22:43

## 2021-01-01 RX ADMIN — ROSUVASTATIN CALCIUM 40 MG: 20 TABLET, COATED ORAL at 21:14

## 2021-01-01 RX ADMIN — HYDROMORPHONE HYDROCHLORIDE 1 MG: 1 INJECTION, SOLUTION INTRAMUSCULAR; INTRAVENOUS; SUBCUTANEOUS at 19:08

## 2021-01-01 RX ADMIN — ALBUTEROL SULFATE 2.5 MG: 2.5 SOLUTION RESPIRATORY (INHALATION) at 04:13

## 2021-01-01 RX ADMIN — ALLOPURINOL 50 MG: 100 TABLET ORAL at 08:51

## 2021-01-01 RX ADMIN — Medication 10 ML: at 22:00

## 2021-01-01 RX ADMIN — FUROSEMIDE 40 MG: 40 TABLET ORAL at 08:31

## 2021-01-01 RX ADMIN — FAMOTIDINE 20 MG: 20 TABLET ORAL at 09:19

## 2021-01-01 RX ADMIN — Medication 5 ML: at 13:25

## 2021-01-01 RX ADMIN — SODIUM CHLORIDE, PRESERVATIVE FREE 10 ML: 5 INJECTION INTRAVENOUS at 20:06

## 2021-01-01 RX ADMIN — Medication 5 ML: at 22:20

## 2021-01-01 RX ADMIN — Medication 10 ML: at 14:32

## 2021-01-01 RX ADMIN — DILTIAZEM HYDROCHLORIDE 120 MG: 30 TABLET, FILM COATED ORAL at 09:01

## 2021-01-01 RX ADMIN — Medication 10 ML: at 07:52

## 2021-01-01 RX ADMIN — Medication 5 ML: at 02:41

## 2021-01-01 RX ADMIN — BUDESONIDE 500 MCG: 0.5 INHALANT RESPIRATORY (INHALATION) at 20:28

## 2021-01-01 RX ADMIN — Medication 5 ML: at 05:48

## 2021-01-01 RX ADMIN — HYDROCODONE BITARTRATE AND ACETAMINOPHEN 1 TABLET: 5; 325 TABLET ORAL at 20:01

## 2021-01-01 RX ADMIN — ALLOPURINOL 300 MG: 100 TABLET ORAL at 08:53

## 2021-01-01 RX ADMIN — SODIUM CHLORIDE, SODIUM LACTATE, POTASSIUM CHLORIDE, AND CALCIUM CHLORIDE 100 ML/HR: 600; 310; 30; 20 INJECTION, SOLUTION INTRAVENOUS at 04:32

## 2021-01-01 RX ADMIN — FUROSEMIDE 40 MG: 10 INJECTION, SOLUTION INTRAMUSCULAR; INTRAVENOUS at 09:19

## 2021-01-01 RX ADMIN — IPRATROPIUM BROMIDE AND ALBUTEROL SULFATE 3 ML: .5; 3 SOLUTION RESPIRATORY (INHALATION) at 13:58

## 2021-01-01 RX ADMIN — SODIUM CHLORIDE 40 MG: 9 INJECTION, SOLUTION INTRAMUSCULAR; INTRAVENOUS; SUBCUTANEOUS at 05:07

## 2021-01-01 RX ADMIN — GUAIFENESIN 600 MG: 600 TABLET ORAL at 17:22

## 2021-01-01 RX ADMIN — Medication 10 ML: at 15:00

## 2021-01-01 RX ADMIN — LISINOPRIL 10 MG: 5 TABLET ORAL at 09:21

## 2021-01-01 RX ADMIN — CEFAZOLIN 2 G: 1 INJECTION, POWDER, FOR SOLUTION INTRAVENOUS at 18:30

## 2021-01-01 RX ADMIN — TAMSULOSIN HYDROCHLORIDE 0.4 MG: 0.4 CAPSULE ORAL at 09:19

## 2021-01-01 RX ADMIN — IPRATROPIUM BROMIDE AND ALBUTEROL SULFATE 3 ML: .5; 3 SOLUTION RESPIRATORY (INHALATION) at 16:47

## 2021-01-01 RX ADMIN — ROSUVASTATIN 40 MG: 20 TABLET, FILM COATED ORAL at 22:26

## 2021-01-01 RX ADMIN — SODIUM CHLORIDE 100 ML/HR: 900 INJECTION, SOLUTION INTRAVENOUS at 02:12

## 2021-01-01 RX ADMIN — Medication 5 ML: at 17:00

## 2021-01-01 RX ADMIN — SODIUM CHLORIDE, PRESERVATIVE FREE 10 ML: 5 INJECTION INTRAVENOUS at 17:03

## 2021-01-01 RX ADMIN — MIDAZOLAM 0.5 MG: 1 INJECTION INTRAMUSCULAR; INTRAVENOUS at 10:16

## 2021-01-01 RX ADMIN — SODIUM CHLORIDE 120 MCG: 900 INJECTION, SOLUTION INTRAVENOUS at 10:32

## 2021-01-01 RX ADMIN — Medication 5 ML: at 13:09

## 2021-01-01 RX ADMIN — Medication 81 MG: at 10:16

## 2021-01-01 RX ADMIN — HALOPERIDOL LACTATE 2 MG: 5 INJECTION, SOLUTION INTRAMUSCULAR at 05:59

## 2021-01-01 RX ADMIN — METOPROLOL SUCCINATE 50 MG: 50 TABLET, EXTENDED RELEASE ORAL at 08:52

## 2021-01-01 RX ADMIN — BUDESONIDE 500 MCG: 0.5 INHALANT RESPIRATORY (INHALATION) at 07:11

## 2021-01-01 RX ADMIN — FAMOTIDINE 40 MG: 20 TABLET, FILM COATED ORAL at 08:51

## 2021-01-01 RX ADMIN — IPRATROPIUM BROMIDE AND ALBUTEROL SULFATE 3 ML: .5; 3 SOLUTION RESPIRATORY (INHALATION) at 07:40

## 2021-01-01 RX ADMIN — PREDNISONE 40 MG: 20 TABLET ORAL at 17:15

## 2021-01-01 RX ADMIN — HYDROCODONE BITARTRATE AND ACETAMINOPHEN 1 TABLET: 5; 325 TABLET ORAL at 17:36

## 2021-01-01 RX ADMIN — METOPROLOL SUCCINATE 100 MG: 100 TABLET, EXTENDED RELEASE ORAL at 17:27

## 2021-01-01 RX ADMIN — BUDESONIDE 500 MCG: 0.5 INHALANT RESPIRATORY (INHALATION) at 21:40

## 2021-01-01 RX ADMIN — LATANOPROST 1 DROP: 50 SOLUTION OPHTHALMIC at 21:14

## 2021-01-01 RX ADMIN — PHENYLEPHRINE HYDROCHLORIDE 45 MCG/MIN: 10 INJECTION INTRAVENOUS at 01:31

## 2021-01-01 RX ADMIN — METOPROLOL SUCCINATE 50 MG: 50 TABLET, EXTENDED RELEASE ORAL at 10:22

## 2021-01-01 RX ADMIN — ONDANSETRON 4 MG: 2 INJECTION INTRAMUSCULAR; INTRAVENOUS at 17:12

## 2021-01-01 RX ADMIN — LATANOPROST 1 DROP: 50 SOLUTION OPHTHALMIC at 17:28

## 2021-01-01 RX ADMIN — POLYETHYLENE GLYCOL 3350 17 G: 17 POWDER, FOR SOLUTION ORAL at 09:19

## 2021-01-01 RX ADMIN — HYDROCORTISONE SODIUM SUCCINATE 50 MG: 100 INJECTION, POWDER, FOR SOLUTION INTRAMUSCULAR; INTRAVENOUS at 17:00

## 2021-01-01 RX ADMIN — Medication 20 ML: at 13:52

## 2021-01-01 RX ADMIN — IPRATROPIUM BROMIDE AND ALBUTEROL SULFATE 3 ML: .5; 3 SOLUTION RESPIRATORY (INHALATION) at 19:43

## 2021-01-01 RX ADMIN — Medication 10 ML: at 14:04

## 2021-01-01 RX ADMIN — BUDESONIDE 500 MCG: 0.5 INHALANT RESPIRATORY (INHALATION) at 21:00

## 2021-01-01 RX ADMIN — Medication 10 ML: at 21:31

## 2021-01-01 RX ADMIN — BUDESONIDE 500 MCG: 0.5 INHALANT RESPIRATORY (INHALATION) at 08:15

## 2021-01-01 RX ADMIN — BUDESONIDE 500 MCG: 0.5 INHALANT RESPIRATORY (INHALATION) at 07:20

## 2021-01-01 RX ADMIN — HYDROMORPHONE HYDROCHLORIDE 0.5 MG: 1 INJECTION, SOLUTION INTRAMUSCULAR; INTRAVENOUS; SUBCUTANEOUS at 04:16

## 2021-01-01 RX ADMIN — CALCIUM CHLORIDE 1 G: 100 INJECTION INTRAVENOUS; INTRAVENTRICULAR at 19:46

## 2021-01-01 RX ADMIN — LATANOPROST 1 DROP: 50 SOLUTION OPHTHALMIC at 17:23

## 2021-01-01 RX ADMIN — BRIMONIDINE TARTRATE 1 DROP: 2 SOLUTION OPHTHALMIC at 21:25

## 2021-01-01 RX ADMIN — ASPIRIN 81 MG: 81 TABLET ORAL at 08:53

## 2021-01-01 RX ADMIN — SODIUM CHLORIDE 10 MG/HR: 900 INJECTION, SOLUTION INTRAVENOUS at 17:48

## 2021-01-01 RX ADMIN — FLUTICASONE PROPIONATE 1 PUFF: 110 AEROSOL, METERED RESPIRATORY (INHALATION) at 10:50

## 2021-01-01 RX ADMIN — BUDESONIDE 500 MCG: 0.5 INHALANT RESPIRATORY (INHALATION) at 20:36

## 2021-01-01 RX ADMIN — FERRIC CARBOXYMALTOSE INJECTION 750 MG: 50 INJECTION, SOLUTION INTRAVENOUS at 08:50

## 2021-01-01 RX ADMIN — Medication 10 ML: at 06:00

## 2021-01-01 RX ADMIN — NOREPINEPHRINE BITARTRATE 6 MCG/MIN: 1 INJECTION, SOLUTION, CONCENTRATE INTRAVENOUS at 05:59

## 2021-01-01 RX ADMIN — FUROSEMIDE 40 MG: 40 TABLET ORAL at 09:05

## 2021-01-01 RX ADMIN — FLUTICASONE PROPIONATE 1 PUFF: 110 AEROSOL, METERED RESPIRATORY (INHALATION) at 08:54

## 2021-01-01 RX ADMIN — ALLOPURINOL 50 MG: 100 TABLET ORAL at 09:43

## 2021-01-01 RX ADMIN — ALLOPURINOL 300 MG: 100 TABLET ORAL at 09:37

## 2021-01-01 RX ADMIN — TIOTROPIUM BROMIDE INHALATION SPRAY 2 PUFF: 3.12 SPRAY, METERED RESPIRATORY (INHALATION) at 07:17

## 2021-01-01 RX ADMIN — ALLOPURINOL 300 MG: 300 TABLET ORAL at 08:20

## 2021-01-01 RX ADMIN — BRIMONIDINE TARTRATE 1 DROP: 2 SOLUTION OPHTHALMIC at 18:18

## 2021-01-01 RX ADMIN — ASPIRIN 81 MG: 81 TABLET ORAL at 08:55

## 2021-01-01 RX ADMIN — FAMOTIDINE 40 MG: 20 TABLET, FILM COATED ORAL at 09:37

## 2021-01-01 RX ADMIN — ALBUTEROL SULFATE 2.5 MG: 2.5 SOLUTION RESPIRATORY (INHALATION) at 20:28

## 2021-01-01 RX ADMIN — BUDESONIDE 500 MCG: 0.5 INHALANT RESPIRATORY (INHALATION) at 19:43

## 2021-01-01 RX ADMIN — ROSUVASTATIN 40 MG: 20 TABLET, FILM COATED ORAL at 21:16

## 2021-01-01 RX ADMIN — SODIUM CHLORIDE 120 MCG: 900 INJECTION, SOLUTION INTRAVENOUS at 10:41

## 2021-01-01 RX ADMIN — TAMSULOSIN HYDROCHLORIDE 0.4 MG: 0.4 CAPSULE ORAL at 08:14

## 2021-01-01 RX ADMIN — DOCUSATE SODIUM 100 MG: 100 CAPSULE, LIQUID FILLED ORAL at 08:51

## 2021-01-01 RX ADMIN — HYDROMORPHONE HYDROCHLORIDE 1 MG: 1 INJECTION, SOLUTION INTRAMUSCULAR; INTRAVENOUS; SUBCUTANEOUS at 19:34

## 2021-01-01 RX ADMIN — TUBERCULIN PURIFIED PROTEIN DERIVATIVE 5 UNITS: 5 INJECTION, SOLUTION INTRADERMAL at 17:39

## 2021-01-01 RX ADMIN — FAMOTIDINE 40 MG: 20 TABLET, FILM COATED ORAL at 08:19

## 2021-01-01 RX ADMIN — BUDESONIDE 500 MCG: 0.5 INHALANT RESPIRATORY (INHALATION) at 07:21

## 2021-01-01 RX ADMIN — ALBUTEROL SULFATE 2.5 MG: 2.5 SOLUTION RESPIRATORY (INHALATION) at 07:11

## 2021-01-01 RX ADMIN — Medication 10 ML: at 14:00

## 2021-01-01 RX ADMIN — FENTANYL CITRATE 50 MCG: 50 INJECTION, SOLUTION INTRAMUSCULAR; INTRAVENOUS at 03:30

## 2021-01-01 RX ADMIN — Medication 10 ML: at 16:33

## 2021-01-01 RX ADMIN — HYDROMORPHONE HYDROCHLORIDE 0.5 MG: 1 INJECTION, SOLUTION INTRAMUSCULAR; INTRAVENOUS; SUBCUTANEOUS at 06:33

## 2021-01-01 RX ADMIN — GUAIFENESIN 600 MG: 600 TABLET ORAL at 09:35

## 2021-01-01 RX ADMIN — LISINOPRIL 10 MG: 5 TABLET ORAL at 10:22

## 2021-01-01 RX ADMIN — HYDROCODONE BITARTRATE AND ACETAMINOPHEN 1 TABLET: 5; 325 TABLET ORAL at 13:09

## 2021-01-01 RX ADMIN — POLYETHYLENE GLYCOL 3350 17 G: 17 POWDER, FOR SOLUTION ORAL at 08:49

## 2021-01-01 RX ADMIN — BRIMONIDINE TARTRATE 1 DROP: 2 SOLUTION OPHTHALMIC at 21:40

## 2021-01-01 RX ADMIN — BUDESONIDE 500 MCG: 0.5 INHALANT RESPIRATORY (INHALATION) at 07:14

## 2021-01-01 RX ADMIN — ALBUTEROL SULFATE 2.5 MG: 2.5 SOLUTION RESPIRATORY (INHALATION) at 20:44

## 2021-01-01 RX ADMIN — ALLOPURINOL 300 MG: 300 TABLET ORAL at 10:16

## 2021-01-01 RX ADMIN — BRIMONIDINE TARTRATE 1 DROP: 2 SOLUTION OPHTHALMIC at 21:41

## 2021-01-01 RX ADMIN — HYDROMORPHONE HYDROCHLORIDE 1 MG: 1 INJECTION, SOLUTION INTRAMUSCULAR; INTRAVENOUS; SUBCUTANEOUS at 12:13

## 2021-01-01 RX ADMIN — DOCUSATE SODIUM 100 MG: 100 CAPSULE, LIQUID FILLED ORAL at 10:17

## 2021-01-01 RX ADMIN — ALBUTEROL SULFATE 2.5 MG: 2.5 SOLUTION RESPIRATORY (INHALATION) at 15:02

## 2021-01-01 RX ADMIN — DILTIAZEM HYDROCHLORIDE 30 MG: 30 TABLET, FILM COATED ORAL at 14:22

## 2021-01-01 RX ADMIN — PREDNISONE 40 MG: 20 TABLET ORAL at 16:23

## 2021-01-01 RX ADMIN — BRIMONIDINE TARTRATE 1 DROP: 2 SOLUTION OPHTHALMIC at 21:16

## 2021-01-01 RX ADMIN — SODIUM CHLORIDE 250 ML: 900 INJECTION, SOLUTION INTRAVENOUS at 05:19

## 2021-01-01 RX ADMIN — TAMSULOSIN HYDROCHLORIDE 0.4 MG: 0.4 CAPSULE ORAL at 09:36

## 2021-01-01 RX ADMIN — ROCURONIUM BROMIDE 20 MG: 10 INJECTION, SOLUTION INTRAVENOUS at 19:28

## 2021-01-01 RX ADMIN — DILTIAZEM HYDROCHLORIDE 120 MG: 30 TABLET, FILM COATED ORAL at 09:21

## 2021-01-01 RX ADMIN — ALBUTEROL SULFATE 2.5 MG: 2.5 SOLUTION RESPIRATORY (INHALATION) at 15:24

## 2021-01-01 RX ADMIN — LATANOPROST 1 DROP: 50 SOLUTION OPHTHALMIC at 18:00

## 2021-01-01 RX ADMIN — BUDESONIDE 500 MCG: 0.5 INHALANT RESPIRATORY (INHALATION) at 07:48

## 2021-01-01 RX ADMIN — SODIUM CHLORIDE 7.5 MG/HR: 900 INJECTION, SOLUTION INTRAVENOUS at 03:31

## 2021-01-01 RX ADMIN — ROSUVASTATIN 40 MG: 20 TABLET, FILM COATED ORAL at 22:51

## 2021-01-01 RX ADMIN — GUAIFENESIN 600 MG: 600 TABLET ORAL at 17:30

## 2021-01-01 RX ADMIN — HYDROMORPHONE HYDROCHLORIDE 1 MG: 1 INJECTION, SOLUTION INTRAMUSCULAR; INTRAVENOUS; SUBCUTANEOUS at 17:46

## 2021-01-01 RX ADMIN — PIPERACILLIN SODIUM AND TAZOBACTAM SODIUM 3.38 G: 3; .375 INJECTION, POWDER, LYOPHILIZED, FOR SOLUTION INTRAVENOUS at 21:15

## 2021-01-01 RX ADMIN — Medication 10 ML: at 21:41

## 2021-01-01 RX ADMIN — PHENYLEPHRINE HYDROCHLORIDE 30 MCG/MIN: 10 INJECTION INTRAVENOUS at 01:16

## 2021-01-01 RX ADMIN — BRIMONIDINE TARTRATE 1 DROP: 2 SOLUTION OPHTHALMIC at 22:27

## 2021-01-01 RX ADMIN — PREDNISONE 10 MG: 5 TABLET ORAL at 16:16

## 2021-01-01 RX ADMIN — DILTIAZEM HYDROCHLORIDE 120 MG: 30 TABLET, FILM COATED ORAL at 09:14

## 2021-01-01 RX ADMIN — FUROSEMIDE 40 MG: 40 TABLET ORAL at 08:21

## 2021-01-01 RX ADMIN — FUROSEMIDE 40 MG: 40 TABLET ORAL at 08:19

## 2021-01-01 RX ADMIN — SODIUM CHLORIDE, SODIUM LACTATE, POTASSIUM CHLORIDE, AND CALCIUM CHLORIDE 100 ML/HR: 600; 310; 30; 20 INJECTION, SOLUTION INTRAVENOUS at 14:35

## 2021-01-01 RX ADMIN — HEPARIN SODIUM 5000 UNITS: 5000 INJECTION INTRAVENOUS; SUBCUTANEOUS at 11:49

## 2021-01-01 RX ADMIN — FAMOTIDINE 40 MG: 20 TABLET, FILM COATED ORAL at 09:05

## 2021-01-01 RX ADMIN — ALLOPURINOL 300 MG: 100 TABLET ORAL at 09:43

## 2021-01-01 RX ADMIN — LISINOPRIL 10 MG: 5 TABLET ORAL at 08:46

## 2021-01-01 RX ADMIN — GUAIFENESIN 600 MG: 600 TABLET ORAL at 17:26

## 2021-01-01 RX ADMIN — DILTIAZEM HYDROCHLORIDE 60 MG: 30 TABLET, FILM COATED ORAL at 16:23

## 2021-01-01 RX ADMIN — ROSUVASTATIN CALCIUM 40 MG: 20 TABLET, COATED ORAL at 20:41

## 2021-01-01 RX ADMIN — DOCUSATE SODIUM 100 MG: 100 CAPSULE, LIQUID FILLED ORAL at 09:42

## 2021-01-01 RX ADMIN — Medication 20 ML: at 06:45

## 2021-01-01 RX ADMIN — BUDESONIDE 500 MCG: 0.5 INHALANT RESPIRATORY (INHALATION) at 07:09

## 2021-01-01 RX ADMIN — BRIMONIDINE TARTRATE 1 DROP: 2 SOLUTION OPHTHALMIC at 09:13

## 2021-01-01 RX ADMIN — HEPARIN SODIUM 4000 UNITS: 200 INJECTION, SOLUTION INTRAVENOUS at 10:16

## 2021-01-01 RX ADMIN — IPRATROPIUM BROMIDE AND ALBUTEROL SULFATE 3 ML: .5; 3 SOLUTION RESPIRATORY (INHALATION) at 21:00

## 2021-01-01 RX ADMIN — HYDROMORPHONE HYDROCHLORIDE 1 MG: 1 INJECTION, SOLUTION INTRAMUSCULAR; INTRAVENOUS; SUBCUTANEOUS at 04:45

## 2021-01-01 RX ADMIN — ROSUVASTATIN CALCIUM 40 MG: 20 TABLET, COATED ORAL at 21:35

## 2021-01-01 RX ADMIN — Medication 10 ML: at 00:43

## 2021-01-01 RX ADMIN — TAMSULOSIN HYDROCHLORIDE 0.4 MG: 0.4 CAPSULE ORAL at 09:13

## 2021-01-01 RX ADMIN — HYDROMORPHONE HYDROCHLORIDE 1 MG: 1 INJECTION, SOLUTION INTRAMUSCULAR; INTRAVENOUS; SUBCUTANEOUS at 11:46

## 2021-01-01 RX ADMIN — DILTIAZEM HYDROCHLORIDE 10 MG: 5 INJECTION INTRAVENOUS at 14:49

## 2021-01-01 RX ADMIN — ALBUTEROL SULFATE 2.5 MG: 2.5 SOLUTION RESPIRATORY (INHALATION) at 11:23

## 2021-01-01 RX ADMIN — ROSUVASTATIN CALCIUM 40 MG: 20 TABLET, COATED ORAL at 22:01

## 2021-01-01 RX ADMIN — SODIUM CHLORIDE, PRESERVATIVE FREE 10 ML: 5 INJECTION INTRAVENOUS at 15:38

## 2021-01-01 RX ADMIN — SODIUM CHLORIDE 1000 ML: 900 INJECTION, SOLUTION INTRAVENOUS at 21:27

## 2021-01-01 RX ADMIN — HYDROCODONE BITARTRATE AND ACETAMINOPHEN 1 TABLET: 5; 325 TABLET ORAL at 13:14

## 2021-01-01 RX ADMIN — BRIMONIDINE TARTRATE 1 DROP: 2 SOLUTION OPHTHALMIC at 20:59

## 2021-01-01 RX ADMIN — DILTIAZEM HYDROCHLORIDE 30 MG: 30 TABLET, FILM COATED ORAL at 00:37

## 2021-01-01 RX ADMIN — SODIUM CHLORIDE 120 MCG: 900 INJECTION, SOLUTION INTRAVENOUS at 10:43

## 2021-01-01 RX ADMIN — ASPIRIN 81 MG: 81 TABLET ORAL at 08:44

## 2021-01-01 RX ADMIN — Medication 10 ML: at 05:50

## 2021-01-01 RX ADMIN — PREDNISONE 10 MG: 5 TABLET ORAL at 18:10

## 2021-01-01 RX ADMIN — IPRATROPIUM BROMIDE AND ALBUTEROL SULFATE 3 ML: .5; 3 SOLUTION RESPIRATORY (INHALATION) at 08:15

## 2021-01-01 RX ADMIN — FAMOTIDINE 40 MG: 20 TABLET, FILM COATED ORAL at 08:13

## 2021-01-01 RX ADMIN — IPRATROPIUM BROMIDE AND ALBUTEROL SULFATE 3 ML: .5; 3 SOLUTION RESPIRATORY (INHALATION) at 20:47

## 2021-01-01 RX ADMIN — FUROSEMIDE 80 MG: 10 INJECTION, SOLUTION INTRAMUSCULAR; INTRAVENOUS at 15:53

## 2021-01-01 RX ADMIN — LISINOPRIL 10 MG: 5 TABLET ORAL at 08:54

## 2021-01-01 RX ADMIN — SODIUM CHLORIDE, PRESERVATIVE FREE 10 ML: 5 INJECTION INTRAVENOUS at 12:40

## 2021-01-01 RX ADMIN — SODIUM BICARBONATE 50 MEQ: 84 INJECTION, SOLUTION INTRAVENOUS at 03:47

## 2021-01-01 RX ADMIN — PANTOPRAZOLE SODIUM 40 MG: 40 TABLET, DELAYED RELEASE ORAL at 06:34

## 2021-01-01 RX ADMIN — ALBUTEROL SULFATE 2.5 MG: 2.5 SOLUTION RESPIRATORY (INHALATION) at 20:04

## 2021-01-01 RX ADMIN — FENTANYL CITRATE 25 MCG: 50 INJECTION, SOLUTION INTRAMUSCULAR; INTRAVENOUS at 10:20

## 2021-01-01 RX ADMIN — DILTIAZEM HYDROCHLORIDE 120 MG: 60 TABLET, FILM COATED ORAL at 08:54

## 2021-01-01 RX ADMIN — Medication 300 UNITS: at 09:00

## 2021-01-01 RX ADMIN — DILTIAZEM HYDROCHLORIDE 60 MG: 30 TABLET, FILM COATED ORAL at 06:00

## 2021-01-01 RX ADMIN — IPRATROPIUM BROMIDE AND ALBUTEROL SULFATE 3 ML: .5; 3 SOLUTION RESPIRATORY (INHALATION) at 19:36

## 2021-01-01 RX ADMIN — FERROUS SULFATE TAB 325 MG (65 MG ELEMENTAL FE) 325 MG: 325 (65 FE) TAB at 18:04

## 2021-01-01 RX ADMIN — GLYCOPYRROLATE 0.2 MG: 0.2 INJECTION, SOLUTION INTRAMUSCULAR; INTRAVENOUS at 12:39

## 2021-01-01 RX ADMIN — FERROUS SULFATE TAB 325 MG (65 MG ELEMENTAL FE) 325 MG: 325 (65 FE) TAB at 16:07

## 2021-01-01 RX ADMIN — FERROUS SULFATE TAB 325 MG (65 MG ELEMENTAL FE) 325 MG: 325 (65 FE) TAB at 07:57

## 2021-01-01 RX ADMIN — HYDROMORPHONE HYDROCHLORIDE 1 MG: 1 INJECTION, SOLUTION INTRAMUSCULAR; INTRAVENOUS; SUBCUTANEOUS at 10:14

## 2021-01-01 RX ADMIN — FAMOTIDINE 40 MG: 20 TABLET, FILM COATED ORAL at 08:20

## 2021-01-01 RX ADMIN — FERROUS SULFATE TAB 325 MG (65 MG ELEMENTAL FE) 325 MG: 325 (65 FE) TAB at 09:15

## 2021-01-01 RX ADMIN — GUAIFENESIN 600 MG: 600 TABLET ORAL at 08:20

## 2021-01-01 RX ADMIN — ALBUTEROL SULFATE 2.5 MG: 2.5 SOLUTION RESPIRATORY (INHALATION) at 19:58

## 2021-01-01 RX ADMIN — METOPROLOL SUCCINATE 50 MG: 50 TABLET, EXTENDED RELEASE ORAL at 17:39

## 2021-01-01 RX ADMIN — BRIMONIDINE TARTRATE 1 DROP: 2 SOLUTION OPHTHALMIC at 21:36

## 2021-01-01 RX ADMIN — Medication 5 ML: at 22:16

## 2021-01-01 RX ADMIN — IPRATROPIUM BROMIDE AND ALBUTEROL SULFATE 3 ML: .5; 3 SOLUTION RESPIRATORY (INHALATION) at 13:48

## 2021-01-01 RX ADMIN — FERROUS SULFATE TAB 325 MG (65 MG ELEMENTAL FE) 325 MG: 325 (65 FE) TAB at 17:37

## 2021-01-01 RX ADMIN — FENTANYL CITRATE 50 MCG: 50 INJECTION INTRAMUSCULAR; INTRAVENOUS at 18:36

## 2021-01-01 RX ADMIN — METOPROLOL SUCCINATE 100 MG: 100 TABLET, EXTENDED RELEASE ORAL at 17:31

## 2021-01-01 RX ADMIN — ALBUTEROL SULFATE 2.5 MG: 2.5 SOLUTION RESPIRATORY (INHALATION) at 07:26

## 2021-01-01 RX ADMIN — BUDESONIDE 500 MCG: 0.5 INHALANT RESPIRATORY (INHALATION) at 07:44

## 2021-01-01 RX ADMIN — GUAIFENESIN 600 MG: 600 TABLET ORAL at 17:31

## 2021-01-01 RX ADMIN — ALBUTEROL SULFATE 2.5 MG: 2.5 SOLUTION RESPIRATORY (INHALATION) at 15:28

## 2021-01-01 RX ADMIN — GUAIFENESIN 600 MG: 600 TABLET ORAL at 17:41

## 2021-01-01 RX ADMIN — DILTIAZEM HYDROCHLORIDE 30 MG: 30 TABLET, FILM COATED ORAL at 17:26

## 2021-01-01 RX ADMIN — IPRATROPIUM BROMIDE AND ALBUTEROL SULFATE 3 ML: .5; 3 SOLUTION RESPIRATORY (INHALATION) at 20:44

## 2021-01-01 RX ADMIN — MIDAZOLAM 0.5 MG: 1 INJECTION INTRAMUSCULAR; INTRAVENOUS at 09:53

## 2021-01-01 RX ADMIN — FUROSEMIDE 40 MG: 40 TABLET ORAL at 09:35

## 2021-01-01 RX ADMIN — SODIUM BICARBONATE 50 MEQ: 84 INJECTION, SOLUTION INTRAVENOUS at 21:59

## 2021-01-01 RX ADMIN — ROSUVASTATIN CALCIUM 40 MG: 20 TABLET, COATED ORAL at 22:12

## 2021-01-01 RX ADMIN — HYDROCODONE BITARTRATE AND ACETAMINOPHEN 1 TABLET: 5; 325 TABLET ORAL at 17:48

## 2021-01-01 RX ADMIN — ROSUVASTATIN 40 MG: 20 TABLET, FILM COATED ORAL at 21:52

## 2021-01-01 RX ADMIN — FAMOTIDINE 40 MG: 20 TABLET, FILM COATED ORAL at 08:54

## 2021-01-01 RX ADMIN — FAMOTIDINE 40 MG: 20 TABLET, FILM COATED ORAL at 10:23

## 2021-01-01 RX ADMIN — MIDAZOLAM 1 MG: 1 INJECTION INTRAMUSCULAR; INTRAVENOUS at 09:56

## 2021-01-01 RX ADMIN — LATANOPROST 1 DROP: 50 SOLUTION OPHTHALMIC at 19:02

## 2021-01-01 RX ADMIN — SODIUM CHLORIDE 75 ML/HR: 9 INJECTION, SOLUTION INTRAVENOUS at 13:00

## 2021-01-01 RX ADMIN — ALBUTEROL SULFATE 2.5 MG: 2.5 SOLUTION RESPIRATORY (INHALATION) at 15:05

## 2021-01-01 RX ADMIN — IPRATROPIUM BROMIDE AND ALBUTEROL SULFATE 3 ML: .5; 3 SOLUTION RESPIRATORY (INHALATION) at 19:49

## 2021-01-01 RX ADMIN — ALLOPURINOL 300 MG: 100 TABLET ORAL at 10:22

## 2021-01-01 RX ADMIN — BRIMONIDINE TARTRATE 1 DROP: 2 SOLUTION OPHTHALMIC at 09:24

## 2021-01-01 RX ADMIN — VANCOMYCIN HYDROCHLORIDE 1000 MG: 1 INJECTION, POWDER, LYOPHILIZED, FOR SOLUTION INTRAVENOUS at 05:33

## 2021-01-01 RX ADMIN — METOPROLOL SUCCINATE 50 MG: 50 TABLET, EXTENDED RELEASE ORAL at 09:21

## 2021-01-01 RX ADMIN — NOREPINEPHRINE BITARTRATE 30 MCG/MIN: 1 INJECTION, SOLUTION, CONCENTRATE INTRAVENOUS at 04:30

## 2021-01-01 RX ADMIN — Medication 10 ML: at 14:24

## 2021-01-01 RX ADMIN — PREDNISONE 40 MG: 20 TABLET ORAL at 17:27

## 2021-01-01 RX ADMIN — CEFAZOLIN 2 G: 10 INJECTION, POWDER, FOR SOLUTION INTRAVENOUS at 02:55

## 2021-01-01 RX ADMIN — FUROSEMIDE 40 MG: 10 INJECTION, SOLUTION INTRAMUSCULAR; INTRAVENOUS at 02:39

## 2021-01-01 RX ADMIN — FAMOTIDINE 40 MG: 20 TABLET, FILM COATED ORAL at 09:35

## 2021-01-01 RX ADMIN — ALBUTEROL SULFATE 2.5 MG: 2.5 SOLUTION RESPIRATORY (INHALATION) at 07:56

## 2021-01-01 RX ADMIN — Medication 10 ML: at 21:42

## 2021-01-01 RX ADMIN — Medication 10 ML: at 22:26

## 2021-01-01 RX ADMIN — Medication 10 ML: at 06:27

## 2021-01-01 RX ADMIN — PREDNISONE 40 MG: 20 TABLET ORAL at 09:01

## 2021-01-01 RX ADMIN — HYDROMORPHONE HYDROCHLORIDE 1 MG: 1 INJECTION, SOLUTION INTRAMUSCULAR; INTRAVENOUS; SUBCUTANEOUS at 23:41

## 2021-01-01 RX ADMIN — FENTANYL CITRATE 25 MCG: 50 INJECTION, SOLUTION INTRAMUSCULAR; INTRAVENOUS at 10:02

## 2021-01-01 RX ADMIN — LATANOPROST 1 DROP: 50 SOLUTION OPHTHALMIC at 17:32

## 2021-01-01 RX ADMIN — ALBUTEROL SULFATE 2.5 MG: 2.5 SOLUTION RESPIRATORY (INHALATION) at 08:26

## 2021-01-01 RX ADMIN — NOREPINEPHRINE BITARTRATE 12 MCG/MIN: 1 INJECTION, SOLUTION, CONCENTRATE INTRAVENOUS at 18:00

## 2021-01-01 RX ADMIN — LIDOCAINE HYDROCHLORIDE 160 MG: 20 INJECTION, SOLUTION INFILTRATION; PERINEURAL at 10:12

## 2021-01-01 RX ADMIN — LATANOPROST 1 DROP: 50 SOLUTION OPHTHALMIC at 21:41

## 2021-01-01 RX ADMIN — ALLOPURINOL 300 MG: 300 TABLET ORAL at 08:14

## 2021-01-01 RX ADMIN — FLUTICASONE PROPIONATE 1 PUFF: 110 AEROSOL, METERED RESPIRATORY (INHALATION) at 14:44

## 2021-01-01 RX ADMIN — Medication 10 ML: at 22:17

## 2021-01-01 RX ADMIN — DILTIAZEM HYDROCHLORIDE 120 MG: 60 TABLET, FILM COATED ORAL at 09:37

## 2021-01-01 RX ADMIN — METHYLPREDNISOLONE SODIUM SUCCINATE 40 MG: 40 INJECTION, POWDER, FOR SOLUTION INTRAMUSCULAR; INTRAVENOUS at 22:12

## 2021-01-01 RX ADMIN — FUROSEMIDE 40 MG: 40 TABLET ORAL at 08:13

## 2021-01-01 RX ADMIN — BRIMONIDINE TARTRATE 1 DROP: 2 SOLUTION OPHTHALMIC at 08:00

## 2021-01-01 RX ADMIN — IPRATROPIUM BROMIDE AND ALBUTEROL SULFATE 3 ML: .5; 3 SOLUTION RESPIRATORY (INHALATION) at 14:45

## 2021-01-01 RX ADMIN — BRIMONIDINE TARTRATE 1 DROP: 2 SOLUTION OPHTHALMIC at 22:51

## 2021-01-01 RX ADMIN — BRIMONIDINE TARTRATE 1 DROP: 2 SOLUTION OPHTHALMIC at 08:50

## 2021-01-01 RX ADMIN — Medication 10 ML: at 21:02

## 2021-01-01 RX ADMIN — ROSUVASTATIN CALCIUM 40 MG: 20 TABLET, COATED ORAL at 21:39

## 2021-01-01 RX ADMIN — METOPROLOL SUCCINATE 100 MG: 100 TABLET, EXTENDED RELEASE ORAL at 08:20

## 2021-01-01 RX ADMIN — ALBUTEROL SULFATE 2.5 MG: 2.5 SOLUTION RESPIRATORY (INHALATION) at 03:05

## 2021-01-01 RX ADMIN — ACETAMINOPHEN 650 MG: 325 TABLET ORAL at 20:41

## 2021-01-01 RX ADMIN — FUROSEMIDE 40 MG: 40 TABLET ORAL at 08:45

## 2021-01-01 RX ADMIN — SODIUM CHLORIDE, PRESERVATIVE FREE 10 ML: 5 INJECTION INTRAVENOUS at 04:17

## 2021-01-01 RX ADMIN — BUDESONIDE 500 MCG: 0.5 INHALANT RESPIRATORY (INHALATION) at 20:04

## 2021-01-01 RX ADMIN — PIPERACILLIN SODIUM AND TAZOBACTAM SODIUM 3.38 G: 3; .375 INJECTION, POWDER, LYOPHILIZED, FOR SOLUTION INTRAVENOUS at 05:33

## 2021-01-01 RX ADMIN — GUAIFENESIN 600 MG: 600 TABLET ORAL at 18:10

## 2021-01-01 RX ADMIN — SODIUM CHLORIDE 75 ML/HR: 900 INJECTION, SOLUTION INTRAVENOUS at 09:15

## 2021-01-01 RX ADMIN — METOPROLOL SUCCINATE 100 MG: 100 TABLET, EXTENDED RELEASE ORAL at 17:41

## 2021-01-01 RX ADMIN — HYDROCORTISONE SODIUM SUCCINATE 50 MG: 100 INJECTION, POWDER, FOR SOLUTION INTRAMUSCULAR; INTRAVENOUS at 23:40

## 2021-01-01 RX ADMIN — DOCUSATE SODIUM 100 MG: 100 CAPSULE, LIQUID FILLED ORAL at 10:49

## 2021-01-01 RX ADMIN — SODIUM CHLORIDE 40 MG: 9 INJECTION, SOLUTION INTRAMUSCULAR; INTRAVENOUS; SUBCUTANEOUS at 05:10

## 2021-01-01 RX ADMIN — METOPROLOL SUCCINATE 100 MG: 100 TABLET, EXTENDED RELEASE ORAL at 17:29

## 2021-01-01 RX ADMIN — GUAIFENESIN 600 MG: 600 TABLET ORAL at 08:51

## 2021-01-01 RX ADMIN — SODIUM CHLORIDE, PRESERVATIVE FREE 10 ML: 5 INJECTION INTRAVENOUS at 22:54

## 2021-01-01 RX ADMIN — Medication 10 ML: at 17:57

## 2021-01-01 RX ADMIN — HALOPERIDOL LACTATE 2 MG: 5 INJECTION, SOLUTION INTRAMUSCULAR at 02:00

## 2021-01-01 RX ADMIN — METOPROLOL SUCCINATE 50 MG: 50 TABLET, EXTENDED RELEASE ORAL at 09:43

## 2021-01-01 RX ADMIN — Medication 10 ML: at 05:15

## 2021-01-01 RX ADMIN — METHYLPREDNISOLONE SODIUM SUCCINATE 40 MG: 40 INJECTION, POWDER, FOR SOLUTION INTRAMUSCULAR; INTRAVENOUS at 09:37

## 2021-01-01 RX ADMIN — FAMOTIDINE 20 MG: 20 TABLET ORAL at 09:02

## 2021-01-01 RX ADMIN — IPRATROPIUM BROMIDE AND ALBUTEROL SULFATE 3 ML: .5; 3 SOLUTION RESPIRATORY (INHALATION) at 16:29

## 2021-01-01 RX ADMIN — HYDROMORPHONE HYDROCHLORIDE 1 MG: 1 INJECTION, SOLUTION INTRAMUSCULAR; INTRAVENOUS; SUBCUTANEOUS at 22:54

## 2021-01-01 RX ADMIN — NOREPINEPHRINE BITARTRATE 22 MCG/MIN: 1 INJECTION, SOLUTION, CONCENTRATE INTRAVENOUS at 00:04

## 2021-01-01 RX ADMIN — IPRATROPIUM BROMIDE AND ALBUTEROL SULFATE 3 ML: .5; 3 SOLUTION RESPIRATORY (INHALATION) at 09:04

## 2021-01-01 RX ADMIN — IPRATROPIUM BROMIDE AND ALBUTEROL SULFATE 3 ML: .5; 3 SOLUTION RESPIRATORY (INHALATION) at 15:41

## 2021-01-01 RX ADMIN — ALBUTEROL SULFATE 2.5 MG: 2.5 SOLUTION RESPIRATORY (INHALATION) at 20:36

## 2021-01-01 RX ADMIN — IPRATROPIUM BROMIDE AND ALBUTEROL SULFATE 3 ML: .5; 3 SOLUTION RESPIRATORY (INHALATION) at 07:44

## 2021-01-01 RX ADMIN — BUDESONIDE 500 MCG: 0.5 INHALANT RESPIRATORY (INHALATION) at 08:26

## 2021-01-01 RX ADMIN — IPRATROPIUM BROMIDE AND ALBUTEROL SULFATE 3 ML: .5; 3 SOLUTION RESPIRATORY (INHALATION) at 02:38

## 2021-01-01 RX ADMIN — METOPROLOL SUCCINATE 50 MG: 50 TABLET, EXTENDED RELEASE ORAL at 09:02

## 2021-01-01 RX ADMIN — BUDESONIDE 500 MCG: 0.5 INHALANT RESPIRATORY (INHALATION) at 20:44

## 2021-01-01 RX ADMIN — FENTANYL CITRATE 50 MCG: 50 INJECTION, SOLUTION INTRAMUSCULAR; INTRAVENOUS at 07:56

## 2021-01-01 RX ADMIN — DEXTROSE MONOHYDRATE 12 MCG/MIN: 5 INJECTION, SOLUTION INTRAVENOUS at 18:09

## 2021-01-01 RX ADMIN — TAMSULOSIN HYDROCHLORIDE 0.4 MG: 0.4 CAPSULE ORAL at 08:53

## 2021-01-01 RX ADMIN — Medication 10 ML: at 05:08

## 2021-01-01 RX ADMIN — PREDNISONE 40 MG: 20 TABLET ORAL at 18:18

## 2021-01-01 RX ADMIN — METOPROLOL SUCCINATE 100 MG: 100 TABLET, EXTENDED RELEASE ORAL at 10:17

## 2021-01-01 RX ADMIN — ALBUTEROL SULFATE 2.5 MG: 2.5 SOLUTION RESPIRATORY (INHALATION) at 20:10

## 2021-01-01 RX ADMIN — BRIMONIDINE TARTRATE 1 DROP: 2 SOLUTION OPHTHALMIC at 22:12

## 2021-01-01 RX ADMIN — Medication 10 ML: at 22:18

## 2021-01-01 RX ADMIN — PREDNISONE 40 MG: 20 TABLET ORAL at 17:26

## 2021-01-01 RX ADMIN — NOREPINEPHRINE BITARTRATE 30 MCG/MIN: 1 INJECTION, SOLUTION, CONCENTRATE INTRAVENOUS at 07:00

## 2021-01-01 RX ADMIN — FUROSEMIDE 40 MG: 10 INJECTION, SOLUTION INTRAMUSCULAR; INTRAVENOUS at 09:14

## 2021-01-01 RX ADMIN — BUDESONIDE 500 MCG: 0.5 INHALANT RESPIRATORY (INHALATION) at 20:14

## 2021-01-01 RX ADMIN — ALLOPURINOL 300 MG: 300 TABLET ORAL at 08:53

## 2021-01-01 RX ADMIN — Medication 10 ML: at 06:01

## 2021-01-01 RX ADMIN — ASPIRIN 81 MG: 81 TABLET ORAL at 08:46

## 2021-01-01 RX ADMIN — BRIMONIDINE TARTRATE 1 DROP: 2 SOLUTION OPHTHALMIC at 09:46

## 2021-01-01 RX ADMIN — HYDROMORPHONE HYDROCHLORIDE 0.5 MG: 2 INJECTION INTRAMUSCULAR; INTRAVENOUS; SUBCUTANEOUS at 16:03

## 2021-01-01 RX ADMIN — HYDROMORPHONE HYDROCHLORIDE 1 MG: 1 INJECTION, SOLUTION INTRAMUSCULAR; INTRAVENOUS; SUBCUTANEOUS at 15:38

## 2021-01-01 RX ADMIN — IPRATROPIUM BROMIDE AND ALBUTEROL SULFATE 3 ML: .5; 3 SOLUTION RESPIRATORY (INHALATION) at 19:23

## 2021-01-01 RX ADMIN — LATANOPROST 1 DROP: 50 SOLUTION OPHTHALMIC at 21:01

## 2021-01-01 RX ADMIN — LISINOPRIL 10 MG: 5 TABLET ORAL at 09:37

## 2021-01-01 RX ADMIN — PREDNISONE 40 MG: 20 TABLET ORAL at 08:57

## 2021-01-01 RX ADMIN — BUDESONIDE 500 MCG: 0.5 INHALANT RESPIRATORY (INHALATION) at 07:56

## 2021-01-01 RX ADMIN — Medication 10 ML: at 05:47

## 2021-01-01 RX ADMIN — IPRATROPIUM BROMIDE AND ALBUTEROL SULFATE 3 ML: .5; 3 SOLUTION RESPIRATORY (INHALATION) at 15:39

## 2021-01-01 RX ADMIN — LISINOPRIL 10 MG: 5 TABLET ORAL at 09:15

## 2021-01-01 RX ADMIN — HYDROCORTISONE SODIUM SUCCINATE 50 MG: 100 INJECTION, POWDER, FOR SOLUTION INTRAMUSCULAR; INTRAVENOUS at 11:46

## 2021-01-01 RX ADMIN — SODIUM CHLORIDE 20 MCG/MIN: 900 INJECTION, SOLUTION INTRAVENOUS at 10:04

## 2021-01-01 RX ADMIN — ETOMIDATE 14 MCG: 2 INJECTION, SOLUTION INTRAVENOUS at 18:24

## 2021-01-01 RX ADMIN — FERROUS SULFATE TAB 325 MG (65 MG ELEMENTAL FE) 325 MG: 325 (65 FE) TAB at 08:53

## 2021-01-01 RX ADMIN — IPRATROPIUM BROMIDE AND ALBUTEROL SULFATE 3 ML: .5; 3 SOLUTION RESPIRATORY (INHALATION) at 16:34

## 2021-01-01 RX ADMIN — Medication 5 ML: at 13:37

## 2021-01-01 RX ADMIN — HALOPERIDOL LACTATE 2 MG: 5 INJECTION, SOLUTION INTRAMUSCULAR at 20:35

## 2021-01-01 RX ADMIN — HYDROCORTISONE SODIUM SUCCINATE 50 MG: 100 INJECTION, POWDER, FOR SOLUTION INTRAMUSCULAR; INTRAVENOUS at 12:24

## 2021-01-01 RX ADMIN — FENTANYL CITRATE 25 MCG: 50 INJECTION, SOLUTION INTRAMUSCULAR; INTRAVENOUS at 10:35

## 2021-01-01 RX ADMIN — METOPROLOL SUCCINATE 100 MG: 100 TABLET, EXTENDED RELEASE ORAL at 09:35

## 2021-01-01 RX ADMIN — HYDROCORTISONE SODIUM SUCCINATE 50 MG: 100 INJECTION, POWDER, FOR SOLUTION INTRAMUSCULAR; INTRAVENOUS at 17:45

## 2021-01-01 RX ADMIN — Medication 5 ML: at 05:13

## 2021-01-01 RX ADMIN — HYDROMORPHONE HYDROCHLORIDE 1 MG: 1 INJECTION, SOLUTION INTRAMUSCULAR; INTRAVENOUS; SUBCUTANEOUS at 22:14

## 2021-01-01 RX ADMIN — SODIUM CHLORIDE 500 ML: 9 INJECTION, SOLUTION INTRAVENOUS at 09:50

## 2021-01-01 RX ADMIN — ALBUMIN (HUMAN) 25 G: 0.25 INJECTION, SOLUTION INTRAVENOUS at 04:39

## 2021-01-01 RX ADMIN — SODIUM CHLORIDE 75 ML/HR: 900 INJECTION, SOLUTION INTRAVENOUS at 23:00

## 2021-01-01 RX ADMIN — MIDAZOLAM 0.5 MG: 1 INJECTION INTRAMUSCULAR; INTRAVENOUS at 10:26

## 2021-01-01 RX ADMIN — HYDROMORPHONE HYDROCHLORIDE 1 MG: 1 INJECTION, SOLUTION INTRAMUSCULAR; INTRAVENOUS; SUBCUTANEOUS at 17:03

## 2021-01-01 RX ADMIN — SODIUM CHLORIDE 120 MCG: 900 INJECTION, SOLUTION INTRAVENOUS at 10:07

## 2021-01-01 RX ADMIN — DILTIAZEM HYDROCHLORIDE 120 MG: 60 TABLET, FILM COATED ORAL at 09:43

## 2021-01-01 RX ADMIN — ALLOPURINOL 300 MG: 300 TABLET ORAL at 09:13

## 2021-01-01 RX ADMIN — HYDROMORPHONE HYDROCHLORIDE 1 MG: 1 INJECTION, SOLUTION INTRAMUSCULAR; INTRAVENOUS; SUBCUTANEOUS at 17:52

## 2021-01-01 RX ADMIN — NOREPINEPHRINE BITARTRATE 30 MCG/MIN: 1 INJECTION, SOLUTION, CONCENTRATE INTRAVENOUS at 02:16

## 2021-01-01 RX ADMIN — ROSUVASTATIN 40 MG: 20 TABLET, FILM COATED ORAL at 21:33

## 2021-01-01 RX ADMIN — METOPROLOL SUCCINATE 50 MG: 50 TABLET, EXTENDED RELEASE ORAL at 09:37

## 2021-01-01 RX ADMIN — BUDESONIDE 500 MCG: 0.5 INHALANT RESPIRATORY (INHALATION) at 07:16

## 2021-01-01 RX ADMIN — GUAIFENESIN 600 MG: 600 TABLET ORAL at 18:18

## 2021-01-01 RX ADMIN — LATANOPROST 1 DROP: 50 SOLUTION OPHTHALMIC at 17:33

## 2021-01-01 RX ADMIN — DOCUSATE SODIUM 100 MG: 100 CAPSULE, LIQUID FILLED ORAL at 09:13

## 2021-01-01 RX ADMIN — NOREPINEPHRINE BITARTRATE 15 MCG/MIN: 1 INJECTION, SOLUTION, CONCENTRATE INTRAVENOUS at 09:23

## 2021-01-01 RX ADMIN — SODIUM CHLORIDE 40 MG: 9 INJECTION, SOLUTION INTRAMUSCULAR; INTRAVENOUS; SUBCUTANEOUS at 09:16

## 2021-01-01 RX ADMIN — SODIUM CHLORIDE 5 MG/HR: 900 INJECTION, SOLUTION INTRAVENOUS at 16:24

## 2021-01-01 RX ADMIN — PREDNISONE 40 MG: 20 TABLET ORAL at 14:23

## 2021-01-01 RX ADMIN — SODIUM CHLORIDE 100 MCG: 900 INJECTION, SOLUTION INTRAVENOUS at 11:58

## 2021-01-01 RX ADMIN — BRIMONIDINE TARTRATE 1 DROP: 2 SOLUTION OPHTHALMIC at 18:16

## 2021-01-01 RX ADMIN — ALLOPURINOL 300 MG: 300 TABLET ORAL at 09:35

## 2021-01-01 RX ADMIN — POLYETHYLENE GLYCOL 3350 17 G: 17 POWDER, FOR SOLUTION ORAL at 09:20

## 2021-01-01 RX ADMIN — IPRATROPIUM BROMIDE AND ALBUTEROL SULFATE 3 ML: .5; 3 SOLUTION RESPIRATORY (INHALATION) at 07:14

## 2021-01-01 RX ADMIN — PANTOPRAZOLE SODIUM 40 MG: 40 TABLET, DELAYED RELEASE ORAL at 08:01

## 2021-01-01 RX ADMIN — PREDNISONE 20 MG: 20 TABLET ORAL at 17:31

## 2021-01-01 RX ADMIN — TUBERCULIN PURIFIED PROTEIN DERIVATIVE 5 UNITS: 5 INJECTION, SOLUTION INTRADERMAL at 11:57

## 2021-01-01 RX ADMIN — Medication 5 ML: at 14:42

## 2021-01-01 RX ADMIN — ALBUTEROL SULFATE 2.5 MG: 2.5 SOLUTION RESPIRATORY (INHALATION) at 11:12

## 2021-01-01 RX ADMIN — PREDNISONE 40 MG: 20 TABLET ORAL at 17:39

## 2021-01-01 RX ADMIN — ROSUVASTATIN 40 MG: 20 TABLET, FILM COATED ORAL at 22:16

## 2021-01-01 RX ADMIN — PREDNISONE 40 MG: 20 TABLET ORAL at 09:19

## 2021-01-01 RX ADMIN — BRIMONIDINE TARTRATE 1 DROP: 2 SOLUTION OPHTHALMIC at 09:02

## 2021-01-01 RX ADMIN — IPRATROPIUM BROMIDE AND ALBUTEROL SULFATE 3 ML: .5; 3 SOLUTION RESPIRATORY (INHALATION) at 19:45

## 2021-01-01 RX ADMIN — ALBUMIN (HUMAN) 25 G: 0.25 INJECTION, SOLUTION INTRAVENOUS at 08:45

## 2021-01-01 RX ADMIN — BRIMONIDINE TARTRATE 1 DROP: 2 SOLUTION OPHTHALMIC at 01:17

## 2021-01-01 RX ADMIN — Medication 10 ML: at 05:33

## 2021-01-01 RX ADMIN — ALLOPURINOL 300 MG: 100 TABLET ORAL at 10:49

## 2021-01-01 RX ADMIN — SODIUM CHLORIDE, PRESERVATIVE FREE 10 ML: 5 INJECTION INTRAVENOUS at 22:15

## 2021-01-01 RX ADMIN — BRIMONIDINE TARTRATE 1 DROP: 2 SOLUTION OPHTHALMIC at 08:52

## 2021-01-01 RX ADMIN — Medication 10 ML: at 05:17

## 2021-01-01 RX ADMIN — METHYLPREDNISOLONE SODIUM SUCCINATE 125 MG: 125 INJECTION, POWDER, FOR SOLUTION INTRAMUSCULAR; INTRAVENOUS at 09:41

## 2021-01-01 RX ADMIN — ALLOPURINOL 50 MG: 100 TABLET ORAL at 08:53

## 2021-01-01 RX ADMIN — ASPIRIN 81 MG: 81 TABLET ORAL at 10:23

## 2021-01-01 RX ADMIN — ALBUMIN (HUMAN) 25 G: 0.25 INJECTION, SOLUTION INTRAVENOUS at 12:03

## 2021-01-01 RX ADMIN — LATANOPROST 1 DROP: 50 SOLUTION OPHTHALMIC at 18:10

## 2021-01-01 RX ADMIN — ALBUTEROL SULFATE 2.5 MG: 2.5 SOLUTION RESPIRATORY (INHALATION) at 07:31

## 2021-01-01 RX ADMIN — FUROSEMIDE 20 MG: 20 TABLET ORAL at 10:23

## 2021-01-01 RX ADMIN — ROSUVASTATIN CALCIUM 40 MG: 20 TABLET, COATED ORAL at 21:40

## 2021-01-01 RX ADMIN — IPRATROPIUM BROMIDE AND ALBUTEROL SULFATE 3 ML: .5; 3 SOLUTION RESPIRATORY (INHALATION) at 20:17

## 2021-01-01 RX ADMIN — HYDROMORPHONE HYDROCHLORIDE 0.5 MG: 2 INJECTION INTRAMUSCULAR; INTRAVENOUS; SUBCUTANEOUS at 10:28

## 2021-01-01 RX ADMIN — SODIUM POLYSTYRENE SULFONATE 15 G: 15 SUSPENSION ORAL; RECTAL at 10:41

## 2021-01-01 RX ADMIN — NOREPINEPHRINE BITARTRATE 14 MCG/MIN: 1 INJECTION, SOLUTION, CONCENTRATE INTRAVENOUS at 07:17

## 2021-01-01 RX ADMIN — HYDROCODONE BITARTRATE AND ACETAMINOPHEN 1 TABLET: 5; 325 TABLET ORAL at 04:36

## 2021-01-01 RX ADMIN — PANTOPRAZOLE SODIUM 40 MG: 40 TABLET, DELAYED RELEASE ORAL at 07:57

## 2021-01-01 RX ADMIN — SODIUM BICARBONATE 50 MEQ: 84 INJECTION, SOLUTION INTRAVENOUS at 23:02

## 2021-01-01 RX ADMIN — FLUTICASONE PROPIONATE 1 PUFF: 110 AEROSOL, METERED RESPIRATORY (INHALATION) at 08:36

## 2021-01-01 RX ADMIN — HYDROMORPHONE HYDROCHLORIDE 0.5 MG: 2 INJECTION INTRAMUSCULAR; INTRAVENOUS; SUBCUTANEOUS at 02:02

## 2021-01-01 RX ADMIN — PREDNISONE 20 MG: 20 TABLET ORAL at 17:23

## 2021-01-01 RX ADMIN — ALBUTEROL SULFATE 2.5 MG: 2.5 SOLUTION RESPIRATORY (INHALATION) at 20:33

## 2021-01-01 RX ADMIN — TAMSULOSIN HYDROCHLORIDE 0.4 MG: 0.4 CAPSULE ORAL at 08:19

## 2021-01-01 RX ADMIN — Medication 300 UNITS: at 20:36

## 2021-01-01 RX ADMIN — FAMOTIDINE 40 MG: 20 TABLET, FILM COATED ORAL at 08:52

## 2021-01-01 RX ADMIN — FERROUS SULFATE TAB 325 MG (65 MG ELEMENTAL FE) 325 MG: 325 (65 FE) TAB at 10:16

## 2021-01-01 RX ADMIN — Medication 10 ML: at 05:21

## 2021-01-01 RX ADMIN — ACETAMINOPHEN 650 MG: 325 TABLET, FILM COATED ORAL at 21:40

## 2021-01-01 RX ADMIN — BUDESONIDE 500 MCG: 0.5 INHALANT RESPIRATORY (INHALATION) at 08:25

## 2021-01-01 RX ADMIN — HYDROMORPHONE HYDROCHLORIDE 0.5 MG: 2 INJECTION INTRAMUSCULAR; INTRAVENOUS; SUBCUTANEOUS at 07:43

## 2021-01-01 RX ADMIN — PANTOPRAZOLE SODIUM 40 MG: 40 TABLET, DELAYED RELEASE ORAL at 05:36

## 2021-01-01 RX ADMIN — SODIUM CHLORIDE 100 ML/HR: 900 INJECTION, SOLUTION INTRAVENOUS at 17:28

## 2021-01-01 RX ADMIN — Medication 10 ML: at 12:59

## 2021-01-01 RX ADMIN — SODIUM CHLORIDE 120 MCG: 900 INJECTION, SOLUTION INTRAVENOUS at 10:11

## 2021-01-01 RX ADMIN — BUDESONIDE 500 MCG: 0.5 INHALANT RESPIRATORY (INHALATION) at 20:10

## 2021-01-01 RX ADMIN — SODIUM CHLORIDE, PRESERVATIVE FREE 10 ML: 5 INJECTION INTRAVENOUS at 06:00

## 2021-01-01 RX ADMIN — FAMOTIDINE 20 MG: 20 TABLET ORAL at 09:21

## 2021-01-01 RX ADMIN — GUAIFENESIN 600 MG: 600 TABLET ORAL at 17:15

## 2021-01-01 RX ADMIN — SODIUM CHLORIDE, SODIUM LACTATE, POTASSIUM CHLORIDE, AND CALCIUM CHLORIDE: 600; 310; 30; 20 INJECTION, SOLUTION INTRAVENOUS at 09:44

## 2021-01-01 RX ADMIN — VANCOMYCIN HYDROCHLORIDE 1750 MG: 10 INJECTION, POWDER, LYOPHILIZED, FOR SOLUTION INTRAVENOUS at 05:25

## 2021-01-01 RX ADMIN — BUDESONIDE 500 MCG: 0.5 INHALANT RESPIRATORY (INHALATION) at 19:49

## 2021-01-01 RX ADMIN — IPRATROPIUM BROMIDE AND ALBUTEROL SULFATE 3 ML: .5; 3 SOLUTION RESPIRATORY (INHALATION) at 08:36

## 2021-01-01 RX ADMIN — BRIMONIDINE TARTRATE 1 DROP: 2 SOLUTION OPHTHALMIC at 07:55

## 2021-01-01 RX ADMIN — HYDROCORTISONE SODIUM SUCCINATE 50 MG: 100 INJECTION, POWDER, FOR SOLUTION INTRAMUSCULAR; INTRAVENOUS at 11:12

## 2021-01-01 RX ADMIN — ALBUTEROL SULFATE 2.5 MG: 2.5 SOLUTION RESPIRATORY (INHALATION) at 11:15

## 2021-01-01 RX ADMIN — SODIUM CHLORIDE 5 MG/HR: 900 INJECTION, SOLUTION INTRAVENOUS at 21:26

## 2021-01-01 RX ADMIN — ASPIRIN 81 MG: 81 TABLET ORAL at 08:51

## 2021-01-01 RX ADMIN — IPRATROPIUM BROMIDE AND ALBUTEROL SULFATE 3 ML: .5; 3 SOLUTION RESPIRATORY (INHALATION) at 08:54

## 2021-01-01 RX ADMIN — MIDAZOLAM 1 MG: 1 INJECTION INTRAMUSCULAR; INTRAVENOUS at 10:25

## 2021-01-01 RX ADMIN — METOPROLOL SUCCINATE 100 MG: 100 TABLET, EXTENDED RELEASE ORAL at 08:51

## 2021-01-01 RX ADMIN — METOPROLOL SUCCINATE 50 MG: 50 TABLET, EXTENDED RELEASE ORAL at 16:03

## 2021-01-01 RX ADMIN — TAMSULOSIN HYDROCHLORIDE 0.4 MG: 0.4 CAPSULE ORAL at 09:21

## 2021-01-01 RX ADMIN — SODIUM CHLORIDE, PRESERVATIVE FREE 10 ML: 5 INJECTION INTRAVENOUS at 20:35

## 2021-01-01 RX ADMIN — METOPROLOL SUCCINATE 100 MG: 100 TABLET, EXTENDED RELEASE ORAL at 17:15

## 2021-01-01 RX ADMIN — HYDROMORPHONE HYDROCHLORIDE 1 MG: 1 INJECTION, SOLUTION INTRAMUSCULAR; INTRAVENOUS; SUBCUTANEOUS at 15:50

## 2021-01-01 RX ADMIN — METOPROLOL SUCCINATE 100 MG: 100 TABLET, EXTENDED RELEASE ORAL at 17:22

## 2021-01-01 RX ADMIN — SODIUM CHLORIDE, SODIUM LACTATE, POTASSIUM CHLORIDE, AND CALCIUM CHLORIDE 100 ML/HR: 600; 310; 30; 20 INJECTION, SOLUTION INTRAVENOUS at 11:12

## 2021-01-01 RX ADMIN — Medication 10 ML: at 21:35

## 2021-01-01 RX ADMIN — PHENYLEPHRINE HYDROCHLORIDE 40 MCG/MIN: 10 INJECTION INTRAVENOUS at 01:26

## 2021-01-01 RX ADMIN — ASPIRIN 81 MG: 81 TABLET ORAL at 08:20

## 2021-01-01 RX ADMIN — HYDROMORPHONE HYDROCHLORIDE 1 MG: 1 INJECTION, SOLUTION INTRAMUSCULAR; INTRAVENOUS; SUBCUTANEOUS at 08:06

## 2021-01-01 RX ADMIN — GUAIFENESIN 600 MG: 600 TABLET ORAL at 08:14

## 2021-01-01 RX ADMIN — METHYLPREDNISOLONE SODIUM SUCCINATE 40 MG: 40 INJECTION, POWDER, FOR SOLUTION INTRAMUSCULAR; INTRAVENOUS at 10:24

## 2021-01-01 RX ADMIN — METOPROLOL SUCCINATE 50 MG: 50 TABLET, EXTENDED RELEASE ORAL at 09:19

## 2021-01-01 RX ADMIN — PIPERACILLIN SODIUM AND TAZOBACTAM SODIUM 3.38 G: 3; .375 INJECTION, POWDER, LYOPHILIZED, FOR SOLUTION INTRAVENOUS at 12:03

## 2021-01-01 RX ADMIN — DILTIAZEM HYDROCHLORIDE 60 MG: 30 TABLET, FILM COATED ORAL at 17:23

## 2021-01-01 RX ADMIN — ALBUTEROL SULFATE 2.5 MG: 2.5 SOLUTION RESPIRATORY (INHALATION) at 07:09

## 2021-01-01 RX ADMIN — Medication 5 ML: at 09:41

## 2021-01-01 RX ADMIN — BUDESONIDE 500 MCG: 0.5 INHALANT RESPIRATORY (INHALATION) at 19:58

## 2021-01-01 RX ADMIN — TIOTROPIUM BROMIDE INHALATION SPRAY 2 PUFF: 3.12 SPRAY, METERED RESPIRATORY (INHALATION) at 07:11

## 2021-01-01 RX ADMIN — Medication 5 ML: at 13:39

## 2021-01-01 RX ADMIN — ALBUTEROL SULFATE 2.5 MG: 2.5 SOLUTION RESPIRATORY (INHALATION) at 03:56

## 2021-01-01 RX ADMIN — ROSUVASTATIN 40 MG: 20 TABLET, FILM COATED ORAL at 22:39

## 2021-01-01 RX ADMIN — POLYETHYLENE GLYCOL 3350 17 G: 17 POWDER, FOR SOLUTION ORAL at 09:15

## 2021-01-01 RX ADMIN — ALBUTEROL SULFATE 2.5 MG: 2.5 SOLUTION RESPIRATORY (INHALATION) at 19:52

## 2021-01-01 RX ADMIN — ASPIRIN 81 MG: 81 TABLET ORAL at 09:14

## 2021-01-01 RX ADMIN — METOPROLOL SUCCINATE 100 MG: 100 TABLET, EXTENDED RELEASE ORAL at 17:26

## 2021-01-01 RX ADMIN — ASPIRIN 81 MG: 81 TABLET ORAL at 09:19

## 2021-01-01 RX ADMIN — NITROGLYCERIN 0.4 MG: 0.4 TABLET SUBLINGUAL at 18:36

## 2021-01-01 RX ADMIN — DILTIAZEM HYDROCHLORIDE 30 MG: 30 TABLET, FILM COATED ORAL at 06:04

## 2021-01-01 RX ADMIN — SODIUM CHLORIDE 5 MG/HR: 900 INJECTION, SOLUTION INTRAVENOUS at 01:16

## 2021-01-01 RX ADMIN — NOREPINEPHRINE BITARTRATE 14 MCG/MIN: 1 INJECTION, SOLUTION, CONCENTRATE INTRAVENOUS at 02:10

## 2021-01-01 RX ADMIN — DOCUSATE SODIUM 100 MG: 100 CAPSULE, LIQUID FILLED ORAL at 09:15

## 2021-01-01 RX ADMIN — BUDESONIDE 500 MCG: 0.5 INHALANT RESPIRATORY (INHALATION) at 08:01

## 2021-01-01 RX ADMIN — DOCUSATE SODIUM 100 MG: 100 CAPSULE, LIQUID FILLED ORAL at 08:31

## 2021-01-01 RX ADMIN — ALBUTEROL SULFATE 2.5 MG: 2.5 SOLUTION RESPIRATORY (INHALATION) at 07:16

## 2021-01-01 RX ADMIN — HEPARIN SODIUM 5000 UNITS: 5000 INJECTION INTRAVENOUS; SUBCUTANEOUS at 23:40

## 2021-01-01 RX ADMIN — HALOPERIDOL LACTATE 2 MG: 5 INJECTION, SOLUTION INTRAMUSCULAR at 23:59

## 2021-01-01 RX ADMIN — GUAIFENESIN 600 MG: 600 TABLET ORAL at 08:21

## 2021-01-01 RX ADMIN — FERROUS SULFATE TAB 325 MG (65 MG ELEMENTAL FE) 325 MG: 325 (65 FE) TAB at 18:09

## 2021-01-01 RX ADMIN — ALBUTEROL SULFATE 2.5 MG: 2.5 SOLUTION RESPIRATORY (INHALATION) at 15:14

## 2021-01-01 RX ADMIN — NOREPINEPHRINE BITARTRATE 12 MCG/MIN: 1 INJECTION, SOLUTION, CONCENTRATE INTRAVENOUS at 12:29

## 2021-01-01 RX ADMIN — IPRATROPIUM BROMIDE AND ALBUTEROL SULFATE 3 ML: .5; 3 SOLUTION RESPIRATORY (INHALATION) at 07:55

## 2021-01-01 RX ADMIN — IPRATROPIUM BROMIDE AND ALBUTEROL SULFATE 3 ML: .5; 3 SOLUTION RESPIRATORY (INHALATION) at 09:20

## 2021-01-01 RX ADMIN — BUDESONIDE 500 MCG: 0.5 INHALANT RESPIRATORY (INHALATION) at 20:06

## 2021-01-01 RX ADMIN — HYDROMORPHONE HYDROCHLORIDE 0.5 MG: 1 INJECTION, SOLUTION INTRAMUSCULAR; INTRAVENOUS; SUBCUTANEOUS at 05:59

## 2021-01-01 RX ADMIN — ASPIRIN 81 MG: 81 TABLET ORAL at 09:35

## 2021-01-01 RX ADMIN — IPRATROPIUM BROMIDE AND ALBUTEROL SULFATE 3 ML: .5; 3 SOLUTION RESPIRATORY (INHALATION) at 10:50

## 2021-01-01 RX ADMIN — Medication 10 ML: at 05:44

## 2021-01-01 RX ADMIN — SODIUM CHLORIDE, PRESERVATIVE FREE 10 ML: 5 INJECTION INTRAVENOUS at 11:47

## 2021-01-01 RX ADMIN — PERFLUTREN 1 ML: 6.52 INJECTION, SUSPENSION INTRAVENOUS at 15:30

## 2021-01-01 RX ADMIN — IOPAMIDOL 94 ML: 612 INJECTION, SOLUTION INTRAVENOUS at 10:43

## 2021-01-01 RX ADMIN — BRIMONIDINE TARTRATE 1 DROP: 2 SOLUTION OPHTHALMIC at 21:14

## 2021-01-01 RX ADMIN — CEFAZOLIN 2 G: 1 INJECTION, POWDER, FOR SOLUTION INTRAVENOUS at 10:15

## 2021-01-01 RX ADMIN — SODIUM CHLORIDE, SODIUM LACTATE, POTASSIUM CHLORIDE, AND CALCIUM CHLORIDE 100 ML/HR: 600; 310; 30; 20 INJECTION, SOLUTION INTRAVENOUS at 01:25

## 2021-01-01 RX ADMIN — Medication 10 ML: at 22:51

## 2021-01-01 RX ADMIN — DOCUSATE SODIUM 100 MG: 100 CAPSULE, LIQUID FILLED ORAL at 08:13

## 2021-01-01 RX ADMIN — ALLOPURINOL 50 MG: 100 TABLET ORAL at 08:21

## 2021-01-01 RX ADMIN — IPRATROPIUM BROMIDE AND ALBUTEROL SULFATE 3 ML: .5; 3 SOLUTION RESPIRATORY (INHALATION) at 08:31

## 2021-01-01 RX ADMIN — METOPROLOL SUCCINATE 50 MG: 50 TABLET, EXTENDED RELEASE ORAL at 08:54

## 2021-01-01 RX ADMIN — IOPAMIDOL 100 ML: 755 INJECTION, SOLUTION INTRAVENOUS at 12:59

## 2021-01-01 RX ADMIN — IPRATROPIUM BROMIDE AND ALBUTEROL SULFATE 3 ML: .5; 3 SOLUTION RESPIRATORY (INHALATION) at 07:21

## 2021-01-01 RX ADMIN — Medication 10 ML: at 02:14

## 2021-01-01 RX ADMIN — ASPIRIN 81 MG: 81 TABLET ORAL at 09:43

## 2021-01-01 RX ADMIN — HYDROCORTISONE SODIUM SUCCINATE 50 MG: 100 INJECTION, POWDER, FOR SOLUTION INTRAMUSCULAR; INTRAVENOUS at 23:56

## 2021-01-01 RX ADMIN — ASPIRIN 81 MG: 81 TABLET ORAL at 09:05

## 2021-01-01 RX ADMIN — DOCUSATE SODIUM 100 MG: 100 CAPSULE, LIQUID FILLED ORAL at 09:35

## 2021-01-01 RX ADMIN — DOCUSATE SODIUM 100 MG: 100 CAPSULE, LIQUID FILLED ORAL at 08:20

## 2021-01-01 RX ADMIN — Medication 10 ML: at 05:46

## 2021-01-01 RX ADMIN — FUROSEMIDE 40 MG: 40 TABLET ORAL at 08:53

## 2021-01-01 RX ADMIN — Medication 5 ML: at 06:14

## 2021-01-01 RX ADMIN — IPRATROPIUM BROMIDE AND ALBUTEROL SULFATE 3 ML: .5; 3 SOLUTION RESPIRATORY (INHALATION) at 22:06

## 2021-01-01 RX ADMIN — BRIMONIDINE TARTRATE 1 DROP: 2 SOLUTION OPHTHALMIC at 21:21

## 2021-01-01 RX ADMIN — ALLOPURINOL 300 MG: 300 TABLET ORAL at 08:19

## 2021-01-01 RX ADMIN — ROSUVASTATIN 40 MG: 20 TABLET, FILM COATED ORAL at 22:00

## 2021-01-01 RX ADMIN — BUDESONIDE 500 MCG: 0.5 INHALANT RESPIRATORY (INHALATION) at 07:17

## 2021-01-01 RX ADMIN — FLUTICASONE PROPIONATE 1 PUFF: 110 AEROSOL, METERED RESPIRATORY (INHALATION) at 07:56

## 2021-01-01 RX ADMIN — TAMSULOSIN HYDROCHLORIDE 0.4 MG: 0.4 CAPSULE ORAL at 08:21

## 2021-01-01 RX ADMIN — LISINOPRIL 10 MG: 5 TABLET ORAL at 09:43

## 2021-01-01 RX ADMIN — OXYCODONE 5 MG: 5 TABLET ORAL at 11:25

## 2021-01-01 RX ADMIN — ACETAMINOPHEN 650 MG: 325 TABLET ORAL at 22:53

## 2021-01-01 RX ADMIN — PIPERACILLIN SODIUM AND TAZOBACTAM SODIUM 3.38 G: 3; .375 INJECTION, POWDER, LYOPHILIZED, FOR SOLUTION INTRAVENOUS at 05:35

## 2021-01-01 RX ADMIN — ALBUTEROL SULFATE 10 MG: 2.5 SOLUTION RESPIRATORY (INHALATION) at 00:35

## 2021-01-01 RX ADMIN — FENTANYL CITRATE 50 MCG: 50 INJECTION INTRAMUSCULAR; INTRAVENOUS at 19:09

## 2021-01-01 RX ADMIN — BRIMONIDINE TARTRATE 1 DROP: 2 SOLUTION OPHTHALMIC at 09:14

## 2021-01-01 RX ADMIN — ASPIRIN 81 MG: 81 TABLET ORAL at 08:19

## 2021-01-01 RX ADMIN — LATANOPROST 1 DROP: 50 SOLUTION OPHTHALMIC at 22:27

## 2021-01-01 RX ADMIN — HYDROCORTISONE SODIUM SUCCINATE 50 MG: 100 INJECTION, POWDER, FOR SOLUTION INTRAMUSCULAR; INTRAVENOUS at 05:08

## 2021-01-01 RX ADMIN — Medication 10 ML: at 08:40

## 2021-01-01 RX ADMIN — SODIUM CHLORIDE 120 MCG: 900 INJECTION, SOLUTION INTRAVENOUS at 10:35

## 2021-01-01 RX ADMIN — METOPROLOL SUCCINATE 100 MG: 100 TABLET, EXTENDED RELEASE ORAL at 08:14

## 2021-01-01 RX ADMIN — SODIUM CHLORIDE 120 MCG: 900 INJECTION, SOLUTION INTRAVENOUS at 10:09

## 2021-01-01 RX ADMIN — Medication 10 ML: at 15:48

## 2021-01-01 RX ADMIN — FUROSEMIDE 40 MG: 10 INJECTION, SOLUTION INTRAMUSCULAR; INTRAVENOUS at 12:55

## 2021-01-01 RX ADMIN — Medication 5 ML: at 02:46

## 2021-01-01 RX ADMIN — FUROSEMIDE 40 MG: 10 INJECTION, SOLUTION INTRAMUSCULAR; INTRAVENOUS at 09:02

## 2021-01-01 RX ADMIN — PROPOFOL 20 MG: 10 INJECTION, EMULSION INTRAVENOUS at 09:55

## 2021-01-01 RX ADMIN — ALBUTEROL SULFATE 2.5 MG: 2.5 SOLUTION RESPIRATORY (INHALATION) at 07:25

## 2021-01-01 RX ADMIN — SODIUM CHLORIDE 1000 ML: 900 INJECTION, SOLUTION INTRAVENOUS at 06:42

## 2021-01-01 RX ADMIN — BUDESONIDE 500 MCG: 0.5 INHALANT RESPIRATORY (INHALATION) at 19:45

## 2021-01-01 RX ADMIN — GUAIFENESIN 600 MG: 600 TABLET ORAL at 09:43

## 2021-01-01 RX ADMIN — HALOPERIDOL LACTATE 2 MG: 5 INJECTION, SOLUTION INTRAMUSCULAR at 04:16

## 2021-01-01 RX ADMIN — Medication 5 ML: at 16:13

## 2021-01-01 RX ADMIN — BUDESONIDE 500 MCG: 0.5 INHALANT RESPIRATORY (INHALATION) at 07:40

## 2021-01-01 RX ADMIN — ALBUTEROL SULFATE 2.5 MG: 2.5 SOLUTION RESPIRATORY (INHALATION) at 08:25

## 2021-01-01 RX ADMIN — FUROSEMIDE 40 MG: 40 TABLET ORAL at 09:43

## 2021-01-01 RX ADMIN — Medication 10 ML: at 22:03

## 2021-01-01 RX ADMIN — SODIUM CHLORIDE 25 ML/HR: 900 INJECTION, SOLUTION INTRAVENOUS at 08:45

## 2021-01-01 RX ADMIN — FERROUS SULFATE TAB 325 MG (65 MG ELEMENTAL FE) 325 MG: 325 (65 FE) TAB at 16:35

## 2021-01-01 RX ADMIN — ALBUTEROL SULFATE 2.5 MG: 2.5 SOLUTION RESPIRATORY (INHALATION) at 11:05

## 2021-01-01 RX ADMIN — Medication 5 ML: at 05:42

## 2021-01-01 RX ADMIN — BUDESONIDE 500 MCG: 0.5 INHALANT RESPIRATORY (INHALATION) at 07:26

## 2021-01-01 RX ADMIN — SODIUM CHLORIDE 40 MG: 9 INJECTION, SOLUTION INTRAMUSCULAR; INTRAVENOUS; SUBCUTANEOUS at 01:13

## 2021-01-01 RX ADMIN — METOPROLOL SUCCINATE 100 MG: 100 TABLET, EXTENDED RELEASE ORAL at 08:46

## 2021-01-01 RX ADMIN — Medication 5 ML: at 21:16

## 2021-01-01 RX ADMIN — FERRIC CARBOXYMALTOSE INJECTION 750 MG: 50 INJECTION, SOLUTION INTRAVENOUS at 09:29

## 2021-01-01 RX ADMIN — Medication 300 UNITS: at 20:06

## 2021-01-01 RX ADMIN — IPRATROPIUM BROMIDE AND ALBUTEROL SULFATE 3 ML: .5; 3 SOLUTION RESPIRATORY (INHALATION) at 07:12

## 2021-01-01 RX ADMIN — FUROSEMIDE 40 MG: 10 INJECTION, SOLUTION INTRAMUSCULAR; INTRAVENOUS at 21:14

## 2021-01-01 RX ADMIN — GUAIFENESIN 600 MG: 600 TABLET ORAL at 17:27

## 2021-01-01 RX ADMIN — IPRATROPIUM BROMIDE AND ALBUTEROL SULFATE 3 ML: 2.5; .5 SOLUTION RESPIRATORY (INHALATION) at 02:38

## 2021-01-01 RX ADMIN — HYDROMORPHONE HYDROCHLORIDE 1 MG: 1 INJECTION, SOLUTION INTRAMUSCULAR; INTRAVENOUS; SUBCUTANEOUS at 12:39

## 2021-01-01 RX ADMIN — FAMOTIDINE 40 MG: 20 TABLET, FILM COATED ORAL at 08:31

## 2021-01-01 RX ADMIN — BRIMONIDINE TARTRATE 1 DROP: 2 SOLUTION OPHTHALMIC at 08:21

## 2021-01-01 RX ADMIN — FAMOTIDINE 40 MG: 20 TABLET, FILM COATED ORAL at 09:43

## 2021-01-01 RX ADMIN — ROSUVASTATIN CALCIUM 40 MG: 20 TABLET, COATED ORAL at 22:17

## 2021-01-01 RX ADMIN — LATANOPROST 1 DROP: 50 SOLUTION OPHTHALMIC at 17:27

## 2021-01-01 RX ADMIN — FUROSEMIDE 80 MG: 10 INJECTION, SOLUTION INTRAMUSCULAR; INTRAVENOUS at 10:49

## 2021-01-01 RX ADMIN — ALBUTEROL SULFATE 2.5 MG: 2.5 SOLUTION RESPIRATORY (INHALATION) at 15:11

## 2021-01-01 RX ADMIN — ALBUTEROL SULFATE 2.5 MG: 2.5 SOLUTION RESPIRATORY (INHALATION) at 15:42

## 2021-01-01 RX ADMIN — GUAIFENESIN 600 MG: 600 TABLET ORAL at 08:53

## 2021-01-01 RX ADMIN — CEFAZOLIN 2 G: 10 INJECTION, POWDER, FOR SOLUTION INTRAVENOUS at 18:27

## 2021-01-01 RX ADMIN — LATANOPROST 1 DROP: 50 SOLUTION OPHTHALMIC at 17:16

## 2021-01-01 RX ADMIN — METHYLPREDNISOLONE SODIUM SUCCINATE 40 MG: 40 INJECTION, POWDER, FOR SOLUTION INTRAMUSCULAR; INTRAVENOUS at 22:18

## 2021-01-01 RX ADMIN — SODIUM CHLORIDE 2.5 MG/HR: 900 INJECTION, SOLUTION INTRAVENOUS at 13:23

## 2021-01-01 RX ADMIN — BUDESONIDE 500 MCG: 0.5 INHALANT RESPIRATORY (INHALATION) at 20:12

## 2021-01-01 RX ADMIN — ROSUVASTATIN 40 MG: 20 TABLET, FILM COATED ORAL at 22:02

## 2021-01-01 RX ADMIN — FAMOTIDINE 40 MG: 20 TABLET, FILM COATED ORAL at 08:45

## 2021-01-01 RX ADMIN — LISINOPRIL 10 MG: 5 TABLET ORAL at 10:17

## 2021-01-01 RX ADMIN — SODIUM CHLORIDE, PRESERVATIVE FREE 10 ML: 5 INJECTION INTRAVENOUS at 10:14

## 2021-01-01 RX ADMIN — LATANOPROST 1 DROP: 50 SOLUTION OPHTHALMIC at 22:12

## 2021-01-01 RX ADMIN — IPRATROPIUM BROMIDE AND ALBUTEROL SULFATE 3 ML: .5; 3 SOLUTION RESPIRATORY (INHALATION) at 08:01

## 2021-01-01 RX ADMIN — Medication 10 ML: at 21:18

## 2021-01-01 RX ADMIN — Medication 10 ML: at 20:44

## 2021-03-01 NOTE — PROGRESS NOTES
Problem: Pain:  Goal: Pain level will decrease  Description: Pain level will decrease  3/1/2021 1515 by Zarina Chavez RN  Outcome: Completed  3/1/2021 0402 by Abigail Laurent RN  Outcome: Ongoing  Goal: Control of acute pain  Description: Control of acute pain  3/1/2021 1515 by Zarina Chavez RN  Outcome: Completed  3/1/2021 0402 by Abigail Laurent RN  Outcome: Ongoing  Goal: Control of chronic pain  Description: Control of chronic pain  3/1/2021 1515 by Zarina Chavez RN  Outcome: Completed  3/1/2021 0402 by Abigail Laurent RN  Outcome: Ongoing     Problem: Skin Integrity:  Goal: Will show no infection signs and symptoms  Description: Will show no infection signs and symptoms  3/1/2021 1515 by Zarina Chavez RN  Outcome: Completed  3/1/2021 0402 by Abigail Laurent RN  Outcome: Ongoing  Goal: Absence of new skin breakdown  Description: Absence of new skin breakdown  3/1/2021 1515 by Zarina Chavez RN  Outcome: Completed  3/1/2021 0402 by Abigail Laurent RN  Outcome: Ongoing     Problem: Falls - Risk of:  Goal: Will remain free from falls  Description: Will remain free from falls  3/1/2021 1515 by Zarina Chavez RN  Outcome: Completed  3/1/2021 0402 by Abigail Laurent RN  Outcome: Ongoing  Goal: Absence of physical injury  Description: Absence of physical injury  3/1/2021 1515 by Zarina Chavez RN  Outcome: Completed  3/1/2021 0402 by Abigail Laurent RN  Outcome: Ongoing Verbal bedside report received from [place], [facility]. Assumed care of patient. Heparin and Cardizem IV drip verified at bedside with outgoing RN.

## 2021-03-22 NOTE — PROGRESS NOTES
Arrived to the infusion center. Injectafer given without signs of adverse reaction. No new issues or concerns voiced during the visit. Aware of next chemo appointment on 3/29 at 0800. Discharged in satisfactory condition.

## 2021-03-25 PROBLEM — C61 PROSTATE CANCER (HCC): Status: ACTIVE | Noted: 2021-01-01

## 2021-03-29 NOTE — PROGRESS NOTES
Arrived to the [facility] in wheelchair wearing 3L NC O2. Injectafer completed. Patient tolerated well. Any issues or concerns during appointment: none. Patient to follow up with MD regarding any future appts. Discharged via wheelchair to home.

## 2021-03-29 NOTE — PROGRESS NOTES
Problem: Anemia Care Plan (Adult and Pediatric) Goal: *Labs within defined limits Outcome: Progressing Towards Goal 
Goal: *Tolerates increased activity Outcome: Progressing Towards Goal 
  
Problem: Knowledge Deficit Goal: *Verbalizes understanding of procedures and medications Outcome: Progressing Towards Goal 
  
Problem: Patient Education:  Go to Education Activity Goal: Patient/Family Education Outcome: Progressing Towards Goal

## 2021-05-17 PROBLEM — J44.1 COPD EXACERBATION (HCC): Status: ACTIVE | Noted: 2021-01-01

## 2021-05-17 PROBLEM — J96.21 ACUTE ON CHRONIC RESPIRATORY FAILURE WITH HYPOXIA (HCC): Status: ACTIVE | Noted: 2018-07-25

## 2021-05-17 NOTE — PROGRESS NOTES
TRANSFER - IN REPORT: 
 
Verbal report received from Kushal Roach, [facility]  on [employer].  being received from ED for routine progression of care Report consisted of patients Situation, Background, Assessment and  
Recommendations(SBAR). Information from the following report(s) SBAR, Kardex and Recent Results was reviewed with the receiving nurse. Opportunity for questions and clarification was provided. Assessment completed upon patients arrival to unit and care assumed.

## 2021-05-17 NOTE — PROGRESS NOTES
Reports having poor appetite. Have lost 11 pounds in two weeks. Nutrition consult placed. To monitor.

## 2021-05-17 NOTE — PROGRESS NOTES
Had been up earlier. Sitting on side of bed and c/o feeling faint when another staff walked in the room. Appears to be short of breath. O2 98% at 2 liters via NC. Reports that he uses 3 liters O2 at home all the time. /94, . Denies chest pain. 4825 states \"feeling better now\"

## 2021-05-17 NOTE — H&P
History and Physical 
 
Patient: Yaakov Lacy MRN: [id number]  SSN: [id number] YOB: 1940  Age: 68 y.o. Sex: male Subjective:  
  
Yaakov Lacy is a 68 y.o. male who came to ER due to shortness of breath tonight. Patient has medical problems as mentioned below. Patient has history of COPD and does not smoke currently. He came to ER with shortness of breath and was found to be hypoxic on presentation with wheezing. He was given BIPAP for an hour and improved. Now he is on oxygen cannula. Hospitalist service is requested to admit the patient. No fever. No shaking. No chills. No chest pain. No hemoptysis. Patient reports some coughs. Patient is being admitted for COPD exacerbation. Past Medical History:  
Diagnosis Date  A-fib ([address] 5/2/2014  AAA (abdominal aortic aneurysm) without rupture ([address] 5/2/2014  
 3.2 on US 2/14 [facility]  Arthritis  Cancer ([address]   
 hx prostate cancer  COPD (chronic obstructive pulmonary disease) ([address] 5/2/2014  Elevated prostate specific antigen (PSA)  Glaucoma 5/2/2014  Heart disease  Heart failure ([address]  Hyperlipemia 5/2/2014  Hypertension  Hypertrophy of prostate with urinary obstruction and other lower urinary tract symptoms (LUTS)  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  OA (osteoarthritis) 5/2/2014  Peptic ulcer disease 6/1/2017  Poor historian  Prostate cancer ([address]  Pulmonary embolus ([address] 6/1/2017  Supplemental oxygen dependent 5/2/2014  Warfarin anticoagulation 5/2/2014 Past Surgical History:  
Procedure Laterality Date  COLONOSCOPY N/A 9/12/2019 COLONOSCOPY/ 26 ROOM 614 performed by Kiana Gutierrez MD at [facility] ENDOSCOPY  EGD  12/18/2019  HX HEART CATHETERIZATION    
 VASCULAR SURGERY PROCEDURE UNLIST  09/14/2019   Bilateral common femoral artery cutdown with exposure and placement of catheter in aorta for aortogram,Endovascular repair of infrarenal abdominal aortic aneurysm with bifurcated modulated device (31 x 14 x 13 main body) via the left common femoral, right iliac extender measuring 27 x 10. Family History Problem Relation Age of Onset  Cancer Mother  Heart Disease Father Social History Tobacco Use  Smoking status: Former Smoker Packs/day: 2.00 Years: 40.00 Pack years: 80.00 Quit date: 2014 Years since quittin.8  Smokeless tobacco: Never Used  Tobacco comment: still taking Chantix Substance Use Topics  Alcohol use: No  
  
Prior to Admission medications Medication Sig Start Date End Date Taking? Authorizing Provider  
albuterol-ipratropium (DUO-NEB) 2.5 mg-0.5 mg/3 ml nebu 3 mL by Nebulization route three (3) times daily for 180 days. 3/24/21 9/20/21  Tacho Silver MD  
allopurinoL (ZYLOPRIM) 300 mg tablet Take 1 Tab by mouth daily. 3/24/21   Tacho Silver MD  
rosuvastatin (Crestor) 40 mg tablet Take 1 Tab by mouth nightly. 3/24/21   Tacho Silver MD  
nitroglycerin (NITROSTAT) 0.4 mg SL tablet 1 Tab by SubLINGual route every five (5) minutes as needed for Chest Pain (call EMS if pain fails to resolve after 2 tabs. max daily dose of 3 tabs). Indications: after 15 minutes or 3 doses, if chest pain persists, contact EMS/911 3/24/21   Tacho Silver MD  
tamsulosin [facility]) 0.4 mg capsule Take 1 Cap by mouth daily. 3/24/21   Tacho Silver MD  
furosemide (Lasix) 40 mg tablet Take 0.5 Tabs by mouth daily. 3/4/21   Tiffani Trejo MD  
aspirin delayed-release 81 mg tablet Take 81 mg by mouth daily.     Provider, Historical  
albuterol (PROVENTIL HFA, VENTOLIN HFA, PROAIR HFA) 90 mcg/actuation inhaler TAKE 2 PUFFS BY MOUTH EVERY 4 HOURS AS NEEDED FOR WHEEZE 1/3/21   Provider, Historical  
latanoprost (XALATAN) 0.005 % ophthalmic solution LOCATION  BOTH EYES. INSTILL ONE DROP INTO EACH EYE AT BEDTIME 20   Provider, Historical dilTIAZem IR (CARDIZEM) 120 mg tablet Take 1 Tab by mouth daily. 1/22/21   Aileen Hardy MD  
metoprolol succinate (TOPROL-XL) 50 mg XL tablet Take 1 Tab by mouth daily. 1/22/21   Aileen Hardy MD  
famotidine (PEPCID) 40 mg tablet Take 1 Tab by mouth daily. 1/22/21   Aileen Hardy MD  
polyethylene glycol [facility]) 17 gram/dose powder Take 17 g by mouth daily. 9/24/20   Billy Henning MD  
trimethoprim-polymyxin b (POLYTRIM) ophthalmic solution Administer 1 Drop to both eyes every four (4) hours. 6/29/20   Shu Cullen MD  
albuterol (PROVENTIL VENTOLIN) 2.5 mg /3 mL (0.083 %) nebu  9/14/19   Provider, Historical  
cholecalciferol (VITAMIN D3) 2,000 unit cap capsule TAKE 1 CAPSULE BY MOUTH EVERY DAY 8/20/19   Provider, Historical  
docusate sodium (COLACE) 100 mg capsule Take 100 mg by mouth daily. Provider, Historical  
budesonide (PULMICORT) 0.5 mg/2 mL nbsp 2 mL by Nebulization route two (2) times a day. 9/15/18   Beryle Limes, DO  
lisinopril (PRINIVIL, ZESTRIL) 10 mg tablet Take 1 Tab by mouth daily. 7/29/18   Mliss Homans, NP  
OXYGEN-AIR DELIVERY SYSTEMS Use as instructed. Use as instructed. 9/15/15   Provider, Historical  
brimonidine (ALPHAGAN P) 0.1 % ophthalmic solution every eight (8) hours. Provider, Historical  
  
 
Allergies Allergen Reactions  Statins-Hmg-Coa Reductase Inhibitors Myalgia Muscle pain Review of Systems: 
 
14-point review of systems is negative except what is mentioned in the present illness section. Objective:  
 
Vitals:  
 05/16/21 2256 05/17/21 0061 05/17/21 3666 05/17/21 4231 BP: (!) 155/72 Pulse: 84 Resp: 22 Temp: 98.1 °F (36.7 °C) SpO2: 98% 100% 100% 100% Weight: 75.3 kg (166 lb) Height: 5' 9\" (1.753 m) Physical Exam: 
 
General:                    The patient is an elderly male in acute respiratory distress with exertion. He is on oxygen cannula.     
Head: Normocephalic/atraumatic. Eyes:                                   palpebral pallor, no scleral icterus. ENT:                                    External auricular and nasal exam within normal limits. Mucous membranes are moist. 
Neck:                                   Supple, non-tender, no JVD. Lungs:                       decreased to auscultation bilaterally with generalized wheezes and crackles. No respiratory accessory muscle use. Heart:                                  Regular rate and rhythm, without murmurs, rubs, or gallops. Abdomen:                  Soft, non-tender, non-distended with normoactive bowel sounds. Genitourinary:           No tenderness over the bladder or bilateral CVAs. Extremities:               Without clubbing, cyanosis, or edema. Skin:                                    Normal color, texture, and turgor. No rashes, lesions, or jaundice. Pulses:                      Radial and dorsalis pedis pulses present 2+ bilaterally. Capillary refill <2s. Neurologic:                CN II-XII grossly intact and symmetrical.  
                                            Moving all four extremities well with no focal deficits. Psychiatric:                Pleasant demeanor, appropriate affect. Alert and oriented x 3 Lab and data Recent Results (from the past 24 hour(s)) CBC WITH AUTOMATED DIFF Collection Time: 05/16/21 11:12 PM  
Result Value Ref Range WBC 6.6 4.3 - 11.1 K/uL  
 RBC 3.71 (L) 4.23 - 5.6 M/uL HGB 9.9 (L) 13.6 - 17.2 g/dL HCT 34.6 (L) 41.1 - 50.3 % MCV 93.3 79.6 - 97.8 FL  
 MCH 26.7 26.1 - 32.9 PG  
 MCHC 28.6 (L) 31.4 - 35.0 g/dL  
 RDW 17.1 (H) 11.9 - 14.6 % PLATELET 838 931 - 427 K/uL MPV 10.7 9.4 - 12.3 FL ABSOLUTE NRBC 0.00 0.0 - 0.2 K/uL  
 DF AUTOMATED NEUTROPHILS 52 43 - 78 %  LYMPHOCYTES 35 13 - 44 % MONOCYTES 9 4.0 - 12.0 % EOSINOPHILS 3 0.5 - 7.8 % BASOPHILS 1 0.0 - 2.0 % IMMATURE GRANULOCYTES 0 0.0 - 5.0 %  
 ABS. NEUTROPHILS 3.4 1.7 - 8.2 K/UL  
 ABS. LYMPHOCYTES 2.3 0.5 - 4.6 K/UL  
 ABS. MONOCYTES 0.6 0.1 - 1.3 K/UL  
 ABS. EOSINOPHILS 0.2 0.0 - 0.8 K/UL  
 ABS. BASOPHILS 0.1 0.0 - 0.2 K/UL  
 ABS. IMM. GRANS. 0.0 0.0 - 0.5 K/UL METABOLIC PANEL, COMPREHENSIVE Collection Time: 05/16/21 11:12 PM  
Result Value Ref Range Sodium 144 136 - 145 mmol/L Potassium 4.2 3.5 - 5.1 mmol/L Chloride 105 98 - 107 mmol/L  
 CO2 33 (H) 21 - 32 mmol/L Anion gap 6 (L) 7 - 16 mmol/L Glucose 115 (H) 65 - 100 mg/dL BUN 20 8 - 23 MG/DL Creatinine 1.60 (H) 0.8 - 1.5 MG/DL  
 GFR est AA 54 (L) >60 ml/min/1.73m2 GFR est non-AA 45 (L) >60 ml/min/1.73m2 Calcium 8.5 8.3 - 10.4 MG/DL Bilirubin, total 0.2 0.2 - 1.1 MG/DL  
 ALT (SGPT) 12 12 - 65 U/L  
 AST (SGOT) 13 (L) 15 - 37 U/L Alk. phosphatase 91 50 - 136 U/L Protein, total 6.9 6.3 - 8.2 g/dL Albumin 3.2 3.2 - 4.6 g/dL Globulin 3.7 (H) 2.3 - 3.5 g/dL A-G Ratio 0.9 (L) 1.2 - 3.5 MAGNESIUM Collection Time: 05/16/21 11:12 PM  
Result Value Ref Range Magnesium 4.6 (H) 1.8 - 2.4 mg/dL PROCALCITONIN Collection Time: 05/16/21 11:12 PM  
Result Value Ref Range Procalcitonin 0.08 ng/mL LACTIC ACID Collection Time: 05/16/21 11:49 PM  
Result Value Ref Range Lactic acid 1.2 0.4 - 2.0 MMOL/L  
BLOOD GAS, ARTERIAL POC Collection Time: 05/17/21 12:22 AM  
Result Value Ref Range Device: NASAL CANNULA pH (POC) 7.29 (L) 7.35 - 7.45    
 pCO2 (POC) 76.3 (HH) 35 - 45 MMHG  
 pO2 (POC) 92 75 - 100 MMHG  
 HCO3 (POC) 36.4 (H) 22 - 26 MMOL/L  
 sO2 (POC) 95.5 95 - 98 % Base excess (POC) 7.1 mmol/L Allens test (POC) NOT APPLICABLE Site RIGHT BRACHIAL Specimen type (POC) ARTERIAL Performed by Erick Critical value read back [email or web address]   
 FIO2, L/min 4 XR chest 5- IMPRESSION No evidence of an acute intrathoracic process. I have reviewed chest x-ray myself. Assessment:  
 
Hospital Problems  Date Reviewed: 5/12/2021 Codes Class Noted POA * (Principal) COPD exacerbation ([facility] ICD-10-CM: J44.1 ICD-9-CM: 491.21  5/17/2021 Unknown Heart failure with reduced ejection fraction (HCC) (Chronic) ICD-10-CM: I50.20 ICD-9-CM: 428.20  9/9/2019 Yes CKD (chronic kidney disease), stage III (HCC) (Chronic) ICD-10-CM: N18.30 ICD-9-CM: 585.3  9/9/2019 Yes Acute on chronic respiratory failure with hypoxia [facility]) ICD-10-CM: J96.21 
ICD-9-CM: 518.84, 799.02  7/25/2018 Unknown History of pulmonary embolism ICD-10-CM: Z86.711 ICD-9-CM: V12.55  6/22/2017 Yes Hypertension ICD-10-CM: I10 
ICD-9-CM: 401.9  6/1/2017 Yes Hyperlipemia (Chronic) ICD-10-CM: H26.2 ICD-9-CM: 272.4  5/2/2014 Yes Paroxysmal atrial fibrillation (HCC) (Chronic) ICD-10-CM: I48.0 ICD-9-CM: 427.31  5/2/2014 Yes Plan: COPD exacerbation with acute hypoxic respiratory failure Admit to medical floor. IV access Solumedrol Nebulizer Oxygen supplementation Monitor History of heart failure No feature of heart failure decompensation now. Continue home medications. CKD Monitor renal function and intake and output. Avoid nephrotoxic agents. Hypertension Monitor blood pressure and manage accordingly. Continue home medications. Dyslipidemia Continue home medications. Paroxysmal AF Not on [facility] Continue home medications. Rate is controlled. Patient requires hospital stay as an in-patient and anticipated stay is more than 2 midnights due to the serious nature of the illness. I have discussed with patient regarding advance directive. Patient would like to have a full-code status. I have discussed the plan of care with patient. Patient has no pain now. Will monitor.  Further treatments will depend on initial responses and findings. DVT prophylaxis : SCD Signed By: Agus Castillo MD   
 May 17, 2021

## 2021-05-17 NOTE — PROGRESS NOTES
Care Management Interventions PCP Verified by CM: Yes Physical Therapy Consult: No 
Occupational Therapy Consult: No 
Speech Therapy Consult: No 
Current Support Network: Lives Alone Confirm Follow Up Transport: Other (see comment) Freedom of Choice List was Provided with Basic Dialogue that Supports the Patient's Individualized Plan of Care/Goals, Treatment Preferences and Shares the Quality Data Associated with the Providers?: Yes [employer] Information Provided?: No 
Discharge Location Discharge Placement: Unable to determine at this time Patient tells CM that he's not thinking clearly and feels it has to do with his 02. He wears home 02 continuous ([employer] provider). He said that he had his home 02 on 4 liters but was told he should have it on 2 liters. Patient has a [facility] aide to assist with ADLs and take to medical appointments. Patient ambulates independently. DME: nebulizer, home 02 CM following for discharge needs.

## 2021-05-17 NOTE — ED PROVIDER NOTES
Presents with complaint of shortness of breath. Patient feels a little bit better after and then with EMS. He also received Solu-Medrol and mag. Patient has a history of COPD. Patient denies any chest pain. He states he has had no cough no rash. He got worse tonight. He wears 4 L 24/7. The history is provided by the patient. Shortness of Breath This is a new problem. The problem occurs continuously. The current episode started 1 to 2 hours ago. The problem has been gradually improving. Associated symptoms include wheezing. Pertinent negatives include no fever, no cough, no sputum production, no chest pain, no vomiting, no abdominal pain, no rash and no leg swelling. He has tried nothing for the symptoms. Associated medical issues include COPD. Past Medical History:  
Diagnosis Date  A-fib ([address] 5/2/2014  AAA (abdominal aortic aneurysm) without rupture ([address] 5/2/2014  
 3.2 on US 2/14 [facility]  Arthritis  Cancer ([address]   
 hx prostate cancer  COPD (chronic obstructive pulmonary disease) ([address] 5/2/2014  Elevated prostate specific antigen (PSA)  Glaucoma 5/2/2014  Heart disease  Heart failure ([address]  Hyperlipemia 5/2/2014  Hypertension  Hypertrophy of prostate with urinary obstruction and other lower urinary tract symptoms (LUTS)  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  OA (osteoarthritis) 5/2/2014  Peptic ulcer disease 6/1/2017  Poor historian  Prostate cancer ([address]  Pulmonary embolus ([address] 6/1/2017  Supplemental oxygen dependent 5/2/2014  Warfarin anticoagulation 5/2/2014 Past Surgical History:  
Procedure Laterality Date  COLONOSCOPY N/A 9/12/2019 COLONOSCOPY/ 26 ROOM 614 performed by Tyesha Aranda MD at [facility] ENDOSCOPY  EGD  12/18/2019  HX HEART CATHETERIZATION    
 VASCULAR SURGERY PROCEDURE UNLIST  09/14/2019   Bilateral common femoral artery cutdown with exposure and placement of catheter in aorta for aortogram,Endovascular repair of infrarenal abdominal aortic aneurysm with bifurcated modulated device (31 x 14 x 13 main body) via the left common femoral, right iliac extender measuring 27 x 10. Family History:  
Problem Relation Age of Onset  Cancer Mother  Heart Disease Father Social History Socioeconomic History  Marital status: SINGLE Spouse name: Not on file  Number of children: Not on file  Years of education: Not on file  Highest education level: Not on file Occupational History  Not on file Social Needs  Financial resource strain: Not on file  Food insecurity Worry: Not on file Inability: Not on file  Transportation needs Medical: Not on file Non-medical: Not on file Tobacco Use  Smoking status: Former Smoker Packs/day: 2.00 Years: 40.00 Pack years: 80.00 Quit date: 2014 Years since quittin.8  Smokeless tobacco: Never Used  Tobacco comment: still taking Chantix Substance and Sexual Activity  Alcohol use: No  
 Drug use: No  
 Sexual activity: Yes  
  Partners: Female Birth control/protection: None Lifestyle  Physical activity Days per week: Not on file Minutes per session: Not on file  Stress: Not on file Relationships  Social connections Talks on phone: Not on file Gets together: Not on file Attends Sabianism service: Not on file Active member of club or organization: Not on file Attends meetings of clubs or organizations: Not on file Relationship status: Not on file  Intimate partner violence Fear of current or ex partner: Not on file Emotionally abused: Not on file Physically abused: Not on file Forced sexual activity: Not on file Other Topics Concern  Not on file Social History Narrative  Not on file ALLERGIES: Statins-hmg-coa reductase inhibitors Review of Systems Constitutional: Negative for chills and fever. Respiratory: Positive for shortness of breath and wheezing. Negative for cough and sputum production. Cardiovascular: Negative for chest pain and leg swelling. Gastrointestinal: Negative for abdominal pain and vomiting. Skin: Negative for rash. All other systems reviewed and are negative. Vitals:  
 05/16/21 2256 BP: (!) 155/72 Pulse: 84 Resp: 22 Temp: 98.1 °F (36.7 °C) SpO2: 98% Weight: 75.3 kg (166 lb) Height: 5' 9\" (1.753 m) Physical Exam 
Vitals signs and nursing note reviewed. Constitutional:   
   General: He is in acute distress. Appearance: He is well-developed. He is ill-appearing. He is not diaphoretic. HENT:  
   Head: Normocephalic and atraumatic. Eyes:  
   General:     
   Right eye: No discharge. Left eye: No discharge. Conjunctiva/sclera: Conjunctivae normal.  
Neck: Musculoskeletal: Normal range of motion and neck supple. Cardiovascular:  
   Rate and Rhythm: Normal rate and regular rhythm. Pulmonary:  
   Effort: Tachypnea and respiratory distress present. Breath sounds: Decreased breath sounds present. No wheezing, rhonchi or rales. Comments: Patient able to speak 3-4 words at a time. Tachypneic poor air movement Abdominal:  
   General: There is no distension. Palpations: Abdomen is soft. Tenderness: There is no abdominal tenderness. Musculoskeletal: Normal range of motion. Skin: 
   General: Skin is warm and dry. Capillary Refill: Capillary refill takes less than 2 seconds. Neurological:  
   General: No focal deficit present. Mental Status: He is alert and oriented to person, place, and time. Cranial Nerves: No cranial nerve deficit. Psychiatric:     
   Mood and Affect: Mood normal.     
   Behavior: Behavior normal.  
 
  
 
MDM Number of Diagnoses or Management Options Acute exacerbation of chronic obstructive pulmonary disease (COPD) ([facility] Acute on chronic respiratory failure with hypercapnia ([facility] Diagnosis management comments: Patient's pH was low and his PCO2 was high. He was placed on BiPAP for over an hour. His lung sounds improved i.e. he is moving more air but still wheezing. He was given another treatment and taken off of BiPAP. Checked on him several times. He is satting at 98% on 2 L and looks improved. Given morphine for air hunger and muscle cramps. Added to the hospitalist. 
 
  
Amount and/or Complexity of Data Reviewed Clinical lab tests: ordered and reviewed Tests in the radiology section of CPT®: ordered and reviewed Review and summarize past medical records: yes Discuss the patient with other providers: yes Independent visualization of images, tracings, or specimens: yes (=============================================== 
ED EKG Interpretation EKG was interpreted in the absence of a cardiologist. 
 
EKG rhythm: normal sinus rhythm Rate: 82 ST Segments: Normal ST segments - NO STEMI Diana Fallon MD; 5/17/2021 @2:38 AM================ 
) Risk of Complications, Morbidity, and/or Mortality Presenting problems: high Diagnostic procedures: minimal 
Management options: high Patient Progress Patient progress: improved Critical Care Performed by: Mihir Santos MD 
Authorized by: Mihir Santso MD  
 
Critical care provider statement:  
  Critical care time (minutes):  40 Critical care time was exclusive of:  Separately billable procedures and treating other patients Critical care was necessary to treat or prevent imminent or life-threatening deterioration of the following conditions:  Respiratory failure   Critical care was time spent personally by me on the following activities:  Ordering and performing treatments and interventions, ordering and review of laboratory studies, ordering and review of radiographic studies, re-evaluation of patient's condition, review of old charts, evaluation of patient's response to treatment and examination of patient   I assumed direction of critical care for this patient from another provider in my specialty: no

## 2021-05-17 NOTE — PROGRESS NOTES
Patient resting in bed and will prepare bedside shift report for oncoming nurse. Call light in reach

## 2021-05-17 NOTE — PROGRESS NOTES
Remains on remote tele with NSR 74 confirmed per monitor tech. Resting quietly at present. NAD noted. Safety maintained through out the shift. To report off to on coming nurse.

## 2021-05-17 NOTE — PROGRESS NOTES
Admission and assessment completed with patient in room 826. Patient is alert and oriented x 3. Patient resting in bed with no pain or distress noted. 02 on at 2 liters via  NC and patient has some wheezing to right side of lungs with some diminished sounds to other lobes. Heart is regular with S1 and S2 sounds. Patient LBM yesterday and patient states, \"I am not going enough when I go to the bathroom and I am taking miralax to help but it is not helping enough\". Abdomen is intact, soft and non-tender. Patient ambulates and patient had leg cramps in the ED. SCD's placed on patient. This nurse instructed patient to use call light when needing assistance with call light in reach. Will continue to assess

## 2021-05-17 NOTE — PROGRESS NOTES
[facility] Progress Note Name:  Prosper Ding. Age:80 y.o. Sex:male :  1940 MRN:  [id number] Admit Date:  2021 Reason for Admission: COPD exacerbation ([address] [J44.1] Hospital Course/Interval history:  
 
Patient is a 67 y/o M with PMH of COPD (2-4L home O2), HLD, HTN, PUD, CKD, HFrEF (30-35%), AAA s/p endovascular repair (), and PAF who presented to the ED with CC SOB. He denies chest pain, fever, hemoptysis, chills. He endorses unintended weight loss. He is admitted to the medical floor for COPD exacerbation. Subjective (21): Patient alert, pleasant, conversational, sitting on side of bed. Some answers are delayed but appropriate. He endorses unintended weight loss. He denies pain or difficulty breathing. Review of Systems: 14 point review of systems is otherwise negative with the exception of the elements mentioned above. Assessment & Plan COPD exacerbation with acute hypoxic respiratory failure  
-CXR no acute intrathoracic process -ABG in ED with partially compensated resp acidosis, placed on BiPap 
-Currently on 2L O2 (home O2 2-4L) -Continuous pulse oximetry 
-Continue IV Solumedrol, taper to Q12H 
-Aggressive pulmonary toilet 
-prn Duonebs, scheduled Pulmicort  
  
HFrEF  
-Echo (2018) LVEF 30-35%, no RWA 
-Continue home ACE, BB, statin, Lasix 
-No symptoms of acute decompensation at this time 
  
CKD, stable 
-Baseline appears Cr. 1.6 
-Monitor renal function and intake and output 
-Avoid nephrotoxic agents Hypertension 
-Monitor blood pressure and manage accordingly 
-Continue home medications 
  
Dyslipidemia 
-Continue home statin  
  
Paroxysmal AF  
-Not on [address] 
-Continue home Cardizem  
-Telemetry 
  
Unintended Weight Loss 
-Nutrition Consult 
-PT/OT Consult Diet:  DIET CARDIAC 
DVT PPx: SCD's Code status: Full Code Disposition/Expected LOS: >2 midnights pending clinical course Objective:  
 
Patient Vitals for the past 24 hrs: 
 Temp Pulse Resp BP SpO2  
05/17/21 1133 98.2 °F (36.8 °C) (!) 109 24 (!) 149/90 95 % 05/17/21 0926  (!) 114  (!) 184/94   
05/17/21 0747 97.6 °F (36.4 °C) 91 22 (!) 163/77 92 % 05/17/21 0500 97.6 °F (36.4 °C) 83 24 (!) 162/87 96 % 05/17/21 0400  82  (!) 181/81 99 % 05/17/21 0344  81  (!) 156/76 95 % 05/17/21 0329  82 25 (!) 158/80 98 % 05/17/21 0315  85 25 (!) 159/74 98 % 05/17/21 0245  80  (!) 171/82 100 % 05/17/21 0238     100 % 05/17/21 0229  83  (!) 169/79 99 % 05/17/21 0215  77  (!) 165/70 98 % 05/17/21 0159  72  137/65 99 % 05/17/21 0145  77  (!) 144/72 100 % 05/17/21 0129  97  (!) 196/99 100 % 05/17/21 0115  71  133/69 99 % 05/17/21 0100  76  138/73 99 % 05/17/21 0044  71 20 (!) 150/65 99 % 05/17/21 0035     100 % 05/17/21 0032     100 % 05/17/21 0029  81 (!) 51 (!) 178/62 100 % 05/17/21 0015  81  (!) 164/75 100 % 05/16/21 2256 98.1 °F (36.7 °C) 84 22 (!) 155/72 98 % Oxygen Therapy O2 Sat (%): 95 % (05/17/21 1133) Pulse via Oximetry: 82 beats per minute (05/17/21 0400) O2 Device: Nasal cannula (05/17/21 0450) O2 Flow Rate (L/min): 2 l/min (05/17/21 0450) FIO2 (%): 30 % (05/17/21 0035) Body mass index is 25.12 kg/m². Physical Exam:  
General:  Ill-appearing, pleasant, no acute distress Lungs:  Diminished to auscultation in all lobes, no wheezing CV:  Regular rate and rhythm with normal S1 and S2 Abdomen:  Soft, nontender, nondistended, normoactive bowel sounds Extremities:  No cyanosis clubbing or edema Neuro:   No focal deficits, A&O x3 but delayed in some responses Psych:  Normal affect Data Review: 
I have reviewed all labs, meds, and studies from the last 24 hours: 
 
Labs: 
 
Recent Results (from the past 24 hour(s)) EKG, 12 LEAD, INITIAL Collection Time: 05/16/21 10:53 PM  
Result Value Ref Range  Ventricular Rate 87 BPM  
 Atrial Rate 87 BPM  
 P-R Interval 176 ms QRS Duration 94 ms Q-T Interval 384 ms QTC Calculation (Bezet) 462 ms Calculated P Axis 80 degrees Calculated R Axis 63 degrees Calculated T Axis 73 degrees Diagnosis Normal sinus rhythm Normal ECG When compared with ECG of 21-DEC-2020 07:35, Sinus rhythm has replaced Atrial fibrillation Non-specific change in ST segment in Inferior leads Nonspecific T wave abnormality no longer evident in Inferior leads Nonspecific T wave abnormality no longer evident in Lateral leads Confirmed by Arjun Mgchee ([id number]) on 5/17/2021 7:09:18 AM 
  
CBC WITH AUTOMATED DIFF Collection Time: 05/16/21 11:12 PM  
Result Value Ref Range WBC 6.6 4.3 - 11.1 K/uL  
 RBC 3.71 (L) 4.23 - 5.6 M/uL HGB 9.9 (L) 13.6 - 17.2 g/dL HCT 34.6 (L) 41.1 - 50.3 % MCV 93.3 79.6 - 97.8 FL  
 MCH 26.7 26.1 - 32.9 PG  
 MCHC 28.6 (L) 31.4 - 35.0 g/dL  
 RDW 17.1 (H) 11.9 - 14.6 % PLATELET 889 694 - 500 K/uL MPV 10.7 9.4 - 12.3 FL ABSOLUTE NRBC 0.00 0.0 - 0.2 K/uL  
 DF AUTOMATED NEUTROPHILS 52 43 - 78 % LYMPHOCYTES 35 13 - 44 % MONOCYTES 9 4.0 - 12.0 % EOSINOPHILS 3 0.5 - 7.8 % BASOPHILS 1 0.0 - 2.0 % IMMATURE GRANULOCYTES 0 0.0 - 5.0 %  
 ABS. NEUTROPHILS 3.4 1.7 - 8.2 K/UL  
 ABS. LYMPHOCYTES 2.3 0.5 - 4.6 K/UL  
 ABS. MONOCYTES 0.6 0.1 - 1.3 K/UL  
 ABS. EOSINOPHILS 0.2 0.0 - 0.8 K/UL  
 ABS. BASOPHILS 0.1 0.0 - 0.2 K/UL  
 ABS. IMM. GRANS. 0.0 0.0 - 0.5 K/UL METABOLIC PANEL, COMPREHENSIVE Collection Time: 05/16/21 11:12 PM  
Result Value Ref Range Sodium 144 136 - 145 mmol/L Potassium 4.2 3.5 - 5.1 mmol/L Chloride 105 98 - 107 mmol/L  
 CO2 33 (H) 21 - 32 mmol/L Anion gap 6 (L) 7 - 16 mmol/L Glucose 115 (H) 65 - 100 mg/dL BUN 20 8 - 23 MG/DL Creatinine 1.60 (H) 0.8 - 1.5 MG/DL  
 GFR est AA 54 (L) >60 ml/min/1.73m2 GFR est non-AA 45 (L) >60 ml/min/1.73m2 Calcium 8.5 8.3 - 10.4 MG/DL  Bilirubin, total 0.2 0.2 - 1.1 MG/DL  
 ALT (SGPT) 12 12 - 65 U/L  
 AST (SGOT) 13 (L) 15 - 37 U/L Alk. phosphatase 91 50 - 136 U/L Protein, total 6.9 6.3 - 8.2 g/dL Albumin 3.2 3.2 - 4.6 g/dL Globulin 3.7 (H) 2.3 - 3.5 g/dL A-G Ratio 0.9 (L) 1.2 - 3.5 MAGNESIUM Collection Time: 05/16/21 11:12 PM  
Result Value Ref Range Magnesium 4.6 (H) 1.8 - 2.4 mg/dL PROCALCITONIN Collection Time: 05/16/21 11:12 PM  
Result Value Ref Range Procalcitonin 0.08 ng/mL LACTIC ACID Collection Time: 05/16/21 11:49 PM  
Result Value Ref Range Lactic acid 1.2 0.4 - 2.0 MMOL/L  
BLOOD GAS, ARTERIAL POC Collection Time: 05/17/21 12:22 AM  
Result Value Ref Range Device: NASAL CANNULA pH (POC) 7.29 (L) 7.35 - 7.45    
 pCO2 (POC) 76.3 (HH) 35 - 45 MMHG  
 pO2 (POC) 92 75 - 100 MMHG  
 HCO3 (POC) 36.4 (H) 22 - 26 MMOL/L  
 sO2 (POC) 95.5 95 - 98 % Base excess (POC) 7.1 mmol/L Allens test (POC) NOT APPLICABLE Site RIGHT BRACHIAL Specimen type (POC) ARTERIAL Performed by Erick Critical value read back [email or web address]   
 FIO2, L/min 4    
LACTIC ACID Collection Time: 05/17/21  3:15 AM  
Result Value Ref Range Lactic acid 1.2 0.4 - 2.0 MMOL/L  
GLUCOSE, POC Collection Time: 05/17/21  9:33 AM  
Result Value Ref Range Glucose (POC) 160 (H) 65 - 100 mg/dL Performed by [employer] Results Procedure Component Value Units Date/Time BLOOD CULTURE [[id number]] Collected: 05/17/21 0315 Order Status: Completed Specimen: Blood Updated: 05/17/21 0447 BLOOD CULTURE [[id number]] Collected: 05/16/21 2649 Order Status: Completed Specimen: Blood Updated: 05/17/21 0309 EKG Results Procedure 720 Value Units Date/Time EKG [[id number]] Collected: 05/16/21 6974 Order Status: Completed Updated: 05/17/21 6890 Ventricular Rate 87 BPM   
  Atrial Rate 87 BPM   
  P-R Interval 176 ms QRS Duration 94 ms Q-T Interval 384 ms QTC Calculation (Bezet) 462 ms Calculated P Axis 80 degrees Calculated R Axis 63 degrees Calculated T Axis 73 degrees Diagnosis --  
   
Normal sinus rhythm Normal ECG When compared with ECG of 21-DEC-2020 07:35, Sinus rhythm has replaced Atrial fibrillation Non-specific change in ST segment in Inferior leads Nonspecific T wave abnormality no longer evident in Inferior leads Nonspecific T wave abnormality no longer evident in Lateral leads Confirmed by Kirby Burns ([id number]) on 5/17/2021 7:09:18 AM 
  
  
 
 
Other Studies: Xr Chest [facility] Result Date: 5/17/2021 EXAM: XR CHEST PORT HISTORY: Syncope. TECHNIQUE: A single frontal view of the chest was submitted. COMPARISON: 12/21/2020 FINDINGS: The cardiac silhouette and pulmonary vasculature are within normal limits. Stable mediastinal widening. There is no consolidation, pleural effusion, or pneumothorax. No significant osseous abnormalities are observed. No evidence of an acute intrathoracic process. Current Meds:  
Current Facility-Administered Medications Medication Dose Route Frequency  sodium chloride (NS) flush 5-40 mL  5-40 mL IntraVENous Q8H  
 sodium chloride (NS) flush 5-40 mL  5-40 mL IntraVENous PRN  
 acetaminophen (TYLENOL) tablet 650 mg  650 mg Oral Q6H PRN Or  
 acetaminophen (TYLENOL) suppository 650 mg  650 mg Rectal Q6H PRN  polyethylene glycol (MIRALAX) packet 17 g  17 g Oral DAILY PRN  promethazine (PHENERGAN) tablet 12.5 mg  12.5 mg Oral Q6H PRN Or  
 ondansetron (ZOFRAN) injection 4 mg  4 mg IntraVENous Q6H PRN  
 albuterol-ipratropium (DUO-NEB) 2.5 MG-0.5 MG/3 ML  3 mL Nebulization Q4H PRN  
 methylPREDNISolone (PF) (SOLU-MEDROL) injection 40 mg  40 mg IntraVENous Q8H  
 allopurinoL (ZYLOPRIM) tablet 300 mg  300 mg Oral DAILY  aspirin delayed-release tablet 81 mg  81 mg Oral DAILY  dilTIAZem IR (CARDIZEM) tablet 120 mg  120 mg Oral DAILY  furosemide (LASIX) tablet 20 mg  20 mg Oral DAILY  famotidine (PEPCID) tablet 40 mg  40 mg Oral DAILY  lisinopriL (PRINIVIL, ZESTRIL) tablet 10 mg  10 mg Oral DAILY  metoprolol succinate (TOPROL-XL) XL tablet 50 mg  50 mg Oral DAILY  rosuvastatin (CRESTOR) tablet 40 mg  40 mg Oral QHS  sodium chloride (NS) flush 5-10 mL  5-10 mL IntraVENous Q8H  
 sodium chloride (NS) flush 5-10 mL  5-10 mL IntraVENous PRN Problem List: 
Hospital Problems as of 5/17/2021 Date Reviewed: 5/12/2021 Codes Class Noted - Resolved POA * (Principal) COPD exacerbation ([facility] [address] ICD-10-CM: J44.1 ICD-9-CM: 491.21  5/17/2021 - Present Unknown Heart failure with reduced ejection fraction (HCC) (Chronic) ICD-10-CM: I50.20 ICD-9-CM: 428.20  9/9/2019 - Present Yes  
   
 CKD (chronic kidney disease), stage III (HCC) (Chronic) ICD-10-CM: N18.30 ICD-9-CM: 585.3  9/9/2019 - Present Yes Acute on chronic respiratory failure with hypoxia [facility]) ICD-10-CM: O71.90 
ICD-9-CM: 518.84, 799.02  7/25/2018 - Present Unknown History of pulmonary embolism ICD-10-CM: Z86.711 ICD-9-CM: V12.55  6/22/2017 - Present Yes Hypertension ICD-10-CM: I10 
ICD-9-CM: 401.9  6/1/2017 - Present Yes COPD (chronic obstructive pulmonary disease) (HCC) (Chronic) ICD-10-CM: J44.9 ICD-9-CM: 592  5/2/2014 - Present Hyperlipemia (Chronic) ICD-10-CM: V14.6 ICD-9-CM: 272.4  5/2/2014 - Present Yes Paroxysmal atrial fibrillation (HCC) (Chronic) ICD-10-CM: I48.0 ICD-9-CM: 427.31  5/2/2014 - Present Yes Labs, vital signs, diagnostics reviewed. Case discussed with patient and care team. 
 
Part of this note was written by using a voice dictation software and the note has been proof read but may still contain some grammatical/other typographical errors. Signed By: Kishan Pickens AGACNP- [place] [facility]  May 17, 2021  5:15 PM

## 2021-05-17 NOTE — ED TRIAGE NOTES
Pt arrives from [facility]. Per EMS pt's sat 70's upon arrival on 3 liters with Combivent neb going. PT received 125 mg Solumedrol, 2 gm of Magnesium and EKG en route. 83 HR, 99% on 4 liters, /74 and .

## 2021-05-17 NOTE — ED NOTES
TRANSFER - OUT REPORT: 
 
Verbal report given to [facility]) on [employer].  being transferred to 826(unit) for routine progression of care Report consisted of patients Situation, Background, Assessment and  
Recommendations(SBAR). Information from the following report(s) SBAR, ED Summary, Intake/Output, MAR and Recent Results was reviewed with the receiving nurse. Lines:  
Peripheral IV 05/16/21 Posterior;Right Hand (Active) Opportunity for questions and clarification was provided.    
 
Patient transported with: 
 2L o2

## 2021-05-17 NOTE — PROGRESS NOTES
Spiritual Care Initial visit from [facility]. Chaplains to follow-up, as needed.

## 2021-05-18 PROBLEM — R41.3 SHORT-TERM MEMORY LOSS: Status: ACTIVE | Noted: 2021-01-01

## 2021-05-18 NOTE — PROGRESS NOTES
Patient sitting on side of bed watching TV. No s/s of distress. Safety measures in place. Will prepare report for oncoming RN.

## 2021-05-18 NOTE — PROGRESS NOTES
Patient in bed resting quietly; no needs verbalized at this time. Safety measures place. Will continue to monitor.

## 2021-05-18 NOTE — PROGRESS NOTES
[facility] Hospital[facility] Progress Note Name:  Jillian Zepeda. Age:80 y.o. Sex:male :  1940 MRN:  [id number] Admit Date:  2021 Reason for Admission: COPD exacerbation ([facility] [address] [J44.1] Hospital Course/Interval history:  
 
Patient is a 67 y/o M with PMH of COPD (2-4L home O2), HLD, HTN, PUD, CKD, HFrEF (30-35%), AAA s/p endovascular repair (), and PAF who presented to the ED with CC SOB. He denies chest pain, fever, hemoptysis, chills. He endorses unintended weight loss. He is admitted to the medical floor for COPD exacerbation. Subjective (21): Patient alert, pleasant, conversational, sitting in chair. He denies pain or difficulty breathing. Discussed discharge plan with patient who expressed concern on his new-onset, intermittent short term memory loss (approx. 3 episodes he can recall). Repeat blood cultures pending, anticipate d/c in 1-2 days. Review of Systems: 14 point review of systems is otherwise negative with the exception of the elements mentioned above. Assessment & Plan COPD exacerbation with acute hypoxic respiratory failure  
-CXR no acute intrathoracic process -ABG in ED with partially compensated resp acidosis, placed on BiPap 
-Currently on 2L O2 (home O2 2-4L) -Continuous pulse oximetry 
-Continue IV Solumedrol, taper to Q12H 
-Aggressive pulmonary toilet 
-prn Dudeuce, scheduled Pulmicort  
  
HFrEF  
-Echo (2018) LVEF 30-35%, no RWA 
-Continue home ACE, BB, statin, Lasix 
-No symptoms of acute decompensation at this time 
  
CKD 
-Baseline approx.  Cr. 1.6-1.7 
-Monitor renal function and intake and output 
-Avoid nephrotoxic agents 
-Cr. 1.86 this am, BUN inc to 32, azotemia could be 2/2 steroid use, UOP 325mL/24 hours, vital signs remain stable with no hypotension, PO intake adequate, will monitor closely 
  
Hypertension 
-Monitor blood pressure and manage accordingly 
-Continue home medications 
  
Dyslipidemia 
-Continue home statin  
  
Paroxysmal AF  
-Not on [address] 
-Continue home Cardizem  
-Telemetry 
  
Unintended Weight Loss 
-Nutrition Consult 
-PT/OT Consult - recommend PT/OT continue outpatient Short-Term Memory Loss 
-Per patient, he has had approximately 3 episodes of lapse in short-term memory. One occurred yesterday when he forgot where he was and didn't recognize his surroundings until the nursing staff re-oriented him. Denies any physical symptoms when this occurs. Notes this lapse in memory as frustrating with no triggering features. He endorses safety in his home and an alert button readily available at all times.  
-Patient remains A&Ox3 with no focal deficits 
-Recommend outpatient follow-up with PCP for memory impairment workup Positive Blood Culture 
-Blood cx drawn on admission, 1/4 bottles with Coagulase negative Staphylococci, likely contaminant 
-Repeat blood cultures ordered, no indication to start antibiotics at this time, no SIRS, procal 0.09, no suspected source for infection 
  
Diet:  DIET CARDIAC 
DVT PPx: SCD's Code status: Full Code Disposition/Expected LOS: Anticipate D/C in 1-2 days, Repeat Blood Cx pending Objective:  
 
Patient Vitals for the past 24 hrs: 
 Temp Pulse Resp BP SpO2  
05/18/21 1133 97.8 °F (36.6 °C) 79 18 (!) 147/68 97 % 05/18/21 1025     94 % 05/18/21 0745     97 % 05/18/21 0740 97.6 °F (36.4 °C) 75 21 (!) 154/75 96 % 05/18/21 0400  62     
05/18/21 0342 97.5 °F (36.4 °C) 61 24 (!) 160/77 96 % 05/18/21 0000  (!) 54     
05/17/21 2330 97.7 °F (36.5 °C) (!) 57 22 137/69 96 % 05/17/21 2100     98 % 05/17/21 2000  66     
05/17/21 1959 97.5 °F (36.4 °C) 70 24 (!) 147/76 96 % 05/17/21 1542     95 % 05/17/21 1452 97.6 °F (36.4 °C) 62 26 (!) 148/82 94 % Oxygen Therapy O2 Sat (%): 97 % (05/18/21 1133) Pulse via Oximetry: 85 beats per minute (05/18/21 0745) O2 Device: Nasal cannula (05/18/21 1025) Skin Assessment: Clean, dry, & intact (05/18/21 9003) Skin Protection for O2 Device: No (05/18/21 1063) O2 Flow Rate (L/min): 2 l/min (05/18/21 1025) FIO2 (%): 98 % (05/18/21 1024) Body mass index is 25.12 kg/m². Physical Exam:  
General:          Ill-appearing, pleasant, no acute distress, conversational 
Lungs:             Mild expiratory wheezing, diminished in all lung fields CV:                  Regular rate and rhythm with normal S1 and S2 Abdomen:        Soft, nontender, nondistended, normoactive bowel sounds Extremities:     No cyanosis clubbing or edema Neuro:              No focal deficits, A&O x3  
Psych:             Normal affect Data Review: 
I have reviewed all labs, meds, and studies from the last 24 hours: 
 
Labs: 
 
Recent Results (from the past 24 hour(s)) CBC W/O DIFF Collection Time: 05/18/21  7:22 AM  
Result Value Ref Range WBC 10.6 4.3 - 11.1 K/uL  
 RBC 4.01 (L) 4.23 - 5.6 M/uL  
 HGB 10.7 (L) 13.6 - 17.2 g/dL HCT 35.2 (L) 41.1 - 50.3 % MCV 87.8 79.6 - 97.8 FL  
 MCH 26.7 26.1 - 32.9 PG  
 MCHC 30.4 (L) 31.4 - 35.0 g/dL  
 RDW 16.9 (H) 11.9 - 14.6 % PLATELET 589 851 - 596 K/uL MPV 11.1 9.4 - 12.3 FL ABSOLUTE NRBC 0.00 0.0 - 0.2 K/uL METABOLIC PANEL, BASIC Collection Time: 05/18/21  7:22 AM  
Result Value Ref Range Sodium 139 136 - 145 mmol/L Potassium 4.3 3.5 - 5.1 mmol/L Chloride 102 98 - 107 mmol/L  
 CO2 31 21 - 32 mmol/L Anion gap 6 (L) 7 - 16 mmol/L Glucose 141 (H) 65 - 100 mg/dL BUN 32 (H) 8 - 23 MG/DL Creatinine 1.86 (H) 0.8 - 1.5 MG/DL  
 GFR est AA 45 (L) >60 ml/min/1.73m2 GFR est non-AA 38 (L) >60 ml/min/1.73m2 Calcium 9.2 8.3 - 10.4 MG/DL PROCALCITONIN Collection Time: 05/18/21  7:22 AM  
Result Value Ref Range Procalcitonin 0.09 ng/mL All Micro Results Procedure Component Value Units Date/Time CULTURE, BLOOD [[id number]] Order Status: Sent Specimen: Blood CULTURE, BLOOD [[id number]]  Order Status: Sent Specimen: Blood BLOOD CULTURE ID PANEL [[id number]]  (Abnormal) Collected: 05/17/21 0315 Order Status: Completed Specimen: Blood Updated: 05/18/21 1150 Acc. no. from [employer] 360 [id number] Staphylococcus Detected Comment: RESULTS VERIFIED, PHONED TO AND READ BACK BY 
Vickie Prieto RN @6628 5.18.21 SC 
  
  
  mecA (Methicillin-Resistance Genes) NOT DETECTED INTERPRETATION Gram positive cocci in clusters. Identified by realtime PCR as  Coagulase negative Staphylococci Comment: A single positive culture of coagulase negative Staph is likely to be a contaminant in adult patients. Consider discontinuation of antibiotics for gram positive bloodstream infections if patient asymptomatic. THIS TEST DOES NOT REPLACE SENSITIVITY TESTING.  
  
 BLOOD CULTURE [[id number]] Collected: 05/17/21 0315 Order Status: Completed Specimen: Blood Updated: 05/18/21 1149 Special Requests: --     
  RIGHT 
HAND 
  
  GRAM STAIN    
  GRAM POS COCCI IN CLUSTERS  
     
   AEROBIC BOTTLE POSITIVE RESULTS VERIFIED, PHONED TO AND READ BACK BY Jay Anderson 1122 5.18.21 SC Culture result:    
  CULTURE IN PROGRESS,FURTHER UPDATES TO FOLLOW Refer to Blood Culture ID Panel Accession [id number] BLOOD CULTURE [[id number]] Collected: 05/16/21 2349 Order Status: Completed Specimen: Blood Updated: 05/18/21 0749 Special Requests: --     
  RIGHT Antecubital 
  
  Culture result: NO GROWTH 1 DAY EKG Results Procedure 720 Value Units Date/Time EKG [[id number]] Collected: 05/16/21 9303 Order Status: Completed Updated: 05/17/21 9369 Ventricular Rate 87 BPM   
  Atrial Rate 87 BPM   
  P-R Interval 176 ms QRS Duration 94 ms Q-T Interval 384 ms QTC Calculation (Bezet) 462 ms Calculated P Axis 80 degrees Calculated R Axis 63 degrees Calculated T Axis 73 degrees   Diagnosis --  
   
Normal sinus rhythm Normal ECG When compared with ECG of 21-DEC-2020 07:35, Sinus rhythm has replaced Atrial fibrillation Non-specific change in ST segment in Inferior leads Nonspecific T wave abnormality no longer evident in Inferior leads Nonspecific T wave abnormality no longer evident in Lateral leads Confirmed by Faith Medina ([id number]) on 5/17/2021 7:09:18 AM 
  
  
 
 
Other Studies: No results found. Current Meds:  
Current Facility-Administered Medications Medication Dose Route Frequency  sodium chloride (NS) flush 5-40 mL  5-40 mL IntraVENous Q8H  
 sodium chloride (NS) flush 5-40 mL  5-40 mL IntraVENous PRN  
 acetaminophen (TYLENOL) tablet 650 mg  650 mg Oral Q6H PRN Or  
 acetaminophen (TYLENOL) suppository 650 mg  650 mg Rectal Q6H PRN  polyethylene glycol (MIRALAX) packet 17 g  17 g Oral DAILY PRN  promethazine (PHENERGAN) tablet 12.5 mg  12.5 mg Oral Q6H PRN Or  
 ondansetron (ZOFRAN) injection 4 mg  4 mg IntraVENous Q6H PRN  
 albuterol-ipratropium (DUO-NEB) 2.5 MG-0.5 MG/3 ML  3 mL Nebulization Q4H PRN  
 allopurinoL (ZYLOPRIM) tablet 300 mg  300 mg Oral DAILY  aspirin delayed-release tablet 81 mg  81 mg Oral DAILY  dilTIAZem IR (CARDIZEM) tablet 120 mg  120 mg Oral DAILY  furosemide (LASIX) tablet 20 mg  20 mg Oral DAILY  famotidine (PEPCID) tablet 40 mg  40 mg Oral DAILY  lisinopriL (PRINIVIL, ZESTRIL) tablet 10 mg  10 mg Oral DAILY  metoprolol succinate (TOPROL-XL) XL tablet 50 mg  50 mg Oral DAILY  rosuvastatin (CRESTOR) tablet 40 mg  40 mg Oral QHS  methylPREDNISolone (PF) (SOLU-MEDROL) injection 40 mg  40 mg IntraVENous Q12H  
 budesonide (PULMICORT) 500 mcg/2 ml nebulizer suspension  500 mcg Nebulization BID RT  
 labetaloL (NORMODYNE;TRANDATE) injection 10 mg  10 mg IntraVENous Q6H PRN  
 sodium chloride (NS) flush 5-10 mL  5-10 mL IntraVENous Q8H  
 sodium chloride (NS) flush 5-10 mL  5-10 mL IntraVENous PRN Problem List: 
KAT PUCKETT FALGUNI - [place] Problems as of 5/18/2021 Date Reviewed: 5/12/2021 Codes Class Noted - Resolved POA * (Principal) COPD exacerbation ([facility] [address] ICD-10-CM: J44.1 ICD-9-CM: 491.21  5/17/2021 - Present Unknown Heart failure with reduced ejection fraction (HCC) (Chronic) ICD-10-CM: I50.20 ICD-9-CM: 428.20  9/9/2019 - Present Yes  
   
 CKD (chronic kidney disease), stage III (HCC) (Chronic) ICD-10-CM: N18.30 ICD-9-CM: 585.3  9/9/2019 - Present Yes Acute on chronic respiratory failure with hypoxia [facility]) ICD-10-CM: M29.94 
ICD-9-CM: 518.84, 799.02  7/25/2018 - Present Unknown History of pulmonary embolism ICD-10-CM: Z86.711 ICD-9-CM: V12.55  6/22/2017 - Present Yes Hypertension ICD-10-CM: I10 
ICD-9-CM: 401.9  6/1/2017 - Present Yes COPD (chronic obstructive pulmonary disease) (HCC) (Chronic) ICD-10-CM: J44.9 ICD-9-CM: 060  5/2/2014 - Present Hyperlipemia (Chronic) ICD-10-CM: R50.7 ICD-9-CM: 272.4  5/2/2014 - Present Yes Paroxysmal atrial fibrillation (HCC) (Chronic) ICD-10-CM: I48.0 ICD-9-CM: 427.31  5/2/2014 - Present Yes Labs, vital signs, diagnostic testing reviewed. Case discussed with patient and care team. 
 
Part of this note was written by using a voice dictation software and the note has been proof read but may still contain some grammatical/other typographical errors. Signed By: Shannon PITTSBC [address] [place] [facility]  May 18, 2021  5:15 PM

## 2021-05-18 NOTE — PROGRESS NOTES
ACUTE OT GOALS: 
(Developed with and agreed upon by patient and/or caregiver.) 1. Patient will bathe and dress total body with modified independence and adaptive device as needed. 2. Patient will toilet with modified independence and adaptive device as needed. 3. Patient will tolerate 30 minutes of OT treatment with self-incorporated rest breaks to increase activity tolerance for ADLs. 4. Patient will complete functional mobility for ADLs with modified independence. 5. Patient will verbalize 2 energy conservation techniques with no cues from therapist to increase safety and independence with ADLs. Timeframe: 7 visits OCCUPATIONAL THERAPY ASSESSMENT: Initial Assessment and Daily Note OT Treatment Day # 1 Zehra Sands is a 68 y.o. male PRIMARY DIAGNOSIS: COPD exacerbation ([facility] [address] COPD exacerbation ([facility] [address] [J44.1] Reason for Referral:  Weakness ICD-10: Treatment Diagnosis: Generalized Muscle Weakness (M62.81) INPATIENT: Payor: [employer] MEDICARE / Plan: [employer] [address] / Product Type: [employer] Medicare /  
ASSESSMENT:  
 
REHAB RECOMMENDATIONS:  
Recommendation to date pending progress: 
[address] [place] [address] Equipment:  To Be Determined PRIOR LEVEL OF FUNCTION: 
(Prior to Hospitalization)  INITIAL/CURRENT LEVEL OF FUNCTION: 
(Based on today's evaluation) Bathing: 
 Independent Dressing:  Independent Feeding/Grooming:  Independent Toileting:  Independent Functional Mobility: 
 Independent Bathing:  Minimal Assistance Dressing:  Minimal Assistance Feeding/Grooming: 
[address] [place] [address] Functional Mobility: 
[address] ASSESSMENT: 
Mr. Morena Martin presents with SOB, COPD exacerbation. At baseline pt lives alone, completes ADLs and ambulates with independence. [facility]TC aide assists 5 hours/5 days x week with IADLs and transportation. Pt denies falls. Utilizes 3L O2 NC. Pt with intermittent confusion and deficits in energy conservation, strength, balance, and endurance impacting mobility and ADLs. Pt practiced functional transfers with SBA, ambulation with CGA, cues for pacing and breathing technique. Pt educated on energy conservation and pacing then practiced for total body bathing/dressing. Pt required mod cueing to rest when SOB and complete bathing in sitting when becoming fatigued. Cued for figure four position to wash/dress BLEs, required Lesley. O2 sats stable throughout on 3.5 L O2 NC. Pt is functioning below baseline and would benefit from continued OT to increase safety and independence. SUBJECTIVE:  
Mr. Daris Moritz states, \"I just had a memory lapse. \" SOCIAL HISTORY/LIVING ENVIRONMENT: At baseline pt lives alone, completes ADLs and ambulates with independence. CLTC aide assists 5 hours/5 days x week with IADLs and transportation. Pt denies falls. Utilizes 3L O2 NC. Home Environment: Apartment # Steps to Enter: 0 One/Two Story Residence: One story Living Alone: Yes Support Systems: Home care staff, Family member(s) OBJECTIVE:  
 
PAIN: VITAL SIGNS: LINES/DRAINS:  
Pre Treatment: Pain Screen Pain Scale 1: Numeric (0 - 10) Pain Intensity 1: 0 Post Treatment: same Vital Signs O2 Device: Nasal cannula O2 Flow Rate (L/min): 3.5 l/min FIO2 (%): 98 % Continuous pulse ox O2 Device: Nasal cannula GROSS EVALUATION: 
BUEs Within Functional Limits Abnormal/ Functional Abnormal/ Non-Functional (see comments) Not Tested Comments: AROM [] [x] [] [] PROM [] [] [] [] Strength [] [x] [] [] Balance [] [x] [] [] Posture [] [x] [] [] Sensation [] [] [] [] Coordination [x] [] [] [] Tone [] [] [] [] Edema [x] [] [] [] Activity Tolerance [] [x] [] []   
 [] [] [] [] COGNITION/ 
PERCEPTION: Intact Impaired  
(see comments) Comments:  
Orientation [] [x] Intermittent confusion Vision [x] [] Hearing [] [x] [facility] Judgment/ Insight [] [x] Attention [] [x] Memory [] [x] Command Following [] [x] Emotional Regulation [x] []   
 [] [] ACTIVITIES OF DAILY LIVING: I Mod I S SBA CGA Min Mod Max Total NT Comments BASIC ADLs:             
Bathing/ Showering [] [] [] [] [] [x] [] [] [] [] Assist for B feet Toileting [] [] [] [] [] [] [] [] [] [x] Dressing [] [] [] [] [] [x] [] [] [] [] Assist to thread feet in pull up, set up to change gown Feeding [] [] [] [] [] [] [] [] [] [x] Grooming [] [] [] [x] [] [] [] [] [] [] Wash face in sitting Personal Device Care [] [] [] [x] [x] [] [] [] [] [] Pants, slippers Functional Mobility [] [] [] [] [x] [] [] [] [] [] I=Independent, Mod I=Modified Independent, S=Supervision, SBA=Standby Assistance, CGA=Contact Guard Assistance,  
Min=Minimal Assistance, Mod=Moderate Assistance, Max=Maximal Assistance, Total=Total Assistance, NT=Not Tested MOBILITY: I Mod I S SBA CGA Min Mod Max Total  NT x2 Comments:  
Supine to sit [] [] [] [] [] [] [] [] [] [x] [] Sit to supine [] [] [] [] [] [] [] [] [] [x] [] Sit to stand [] [] [] [x] [] [] [] [] [] [] [] Bed to chair [] [] [] [] [x] [] [] [] [] [] [] I=Independent, Mod I=Modified Independent, S=Supervision, SBA=Standby Assistance, CGA=Contact Guard Assistance,  
Min=Minimal Assistance, Mod=Moderate Assistance, Max=Maximal Assistance, Total=Total Assistance, NT=Not Tested [place] Daily Activity Inpatient Short Form How much help from another person does the patient currently need. .. Total A Lot A Little None 1. Putting on and taking off regular lower body clothing? [] 1   [] 2   [x] 3   [] 4  
2. Bathing (including washing, rinsing, drying)? [] 1   [] 2   [x] 3   [] 4  
3. Toileting, which includes using toilet, bedpan or urinal?   [] 1   [] 2   [x] 3   [] 4  
4. Putting on and taking off regular upper body clothing? [] 1   [] 2   [x] 3   [] 4  
5.   Taking care of personal grooming such as brushing teeth? [] 1   [] 2   [x] 3   [] 4  
6. Eating meals? [] 1   [] 2   [] 3   [x] 4  
© 2007, Trustees of [address] [place] [address] [address] [id number], under license to [employer]. All rights reserved Score:  Initial: 19 5/18/21 Most Recent: X (Date: -- ) Interpretation of Tool:  Represents activities that are increasingly more difficult (i.e. Bed mobility, Transfers, Gait). PLAN:  
FREQUENCY/DURATION: OT Plan of Care: 3 times/week for duration of hospital stay or until stated goals are met, whichever comes first. 
 
PROBLEM LIST:  
(Skilled intervention is medically necessary to address:) 1. Decreased ADL/Functional Activities 2. Decreased Activity Tolerance 3. Decreased AROM/PROM 4. Decreased Balance 5. Decreased Cognition 6. Decreased Gait Ability 7. Decreased Strength 8. Decreased Transfer Abilities INTERVENTIONS PLANNED:  
(Benefits and precautions of occupational therapy have been discussed with the patient.) 1. Self Care Training 2. Therapeutic Activity 3. Therapeutic Exercise/HEP 4. Neuromuscular Re-education 5. Education TREATMENT:  
 
EVALUATION: Low Complexity : (Untimed Charge) TREATMENT:  
($$ Self Care/Home Management: 38-52 mins    ) Co-Treatment PT/OT necessary due to patient's decreased overall endurance/tolerance levels, as well as need for high level skilled assistance to complete functional transfers/mobility and functional tasks Self Care (38 Minutes): Self care including Upper Body Bathing, Lower Body Bathing, Upper Body Dressing, Lower Body Dressing, Personal Device Care, Grooming and Energy conservation training to increase independence and decrease level of assistance required. TREATMENT GRID: 
N/A 
 
AFTER TREATMENT POSITION/PRECAUTIONS: 
Alarm Activated, Chair, Needs within reach and RN notified INTERDISCIPLINARY COLLABORATION: 
RN/PCT, PT/PTA and OT/HARRISON TOTAL TREATMENT DURATION: 
OT Patient Time In/Time Out Time In: 5122 Time Out: 1024 Poornima Garcia, OTR/L

## 2021-05-18 NOTE — CONSULTS
Comprehensive Nutrition Assessment Type and Reason for Visit: Initial, Consult Poor intake/appetite 5 or more days (Hospitalist) Nutrition Recommendations/Plan:  
Meals and Snacks: 
Continue current diet. Nutrition Supplement Therapy:  Medical food supplement therapy:  Initiate Ensure High Protein three times per day (this provides 160 kcal and 16 grams protein per bottle) chocolate Malnutrition Assessment: 
Malnutrition Status: Moderate malnutrition Context: Chronic illness Findings of clinical characteristics of malnutrition:  
Energy Intake:  Unable to assess(pt reports variable intake, unable to quantify baseline po ) Weight Loss:  Mild weight loss (specify amount and time period)(2.7% wt loss unclear time frame) Body Fat Loss:  1 - Mild body fat loss, Triceps Muscle Mass Loss:  1 - Mild muscle mass loss, Hand (interosseous), Temples (temporalis) Fluid Accumulation:  Unable to assess,   
 Strength:  Not performed Nutrition Assessment:  
Nutrition History: Pt reports he eats 2 meals per day at baseline. He lives alone and self preps foods. He indicates his appetite waxes and wanes along with his intake. Nutrition Background: PMH remarkable for COPD, HLD, HTN, PUD, CKD, HFrEF, AAA s/p endovascular repair and PAF. Admitted with COPD exacerbation, HFrEF, CKD, unintentional wt loss. Daily Update: 
Pt is up to chair at RD visit. He relates ongoing hx of off and on appetite and interest in po intake. His recall of home intake is unable to be quantified from his report. PO intake her recorded from % which he indicates is likely more than his home intake. He expresses frustrations when he is unable to recall what he had for breakfast today. He indicates he weighs daily at home but has never been given a goal weight for HF. He indicates recently his weight has been around 175# but had decreased to 165# prior to admission.   
 
Nutrition Related Findings:  
Alert and oriented x 3 at RD visit, unable to recall am meal intake. Current Nutrition Therapies: DIET CARDIAC Regular Current Intake: Average Meal Intake: 51-75% Average Supplement Intake: None ordered Anthropometric Measures: 
Height: 5' 9\" (175.3 cm) Current Body Wt: 77.2 kg (170 lb 3.1 oz)(5/17), Weight source: Bed scale BMI: 25.1, Overweight (BMI 25.0-29. 9) Admission Body Weight: 166 lb 0.1 oz(no weight source identified) Ideal Body Weight (lbs) (Calculated): 160 lbs (73 kg), 106.4 % Usual Body Wt: 79.4 kg (175 lb)(UBW per pt, review of EMR indicates 180-185# past yr), Percent weight change: -2.7 Edema: No data recorded Estimated Daily Nutrient Needs: 
Energy (kcal/day): 7779-6315 (Kcal/kg(25-30), Weight Used: Ideal(73 kg)) Protein (g/day): 73-88 (1-1.2 g/kg) Weight Used: (Ideal) Fluid (ml/day):   (1 ml/kcal) Nutrition Diagnosis:  
· Inadequate oral intake related to (varied po acceptance, short term memory challenges) as evidenced by (unable to recall what he ate for breakfast, po ~50% needs ) · Moderate malnutrition, In context of chronic illness related to (inconsistent po intake) as evidenced by (self report, malnutrition criteria above) Nutrition Interventions:  
Food and/or Nutrient Delivery: Continue current diet, Start oral nutrition supplement Coordination of Nutrition Care: Continue to monitor while inpatient Plan of Care discussed with Prabhakar Addison RN. Goals: Active Goal: Meet >75% estimated needs by nutrition follow-up Nutrition Monitoring and Evaluation:  
  
Food/Nutrient Intake Outcomes: Food and nutrient intake, Supplement intake Physical Signs/Symptoms Outcomes: Biochemical data, GI status, Meal time behavior Discharge Planning: Too soon to determine Katelynn Mario, LORAINE, LDN on 5/18/2021 at 2:28 PM 
Contact: [phone]

## 2021-05-18 NOTE — PROGRESS NOTES
ACUTE PHYSICAL THERAPY GOALS: 
(Developed with and agreed upon by patient and/or caregiver. ) LTG: 
(1.)Mr. Proctor will move from supine to sit and sit to supine, scoot up and down and roll side to side in bed INDEPENDENTLY with bed flat within 7 treatment day(s). (2.)Mr. Proctor will transfer from bed to chair and chair to bed INDEPENDENTLY within 7 treatment day(s). (3.)Mr. Proctor will ambulate INDEPENDENTLY for 600+ feet within 7 treatment day(s). (4.)Mr. Proctor will perform seated and standing exercises per HEP for 15+ minutes to improve strength and mobility within 7 days. ________________________________________________________________________________________________ PHYSICAL THERAPY ASSESSMENT: Initial Assessment and AM PT Treatment Day # 1 Herson Arnold is a 68 y.o. male PRIMARY DIAGNOSIS: COPD exacerbation ([address] COPD exacerbation ([facility] [address] [J44.1] Reason for Referral:  Weakness, COPD exacerbation ICD-10: Treatment Diagnosis: Generalized Muscle Weakness (M62.81) Difficulty in walking, Not elsewhere classified (R26.2) Other abnormalities of gait and mobility (R26.89) INPATIENT: Payor: [facility] MEDICARE / Plan: [address] / Product Type: Managed Care Medicare /  
 
ASSESSMENT:  
 
REHAB RECOMMENDATIONS:  
Recommendation to date pending progress: 
[address] [place] [address] Equipment:  To Be Determined PRIOR LEVEL OF FUNCTION: 
(Prior to Hospitalization) INITIAL/CURRENT LEVEL OF FUNCTION: 
(Most Recently Demonstrated) Bed Mobility: 
 Independent Sit to Stand:  Independent Transfers:  Independent Gait/Mobility: 
 Independent Bed Mobility: 
 Supervision Sit to Stand: 
Melgar [place] Transfers: 
Melgar [place] Gait/Mobility: 
[employer] ASSESSMENT: 
Mr. Juliette Tse is admitted from home with COPD exacerbation. He lives alone with [facility] aide to help during the week.  Reports ambulatory without assistive device at baseline, does have rollator if needed. Denies falls. Presents today with intermittent confusion, agreeable to therapy. Performs bed mobility with supervision, sit-stand transfers from various surfaces/heights with CGA/SBA, and ambulation in room and hallway with 1 standing rest break. Cueing for pursed lip breathing, activity pacing, body mechanics, and posture. Practiced multiple transfers in room as well as seated and standing static/dynamic activities to address balance, activity tolerance, and safety. Anticipate pt will progress well with therapy and can dc home with [facility] PT, continued [facility]TC assistance. SUBJECTIVE:  
Mr. Juliette Tse states, \"I will try. \" SOCIAL HISTORY/LIVING ENVIRONMENT: Lives alone in apartment on 9th floor, has elevator. Has rollator, doesn't typically use. Independent baseline without DME use, does wear 2-3L O2 at baseline. Home Environment: Apartment # Steps to Enter: 0 One/Two Story Residence: One story Living Alone: Yes Support Systems: Home care staff, Family member(s) OBJECTIVE:  
 
PAIN: VITAL SIGNS: LINES/DRAINS:  
Pre Treatment: Pain Screen Pain Scale 1: Numeric (0 - 10) Pain Intensity 1: 0 Post Treatment: 0/10 Vital Signs O2 Sat (%): 94 % O2 Device: Nasal cannula O2 Flow Rate (L/min): 2 l/min none O2 Device: Nasal cannula GROSS EVALUATION: 
 Within Functional Limits Abnormal/ Functional Abnormal/ Non-Functional (see comments) Not Tested Comments: AROM [x] [] [] [] PROM [] [] [] [] Strength [] [x] [] [] Balance [] [x] [] [] Posture [] [x] [] [] Sensation [] [] [] [] Coordination [] [] [] [] Tone [] [] [] [] Edema [] [] [] [] Activity Tolerance [] [x] [] []   
 [] [] [] [] COGNITION/ 
PERCEPTION: Intact Impaired  
(see comments) Comments:  
Orientation [] [x] Vision [x] [] Hearing [] [x] Command Following [] [x] Safety Awareness [] [x]   
 [] [] MOBILITY: I Mod I S SBA CGA Min Mod Max Total  NT x2 Comments:  
Bed Mobility Rolling [] [] [x] [] [] [] [] [] [] [] [] Supine to Sit [] [] [x] [] [] [] [] [] [] [] [] Scooting [] [] [x] [] [] [] [] [] [] [] [] Sit to Supine [] [] [] [] [] [] [] [] [] [] []   
Transfers Sit to Stand [] [] [] [x] [] [] [] [] [] [] [] Bed to Chair [] [] [] [x] [x] [] [] [] [] [] []   
Stand to Sit [] [] [] [x] [] [] [] [] [] [] [] I=Independent, Mod I=Modified Independent, S=Supervision, SBA=Standby Assistance, CGA=Contact Guard Assistance,  
Min=Minimal Assistance, Mod=Moderate Assistance, Max=Maximal Assistance, Total=Total Assistance, NT=Not Tested GAIT: I Mod I S SBA CGA Min Mod Max Total  NT x2 Comments:  
Level of Assistance [] [] [] [x] [x] [] [] [] [] [] [] Distance 450 ft 1 standing break DME None Gait Quality Fluctuating pace, mild balance deficits Weightbearing Status N/A I=Independent, Mod I=Modified Independent, S=Supervision, SBA=Standby Assistance, CGA=Contact Guard Assistance,  
Min=Minimal Assistance, Mod=Moderate Assistance, Max=Maximal Assistance, Total=Total Assistance, NT=Not Tested [facility] Basic Mobility Inpatient Short Form How much difficulty does the patient currently have. .. Unable A Lot A Little None 1. Turning over in bed (including adjusting bedclothes, sheets and blankets)? [] 1   [] 2   [] 3   [x] 4  
2. Sitting down on and standing up from a chair with arms ( e.g., wheelchair, bedside commode, etc.)   [] 1   [] 2   [] 3   [x] 4  
3. Moving from lying on back to sitting on the side of the bed? [] 1   [] 2   [] 3   [x] 4 How much help from another person does the patient currently need. .. Total A Lot A Little None 4. Moving to and from a bed to a chair (including a wheelchair)? [] 1   [] 2   [x] 3   [] 4  
5. Need to walk in hospital room? [] 1   [] 2   [x] 3   [] 4  
6. Climbing 3-5 steps with a railing?    [] 1   [x] 2   [] 3   [] 4  
© 2007, Trustees of [address] [place] [address] [address] [address], under license to [employer]. All rights reserved Score:  Initial: 20 Most Recent: X (Date: -- ) Interpretation of Tool:  Represents activities that are increasingly more difficult (i.e. Bed mobility, Transfers, Gait). PLAN:  
FREQUENCY/DURATION: PT Plan of Care: 3 times/week for duration of hospital stay or until stated goals are met, whichever comes first. 
 
PROBLEM LIST:  
(Skilled intervention is medically necessary to address:) 1. Decreased Activity Tolerance 2. Decreased Balance 3. Decreased Cognition 4. Decreased Gait Ability 5. Decreased Strength 6. Decreased Transfer Abilities INTERVENTIONS PLANNED:  
(Benefits and precautions of physical therapy have been discussed with the patient.) 1. Therapeutic Activity 2. Therapeutic Exercise/HEP 3. Neuromuscular Re-education 4. Gait Training 5. Manual Therapy 6. Education TREATMENT:  
 
EVALUATION: Low Complexity : (Untimed Charge) TREATMENT:  
($$ Therapeutic Activity: 38-52 mins    ) Co-Treatment PT/OT necessary due to patient's decreased overall endurance/tolerance levels, as well as need for high level skilled assistance to complete functional transfers/mobility and functional tasks Therapeutic Activity (38 Minutes): Therapeutic activity included Rolling, Supine to Sit, Scooting, Lateral Scooting, Transfer Training, Ambulation on level ground, Sitting balance , Standing balance and chair transfers, multiple sit-stand transfers from various surfaces, standing static/dynamic activities to improve functional Mobility, Strength, Activity tolerance and balance. TREATMENT GRID: 
N/A 
 
AFTER TREATMENT POSITION/PRECAUTIONS: 
Alarm Activated, Chair, Needs within reach and RN notified INTERDISCIPLINARY COLLABORATION: 
RN/PCT, PT/PTA and OT/HARRISON TOTAL TREATMENT DURATION: 
PT Patient Time In/Time Out Time In: 6881 Time Out: 1025 Jennye Buerger, DPT

## 2021-05-19 PROBLEM — E44.0 MODERATE PROTEIN-CALORIE MALNUTRITION (HCC): Status: ACTIVE | Noted: 2021-01-01

## 2021-05-19 NOTE — PROGRESS NOTES
[facility] Hospitalist Progress Note Name:  Braxton Raza. Age:80 y.o. Sex:male :  1940 MRN:  [id number] Admit Date:  2021 Reason for Admission: COPD exacerbation ([facility] [address] [J44.1] Assessment & Plan COPD exacerbation with acute hypoxic respiratory failure  
-CXR no acute intrathoracic process -ABG in ED with partially compensated resp acidosis, placed on BiPap 
-Currently on 2L O2 (home O2 2-4L) -Continuous pulse oximetry 
-Continue IV Solumedrol, taper to Q12H 
-Aggressive pulmonary toilet 
-prn Duonebs, scheduled Pulmicort  
21 Will start oral steroids with anticipation of discharge in a.m. Patient is on home dosing O2; HH recommended by PT/OT   
 
Positive Blood Culture 
-Blood cx drawn on admission, 1/ bottles with Coagulase negative Staphylococci, likely contaminant 21 Repeat BCx NTD. No obvious s/sx of infection. Likely contaminant. If remain negative will hopefully discharge in a.m. HFrEF  
-Echo (2018) LVEF 30-35%, no RWA 
-Continue home ACE, BB, statin, Lasix 21 Stable 
 
 CKD 
21 Cr 1.90 Baseline approx. Cr. 1.6-1.7. Likely prerenal/2/2 diuretic/CHF. Hold nephrotoxics as able Hypertension 
-Monitor blood pressure and manage accordingly 
-Continue home medications 21 Stable BP; Continue current regimen 
  
Dyslipidemia 
-Continue home statin  
  
Paroxysmal AF  
-Not on [address] 
-Continue home Cardizem  
-Telemetry 21 Stable HR NSR; continue current regimen 
  
Unintended Weight Loss 
-Nutrition Consult 
-PT/OT Consult - recommend PT/OT continue outpatient 
  
 
 
 
 
Diet:  DIET CARDIAC 
DIET NUTRITIONAL SUPPLEMENTS 
DIET NUTRITIONAL SUPPLEMENTS 
DVT PPx: SCDs GI Ppx: Pepcid Code: Full Code Dispo/Discharge Plannin hours Hospital Course/Subjective:  
Patient is a 67 y/o M with PMH of COPD (2-4L home O2), HLD, HTN, PUD, CKD, HFrEF (30-35%), AAA s/p endovascular repair (), and PAF who presented to the ED with CC SOB. Admitted with COPD exacerbation Subjective/24 hr Events (05/19/21): Patient reports he has noticed that he gets SOB with speaking and has some SIM which is not his norm Review of Systems: 14 point review of systems is otherwise negative with the exception of the elements mentioned above. Objective:  
 
Patient Vitals for the past 24 hrs: 
 Temp Pulse Resp BP SpO2  
05/19/21 1108 98.4 °F (36.9 °C) 62 18 (!) 141/63 100 % 05/19/21 0748     100 % 05/19/21 0739 98 °F (36.7 °C) 71 18 (!) 169/84 97 % 05/18/21 2300 97.9 °F (36.6 °C) 65 17 (!) 140/93 98 % 05/18/21 2044     92 % 05/18/21 1826 98 °F (36.7 °C) 71 18 113/65 96 % 05/18/21 1515 97.9 °F (36.6 °C) 65 18 126/61 97 % Oxygen Therapy O2 Sat (%): 100 % (05/19/21 1108) Pulse via Oximetry: 76 beats per minute (05/19/21 0748) O2 Device: Nasal cannula (05/19/21 0805) Skin Assessment: Clean, dry, & intact (05/19/21 0805) Skin Protection for O2 Device: No (05/19/21 0805) O2 Flow Rate (L/min): 2 l/min (05/19/21 0805) FIO2 (%): 98 % (05/18/21 1024) Body mass index is 25.12 kg/m². Physical Exam:  
General: No acute distress, speaking in full but choppy sentences, no use of accessory muscles HEENT: Pupils equal; oropharynx is clear Neck: no JVD Lungs: Diminished but clear to auscultation bilaterally Cardiovascular: Regular rate and rhythm with normal S1 and S2 Abdomen: Soft, nontender, nondistended, normoactive bowel sounds Extremities: No cyanosis clubbing or edema Neuro: Nonfocal, A&O x3  
Psych: Normal affect Data Review: 
I have reviewed all labs, meds, and studies from the last 24 hours: 
 
Labs: 
 
Recent Results (from the past 24 hour(s)) CBC W/O DIFF Collection Time: 05/19/21  6:21 AM  
Result Value Ref Range WBC 12.3 (H) 4.3 - 11.1 K/uL  
 RBC 3.72 (L) 4.23 - 5.6 M/uL HGB 9.9 (L) 13.6 - 17.2 g/dL HCT 33.6 (L) 41.1 - 50.3 %  MCV 90.3 79.6 - 97.8 FL  
 MCH 26.6 26.1 - 32.9 PG  
 MCHC 29.5 (L) 31.4 - 35.0 g/dL  
 RDW 17.2 (H) 11.9 - 14.6 % PLATELET 946 037 - 984 K/uL MPV 11.4 9.4 - 12.3 FL ABSOLUTE NRBC 0.00 0.0 - 0.2 K/uL METABOLIC PANEL, BASIC Collection Time: 05/19/21  6:21 AM  
Result Value Ref Range Sodium 140 136 - 145 mmol/L Potassium 4.6 3.5 - 5.1 mmol/L Chloride 103 98 - 107 mmol/L  
 CO2 32 21 - 32 mmol/L Anion gap 5 (L) 7 - 16 mmol/L Glucose 119 (H) 65 - 100 mg/dL BUN 48 (H) 8 - 23 MG/DL Creatinine 1.90 (H) 0.8 - 1.5 MG/DL  
 GFR est AA 44 (L) >60 ml/min/1.73m2 GFR est non-AA 37 (L) >60 ml/min/1.73m2 Calcium 8.8 8.3 - 10.4 MG/DL All Micro Results Procedure Component Value Units Date/Time BLOOD CULTURE [[id number]]  (Abnormal) Collected: 05/17/21 0315 Order Status: Completed Specimen: Blood Updated: 05/19/21 8421 Special Requests: --     
  RIGHT 
HAND 
  
  GRAM STAIN    
  GRAM POS COCCI IN CLUSTERS  
     
   AEROBIC BOTTLE POSITIVE RESULTS VERIFIED, PHONED TO AND READ BACK BY Jay Galaviz [facility] 5.18.21 SC Culture result: STAPHYLOCOCCUS SPECIES, COAGULASE NEGATIVE THIS ORGANISM MAY BE INDICATIVE OF CULTURE CONTAMINATION, HOWEVER, CLINICAL CORRELATION NEEDS TO BE EVALUATED, AS EACH CASE IS UNIQUE. Refer to Blood Culture ID Panel Accession [id number] CULTURE, BLOOD [[id number]] Collected: 05/18/21 1353 Order Status: Completed Specimen: Blood Updated: 05/19/21 0710 Special Requests: --     
  RIGHT Antecubital 
  
  Culture result: NO GROWTH AFTER 16 HOURS     
 CULTURE, BLOOD [[id number]] Collected: 05/18/21 1343 Order Status: Completed Specimen: Blood Updated: 05/19/21 0710 Special Requests: --     
  LEFT Antecubital 
  
  Culture result: NO GROWTH AFTER 16 HOURS     
 BLOOD CULTURE [[id number]] Collected: 05/16/21 4116 Order Status: Completed Specimen: Blood Updated: 05/19/21 0710 Special Requests: --     
  RIGHT Antecubital 
  
  Culture result: NO GROWTH 2 DAYS     
 BLOOD CULTURE ID PANEL [[id number]]  (Abnormal) Collected: 05/17/21 0315 Order Status: Completed Specimen: Blood Updated: 05/18/21 1150 Acc. no. from [facility] [id number] Staphylococcus Detected Comment: RESULTS VERIFIED, PHONED TO AND READ BACK BY 
Jocelyne Frank RN @9901 5.18.21 SC 
  
  
  mecA (Methicillin-Resistance Genes) NOT DETECTED INTERPRETATION Gram positive cocci in clusters. Identified by realtime PCR as  Coagulase negative Staphylococci Comment: A single positive culture of coagulase negative Staph is likely to be a contaminant in adult patients. Consider discontinuation of antibiotics for gram positive bloodstream infections if patient asymptomatic. THIS TEST DOES NOT REPLACE SENSITIVITY TESTING. EKG Results Procedure 720 Value Units Date/Time EKG [[id number]] Collected: 05/16/21 2253 Order Status: Completed Updated: 05/17/21 9756 Ventricular Rate 87 BPM   
  Atrial Rate 87 BPM   
  P-R Interval 176 ms QRS Duration 94 ms Q-T Interval 384 ms QTC Calculation (Bezet) 462 ms Calculated P Axis 80 degrees Calculated R Axis 63 degrees Calculated T Axis 73 degrees Diagnosis --  
   
Normal sinus rhythm Normal ECG When compared with ECG of 21-DEC-2020 07:35, Sinus rhythm has replaced Atrial fibrillation Non-specific change in ST segment in Inferior leads Nonspecific T wave abnormality no longer evident in Inferior leads Nonspecific T wave abnormality no longer evident in Lateral leads Confirmed by Pati Grey ([id number]) on 5/17/2021 7:09:18 AM 
  
  
 
 
Other Studies: XR CHEST PORT Result Date: 5/17/2021 EXAM: XR CHEST PORT HISTORY: Syncope. TECHNIQUE: A single frontal view of the chest was submitted. COMPARISON: 12/21/2020 FINDINGS: The cardiac silhouette and pulmonary vasculature are within normal limits. Stable mediastinal widening.  There is no consolidation, pleural effusion, or pneumothorax. No significant osseous abnormalities are observed. No evidence of an acute intrathoracic process. Current Meds:  
Current Facility-Administered Medications Medication Dose Route Frequency  sodium chloride (NS) flush 5-40 mL  5-40 mL IntraVENous Q8H  
 sodium chloride (NS) flush 5-40 mL  5-40 mL IntraVENous PRN  
 acetaminophen (TYLENOL) tablet 650 mg  650 mg Oral Q6H PRN Or  
 acetaminophen (TYLENOL) suppository 650 mg  650 mg Rectal Q6H PRN  polyethylene glycol (MIRALAX) packet 17 g  17 g Oral DAILY PRN  promethazine (PHENERGAN) tablet 12.5 mg  12.5 mg Oral Q6H PRN Or  
 ondansetron (ZOFRAN) injection 4 mg  4 mg IntraVENous Q6H PRN  
 albuterol-ipratropium (DUO-NEB) 2.5 MG-0.5 MG/3 ML  3 mL Nebulization Q4H PRN  
 allopurinoL (ZYLOPRIM) tablet 300 mg  300 mg Oral DAILY  aspirin delayed-release tablet 81 mg  81 mg Oral DAILY  dilTIAZem IR (CARDIZEM) tablet 120 mg  120 mg Oral DAILY  furosemide (LASIX) tablet 20 mg  20 mg Oral DAILY  famotidine (PEPCID) tablet 40 mg  40 mg Oral DAILY  lisinopriL (PRINIVIL, ZESTRIL) tablet 10 mg  10 mg Oral DAILY  metoprolol succinate (TOPROL-XL) XL tablet 50 mg  50 mg Oral DAILY  rosuvastatin (CRESTOR) tablet 40 mg  40 mg Oral QHS  methylPREDNISolone (PF) (SOLU-MEDROL) injection 40 mg  40 mg IntraVENous Q12H  
 budesonide (PULMICORT) 500 mcg/2 ml nebulizer suspension  500 mcg Nebulization BID RT  
 labetaloL (NORMODYNE;TRANDATE) injection 10 mg  10 mg IntraVENous Q6H PRN  
 sodium chloride (NS) flush 5-10 mL  5-10 mL IntraVENous Q8H  
 sodium chloride (NS) flush 5-10 mL  5-10 mL IntraVENous PRN Problem List: 
Hospital Problems as of 5/19/2021 Date Reviewed: 5/12/2021 Codes Class Noted - Resolved POA Moderate protein-calorie malnutrition ([facility] [address] ICD-10-CM: E44.0 ICD-9-CM: 263.0  5/19/2021 - Present Yes Short-term memory loss ICD-10-CM: R41.3 ICD-9-CM: 780.93  5/18/2021 - Present Unknown * (Principal) COPD exacerbation ([facility] [address] ICD-10-CM: J44.1 ICD-9-CM: 491.21  5/17/2021 - Present Unknown Heart failure with reduced ejection fraction (HCC) (Chronic) ICD-10-CM: I50.20 ICD-9-CM: 428.20  9/9/2019 - Present Yes  
   
 CKD (chronic kidney disease), stage III (HCC) (Chronic) ICD-10-CM: N18.30 ICD-9-CM: 585.3  9/9/2019 - Present Yes Acute on chronic respiratory failure with hypoxia [facility]) ICD-10-CM: W73.37 
ICD-9-CM: 518.84, 799.02  7/25/2018 - Present Unknown History of pulmonary embolism ICD-10-CM: Z86.711 ICD-9-CM: V12.55  6/22/2017 - Present Yes Hypertension ICD-10-CM: I10 
ICD-9-CM: 401.9  6/1/2017 - Present Yes COPD (chronic obstructive pulmonary disease) (HCC) (Chronic) ICD-10-CM: J44.9 ICD-9-CM: 384  5/2/2014 - Present Hyperlipemia (Chronic) ICD-10-CM: S72.1 ICD-9-CM: 272.4  5/2/2014 - Present Yes Paroxysmal atrial fibrillation (HCC) (Chronic) ICD-10-CM: I48.0 ICD-9-CM: 427.31  5/2/2014 - Present Yes Part of this note was written by using a voice dictation software and the note has been proof read but may still contain some grammatical/other typographical errors. Signed By: Milady Snyder  [place] [facility]  May 19, 2021  3:03 PM

## 2021-05-19 NOTE — DISCHARGE INSTRUCTIONS
DISCHARGE SUMMARY from Nurse    PATIENT INSTRUCTIONS:    After general anesthesia or intravenous sedation, for 24 hours or while taking prescription Narcotics:  · Limit your activities  · Do not drive and operate hazardous machinery  · Do not make important personal or business decisions  · Do  not drink alcoholic beverages  · If you have not urinated within 8 hours after discharge, please contact your surgeon on call. What to do at Home:  Recommended activity: Activity as tolerated. If you experience any of the following symptoms worsening cough or wheezing, shortness of breath or fatigue not relieved with rest, unrelieved pain, nausea or vomiting please follow up with MD.    *  Please give a list of your current medications to your Primary Care Provider. *  Please update this list whenever your medications are discontinued, doses are      changed, or new medications (including over-the-counter products) are added. *  Please carry medication information at all times in case of emergency situations. These are general instructions for a healthy lifestyle:    No smoking/ No tobacco products/ Avoid exposure to second hand smoke  Surgeon General's Warning:  Quitting smoking now greatly reduces serious risk to your health. Obesity, smoking, and sedentary lifestyle greatly increases your risk for illness    A healthy diet, regular physical exercise & weight monitoring are important for maintaining a healthy lifestyle    You may be retaining fluid if you have a history of heart failure or if you experience any of the following symptoms:  Weight gain of 3 pounds or more overnight or 5 pounds in a week, increased swelling in our hands or feet or shortness of breath while lying flat in bed. Please call your doctor as soon as you notice any of these symptoms; do not wait until your next office visit. The discharge information has been reviewed with the patient.   The patient verbalized understanding. Discharge medications reviewed with the patient and appropriate educational materials and side effects teaching were provided. Patient Education        Chronic Obstructive Pulmonary Disease (COPD) Flare-Ups: Care Instructions  Your Care Instructions     Chronic obstructive pulmonary disease (COPD) is a lung disease that makes it hard to breathe. It is caused by damage to the lungs over many years, usually from smoking. COPD is often a mix of two diseases:  · Chronic bronchitis: The airways that carry air to the lungs (bronchial tubes) get inflamed and make a lot of mucus. This can narrow or block the airways. · Emphysema: In a healthy person, the tiny air sacs in the lungs are like balloons. As you breathe in and out, they get bigger and smaller to move air through your lungs. But with emphysema, these air sacs are damaged and lose their stretch. Less air gets in and out of the lungs. Many people with COPD have attacks called flare-ups or exacerbations. This is when your usual symptoms quickly get worse and stay worse. The doctor has checked you carefully. But problems can develop later. If you notice any problems or new symptoms, get medical treatment right away. Follow-up care is a key part of your treatment and safety. Be sure to make and go to all appointments, and call your doctor if you are having problems. It's also a good idea to know your test results and keep a list of the medicines you take. How can you care for yourself at home? · Be safe with medicines. Take your medicines exactly as prescribed. Call your doctor if you think you are having a problem with your medicine. You may be taking medicines such as:  ? Bronchodilators. These help open your airways and make breathing easier. ? Corticosteroids. These reduce airway inflammation. They may be given as pills, in a vein, or in an inhaled form. You may go home with pills in addition to an inhaler that you already use.   · A spacer may help you get more inhaled medicine to your lungs. Ask your doctor or pharmacist if a spacer is right for you. If it is, ask how to use it properly. · If your doctor prescribed antibiotics, take them as directed. Do not stop taking them just because you feel better. You need to take the full course of antibiotics. · If your doctor prescribed oxygen, use the flow rate your doctor has recommended. Do not change it without talking to your doctor first.  · Do not smoke. Smoking makes COPD worse. If you need help quitting, talk to your doctor about stop-smoking programs and medicines. These can increase your chances of quitting for good. When should you call for help? Call 911 anytime you think you may need emergency care. For example, call if:    · You have severe trouble breathing. Call your doctor now or seek immediate medical care if:    · You have new or worse trouble breathing.     · Your coughing or wheezing gets worse.     · You cough up dark brown or bloody mucus (sputum).     · You have a new or higher fever. Watch closely for changes in your health, and be sure to contact your doctor if:    · You notice more mucus or a change in the color of your mucus.     · You need to use your antibiotic or steroid pills.     · You do not get better as expected. Where can you learn more? Go to http://www.gray.com/  Enter D989 in the search box to learn more about \"Chronic Obstructive Pulmonary Disease (COPD) Flare-Ups: Care Instructions. \"  Current as of: October 26, 2020               Content Version: 12.8  © 2006-2021 [employer]. Care instructions adapted under license by [employer] (which disclaims liability or warranty for this information). If you have questions about a medical condition or this instruction, always ask your healthcare professional. [place] [address] any warranty or liability for your use of this information. ___________________________________________________________________________________________________________________________________

## 2021-05-19 NOTE — PROGRESS NOTES
Report received from Ke, [facility] 
 
Pt alert sitting on edge of the bed w/ stable respirations on 3L NC. No pain or distress expressed at this time. Asked about his missing meds. Teja Norris previous floor to check if meds were left behind. Will follow up.

## 2021-05-19 NOTE — PROGRESS NOTES
ACUTE PHYSICAL THERAPY GOALS: 
(Developed with and agreed upon by patient and/or caregiver. ) LTG: 
(1.)Mr. Proctor will move from supine to sit and sit to supine, scoot up and down and roll side to side in bed INDEPENDENTLY with bed flat within 7 treatment day(s). (2.)Mr. Proctor will transfer from bed to chair and chair to bed INDEPENDENTLY within 7 treatment day(s). (3.)Mr. Proctor will ambulate INDEPENDENTLY for 600+ feet within 7 treatment day(s). (4.)Mr. Proctor will perform seated and standing exercises per HEP for 15+ minutes to improve strength and mobility within 7 days. PHYSICAL THERAPY: Daily Note and PM Treatment Day # 2 Evin Almanzar is a 68 y.o. male PRIMARY DIAGNOSIS: COPD exacerbation ([facility] [address] COPD exacerbation ([facility] [address] [J44.1] ASSESSMENT:  
 
REHAB RECOMMENDATIONS: CURRENT LEVEL OF FUNCTION: 
(Most Recently Demonstrated) Recommendation to date pending progress: 
[address] [place] [address] Equipment:  To Be Determined Bed Mobility:  Modified Independent Sit to Stand: 
 Supervision Transfers: 
 Supervision Gait/Mobility: 
Ramón Isidro ASSESSMENT: 
Mr. Thompson Pelaez reports feeling more short of breath today although sats maintained above 90%. Patient stated he felt like the O2 on the wall gave him \"better air\" than the O2 in the tank? He did ambulate 100' then 200' with seated rest break in between and cues for pursed lipped breathing. O2 sats never dropped. Will continue efforts. Sofie Jensen SUBJECTIVE:  
Mr. Thompson Pelaez states, \"I feel like I can't catch my breath. \" 
 
SOCIAL HISTORY/ LIVING ENVIRONMENT: See initial evaluation Home Environment: Apartment # Steps to Enter: 0 One/Two Story Residence: One story Living Alone: Yes Support Systems: Home care staff OBJECTIVE:  
 
PAIN: VITAL SIGNS: LINES/DRAINS:  
Pre Treatment: Pain Screen Pain Scale 1: FLACC Pain Intensity 1: 0 Post Treatment: 0   None O2 Device: Nasal cannula MOBILITY: I Mod I S SBA CGA Min Mod Max Total  NT x2 Comments:  
Bed Mobility Rolling [] [] [] [] [] [] [] [] [] [] [] Supine to Sit [] [x] [] [] [] [] [] [] [] [] [] Scooting [] [] [] [] [] [] [] [] [] [] [] Sit to Supine [] [x] [] [] [] [] [] [] [] [] []   
Transfers Sit to Stand [] [] [x] [] [] [] [] [] [] [] [] Bed to Chair [] [] [x] [] [] [] [] [] [] [] []   
Stand to Sit [] [] [x] [] [] [] [] [] [] [] [] I=Independent, Mod I=Modified Independent, S=Supervision, SBA=Standby Assistance, CGA=Contact Guard Assistance,  
Min=Minimal Assistance, Mod=Moderate Assistance, Max=Maximal Assistance, Total=Total Assistance, NT=Not Tested BALANCE: Good Fair+ Fair Fair- Poor NT Comments Sitting Static [x] [] [] [] [] [] Sitting Dynamic [x] [] [] [] [] []   
         
Standing Static [x] [] [] [] [] []   
Standing Dynamic [] [x] [] [] [] [] GAIT: I Mod I S SBA CGA Min Mod Max Total  NT x2 Comments:  
Level of Assistance [] [] [] [x] [] [] [] [] [] [] [] Distance 100' then 200' DME Holding hallway railing Gait Quality Weightbearing Status N/A I=Independent, Mod I=Modified Independent, S=Supervision, SBA=Standby Assistance, CGA=Contact Guard Assistance,  
Min=Minimal Assistance, Mod=Moderate Assistance, Max=Maximal Assistance, Total=Total Assistance, NT=Not Tested PLAN:  
FREQUENCY/DURATION: PT Plan of Care: 3 times/week for duration of hospital stay or until stated goals are met, whichever comes first. 
TREATMENT:  
 
TREATMENT:  
($$ Therapeutic Activity: 23-37 mins    ) Therapeutic Activity (23 Minutes): Therapeutic activity included Supine to Sit, Sit to Supine, Scooting, Transfer Training, Ambulation on level ground, Sitting balance  and Standing balance to improve functional Mobility, Strength and Activity tolerance. TREATMENT GRID: 
N/A 
 
AFTER TREATMENT POSITION/PRECAUTIONS: 
Bed, Needs within reach and RN notified INTERDISCIPLINARY COLLABORATION: 
RN/PCT and PT/PTA TOTAL TREATMENT DURATION: 
PT Patient Time In/Time Out Time In: 1347 Time Out: 1410 Ambreen Lees, PTA

## 2021-05-20 NOTE — PROGRESS NOTES
Problem: Falls - Risk of 
Goal: *Absence of Falls Description: Document Vinny Long Fall Risk and appropriate interventions in the flowsheet. Outcome: Progressing Towards Goal 
Note: Fall Risk Interventions: 
Mobility Interventions: Patient to call before getting OOB Mentation Interventions: Adequate sleep, hydration, pain control Medication Interventions: Teach patient to arise slowly History of Falls Interventions: Bed/chair exit alarm

## 2021-05-20 NOTE — PROGRESS NOTES
Care Management Interventions PCP Verified by CM: Yes Transition of Care Consult (CM Consult): Home Health [address] Yes Physical Therapy Consult: Yes Occupational Therapy Consult: Yes Speech Therapy Consult: No 
Current Support Network: Lives Alone Confirm Follow Up Transport: Other (see comment) The Plan for Transition of Care is Related to the Following Treatment Goals : [facility] The Patient and/or Patient Representative was Provided with a Choice of Provider and Agrees with the Discharge Plan?: Yes Name of the Patient Representative Who was Provided with a Choice of Provider and Agrees with the Discharge Plan: patient Freedom of Choice List was Provided with Basic Dialogue that Supports the Patient's Individualized Plan of Care/Goals, Treatment Preferences and Shares the Quality Data Associated with the Providers?: Yes [employer] Information Provided?: No 
Discharge Location Discharge Placement: Home with home health Patient is discharging home with [facility] RN/PT/OT. Patient's [facility] aide will transport home.

## 2021-05-20 NOTE — PROGRESS NOTES
Discharge instructions, follow up information, medication list, and prescription information provided and explained to the pt. No IV to remove, remote telemetry removed. Opportunity for questions provided. Patient states will call for transport home. Has home portable oxygen tank at bedside for transport home. Instructed to call once ready to leave.

## 2021-05-20 NOTE — PROGRESS NOTES
ACUTE PHYSICAL THERAPY GOALS: 
(Developed with and agreed upon by patient and/or caregiver. ) LTG: 
(1.)Mr. Proctor will move from supine to sit and sit to supine, scoot up and down and roll side to side in bed INDEPENDENTLY with bed flat within 7 treatment day(s). (2.)Mr. Proctor will transfer from bed to chair and chair to bed INDEPENDENTLY within 7 treatment day(s). (3.)Mr. Proctor will ambulate INDEPENDENTLY for 600+ feet within 7 treatment day(s). (4.)Mr. Proctor will perform seated and standing exercises per HEP for 15+ minutes to improve strength and mobility within 7 days. PHYSICAL THERAPY: Daily Note and AM Treatment Day # 3 Geri Ba. is a 68 y.o. male PRIMARY DIAGNOSIS: COPD exacerbation ([facility] COPD exacerbation ([facility] [J44.1] ASSESSMENT:  
 
REHAB RECOMMENDATIONS: CURRENT LEVEL OF FUNCTION: 
(Most Recently Demonstrated) Recommendation to date pending progress: 
[address] [place] [address] Equipment:  To Be Determined Bed Mobility:  Not tested Sit to Stand: 
 Supervision Transfers: 
 Supervision Gait/Mobility: 
Ramón Isidro ASSESSMENT: 
Mr. Shante Mcarthur is making good progress towards PT goals. He reports feeling better today. He ambulated 400' with no AD but occasionally holding hallway railing on 2L O2 with sats maintaining above 90%. Patient returns to EOB where he rests prior to participated in LE exercises. Will continue efforts. Keara Fernandez SUBJECTIVE:  
Mr. Shante Mcarthur states, \"I might go home today\" SOCIAL HISTORY/ LIVING ENVIRONMENT: See initial evaluation Home Environment: Apartment # Steps to Enter: 0 One/Two Story Residence: One story Living Alone: Yes Support Systems: Home care staff OBJECTIVE:  
 
PAIN: VITAL SIGNS: LINES/DRAINS:  
Pre Treatment: Pain Screen Pain Scale 1: FLACC Pain Intensity 1: 0 Post Treatment: 0   None O2 Device: Nasal cannula MOBILITY: I Mod I S SBA CGA Min Mod Max Total  NT x2 Comments:  
Bed Mobility Rolling [] [] [] [] [] [] [] [] [] [] [] Supine to Sit [] [] [] [] [] [] [] [] [] [] [] Scooting [] [] [] [] [] [] [] [] [] [] [] Sit to Supine [] [] [] [] [] [] [] [] [] [] []   
Transfers Sit to Stand [] [] [x] [] [] [] [] [] [] [] [] Bed to Chair [] [] [x] [] [] [] [] [] [] [] []   
Stand to Sit [] [] [x] [] [] [] [] [] [] [] [] I=Independent, Mod I=Modified Independent, S=Supervision, SBA=Standby Assistance, CGA=Contact Guard Assistance,  
Min=Minimal Assistance, Mod=Moderate Assistance, Max=Maximal Assistance, Total=Total Assistance, NT=Not Tested BALANCE: Good Fair+ Fair Fair- Poor NT Comments Sitting Static [x] [] [] [] [] [] Sitting Dynamic [x] [] [] [] [] []   
         
Standing Static [x] [] [] [] [] []   
Standing Dynamic [] [x] [] [] [] [] GAIT: I Mod I S SBA CGA Min Mod Max Total  NT x2 Comments:  
Level of Assistance [] [] [] [x] [] [] [] [] [] [] [] Distance 400' DME Holding hallway railing Gait Quality Weightbearing Status N/A I=Independent, Mod I=Modified Independent, S=Supervision, SBA=Standby Assistance, CGA=Contact Guard Assistance,  
Min=Minimal Assistance, Mod=Moderate Assistance, Max=Maximal Assistance, Total=Total Assistance, NT=Not Tested PLAN:  
FREQUENCY/DURATION: PT Plan of Care: 3 times/week for duration of hospital stay or until stated goals are met, whichever comes first. 
TREATMENT:  
 
TREATMENT:  
($$ Therapeutic Activity: 23-37 mins    ) Therapeutic Activity (24 Minutes): Therapeutic activity included Scooting, Transfer Training, Ambulation on level ground, Sitting balance , Standing balance and LE exercises to improve functional Mobility, Strength and Activity tolerance. TREATMENT GRID: 
N/A 
 
AFTER TREATMENT POSITION/PRECAUTIONS: 
Bed, Needs within reach and RN notified INTERDISCIPLINARY COLLABORATION: 
RN/PCT and PT/PTA TOTAL TREATMENT DURATION: 
PT Patient Time In/Time Out Time In: 2195 Time Out: 1023 Prachi Manning BILLIE Lees, PTA

## 2021-05-20 NOTE — PROGRESS NOTES
Nutrition: Acknowledge: Best Practice Alert for Malnutrition Screening Tool: Recently Lost Weight Without Trying: Yes, If Yes, How Much Weight Loss: Unsure, Eating Poorly Due to Decreased Appetite: No 
Diet: DIET CARDIAC Regular DIET NUTRITIONAL SUPPLEMENTS All Meals; Ensure High Protein ( ) DIET NUTRITIONAL SUPPLEMENTS All Meals; Ensure [employer] Pt assessed on 5/18 with wt hx and po intake PTA addressed in consult note. Electronically signed by Emmie Walker MS, RDN, MERRICK 5/20/2021 at 9:40 AM 
Contact: [phone]

## 2021-05-20 NOTE — DISCHARGE SUMMARY
[address] Hospitalist Discharge Summary Name:  Aaron Martínez. Age:80 y.o. Sex:male :  1940 MRN:  [id number] Admitting Physician: Vini Griffin MD Admit Date: 2021 10:50 PM  
Attending Physician: Kassi Haley MD 
Primary Care Physician: Fermín Penaloza MD  
 
 
Discharge Physician: Christiano Lyles NP  Discharge date: 21 Discharged Condition: Stable Indication for Admission: Chief Complaint Patient presents with  Shortness of Breath Reasons for hospitalization: 
Hospital Problems as of 2021 Date Reviewed: 2021 Codes Class Noted - Resolved POA Moderate protein-calorie malnutrition ([facility] ICD-10-CM: E44.0 ICD-9-CM: 263.0  2021 - Present Yes Short-term memory loss ICD-10-CM: R41.3 ICD-9-CM: 780.93  2021 - Present Unknown * (Principal) COPD exacerbation ([facility] ICD-10-CM: J44.1 ICD-9-CM: 491.21  2021 - Present Unknown Heart failure with reduced ejection fraction (HCC) (Chronic) ICD-10-CM: I50.20 ICD-9-CM: 428.20  2019 - Present Yes  
   
 CKD (chronic kidney disease), stage III (HCC) (Chronic) ICD-10-CM: N18.30 ICD-9-CM: 585.3  2019 - Present Yes Acute on chronic respiratory failure with hypoxia [facility]) ICD-10-CM: T02.31 
ICD-9-CM: 518.84, 799.02  2018 - Present Unknown History of pulmonary embolism ICD-10-CM: Z86.711 ICD-9-CM: V12.55  2017 - Present Yes Hypertension ICD-10-CM: I10 
ICD-9-CM: 401.9  2017 - Present Yes COPD (chronic obstructive pulmonary disease) (HCC) (Chronic) ICD-10-CM: J44.9 ICD-9-CM: 100  2014 - Present Hyperlipemia (Chronic) ICD-10-CM: G62.4 ICD-9-CM: 272.4  2014 - Present Yes Paroxysmal atrial fibrillation (HCC) (Chronic) ICD-10-CM: I48.0 ICD-9-CM: 427.31  2014 - Present Yes Discharge Diagnosis: COPD exacerbation Did Patient have Sepsis (YES OR NO): No  
 
 
Hospital Course : 
Patient is a 67 y/o M with PMH of COPD (2-4L home O2), HLD, HTN, PUD, CKD, HFrEF (30-35%), AAA s/p endovascular repair (2019), and PAF who presented to the ED with CC SOB. Admitted with COPD exacerbation COPD exacerbation with acute hypoxic respiratory failure  
-CXR no acute intrathoracic process -ABG in ED with partially compensated resp acidosis, placed on BiPap 
-Currently on 2L O2 (home O2 2-4L) -Continuous pulse oximetry 
-Continue IV Solumedrol, taper to Q12H 
-Aggressive pulmonary toilet 
-prn Duonebs, scheduled Pulmicort  
5/19/21 Will start oral steroids with anticipation of discharge in a.m. Patient is on home dosing O2; [facility] recommended by PT/OT   
5/20/21 Patient remains on home O2 at 2L. Repeat BCx remain negative and patient has no s/sx of infection Positive Blood Culture 
-Blood cx drawn on admission, 1/4 bottles with Coagulase negative Staphylococci, likely contaminant 5/19/21 Repeat BCx NTD. No obvious s/sx of infection. Likely contaminant. If remain negative will hopefully discharge in a.m.  
5/20/21 Repeat BCx remain negative HFrEF  
-Echo (9/2018) LVEF 30-35%, no RWA 
-Continue home ACE, BB, statin, Lasix 5/19/21 Stable 
  
 CKD 
5/19/21 Cr 1.90 Baseline approx. Cr. 1.6-1.7. Likely prerenal/2/2 diuretic/CHF. Hold nephrotoxics as able 
  
Hypertension 
-Monitor blood pressure and manage accordingly 
-Continue home medications 5/19/21 Stable BP; Continue current regimen 5/20/21 /73 Dyslipidemia 
-Continue home statin  
  
Paroxysmal AF  
-Not on [address] 
-Continue home Cardizem  
-Telemetry 5/19/21 Stable HR NSR; continue current regimen 
 5/20/21 Stable HR NSR Unintended Weight Loss 
-Nutrition Consult 
-PT/OT Consult - recommend PT/OT continue outpatient 5/20/21 Discharging with [facility] services. Patient reports periodic poor appetite. He will continue Ensure at home if his appetite remains poor Consults: None Disposition: Home Health Care Svc Diet: DIET CARDIAC DIET NUTRITIONAL SUPPLEMENTS 
DIET NUTRITIONAL SUPPLEMENTS Code Status: Full Code Discharge Info:  
 
Current Discharge Medication List  
  
START taking these medications Details  
predniSONE (DELTASONE) 10 mg tablet Take 20 mg by mouth two (2) times a day for 3 days, THEN 20 mg daily for 3 days, THEN 10 mg daily for 3 days. Qty: 21 Tablet, Refills: 0 Start date: 5/20/2021, End date: 5/29/2021 CONTINUE these medications which have NOT CHANGED Details  
albuterol-ipratropium (DUO-NEB) 2.5 mg-0.5 mg/3 ml nebu 3 mL by Nebulization route three (3) times daily for 180 days. Qty: 810 mL, Refills: 1 Associated Diagnoses: Chronic obstructive pulmonary disease, unspecified COPD type ([facility]  
  
allopurinoL (ZYLOPRIM) 300 mg tablet Take 1 Tab by mouth daily. Qty: 90 Tab, Refills: 1 Associated Diagnoses: Chronic gout without tophus, unspecified cause, unspecified site  
  
rosuvastatin (Crestor) 40 mg tablet Take 1 Tab by mouth nightly. Qty: 90 Tab, Refills: 1 Associated Diagnoses: Heart failure with reduced ejection fraction (HCC)  
  
nitroglycerin (NITROSTAT) 0.4 mg SL tablet 1 Tab by SubLINGual route every five (5) minutes as needed for Chest Pain (call EMS if pain fails to resolve after 2 tabs. max daily dose of 3 tabs). Indications: after 15 minutes or 3 doses, if chest pain persists, contact EMS/911 Qty: 1 Bottle, Refills: 11  
 Associated Diagnoses: Heart failure with reduced ejection fraction (HCC)  
  
tamsulosin (FLOMAX) 0.4 mg capsule Take 1 Cap by mouth daily. Qty: 90 Cap, Refills: 3 Associated Diagnoses: Prostate cancer ([facility] furosemide (Lasix) 40 mg tablet Take 0.5 Tabs by mouth daily. Qty: 90 Tab, Refills: 3 Associated Diagnoses: Congestive heart failure, unspecified HF chronicity, unspecified heart failure type ([facility] aspirin delayed-release 81 mg tablet Take 81 mg by mouth daily.   
  
albuterol (PROVENTIL HFA, VENTOLIN HFA, PROAIR HFA) 90 mcg/actuation inhaler TAKE 2 PUFFS BY MOUTH EVERY 4 HOURS AS NEEDED FOR WHEEZE  
  
latanoprost (XALATAN) 0.005 % ophthalmic solution LOCATION  BOTH EYES. INSTILL ONE DROP INTO EACH EYE AT BEDTIME  
  
dilTIAZem IR (CARDIZEM) 120 mg tablet Take 1 Tab by mouth daily. Qty: 90 Tab, Refills: 3  
  
metoprolol succinate (TOPROL-XL) 50 mg XL tablet Take 1 Tab by mouth daily. Qty: 90 Tab, Refills: 3  
  
famotidine (PEPCID) 40 mg tablet Take 1 Tab by mouth daily. Qty: 90 Tab, Refills: 3  
  
polyethylene glycol (MIRALAX) 17 gram/dose powder Take 17 g by mouth daily. Qty: 510 g, Refills: 2 Associated Diagnoses: Chronic constipation  
  
trimethoprim-polymyxin b (POLYTRIM) ophthalmic solution Administer 1 Drop to both eyes every four (4) hours. Qty: 10 mL, Refills: 0  
  
albuterol (PROVENTIL VENTOLIN) 2.5 mg /3 mL (0.083 %) nebu   
  
cholecalciferol (VITAMIN D3) 2,000 unit cap capsule TAKE 1 CAPSULE BY MOUTH EVERY DAY Refills: 2  
  
docusate sodium (COLACE) 100 mg capsule Take 100 mg by mouth daily. budesonide (PULMICORT) 0.5 mg/2 mL nbsp 2 mL by Nebulization route two (2) times a day. Qty: 1 Each, Refills: 0  
  
lisinopril (PRINIVIL, ZESTRIL) 10 mg tablet Take 1 Tab by mouth daily. Qty: 30 Tab, Refills: 0 OXYGEN-AIR DELIVERY SYSTEMS Use as instructed. Use as instructed. brimonidine (ALPHAGAN P) 0.1 % ophthalmic solution every eight (8) hours. Medications Discontinued During This Encounter Medication Reason  pantoprazole (PROTONIX) tablet 40 mg   
 methylPREDNISolone (PF) (SOLU-MEDROL) injection 40 mg   
 methylPREDNISolone (PF) (SOLU-MEDROL) injection 40 mg DUPLICATE ORDER Follow Up Orders: Follow-up Appointments Procedures  FOLLOW UP VISIT Appointment in: One Week PCP within 1 week for routine follow-up PCP within 1 week for routine follow-up Standing Status:   Standing Number of Occurrences:   1 Order Specific Question:   Appointment in Answer:    One Week  
 
 
Follow-up Information Follow up With Specialties Details Why Contact Info Lio Matias MD Family Medicine, Family Medicine On 5/28/2021 at 1:00 pm  [address] [facility] [place] [address] 
[phone] Discharge Exam: 
 
Patient Vitals for the past 24 hrs: 
 Temp Pulse Resp BP SpO2  
05/20/21 0911 98.1 °F (36.7 °C) 71 20 135/73 93 % 05/20/21 0854  71  (!) 158/73   
05/20/21 0720     98 % 05/20/21 0344  65     
05/20/21 0328 97.6 °F (36.4 °C) 70 18 (!) 149/72 96 % 05/20/21 0000  65     
05/19/21 2326 98 °F (36.7 °C) (!) 56 18 (!) 146/64 99 % 05/19/21 2140     100 % 05/19/21 1918 97.6 °F (36.4 °C) 62 18 (!) 150/57 100 % 05/19/21 1703     97 % 05/19/21 1510 98.4 °F (36.9 °C) (!) 56 18 (!) 141/65 94 % 05/19/21 1108 98.4 °F (36.9 °C) 62 18 (!) 141/63 100 % Oxygen Therapy O2 Sat (%): 93 % (05/20/21 0911) Pulse via Oximetry: 71 beats per minute (05/20/21 0720) O2 Device: Nasal cannula (05/20/21 0911) Skin Assessment: Clean, dry, & intact (05/19/21 2140) Skin Protection for O2 Device: No (05/19/21 2140) O2 Flow Rate (L/min): 2 l/min (05/20/21 0911) FIO2 (%): 98 % (05/18/21 1024) Estimated body mass index is 25.12 kg/m² as calculated from the following: 
  Height as of this encounter: 5' 9\" (1.753 m). Weight as of this encounter: 77.2 kg (170 lb 1.6 oz). Intake/Output Summary (Last 24 hours) at 5/20/2021 1017 Last data filed at 5/20/2021 4232 Gross per 24 hour Intake 1320 ml Output 1450 ml Net -130 ml  
   
*Note that automatically entered I/Os may not be accurate; dependent on patient compliance with collection and accurate  by assistants. Physical Exam:  
General: No acute distress, speaking in full sentences, no use of accessory muscles HEENT: Pupils equal; oropharynx is clear Neck: no JVD Lungs: Scattered wheezing Cardiovascular: Regular rate and rhythm with normal S1 and S2 Abdomen: Soft, nontender, nondistended, normoactive bowel sounds Extremities: No cyanosis clubbing or edema Neuro: Nonfocal, A&O x3  
Psych: Normal affect All Labs from Last 24 Hrs: 
Recent Results (from the past 24 hour(s)) CBC W/O DIFF Collection Time: 05/20/21  7:08 AM  
Result Value Ref Range WBC 11.9 (H) 4.3 - 11.1 K/uL  
 RBC 3.79 (L) 4.23 - 5.6 M/uL  
 HGB 10.2 (L) 13.6 - 17.2 g/dL HCT 33.8 (L) 41.1 - 50.3 % MCV 89.2 79.6 - 97.8 FL  
 MCH 26.9 26.1 - 32.9 PG  
 MCHC 30.2 (L) 31.4 - 35.0 g/dL  
 RDW 17.3 (H) 11.9 - 14.6 % PLATELET 652 350 - 048 K/uL MPV 10.7 9.4 - 12.3 FL ABSOLUTE NRBC 0.02 0.0 - 0.2 K/uL METABOLIC PANEL, BASIC Collection Time: 05/20/21  7:08 AM  
Result Value Ref Range Sodium 142 136 - 145 mmol/L Potassium 4.7 3.5 - 5.1 mmol/L Chloride 107 98 - 107 mmol/L  
 CO2 32 21 - 32 mmol/L Anion gap 3 (L) 7 - 16 mmol/L Glucose 88 65 - 100 mg/dL BUN 47 (H) 8 - 23 MG/DL Creatinine 1.81 (H) 0.8 - 1.5 MG/DL  
 GFR est AA 47 (L) >60 ml/min/1.73m2 GFR est non-AA 39 (L) >60 ml/min/1.73m2 Calcium 8.5 8.3 - 10.4 MG/DL All Micro Results Procedure Component Value Units Date/Time CULTURE, BLOOD [[id number]] Collected: 05/18/21 8781 Order Status: Completed Specimen: Blood Updated: 05/20/21 5750 Special Requests: --     
  LEFT Antecubital 
  
  Culture result: NO GROWTH 2 DAYS     
 CULTURE, BLOOD [[id number]] Collected: 05/18/21 4346 Order Status: Completed Specimen: Blood Updated: 05/20/21 1583 Special Requests: --     
  RIGHT Antecubital 
  
  Culture result: NO GROWTH 2 DAYS     
 BLOOD CULTURE [[id number]] Collected: 05/16/21 1339 Order Status: Completed Specimen: Blood Updated: 05/20/21 4759 Special Requests: --     
  RIGHT Antecubital 
  
  Culture result: NO GROWTH 3 DAYS     
 BLOOD CULTURE [[id number]]  (Abnormal) Collected: 05/17/21 0315 Order Status: Completed Specimen: Blood Updated: 05/19/21 7936   Special Requests: --     
  RIGHT 
HAND 
  
  GRAM STAIN    
  GRAM POS COCCI IN CLUSTERS  
     
   AEROBIC BOTTLE POSITIVE RESULTS VERIFIED, PHONED TO AND READ BACK BY Jay Girard 1122 5.18.21 SC Culture result: STAPHYLOCOCCUS SPECIES, COAGULASE NEGATIVE THIS ORGANISM MAY BE INDICATIVE OF CULTURE CONTAMINATION, HOWEVER, CLINICAL CORRELATION NEEDS TO BE EVALUATED, AS EACH CASE IS UNIQUE. Refer to Blood Culture ID Panel Accession [id number] BLOOD CULTURE ID PANEL [[id number]]  (Abnormal) Collected: 05/17/21 0315 Order Status: Completed Specimen: Blood Updated: 05/18/21 1150 Acc. no. from [employer] [id number] Staphylococcus Detected Comment: RESULTS VERIFIED, PHONED TO AND READ BACK BY 
Brendon Zhang RN @3106 5.18.21 SC 
  
  
  mecA (Methicillin-Resistance Genes) NOT DETECTED INTERPRETATION Gram positive cocci in clusters. Identified by realtime PCR as  Coagulase negative Staphylococci Comment: A single positive culture of coagulase negative Staph is likely to be a contaminant in adult patients. Consider discontinuation of antibiotics for gram positive bloodstream infections if patient asymptomatic. THIS TEST DOES NOT REPLACE SENSITIVITY TESTING. SARS-CoV-2 Lab Results \"Novel Coronavirus\" Test: No results found for: COV2NT \"Emergent Disease\" Test: No results found for: EDPR \"SARS-COV-2\" Test: No results found for: XGCOVT Rapid Test: No results found for: COVR Diagnostic Imaging/Tests: XR CHEST PORT Result Date: 5/17/2021 EXAM: XR CHEST PORT HISTORY: Syncope. TECHNIQUE: A single frontal view of the chest was submitted. COMPARISON: 12/21/2020 FINDINGS: The cardiac silhouette and pulmonary vasculature are within normal limits. Stable mediastinal widening. There is no consolidation, pleural effusion, or pneumothorax. No significant osseous abnormalities are observed.   
 
No evidence of an acute intrathoracic process. Echocardiogram/EKG results: No results found for this visit on 05/16/21. EKG Results Procedure 720 Value Units Date/Time EKG [[id number]] Collected: 05/16/21 2253 Order Status: Completed Updated: 05/17/21 1235 Ventricular Rate 87 BPM   
  Atrial Rate 87 BPM   
  P-R Interval 176 ms QRS Duration 94 ms Q-T Interval 384 ms QTC Calculation (Bezet) 462 ms Calculated P Axis 80 degrees Calculated R Axis 63 degrees Calculated T Axis 73 degrees Diagnosis --  
   
Normal sinus rhythm Normal ECG When compared with ECG of 21-DEC-2020 07:35, Sinus rhythm has replaced Atrial fibrillation Non-specific change in ST segment in Inferior leads Nonspecific T wave abnormality no longer evident in Inferior leads Nonspecific T wave abnormality no longer evident in Lateral leads Confirmed by Justyna Dugan ([id number]) on 5/17/2021 7:09:18 AM 
  
  
 
 
Results for orders placed or performed during the hospital encounter of 05/16/21 EKG, 12 LEAD, INITIAL Result Value Ref Range Ventricular Rate 87 BPM  
 Atrial Rate 87 BPM  
 P-R Interval 176 ms QRS Duration 94 ms Q-T Interval 384 ms QTC Calculation (Bezet) 462 ms Calculated P Axis 80 degrees Calculated R Axis 63 degrees Calculated T Axis 73 degrees Diagnosis Normal sinus rhythm Normal ECG When compared with ECG of 21-DEC-2020 07:35, Sinus rhythm has replaced Atrial fibrillation Non-specific change in ST segment in Inferior leads Nonspecific T wave abnormality no longer evident in Inferior leads Nonspecific T wave abnormality no longer evident in Lateral leads Confirmed by Justyna Dugan ([id number]) on 5/17/2021 7:09:18 AM 
  
Results for orders placed or performed in visit on 06/22/17 AMB POC EKG ROUTINE W/ 12 LEADS, INTER & REP Impression Sinus  Tachycardia  
-Right atrial enlargement.  
 -  Nonspecific T-abnormality. Procedures done this admission: * No surgery found * Time spent in patient discharge planning and coordination 40 minutes. Signed By: Isabel Bowman  [facility] Service  May 20, 2021  10:07 AM

## 2021-05-24 NOTE — TELEPHONE ENCOUNTER
[address] [place] [address] Scott Batista Pharmacist consult. Mr. Haroldo Makrs was discharged from [facility] with COPD. I spoke with him and explained my part of the [facility] team.  I reviewed his new medication of prednisone. He has and is taking per orders. He did not need to review him maintenance medications. He said he is having diarrhea which is just water and it burns his rectum. I told him he was on several medication for constipation which should be held--docusate and Miralax. I also reminded him of his MD appointment with Dr. Marleny Jacobo on Thursday at 8:20. I told him to review this issue with Dr. Marleny Jacobo. He asked me to talk with his aid Ayden Rich and gave me his number. He ask me to remind Phil Keepers of that also. I called Mr. John Campbell and got voice mail. I left my name and cell phone number for any medication questions or issues. I reminded him of Mr. Rojelio Favre MD appointment on Thursday.

## 2021-05-27 PROBLEM — K57.90 DIVERTICULOSIS: Status: ACTIVE | Noted: 2021-01-01

## 2021-05-27 PROBLEM — I48.91 ATRIAL FIBRILLATION WITH RAPID VENTRICULAR RESPONSE (HCC): Status: RESOLVED | Noted: 2020-01-01 | Resolved: 2021-01-01

## 2021-05-27 PROBLEM — E04.2 NONTOXIC MULTINODULAR GOITER: Status: ACTIVE | Noted: 2017-05-19

## 2021-05-27 PROBLEM — I48.92 ATRIAL FLUTTER (HCC): Status: RESOLVED | Noted: 2017-06-01 | Resolved: 2021-01-01

## 2021-06-09 PROBLEM — J96.21 ACUTE ON CHRONIC RESPIRATORY FAILURE WITH HYPOXIA AND HYPERCAPNIA (HCC): Status: ACTIVE | Noted: 2021-01-01

## 2021-06-09 PROBLEM — J96.22 ACUTE ON CHRONIC RESPIRATORY FAILURE WITH HYPERCAPNIA (HCC): Status: ACTIVE | Noted: 2021-01-01

## 2021-06-09 PROBLEM — J96.22 ACUTE ON CHRONIC RESPIRATORY FAILURE WITH HYPOXIA AND HYPERCAPNIA (HCC): Status: ACTIVE | Noted: 2021-01-01

## 2021-06-09 NOTE — PROGRESS NOTES
Chart review complete, CM met with pt at bedside, pt found laying in bed alert and oriented x4, pt states he lives in apartment alone on 9th floor states elevator to enter states has CLTC aid 5 days/week for 4 hours/day states aid provides transportation for appointments and assists with ADLS  Pt has oxygen provided from [employer] and nebulizer at home and walker for ambulation. Demographics, insurance and PCP confirmed    Pt made aware CM staff will continue to follow to assist with DC needs once placed in room verbalized understanding. Care Management Interventions  PCP Verified by CM: Yes  Mode of Transport at Discharge:  Other (see comment) (has CLTC aid to assist with transportation as needed)  Transition of Care Consult (CM Consult):  (pt is current with [facility] for RN, PT/OT)  Discharge Durable Medical Equipment: No  Physical Therapy Consult: No  Occupational Therapy Consult: No  Speech Therapy Consult: No  Current Support Network: Own Home, Lives Alone (9th floor apartment)  Confirm Follow Up Transport: Other (see comment) (CLTC aid)  Discharge Location  Discharge Placement: Unable to determine at this time

## 2021-06-09 NOTE — PROGRESS NOTES
Ct noted and no PE. No infiltrates. Does have elevated Right diaphragm. Lung lesion in SAMANTHA about 8.7 cm spiculated lesion is reportedly old per notes from [facility]. We don't have any images to compare. His doctor at [facility] can compare and we will tell him tomorrow. Given dyspnea and was acute. Put on remote telemetry. EKG with no ST changes and noted only LA enlargement. Cannot check troponins since machine is broken. May need cardiology to see patient if still persists.      Deyvi Posey MD

## 2021-06-09 NOTE — ED NOTES
TRANSFER - OUT REPORT:    Verbal [facility]  [facility] (unit) for routine progression of care       Report consisted of patients Situation, Background, Assessment and   Recommendations(SBAR). Information from the following report(s) SBAR, Kardex, ED Summary, [facility] and Recent Results was reviewed with the receiving nurse. Lines:   Peripheral IV 06/09/21 Left Hand (Active)   Site Assessment Clean, dry, & intact 06/09/21 0709   Phlebitis Assessment 0 06/09/21 0709   Infiltration Assessment 0 06/09/21 0709   Dressing Status Clean, dry, & intact 06/09/21 0709       Peripheral IV 06/09/21 Left Antecubital (Active)   Site Assessment Clean, dry, & intact 06/09/21 1117   Phlebitis Assessment 0 06/09/21 1117   Infiltration Assessment 0 06/09/21 1117   Dressing Status Clean, dry, & intact 06/09/21 1117        Opportunity for questions and clarification was provided.       Patient transported with:   [facility]

## 2021-06-09 NOTE — H&P
[facility] Hospitalist History and Physical       Name:  Penny Hoffmann. Age:80 y.o. Sex:male   :  1940    MRN:  [id number]   PCP:  Thomas Garcia MD      Admit Date:  2021  7:03 AM   Chief Complaint: dyspnea    Reason for Admission:   COPD exacerbation ([facility] [address] [J44.1]    Assessment & Plan:     # Acute on chronic hypoxic respiratory failure  - pulmonology has seen  - ? COPD exacerbation although may have cardiac component  - CT negative for PE but does show spiculated 9mm SAMANTHA  lung mass - ?stable per DANNA reports (follows with [facility] pulmonology)  - continue scheduled albuterol nebs, pulmicort  - steroids deferred (no wheezing)  - monitor oxygen requirement - try to wean to baseline 2-3L NC    # chronic systolic CHF  - EF 35% per last echo  - repeat TTE  - trend trops (first set 33.4)   - continue diuresis - IV lasix 40mg daily  - strict I/O's. # hx of Afib  - ?not on OAC  - rate controlled  - NSR on admission ECG  - monitor on telemetry  - asa 81 mg daily    # CKD stage 2-3  - cr 1.39 at admit  - suspect close to baseline  - monitor with diuresis    # Protein calorie malnutrition  - BMI 25  - consult nutrition    # Hypernatremia  - mild, Na 146  - monitor (getting lasix so anticipate to correct)      Disposition/Expected LOS: 2-3 days  Diet: DIET ADULT  VTE ppx: lovenox 40mg subq  Code status: Full Code      History of Presenting Illness:       10 AAM with pmhx of tob abuse, COPD, chronic hypoxic resp failure (on 2-3L NC at home) CKD stage 3, chronic systolic CHF, SAMANTHA spiculated lung mass (stable per CT readings at [facility]), hx of MAC unable to complete treatment secondary to qT prolongation presents to the ER with report of sudden onset dyspnea that woke patient from sleep. Was placed on CPAP by EMS and on BIPAP in ER - now weaned back to [place]. Given IV Solumedrol in ER. Patient not a clear historian with me. He denies any frequent cough, fever, chills.  Although reported to EMS his symptom onset was \"sudden\", tells me his dyspnea \"comes and goes\". Denies any current chest pain to me but said he has had \"tightness\" in his chest when he had trouble breathing. Hospitalist asked to admit for acute/chronic hypoxic resp failure. Review of Systems:  A 14 point review of systems was taken and pertinent positive as per HPI. Past Medical History:   Diagnosis Date    A-fib [facility]) 5/2/2014    AAA (abdominal aortic aneurysm) without rupture ([address] 5/2/2014    3.2 on US 2/14 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    OA (osteoarthritis) 5/2/2014    Peptic ulcer disease 6/1/2017    Poor historian     Prostate cancer ([address]     Pulmonary embolus ([address] 6/1/2017    Supplemental oxygen dependent 5/2/2014    Warfarin anticoagulation 5/2/2014       Past Surgical History:   Procedure Laterality Date    COLONOSCOPY N/A 9/12/2019    COLONOSCOPY/ 26 ROOM 614 performed by Adeola Rodriguez MD at [facility] ENDOSCOPY    EGD  12/18/2019         HX HEART CATHETERIZATION      VASCULAR SURGERY PROCEDURE UNLIST  09/14/2019     Bilateral common femoral artery cutdown with exposure and placement of catheter in aorta for aortogram,Endovascular repair of infrarenal abdominal aortic aneurysm with bifurcated modulated device (31 x 14 x 13 main body) via the left common femoral, right iliac extender measuring 27 x 10.        Family History : reviewed  Family History   Problem Relation Age of Onset    Cancer Mother     Heart Disease Father         Social History     Tobacco Use    Smoking status: Former Smoker     Packs/day: 2.00     Years: 40.00     Pack years: 80.00     Quit date: 2014     Years since quittin.8    Smokeless tobacco: Never Used    Tobacco comment: still taking Chantix    Substance Use Topics    Alcohol use: No       Allergies   Allergen Reactions    Statins-Hmg-Coa Reductase Inhibitors Myalgia     Muscle pain       Immunization History   Administered Date(s) Administered    Influenza Vaccine 10/27/2015    Influenza Vaccine (Quad) PF (>6 Mo Flulaval, Fluarix, and >3 Yrs Afluria, Fluzone 60615) 10/31/2012, 10/06/2014, 10/20/2016, 09/15/2018, 2019    Influenza Vaccine PF 10/26/2017    Influenza, Quadrivalent, Adjuvanted (>65 Yrs FLUAD QUAD 81638) 10/05/2020    Pneumococcal Conjugate (PCV-13) 2016, 10/26/2017    Pneumococcal Polysaccharide (PPSV-23) 2011, 2012, 2015, 2015, 2015    TB Skin Test (PPD) Intradermal 2018, 2019, 09/15/2019    Tdap 2015         PTA Medications:  Current Outpatient Medications   Medication Instructions    albuterol (PROVENTIL HFA, VENTOLIN HFA, PROAIR HFA) 90 mcg/actuation inhaler TAKE 2 PUFFS BY MOUTH EVERY 4 HOURS AS NEEDED FOR WHEEZE    albuterol (PROVENTIL VENTOLIN) 2.5 mg, Inhalation, EVERY 6 HOURS AS NEEDED    albuterol-ipratropium (DUO-NEB) 2.5 mg-0.5 mg/3 ml nebu 3 mL, Nebulization, 3 TIMES DAILY    aspirin delayed-release 81 mg, Oral, DAILY    brimonidine (ALPHAGAN P) 0.1 % ophthalmic solution 1 Drop, Both Eyes, 2 TIMES DAILY    budesonide (PULMICORT) 500 mcg, Nebulization, 2 TIMES DAILY    dilTIAZem IR (CARDIZEM) 120 mg, Oral, DAILY    docusate sodium (COLACE) 100 mg, Oral, DAILY    famotidine (PEPCID) 40 mg, Oral, DAILY    furosemide (LASIX) 20 mg, Oral, DAILY    latanoprost (XALATAN) 0.005 % ophthalmic solution LOCATION  BOTH EYES. INSTILL ONE DROP INTO EACH EYE AT BEDTIME    lisinopriL (PRINIVIL, ZESTRIL) 10 mg, Oral, DAILY    metoprolol succinate (TOPROL-XL) 50 mg, Oral, DAILY    nitroglycerin (NITROSTAT) 0.4 mg, SubLINGual, EVERY 5 MIN AS NEEDED    OXYGEN-AIR DELIVERY SYSTEMS 2 L/min, Inhalation, DAILY, 2 L/min continuously inhalation via nasal canula    polyethylene glycol (MIRALAX) 17 g, Oral, DAILY    psyllium (METAMUCIL) powd 1 Scoop, Oral, DAILY, mix with 8 ounces of water    rosuvastatin (CRESTOR) 40 mg, Oral, EVERY BEDTIME    tamsulosin (FLOMAX) 0.4 mg, Oral, DAILY    trimethoprim-polymyxin b (POLYTRIM) ophthalmic solution 1 Drop, Both Eyes, EVERY 4 HOURS       Objective:     Patient Vitals for the past 24 hrs:   Pulse Resp BP SpO2   06/09/21 1821 98 20 (!) 181/85 100 %   06/09/21 1802 99 21 (!) 167/79 100 %   06/09/21 1742 100 18 (!) 168/82 100 %   06/09/21 1702 97 16 (!) 142/76 99 %   06/09/21 1621 98 20 (!) 152/74 100 %   06/09/21 1602 100 17 (!) 140/63 100 %   06/09/21 1524    99 %   06/09/21 1421 (!) 107  (!) 145/94 97 %   06/09/21 1342 95  (!) 159/97 99 %   06/09/21 1321 96  (!) 140/66 99 %   06/09/21 1121 86  (!) 162/85 100 %   06/09/21 1101 83  (!) 183/84 100 %   06/09/21 1041 84  (!) 170/75 100 %   06/09/21 1021 80  (!) 180/85 100 %   06/09/21 1001 80  (!) 169/79 100 %   06/09/21 0941 85 23 (!) 171/79 100 %   06/09/21 0930    97 %   06/09/21 0920    98 %   06/09/21 0902 76 17 (!) 151/71 99 %   06/09/21 0841 80 16 (!) 177/79 99 %   06/09/21 0821 71 16 (!) 157/72 98 %   06/09/21 0801 76 17 (!) 151/68 98 %   06/09/21 0741 84 15 (!) 158/70 98 %   06/09/21 0735    98 %   06/09/21 0721 92 16 (!) 153/68 98 %   06/09/21 0706 94 20 (!) 144/60 100 %       Oxygen Therapy  O2 Sat (%): 100 % (06/09/21 1821)  Pulse via Oximetry: 99 beats per minute (06/09/21 1821)  O2 Device: Nasal cannula (06/09/21 1524)  O2 Flow Rate (L/min): 3 l/min (06/09/21 1524)  FIO2 (%): 32 % (06/09/21 1524)    Body mass index is 25.1 kg/m².     Physical Exam:    General:  Thin, chronically ill appearingNo acute distress, speaking in full sentences  HEENT:  Pupils equal and reactive to light and accommodation, oropharynx is clear   Neck:   Supple, no lymphadenopathy, no JVD   Lungs:  Diminished w/o wheezing or rhonchi  CV:   Regular rate and rhythm with normal S1 and S2   Abdomen:  Soft, nontender, nondistended, normoactive bowel sounds   Extremities:  No cyanosis clubbing or edema   Neuro:  Nonfocal, A&O x3   Psych:  Normal mood and affect       Data Reviewed: I have reviewed all labs, meds, and studies. Recent Results (from the past 24 hour(s))   CBC WITH AUTOMATED DIFF    Collection Time: 06/09/21  7:10 AM   Result Value Ref Range    WBC 6.0 4.3 - 11.1 K/uL    RBC 3.50 (L) 4.23 - 5.6 M/uL    HGB 9.6 (L) 13.6 - 17.2 g/dL    HCT 31.9 (L) 41.1 - 50.3 %    MCV 91.1 79.6 - 97.8 FL    MCH 27.4 26.1 - 32.9 PG    MCHC 30.1 (L) 31.4 - 35.0 g/dL    RDW 16.7 (H) 11.9 - 14.6 %    PLATELET 157 798 - 357 K/uL    MPV 10.4 9.4 - 12.3 FL    ABSOLUTE NRBC 0.00 0.0 - 0.2 K/uL    DF AUTOMATED      NEUTROPHILS 44 43 - 78 %    LYMPHOCYTES 45 (H) 13 - 44 %    MONOCYTES 8 4.0 - 12.0 %    EOSINOPHILS 3 0.5 - 7.8 %    BASOPHILS 0 0.0 - 2.0 %    IMMATURE GRANULOCYTES 0 0.0 - 5.0 %    ABS. NEUTROPHILS 2.6 1.7 - 8.2 K/UL    ABS. LYMPHOCYTES 2.7 0.5 - 4.6 K/UL    ABS. MONOCYTES 0.5 0.1 - 1.3 K/UL    ABS. EOSINOPHILS 0.2 0.0 - 0.8 K/UL    ABS. BASOPHILS 0.0 0.0 - 0.2 K/UL    ABS. IMM. GRANS. 0.0 0.0 - 0.5 K/UL   METABOLIC PANEL, COMPREHENSIVE    Collection Time: 06/09/21  7:10 AM   Result Value Ref Range    Sodium 146 (H) 136 - 145 mmol/L    Potassium 4.0 3.5 - 5.1 mmol/L    Chloride 111 (H) 98 - 107 mmol/L    CO2 29 21 - 32 mmol/L    Anion gap 6 (L) 7 - 16 mmol/L    Glucose 106 (H) 65 - 100 mg/dL    BUN 15 8 - 23 MG/DL    Creatinine 1.39 0.8 - 1.5 MG/DL    GFR est AA >60 >60 ml/min/1.73m2    GFR est non-AA 53 (L) >60 ml/min/1.73m2    Calcium 8.5 8.3 - 10.4 MG/DL    Bilirubin, total 0.2 0.2 - 1.1 MG/DL    ALT (SGPT) 12 12 - 65 U/L    AST (SGOT) 12 (L) 15 - 37 U/L    Alk.  phosphatase 95 50 - 136 U/L    Protein, total 6.6 6.3 - 8.2 g/dL Albumin 2.9 (L) 3.2 - 4.6 g/dL    Globulin 3.7 (H) 2.3 - 3.5 g/dL    A-G Ratio 0.8 (L) 1.2 - 3.5     TROPONIN-HIGH SENSITIVITY    Collection Time: 06/09/21  7:10 AM   Result Value Ref Range    Troponin-High Sensitivity 33.4 (H) 0 - 14 pg/mL   NT-PRO BNP    Collection Time: 06/09/21  7:10 AM   Result Value Ref Range    NT pro- (H) <450 PG/ML   EKG, 12 LEAD, INITIAL    Collection Time: 06/09/21  7:10 AM   Result Value Ref Range    Ventricular Rate 91 BPM    Atrial Rate 91 BPM    P-R Interval 188 ms    QRS Duration 68 ms    Q-T Interval 342 ms    QTC Calculation (Bezet) 420 ms    Calculated P Axis 87 degrees    Calculated R Axis 73 degrees    Calculated T Axis 89 degrees    Diagnosis       Normal sinus rhythm  Right atrial enlargement  Anteroseptal infarct , age undetermined  Abnormal ECG  No previous ECGs available  Confirmed by Villalpando Herrera [id number]) on 6/9/2021 2:49:31 PM     BLOOD GAS, ARTERIAL POC    Collection Time: 06/09/21  7:22 AM   Result Value Ref Range    Device: NASAL CANNULA      FIO2 (POC) 28 %    pH (POC) 7.26 (L) 7.35 - 7.45      pCO2 (POC) 67.9 (HH) 35 - 45 MMHG    pO2 (POC) 79 75 - 100 MMHG    HCO3 (POC) 30.6 (H) 22 - 26 MMOL/L    sO2 (POC) 92.8 (L) 95 - 98 %    Base excess (POC) 1.5 mmol/L    Allens test (POC) Positive      Site RIGHT RADIAL      Specimen type (POC) ARTERIAL      Performed by Valentín     Critical value read back     BLOOD GAS, ARTERIAL POC    Collection Time: 06/09/21  9:24 AM   Result Value Ref Range    Device: BIPAP MASK      FIO2 (POC) 28 %    pH (POC) 7.32 (L) 7.35 - 7.45      pCO2 (POC) 60.1 (HH) 35 - 45 MMHG    pO2 (POC) 65 (L) 75 - 100 MMHG    HCO3 (POC) 30.9 (H) 22 - 26 MMOL/L    sO2 (POC) 89.8 (L) 95 - 98 %    Base excess (POC) 3.5 mmol/L    Mode CPAP/PS      Tidal volume 533 ml    PEEP/CPAP (POC) 6 cmH2O    Allens test (POC) Positive      Site RIGHT BRACHIAL      Specimen type (POC) ARTERIAL      Performed by Valentín     Critical value read back      Respiratory comment: [id number]    DRUG SCREEN, URINE    Collection Time: 06/09/21 10:27 AM   Result Value Ref Range    PCP(PHENCYCLIDINE) Negative      BENZODIAZEPINES Negative      COCAINE Negative      AMPHETAMINES Negative      METHADONE Negative      THC (TH-CANNABINOL) Negative      OPIATES Negative      BARBITURATES Negative         EKG Results     Procedure 720 Value Units Date/Time    EKG 12 LEAD INITIAL [[id number]] Collected: 06/09/21 0710    Order Status: Completed Updated: 06/09/21 1449     Ventricular Rate 91 BPM      Atrial Rate 91 BPM      P-R Interval 188 ms      QRS Duration 68 ms      Q-T Interval 342 ms      QTC Calculation (Bezet) 420 ms      Calculated P Axis 87 degrees      Calculated R Axis 73 degrees      Calculated T Axis 89 degrees      Diagnosis --     Normal sinus rhythm  Right atrial enlargement  Anteroseptal infarct , age undetermined  Abnormal ECG  No previous ECGs available  Confirmed by Alyce Alaniz ([id number]) on 6/9/2021 2:49:31 PM            All Micro Results     None          Other Studies:  XR CHEST SNGL V    Result Date: 6/9/2021  EXAM: CHEST SINGLE VW INDICATION: Chest pain COMPARISON: Chest radiographs, 5/16/2021 FINDINGS: The cardiomediastinal silhouette remains unchanged with prominence of the pulmonary arteries and atherosclerotic calcifications in the aorta. No pleural effusion or pneumothorax. No focal parenchymal process. No acute osseous abnormality. No acute cardiopulmonary abnormality. CT CHEST PULMONARY EMBOLISM    Result Date: 6/9/2021  EXAM: CT Chest with contrast. INDICATION: Concern for PE. History of PE and atrial fibrillation. Not on anticoagulation. Comparison: None. Multiple axial images were obtained through the chest during intravenous infusion of 100mL of Isovue 370. Coronal 2D MIP image reformats were performed. Radiation dose reduction techniques were used for this study:   All CT scans performed at this facility use one or all of the following: Automated exposure control, adjustment of the mA and/or kVp according to patient's size, iterative reconstruction. FINDINGS: Examination is diagnostic through the segmental level, except in the right lower lobe where motion artifact limits evaluation to the lobar level. No evidence of acute or chronic pulmonary embolism. The main pulmonary artery and thoracic aorta are normal in caliber. Atherosclerotic calcifications throughout the aorta and coronary arteries. Partially visualized and vascular stent in the infrarenal abdominal aorta which appears aneurysmal. LUNGS/PLEURA: Central airways are patent. Moderate upper lobe predominant centrilobular emphysema. There is a mildly spiculated left apical solid pulmonary nodule measuring 9 mm (axial image 24). No consolidation to suggest pneumonia. No pleural effusion or pneumothorax. MEDIASTINUM: Thyroid is mildly enlarged with streak artifacts tearing evaluation. The thoracic esophagus is within normal limits. No mediastinal or hilar lymphadenopathy. Heart is normal in size without pericardial effusion. BONES/SUBCUTANEOUS TISSUE: No aggressive osseous lesion. No subcutaneous soft tissue abnormality. UPPER ABDOMEN: Multiple simple cysts are again seen throughout the liver with a few additional subcentimeter hypodensities are too small to characterize but stable. There are stable simple cyst on both kidneys. Unchanged subcentimeter nodule in the right adrenal gland, likely representing an adenoma given stability. 1.  No evidence of pulmonary embolism or other acute process in the chest. 2. Mildly spiculated 9 mm left apical pulmonary nodule worrisome for primary lung malignancy. Consider further evaluation with PET/CT. 3. Moderate centrilobular emphysema.          Medications:  Medications Administered      Medications Administered     albuterol (PROVENTIL VENTOLIN) nebulizer solution 2.5 mg     Admin Date  06/09/2021 Action  Given Dose  2.5 mg Route  Nebulization Administered By  Mi Steiner, RT          albuterol-ipratropium (DUO-NEB) 2.5 MG-0.5 MG/3 ML     Admin Date  06/09/2021 Action  Given Dose  3 mL Route  Nebulization Administered By  Mi Steiner, RT          iopamidoL (ISOVUE-370) 76 % injection 100 mL     Admin Date  06/09/2021 Action  Given Dose  100 mL Route  IntraVENous Administered By  Danya SURESH, RT, R, CT          saline peripheral flush soln 10 mL     Admin Date  06/09/2021 Action  Given Dose  10 mL Route  InterCATHeter Administered By  Danya SURESH, RT, R, CT          sodium chloride (NS) flush 5-40 mL     Admin Date  06/09/2021 Action  Given Dose  5 mL Route  IntraVENous Administered By  Kevin Samuels RN          sodium chloride 0.9 % bolus infusion 100 mL     Admin Date  06/09/2021 Action  New Bag Dose  100 mL Route  IntraVENous Administered By  Katie Pinzon, RT, R, CT                      Problem List:     Hospital Problems as of 6/9/2021 Date Reviewed: 5/27/2021        Codes Class Noted - Resolved POA    Acute on chronic respiratory failure with hypoxia and hypercapnia (HCC) ICD-10-CM: J96.21, J96.22  ICD-9-CM: 518.84, 786.09, 799.02  6/9/2021 - Present Unknown        Moderate protein-calorie malnutrition ([facility] ICD-10-CM: E44.0  ICD-9-CM: 263.0  5/19/2021 - Present Yes        Short-term memory loss ICD-10-CM: R41.3  ICD-9-CM: 780.93  5/18/2021 - Present Yes        COPD exacerbation ([facility] ICD-10-CM: J44.1  ICD-9-CM: 491.21  5/17/2021 - Present Unknown        Chronic systolic heart failure ([facility] ICD-10-CM: I50.22  ICD-9-CM: 428.22  12/21/2020 - Present Yes        Chronic hypoxemic respiratory failure (HCC) (Chronic) ICD-10-CM: J96.11  ICD-9-CM: 518.83, 799.02  9/9/2019 - Present Yes        Heart failure with reduced ejection fraction (HCC) (Chronic) ICD-10-CM: I50.20  ICD-9-CM: 428.20  9/9/2019 - Present Yes        CKD (chronic kidney disease), stage III (HCC) (Chronic) ICD-10-CM: N18.30  ICD-9-CM: 585.3  9/9/2019 - Present Yes        A-fib ([facility] ICD-10-CM: I48.91  ICD-9-CM: 427.31  7/23/2018 - Present Yes        Hypertension ICD-10-CM: I10  ICD-9-CM: 401.9  6/1/2017 - Present Yes        COPD (chronic obstructive pulmonary disease) ([facility] (Chronic) ICD-10-CM: J44.9  ICD-9-CM: 496  5/2/2014 - Present Yes        Hyperlipemia (Chronic) ICD-10-CM: E78.5  ICD-9-CM: 272.4  5/2/2014 - Present Yes               Signed By: DO Elsi Velázquez Hospitalist Service    June 9, 2021  4:22 PM

## 2021-06-09 NOTE — ED TRIAGE NOTES
Pt arrives via [facility] from home with a complaint of SOB. Per EMS patient was woken from sleep with SOB. Per EMS RA sat was 88% on arrival. EMS placed patient on CPAP and administered 5mg albuterol, 125mg Solumedrol and 2G mag IV. Pt wears oxygen 2LNC at home. Pt has hx of CHF and COPD. MD Jose Elias in room to evaluate.

## 2021-06-09 NOTE — ED PROVIDER NOTES
68-year-old male patient presents to the emergency department via EMS on CPAP with reports of sudden onset shortness of breath that woke him from sleep over the evening. Patient states he was feeling well yesterday. He reports tightness in his chest and occasional cough that is nonproductive. He denies known sick contacts, active fever or chills. Patient reports no nausea or vomiting. States he attempted a breathing treatment at home without effect. EMS reports saturations of 88% on arrival, normalized with CPAP in route. He received Solu-Medrol, albuterol and magnesium prior to his arrival and feels better upon arriving to this department. Patient denies any significant swelling in his hands or feet. He uses oxygen at home at 2 L nasal cannula at all times.            Past Medical History:   Diagnosis Date    A-fib [facility]) 5/2/2014    AAA (abdominal aortic aneurysm) without rupture ([address] 5/2/2014    3.2 on US 2/14 [facility]    Arthritis     Cancer ([facility] [address]     hx prostate cancer    COPD (chronic obstructive pulmonary disease) ([address] 5/2/2014    Elevated prostate specific antigen (PSA)     Glaucoma 5/2/2014    Heart disease     Heart failure ([address]     Hyperlipemia 5/2/2014    Hypertension     Hypertrophy of prostate with urinary obstruction and other lower urinary tract symptoms (LUTS)     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    OA (osteoarthritis) 5/2/2014    Peptic ulcer disease 6/1/2017    Poor historian     Prostate cancer ([address]     Pulmonary embolus ([address] 6/1/2017    Supplemental oxygen dependent 5/2/2014    Warfarin anticoagulation 5/2/2014       Past Surgical History:   Procedure Laterality Date    COLONOSCOPY N/A 9/12/2019    COLONOSCOPY/ 26 ROOM 614 performed by Adeola Rodriguez MD at [place] [address] EGD  12/18/2019         HX HEART CATHETERIZATION      VASCULAR SURGERY PROCEDURE UNLIST  09/14/2019     Bilateral common femoral artery cutdown with exposure and placement of catheter in aorta for aortogram,Endovascular repair of infrarenal abdominal aortic aneurysm with bifurcated modulated device (31 x 14 x 13 main body) via the left common femoral, right iliac extender measuring 27 x 10. Family History:   Problem Relation Age of Onset    Cancer Mother     Heart Disease Father        Social History     Socioeconomic History    Marital status: SINGLE     Spouse name: Not on file    Number of children: Not on file    Years of education: Not on file    Highest education level: Not on file   Occupational History    Not on file   Tobacco Use    Smoking status: Former Smoker     Packs/day: 2.00     Years: 40.00     Pack years: 80.00     Quit date: 2014     Years since quittin.8    Smokeless tobacco: Never Used    Tobacco comment: still taking Chantix    Substance and Sexual Activity    Alcohol use: No    Drug use: No    Sexual activity: Yes     Partners: Female     Birth control/protection: None   Other Topics Concern    Not on file   Social History Narrative    Not on file     Social Determinants of Health     Financial Resource Strain:     Difficulty of Paying Living Expenses:    Food Insecurity:     Worried About Running Out of Food in the Last Year:     Ran Out of Food in the Last Year:    Transportation Needs:     Lack of Transportation (Medical):  Lack of Transportation (Non-Medical):    Physical Activity:     Days of Exercise per Week:     Minutes of Exercise per Session:    Stress:     Feeling of Stress :    Social Connections:     Frequency of Communication with Friends and Family:     Frequency of Social Gatherings with Friends and Family:     Attends Buddhist Services:     Active Member of Clubs or Organizations:     Attends Club or Organization Meetings:     Marital Status:    Intimate Partner Violence:     Fear of Current or Ex-Partner:     Emotionally Abused:     Physically Abused:     Sexually Abused:           ALLERGIES: Statins-hmg-coa reductase inhibitors    Review of Systems   Constitutional: Negative for chills, diaphoresis and fever. HENT: Negative for congestion, sneezing and sore throat. Eyes: Negative for visual disturbance. Respiratory: Positive for cough, chest tightness and shortness of breath. Negative for wheezing. Cardiovascular: Negative for chest pain and leg swelling. Gastrointestinal: Negative for abdominal pain, blood in stool, diarrhea, nausea and vomiting. Endocrine: Negative for polyuria. Genitourinary: Negative for difficulty urinating, dysuria, flank pain, hematuria and urgency. Musculoskeletal: Negative for back pain, myalgias, neck pain and neck stiffness. Skin: Negative for color change and rash. Neurological: Negative for dizziness, syncope, speech difficulty, weakness, light-headedness, numbness and headaches. Psychiatric/Behavioral: Negative for behavioral problems. All other systems reviewed and are negative. Vitals:    06/09/21 0706   BP: (!) 144/60   Pulse: 94   Resp: 20   SpO2: 100%   Weight: 77.1 kg (170 lb)   Height: 5' 9\" (1.753 m)            Physical Exam  Vitals and nursing note reviewed. Constitutional:       General: He is not in acute distress. Appearance: He is well-developed. He is not diaphoretic. Comments: Alert and oriented to person place and time. No acute distress, speaks in clear, fluid sentences. HENT:      Head: Normocephalic and atraumatic. Right Ear: External ear normal.      Left Ear: External ear normal.      Nose: Nose normal.   Eyes:      Pupils: Pupils are equal, round, and reactive to light. Cardiovascular:      Rate and Rhythm: Normal rate and regular rhythm. Heart sounds: Normal heart sounds. No murmur heard. No friction rub. No gallop. Pulmonary:      Effort: Pulmonary effort is normal. No respiratory distress. Breath sounds: No stridor. Decreased breath sounds present. No wheezing, rhonchi or rales. Comments: Diminished, coarse throughout. Coughs intermittently during exam.  Chest:      Chest wall: No tenderness. Abdominal:      General: There is no distension. Palpations: Abdomen is soft. There is no mass. Tenderness: There is no abdominal tenderness. There is no guarding or rebound. Hernia: No hernia is present. Musculoskeletal:         General: No tenderness or deformity. Normal range of motion. Cervical back: Normal range of motion. Skin:     General: Skin is warm and dry. Neurological:      Mental Status: He is alert and oriented to person, place, and time. Cranial Nerves: No cranial nerve deficit. MDM  Number of Diagnoses or Management Options  Diagnosis management comments: ABG reveals hypercapnia with acidosis, we will place patient on trial of BiPAP and reassess. Amount and/or Complexity of Data Reviewed  Clinical lab tests: ordered and reviewed  Tests in the radiology section of CPT®: ordered and reviewed  Tests in the medicine section of CPT®: ordered and reviewed  Independent visualization of images, tracings, or specimens: yes    Risk of Complications, Morbidity, and/or Mortality  Presenting problems: high  Diagnostic procedures: high  Management options: high    Patient Progress  Patient progress: stable    ED Course as of Jun 09 0723 Wed Jun 09, 2021   0715 EKG interpretation: Sinus rhythm, rate of 91 beats a minute, normal axis, no ischemia, underlying artifact present.     [BR]      ED Course User Index  [BR] Arabella Gale,        Procedures

## 2021-06-09 NOTE — CONSULTS
CONSULT NOTE    Soraida Yanez.    6/9/2021    Date of Admission:  6/9/2021    The patient's chart is reviewed and the patient is discussed with the staff. Subjective:     Patient is a 68 y.o.  male seen and evaluated at the request of Dr. Ellison Soulier. Patient has PMH of HFrEF (EF 30-35%), COPD, CKD, PE, AFib- not on anticoagulation, AAA with repair, HTN. Former smoker quit in 2014, 2 ppd for 30 years. Denies illicit drugs, alcohol or current smoking. He wears 2 L NC at home, 3L with exertion. He also has hx of MAC infection, pulmonary nodules and masslike density of R lung base felt to be pseudotumor. He was treated for MAC infection for 6 months in 2018 and was stopped due to prolonged QTc. He was recently admitted 5/16-20 for COPD exacerbation and was discharged on steroids. He follows with pulmonary at [facility]. He presented to the ER today because patient states he couldn't breathe. He was feeling his normal self until last night when he states he became acutely short of breath. He states his nebulizers were not working and he felt a little wheezing. He denies any pain, N/V or edema. He states he has lost about 15 lbs in 2 weeks. He denies any fevers or sick contacts. Received COVID vaccines 2 months ago. Placed on CPA and given solumedrol by EMS, BiPAP in ER. ABG on arrival resp acidosis- 7.26/67.9/30.6. Repeat ABG improved 7.32/30.1/30.9. nt , trop 33.4. Review of Systems  A comprehensive review of systems was negative except for that written in the HPI.     Patient Active Problem List   Diagnosis Code    AAA (abdominal aortic aneurysm) without rupture (HCC) I71.4    COPD (chronic obstructive pulmonary disease) (HCC) J44.9    Hyperlipemia E78.5    Glaucoma H40.9    Supplemental oxygen dependent Z99.81    OA (osteoarthritis) M19.90    Renal cysts, acquired, bilateral N28.1    Tobacco use disorder F17.200    Hypertension I10    Cardiomyopathy ([facility] [address] I42.9    Peptic ulcer disease K27.9    History of pulmonary embolism Z86.711    A-fib ([facility]) I48.91    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[facility]) J15.8, A31.9    Acute on chronic respiratory failure with hypoxia ([facility]) J96.21    Multiple pulmonary nodules R91.8    Absolute anemia D64.9    GI bleed K92.2    Chronic hypoxemic respiratory failure ([facility]) J96.11    Heart failure with reduced ejection fraction ([facility]) I50.20    CKD (chronic kidney disease), stage III ([facility]) N18.30    AAA (abdominal aortic aneurysm) ([facility]) I71.4    Chronic systolic heart failure ([facility]) I50.22    History of GI bleed Z87.19    Iron deficiency anemia D50.9    Prostate cancer ([facility]) C61    COPD exacerbation ([facility]) J44.1    Short-term memory loss R41.3    Moderate protein-calorie malnutrition ([facility]) E44.0    Vitamin D deficiency E55.9    Nontoxic multinodular goiter E04.2    Diverticulosis K57.90    Acute on chronic respiratory failure with hypoxia and hypercapnia ([facility]) J96.21, J96.22       Prior to Admission Medications   Prescriptions Last Dose Informant Patient Reported? Taking? OXYGEN-AIR DELIVERY SYSTEMS   Yes No   Sig: Take 2 L/min by inhalation daily. 2 L/min continuously inhalation via nasal canula   albuterol (PROVENTIL HFA, VENTOLIN HFA, PROAIR HFA) 90 mcg/actuation inhaler   Yes No   Sig: TAKE 2 PUFFS BY MOUTH EVERY 4 HOURS AS NEEDED FOR WHEEZE   albuterol (PROVENTIL VENTOLIN) 2.5 mg /3 mL (0.083 %) nebu   Yes No   Sig: Take 2.5 mg by inhalation every six (6) hours as needed for Shortness of Breath or Wheezing. albuterol-ipratropium (DUO-NEB) 2.5 mg-0.5 mg/3 ml nebu   No No   Sig: 3 mL by Nebulization route three (3) times daily for 180 days. aspirin delayed-release 81 mg tablet   Yes No   Sig: Take 81 mg by mouth daily. brimonidine (ALPHAGAN P) 0.1 % ophthalmic solution   Yes No   Sig: Administer 1 Drop to both eyes two (2) times a day.    budesonide (PULMICORT) 0.5 mg/2 mL nbsp   No No   Si mL by Nebulization route two (2) times a day. dilTIAZem IR (CARDIZEM) 120 mg tablet   No No   Sig: Take 1 Tablet by mouth daily. docusate sodium (COLACE) 100 mg capsule   No No   Sig: Take 1 Capsule by mouth daily. famotidine (PEPCID) 40 mg tablet   No No   Sig: Take 1 Tab by mouth daily. furosemide (Lasix) 40 mg tablet   No No   Sig: Take 0.5 Tabs by mouth daily. Patient taking differently: Take 40 mg by mouth every other day. latanoprost (XALATAN) 0.005 % ophthalmic solution   Yes No   Sig: LOCATION  BOTH EYES. INSTILL ONE DROP INTO EACH EYE AT BEDTIME   lisinopril (PRINIVIL, ZESTRIL) 10 mg tablet   No No   Sig: Take 1 Tab by mouth daily. metoprolol succinate (TOPROL-XL) 50 mg XL tablet   No No   Sig: Take 1 Tab by mouth daily. nitroglycerin (NITROSTAT) 0.4 mg SL tablet   No No   Si Tab by SubLINGual route every five (5) minutes as needed for Chest Pain (call EMS if pain fails to resolve after 2 tabs. max daily dose of 3 tabs). Indications: after 15 minutes or 3 doses, if chest pain persists, contact EMS/911   polyethylene glycol (MIRALAX) 17 gram/dose powder   No No   Sig: Take 17 g by mouth daily. Patient not taking: Reported on 2021   psyllium (METAMUCIL) powd   Yes No   Sig: Take 1 Scoop by mouth daily. mix with 8 ounces of water   rosuvastatin (Crestor) 40 mg tablet   No No   Sig: Take 1 Tab by mouth nightly. tamsulosin (FLOMAX) 0.4 mg capsule   No No   Sig: Take 1 Cap by mouth daily. trimethoprim-polymyxin b (POLYTRIM) ophthalmic solution   No No   Sig: Administer 1 Drop to both eyes every four (4) hours.    Patient not taking: Reported on 2021      Facility-Administered Medications: None       Past Medical History:   Diagnosis Date    A-fib [facility]) 2014    AAA (abdominal aortic aneurysm) without rupture ([facility] 2014    3.2 on US  [facility]    Arthritis     Cancer ([facility] [address]     hx prostate cancer    [facility]) [facility] 2014    Elevated prostate specific antigen (PSA)     Glaucoma 2014    Heart disease     Heart failure ([address]     Hyperlipemia 2014    Hypertension     Hypertrophy of prostate with urinary obstruction and other lower urinary tract symptoms (LUTS)     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    OA (osteoarthritis) 2014    Peptic ulcer disease 2017    Poor historian     Prostate cancer ([address]     Pulmonary embolus ([address] 2017    Supplemental oxygen dependent 2014    Warfarin anticoagulation 2014     Past Surgical History:   Procedure Laterality Date    COLONOSCOPY N/A 2019    COLONOSCOPY/ 26 ROOM 614 performed by [facility] at [facility] ENDOSCOPY    EGD  2019         HX HEART CATHETERIZATION      VASCULAR SURGERY PROCEDURE UNLIST  2019     Bilateral common femoral artery cutdown with exposure and placement of catheter in aorta for aortogram,Endovascular repair of infrarenal abdominal aortic aneurysm with bifurcated modulated device (31 x 14 x 13 main body) via the left common femoral, right iliac extender measuring 27 x 10.      Social History     Socioeconomic History    Marital status: SINGLE     Spouse name: Not on file    Number of children: Not on file    Years of education: Not on file    Highest education level: Not on file   Occupational History    Not on file   Tobacco Use    Smoking status: Former Smoker     Packs/day: 2.00     Years: 40.00     Pack years: 80.00     Quit date: 2014     Years since quittin.8    Smokeless tobacco: Never Used    Tobacco comment: still taking Chantix    Substance and Sexual Activity    Alcohol use: No    Drug use: No    Sexual activity: Yes     Partners: Female     Birth control/protection: None   Other Topics Concern    Not on file   Social History Narrative    Not on file     Social Determinants of Health     Financial Resource Strain:     Difficulty of Paying Living Expenses:    Food Insecurity:     Worried About Running Out of Food in the Last Year:    [address] in the Last Year:    Transportation Needs:     Lack of Transportation (Medical):  Lack of Transportation (Non-Medical):    Physical Activity:     Days of Exercise per Week:     Minutes of Exercise per Session:    Stress:     Feeling of Stress :    Social Connections:     Frequency of Communication with Friends and Family:     Frequency of Social Gatherings with Friends and Family:     Attends Yazdanism Services:     Active Member of Clubs or Organizations:     Attends Club or Organization Meetings:     Marital Status:    Intimate Partner Violence:     Fear of Current or Ex-Partner:     Emotionally Abused:     Physically Abused:     Sexually Abused:      Family History   Problem Relation Age of Onset    Cancer Mother     Heart Disease Father      Allergies   Allergen Reactions    Statins-Hmg-Coa Reductase Inhibitors Myalgia     Muscle pain       Current Facility-Administered Medications   Medication Dose Route Frequency    sodium chloride 0.9 % bolus infusion 100 mL  100 mL IntraVENous RAD ONCE    iopamidoL (ISOVUE-370) 76 % injection 100 mL  100 mL IntraVENous RAD ONCE    saline peripheral flush soln 10 mL  10 mL InterCATHeter RAD ONCE     Current Outpatient Medications   Medication Sig    psyllium (METAMUCIL) powd Take 1 Scoop by mouth daily. mix with 8 ounces of water    dilTIAZem IR (CARDIZEM) 120 mg tablet Take 1 Tablet by mouth daily.  docusate sodium (COLACE) 100 mg capsule Take 1 Capsule by mouth daily.  albuterol-ipratropium (DUO-NEB) 2.5 mg-0.5 mg/3 ml nebu 3 mL by Nebulization route three (3) times daily for 180 days.  rosuvastatin (Crestor) 40 mg tablet Take 1 Tab by mouth nightly.  nitroglycerin (NITROSTAT) 0.4 mg SL tablet 1 Tab by SubLINGual route every five (5) minutes as needed for Chest Pain (call EMS if pain fails to resolve after 2 tabs. max daily dose of 3 tabs).  Indications: after 15 minutes or 3 doses, if chest pain persists, contact EMS/911    tamsulosin (FLOMAX) 0.4 mg capsule Take 1 Cap by mouth daily.  furosemide (Lasix) 40 mg tablet Take 0.5 Tabs by mouth daily. (Patient taking differently: Take 40 mg by mouth every other day.)    aspirin delayed-release 81 mg tablet Take 81 mg by mouth daily.  albuterol (PROVENTIL HFA, VENTOLIN HFA, PROAIR HFA) 90 mcg/actuation inhaler TAKE 2 PUFFS BY MOUTH EVERY 4 HOURS AS NEEDED FOR WHEEZE    latanoprost (XALATAN) 0.005 % ophthalmic solution LOCATION  BOTH EYES. INSTILL ONE DROP INTO EACH EYE AT BEDTIME    metoprolol succinate (TOPROL-XL) 50 mg XL tablet Take 1 Tab by mouth daily.  famotidine (PEPCID) 40 mg tablet Take 1 Tab by mouth daily.  polyethylene glycol (MIRALAX) 17 gram/dose powder Take 17 g by mouth daily. (Patient not taking: Reported on 6/1/2021)    trimethoprim-polymyxin b (POLYTRIM) ophthalmic solution Administer 1 Drop to both eyes every four (4) hours. (Patient not taking: Reported on 6/1/2021)    albuterol (PROVENTIL VENTOLIN) 2.5 mg /3 mL (0.083 %) nebu Take 2.5 mg by inhalation every six (6) hours as needed for Shortness of Breath or Wheezing.  budesonide (PULMICORT) 0.5 mg/2 mL nbsp 2 mL by Nebulization route two (2) times a day.  lisinopril (PRINIVIL, ZESTRIL) 10 mg tablet Take 1 Tab by mouth daily.  OXYGEN-AIR DELIVERY SYSTEMS Take 2 L/min by inhalation daily. 2 L/min continuously inhalation via nasal canula    brimonidine (ALPHAGAN P) 0.1 % ophthalmic solution Administer 1 Drop to both eyes two (2) times a day. Objective:     Vitals:    06/09/21 0930 06/09/21 0941 06/09/21 1001 06/09/21 1021   BP:  (!) 171/79 (!) 169/79 (!) 180/85   Pulse:  85 80 80   Resp:  23     SpO2: 97% 100% 100% 100%   Weight:       Height:           PHYSICAL EXAM     Constitutional:  the patient is well developed and in no acute distress  EENMT:  Sclera clear, pupils equal, oral mucosa moist  Respiratory: diminished throughout, no wheezing.  On BiPAP 15/5 FiO2 @ 30%  Cardiovascular:  RRR without M,G,R  Gastrointestinal: soft and non-tender; with positive bowel sounds. Musculoskeletal: warm without cyanosis. There is no lower extremity edema. Skin:  no jaundice or rashes, no wounds   Neurologic: no gross neuro deficits     Psychiatric:  alert and oriented x 3    CXR:  6/9        CT Chest 2/2021 at [place]  FINDINGS:    Lungs - Pleura:    Spiculated left upper lobe apical posterior segment pulmonary nodule (sequence 203, image 55) 11 mm x 6 mm: 9 mm average diameter.    Previously, this nodule measured 11 mm x 7 mm; 9 mm average diameter May 29, 2020.    6 mm x 6 mm; 6 mm average diameter December 14, 2017.      Emphysema and changes of inflammatory small airways. Continued involution of the pseudotumor (loculated fluid) right minor fissure. No new or additional suspicious pulmonary nodules. Persistent mild apical pleural-parenchymal scarring.      Abbie - Mediastinum:    No pathologic hilar or mediastinal adenopathy      Heart - Vascular:    Normal cardiac size.    Left aortic arch with extensive ascending, arch, great vessel and descending thoracic aortic intimal calcification. Normal caliber aortic arch and pulmonary artery     PFT 10/2018    Narrative    Spirometry reveals a very severe partially reversible obstructive   ventilatory defect with a severely reduced diffusing capacity consistent   with severe COPD secondary to emphysema with a bronchospastic component.       Recent Labs     06/09/21  0710   WBC 6.0   HGB 9.6*   HCT 31.9*        Recent Labs     06/09/21  0710   *   K 4.0   *   *   CO2 29   BUN 15   CREA 1.39   CA 8.5   ALB 2.9*   TBILI 0.2   ALT 12     Recent Labs     06/09/21  0924 06/09/21  0722   PHI 7.32* 7.26*   PCO2I 60.1* 67.9*   PO2I 65* 79   HCO3I 30.9* 30.6*       Cultures:  None    Assessment:  (Medical Decision Making)     Hospital Problems  Date Reviewed: 5/27/2021        Codes Class Noted POA    Acute on chronic respiratory failure with hypoxia and hypercapnia (HCC) ICD-10-CM: J96.21, J96.22  ICD-9-CM: 518.84, 786.09, 799.02  6/9/2021 Unknown        Heart failure with reduced ejection fraction (HCC) (Chronic) ICD-10-CM: I50.20  ICD-9-CM: 428.20  9/9/2019 Yes        CKD (chronic kidney disease), stage III (HCC) (Chronic) ICD-10-CM: N18.30  ICD-9-CM: 585.3  9/9/2019 Yes        A-fib ([address] ICD-10-CM: I48.91  ICD-9-CM: 427.31  7/23/2018 Yes        Hypertension ICD-10-CM: I10  ICD-9-CM: 401.9  6/1/2017 Yes        COPD (chronic obstructive pulmonary disease) (HCC) (Chronic) ICD-10-CM: J44.9  ICD-9-CM: 730  5/2/2014 Yes              Plan:  (Medical Decision Making)     -- change BiPAP to NC  -- CT Chest to rule out PE  -- add nebs, no need for steroids currently  -- pepcid for PUD    More than 50% of the time documented was spent in face-to-face contact with the patient and in the care of the patient on the floor/unit where the patient is located. Thank you very much for this referral.  We appreciate the opportunity to participate in this patient's care. Will follow along with above stated plan. Melissa Valadez, NP     Lungs: distant sounds. No wheezing on 3 LPM  Heart S1 and S2 audible, no murmers or rubs appreciated  Other     Change from BIPAP to NC and patient is not wheezing. Not sure if this was a true COPD exacerbation versus possible alternative process such as a pulmonary embolism. Patient is going for spiral CT. Told ER attending, to let the hospitalist admit the patient and we can follow. BNP is slightly elevated and can also diurese, but does not have any overt edema on examination and no crackles noted on examination as well. Please note he is also describing episodes that happen at nighttime but not really referring to any reflux symptoms or sinus drainage is which may have precipitated this. He does not smoke or drink. Patient also does not have any exposure to dust fumes, outside personnel. He also had his Covid vaccine a month ago. I have spoken with and examined the patient. I have reviewed the history, examination, assessment, and plan and agree with the above. Atiya Keller MD      This note was signed electronically. Errors are unfortunately her likely due to dictation software.

## 2021-06-10 PROBLEM — E87.0 HYPERNATREMIA: Status: RESOLVED | Noted: 2021-01-01 | Resolved: 2021-01-01

## 2021-06-10 PROBLEM — E87.0 HYPERNATREMIA: Status: ACTIVE | Noted: 2021-01-01

## 2021-06-10 NOTE — PROGRESS NOTES
Chart reviewed by CM for discharge planning. Per chart review, PT/OT has not been consulted. CM will determine if patient would like [facility] services resumed with SF, prior to discharge. According to previous CM, patient has a CLTC aide that appears to be  supportive with care needs. CM continues to follow.

## 2021-06-10 NOTE — PROGRESS NOTES
[facility] Hospitalist Progress Note     Name: Ralph Luque Age: 68 y.o. Sex: male  : 1940    MRN:     [id number]    Admit Date:  2021    Reason for Admission:  COPD exacerbation ([address] [J44.1]    Assessment & Plan       Acute on chronic respiratory failure with hypoxia and hypercarbia  CT Chest without any evidence of PE  - Pulmonary following  - O2 supplement. Currently down to 3L O2 NC  - continue Pulmicort and albuterol nebulizer treatments  - Sees Dr. Den Garnica at [facility] for Pulmonary, lung nodule/COPD/Hx of MAC infection followed by that group  - no need for steroids given very minimal wheezing on exam  - will need ABG prior to DC per pulm      Chronic Systolic CHF  EF of 09% in last Echo. - IV lasix 40mg IV daily given pBNP of 5k. - repeat Echo pending  - I&O    Hx of Afib // HTN   NSR on admission. Not on outpatient OAC  - ASA 81, Diltiazem, lisinopril, metoprolol. CKD stage 2  Cr of 1.39 on admission  - monitor renal function given lasix IV    Protein Calorie Malnutrition  Due to chronic illness. BMI of Body mass index is 24.31 kg/m². - nutrition consulted    Diet:  DIET ADULT  DIET ADULT ORAL NUTRITION SUPPLEMENT  DVT PPx: lovenox    Code: Full Code    Dispo / Discharge Planning: pending clinical course and PT/ [address] Course/Subjective:     Please refer to the admission H&P for details of presentation. In summary, Ralph Luque is a 68 y.o. male with medical history significant for  COPD, chronic hypoxic resp failure (on 2-3L NC at home) CKD stage 3, chronic systolic CHF, SAMANTHA spiculated lung mass (stable per CT readings at [facility]), hx of MAC unable to complete treatment secondary to qT prolongation presents to the ER with report of sudden onset dyspnea that woke patient from sleep. Was placed on CPAP by EMS and on BIPAP in ER. Admitted for acute on chronic respiratory failure with hypoxia and hypercarbia.       Subjective/24 hr Events (06/10/21) :  Patient is seen and examined at bedside. No acute events reported overnight by nursing staff. Reports breathing improved and almost back at his baseline. Denies any cough or shortness of breath at rest while on O2 NC. Patient denies fever, chills, chest pains, n/v, abdominal pain. Review of Systems: 14 point review of systems is otherwise negative with the exception of the elements mentioned above. Objective:     Patient Vitals for the past 24 hrs:   Temp Pulse Resp BP SpO2   06/10/21 1514     96 %   06/10/21 1200  73      06/10/21 1144     99 %   06/10/21 1059 98.4 °F (36.9 °C) 66 22 (!) 119/50 94 %   06/10/21 0825     97 %   06/10/21 0800  (!) 103      06/10/21 0657 98.2 °F (36.8 °C) 84 23 (!) 163/83 100 %   06/10/21 0410 97.9 °F (36.6 °C) 88 26 (!) 144/63 99 %   06/10/21 0400  83      06/09/21 2350  (!) 106      06/09/21 2322   28     06/09/21 2258 97.6 °F (36.4 °C) (!) 114 (!) 36 (!) 143/83 97 %   06/09/21 2036     99 %   06/09/21 2000  (!) 120      06/09/21 1921 97.9 °F (36.6 °C) 70 (!) 31 (!) 180/84 97 %   06/09/21 1821  98 20 (!) 181/85 100 %   06/09/21 1802  99 21 (!) 167/79 100 %   06/09/21 1742  100 18 (!) 168/82 100 %   06/09/21 1702  97 16 (!) 142/76 99 %   06/09/21 1621  98 20 (!) 152/74 100 %   06/09/21 1602  100 17 (!) 140/63 100 %     Oxygen Therapy  O2 Sat (%): 96 % (06/10/21 1514)  Pulse via Oximetry: 84 beats per minute (06/10/21 1514)  O2 Device: Nasal cannula (06/10/21 1514)  O2 Flow Rate (L/min): 2 l/min (06/10/21 1514)  FIO2 (%): 32 % (06/09/21 1524)    Body mass index is 24.31 kg/m². Physical Exam:   General:     alert, awake, no acute distress. Thin elderly male  HENT:   normocephalic, atraumatic. Eyes:   anicteric sclerae, normal conjunctiva, EOMI  Neck:    supple, non-tender. Trachea midline. Lungs:   Poor air entry, minimal expiratory wheezing  Cardiac:   RRR, Normal S1 and S2.    Abdomen:   Soft, non distended, nontender, +BS, no guarding/rebound  Extremities:   No edema , pedal pulses present  Skin:   normal turgor and texture; no rash, ulcers   Neuro:  AAOx3. No gross focal neurological deficit  Psychiatric:  No anxiety, calm, cooperative    Data Review:  I have reviewed all labs, meds, and studies from the last 24 hours:    Labs:    Recent Results (from the past 24 hour(s))   TROPONIN-HIGH SENSITIVITY    Collection Time: 06/09/21  8:55 PM   Result Value Ref Range    Troponin-High Sensitivity 49.6 (H) 0 - 14 pg/mL   CBC WITH AUTOMATED DIFF    Collection Time: 06/09/21  8:55 PM   Result Value Ref Range    WBC 3.5 (L) 4.3 - 11.1 K/uL    RBC 4.19 (L) 4.23 - 5.6 M/uL    HGB 11.2 (L) 13.6 - 17.2 g/dL    HCT 38.2 (L) 41.1 - 50.3 %    MCV 91.2 79.6 - 97.8 FL    MCH 26.7 26.1 - 32.9 PG    MCHC 29.3 (L) 31.4 - 35.0 g/dL    RDW 17.0 (H) 11.9 - 14.6 %    PLATELET 195 164 - 726 K/uL    MPV 11.3 9.4 - 12.3 FL    ABSOLUTE NRBC 0.00 0.0 - 0.2 K/uL    DF AUTOMATED      NEUTROPHILS 83 (H) 43 - 78 %    LYMPHOCYTES 16 13 - 44 %    MONOCYTES 1 (L) 4.0 - 12.0 %    EOSINOPHILS 0 (L) 0.5 - 7.8 %    BASOPHILS 0 0.0 - 2.0 %    IMMATURE GRANULOCYTES 0 0.0 - 5.0 %    ABS. NEUTROPHILS 2.9 1.7 - 8.2 K/UL    ABS. LYMPHOCYTES 0.6 0.5 - 4.6 K/UL    ABS. MONOCYTES 0.0 (L) 0.1 - 1.3 K/UL    ABS. EOSINOPHILS 0.0 0.0 - 0.8 K/UL    ABS. BASOPHILS 0.0 0.0 - 0.2 K/UL    ABS. IMM. GRANS. 0.0 0.0 - 0.5 K/UL   METABOLIC PANEL, COMPREHENSIVE    Collection Time: 06/09/21  8:55 PM   Result Value Ref Range    Sodium 146 (H) 136 - 145 mmol/L    Potassium 4.3 3.5 - 5.1 mmol/L    Chloride 108 (H) 98 - 107 mmol/L    CO2 33 (H) 21 - 32 mmol/L    Anion gap 5 (L) 7 - 16 mmol/L    Glucose 132 (H) 65 - 100 mg/dL    BUN 18 8 - 23 MG/DL    Creatinine 1.44 0.8 - 1.5 MG/DL    GFR est AA >60 >60 ml/min/1.73m2    GFR est non-AA 50 (L) >60 ml/min/1.73m2    Calcium 9.2 8.3 - 10.4 MG/DL    Bilirubin, total 0.2 0.2 - 1.1 MG/DL    ALT (SGPT) 16 12 - 65 U/L    AST (SGOT) 11 (L) 15 - 37 U/L    Alk. phosphatase 113 50 - 136 U/L    Protein, total 7.7 6.3 - 8.2 g/dL    Albumin 3.4 3.2 - 4.6 g/dL    Globulin 4.3 (H) 2.3 - 3.5 g/dL    A-G Ratio 0.8 (L) 1.2 - 3.5     CBC WITH AUTOMATED DIFF    Collection Time: 06/10/21  3:31 AM   Result Value Ref Range    WBC 5.3 4.3 - 11.1 K/uL    RBC 3.55 (L) 4.23 - 5.6 M/uL    HGB 9.4 (L) 13.6 - 17.2 g/dL    HCT 32.2 (L) 41.1 - 50.3 %    MCV 90.7 79.6 - 97.8 FL    MCH 26.5 26.1 - 32.9 PG    MCHC 29.2 (L) 31.4 - 35.0 g/dL    RDW 16.4 (H) 11.9 - 14.6 %    PLATELET 222 513 - 805 K/uL    MPV 10.0 9.4 - 12.3 FL    ABSOLUTE NRBC 0.00 0.0 - 0.2 K/uL    DF AUTOMATED      NEUTROPHILS 82 (H) 43 - 78 %    LYMPHOCYTES 15 13 - 44 %    MONOCYTES 2 (L) 4.0 - 12.0 %    EOSINOPHILS 0 (L) 0.5 - 7.8 %    BASOPHILS 0 0.0 - 2.0 %    IMMATURE GRANULOCYTES 0 0.0 - 5.0 %    ABS. NEUTROPHILS 4.4 1.7 - 8.2 K/UL    ABS. LYMPHOCYTES 0.8 0.5 - 4.6 K/UL    ABS. MONOCYTES 0.1 0.1 - 1.3 K/UL    ABS. EOSINOPHILS 0.0 0.0 - 0.8 K/UL    ABS. BASOPHILS 0.0 0.0 - 0.2 K/UL    ABS. IMM. GRANS. 0.0 0.0 - 0.5 K/UL   METABOLIC PANEL, COMPREHENSIVE    Collection Time: 06/10/21  3:31 AM   Result Value Ref Range    Sodium 140 136 - 145 mmol/L    Potassium 4.1 3.5 - 5.1 mmol/L    Chloride 104 98 - 107 mmol/L    CO2 30 21 - 32 mmol/L    Anion gap 6 (L) 7 - 16 mmol/L    Glucose 155 (H) 65 - 100 mg/dL    BUN 22 8 - 23 MG/DL    Creatinine 1.46 0.8 - 1.5 MG/DL    GFR est AA >60 >60 ml/min/1.73m2    GFR est non-AA 50 (L) >60 ml/min/1.73m2    Calcium 8.8 8.3 - 10.4 MG/DL    Bilirubin, total 0.2 0.2 - 1.1 MG/DL    ALT (SGPT) 13 12 - 65 U/L    AST (SGOT) 14 (L) 15 - 37 U/L    Alk.  phosphatase 91 50 - 136 U/L    Protein, total 6.5 6.3 - 8.2 g/dL    Albumin 2.9 (L) 3.2 - 4.6 g/dL    Globulin 3.6 (H) 2.3 - 3.5 g/dL    A-G Ratio 0.8 (L) 1.2 - 3.5     NT-PRO BNP    Collection Time: 06/10/21  3:31 AM   Result Value Ref Range    NT pro-BNP 5,145 (H) <450 PG/ML   TROPONIN-HIGH SENSITIVITY    Collection Time: 06/10/21  3:31 AM   Result Value Ref Range    Troponin-High Sensitivity 50.7 (H) 0 - 14 pg/mL       All Micro Results     None          EKG Results     Procedure 720 Value Units Date/Time    EKG 12 LEAD INITIAL [[id number]] Collected: 06/09/21 0710    Order Status: Completed Updated: 06/09/21 1449     Ventricular Rate 91 BPM      Atrial Rate 91 BPM      P-R Interval 188 ms      QRS Duration 68 ms      Q-T Interval 342 ms      QTC Calculation (Bezet) 420 ms      Calculated P Axis 87 degrees      Calculated R Axis 73 degrees      Calculated T Axis 89 degrees      Diagnosis --     Normal sinus rhythm  Right atrial enlargement  Anteroseptal infarct , age undetermined  Abnormal ECG  No previous ECGs available  Confirmed by Sunil Brain ([id number]) on 6/9/2021 2:49:31 PM            Other Studies:  XR CHEST SNGL V    Result Date: 6/9/2021  EXAM: CHEST SINGLE VW INDICATION: Chest pain COMPARISON: Chest radiographs, 5/16/2021 FINDINGS: The cardiomediastinal silhouette remains unchanged with prominence of the pulmonary arteries and atherosclerotic calcifications in the aorta. No pleural effusion or pneumothorax. No focal parenchymal process. No acute osseous abnormality. No acute cardiopulmonary abnormality. CT CHEST PULMONARY EMBOLISM    Result Date: 6/9/2021  EXAM: CT Chest with contrast. INDICATION: Concern for PE. History of PE and atrial fibrillation. Not on anticoagulation. Comparison: None. Multiple axial images were obtained through the chest during intravenous infusion of 100mL of Isovue 370. Coronal 2D MIP image reformats were performed. Radiation dose reduction techniques were used for this study: All CT scans performed at this facility use one or all of the following: Automated exposure control, adjustment of the mA and/or kVp according to patient's size, iterative reconstruction. FINDINGS: Examination is diagnostic through the segmental level, except in the right lower lobe where motion artifact limits evaluation to the lobar level.  No evidence of acute or chronic pulmonary embolism. The main pulmonary artery and thoracic aorta are normal in caliber. Atherosclerotic calcifications throughout the aorta and coronary arteries. Partially visualized and vascular stent in the infrarenal abdominal aorta which appears aneurysmal. LUNGS/PLEURA: Central airways are patent. Moderate upper lobe predominant centrilobular emphysema. There is a mildly spiculated left apical solid pulmonary nodule measuring 9 mm (axial image 24). No consolidation to suggest pneumonia. No pleural effusion or pneumothorax. MEDIASTINUM: Thyroid is mildly enlarged with streak artifacts tearing evaluation. The thoracic esophagus is within normal limits. No mediastinal or hilar lymphadenopathy. Heart is normal in size without pericardial effusion. BONES/SUBCUTANEOUS TISSUE: No aggressive osseous lesion. No subcutaneous soft tissue abnormality. UPPER ABDOMEN: Multiple simple cysts are again seen throughout the liver with a few additional subcentimeter hypodensities are too small to characterize but stable. There are stable simple cyst on both kidneys. Unchanged subcentimeter nodule in the right adrenal gland, likely representing an adenoma given stability. 1.  No evidence of pulmonary embolism or other acute process in the chest. 2. Mildly spiculated 9 mm left apical pulmonary nodule worrisome for primary lung malignancy. Consider further evaluation with PET/CT. 3. Moderate centrilobular emphysema.           Current Meds:   Current Facility-Administered Medications   Medication Dose Route Frequency    famotidine (PEPCID) tablet 20 mg  20 mg Oral DAILY    albuterol (PROVENTIL VENTOLIN) nebulizer solution 2.5 mg  2.5 mg Nebulization Q4HWA RT    budesonide (PULMICORT) 500 mcg/2 ml nebulizer suspension  500 mcg Nebulization BID RT    aspirin delayed-release tablet 81 mg  81 mg Oral DAILY    brimonidine (ALPHAGAN) 0.2 % ophthalmic solution 1 Drop  1 Drop Both Eyes BID    dilTIAZem IR (CARDIZEM) tablet 120 mg  120 mg Oral DAILY    latanoprost (XALATAN) 0.005 % ophthalmic solution 1 Drop  1 Drop Both Eyes QPM    lisinopriL (PRINIVIL, ZESTRIL) tablet 10 mg  10 mg Oral DAILY    metoprolol succinate (TOPROL-XL) XL tablet 50 mg  50 mg Oral DAILY    polyethylene glycol (MIRALAX) packet 17 g  17 g Oral DAILY    rosuvastatin (CRESTOR) tablet 40 mg  40 mg Oral QHS    tamsulosin (FLOMAX) capsule 0.4 mg  0.4 mg Oral DAILY    sodium chloride (NS) flush 5-40 mL  5-40 mL IntraVENous Q8H    sodium chloride (NS) flush 5-40 mL  5-40 mL IntraVENous PRN    acetaminophen (TYLENOL) tablet 650 mg  650 mg Oral Q6H PRN    Or    acetaminophen (TYLENOL) suppository 650 mg  650 mg Rectal Q6H PRN    polyethylene glycol (MIRALAX) packet 17 g  17 g Oral DAILY PRN    ondansetron (ZOFRAN ODT) tablet 4 mg  4 mg Oral Q8H PRN    Or    ondansetron (ZOFRAN) injection 4 mg  4 mg IntraVENous Q6H PRN    furosemide (LASIX) injection 40 mg  40 mg IntraVENous DAILY    hydrALAZINE (APRESOLINE) 20 mg/mL injection 20 mg  20 mg IntraVENous Q6H PRN    metoprolol (LOPRESSOR) injection 5 mg  5 mg IntraVENous Q6H PRN       Problem List:  Hospital Problems as of 6/10/2021 Date Reviewed: 6/10/2021        Codes Class Noted - Resolved POA    * (Principal) Acute on chronic respiratory failure with hypoxia and hypercapnia (HCC) ICD-10-CM: J96.21, J96.22  ICD-9-CM: 518.84, 786.09, 799.02  6/9/2021 - Present Yes        Moderate protein-calorie malnutrition (HCC) ICD-10-CM: E44.0  ICD-9-CM: 263.0  5/19/2021 - Present Yes        Short-term memory loss ICD-10-CM: R41.3  ICD-9-CM: 780.93  5/18/2021 - Present Yes        [facility] ICD-10-CM: J44.1  ICD-9-CM: 491.21  5/17/2021 - Present Yes        Chronic systolic heart failure (HCC) ICD-10-CM: I50.22  ICD-9-CM: 428.22  12/21/2020 - Present Yes        Chronic hypoxemic respiratory failure (HCC) (Chronic) ICD-10-CM: J96.11  ICD-9-CM: 518.83, 799.02  9/9/2019 - Present Yes Heart failure with reduced ejection fraction (HCC) (Chronic) ICD-10-CM: I50.20  ICD-9-CM: 428.20  9/9/2019 - Present Yes        CKD (chronic kidney disease), stage III (HCC) (Chronic) ICD-10-CM: N18.30  ICD-9-CM: 585.3  9/9/2019 - Present Yes        [facility] ([facility] ICD-10-CM: I48.91  ICD-9-CM: 427.31  7/23/2018 - Present Yes        Hypertension ICD-10-CM: I10  ICD-9-CM: 401.9  6/1/2017 - Present Yes        COPD (chronic obstructive pulmonary disease) ([facility] (Chronic) ICD-10-CM: J44.9  ICD-9-CM: 763  5/2/2014 - Present Yes        Hyperlipemia (Chronic) ICD-10-CM: E78.5  ICD-9-CM: 272.4  5/2/2014 - Present Yes        RESOLVED: Hypernatremia ICD-10-CM: E87.0  ICD-9-CM: 276.0  6/10/2021 - 6/10/2021 Yes               Part of this note was written by using a voice dictation software and the note has been proof read but may still contain some grammatical/other typographical errors.     Signed By: Sharyn Russo MD   [employer] [facility]    Meri 10, 2021  3:17 PM

## 2021-06-10 NOTE — PROGRESS NOTES
Amilcar Dunbaroter. Admission Date: 6/9/2021             Daily Progress Note: 6/10/2021    The patient's chart is reviewed and the patient is discussed with the staff. 68 y.o.  male seen and evaluated at the request of Dr. Bambi Stanley. Patient has PMH of HFrEF (EF 30-35%), COPD, CKD, PE, AFib- not on anticoagulation, AAA with repair, HTN. Former smoker quit in 2014, 2 ppd for 30 years. Denies illicit drugs, alcohol or current smoking. He wears 2 L NC at home, 3L with exertion. He also has hx of MAC infection, pulmonary nodules and masslike density of R lung base felt to be pseudotumor. He was treated for MAC infection for 6 months in 2018 and was stopped due to prolonged QTc. He was recently admitted 5/16-20 for COPD exacerbation and was discharged on steroids. He follows with pulmonary at [facility].     He presented to the ER today because patient states he couldn't breathe. He was feeling his normal self until last night when he states he became acutely short of breath. He states his nebulizers were not working and he felt a little wheezing. He denies any pain, N/V or edema. He states he has lost about 15 lbs in 2 weeks. He denies any fevers or sick contacts. Received COVID vaccines 2 months ago.     Placed on CPAP and given solumedrol by EMS, BiPAP in ER. ABG on arrival resp acidosis- 7.26/67.9/30.6.  Repeat ABG improved 7.32/30.1/30.9. nt , trop 33.4.       Subjective:     Sitting up on side of bed  Currently on 2L O2 NC, states he stays on 2L all the time at home  Patient verbalizing wanting to follow up with Pulmonary outpatient, states not having a pulmonary provider, however just saw Dr. Karyn Weller at [facility] this past April    Current Facility-Administered Medications   Medication Dose Route Frequency    famotidine (PEPCID) tablet 20 mg  20 mg Oral DAILY    albuterol (PROVENTIL VENTOLIN) nebulizer solution 2.5 mg  2.5 mg Nebulization Q4HWA RT    budesonide (PULMICORT) 500 mcg/2 ml nebulizer suspension  500 mcg Nebulization BID RT    aspirin delayed-release tablet 81 mg  81 mg Oral DAILY    brimonidine (ALPHAGAN) 0.2 % ophthalmic solution 1 Drop  1 Drop Both Eyes BID    dilTIAZem IR (CARDIZEM) tablet 120 mg  120 mg Oral DAILY    latanoprost (XALATAN) 0.005 % ophthalmic solution 1 Drop  1 Drop Both Eyes QPM    lisinopriL (PRINIVIL, ZESTRIL) tablet 10 mg  10 mg Oral DAILY    metoprolol succinate (TOPROL-XL) XL tablet 50 mg  50 mg Oral DAILY    polyethylene glycol (MIRALAX) packet 17 g  17 g Oral DAILY    rosuvastatin (CRESTOR) tablet 40 mg  40 mg Oral QHS    tamsulosin (FLOMAX) capsule 0.4 mg  0.4 mg Oral DAILY    sodium chloride (NS) flush 5-40 mL  5-40 mL IntraVENous Q8H    sodium chloride (NS) flush 5-40 mL  5-40 mL IntraVENous PRN    acetaminophen (TYLENOL) tablet 650 mg  650 mg Oral Q6H PRN    Or    acetaminophen (TYLENOL) suppository 650 mg  650 mg Rectal Q6H PRN    polyethylene glycol (MIRALAX) packet 17 g  17 g Oral DAILY PRN    ondansetron (ZOFRAN ODT) tablet 4 mg  4 mg Oral Q8H PRN    Or    ondansetron (ZOFRAN) injection 4 mg  4 mg IntraVENous Q6H PRN    furosemide (LASIX) injection 40 mg  40 mg IntraVENous DAILY    hydrALAZINE (APRESOLINE) 20 mg/mL injection 20 mg  20 mg IntraVENous Q6H PRN    metoprolol (LOPRESSOR) injection 5 mg  5 mg IntraVENous Q6H PRN       Review of Systems  Constitutional: negative for fever, chills, sweats  Cardiovascular: negative for chest pain, palpitations, syncope, edema  Gastrointestinal:  negative for dysphagia, reflux, vomiting, diarrhea, abdominal pain, or melena  Neurologic:  negative for focal weakness, numbness, headache    Objective:     Vitals:    06/10/21 0800 06/10/21 0825 06/10/21 1036 06/10/21 1059   BP:    (!) 119/50   Pulse: (!) 103   66   Resp:    22   Temp:    98.4 °F (36.9 °C)   SpO2:  97%  94%   Weight:       Height:   5' 9\" (1.753 m)          Intake/Output Summary (Last 24 hours) at 6/10/2021 1 [facility] filed at 6/10/2021 0545  Gross per 24 hour   Intake 400 ml   Output 200 ml   Net 200 ml       Physical Exam:   Constitution:  the patient is well developed and in no acute distress  EENMT:  Sclera clear, pupils equal, oral mucosa moist  Respiratory: On 2L O2 NC, no wheezing  Cardiovascular:  RRR without M,G,R  Gastrointestinal: soft and non-tender; with positive bowel sounds. Musculoskeletal: warm without cyanosis. There is no lower extremity edema. Skin:  no jaundice or rashes, no wounds   Neurologic: no gross neuro deficits     Psychiatric:  alert and oriented x 3      CXR:  6/9/2021          CT Chest 2/2021 at [facility]:    Lungs - Pleura:    Spiculated left upper lobe apical posterior segment pulmonary nodule (sequence 203, image 55) 11 mm x 6 mm: 9 mm average diameter.    Previously, this nodule measured 11 mm x 7 mm; 9 mm average diameter May 29, 2020.    6 mm x 6 mm; 6 mm average diameter December 14, 2017.      Emphysema and changes of inflammatory small airways. Continued involution of the pseudotumor (loculated fluid) right minor fissure. No new or additional suspicious pulmonary nodules. Persistent mild apical pleural-parenchymal scarring.      Abbie - Mediastinum:    No pathologic hilar or mediastinal adenopathy      Heart - Vascular:    Normal cardiac size.    Left aortic arch with extensive ascending, arch, great vessel and descending thoracic aortic intimal calcification. Normal caliber aortic arch and pulmonary artery      CT CHEST W/PE PROTOCOL:  6/9/2021    IMPRESSION  1. No evidence of pulmonary embolism or other acute process in the chest.  2. Mildly spiculated 9 mm left apical pulmonary nodule worrisome for primary  lung malignancy. Consider further evaluation with PET/CT. 3. Moderate centrilobular emphysema.       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       LAB  No results for input(s): GLUCPOC in the last 72 hours. No lab exists for component: [place]   Recent Labs     06/10/21  0331 06/09/21  2055 06/09/21  0710   WBC 5.3 3.5* 6.0   HGB 9.4* 11.2* 9.6*   HCT 32.2* 38.2* 31.9*    212 165     Recent Labs     06/10/21  0331 06/09/21  2055 06/09/21  0710    146* 146*   K 4.1 4.3 4.0    108* 111*   CO2 30 33* 29   * 132* 106*   BUN 22 18 15   CREA 1.46 1.44 1.39   CA 8.8 9.2 8.5   ALB 2.9* 3.4 2.9*   TBILI 0.2 0.2 0.2   ALT 13 16 12     Recent Labs     06/09/21 0924 06/09/21  0722   PHI 7.32* 7.26*   PCO2I 60.1* 67.9*   PO2I 65* 79   HCO3I 30.9* 30.6*     No results for input(s): LCAD, LAC in the last 72 hours.       Assessment:  (Medical Decision Making)     Hospital Problems  Date Reviewed: 6/10/2021        Codes Class Noted POA    Acute on chronic respiratory failure with hypoxia and hypercapnia (HCC) ICD-10-CM: J96.21, J96.22  ICD-9-CM: 518.84, 786.09, 799.02  6/9/2021 Unknown        Moderate protein-calorie malnutrition ([facility] ICD-10-CM: E44.0  ICD-9-CM: 263.0  5/19/2021 Yes        Short-term memory loss ICD-10-CM: R41.3  ICD-9-CM: 780.93  5/18/2021 Yes        COPD exacerbation ([facility] ICD-10-CM: J44.1  ICD-9-CM: 491.21  5/17/2021 Unknown        Chronic systolic heart failure ([facility] ICD-10-CM: I50.22  ICD-9-CM: 428.22  12/21/2020 Yes        Chronic hypoxemic respiratory failure (HCC) (Chronic) ICD-10-CM: J96.11  ICD-9-CM: 518.83, 799.02  9/9/2019 Yes        Heart failure with reduced ejection fraction (HCC) (Chronic) ICD-10-CM: I50.20  ICD-9-CM: 428.20  9/9/2019 Yes        CKD (chronic kidney disease), stage III (HCC) (Chronic) ICD-10-CM: N18.30  ICD-9-CM: 585.3  9/9/2019 Yes        A-fib ([facility] ICD-10-CM: I48.91  ICD-9-CM: 427.31  7/23/2018 Yes        Hypertension ICD-10-CM: I10  ICD-9-CM: 401.9  6/1/2017 Yes        COPD (chronic obstructive pulmonary disease) ([facility] (Chronic) ICD-10-CM: J44.9  ICD-9-CM: 091  5/2/2014 Yes        Hyperlipemia (Chronic) ICD-10-CM: E78.5  ICD-9-CM: 272.4  5/2/2014 Yes              Plan:  (Medical Decision Making)     --On 2L O2 NC, on 2L at home  --Continue Pulmicort and albuterol nebulizer treatments  --On Lasix 40 mg IV daily  --CT chest yesterday negative for PE  --Sees Dr. Will Almanzar at [facility] for Pulmonary, lung nodule/COPD/Hx of MAC infection followed by that group  --GI/DVT prophylaxis  --Full code      More than 50% of the time documented was spent in face-to-face contact with the patient and in the care of the patient on the floor/unit where the patient is located. Royer Mcdaniel, MESSI      Lungs:  Decreased air movement. No wheeze at present. Heart:  RRR with no Murmur/Rubs/Gallops    Additional Comments:    Patient admitted with what appears to be acute on chronic respiratory failure with hypoxia and hypercarbia due to COPD exacerbation. Poor overall air movement but no detectable wheeze at present. Feeling better but not yet back to baseline. Cont nebs. Not currently on steroids. Do not feel need to add at present. Will need to check ambulating O2 sat before discharge to determine exertional O2 needs. Back to baseline resting O2 needs of 2L. Would also want to repeat ABG just prior to discharge to see what his more baseline CO2 level is. If in the 52's may benefit from outpt consideration of Bipap at night. Will need to follow up with outpatient pulmonary for this as well as his L lung nodule. I have spoken with and examined the patient. I agree with the above assessment and plan as documented.     Torito Jernigan MD

## 2021-06-10 NOTE — ACP (ADVANCE CARE PLANNING)
Advance Care Planning Advance Care Planning Inpatient Note [address] [facility] Today's Date: 6/10/2021 Unit: [facility] 6 MED SURG Received request from IDT member. Upon review of chart and communication with care team, patient's decision making abilities are not in question. Patient was/were present in the room during visit. Goals of ACP Conversation: 
Discuss Advance Care planning documents Health Care Decision Makers:   
  Primary Decision Maker: Truong Chiu - Other Relative - [phone] Click here to complete [employer] including selection of the Healthcare Decision Maker Relationship (ie \"Primary\") Today we: 
Updated Documents Advance Care Planning Documents (Patient Wishes) on file: 
Healthcare Power of /Advance Directive appointment of Health care agent Assessment:   
Mr. Olga Garvey tgadriana Wil Paulino is named as his agent for the [employer] Interventions: 
Reviewed ACP document(s) on record for proper execution and need for updating Provided education on documents for clarity and greater understanding Assisted in the completion of documents according to patient's wishes at this time Returned original document(s) to patient, as well as copies for distribution to appointed agents Outcomes/Plan: 
New Advance Directive completed Original Advance Directive form and copies given to patient Copy of Advance Directive given to staff to scan into medical record Teach Back Method used to verify the patient/Healthcare Decision Maker's understanding of key information in the advance directive documents Electronically signed by Bryant Nunn, [employer] on 6/10/2021 at 11:41 AM

## 2021-06-10 NOTE — PROGRESS NOTES
Problem: Chronic Obstructive Pulmonary Disease (COPD)  Goal: *Oxygen saturation during activity within specified parameters  Outcome: Progressing Towards Goal  Goal: *Able to remain out of bed as prescribed  Outcome: Progressing Towards Goal  Goal: *Absence of hypoxia  Outcome: Progressing Towards Goal  Goal: *Optimize nutritional status  Outcome: Progressing Towards Goal     Problem: Patient Education: Go to Patient Education Activity  Goal: Patient/Family Education  Outcome: Progressing Towards Goal     Problem: Falls - Risk of  Goal: *Absence of Falls  Description: Document Raul Fall Risk and appropriate interventions in the flowsheet.   Outcome: Progressing Towards Goal  Note: Fall Risk Interventions:  Mobility Interventions: Bed/chair exit alarm, Communicate number of staff needed for ambulation/transfer, Patient to call before getting OOB         Medication Interventions: Bed/chair exit alarm                   Problem: Patient Education: Go to Patient Education Activity  Goal: Patient/Family Education  Outcome: Progressing Towards Goal

## 2021-06-10 NOTE — PROGRESS NOTES
Patient sitting up at side of bed. Patient continues to use cane during ambulation. No distress noted at this time. Bed in low position. Belongings and call light within reach. Will give report to night shift nurse.

## 2021-06-10 NOTE — CONSULTS
Comprehensive Nutrition Assessment    Type and Reason for Visit: Initial, Consult  Poor intake/appetite 5 or more days (Hospitalist)    Nutrition Recommendations/Plan:   Meals and Snacks:  Change current diet. Add 2 gram Na restriction. Nutrition Supplement Therapy:   Medical food supplement therapy:  Initiate Ensure Enlive once per day (this provides 350 kcal and 20 grams protein per bottle)  Francisco. Malnutrition Assessment:  Malnutrition Status: Moderate malnutrition  Context: Chronic illness  Findings of clinical characteristics of malnutrition:   Energy Intake:  Mild decrease in energy intake (specify) (recall of intake and last admission ~50% needs)  Weight Loss:  Mild weight loss (specify amount and time period) (~6% loss per stated UBW, unable to determine fluid component)     Body Fat Loss:  1 - Mild body fat loss, Triceps   Muscle Mass Loss:  1 - Mild muscle mass loss, Hand (interosseous), Temples (temporalis)  Fluid Accumulation:  Unable to assess,     Strength:  Not performed     Nutrition Assessment:   Nutrition History: Pt lives alone, self preps meals, family assists with groceries. He reports his intake has continued to wax and wane at home but feels he has been eating more since last hospitalization. Nutrition Background: PMH remarkable for tob abuse, COPD, chronic hypoxic respiratory failure, CKD stage III, CHF, SAMANTHA spiculated lung mass. Admitted with acute on chronic respiratory failure, chronic systolic CHF, protein calorie malnutrition, hypernatremia. Daily Update:  Pt up to bedside at RD visit. Known from last admission. He is oriented times 4 at this encounter. He indicates his intake has somewhat improved since last admission. He c/o his fluid building up and increasing difficulties with breathing. He consumed ~75% am tray. Nutrition Related Findings:   Alert and oriented x 4 at RD visit.         Current Nutrition Therapies:  No diet orders on file    Current Intake:   Average Meal Intake: 51-75% Average Supplement Intake: None ordered      Anthropometric Measures:  Height: 5' 9\" (175.3 cm)  Current Body Wt: 74.7 kg (164 lb 10.9 oz) (6/10), Weight source: Not specified  BMI: 24.3, Normal weight (BMI 22.0-24.9) age over 72  Admission Body Weight: 169 lb 15.6 oz (stated)  Ideal Body Weight (lbs) (Calculated): 160 lbs (73 kg), 102.9 %  Usual Body Wt: 79.4 kg (175 lb) (UBW per pt, 180-185# per EMR review past year), Percent weight change: -5.9          Edema: No data recorded   Estimated Daily Nutrient Needs:  Energy (kcal/day): 1478-4048 (Kcal/kg (25-30), Weight Used: Ideal (73 kg))  Protein (g/day): 73-88 (1-1.2 g/kg) Weight Used: (Ideal)  Fluid (ml/day):   (1 ml/kcal)    Nutrition Diagnosis:   · Inadequate oral intake related to  (waxing and waning oral intake) as evidenced by  (home recall and last admission ~50 % needs)    · Moderate malnutrition, In context of chronic illness related to  (inconsistent po intake, hypermetabolic state) as evidenced by  (self recall, +COPD/CKD, malnutrition criteria above)    Nutrition Interventions:   Food and/or Nutrient Delivery: Start oral nutrition supplement, Modify current diet     Coordination of Nutrition Care: Continue to monitor while inpatient  Plan of Care discussed with PCT, IDT rounds. Goals:       Active Goal: Meet >75% estimated needs by nutrition follow-up    Nutrition Monitoring and Evaluation:      Food/Nutrient Intake Outcomes: Food and nutrient intake, Supplement intake  Physical Signs/Symptoms Outcomes: Biochemical data, Weight, Meal time behavior    Discharge Planning:    Continue oral nutrition supplement    Stanton Hendricks MA, RD, LDN on 6/10/2021 at 10:58 AM  Contact: [phone]

## 2021-06-10 NOTE — PROGRESS NOTES
Patient Belongings were accounted for and sealed into bag provided by security. Sally Almanzar RN was with the nurse in the process and patient witnessed the whole process as well. Security took belonging bag to lock in safe for patient. Receipt given to patient.

## 2021-06-10 NOTE — PROGRESS NOTES
06/09/21 2005   Dual Skin Pressure Injury Assessment   Dual Skin Pressure Injury Assessment WDL   Second Care Provider (Based on [address] [place] [address]) Chica VERNON   Skin Integumentary   Skin Integumentary (WDL) WDL    Pressure  Injury Documentation No Pressure Injury Noted-Pressure Ulcer Prevention Initiated

## 2021-06-10 NOTE — PROGRESS NOTES
Hourly rounds performed this shift, needs met at this time. Bed in low/locked position and call light within reach. Will  give bedside report to oncoming nurse. Patient educated on the need to use the urinal as we are trying to monitor his output.

## 2021-06-10 NOTE — PROGRESS NOTES
Advance Care Planning Inpatient Note  [facility] Department    Today's Date: 6/10/2021  Unit: SFD 6 MED SURG    Received request from IDT member. Upon review of chart and communication with care team, patient's decision making abilities are not in question. Patient was/were present in the room during visit. Goals of ACP Conversation:  Discuss Advance Care planning documents    Health Care Decision Makers:      Primary Decision Maker: Truong Chiu - Other Relative - [phone]  Click here to complete [address] including selection of the Healthcare Decision Maker Relationship (ie \"Primary\")     Today we:  Updated [id number] (Patient Wishes) on file:  Healthcare Power of /Advance Directive appointment of Health care agent     Assessment:    Mr. Olga ramirez Wil Paulino is named as his agent for the HCPOA     Interventions:  Reviewed ACP document(s) on record for proper execution and need for updating  Provided education on documents for clarity and greater understanding  Assisted in the completion of documents according to patient's wishes at this time  Returned original document(s) to patient, as well as copies for distribution to appointed agents     Outcomes/Plan:  New Advance Directive completed  Original Advance Directive form and copies given to patient  Copy of Advance Directive given to staff to scan into medical record  Teach Back Method used to verify the patient/Healthcare Decision Maker's understanding of key information in the advance directive documents.     Felix Santillan 70  Board Certified

## 2021-06-10 NOTE — PROGRESS NOTES
06/09/21 2258   Vital Signs   Temp 97.6 °F (36.4 °C)   Temp Source Oral   Pulse (Heart Rate) (!) 114   Resp Rate (!) 36   O2 Sat (%) 97 %   Level of Consciousness Alert (0)   BP (!) 143/83   MAP (Calculated) 103   MEWS Score 5   MD Notified and orders received for IV metoprolol forHR > 120.

## 2021-06-11 NOTE — PROGRESS NOTES
ACUTE PHYSICAL THERAPY GOALS:  (Developed with and agreed upon by patient and/or caregiver. )  LTG:  (1.)Mr. Proctor will move from supine to sit and sit to supine, scoot up and down and roll side to side in bed INDEPENDENTLY with bed flat within 7 treatment day(s). (2.)Mr. Proctor will transfer from bed to chair and chair to bed INDEPENDENTLY within 7 treatment day(s). (3.)Mr. Proctor will ambulate with SUPERVISION for 500+ feet with the least restrictive device within 7 treatment day(s). (4.)Mr. Proctor will perform seated and standing exercises per HEP independently to improve strength and mobility within 7 days.   ________________________________________________________________________________________________      PHYSICAL THERAPY ASSESSMENT: Initial Assessment and AM PT Treatment Day # 1      Oren Peace is a 68 y.o. male   PRIMARY DIAGNOSIS: Acute on chronic respiratory failure with hypoxia and hypercapnia (HCC)  COPD exacerbation (HCC) [J44.1]       Reason for Referral:  Respiratory failure, COPD  ICD-10: Treatment Diagnosis: Generalized Muscle Weakness (M62.81)  Difficulty in walking, Not elsewhere classified (R26.2)  Other abnormalities of gait and mobility (R26.89)  INPATIENT: Payor: [facility] MEDICARE / Plan: [employer] / Product Type: Managed Care Medicare /     ASSESSMENT:     REHAB RECOMMENDATIONS:   Recommendation to date pending progress:  Setting:  [address] Therapy  Equipment:    None     PRIOR LEVEL OF FUNCTION:  (Prior to Hospitalization) INITIAL/CURRENT LEVEL OF FUNCTION:  (Most Recently Demonstrated)   Bed Mobility:   Independent  Sit to Stand:   Independent  Transfers:   Independent  Gait/Mobility:   Modified Independent Bed Mobility:   Independent  Sit to Stand:   Independent  Transfers:   Supervision  Gait/Mobility:  [facility] [employer] [facility]     ASSESSMENT:  Mr. Saleem Piper is admitted from home with COPD exacerbation, respiratory failure. He lives alone and has home care aides, therapies. Ambulatory around home with occasional rollator use as needed, wears 2L O2. Presents without complaints, agreeable to therapy. Performs bed mobility and transfers independently. Ambulatory in room and hallway with SBA and verbal cues for pacing, breathing, and gait safety. Takes a few short standing rest breaks during activity due to fatigue, SOB. Pt returned to room for short seated rest break, then performs standing static/dynamic activities in room and bathroom with supervision. Positioned comfortably after activity with needs in reach. Anticipate dc home with continued HH PT.      SUBJECTIVE:   Mr. America Dobson states, \"I don't know how you remember people. \"    SOCIAL HISTORY/LIVING ENVIRONMENT: Lives alone in apartment on 9th floor, has elevator. Has rollator, doesn't typically use. Independent baseline without DME use, does wear 2-3L O2 at baseline.  Has CLTC aide and HH PT/OT  Home Environment: Apartment  # Steps to Enter: 0  One/Two Story Residence: One story  Living Alone: Yes  Support Systems: Home care staff, Therapist  OBJECTIVE:     PAIN: VITAL SIGNS: LINES/DRAINS:   Pre Treatment: Pain Screen  Pain Scale 1: Numeric (0 - 10)  Pain Intensity 1: 0  Post Treatment: 0/10 Vital Signs  O2 Device: Nasal cannula  O2 Flow Rate (L/min): 2 l/min none  O2 Device: Nasal cannula     GROSS EVALUATION:   Within Functional Limits Abnormal/ Functional Abnormal/ Non-Functional (see comments) Not Tested Comments:   AROM [x] [] [] []    PROM [] [] [] []    Strength [x] [] [] []    Balance [] [x] [] []    Posture [] [x] [] []    Sensation [] [] [] []    Coordination [] [] [] []    Tone [] [] [] []    Edema [] [] [] []    Activity Tolerance [] [x] [] []     [] [] [] []      COGNITION/  PERCEPTION: Intact Impaired   (see comments) Comments:   Orientation [x] []    Vision [x] []    Hearing [x] []    Command Following [x] []    Safety Awareness [] [x]     [] [] MOBILITY: I Mod I S SBA CGA Min Mod Max Total  NT x2 Comments:   Bed Mobility    Rolling [x] [] [] [] [] [] [] [] [] [] []    Supine to Sit [x] [] [] [] [] [] [] [] [] [] []    Scooting [x] [] [] [] [] [] [] [] [] [] []    Sit to Supine [] [] [] [] [] [] [] [] [] [] []    Transfers    Sit to Stand [x] [] [] [] [] [] [] [] [] [] []    Bed to Chair [] [x] [] [] [] [] [] [] [] [] []    Stand to Sit [x] [] [] [] [] [] [] [] [] [] []    I=Independent, Mod I=Modified Independent, S=Supervision, SBA=Standby Assistance, CGA=Contact Guard Assistance,   Min=Minimal Assistance, Mod=Moderate Assistance, Max=Maximal Assistance, Total=Total Assistance, NT=Not Tested  GAIT: I Mod I S SBA CGA Min Mod Max Total  NT x2 Comments:   Level of Assistance [] [] [x] [x] [] [] [] [] [] [] []    Distance 450 ft several short standing rest breaks    DME None -using rails occasionally    Gait Quality Mild lateral sway, path deviations    Weightbearing Status N/A     I=Independent, Mod I=Modified Independent, S=Supervision, SBA=Standby Assistance, CGA=Contact Guard Assistance,   Min=Minimal Assistance, Mod=Moderate Assistance, Max=Maximal Assistance, Total=Total Assistance, NT=Not Tested    [facility] 6 Clicks   Basic Mobility Inpatient Short Form       How much difficulty does the patient currently have. .. Unable A Lot A Little None   1. Turning over in bed (including adjusting bedclothes, sheets and blankets)? [] 1   [] 2   [] 3   [x] 4   2. Sitting down on and standing up from a chair with arms ( e.g., wheelchair, bedside commode, etc.)   [] 1   [] 2   [] 3   [x] 4   3. Moving from lying on back to sitting on the side of the bed? [] 1   [] 2   [] 3   [x] 4   How much help from another person does the patient currently need. .. Total A Lot A Little None   4. Moving to and from a bed to a chair (including a wheelchair)? [] 1   [] 2   [x] 3   [] 4   5. Need to walk in hospital room?    [] 1   [] 2   [x] 3   [] 4 6.  Climbing 3-5 steps with a railing? [] 1   [] 2   [x] 3   [] 4   © 2007, Trustees of [address] [place] [address] [address] [id number], under license to [employer]. All rights reserved     Score:  Initial: 21 Most Recent: X (Date: -- )    Interpretation of Tool:  Represents activities that are increasingly more difficult (i.e. Bed mobility, Transfers, Gait). PLAN:   FREQUENCY/DURATION: PT Plan of Care: 3 times/week for duration of hospital stay or until stated goals are met, whichever comes first.    PROBLEM LIST:   (Skilled intervention is medically necessary to address:)  1. Decreased Activity Tolerance  2. Decreased Balance  3. Decreased Gait Ability  4. Decreased Strength  5. Decreased Transfer Abilities   INTERVENTIONS PLANNED:   (Benefits and precautions of physical therapy have been discussed with the patient.)  1. Therapeutic Activity  2. Therapeutic Exercise/HEP  3. Neuromuscular Re-education  4. Gait Training  5. Manual Therapy  6. Education     TREATMENT:     EVALUATION: Low Complexity : (Untimed Charge)    TREATMENT:   ($$ Therapeutic Activity: 8-22 mins    )  Therapeutic Activity (10 Minutes): Therapeutic activity included Rolling, Supine to Sit, Scooting, Transfer Training, Ambulation on level ground, Sitting balance , Standing balance and standing static/dynamic activities, chair transfer, review of activity pacing/pursed lip breathing to improve functional Mobility, Strength, Activity tolerance and balance.     TREATMENT GRID:  N/A    AFTER TREATMENT POSITION/PRECAUTIONS:  Chair, Needs within reach and RN notified    INTERDISCIPLINARY COLLABORATION:  RN/PCT and PT/PTA    TOTAL TREATMENT DURATION:  PT Patient Time In/Time Out  Time In: 0945  Time Out: [facility]

## 2021-06-11 NOTE — PROGRESS NOTES
Moe Patti. Admission Date: 6/9/2021             Daily Progress Note: 6/11/2021    The patient's chart is reviewed and the patient is discussed with the staff. 68 y.o.  male seen and evaluated at the request of Dr. Buffy Eastman. Patient has PMH of HFrEF (EF 30-35%), COPD, CKD, PE, AFib- not on anticoagulation, AAA with repair, HTN. Former smoker quit in 2014, 2 ppd for 30 years. Denies illicit drugs, alcohol or current smoking. He wears 2 L NC at home, 3L with exertion. He also has hx of MAC infection, pulmonary nodules and masslike density of R lung base felt to be pseudotumor. He was treated for MAC infection for 6 months in 2018 and was stopped due to prolonged QTc. He was recently admitted 5/16-20 for COPD exacerbation and was discharged on steroids. He follows with pulmonary at [facility].     He presented to the ER today because patient states he couldn't breathe. He was feeling his normal self until last night when he states he became acutely short of breath. He states his nebulizers were not working and he felt a little wheezing. He denies any pain, N/V or edema. He states he has lost about 15 lbs in 2 weeks. He denies any fevers or sick contacts. Received COVID vaccines 2 months ago.     Placed on CPAP and given solumedrol by EMS, BiPAP in ER. ABG on arrival resp acidosis- 7.26/67.9/30.6.  Repeat ABG improved 7.32/30.1/30.9. nt , trop 33.4.       Subjective:     Patient up to chair eating lunch, feels \"better\"  Currently on 2L O2 NC, states he stays on 2L all the time at home  Discussed with patient that he has been seeing Dr. Nimesh Velarde at [facility] for Pulmonary      Current Facility-Administered Medications   Medication Dose Route Frequency    famotidine (PEPCID) tablet 20 mg  20 mg Oral DAILY    albuterol (PROVENTIL VENTOLIN) nebulizer solution 2.5 mg  2.5 mg Nebulization Q4HWA RT    budesonide (PULMICORT) 500 mcg/2 ml nebulizer suspension  500 mcg Nebulization BID RT    aspirin delayed-release tablet 81 mg  81 mg Oral DAILY    brimonidine (ALPHAGAN) 0.2 % ophthalmic solution 1 Drop  1 Drop Both Eyes BID    dilTIAZem IR (CARDIZEM) tablet 120 mg  120 mg Oral DAILY    latanoprost (XALATAN) 0.005 % ophthalmic solution 1 Drop  1 Drop Both Eyes QPM    lisinopriL (PRINIVIL, ZESTRIL) tablet 10 mg  10 mg Oral DAILY    metoprolol succinate (TOPROL-XL) XL tablet 50 mg  50 mg Oral DAILY    polyethylene glycol (MIRALAX) packet 17 g  17 g Oral DAILY    rosuvastatin (CRESTOR) tablet 40 mg  40 mg Oral QHS    tamsulosin (FLOMAX) capsule 0.4 mg  0.4 mg Oral DAILY    sodium chloride (NS) flush 5-40 mL  5-40 mL IntraVENous Q8H    sodium chloride (NS) flush 5-40 mL  5-40 mL IntraVENous PRN    acetaminophen (TYLENOL) tablet 650 mg  650 mg Oral Q6H PRN    Or    acetaminophen (TYLENOL) suppository 650 mg  650 mg Rectal Q6H PRN    polyethylene glycol (MIRALAX) packet 17 g  17 g Oral DAILY PRN    ondansetron (ZOFRAN ODT) tablet 4 mg  4 mg Oral Q8H PRN    Or    ondansetron (ZOFRAN) injection 4 mg  4 mg IntraVENous Q6H PRN    furosemide (LASIX) injection 40 mg  40 mg IntraVENous DAILY    hydrALAZINE (APRESOLINE) 20 mg/mL injection 20 mg  20 mg IntraVENous Q6H PRN    metoprolol (LOPRESSOR) injection 5 mg  5 mg IntraVENous Q6H PRN       Review of Systems  Constitutional: negative for fever, chills, sweats  Cardiovascular: negative for chest pain, palpitations, syncope, edema  Gastrointestinal:  negative for dysphagia, reflux, vomiting, diarrhea, abdominal pain, or melena  Neurologic:  negative for focal weakness, numbness, headache    Objective:     Vitals:    06/11/21 0710 06/11/21 0730 06/11/21 1018 06/11/21 1117   BP:  (!) 156/75 124/65    Pulse:  87 81    Resp:  22 20    Temp:  97.6 °F (36.4 °C) 98.2 °F (36.8 °C)    SpO2: 100% 98% 100% 97%   Weight:       Height:             Intake/Output Summary (Last 24 hours) at 6/11/2021 1215  Last data filed at 6/10/2021 2241  Gross per 24 hour   Intake    Output 50 ml   Net -50 ml       Physical Exam:   Constitution:  the patient is well developed and in no acute distress  EENMT:  Sclera clear, pupils equal, oral mucosa moist  Respiratory: On 2L O2 NC, no wheezing  Cardiovascular:  RRR without M,G,R  Gastrointestinal: soft and non-tender; with positive bowel sounds. Musculoskeletal: warm without cyanosis. There is no lower extremity edema. Skin:  no jaundice or rashes, no wounds   Neurologic: no gross neuro deficits     Psychiatric:  alert and oriented x 3      CXR:  6/9/2021          CT Chest 2/2021 at [facility]:    Lungs - Pleura:    Spiculated left upper lobe apical posterior segment pulmonary nodule (sequence 203, image 55) 11 mm x 6 mm: 9 mm average diameter.    Previously, this nodule measured 11 mm x 7 mm; 9 mm average diameter May 29, 2020.    6 mm x 6 mm; 6 mm average diameter December 14, 2017.    Emphysema and changes of inflammatory small airways. Continued involution of the pseudotumor (loculated fluid) right minor fissure. No new or additional suspicious pulmonary nodules. Persistent mild apical pleural-parenchymal scarring.      Abbie - Mediastinum:    No pathologic hilar or mediastinal adenopathy      Heart - Vascular:    Normal cardiac size.    Left aortic arch with extensive ascending, arch, great vessel and descending thoracic aortic intimal calcification. Normal caliber aortic arch and pulmonary artery      CT CHEST W/PE PROTOCOL:  6/9/2021    IMPRESSION  1. No evidence of pulmonary embolism or other acute process in the chest.  2. Mildly spiculated 9 mm left apical pulmonary nodule worrisome for primary  lung malignancy. Consider further evaluation with PET/CT. 3. Moderate centrilobular emphysema.       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       LAB  No results for input(s): GLUCPOC in the last 72 hours. No lab exists for component: [place]   Recent Labs     06/11/21  0632 06/10/21  0331 06/09/21  2055 06/09/21  0710   WBC 8.7 5.3 3.5* 6.0   HGB 9.7* 9.4* 11.2* 9.6*   HCT 32.9* 32.2* 38.2* 31.9*    168 212 165     Recent Labs     06/11/21 0632 06/10/21  0331 06/09/21  2055 06/09/21  0710    140 146* 146*   K 4.5 4.1 4.3 4.0    104 108* 111*   CO2 34* 30 33* 29   GLU 97 155* 132* 106*   BUN 30* 22 18 15   CREA 1.28 1.46 1.44 1.39   CA 8.9 8.8 9.2 8.5   ALB  --  2.9* 3.4 2.9*   TBILI  --  0.2 0.2 0.2   ALT  --  13 16 12     Recent Labs     06/09/21 0924 06/09/21  0722   PHI 7.32* 7.26*   PCO2I 60.1* 67.9*   PO2I 65* 79   HCO3I 30.9* 30.6*     No results for input(s): LCAD, LAC in the last 72 hours.       Assessment:  (Medical Decision Making)     Hospital Problems  Date Reviewed: 6/11/2021        Codes Class Noted POA    * (Principal) Acute on chronic respiratory failure with hypoxia and hypercapnia (HCC) ICD-10-CM: J96.21, J96.22  ICD-9-CM: 518.84, 786.09, 799.02  6/9/2021 Yes        Moderate protein-calorie malnutrition ([facility] [address] ICD-10-CM: E44.0  ICD-9-CM: 263.0  5/19/2021 Yes        Short-term memory loss ICD-10-CM: R41.3  ICD-9-CM: 780.93  5/18/2021 Yes        COPD exacerbation ([facility] [address] ICD-10-CM: J44.1  ICD-9-CM: 491.21  5/17/2021 Yes        Chronic systolic heart failure ([facility] ICD-10-CM: I50.22  ICD-9-CM: 428.22  12/21/2020 Yes        Chronic hypoxemic respiratory failure (HCC) (Chronic) ICD-10-CM: J96.11  ICD-9-CM: 518.83, 799.02  9/9/2019 Yes        Heart failure with reduced ejection fraction (HCC) (Chronic) ICD-10-CM: I50.20  ICD-9-CM: 428.20  9/9/2019 Yes        CKD (chronic kidney disease), stage III (HCC) (Chronic) ICD-10-CM: N18.30  ICD-9-CM: 585.3  9/9/2019 Yes        A-fib ([facility] ICD-10-CM: I48.91  ICD-9-CM: 427.31  7/23/2018 Yes        Hypertension ICD-10-CM: I10  ICD-9-CM: 401.9  6/1/2017 Yes        COPD (chronic obstructive pulmonary disease) (HCC) (Chronic) ICD-10-CM: J44.9  ICD-9-CM: 496  5/2/2014 Yes        Hyperlipemia (Chronic) ICD-10-CM: E78.5  ICD-9-CM: 272.4  5/2/2014 Yes              Plan:  (Medical Decision Making)     --On 2L O2 NC, baseline as he is on 2L at home  --Continue Pulmicort and albuterol nebulizer treatments  --On Lasix 40 mg IV daily  --CT chest negative for PE  --Sees Dr. Jeimy Villalba at [facility] for [facility], lung nodule/COPD/Hx of MAC infection   --Will need ambulating O2 sat prior to discharge, may need ABG as well for consideration of BIPAP  --GI/DVT prophylaxis  --Full code      More than 50% of the time documented was spent in face-to-face contact with the patient and in the care of the patient on the floor/unit where the patient is located. Alem Barney NP        Lungs:  Mild B exp wheeze  Heart:  RRR with no Murmur/Rubs/Gallops    Additional Comments:    Pt with some increase in wheeze today. Will start 40mg po prednisone daily today. Otherwise cont plan as above. I have spoken with and examined the patient. I agree with the above assessment and plan as documented.     Aretha Martines MD

## 2021-06-11 NOTE — PROGRESS NOTES
[facility] Hospitalist Progress Note     Name: Lady Montano Age: 68 y.o. Sex: male  : 1940    MRN:     [id number]    Admit Date:  2021    Reason for Admission:  COPD exacerbation ([facility] [address] [J44.1]    Assessment & Plan     Acute on chronic respiratory failure with hypoxia and hypercarbia  CT Chest without any evidence of PE  - Pulmonary following  - O2 supplement. Currently down to 2L O2 NC  - continue Pulmicort and albuterol nebulizer treatments  - Sees Dr. Robyn Puente at [facility] for Pulmonary, lung nodule/COPD/Hx of MAC infection followed by that group  - no need for steroids given very minimal wheezing on exam  - 6MWT to assess ambulatory O2 requirement  - will need ABG prior to DC per pulm  - started on steroids due to wheezing today    Chronic Systolic CHF  EF of 60% in last Echo. - IV lasix 40mg IV daily given pBNP of 5k. - repeat Echo pending  - I&O    Hx of Afib // HTN   NSR on admission. Not on outpatient OAC  - ASA 81, Diltiazem, lisinopril, metoprolol. CKD stage 2  Cr of 1.39 on admission  Reviewed renal function today. Stable and at baseline.  - will monitor renal function given lasix IV    Protein Calorie Malnutrition  Due to chronic illness. BMI of Body mass index is 24.68 kg/m². - nutrition consulted    Diet:  DIET ADULT  DIET ADULT ORAL NUTRITION SUPPLEMENT  DVT PPx: lovenox    Code: Full Code    Dispo / Discharge Planning: Home PT per PT/OT eval. Likely in [address] [place] [address] Course/Subjective:     Please refer to the admission H&P for details of presentation. In summary, Lady Montano is a 68 y.o. male with medical history significant for  COPD, chronic hypoxic resp failure (on 2-3L NC at home) CKD stage 3, chronic systolic CHF, SAMANTHA spiculated lung mass (stable per CT readings at [facility]), hx of MAC unable to complete treatment secondary to qT prolongation presents to the ER with report of sudden onset dyspnea that woke patient from sleep.  Was placed on CPAP by EMS and on BIPAP in ER. Admitted for acute on chronic respiratory failure with hypoxia and hypercarbia. Subjective/24 hr Events (06/11/21) : Patient is seen and examined at bedside. No acute events reported overnight by nursing staff. Reports breathing back to baseline. Denies any cough or shortness of breath at rest while on O2 NC. Patient denies fever, chills, chest pains, n/v, abdominal pain. Review of Systems: 14 point review of systems is otherwise negative with the exception of the elements mentioned above. Objective:     Patient Vitals for the past 24 hrs:   Temp Pulse Resp BP SpO2   06/11/21 1117     97 %   06/11/21 1018 98.2 °F (36.8 °C) 81 20 124/65 100 %   06/11/21 0730 97.6 °F (36.4 °C) 87 22 (!) 156/75 98 %   06/11/21 0710     100 %   06/11/21 0325 98.1 °F (36.7 °C) 83 22 134/74 98 %   06/10/21 2241 98.4 °F (36.9 °C) 85 26 (!) 140/83 100 %   06/10/21 2010     97 %   06/10/21 1849 98.6 °F (37 °C) 80 26 134/65 96 %   06/10/21 1600  78      06/10/21 1555 98.3 °F (36.8 °C) 81 20 128/68 98 %   06/10/21 1514     96 %     Oxygen Therapy  O2 Sat (%): 97 % (06/11/21 1117)  Pulse via Oximetry: 80 beats per minute (06/11/21 1117)  O2 Device: Nasal cannula (06/11/21 1008)  Skin Assessment: Clean, dry, & intact (06/10/21 2010)  O2 Flow Rate (L/min): 2 l/min (06/11/21 1117)  FIO2 (%): 28 % (06/11/21 0710)    Body mass index is 24.68 kg/m². Physical Exam:   General:     alert, awake, no acute distress. Thin elderly male  HENT:   normocephalic, atraumatic. Eyes:   anicteric sclerae, normal conjunctiva, EOMI  Neck:    supple, non-tender. Trachea midline. Lungs:   Poor air entry but otherwise clear, +wheezing  Cardiac:   RRR, Normal S1 and S2. Abdomen:   Soft, non distended, nontender, +BS, no guarding/rebound  Extremities:   No edema , pedal pulses present  Skin:   normal turgor and texture; no rash, ulcers   Neuro:  AAOx3.  No gross focal neurological deficit  Psychiatric:  No anxiety, calm, cooperative    Data Review:  I have reviewed all labs, meds, and studies from the last 24 hours:    Labs:    Recent Results (from the past 24 hour(s))   CBC W/O DIFF    Collection Time: 06/11/21  6:32 AM   Result Value Ref Range    WBC 8.7 4.3 - 11.1 K/uL    RBC 3.61 (L) 4.23 - 5.6 M/uL    HGB 9.7 (L) 13.6 - 17.2 g/dL    HCT 32.9 (L) 41.1 - 50.3 %    MCV 91.1 79.6 - 97.8 FL    MCH 26.9 26.1 - 32.9 PG    MCHC 29.5 (L) 31.4 - 35.0 g/dL    RDW 16.6 (H) 11.9 - 14.6 %    PLATELET 788 332 - 775 K/uL    MPV 11.2 9.4 - 12.3 FL    ABSOLUTE NRBC 0.00 0.0 - 0.2 K/uL   METABOLIC PANEL, BASIC    Collection Time: 06/11/21  6:32 AM   Result Value Ref Range    Sodium 142 138 - 145 mmol/L    Potassium 4.5 3.5 - 5.1 mmol/L    Chloride 106 98 - 107 mmol/L    CO2 34 (H) 21 - 32 mmol/L    Anion gap 2 (L) 7 - 16 mmol/L    Glucose 97 65 - 100 mg/dL    BUN 30 (H) 8 - 23 MG/DL    Creatinine 1.28 0.8 - 1.5 MG/DL    GFR est AA >60 >60 ml/min/1.73m2    GFR est non-AA 58 (L) >60 ml/min/1.73m2    Calcium 8.9 8.3 - 10.4 MG/DL       All Micro Results     None          EKG Results     Procedure 720 Value Units Date/Time    EKG 12 LEAD INITIAL [[id number]] Collected: 06/09/21 0710    Order Status: Completed Updated: 06/09/21 1449     Ventricular Rate 91 BPM      Atrial Rate 91 BPM      P-R Interval 188 ms      QRS Duration 68 ms      Q-T Interval 342 ms      QTC Calculation (Bezet) 420 ms      Calculated P Axis 87 degrees      Calculated R Axis 73 degrees      Calculated T Axis 89 degrees      Diagnosis --     Normal sinus rhythm  Right atrial enlargement  Anteroseptal infarct , age undetermined  Abnormal ECG  No previous ECGs available  Confirmed by Liseth Tejeda ([id number]) on 6/9/2021 2:49:31 PM            Other Studies:  XR CHEST SNGL V    Result Date: 6/9/2021  EXAM: CHEST SINGLE VW INDICATION: Chest pain COMPARISON: Chest radiographs, 5/16/2021 FINDINGS: The cardiomediastinal silhouette remains unchanged with prominence of the pulmonary arteries and atherosclerotic calcifications in the aorta. No pleural effusion or pneumothorax. No focal parenchymal process. No acute osseous abnormality. No acute cardiopulmonary abnormality. CT CHEST PULMONARY EMBOLISM    Result Date: 6/9/2021  EXAM: CT Chest with contrast. INDICATION: Concern for PE. History of PE and atrial fibrillation. Not on anticoagulation. Comparison: None. Multiple axial images were obtained through the chest during intravenous infusion of 100mL of Isovue 370. Coronal 2D MIP image reformats were performed. Radiation dose reduction techniques were used for this study: All CT scans performed at this facility use one or all of the following: Automated exposure control, adjustment of the mA and/or kVp according to patient's size, iterative reconstruction. FINDINGS: Examination is diagnostic through the segmental level, except in the right lower lobe where motion artifact limits evaluation to the lobar level. No evidence of acute or chronic pulmonary embolism. The main pulmonary artery and thoracic aorta are normal in caliber. Atherosclerotic calcifications throughout the aorta and coronary arteries. Partially visualized and vascular stent in the infrarenal abdominal aorta which appears aneurysmal. LUNGS/PLEURA: Central airways are patent. Moderate upper lobe predominant centrilobular emphysema. There is a mildly spiculated left apical solid pulmonary nodule measuring 9 mm (axial image 24). No consolidation to suggest pneumonia. No pleural effusion or pneumothorax. MEDIASTINUM: Thyroid is mildly enlarged with streak artifacts tearing evaluation. The thoracic esophagus is within normal limits. No mediastinal or hilar lymphadenopathy. Heart is normal in size without pericardial effusion. BONES/SUBCUTANEOUS TISSUE: No aggressive osseous lesion. No subcutaneous soft tissue abnormality.  UPPER ABDOMEN: Multiple simple cysts are again seen throughout the liver with a few additional subcentimeter hypodensities are too small to characterize but stable. There are stable simple cyst on both kidneys. Unchanged subcentimeter nodule in the right adrenal gland, likely representing an adenoma given stability. 1.  No evidence of pulmonary embolism or other acute process in the chest. 2. Mildly spiculated 9 mm left apical pulmonary nodule worrisome for primary lung malignancy. Consider further evaluation with PET/CT. 3. Moderate centrilobular emphysema.           Current Meds:   Current Facility-Administered Medications   Medication Dose Route Frequency    famotidine (PEPCID) tablet 20 mg  20 mg Oral DAILY    albuterol (PROVENTIL VENTOLIN) nebulizer solution 2.5 mg  2.5 mg Nebulization Q4HWA RT    budesonide (PULMICORT) 500 mcg/2 ml nebulizer suspension  500 mcg Nebulization BID RT    aspirin delayed-release tablet 81 mg  81 mg Oral DAILY    brimonidine (ALPHAGAN) 0.2 % ophthalmic solution 1 Drop  1 Drop Both Eyes BID    dilTIAZem IR (CARDIZEM) tablet 120 mg  120 mg Oral DAILY    latanoprost (XALATAN) 0.005 % ophthalmic solution 1 Drop  1 Drop Both Eyes QPM    lisinopriL (PRINIVIL, ZESTRIL) tablet 10 mg  10 mg Oral DAILY    metoprolol succinate (TOPROL-XL) XL tablet 50 mg  50 mg Oral DAILY    polyethylene glycol (MIRALAX) packet 17 g  17 g Oral DAILY    rosuvastatin (CRESTOR) tablet 40 mg  40 mg Oral QHS    tamsulosin (FLOMAX) capsule 0.4 mg  0.4 mg Oral DAILY    sodium chloride (NS) flush 5-40 mL  5-40 mL IntraVENous Q8H    sodium chloride (NS) flush 5-40 mL  5-40 mL IntraVENous PRN    acetaminophen (TYLENOL) tablet 650 mg  650 mg Oral Q6H PRN    Or    acetaminophen (TYLENOL) suppository 650 mg  650 mg Rectal Q6H PRN    polyethylene glycol (MIRALAX) packet 17 g  17 g Oral DAILY PRN    ondansetron (ZOFRAN ODT) tablet 4 mg  4 mg Oral Q8H PRN    Or    ondansetron (ZOFRAN) injection 4 mg  4 mg IntraVENous Q6H PRN    furosemide (LASIX) injection 40 mg  40 mg IntraVENous DAILY    hydrALAZINE (APRESOLINE) 20 mg/mL injection 20 mg  20 mg IntraVENous Q6H PRN    metoprolol (LOPRESSOR) injection 5 mg  5 mg IntraVENous Q6H PRN       Problem List:  Hospital Problems as of 6/11/2021 Date Reviewed: 6/11/2021        Codes Class Noted - Resolved POA    * (Principal) Acute on chronic respiratory failure with hypoxia and hypercapnia (HCC) ICD-10-CM: J96.21, J96.22  ICD-9-CM: 518.84, 786.09, 799.02  6/9/2021 - Present Yes        Moderate protein-calorie malnutrition ([facility] ICD-10-CM: E44.0  ICD-9-CM: 263.0  5/19/2021 - Present Yes        Short-term memory loss ICD-10-CM: R41.3  ICD-9-CM: 780.93  5/18/2021 - Present Yes        COPD exacerbation ([facility] ICD-10-CM: J44.1  ICD-9-CM: 491.21  5/17/2021 - Present Yes        Chronic systolic heart failure ([facility] ICD-10-CM: I50.22  ICD-9-CM: 428.22  12/21/2020 - Present Yes        Chronic hypoxemic respiratory failure (HCC) (Chronic) ICD-10-CM: J96.11  ICD-9-CM: 518.83, 799.02  9/9/2019 - Present Yes        Heart failure with reduced ejection fraction (HCC) (Chronic) ICD-10-CM: I50.20  ICD-9-CM: 428.20  9/9/2019 - Present Yes        CKD (chronic kidney disease), stage III (HCC) (Chronic) ICD-10-CM: N18.30  ICD-9-CM: 585.3  9/9/2019 - Present Yes        A-fib ([facility] ICD-10-CM: I48.91  ICD-9-CM: 427.31  7/23/2018 - Present Yes        Hypertension ICD-10-CM: I10  ICD-9-CM: 401.9  6/1/2017 - Present Yes        COPD (chronic obstructive pulmonary disease) ([facility] (Chronic) ICD-10-CM: J44.9  ICD-9-CM: 130  5/2/2014 - Present Yes        Hyperlipemia (Chronic) ICD-10-CM: E78.5  ICD-9-CM: 272.4  5/2/2014 - Present Yes        RESOLVED: Hypernatremia ICD-10-CM: E87.0  ICD-9-CM: 276.0  6/10/2021 - 6/10/2021 Yes               Part of this note was written by using a voice dictation software and the note has been proof read but may still contain some grammatical/other typographical errors.     Signed By: Giovani Gutierrez MD   [employer] [facility]    June 11, 2021  3:17 PM

## 2021-06-11 NOTE — PROGRESS NOTES
Oxygen Qualifier       Room air: SpO2 with O2 and liter flow   Resting SpO2  98%    Ambulating SpO2  81% 84% on 1L / 88 on 2L / 92% on 3L       Completed by:    Lisseth Aj, RT

## 2021-06-11 NOTE — PROGRESS NOTES
Chart reviewed by CM for discharge planning. Per chart review, PT/OT consulted this day. Per PT Eval this day, the patient has been recommended for [facility] therapy. CM will confirm with patient, if he would like [facility] services resumed. CM continues to follow. Update 1518: CM spoke with patient to discuss discharge needs. Patient confirmed he would like [facility] services resumed with [facility]. CM sent referral this day. Patient also confirmed his [facility] will transport him at discharge and provide portable tank. CM remains available.

## 2021-06-11 NOTE — PROGRESS NOTES
ACUTE OT GOALS:  (Developed with and agreed upon by patient and/or caregiver.)  1. Patient will complete lower body bathing and dressing with mod I and adaptive equipment as needed. GOAL MET  2. Patient will complete upper body bathing and dressing with mod I and adaptive equipment as needed. GOAL MET  3. Patient will complete toileting with mod I. GOAL MET  4. Patient will complete functional transfers with mod I and adaptive equipment as needed. GOAL MET  5. Patient will complete functional activity with mod I and adaptive equipment as needed. GOAL MET    Timeframe: 7 visits     OCCUPATIONAL THERAPY ASSESSMENT: Initial Assessment and Daily Note OT Treatment Day # 1    Soraida Loemli is a 68 y.o. male   PRIMARY DIAGNOSIS: Acute on chronic respiratory failure with hypoxia and hypercapnia (HCC)  COPD exacerbation (HCC) [J44.1]       Reason for Referral:  Respiratory failure   ICD-10: Treatment Diagnosis: Generalized Muscle Weakness (M62.81)  Difficulty in walking, Not elsewhere classified (R26.2)  INPATIENT: Payor: [facility] MEDICARE / Plan: [address] / Product Type: [employer] Medicare /   ASSESSMENT:     REHAB RECOMMENDATIONS:   Recommendation to date pending progress:  [address] [place] [address]  Equipment:    None     PRIOR LEVEL OF FUNCTION:  (Prior to Hospitalization)  INITIAL/CURRENT LEVEL OF FUNCTION:  (Based on today's evaluation)   Bathing:   Modified Independent  Dressing:   Independent  Feeding/Grooming:   Independent  Toileting:   Independent  Functional Mobility:   Modified Independent Bathing:   Modified Independent   Dressing:   Modified Independent  Feeding/Grooming:   Modified Independent  Toileting:   Modified Independent   Functional Mobility:   Modified Independent     ASSESSMENT:  Mr. America Dobson presented to the hospital with respiratory distress.  Patient completes all ADLs mod I and receives assist from an aid 3 hours a day for all IADLs at baseline. Pt is currently on 2L of O2. No signs of SOB on this date. Today, pt completed bed mobility mod I, functional transfers mod I, LB dressing mod I and grooming task standing at the sink mod I. Recommend pt return home with assist from family/friends and continued HHOT/aid. Pt met all goals during treatment session on this date w/ no further need for skilled OT services.       SUBJECTIVE:   Mr. Irasema Albert states, \"I feel like I am moving normal.\"    SOCIAL HISTORY/LIVING ENVIRONMENT: pt lives alone in an apartment on the 9th floor with elevator access, uses a tub shower with a chair, ambulates with a rollator when needed, pt receives care from aid who comes 3 hours a day to assist with IADLs, able to complete ADLs mod I, reported he does have family/friends available who can assist him  Home Environment: Apartment  # Steps to Enter: 0  One/Two Story Residence: One story  Living Alone: Yes  Support Systems: Home care staff, Therapist    OBJECTIVE:     PAIN: VITAL SIGNS: LINES/DRAINS:   Pre Treatment: Pain Screen  Pain Scale 1: Numeric (0 - 10)  Pain Intensity 1: 0  Post Treatment: no change  Vital Signs  O2 Flow Rate (L/min): 2 l/min none  O2 Device: Nasal cannula     GROSS EVALUATION:  BUEs Within Functional Limits Abnormal/ Functional Abnormal/ Non-Functional (see comments) Not Tested Comments:   AROM [x] [] [] []    PROM [] [] [] [x]    Strength [x] [] [] []    Balance [x] [] [] []    Posture [x] [] [] []    Sensation [x] [] [] []    Coordination [x] [] [] []    Tone [x] [] [] []    Edema [x] [] [] []    Activity Tolerance [x] [] [] []     [] [] [] []      COGNITION/  PERCEPTION: Intact Impaired   (see comments) Comments:   Orientation [x] []    Vision [x] []    Hearing [x] []    Judgment/ Insight [x] []    Attention [x] []    Memory [x] []    Command Following [x] []    Emotional Regulation [x] []     [] []      ACTIVITIES OF DAILY LIVING: I Mod I S SBA CGA Min Mod Max Total NT Comments   BASIC ADLs:              Bathing/ Showering [] [] [] [] [] [] [] [] [] [x]    Toileting [] [] [] [] [] [] [] [] [] [x] Pt reported he is getting up independently to get on and off the toilet    Dressing [] [x] [] [] [] [] [] [] [] [] Donned shoes sitting EOB   Feeding [] [] [] [] [] [] [] [] [] [x]    Grooming [] [x] [] [] [] [] [] [] [] [] Washed hands standing at the sink   Personal Device Care [] [] [] [] [] [] [] [] [] [x]    Functional Mobility [] [x] [] [] [] [] [] [] [] [] Ambulation in room with RW   I=Independent, Mod I=Modified Independent, S=Supervision, SBA=Standby Assistance, CGA=Contact Guard Assistance,   Min=Minimal Assistance, Mod=Moderate Assistance, Max=Maximal Assistance, Total=Total Assistance, NT=Not Tested    MOBILITY: I Mod I S SBA CGA Min Mod Max Total  NT x2 Comments:   Supine to sit [] [x] [] [] [] [] [] [] [] [] []    Sit to supine [] [x] [] [] [] [] [] [] [] [] []    Sit to stand [] [x] [] [] [] [] [] [] [] [] []    Bed to chair [] [x] [] [] [] [] [] [] [] [] []    I=Independent, Mod I=Modified Independent, S=Supervision, SBA=Standby Assistance, CGA=Contact Guard Assistance,   Min=Minimal Assistance, Mod=Moderate Assistance, Max=Maximal Assistance, Total=Total Assistance, NT=Not Tested    [address] [facility] [address] [address] AM-PAC 6 Clicks   Daily Activity Inpatient Short Form        How much help from another person does the patient currently need. .. Total A Lot A Little None   1. Putting on and taking off regular lower body clothing? [] 1   [] 2   [] 3   [x] 4   2. Bathing (including washing, rinsing, drying)? [] 1   [] 2   [] 3   [x] 4   3. Toileting, which includes using toilet, bedpan or urinal?   [] 1   [] 2   [] 3   [x] 4   4. Putting on and taking off regular upper body clothing? [] 1   [] 2   [] 3   [x] 4   5. Taking care of personal grooming such as brushing teeth? [] 1   [] 2   [] 3   [x] 4   6. Eating meals?    [] 1   [] 2   [] 3   [x] 4   © 2007, Trustees of [address] [place] [address] [address] [address], under license to [employer]. All rights reserved     Score:  Initial: 24  Completed 6/11/2021 Most Recent: X (Date: -- )   Interpretation of Tool:  Represents activities that are increasingly more difficult (i.e. Bed mobility, Transfers, Gait). PLAN:   FREQUENCY/DURATION: OT Plan of Care:  (eval and d/c due to being at functional baseline) for duration of hospital stay or until stated goals are met, whichever comes first.    PROBLEM LIST:   (Skilled intervention is medically necessary to address:)  1. Decreased ADL/Functional Activities  2. Decreased Activity Tolerance  3. Decreased Balance  4. Decreased Strength  5. Decreased Transfer Abilities   INTERVENTIONS PLANNED:   (Benefits and precautions of occupational therapy have been discussed with the patient.)  1. Self Care Training  2. Therapeutic Activity  3. Therapeutic Exercise/HEP  4. Neuromuscular Re-education  5. Education     TREATMENT:     EVALUATION: Low Complexity : (Untimed Charge)    TREATMENT:   ($$ Self Care/Home Management: 8-22 mins    )  Self Care (8 Minutes): Self care including Lower Body Bathing and Grooming to increase independence and decrease level of assistance required.     TREATMENT GRID:  N/A    AFTER TREATMENT POSITION/PRECAUTIONS:  Alarm Activated, Chair and Needs within reach    INTERDISCIPLINARY COLLABORATION:  OT/HARRISON    TOTAL TREATMENT DURATION:  OT Patient Time In/Time Out  Time In: 1427  Time Out: [address] [facility], OT

## 2021-06-11 NOTE — PROGRESS NOTES
Problem: Chronic Obstructive Pulmonary Disease (COPD)  Goal: *Oxygen saturation during activity within specified parameters  Outcome: Progressing Towards Goal  Goal: *Able to remain out of bed as prescribed  Outcome: Progressing Towards Goal  Goal: *Absence of hypoxia  Outcome: Progressing Towards Goal  Goal: *Optimize nutritional status  Outcome: Progressing Towards Goal     Problem: Patient Education: Go to Patient Education Activity  Goal: Patient/Family Education  Outcome: Progressing Towards Goal     Problem: Falls - Risk of  Goal: *Absence of Falls  Description: Document Raul Fall Risk and appropriate interventions in the flowsheet.   Outcome: Progressing Towards Goal  Note: Fall Risk Interventions:  Mobility Interventions: Bed/chair exit alarm         Medication Interventions: Bed/chair exit alarm                   Problem: Patient Education: Go to Patient Education Activity  Goal: Patient/Family Education  Outcome: Progressing Towards Goal

## 2021-06-12 PROBLEM — I50.42 CHRONIC COMBINED SYSTOLIC AND DIASTOLIC HEART FAILURE (HCC): Status: ACTIVE | Noted: 2020-01-01

## 2021-06-12 NOTE — PROGRESS NOTES
Hourly rounds performed through shift, pt received prn tylenol for leg cramping, positive effects. Denies needs at this time. Bed in low position, locked and call light/personal items within reach. Will continue to monitor and report to day shift nurse.

## 2021-06-12 NOTE — PROGRESS NOTES
Hourly rounds completed. Patient is alert and oriented. Sitting on side of bed. Eats well from meal trays. No needs at this time. Will continue to monitor and give report to oncoming RN.

## 2021-06-12 NOTE — PROGRESS NOTES
[facility] Hospitalist Progress Note     Name: Amilcar Yu Age: 68 y.o. Sex: male  : 1940    MRN:     [id number]    Admit Date:  2021    Reason for Admission:  COPD exacerbation ([address] [J44.1]    Assessment & Plan     Acute on chronic respiratory failure with hypoxia and hypercarbia  CT Chest without any evidence of PE  - Pulmonary following  - 2L O2 NC at rest and 3L O2 on ambulation. - neb treatment per Pulmonary  - Sees Dr. Karyn Weller at [facility] for Pulmonary, lung nodule/COPD/Hx of MAC infection followed by that group  - started on steroids due to wheezing ( - )  - start spriva respimat       Combined chronic Systolic and diastolic CHF   EF of 17-97% with diastolic dysfunction.  - IV lasix 40mg IV daily  - I&O  - 1500mL water restriction    Hx of Afib // HTN   NSR on admission. Not on outpatient OAC  - ASA 81, Diltiazem, lisinopril, metoprolol. CKD stage 2  Cr of 1.39 on admission  Reviewed renal function today. Stable and at baseline.  - will monitor renal function given lasix IV    Protein Calorie Malnutrition  Due to chronic illness. BMI of Body mass index is 24.13 kg/m². - nutrition consulted    Diet:  DIET ADULT ORAL NUTRITION SUPPLEMENT  DIET ADULT  DVT PPx: lovenox    Code: Full Code    Dispo / Discharge Planning: Home PT per PT/OT zoya. Likely in [address] [place] [address] Course/Subjective:     Please refer to the admission H&P for details of presentation. In summary, Amilcar Yu is a 68 y.o. male with medical history significant for COPD, chronic hypoxic resp failure (on 2-3L NC at home) CKD stage 3, chronic systolic CHF, SAMANTHA spiculated lung mass (stable per CT readings at [facility]), hx of MAC unable to complete treatment secondary to qT prolongation presents to the ER with report of sudden onset dyspnea that woke patient from sleep. Was placed on CPAP by EMS and on BIPAP in ER.  Admitted for acute on chronic respiratory failure with hypoxia and hypercarbia. Subjective/24 hr Events (06/12/21) : Patient is seen and examined at bedside. No acute events reported overnight by nursing staff. Reports feeling better with some wheezing. Still has significant dyspnea on when walking to the bathroom. Needs 3L of O2 NC. Patient denies fever, chills, chest pains, n/v, abdominal pain, SOB at rest.    Review of Systems: 14 point review of systems is otherwise negative with the exception of the elements mentioned above. Objective:     Patient Vitals for the past 24 hrs:   Temp Pulse Resp BP SpO2   06/12/21 1200 98.6 °F (37 °C) 75 20 (!) 125/54 95 %   06/12/21 1112     98 %   06/12/21 0735 97.8 °F (36.6 °C) 80 24 (!) 146/70 (!) 1 %   06/12/21 0726     99 %   06/12/21 0222 97.7 °F (36.5 °C) 71 20 (!) 168/72 96 %   06/11/21 2310 98.2 °F (36.8 °C) 73 20 (!) 160/66 96 %   06/11/21 1958     98 %   06/11/21 1936 98.4 °F (36.9 °C) 83 20 (!) 150/66 95 %   06/11/21 1558    124/65    06/11/21 1556 98.2 °F (36.8 °C) 67 20 (!) 116/59 99 %   06/11/21 1506     100 %     Oxygen Therapy  O2 Sat (%): 95 % (06/12/21 1200)  Pulse via Oximetry: 68 beats per minute (06/12/21 1112)  O2 Device: Nasal cannula (06/12/21 1112)  Skin Assessment: Clean, dry, & intact (06/11/21 1958)  O2 Flow Rate (L/min): 2 l/min (06/12/21 1112)  FIO2 (%): 28 % (06/11/21 0710)    Body mass index is 24.13 kg/m². Physical Exam:   General:     alert, awake, no acute distress. Thin elderly male  HENT:   normocephalic, atraumatic. Eyes:   anicteric sclerae, normal conjunctiva, EOMI  Neck:    supple, non-tender. Trachea midline. Lungs:   Poor air entry but otherwise clear, +wheezing  Cardiac:   RRR, Normal S1 and S2. Abdomen:   Soft, non distended, nontender, +BS, no guarding/rebound  Extremities:   No edema , pedal pulses present  Skin:   normal turgor and texture; no rash, ulcers   Neuro:  AAOx3.  No gross focal neurological deficit  Psychiatric:  No anxiety, calm, cooperative    Data Review:  I have reviewed all labs, meds, and studies from the last 24 hours:    Labs:    Recent Results (from the past 24 hour(s))   ECHO ADULT COMPLETE    Collection Time: 06/11/21  3:45 PM   Result Value Ref Range    Aortic Valve Systolic Peak Velocity 223.54 cm/s    AoV PG 9.00 mmHg    Aortic Valve Area by Continuity of Peak Velocity 2.04 cm2    Ao Root D 2.60 cm    IVSd 0.90 0.60 - 1.00 cm    LVIDd 5.20 4. 20 - 5.90 cm    LVIDs 4.10 cm    LVOT d 2.00 cm    Pulmonic Regurgitant End Max Velocity 97.30 cm/s    LVOT Peak Gradient 4.00 mmHg    LVPWd 0.90 0.60 - 1.00 cm    LV E' Septal Velocity 7.25 cm/s    LV E' Lateral Velocity 7.25 cm/s    LV E' Septal Velocity 10.50 cm/s    LV E' Septal Velocity 10.50 cm/s    MV A Collins 111.00 cm/s    MV E Collins 83.90 cm/s    RVIDd 2.50 cm    Tapse 2.44 (A) 1.50 - 2.00 cm    Pulmonic Regurgitant End Max Velocity 271.00 cm/s    Triscuspid Valve Regurgitation Peak Gradient 29.00 mmHg    MV E/A 0.76     LV Mass .0 88.0 - 224.0 g    LV Mass AL Index 88.5 49.0 - 115.0 g/m2    E/E' lateral 11.57     MOON/BSA Pk Collins 1.1 cm2/m2   CBC W/O DIFF    Collection Time: 06/12/21  6:52 AM   Result Value Ref Range    WBC 6.8 4.3 - 11.1 K/uL    RBC 3.32 (L) 4.23 - 5.6 M/uL    HGB 8.9 (L) 13.6 - 17.2 g/dL    HCT 30.3 (L) 41.1 - 50.3 %    MCV 91.3 79.6 - 97.8 FL    MCH 26.8 26.1 - 32.9 PG    MCHC 29.4 (L) 31.4 - 35.0 g/dL    RDW 16.6 (H) 11.9 - 14.6 %    PLATELET 453 304 - 846 K/uL    MPV 10.5 9.4 - 12.3 FL    ABSOLUTE NRBC 0.02 0.0 - 0.2 K/uL   METABOLIC PANEL, BASIC    Collection Time: 06/12/21  6:52 AM   Result Value Ref Range    Sodium 142 136 - 145 mmol/L    Potassium 4.6 3.5 - 5.1 mmol/L    Chloride 107 98 - 107 mmol/L    CO2 34 (H) 21 - 32 mmol/L    Anion gap 1 (L) 7 - 16 mmol/L    Glucose 184 (H) 65 - 100 mg/dL    BUN 30 (H) 8 - 23 MG/DL    Creatinine 1.21 0.8 - 1.5 MG/DL    GFR est AA >60 >60 ml/min/1.73m2    GFR est non-AA >60 >60 ml/min/1.73m2    Calcium 8.4 8.3 - 10.4 MG/DL       All Micro Results     None          EKG Results     Procedure 720 Value Units Date/Time    EKG 12 LEAD INITIAL [[id number]] Collected: 06/09/21 0710    Order Status: Completed Updated: 06/09/21 1449     Ventricular Rate 91 BPM      Atrial Rate 91 BPM      P-R Interval 188 ms      QRS Duration 68 ms      Q-T Interval 342 ms      QTC Calculation (Bezet) 420 ms      Calculated P Axis 87 degrees      Calculated R Axis 73 degrees      Calculated T Axis 89 degrees      Diagnosis --     Normal sinus rhythm  Right atrial enlargement  Anteroseptal infarct , age undetermined  Abnormal ECG  No previous ECGs available  Confirmed by Sunil Brain ([id number]) on 6/9/2021 2:49:31 PM            Other Studies:  XR CHEST SNGL V    Result Date: 6/9/2021  EXAM: CHEST SINGLE VW INDICATION: Chest pain COMPARISON: Chest radiographs, 5/16/2021 FINDINGS: The cardiomediastinal silhouette remains unchanged with prominence of the pulmonary arteries and atherosclerotic calcifications in the aorta. No pleural effusion or pneumothorax. No focal parenchymal process. No acute osseous abnormality. No acute cardiopulmonary abnormality. CT CHEST PULMONARY EMBOLISM    Result Date: 6/9/2021  EXAM: CT Chest with contrast. INDICATION: Concern for PE. History of PE and atrial fibrillation. Not on anticoagulation. Comparison: None. Multiple axial images were obtained through the chest during intravenous infusion of 100mL of Isovue 370. Coronal 2D MIP image reformats were performed. Radiation dose reduction techniques were used for this study: All CT scans performed at this facility use one or all of the following: Automated exposure control, adjustment of the mA and/or kVp according to patient's size, iterative reconstruction. FINDINGS: Examination is diagnostic through the segmental level, except in the right lower lobe where motion artifact limits evaluation to the lobar level. No evidence of acute or chronic pulmonary embolism.  The main pulmonary artery and thoracic aorta are normal in caliber. Atherosclerotic calcifications throughout the aorta and coronary arteries. Partially visualized and vascular stent in the infrarenal abdominal aorta which appears aneurysmal. LUNGS/PLEURA: Central airways are patent. Moderate upper lobe predominant centrilobular emphysema. There is a mildly spiculated left apical solid pulmonary nodule measuring 9 mm (axial image 24). No consolidation to suggest pneumonia. No pleural effusion or pneumothorax. MEDIASTINUM: Thyroid is mildly enlarged with streak artifacts tearing evaluation. The thoracic esophagus is within normal limits. No mediastinal or hilar lymphadenopathy. Heart is normal in size without pericardial effusion. BONES/SUBCUTANEOUS TISSUE: No aggressive osseous lesion. No subcutaneous soft tissue abnormality. UPPER ABDOMEN: Multiple simple cysts are again seen throughout the liver with a few additional subcentimeter hypodensities are too small to characterize but stable. There are stable simple cyst on both kidneys. Unchanged subcentimeter nodule in the right adrenal gland, likely representing an adenoma given stability. 1.  No evidence of pulmonary embolism or other acute process in the chest. 2. Mildly spiculated 9 mm left apical pulmonary nodule worrisome for primary lung malignancy. Consider further evaluation with PET/CT. 3. Moderate centrilobular emphysema.           Current Meds:   Current Facility-Administered Medications   Medication Dose Route Frequency    tiotropium bromide (SPIRIVA RESPIMAT) 2.5 mcg /actuation  2 Puff Inhalation DAILY    predniSONE (DELTASONE) tablet 40 mg  40 mg Oral DAILY WITH BREAKFAST    famotidine (PEPCID) tablet 20 mg  20 mg Oral DAILY    albuterol (PROVENTIL VENTOLIN) nebulizer solution 2.5 mg  2.5 mg Nebulization Q4HWA RT    budesonide (PULMICORT) 500 mcg/2 ml nebulizer suspension  500 mcg Nebulization BID RT    aspirin delayed-release tablet 81 mg  81 mg Oral DAILY    brimonidine (ALPHAGAN) 0.2 % ophthalmic solution 1 Drop  1 Drop Both Eyes BID    dilTIAZem IR (CARDIZEM) tablet 120 mg  120 mg Oral DAILY    latanoprost (XALATAN) 0.005 % ophthalmic solution 1 Drop  1 Drop Both Eyes QPM    lisinopriL (PRINIVIL, ZESTRIL) tablet 10 mg  10 mg Oral DAILY    metoprolol succinate (TOPROL-XL) XL tablet 50 mg  50 mg Oral DAILY    polyethylene glycol (MIRALAX) packet 17 g  17 g Oral DAILY    rosuvastatin (CRESTOR) tablet 40 mg  40 mg Oral QHS    tamsulosin (FLOMAX) capsule 0.4 mg  0.4 mg Oral DAILY    sodium chloride (NS) flush 5-40 mL  5-40 mL IntraVENous Q8H    sodium chloride (NS) flush 5-40 mL  5-40 mL IntraVENous PRN    acetaminophen (TYLENOL) tablet 650 mg  650 mg Oral Q6H PRN    Or    acetaminophen (TYLENOL) suppository 650 mg  650 mg Rectal Q6H PRN    polyethylene glycol (MIRALAX) packet 17 g  17 g Oral DAILY PRN    ondansetron (ZOFRAN ODT) tablet 4 mg  4 mg Oral Q8H PRN    Or    ondansetron (ZOFRAN) injection 4 mg  4 mg IntraVENous Q6H PRN    furosemide (LASIX) injection 40 mg  40 mg IntraVENous DAILY    hydrALAZINE (APRESOLINE) 20 mg/mL injection 20 mg  20 mg IntraVENous Q6H PRN    metoprolol (LOPRESSOR) injection 5 mg  5 mg IntraVENous Q6H PRN       Problem List:  Hospital Problems as of 6/12/2021 Date Reviewed: 6/12/2021        Codes Class Noted - Resolved POA    * (Principal) Acute on chronic respiratory failure with hypoxia and hypercapnia (HCC) ICD-10-CM: J96.21, J96.22  ICD-9-CM: 518.84, 786.09, 799.02  6/9/2021 - Present Yes        Moderate protein-calorie malnutrition (HCC) ICD-10-CM: E44.0  ICD-9-CM: 263.0  5/19/2021 - Present Yes        Short-term memory loss ICD-10-CM: R41.3  ICD-9-CM: 780.93  5/18/2021 - Present Yes        COPD exacerbation ([facility] ICD-10-CM: J44.1  ICD-9-CM: 491.21  5/17/2021 - Present Yes        Chronic combined systolic and diastolic heart failure (HCC) ICD-10-CM: I50.42  ICD-9-CM: 428.42  12/21/2020 - Present Yes Chronic hypoxemic respiratory failure (HCC) (Chronic) ICD-10-CM: J96.11  ICD-9-CM: 518.83, 799.02  9/9/2019 - Present Yes        Heart failure with reduced ejection fraction (HCC) (Chronic) ICD-10-CM: I50.20  ICD-9-CM: 428.20  9/9/2019 - Present Yes        CKD (chronic kidney disease), stage III (HCC) (Chronic) ICD-10-CM: N18.30  ICD-9-CM: 585.3  9/9/2019 - Present Yes        A-fib ([facility] ICD-10-CM: I48.91  ICD-9-CM: 427.31  7/23/2018 - Present Yes        Hypertension ICD-10-CM: I10  ICD-9-CM: 401.9  6/1/2017 - Present Yes        COPD (chronic obstructive pulmonary disease) ([facility] (Chronic) ICD-10-CM: J44.9  ICD-9-CM: 811  5/2/2014 - Present Yes        Hyperlipemia (Chronic) ICD-10-CM: E78.5  ICD-9-CM: 272.4  5/2/2014 - Present Yes        RESOLVED: Hypernatremia ICD-10-CM: E87.0  ICD-9-CM: 276.0  6/10/2021 - 6/10/2021 Yes               Part of this note was written by using a voice dictation software and the note has been proof read but may still contain some grammatical/other typographical errors.     Signed By: Dayna Griffin MD   [facility]    June 12, 2021  3:17 PM

## 2021-06-12 NOTE — PROGRESS NOTES
Arminda Pearce. Admission Date: 6/9/2021             Daily Progress Note: 6/12/2021    The patient's chart is reviewed and the patient is discussed with the staff. Pt is a 69 yo [facility] American male with a history of chronic respiratory failure on 2L at rest and 3L with exertion, COPD, prior tobacco abuse (2ppd x 30 yrs, quit in 2014), prior RON tx in 2018 x 6 months but abx discontinued secondary to prolonged QTc followed by [facility] [address] [facility], PE,HFrEF (EF 30-35%), CKD, Afib not on NOAC, AAA s/p repair, and HTN. Pt was recently admitted 5/16-5/20 for COPD exacerbation at [facility] [place] [place]. Pt presented to the ER on 6/9/21 with complaints of acute onset shortness of breath. He was placed on CPAP and solumedrol by EMS and transitioned to BiPAP in the ER. Pt was in respiratory acidosis with 7.26/67.9/30.6. After BiPAP, he improved. He was admitted by the hospitalist service. We were consulted to assist. Pt had a CTA chest on 6/9 which revealed no PE, mildly spiculated 9mm L apical pulmonary nodule, and moderate centrilobular emphysema. Pt has had wheezing and prednisone PO was added. Subjective:     Pt reports that he is doing fairly well. He did ambulate to the bathroom and feels short of breath. He is on 2L O2 and sat 97%. Pt reports some mild dry cough. He is unsure about wheezing. He denies fever, chills, or sweats.      Current Facility-Administered Medications   Medication Dose Route Frequency    predniSONE (DELTASONE) tablet 40 mg  40 mg Oral DAILY WITH BREAKFAST    famotidine (PEPCID) tablet 20 mg  20 mg Oral DAILY    albuterol (PROVENTIL VENTOLIN) nebulizer solution 2.5 mg  2.5 mg Nebulization Q4HWA RT    budesonide (PULMICORT) 500 mcg/2 ml nebulizer suspension  500 mcg Nebulization BID RT    aspirin delayed-release tablet 81 mg  81 mg Oral DAILY    brimonidine (ALPHAGAN) 0.2 % ophthalmic solution 1 Drop  1 Drop Both Eyes BID    dilTIAZem IR (CARDIZEM) tablet 120 mg  120 mg Oral DAILY    latanoprost (XALATAN) 0.005 % ophthalmic solution 1 Drop  1 Drop Both Eyes QPM    lisinopriL (PRINIVIL, ZESTRIL) tablet 10 mg  10 mg Oral DAILY    metoprolol succinate (TOPROL-XL) XL tablet 50 mg  50 mg Oral DAILY    polyethylene glycol (MIRALAX) packet 17 g  17 g Oral DAILY    rosuvastatin (CRESTOR) tablet 40 mg  40 mg Oral QHS    tamsulosin (FLOMAX) capsule 0.4 mg  0.4 mg Oral DAILY    sodium chloride (NS) flush 5-40 mL  5-40 mL IntraVENous Q8H    sodium chloride (NS) flush 5-40 mL  5-40 mL IntraVENous PRN    acetaminophen (TYLENOL) tablet 650 mg  650 mg Oral Q6H PRN    Or    acetaminophen (TYLENOL) suppository 650 mg  650 mg Rectal Q6H PRN    polyethylene glycol (MIRALAX) packet 17 g  17 g Oral DAILY PRN    ondansetron (ZOFRAN ODT) tablet 4 mg  4 mg Oral Q8H PRN    Or    ondansetron (ZOFRAN) injection 4 mg  4 mg IntraVENous Q6H PRN    furosemide (LASIX) injection 40 mg  40 mg IntraVENous DAILY    hydrALAZINE (APRESOLINE) 20 mg/mL injection 20 mg  20 mg IntraVENous Q6H PRN    metoprolol (LOPRESSOR) injection 5 mg  5 mg IntraVENous Q6H PRN       Review of Systems  +dyspnea on exertion  +cough  Constitutional: negative for fever, chills, sweats  Cardiovascular: negative for chest pain, palpitations, syncope, edema  Gastrointestinal:  negative for dysphagia, reflux, vomiting, diarrhea, abdominal pain, or melena  Neurologic:  negative for focal weakness, numbness, headache    Objective:     Vitals:    06/12/21 0726 06/12/21 0735 06/12/21 1112 06/12/21 1200   BP:  (!) 146/70  (!) 125/54   Pulse:  80  75   Resp:  24  20   Temp:  97.8 °F (36.6 °C)  98.6 °F (37 °C)   SpO2: 99% (!) 1% 98% 95%   Weight:       Height:             Intake/Output Summary (Last 24 hours) at 6/12/2021 1231  Last data filed at 6/12/2021 0820  Gross per 24 hour   Intake 600 ml   Output 800 ml   Net -200 ml       Physical Exam:   Constitution:  the patient is well developed and in no acute distress, on 2L O2 EENMT:  Sclera clear, pupils equal, oral mucosa moist  Respiratory: faint end expiratory wheezing  Cardiovascular:  RRR without M,G,R  Gastrointestinal: soft and non-tender; with positive bowel sounds. Musculoskeletal: warm without cyanosis. There is no lower extremity edema. Skin:  no jaundice or rashes  Neurologic: no gross neuro deficits     Psychiatric:  alert and oriented x 3    CXR:         Chest CTA: 6/9/21      LAB  No results for input(s): GLUCPOC in the last 72 hours. No lab exists for component: [place]   Recent Labs     06/12/21  0652 06/11/21  0632 06/10/21  0331 06/09/21 2055   WBC 6.8 8.7 5.3 3.5*   HGB 8.9* 9.7* 9.4* 11.2*   HCT 30.3* 32.9* 32.2* 38.2*    215 168 212     Recent Labs     06/12/21  0652 06/11/21  0632 06/10/21  0331 06/09/21 2055    142 140 146*   K 4.6 4.5 4.1 4.3    106 104 108*   CO2 34* 34* 30 33*   * 97 155* 132*   BUN 30* 30* 22 18   CREA 1.21 1.28 1.46 1.44   CA 8.4 8.9 8.8 9.2   ALB  --   --  2.9* 3.4   TBILI  --   --  0.2 0.2   ALT  --   --  13 16     Recent Labs     06/11/21  1352   PHI 7.31*   PCO2I 67.8*   PO2I 87   HCO3I 34.2*     No results for input(s): LCAD, LAC in the last 72 hours.       Assessment:  (Medical Decision Making)     Hospital Problems  Date Reviewed: 6/12/2021        Codes Class Noted POA    * (Principal) Acute on chronic respiratory failure with hypoxia and hypercapnia (HCC) ICD-10-CM: J96.21, J96.22  ICD-9-CM: 518.84, 786.09, 799.02  6/9/2021 Yes    On home dose O2     Moderate protein-calorie malnutrition (HCC) ICD-10-CM: E44.0  ICD-9-CM: 263.0  5/19/2021 Yes    Dietary following     Short-term memory loss ICD-10-CM: R41.3  ICD-9-CM: 780.93  5/18/2021 Yes    Chronic     COPD exacerbation ([facility] ICD-10-CM: J44.1  ICD-9-CM: 491.21  5/17/2021 Yes    On prednisone PO/nebs    Chronic systolic heart failure ([facility] ICD-10-CM: I50.22  ICD-9-CM: 428.22  12/21/2020 Yes    Continue diuresis with IV lasix     Chronic hypoxemic respiratory failure (HCC) (Chronic) ICD-10-CM: J96.11  ICD-9-CM: 518.83, 799.02  9/9/2019 Yes    At baseline     Heart failure with reduced ejection fraction (HCC) (Chronic) ICD-10-CM: I50.20  ICD-9-CM: 428.20  9/9/2019 Yes    On lasix     CKD (chronic kidney disease), stage III (HCC) (Chronic) ICD-10-CM: N18.30  ICD-9-CM: 585.3  9/9/2019 Yes        A-fib ([address] ICD-10-CM: I48.91  ICD-9-CM: 427.31  7/23/2018 Yes        Hypertension ICD-10-CM: I10  ICD-9-CM: 401.9  6/1/2017 Yes        COPD (chronic obstructive pulmonary disease) (HCC) (Chronic) ICD-10-CM: J44.9  ICD-9-CM: 329  5/2/2014 Yes        Hyperlipemia (Chronic) ICD-10-CM: E78.5  ICD-9-CM: 272.4  5/2/2014 Yes              Plan:  (Medical Decision Making)     --on home dose O2 at 2L at rest  --continue PO prednisone 40mg daily  --continue pulmicort/albuterol nebs  --continue lasix 40mg daily  --pt will need PET CT after discharge, is followed by [facility] Jaquelin Sn[facility]  --hopefully home in the next few days       More than 50% of the time documented was spent in face-to-face contact with the patient and in the care of the patient on the floor/unit where the patient is located. EDGAR Herrera    Lungs:  Decreased BS bilaterally with a few trace wheezes  Heart:  RRR with no Murmur/Rubs/Gallops    Additional Comments:  Not yet back to baseline but improving. Continue present Rx and add Spiriva Respimat. I have spoken with and examined the patient. I agree with the above assessment and plan as documented.     Mari Michel MD

## 2021-06-13 NOTE — PROGRESS NOTES
[facility] Hospitalist Progress Note     Name: Arminda Lamar Age: 68 y.o. Sex: male  : 1940    MRN:     [id number]    Admit Date:  2021    Reason for Admission:  COPD exacerbation ([facility] [address] [J44.1]    Assessment & Plan     Acute on chronic respiratory failure with hypoxia and hypercarbia  CT Chest without any evidence of PE  - Pulmonary following  - 2L O2 NC at rest and 3L O2 on ambulation. - neb treatment per Pulmonary  - Sees Dr. Jorje Newby at [facility] for Pulmonary, lung nodule/COPD/Hx of MAC infection followed by that group  - on prednisone 40mg daily ( ) for wheezing  - will need outpt pulm followup upon discharge. Combined chronic Systolic and diastolic CHF   EF of 00-44% with diastolic dysfunction.  - IV lasix 40mg IV daily  - I&O  - 1500mL water restriction    Hx of Afib // HTN   NSR on admission. Not on outpatient OAC  - ASA 81, Diltiazem, lisinopril, metoprolol. CKD stage 2  Cr of 1.39 on admission  Reviewed renal function today. Stable and at baseline.  - will monitor renal function given lasix IV    Protein Calorie Malnutrition  Due to chronic illness. BMI of Body mass index is 24.59 kg/m². - nutrition consulted    Diet:  DIET ADULT ORAL NUTRITION SUPPLEMENT  DIET ADULT  DVT PPx: lovenox    Code: Full Code    Dispo / Discharge Planning: Home PT per PT/OT zoya. Likely in [address] [place] [address] Course/Subjective:     Please refer to the admission H&P for details of presentation. In summary, Arminda Lamar is a 68 y.o. male with medical history significant for COPD, chronic hypoxic resp failure (on 2-3L NC at home) CKD stage 3, chronic systolic CHF, SAMANTHA spiculated lung mass (stable per CT readings at [facility]), hx of MAC unable to complete treatment secondary to qT prolongation presents to the ER with report of sudden onset dyspnea that woke patient from sleep. Was placed on CPAP by EMS and on BIPAP in ER.  Admitted for acute on chronic respiratory failure with hypoxia and hypercarbia. Subjective/24 hr Events (06/13/21) : Patient is seen and examined at bedside. No acute events reported overnight by nursing staff. Reports feeling better but still having significant dyspnea on exertion when walking to the bathroom. Patient denies fever, chills, chest pains, n/v, abdominal pain, SOB at rest.    Review of Systems: 14 point review of systems is otherwise negative with the exception of the elements mentioned above. Objective:     Patient Vitals for the past 24 hrs:   Temp Pulse Resp BP SpO2   06/13/21 1123     99 %   06/13/21 1111 98 °F (36.7 °C) 63 20 126/64 100 %   06/13/21 0736 97.8 °F (36.6 °C) 63 20 138/60 100 %   06/13/21 0717     98 %   06/13/21 0406 98.1 °F (36.7 °C) 76 20 (!) 159/70 97 %   06/13/21 0005 98.1 °F (36.7 °C) 61 18 (!) 157/66 100 %   06/12/21 2029     99 %   06/12/21 1958 98.4 °F (36.9 °C) 77 20 (!) 154/56 98 %   06/12/21 1555 97.9 °F (36.6 °C) 67 20 (!) 141/76 99 %   06/12/21 1511     99 %     Oxygen Therapy  O2 Sat (%): 99 % (06/13/21 1123)  Pulse via Oximetry: 65 beats per minute (06/13/21 1123)  O2 Device: Nasal cannula (06/13/21 1123)  Skin Assessment: Clean, dry, & intact (06/11/21 1958)  O2 Flow Rate (L/min): 1 l/min (06/13/21 1123)  FIO2 (%): 28 % (06/11/21 0710)    Body mass index is 24.59 kg/m². Physical Exam:   General:     alert, awake, no acute distress. Thin elderly male  HENT:   normocephalic, atraumatic. Eyes:   anicteric sclerae, normal conjunctiva, EOMI  Neck:    supple, non-tender. Trachea midline. Lungs:   Poor air entry but otherwise clear, +wheezing  Cardiac:   RRR, Normal S1 and S2. Abdomen:   Soft, non distended, nontender, +BS, no guarding/rebound  Extremities:   No edema , pedal pulses present  Skin:   normal turgor and texture; no rash, ulcers   Neuro:  AAOx3.  No gross focal neurological deficit  Psychiatric:  No anxiety, calm, cooperative    Data Review:  I have reviewed all labs, meds, and studies from the last 24 hours:    Labs:    Recent Results (from the past 24 hour(s))   CBC W/O DIFF    Collection Time: 06/13/21  6:50 AM   Result Value Ref Range    WBC 8.8 4.3 - 11.1 K/uL    RBC 3.40 (L) 4.23 - 5.6 M/uL    HGB 9.3 (L) 13.6 - 17.2 g/dL    HCT 30.9 (L) 41.1 - 50.3 %    MCV 90.9 79.6 - 97.8 FL    MCH 27.4 26.1 - 32.9 PG    MCHC 30.1 (L) 31.4 - 35.0 g/dL    RDW 16.9 (H) 11.9 - 14.6 %    PLATELET 512 099 - 037 K/uL    MPV 10.9 9.4 - 12.3 FL    ABSOLUTE NRBC 0.03 0.0 - 0.2 K/uL   METABOLIC PANEL, BASIC    Collection Time: 06/13/21  6:50 AM   Result Value Ref Range    Sodium 140 138 - 145 mmol/L    Potassium 4.4 3.5 - 5.1 mmol/L    Chloride 103 98 - 107 mmol/L    CO2 37 (H) 21 - 32 mmol/L    Anion gap 0 (L) 7 - 16 mmol/L    Glucose 86 65 - 100 mg/dL    BUN 37 (H) 8 - 23 MG/DL    Creatinine 1.31 0.8 - 1.5 MG/DL    GFR est AA >60 >60 ml/min/1.73m2    GFR est non-AA 56 (L) >60 ml/min/1.73m2    Calcium 8.6 8.3 - 10.4 MG/DL       All Micro Results     None          EKG Results     Procedure 720 Value Units Date/Time    EKG 12 LEAD INITIAL [[id number]] Collected: 06/09/21 0710    Order Status: Completed Updated: 06/09/21 1449     Ventricular Rate 91 BPM      Atrial Rate 91 BPM      P-R Interval 188 ms      QRS Duration 68 ms      Q-T Interval 342 ms      QTC Calculation (Bezet) 420 ms      Calculated P Axis 87 degrees      Calculated R Axis 73 degrees      Calculated T Axis 89 degrees      Diagnosis --     Normal sinus rhythm  Right atrial enlargement  Anteroseptal infarct , age undetermined  Abnormal ECG  No previous ECGs available  Confirmed by Sherie Goodman ([id number]) on 6/9/2021 2:49:31 PM            Other Studies:  XR CHEST SNGL V    Result Date: 6/9/2021  EXAM: CHEST SINGLE VW INDICATION: Chest pain COMPARISON: Chest radiographs, 5/16/2021 FINDINGS: The cardiomediastinal silhouette remains unchanged with prominence of the pulmonary arteries and atherosclerotic calcifications in the aorta.  No pleural effusion or pneumothorax. No focal parenchymal process. No acute osseous abnormality. No acute cardiopulmonary abnormality. CT CHEST PULMONARY EMBOLISM    Result Date: 6/9/2021  EXAM: CT Chest with contrast. INDICATION: Concern for PE. History of PE and atrial fibrillation. Not on anticoagulation. Comparison: None. Multiple axial images were obtained through the chest during intravenous infusion of 100mL of Isovue 370. Coronal 2D MIP image reformats were performed. Radiation dose reduction techniques were used for this study: All CT scans performed at this facility use one or all of the following: Automated exposure control, adjustment of the mA and/or kVp according to patient's size, iterative reconstruction. FINDINGS: Examination is diagnostic through the segmental level, except in the right lower lobe where motion artifact limits evaluation to the lobar level. No evidence of acute or chronic pulmonary embolism. The main pulmonary artery and thoracic aorta are normal in caliber. Atherosclerotic calcifications throughout the aorta and coronary arteries. Partially visualized and vascular stent in the infrarenal abdominal aorta which appears aneurysmal. LUNGS/PLEURA: Central airways are patent. Moderate upper lobe predominant centrilobular emphysema. There is a mildly spiculated left apical solid pulmonary nodule measuring 9 mm (axial image 24). No consolidation to suggest pneumonia. No pleural effusion or pneumothorax. MEDIASTINUM: Thyroid is mildly enlarged with streak artifacts tearing evaluation. The thoracic esophagus is within normal limits. No mediastinal or hilar lymphadenopathy. Heart is normal in size without pericardial effusion. BONES/SUBCUTANEOUS TISSUE: No aggressive osseous lesion. No subcutaneous soft tissue abnormality. UPPER ABDOMEN: Multiple simple cysts are again seen throughout the liver with a few additional subcentimeter hypodensities are too small to characterize but stable.  There are stable simple cyst on both kidneys. Unchanged subcentimeter nodule in the right adrenal gland, likely representing an adenoma given stability. 1.  No evidence of pulmonary embolism or other acute process in the chest. 2. Mildly spiculated 9 mm left apical pulmonary nodule worrisome for primary lung malignancy. Consider further evaluation with PET/CT. 3. Moderate centrilobular emphysema.           Current Meds:   Current Facility-Administered Medications   Medication Dose Route Frequency    tiotropium bromide (SPIRIVA RESPIMAT) 2.5 mcg /actuation  2 Puff Inhalation DAILY    predniSONE (DELTASONE) tablet 40 mg  40 mg Oral DAILY WITH BREAKFAST    famotidine (PEPCID) tablet 20 mg  20 mg Oral DAILY    albuterol (PROVENTIL VENTOLIN) nebulizer solution 2.5 mg  2.5 mg Nebulization Q4HWA RT    budesonide (PULMICORT) 500 mcg/2 ml nebulizer suspension  500 mcg Nebulization BID RT    aspirin delayed-release tablet 81 mg  81 mg Oral DAILY    brimonidine (ALPHAGAN) 0.2 % ophthalmic solution 1 Drop  1 Drop Both Eyes BID    dilTIAZem IR (CARDIZEM) tablet 120 mg  120 mg Oral DAILY    latanoprost (XALATAN) 0.005 % ophthalmic solution 1 Drop  1 Drop Both Eyes QPM    lisinopriL (PRINIVIL, ZESTRIL) tablet 10 mg  10 mg Oral DAILY    metoprolol succinate (TOPROL-XL) XL tablet 50 mg  50 mg Oral DAILY    polyethylene glycol (MIRALAX) packet 17 g  17 g Oral DAILY    rosuvastatin (CRESTOR) tablet 40 mg  40 mg Oral QHS    tamsulosin (FLOMAX) capsule 0.4 mg  0.4 mg Oral DAILY    sodium chloride (NS) flush 5-40 mL  5-40 mL IntraVENous Q8H    sodium chloride (NS) flush 5-40 mL  5-40 mL IntraVENous PRN    acetaminophen (TYLENOL) tablet 650 mg  650 mg Oral Q6H PRN    Or    acetaminophen (TYLENOL) suppository 650 mg  650 mg Rectal Q6H PRN    polyethylene glycol (MIRALAX) packet 17 g  17 g Oral DAILY PRN    ondansetron (ZOFRAN ODT) tablet 4 mg  4 mg Oral Q8H PRN    Or    ondansetron (ZOFRAN) injection 4 mg  4 mg IntraVENous Q6H PRN  furosemide (LASIX) injection 40 mg  40 mg IntraVENous DAILY    hydrALAZINE (APRESOLINE) 20 mg/mL injection 20 mg  20 mg IntraVENous Q6H PRN    metoprolol (LOPRESSOR) injection 5 mg  5 mg IntraVENous Q6H PRN       Problem List:  Hospital Problems as of 6/13/2021 Date Reviewed: 6/12/2021        Codes Class Noted - Resolved POA    * (Principal) Acute on chronic respiratory failure with hypoxia and hypercapnia (HCC) ICD-10-CM: J96.21, J96.22  ICD-9-CM: 518.84, 786.09, 799.02  6/9/2021 - Present Yes        Moderate protein-calorie malnutrition ([facility] ICD-10-CM: E44.0  ICD-9-CM: 263.0  5/19/2021 - Present Yes        Short-term memory loss ICD-10-CM: R41.3  ICD-9-CM: 780.93  5/18/2021 - Present Yes        COPD exacerbation ([facility] ICD-10-CM: J44.1  ICD-9-CM: 491.21  5/17/2021 - Present Yes        Chronic combined systolic and diastolic heart failure ([facility] ICD-10-CM: I50.42  ICD-9-CM: 428.42  12/21/2020 - Present Yes        Chronic hypoxemic respiratory failure (HCC) (Chronic) ICD-10-CM: J96.11  ICD-9-CM: 518.83, 799.02  9/9/2019 - Present Yes        Heart failure with reduced ejection fraction (HCC) (Chronic) ICD-10-CM: I50.20  ICD-9-CM: 428.20  9/9/2019 - Present Yes        CKD (chronic kidney disease), stage III (HCC) (Chronic) ICD-10-CM: N18.30  ICD-9-CM: 585.3  9/9/2019 - Present Yes        A-fib ([facility] ICD-10-CM: I48.91  ICD-9-CM: 427.31  7/23/2018 - Present Yes        Hypertension ICD-10-CM: I10  ICD-9-CM: 401.9  6/1/2017 - Present Yes        COPD (chronic obstructive pulmonary disease) ([facility] (Chronic) ICD-10-CM: J44.9  ICD-9-CM: 556  5/2/2014 - Present Yes        Hyperlipemia (Chronic) ICD-10-CM: E78.5  ICD-9-CM: 272.4  5/2/2014 - Present Yes        RESOLVED: Hypernatremia ICD-10-CM: E87.0  ICD-9-CM: 276.0  6/10/2021 - 6/10/2021 Yes               Part of this note was written by using a voice dictation software and the note has been proof read but may still contain some grammatical/other typographical errors.     Signed By: Abel Lawton MD   [facility] Hospitalist Service    June 13, 2021  3:17 PM

## 2021-06-13 NOTE — PROGRESS NOTES
Uneventful shift, Pt rested well. Pt alert and oriented this shift. Voiding to urinal. Bed in low position and call light/ personal items within reach. Will continue to monitor and give bedside shift report to [facility] day shift nurse. Date 06/12/21 0700 - 06/13/21 0659 06/13/21 0700 - 06/14/21 0659   Shift [id number] 6011-4849 24 Hour Total 0518-6426 4326-5246 24 Hour Total   INTAKE   P.O. 360 240 600        P. O. 360 240 600      Shift Total(mL/kg) 360(4.9) 240(3.2) 600(7.9)      OUTPUT   Urine(mL/kg/hr)  325 325        Urine Voided  325 325      Shift Total(mL/kg)  325(4.3) 325(4.3)       -85 275      Weight (kg) 74.1 75.5 75.5 75.5 75.5 75.5

## 2021-06-13 NOTE — PROGRESS NOTES
[facility]. Admission Date: 6/9/2021             Daily Progress Note: 6/13/2021    The patient's chart is reviewed and the patient is discussed with the staff. Pt is a 69 yo [facility] American male with a history of chronic respiratory failure on 2L at rest and 3L with exertion, COPD, prior tobacco abuse (2ppd x 30 yrs, quit in 2014), prior RON tx in 2018 x 6 months but abx discontinued secondary to prolonged QTc followed by [facility] [address] [facility], PE,HFrEF (EF 30-35%), CKD, Afib not on NOAC, AAA s/p repair, and HTN. Pt was recently admitted 5/16-5/20 for COPD exacerbation at [facility]. Pt presented to the ER on 6/9/21 with complaints of acute onset shortness of breath. He was placed on CPAP and solumedrol by EMS and transitioned to BiPAP in the ER. Pt was in respiratory acidosis with 7.26/67.9/30.6. After BiPAP, he improved. He was admitted by the hospitalist service. We were consulted to assist. Pt had a CTA chest on 6/9 which revealed no PE, mildly spiculated 9mm L apical pulmonary nodule, and moderate centrilobular emphysema. Pt has had wheezing and prednisone PO was added. Subjective:     Patient states he is feeling some better today. Resting in bed. Satting 99% on 1L.      Current Facility-Administered Medications   Medication Dose Route Frequency    tiotropium bromide (SPIRIVA RESPIMAT) 2.5 mcg /actuation  2 Puff Inhalation DAILY    predniSONE (DELTASONE) tablet 40 mg  40 mg Oral DAILY WITH BREAKFAST    famotidine (PEPCID) tablet 20 mg  20 mg Oral DAILY    albuterol (PROVENTIL VENTOLIN) nebulizer solution 2.5 mg  2.5 mg Nebulization Q4HWA RT    budesonide (PULMICORT) 500 mcg/2 ml nebulizer suspension  500 mcg Nebulization BID RT    aspirin delayed-release tablet 81 mg  81 mg Oral DAILY    brimonidine (ALPHAGAN) 0.2 % ophthalmic solution 1 Drop  1 Drop Both Eyes BID    dilTIAZem IR (CARDIZEM) tablet 120 mg  120 mg Oral DAILY    latanoprost (XALATAN) 0.005 % ophthalmic solution 1 Drop  1 Drop Both Eyes QPM    lisinopriL (PRINIVIL, ZESTRIL) tablet 10 mg  10 mg Oral DAILY    metoprolol succinate (TOPROL-XL) XL tablet 50 mg  50 mg Oral DAILY    polyethylene glycol (MIRALAX) packet 17 g  17 g Oral DAILY    rosuvastatin (CRESTOR) tablet 40 mg  40 mg Oral QHS    tamsulosin (FLOMAX) capsule 0.4 mg  0.4 mg Oral DAILY    sodium chloride (NS) flush 5-40 mL  5-40 mL IntraVENous Q8H    sodium chloride (NS) flush 5-40 mL  5-40 mL IntraVENous PRN    acetaminophen (TYLENOL) tablet 650 mg  650 mg Oral Q6H PRN    Or    acetaminophen (TYLENOL) suppository 650 mg  650 mg Rectal Q6H PRN    polyethylene glycol (MIRALAX) packet 17 g  17 g Oral DAILY PRN    ondansetron (ZOFRAN ODT) tablet 4 mg  4 mg Oral Q8H PRN    Or    ondansetron (ZOFRAN) injection 4 mg  4 mg IntraVENous Q6H PRN    furosemide (LASIX) injection 40 mg  40 mg IntraVENous DAILY    hydrALAZINE (APRESOLINE) 20 mg/mL injection 20 mg  20 mg IntraVENous Q6H PRN    metoprolol (LOPRESSOR) injection 5 mg  5 mg IntraVENous Q6H PRN       Review of Systems  Constitutional: negative for fever, chills, sweats  Cardiovascular: negative for chest pain, palpitations, syncope, edema  Gastrointestinal: negative for dysphagia, reflux, vomiting, diarrhea, abdominal pain, or melena  Neurologic: negative for focal weakness, numbness, headache    Objective:     Vitals:    06/13/21 0406 06/13/21 0717 06/13/21 0736 06/13/21 1111   BP: (!) 159/70  138/60 126/64   Pulse: 76  63 63   Resp: 20  20 20   Temp: 98.1 °F (36.7 °C)  97.8 °F (36.6 °C) 98 °F (36.7 °C)   SpO2: 97% 98% 100% 100%   Weight: 166 lb 8 oz (75.5 kg)      Height:         Intake and Output:   06/11 1901 - 06/13 0700  In: 600 [P.O.:600]  Out: 925 [Urine:925]  No intake/output data recorded. Physical Exam:   Constitution:  the patient is well developed and in no acute distress  EENMT:  Sclera clear, pupils equal, oral mucosa moist  Respiratory: Mild B end expiratory wheeze. Cardiovascular:  RRR without M,G,R  Gastrointestinal: soft and non-tender; with positive bowel sounds. Musculoskeletal: warm without cyanosis. There is no lower leg edema.   Skin:  no jaundice or rashes, no wounds   Neurologic: no gross neuro deficits     Psychiatric:  alert and oriented x ppt    CHEST XRAY:   CXR Results  (Last 48 hours)    None          LAB  No lab exists for component: [place]   Recent Labs     06/13/21  0650 06/12/21  0652 06/11/21  0632   WBC 8.8 6.8 8.7   HGB 9.3* 8.9* 9.7*   HCT 30.9* 30.3* 32.9*    191 215     Recent Labs     06/13/21  0650 06/12/21  0652 06/11/21  0632    142 142   K 4.4 4.6 4.5    107 106   CO2 37* 34* 34*   GLU 86 184* 97   BUN 37* 30* 30*   CREA 1.31 1.21 1.28   CA 8.6 8.4 8.9     ABG:  No results found for: PH, PHI, PCO2, PCO2I, PO2, PO2I, HCO3, HCO3I, FIO2, FIO2I      MICRO    SARS-CoV-2 LAB Results  [facility] Test: No results found for: COV2NT   [facility] Test: No results found for: EDPR  [facility] Test: No components found for: [id number]         Assessment:  (Medical Decision Making)     Hospital Problems  Date Reviewed: 6/12/2021        Codes Class Noted POA    * (Principal) Acute on chronic respiratory failure with hypoxia and hypercapnia (HCC) ICD-10-CM: J96.21, J96.22  ICD-9-CM: 518.84, 786.09, 799.02  6/9/2021 Yes        Moderate protein-calorie malnutrition ([facility] ICD-10-CM: E44.0  ICD-9-CM: 263.0  5/19/2021 Yes        Short-term memory loss ICD-10-CM: R41.3  ICD-9-CM: 780.93  5/18/2021 Yes        COPD exacerbation ([facility] ICD-10-CM: J44.1  ICD-9-CM: 491.21  5/17/2021 Yes        Chronic combined systolic and diastolic heart failure (HCC) ICD-10-CM: I50.42  ICD-9-CM: 428.42  12/21/2020 Yes        Chronic hypoxemic respiratory failure (HCC) (Chronic) ICD-10-CM: J96.11  ICD-9-CM: 518.83, 799.02  9/9/2019 Yes        Heart failure with reduced ejection fraction (HCC) (Chronic) ICD-10-CM: I50.20  ICD-9-CM: 428.20  9/9/2019 Yes        CKD (chronic kidney disease), stage III ([facility] (Chronic) ICD-10-CM: N18.30  ICD-9-CM: 585.3  9/9/2019 Yes        A-fib ([facility] ICD-10-CM: I48.91  ICD-9-CM: 427.31  7/23/2018 Yes        Hypertension ICD-10-CM: I10  ICD-9-CM: 401.9  6/1/2017 Yes        COPD (chronic obstructive pulmonary disease) (HCC) (Chronic) ICD-10-CM: J44.9  ICD-9-CM: 052  5/2/2014 Yes        Hyperlipemia (Chronic) ICD-10-CM: E78.5  ICD-9-CM: 272.4  5/2/2014 Yes              Patient recovering from copd exacerbation resulting in acute on chronic respiratory failure with hypoxia and hypercarbia. Plan:  (Medical Decision Making)   -at least than home o2 at this point. .   -cont prednisone 40mg daily.   -cont albuterol and pulmicort nebs. Getting spiriva as well.   -will need outpatient PET scan to monitor SAMANTHA spiculated nodule unless known to be stable already. -will need outpatient monitor of hypercarbia and may benefit from home bipap.        Clinton Odom MD

## 2021-06-14 NOTE — PROGRESS NOTES
The patient is medically stable for discharge. Patient to discharge home with [facility]. Patient to be transported home by [facility] with portable o2 tank provided. Patient's ride to arrive at 1300. Primary Nurse Raúl Cervantes made aware. CM also notified the patient's relative Lillette of discharge. No needs voiced at this time. Please consult or notify CM if any needs shall arise. CM remains available. Care Management Interventions  PCP Verified by CM: Yes  Mode of Transport at Discharge: Other (see comment) (has [facility] aid to assist with transportation as needed)  Transition of Care Consult (CM Consult): Home Health (pt is current with [facility] for RN, PT/OT)  [facility] - INPATIENT: Yes  Discharge Durable Medical Equipment: No  Physical Therapy Consult: Yes  Occupational Therapy Consult: Yes  Speech Therapy Consult: No  Current Support Network: Own Home, Lives Alone (9th floor apartment)  Confirm Follow Up Transport: Other (see comment) ([facility] aid)  The Plan for Transition of Care is Related to the Following Treatment Goals : Return to Baseline. The Patient and/or Patient Representative was Provided with a Choice of Provider and Agrees with the Discharge Plan?: Yes  Name of the Patient Representative Who was Provided with a Choice of Provider and Agrees with the Discharge Plan: Patient.     Resource Information Provided?: No  Discharge Location  Discharge Placement: Home with [facility])

## 2021-06-14 NOTE — PROGRESS NOTES
ACUTE PHYSICAL THERAPY GOALS:  (Developed with and agreed upon by patient and/or caregiver. )  LTG:  (1.)Mr. Proctor will move from supine to sit and sit to supine, scoot up and down and roll side to side in bed INDEPENDENTLY with bed flat within 7 treatment day(s). (2.)Mr. Proctor will transfer from bed to chair and chair to bed INDEPENDENTLY within 7 treatment day(s). (3.)Mr. Proctor will ambulate with SUPERVISION for 500+ feet with the least restrictive device within 7 treatment day(s). (4.)Mr. Proctor will perform seated and standing exercises per HEP independently to improve strength and mobility within 7 days. PHYSICAL THERAPY: Daily Note and AM Treatment Day # 2    Mckenzie Pedersen is a 68 y.o. male   PRIMARY DIAGNOSIS: Acute on chronic respiratory failure with hypoxia and hypercapnia (HCC)  COPD exacerbation (HCC) [J44.1]         ASSESSMENT:     REHAB RECOMMENDATIONS: CURRENT LEVEL OF FUNCTION:  (Most Recently Demonstrated)   Recommendation to date pending progress:  [address] [place] [address]  Equipment:    None Bed Mobility:   Modified Independent  Sit to Stand:   Modified Independent  Transfers:   Supervision  Gait/Mobility:   Contact Guard Assistance     ASSESSMENT:  Mr. Freda Schumacher is a very pleasant man who was agreeable to work with PT. He worked on standing balance, amb and LE ex. O2 sat dropped to 90% on 2L/min and since he was getting ready to amb it was turned up to 3L/min. During amb  his right LE would cross his left as in a scissoring step x 4 in first half of amb distance. He recovered each time with CGA. Two standing rest breaks. Maintaining 90% on 3L O2. After he had amb about 150' he did not have a recurrence of this. Because of this he did not make progress toward goals but he is doing very well. Once seated and rests his O2 sat was 98%. Decreased to 2L/min. He performed B LE ex from chair.       SUBJECTIVE:   Mr. Freda Schumacher states \" I think I may be leaving after while\". SOCIAL HISTORY/ LIVING ENVIRONMENT: Lives alone in apartment on 9th floor, has elevator. Has rollator, doesn't typically use.  Independent baseline without DME use, does wear 2-3L O2 at baseline. Has CLTC aide and HH PT/OT  Home Environment: Apartment  # Steps to Enter: 0  One/Two Story Residence: One story  Living Alone: Yes  Support Systems: Home care staff, Therapist  OBJECTIVE:     PAIN: VITAL SIGNS: LINES/DRAINS:   Pre Treatment: Pain Screen  Pain Scale 1: Numeric (0 - 10)  Pain Intensity 1: 0  Post Treatment: 0 Vital Signs  O2 Sat (%): 90 % (to 98)% none  O2 Device: Nasal cannula     MOBILITY: I Mod I S SBA CGA Min Mod Max Total  NT x2 Comments:   Bed Mobility    Rolling [] [x] [] [] [] [] [] [] [] [] []    Supine to Sit [] [x] [] [] [] [] [] [] [] [] []    Scooting [] [x] [] [] [] [] [] [] [] [] []    Sit to Supine [] [] [] [] [] [] [] [] [] [] []    Transfers    Sit to Stand [] [] [] [] [] [] [] [] [] [] []    Bed to Chair [] [] [] [] [] [] [] [] [] [] []    Stand to Sit [] [] [] [] [] [] [] [] [] [] []    I=Independent, Mod I=Modified Independent, S=Supervision, SBA=Standby Assistance, CGA=Contact Guard Assistance,   Min=Minimal Assistance, Mod=Moderate Assistance, Max=Maximal Assistance, Total=Total Assistance, NT=Not Tested    BALANCE: Good Fair+ Fair Fair- Poor NT Comments   Sitting Static [] [] [] [] [] []    Sitting Dynamic [] [] [] [] [] []              Standing Static [] [] [] [] [] []    Standing Dynamic [] [] [] [] [] []      GAIT: I Mod I S SBA CGA Min Mod Max Total  NT x2 Comments:   Level of Assistance [] [] [] [] [] [] [] [] [] [] []    Distance 450    DME None    Gait Quality Slow, scissor type gt several times    Weightbearing  Status N/A     I=Independent, Mod I=Modified Independent, S=Supervision, SBA=Standby Assistance, CGA=Contact Guard Assistance,   Min=Minimal Assistance, Mod=Moderate Assistance, Max=Maximal Assistance, Total=Total Assistance, NT=Not Tested    PLAN: FREQUENCY/DURATION: PT Plan of Care: 3 times/week for duration of hospital stay or until stated goals are met, whichever comes first.  TREATMENT:     TREATMENT:   ($$ Therapeutic Activity: 23-37 mins    )  Therapeutic Activity (23 Minutes): Therapeutic activity included Supine to Sit, Scooting, Ambulation on level ground, Sitting balance , Standing balance and LE ex to improve functional Mobility, Strength and Activity tolerance.     TREATMENT GRID:   Date:  6/14 Date:   Date:     Activity/Exercise Seated Parameters Parameters Parameters   Heel raises X 20 B     Toe raises X 20 B     LAQ's X 15 B     Hip Flex X 15 B     Hip ABD X 15 B                           AFTER TREATMENT POSITION/PRECAUTIONS:  Chair and Needs within reach    INTERDISCIPLINARY COLLABORATION:  RN/PCT and PT/PTA    TOTAL TREATMENT DURATION:  PT Patient Time In/Time Out  Time In: 0950  Time Out: 2146 [facility]

## 2021-06-14 NOTE — PROGRESS NOTES
Problem: Chronic Obstructive Pulmonary Disease (COPD)  Goal: *Able to remain out of bed as prescribed  Outcome: Progressing Towards Goal  Goal: *Absence of hypoxia  Outcome: Progressing Towards Goal  Goal: *Optimize nutritional status  Outcome: Progressing Towards Goal     Problem: Patient Education: Go to Patient Education Activity  Goal: Patient/Family Education  Outcome: Progressing Towards Goal     Problem: Falls - Risk of  Goal: *Absence of Falls  Description: Document Raul Fall Risk and appropriate interventions in the flowsheet.   Outcome: Progressing Towards Goal  Note: Fall Risk Interventions:  Mobility Interventions: Patient to call before getting OOB         Medication Interventions: Patient to call before getting OOB, Teach patient to arise slowly                   Problem: Patient Education: Go to Patient Education Activity  Goal: Patient/Family Education  Outcome: Progressing Towards Goal

## 2021-06-14 NOTE — DISCHARGE SUMMARY
[facility] Hospitalist Discharge Summary     Name:    Berhane Taylor.  68 y.o.  male  :    1940     MRN:   [id number]       Admitting Physician: Louis Carias DO Admit Date:  2021  7:03 AM   Attending Physician: Donna Dotson MD  Primary Care Physician: Stephanie Conrad MD       Discharge Physician: Cristy Nichols MD  Discharge date: 21   Discharged Condition: Stable    Indication for Admission:   Chief Complaint   Patient presents with    Shortness of Breath        Reasons for hospitalization:  Hospital Problems as of 2021 Date Reviewed: 2021        Codes Class Noted - Resolved POA    * (Principal) Acute on chronic respiratory failure with hypoxia and hypercapnia ([email or web address] ICD-10-CM: J96.21, J96.22  ICD-9-CM: 518.84, 786.09, 799.02  2021 - Present Yes        Moderate protein-calorie malnutrition ([facility] ICD-10-CM: E44.0  ICD-9-CM: 263.0  2021 - Present Yes        Short-term memory loss ICD-10-CM: R41.3  ICD-9-CM: 780.93  2021 - Present Yes        COPD exacerbation ([facility] ICD-10-CM: J44.1  ICD-9-CM: 491.21  2021 - Present Yes        Chronic combined systolic and diastolic heart failure ([facility] ICD-10-CM: I50.42  ICD-9-CM: 428.42  2020 - Present Yes        Chronic hypoxemic respiratory failure ([facility] (Chronic) ICD-10-CM: J96.11  ICD-9-CM: 518.83, 799.02  2019 - Present Yes        Heart failure with reduced ejection fraction (HCC) (Chronic) ICD-10-CM: I50.20  ICD-9-CM: 428.20  2019 - Present Yes        CKD (chronic kidney disease), stage III ([facility] (Chronic) ICD-10-CM: N18.30  ICD-9-CM: 585.3  2019 - Present Yes        A-fib ([address] ICD-10-CM: I48.91  ICD-9-CM: 427.31  2018 - Present Yes        Hypertension ICD-10-CM: I10  ICD-9-CM: 401.9  2017 - Present Yes        COPD (chronic obstructive pulmonary disease) ([facility] (Chronic) ICD-10-CM: J44.9  ICD-9-CM: 059  2014 - Present Yes        Hyperlipemia (Chronic) ICD-10-CM: E78.5  ICD-9-CM: 272.4  2014 - Present Yes        RESOLVED: Hypernatremia ICD-10-CM: E87.0  ICD-9-CM: 276.0  6/10/2021 - 6/10/2021 Yes                 Discharge Diagnosis: Acute on chronic respiratory failure with hypoxia and hypercarbia    Did Patient have Sepsis (YES OR NO): No      Hospital Course :  Please refer to the admission H&P for details of presentation. In summary, Terrence Gaspar is a 68 y.o. male with past medical history significant for  COPD, chronic hypoxic resp failure (on 2-3L NC at home) CKD stage 3, chronic systolic CHF, SAMANTHA spiculated lung mass (stable per CT readings at 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 Was placed on CPAP by EMS and on BIPAP in ER. Admitted for acute on chronic respiratory failure with hypoxia and hypercarbia. Pt had a CTA chest on 6/9 which revealed no PE, mildly spiculated 9mm L apical pulmonary nodule, and moderate centrilobular emphysema. Pulmonary was consulted. Patient was started on prednisone for his wheezing. Echocardiogram shows EF of 30 to 90% with diastolic dysfunction. Patient was started on IV Lasix for diuresis. His respiratory status has improved and his O2 requirement has been weaned down to 1 L nasal cannula. Patient will need to get outpatient PET scans to monitor left lower lobe spiculated nodule as well as monitoring of his hypercarbia to evaluate for benefits from BiPAP. He has been following with Dr. Sydnee Meeks of the lung center and will need to continue following up upon discharge. Patient is medically stable for discharge. Patient is to continue taking medications as prescribed and to follow up with PCP on discharge. Patient is instructed to to call a physician or return to ED if any concerns/symptoms worsened.    Discharge summary and encounter summary was sent to PCP electronically via \"Comm Mgt\" link in [facility], if possible. Consults: None     Disposition: HHT  Diet: DIET ADULT ORAL NUTRITION SUPPLEMENT  DIET ADULT   Code Status: Full Code    Discharge Info:     Current Discharge Medication List      START taking these medications    Details   predniSONE (DELTASONE) 20 mg tablet Take 40 mg by mouth daily (with breakfast). Qty: 5 Tablet, Refills: 0  Start date: 6/14/2021         CONTINUE these medications which have CHANGED    Details   furosemide (Lasix) 40 mg tablet Take 1 Tablet by mouth daily. Qty: 90 Tablet, Refills: 3  Start date: 6/14/2021    Associated Diagnoses: Congestive heart failure, unspecified HF chronicity, unspecified heart failure type ([facility]         CONTINUE these medications which have NOT CHANGED    Details   allopurinoL (ZYLOPRIM) 300 mg tablet Take 300 mg by mouth daily. amLODIPine (Norvasc) 2.5 mg tablet Take 2.5 mg by mouth daily. dilTIAZem IR (CARDIZEM) 120 mg tablet Take 1 Tablet by mouth daily. Qty: 90 Tablet, Refills: 3    Associated Diagnoses: Essential hypertension      docusate sodium (COLACE) 100 mg capsule Take 1 Capsule by mouth daily. Qty: 90 Capsule, Refills: 2    Associated Diagnoses: Chronic constipation      albuterol-ipratropium (DUO-NEB) 2.5 mg-0.5 mg/3 ml nebu 3 mL by Nebulization route three (3) times daily for 180 days. Qty: 810 mL, Refills: 1    Associated Diagnoses: Chronic obstructive pulmonary disease, unspecified COPD type (HCC)      rosuvastatin (Crestor) 40 mg tablet Take 1 Tab by mouth nightly. Qty: 90 Tab, Refills: 1    Associated Diagnoses: Heart failure with reduced ejection fraction (HCC)      nitroglycerin (NITROSTAT) 0.4 mg SL tablet 1 Tab by SubLINGual route every five (5) minutes as needed for Chest Pain (call EMS if pain fails to resolve after 2 tabs. max daily dose of 3 tabs).  Indications: after 15 minutes or 3 doses, if chest pain persists, contact EMS/911  Qty: 1 Bottle, Refills: 11    Associated Diagnoses: Heart failure with reduced ejection fraction (HCC)      tamsulosin (FLOMAX) 0.4 mg capsule Take 1 Cap by mouth daily. Qty: 90 Cap, Refills: 3    Associated Diagnoses: Prostate cancer ([address]      aspirin delayed-release 81 mg tablet Take 81 mg by mouth daily. latanoprost (XALATAN) 0.005 % ophthalmic solution LOCATION  BOTH EYES. INSTILL ONE DROP INTO EACH EYE AT BEDTIME      metoprolol succinate (TOPROL-XL) 50 mg XL tablet Take 1 Tab by mouth daily. Qty: 90 Tab, Refills: 3      famotidine (PEPCID) 40 mg tablet Take 1 Tab by mouth daily. Qty: 90 Tab, Refills: 3      lisinopril (PRINIVIL, ZESTRIL) 10 mg tablet Take 1 Tab by mouth daily. Qty: 30 Tab, Refills: 0      OXYGEN-AIR DELIVERY SYSTEMS Take 2 L/min by inhalation daily. 2 L/min continuously inhalation via nasal canula      albuterol (PROVENTIL HFA, VENTOLIN HFA, PROAIR HFA) 90 mcg/actuation inhaler TAKE 2 PUFFS BY MOUTH EVERY 4 HOURS AS NEEDED FOR WHEEZE      albuterol (PROVENTIL VENTOLIN) 2.5 mg /3 mL (0.083 %) nebu Take 2.5 mg by inhalation every six (6) hours as needed for Shortness of Breath or Wheezing. budesonide (PULMICORT) 0.5 mg/2 mL nbsp 2 mL by Nebulization route two (2) times a day. Qty: 1 Each, Refills: 0      brimonidine (ALPHAGAN P) 0.1 % ophthalmic solution Administer 1 Drop to both eyes two (2) times a day.          STOP taking these medications       polyethylene glycol (MIRALAX) 17 gram/dose powder Comments:   Reason for Stopping:               Medications Discontinued During This Encounter   Medication Reason    albuterol (PROVENTIL VENTOLIN) nebulizer solution 2.5 mg     budesonide (PULMICORT) 500 mcg/2 ml nebulizer suspension     famotidine (PEPCID) tablet 40 mg DUPLICATE ORDER    trimethoprim-polymyxin b (POLYTRIM) ophthalmic solution LIST CLEANUP    polyethylene glycol (MIRALAX) 17 gram/dose powder LIST CLEANUP    psyllium (METAMUCIL) powd LIST CLEANUP    furosemide (Lasix) 40 mg tablet REORDER         Follow Up Orders:  No orders of the defined types were placed in this encounter. Follow-up Information     Follow up With Specialties Details Why Contact Info    Yves Chandler MD Family Medicine, Family Medicine  As needed [address] [place] [address] [phone] [facility]      Ashvin Silverman MD Internal Medicine In 1 week post-hospitalization for COPD [address]  [phone]              Discharge Exam:    Patient Vitals for the past 24 hrs:   Temp Pulse Resp BP SpO2   06/14/21 0727 97.5 °F (36.4 °C) 67 20 (!) 163/75 100 %   06/14/21 0712     100 %   06/14/21 0437 98.2 °F (36.8 °C) 71 18 (!) 142/76 96 %   06/14/21 0005 98.5 °F (36.9 °C) 75 20 (!) 144/65 95 %   06/13/21 1956 98.2 °F (36.8 °C) 72 18 (!) 149/66 94 %   06/13/21 1953     99 %   06/13/21 1542     98 %   06/13/21 1515 98.8 °F (37.1 °C) (!) 59 20 (!) 127/57 100 %   06/13/21 1123     99 %   06/13/21 1111 98 °F (36.7 °C) 63 20 126/64 100 %     Oxygen Therapy  O2 Sat (%): 100 % (06/14/21 0727)  Pulse via Oximetry: 88 beats per minute (06/14/21 0712)  O2 Device: Nasal cannula (06/14/21 0556)  Skin Assessment: Clean, dry, & intact (06/11/21 1958)  O2 Flow Rate (L/min): 2 l/min (06/14/21 0712)  FIO2 (%): 28 % (06/11/21 0710)    Estimated body mass index is 25.03 kg/m² as calculated from the following:    Height as of this encounter: 5' 9\" (1.753 m). Weight as of this encounter: 76.9 kg (169 lb 8 oz). Intake/Output Summary (Last 24 hours) at 6/14/2021 1025  Last data filed at 6/14/2021 0811  Gross per 24 hour   Intake 360 ml   Output 225 ml   Net 135 ml       *Note that automatically entered I/Os may not be accurate; dependent on patient compliance with collection and accurate  by assistants. Physical Exam:   General:                     alert, awake, no acute distress. Thin elderly male  HENT:                         normocephalic, atraumatic.    Eyes:                           anicteric sclerae, normal conjunctiva, EOMI  Neck:                          supple, non-tender. Trachea midline. Lungs:                        CTAB, slight expiratory wheezing  Cardiac:                      RRR, Normal S1 and S2. Abdomen:                  Soft, non distended, nontender, +BS, no guarding/rebound  Extremities:               No edema , pedal pulses present  Skin:                           normal turgor and texture; no rash, ulcers   Neuro:              AAOx3.  No gross focal neurological deficit  Psychiatric:                No anxiety, calm, cooperative     All Labs from Last 24 Hrs:  Recent Results (from the past 24 hour(s))   CBC W/O DIFF    Collection Time: 06/14/21  5:30 AM   Result Value Ref Range    WBC 9.0 4.3 - 11.1 K/uL    RBC 3.43 (L) 4.23 - 5.6 M/uL    HGB 9.2 (L) 13.6 - 17.2 g/dL    HCT 31.3 (L) 41.1 - 50.3 %    MCV 91.3 79.6 - 97.8 FL    MCH 26.8 26.1 - 32.9 PG    MCHC 29.4 (L) 31.4 - 35.0 g/dL    RDW 16.9 (H) 11.9 - 14.6 %    PLATELET 919 864 - 089 K/uL    MPV 10.2 9.4 - 12.3 FL    ABSOLUTE NRBC 0.06 0.0 - 0.2 K/uL   METABOLIC PANEL, BASIC    Collection Time: 06/14/21  5:30 AM   Result Value Ref Range    Sodium 141 138 - 145 mmol/L    Potassium 4.6 3.5 - 5.1 mmol/L    Chloride 103 98 - 107 mmol/L    CO2 37 (H) 21 - 32 mmol/L    Anion gap 1 (L) 7 - 16 mmol/L    Glucose 100 65 - 100 mg/dL    BUN 36 (H) 8 - 23 MG/DL    Creatinine 1.29 0.8 - 1.5 MG/DL    GFR est AA >60 >60 ml/min/1.73m2    GFR est non-AA 57 (L) >60 ml/min/1.73m2    Calcium 8.7 8.3 - 10.4 MG/DL       All Micro Results     None          SARS-CoV-2 Lab Results  \"Novel Coronavirus\" Test: No results found for: COV2NT   \"Emergent Disease\" Test: No results found for: EDPR  \"SARS-COV-2\" Test: No results found for: XGCOVT  Rapid Test: No results found for: COVR         Diagnostic Imaging/Tests:   XR CHEST SNGL V    Result Date: 6/9/2021  EXAM: CHEST SINGLE VW INDICATION: Chest pain COMPARISON: Chest radiographs, 5/16/2021 FINDINGS: The cardiomediastinal silhouette remains unchanged with prominence of the pulmonary arteries and atherosclerotic calcifications in the aorta. No pleural effusion or pneumothorax. No focal parenchymal process. No acute osseous abnormality. No acute cardiopulmonary abnormality. XR CHEST PORT    Result Date: 5/17/2021  EXAM: XR CHEST PORT HISTORY: Syncope. TECHNIQUE: A single frontal view of the chest was submitted. COMPARISON: 12/21/2020 FINDINGS: The cardiac silhouette and pulmonary vasculature are within normal limits. Stable mediastinal widening. There is no consolidation, pleural effusion, or pneumothorax. No significant osseous abnormalities are observed. No evidence of an acute intrathoracic process. ECHO ADULT COMPLETE    Result Date: 6/11/2021  · LV: Estimated LVEF is 30 - 35%. Normal cavity size, wall thickness and systolic function (ejection fraction normal). Mild (grade 1) left ventricular diastolic dysfunction. · Image quality for this study was technically difficult. · Contrast used: DEFINITY. · RV: Not well visualized. · LA: Mildly dilated left atrium. CT CHEST PULMONARY EMBOLISM    Result Date: 6/9/2021  EXAM: CT Chest with contrast. INDICATION: Concern for PE. History of PE and atrial fibrillation. Not on anticoagulation. Comparison: None. Multiple axial images were obtained through the chest during intravenous infusion of 100mL of Isovue 370. Coronal 2D MIP image reformats were performed. Radiation dose reduction techniques were used for this study: All CT scans performed at this facility use one or all of the following: Automated exposure control, adjustment of the mA and/or kVp according to patient's size, iterative reconstruction. FINDINGS: Examination is diagnostic through the segmental level, except in the right lower lobe where motion artifact limits evaluation to the lobar level. No evidence of acute or chronic pulmonary embolism.  The main pulmonary artery and thoracic aorta are normal in caliber. Atherosclerotic calcifications throughout the aorta and coronary arteries. Partially visualized and vascular stent in the infrarenal abdominal aorta which appears aneurysmal. LUNGS/PLEURA: Central airways are patent. Moderate upper lobe predominant centrilobular emphysema. There is a mildly spiculated left apical solid pulmonary nodule measuring 9 mm (axial image 24). No consolidation to suggest pneumonia. No pleural effusion or pneumothorax. MEDIASTINUM: Thyroid is mildly enlarged with streak artifacts tearing evaluation. The thoracic esophagus is within normal limits. No mediastinal or hilar lymphadenopathy. Heart is normal in size without pericardial effusion. BONES/SUBCUTANEOUS TISSUE: No aggressive osseous lesion. No subcutaneous soft tissue abnormality. UPPER ABDOMEN: Multiple simple cysts are again seen throughout the liver with a few additional subcentimeter hypodensities are too small to characterize but stable. There are stable simple cyst on both kidneys. Unchanged subcentimeter nodule in the right adrenal gland, likely representing an adenoma given stability. 1.  No evidence of pulmonary embolism or other acute process in the chest. 2. Mildly spiculated 9 mm left apical pulmonary nodule worrisome for primary lung malignancy. Consider further evaluation with PET/CT. 3. Moderate centrilobular emphysema. Echocardiogram/EKG results:  No results found for this visit on 06/09/21.     EKG Results     Procedure 720 Value Units Date/Time    EKG 12 LEAD INITIAL [[id number]] Collected: 06/09/21 0710    Order Status: Completed Updated: 06/09/21 1449     Ventricular Rate 91 BPM      Atrial Rate 91 BPM      P-R Interval 188 ms      QRS Duration 68 ms      Q-T Interval 342 ms      QTC Calculation (Bezet) 420 ms      Calculated P Axis 87 degrees      Calculated R Axis 73 degrees      Calculated T Axis 89 degrees      Diagnosis --     Normal sinus rhythm  Right atrial enlargement  Anteroseptal infarct , age undetermined  Abnormal ECG  No previous ECGs available  Confirmed by Acquanetta Sever ([id number]) on 6/9/2021 2:49:31 PM            Results for orders placed or performed during the hospital encounter of 06/09/21   EKG, 12 LEAD, INITIAL   Result Value Ref Range    Ventricular Rate 91 BPM    Atrial Rate 91 BPM    P-R Interval 188 ms    QRS Duration 68 ms    Q-T Interval 342 ms    QTC Calculation (Bezet) 420 ms    Calculated P Axis 87 degrees    Calculated R Axis 73 degrees    Calculated T Axis 89 degrees    Diagnosis       Normal sinus rhythm  Right atrial enlargement  Anteroseptal infarct , age undetermined  Abnormal ECG  No previous ECGs available  Confirmed by Acquanetta Sever ([id number]) on 6/9/2021 2:49:31 PM     Results for orders placed or performed in visit on 06/22/17   AMB POC EKG ROUTINE W/ 12 LEADS, INTER & REP    Impression    Sinus  Tachycardia   -Right atrial enlargement.    -  Nonspecific T-abnormality. Procedures done this admission:  * No surgery found *        Time spent in patient discharge planning and coordination 40 minutes.       Signed By: Prashant Groves MD   [facility]    June 14, 2021  10:12 AM

## 2021-06-14 NOTE — DISCHARGE INSTRUCTIONS
DISCHARGE INSTRUCTIONS:  - Please take medication as prescribed. - Please follow-up with  of [facility] in 2weeks for post hospitalization follow up as well as evaluation for Home Bipap and follow up for SAMANTHA spiculated mass.

## 2021-06-14 NOTE — PROGRESS NOTES
Pt resting in room. Denies pain or needs at this time. Bed in low position and call light/ personal items within reach. Will continue to monitor and give bedside shift report to oncoming day shift nurse.

## 2021-06-14 NOTE — PROGRESS NOTES
CM faxed o2 order to [facility] ([phone], as patient required 3 Liters with ambulation, per chart review. Ana Lilia Anton- RT with [facility] [facility], confirmed CM will need to send a new order. Ana Lilia Mart confirmed staff can still assist with patient's o2 needs, with qualifier being  last placed on 6/11. CM also sent PT Eval this day, providing documentation of sats. CM remains available.

## 2021-06-18 NOTE — TELEPHONE ENCOUNTER
Mr. Kamala No was readmitted 6/9-6/14 for SOB. He said he had acute on chronic respiratory failure. He did not need to review his discharge medications as he has been on the same meds a long time. His only new medication was prednisone and he has been on it often in the past.  No medication questions at this time. I asked him to call me with any medication questions or issues.

## 2021-07-01 NOTE — ED TRIAGE NOTES
Patient arrives to ED via EMS from home. Patient complains of SOB with exertion. Patient has history of COPD and CHF. Patient wears 2L of oxygen via nc. Patient was 95% on 2L with tachypnea. Patient placed on 4L and duoneb in route. Denies chest pain. Patient is on Lasix. Compliant with medication.      In route:  125 mg solumedrol

## 2021-07-01 NOTE — H&P
Hospitalist Admission History and Physical     NAME:  Justin Joseph. Age:  68 y.o.  :   1940   MRN:   [id number]  PCP: Geovanna Abraham MD  Consulting MD:  Treatment Team: Primary Nurse: Luis Marie RN; Consulting Provider: Zion Jara MD    Chief Complaint   Patient presents with    Shortness of Breath         HPI:   Patient is a 68 y.o. male who presented to the ED for cc increased SOB since last night. Hx of COPD on 2L NC continuously, paroxysmal a fib not on anticoagulation follows [facility] cardiology, CKD stage 3, dCHF EF 35%, SAMANTHA spiculated mass that is stable per CT at [facility], [facility] unable to complete treatment due to qT prolongations, AAA s/p repair. Recent dc on  for similar concerns. Patient is very poor historian. Vitals - 87% on 2L NC,  but now 94    Labs- Na 130    EKG with atrial flutter.    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    OA (osteoarthritis) 2014    Peptic ulcer disease 2017    Poor historian     Prostate cancer ([address]     Pulmonary embolus ([address] 2017    Supplemental oxygen dependent 2014    Warfarin anticoagulation 2014        Past Surgical History:   Procedure Laterality Date    COLONOSCOPY N/A 2019    COLONOSCOPY/ 26 ROOM 614 performed by Lj Pappas MD at [facility] ENDOSCOPY    EGD  2019         HX HEART CATHETERIZATION      VASCULAR SURGERY PROCEDURE UNLIST  2019     Bilateral common femoral artery cutdown with exposure and placement of catheter in aorta for aortogram,Endovascular repair of infrarenal abdominal aortic aneurysm with bifurcated modulated device (31 x 14 x 13 main body) via the left common femoral, right iliac extender measuring 27 x 10. Family History   Problem Relation Age of Onset    Cancer Mother     Heart Disease Father        Social History     Social History Narrative    Not on file        Social History     Tobacco Use    Smoking status: Former Smoker     Packs/day: 2.00     Years: 40.00     Pack years: 80.00     Quit date: 2014     Years since quittin.9    Smokeless tobacco: Never Used    Tobacco comment: still taking Chantix    Substance Use Topics    Alcohol use: No        Social History     Substance and Sexual Activity   Drug Use No         Allergies   Allergen Reactions    Statins-Hmg-Coa Reductase Inhibitors Myalgia     Muscle pain       Prior to Admission medications    Medication Sig Start Date End Date Taking? Authorizing Provider   predniSONE (DELTASONE) 20 mg tablet Take 20 mg by mouth daily (with breakfast). 21   Jossy Cheng MD   doxycycline (VIBRAMYCIN) 100 mg capsule Take 1 Capsule by mouth two (2) times a day. 21   Jossy Cheng MD   guaiFENesin ER [facility]) 600 mg ER tablet Take 1 Tablet by mouth two (2) times a day. 21   Jossy Cheng MD   furosemide (Lasix) 40 mg tablet Take 1 Tablet by mouth daily. 21   Monica Brantley MD   allopurinoL (ZYLOPRIM) 300 mg tablet Take 300 mg by mouth daily. Provider, Historical   amLODIPine (Norvasc) 2.5 mg tablet Take 2.5 mg by mouth daily. Provider, Historical   dilTIAZem IR (CARDIZEM) 120 mg tablet Take 1 Tablet by mouth daily. 21   Jossy Cehng MD   docusate sodium (COLACE) 100 mg capsule Take 1 Capsule by mouth daily.  21   Jossy Cheng MD   albuterol-ipratropium (DUO-NEB) 2.5 mg-0.5 mg/3 ml nebu 3 mL by Nebulization route three (3) times daily for 180 days. Patient not taking: Reported on 6/25/2021 3/24/21 9/20/21  Claus Wilson MD   rosuvastatin (Crestor) 40 mg tablet Take 1 Tab by mouth nightly. 3/24/21   Claus Wilson MD   nitroglycerin (NITROSTAT) 0.4 mg SL tablet 1 Tab by SubLINGual route every five (5) minutes as needed for Chest Pain (call EMS if pain fails to resolve after 2 tabs. max daily dose of 3 tabs). Indications: after 15 minutes or 3 doses, if chest pain persists, contact EMS/911 3/24/21   Claus Wilson MD   tamsulosin [facility]) 0.4 mg capsule Take 1 Cap by mouth daily. 3/24/21   Claus Wilson MD   aspirin delayed-release 81 mg tablet Take 81 mg by mouth daily. Provider, Historical   albuterol (PROVENTIL HFA, VENTOLIN HFA, PROAIR HFA) 90 mcg/actuation inhaler TAKE 2 PUFFS BY MOUTH EVERY 4 HOURS AS NEEDED FOR WHEEZE 1/3/21   Provider, Historical   latanoprost (XALATAN) 0.005 % ophthalmic solution LOCATION  BOTH EYES. INSTILL ONE DROP INTO EACH EYE AT BEDTIME 11/12/20   Provider, Historical   metoprolol succinate (TOPROL-XL) 50 mg XL tablet Take 1 Tab by mouth daily. 1/22/21   Brett Hussein MD   famotidine (PEPCID) 40 mg tablet Take 1 Tab by mouth daily. 1/22/21   Brett Hussein MD   albuterol (PROVENTIL VENTOLIN) 2.5 mg /3 mL (0.083 %) nebu Take 2.5 mg by inhalation every six (6) hours as needed for Shortness of Breath or Wheezing. 9/14/19   Provider, Historical   budesonide (PULMICORT) 0.5 mg/2 mL nbsp 2 mL by Nebulization route two (2) times a day. 9/15/18   Rockland Shock, DO   lisinopril (PRINIVIL, ZESTRIL) 10 mg tablet Take 1 Tab by mouth daily. 7/29/18   Sherri Meneses NP   OXYGEN-AIR DELIVERY SYSTEMS Take 2 L/min by inhalation daily. 2 L/min continuously inhalation via nasal canula 9/15/15   Provider, Historical   brimonidine (ALPHAGAN P) 0.1 % ophthalmic solution Administer 1 Drop to both eyes two (2) times a day.     Provider, Historical           Review of Systems    Constitutional:NAD  Eyes: no change in visual acuity, no photophobia  Ears, nose, mouth, throat, and face: no  Odynphagia, dysphagia, no thrush or exudate, negative for chronic sinus congestion, recurrent headaches  Respiratory: negative for SOB  Cardiovascular: negative for CP, palpitations, or PND  Gastrointestinal: negative for abdominal pain, no hematemesis, hematochezia or BRBPR  Genitourinary: no urgency, frequency, or dysuria, no nocturia  Integument/breast: negative for skin rash or skin lesions  Hematologic/lymphatic: negative for known bleeding disorder  Musculoskeletal:negative for joint pain or joint tenderness  Neurological: negative for lightheadedness, syncope or presyncopal events, no seizure or CVA history  Behavioral/Psych: negative for depression or chronic anxiety,   Endocrine: negative for polydyspia, polyuria or intolerance to heat or cold  Allergic/Immunologic: negative for chronic allergic rhinitis, or known connective tissue disorder      Objective:     Visit Vitals  BP (!) 99/55   Pulse (!) 134   Temp 98.2 °F (36.8 °C)   Resp 20   Ht 5' 9\" (1.753 m)   Wt 69.9 kg (154 lb)   SpO2 97%   BMI 22.74 kg/m²        No intake/output data recorded. No intake/output data recorded. Data Review:   Recent Results (from the past 24 hour(s))   EKG, 12 LEAD, INITIAL    Collection Time: 07/01/21 12:41 PM   Result Value Ref Range    Ventricular Rate 82 BPM    Atrial Rate 340 BPM    QRS Duration 64 ms    Q-T Interval 344 ms    QTC Calculation (Bezet) 401 ms    Calculated P Axis -96 degrees    Calculated R Axis 68 degrees    Calculated T Axis 89 degrees    Diagnosis       !! AGE AND GENDER SPECIFIC ECG ANALYSIS !!   Atrial flutter with variable A-V block  Septal infarct (cited on or before 09-JUN-2021)  Abnormal ECG  When compared with ECG of 09-JUN-2021 07:10,  Atrial flutter has replaced Sinus rhythm     CBC WITH AUTOMATED DIFF    Collection Time: 07/01/21 12:45 PM   Result Value Ref Range    WBC 10.0 4.3 - 11.1 K/uL    RBC 4.31 4.23 - 5.6 M/uL    HGB 11.8 (L) 13.6 - 17.2 g/dL    HCT 38.3 (L) 41.1 - 50.3 %    MCV 88.9 79.6 - 97.8 FL    MCH 27.4 26.1 - 32.9 PG    MCHC 30.8 (L) 31.4 - 35.0 g/dL    RDW 17.3 (H) 11.9 - 14.6 %    PLATELET 979 138 - 158 K/uL    MPV 11.6 9.4 - 12.3 FL    ABSOLUTE NRBC 0.02 0.0 - 0.2 K/uL    DF AUTOMATED      NEUTROPHILS 54 43 - 78 %    LYMPHOCYTES 33 13 - 44 %    MONOCYTES 9 4.0 - 12.0 %    EOSINOPHILS 1 0.5 - 7.8 %    BASOPHILS 1 0.0 - 2.0 %    IMMATURE GRANULOCYTES 2 0.0 - 5.0 %    ABS. NEUTROPHILS 5.4 1.7 - 8.2 K/UL    ABS. LYMPHOCYTES 3.3 0.5 - 4.6 K/UL    ABS. MONOCYTES 0.9 0.1 - 1.3 K/UL    ABS. EOSINOPHILS 0.1 0.0 - 0.8 K/UL    ABS. BASOPHILS 0.1 0.0 - 0.2 K/UL    ABS. IMM. GRANS. 0.2 0.0 - 0.5 K/UL   METABOLIC PANEL, COMPREHENSIVE    Collection Time: 07/01/21 12:45 PM   Result Value Ref Range    Sodium 130 (L) 138 - 145 mmol/L    Potassium PENDING mmol/L    Chloride 99 98 - 107 mmol/L    CO2 32 21 - 32 mmol/L    Anion gap Cannot be calculated 7 - 16 mmol/L    Glucose 98 65 - 100 mg/dL    BUN 31 (H) 8 - 23 MG/DL    Creatinine 1.65 (H) 0.8 - 1.5 MG/DL    GFR est AA 52 (L) >60 ml/min/1.73m2    GFR est non-AA 43 (L) >60 ml/min/1.73m2    Calcium 8.3 8.3 - 10.4 MG/DL    Bilirubin, total PENDING MG/DL    ALT (SGPT) PENDING U/L    AST (SGOT) PENDING U/L    Alk. phosphatase 82 50 - 136 U/L    Protein, total 8.6 (H) 6.3 - 8.2 g/dL    Albumin 3.0 (L) 3.2 - 4.6 g/dL    Globulin 5.6 (H) 2.3 - 3.5 g/dL    A-G Ratio 0.5 (L) 1.2 - 3.5     MAGNESIUM    Collection Time: 07/01/21 12:45 PM   Result Value Ref Range    Magnesium 2.1 1.8 - 2.4 mg/dL   NT-PRO BNP    Collection Time: 07/01/21 12:45 PM   Result Value Ref Range    NT pro-BNP 4,322 (H) <450 PG/ML   TROPONIN-HIGH SENSITIVITY    Collection Time: 07/01/21 12:45 PM   Result Value Ref Range    Troponin-High Sensitivity 47.6 (H) 0 - 14 pg/mL       Physical Exam:     General:  Alert, cooperative, no distress, wearing sun glasses   Eyes:  Conjunctivae/corneas clear.    Ears: Normal TMs and external ear canals both ears. Nose: Nares normal. Septum midline. Mouth/Throat: Lips, mucosa, and tongue normal. Teeth and gums normal.   Neck:  no JVD. Back:   deferred   Lungs:   Clear to auscultation bilaterally. Heart:  irregularly irregular with tachycardia. Abdomen:   Soft, non-tender. Bowel sounds normal.    Extremities: Extremities normal, atraumatic, no cyanosis or edema. Pulses: 2+ and symmetric all extremities. Skin: Skin color, texture, turgor normal. No rashes or lesions   Lymph nodes: Cervical, supraclavicular, and axillary nodes normal.   Neurologic: CNII-XII intact. 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 Standley Runner at [facility]. In March 2018 was found with new SAMANTHA       nodules with spiculation. 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        He was started on ETB/azithro/rif.        Chronic hypoxemic respiratory failure ([address] (9/9/2019)      CKD (chronic kidney disease), stage III ([address] (9/9/2019)     COPD on 2L NC continuously with possible exacerbation - No respiratory distress when I am in room and satting well on 2L NC. Order Pulmicort and PRN albuterol. Small steroid burst.     Paroxysmal a fib not on anticoagulation follows [facility] cardiology with a fib RVR- Possible triggered by COPD exacerbation. Consult cardiology. I am suspecting patient is non compliant with meds because he could not tell me anything of what he takes. Order TSH/T4, phosphorus level. Hyponatremia - Does not appear volume depleted.  Trend and if still low in AM can work up for hyponatremia. CKD stage 3-stable     dCHF EF 35%-appears compensated. Order cardiac monitor. 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 Reduce allopurinol dose due to GFR. Wishes to be DNR    DVT prophylaxis - SCDs due to hx of GI bleeding.      Signed By: Tan Atwood DO   July 1, 2021

## 2021-07-01 NOTE — H&P
[facility] Initial Cardiac Evaluation                 Date of  Admission: 7/1/2021 12:27 PM     Primary Care Physician: Dr. Roxy Wayne  Primary Cardiologist:  Dr. Lorenza Phan  Referring Physician:  Dr. Anabel Kathleen  Attending Physician:  Dr. Keyona Culp    CC/Reason for consult:  Shortness of breath      Jennifer Molina is a 68 y.o. male with h/o COPD on 3L O2 by NC, h/o MAC infection, R lung mass, pAF (no OAC due to anemia and recurrent GI bleed ), h/o GI bleed Oct and Dec 2020 due to AVMs, chronic AYAN on IV iron, prostate cancer and sHF w echo 6/11/21 w EF 30-35%, AAA with repair, C in 2006 w mild CAD, who presented to the ED with c/o weakness and dizziness. On arrival to the ED, EKG showed a flutter @ 86. HR became elevated at 135-160. He was treated with cardizem bolus of 10 mg and drip started. EKG on 6/9 showed SR. On exam HR is 90s-120s. Patient denies chest pain.         Past Medical History:   Diagnosis Date    A-fib [facility]) 5/2/2014    AAA (abdominal aortic aneurysm) without rupture ([address] 5/2/2014    3.2 on US 2/14 [facility]    Arthritis     Cancer ([address]     hx prostate cancer    COPD (chronic obstructive pulmonary disease) ([address] 5/2/2014    Elevated prostate specific antigen (PSA)     Glaucoma 5/2/2014    Heart disease     Heart failure ([address]     Hyperlipemia 5/2/2014    Hypertension     Hypertrophy of prostate with urinary obstruction and other lower urinary tract symptoms (LUTS)     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    OA (osteoarthritis) 5/2/2014    Peptic ulcer disease 6/1/2017    Poor historian     Prostate cancer ([address]     Pulmonary embolus ([address] 6/1/2017    Supplemental oxygen dependent 5/2/2014    Warfarin anticoagulation 5/2/2014      Past Surgical History:   Procedure Laterality Date    COLONOSCOPY N/A 9/12/2019    COLONOSCOPY/ 26 ROOM 614 performed by Hakeem N[facility]o MD at [facility] ENDOSCOPY    EGD  12/18/2019         HX HEART CATHETERIZATION      VASCULAR SURGERY PROCEDURE UNLIST 09/14/2019     Bilateral common femoral artery cutdown with exposure and placement of catheter in aorta for aortogram,Endovascular repair of infrarenal abdominal aortic aneurysm with bifurcated modulated device (31 x 14 x 13 main body) via the left common femoral, right iliac extender measuring 27 x 10. Allergies   Allergen Reactions    Statins-Hmg-Coa Reductase Inhibitors Myalgia     Muscle pain      Family History   Problem Relation Age of Onset    Cancer Mother     Heart Disease Father         Current Facility-Administered Medications   Medication Dose Route Frequency    sodium chloride (NS) flush 5-10 mL  5-10 mL IntraVENous Q8H    sodium chloride (NS) flush 5-10 mL  5-10 mL IntraVENous PRN    dilTIAZem (CARDIZEM) 100 mg in 0.9% sodium chloride (MBP/ADV) 100 mL infusion  5-15 mg/hr IntraVENous TITRATE     Current Outpatient Medications   Medication Sig    predniSONE (DELTASONE) 20 mg tablet Take 20 mg by mouth daily (with breakfast).  doxycycline (VIBRAMYCIN) 100 mg capsule Take 1 Capsule by mouth two (2) times a day.  guaiFENesin ER (MUCINEX) 600 mg ER tablet Take 1 Tablet by mouth two (2) times a day.  furosemide (Lasix) 40 mg tablet Take 1 Tablet by mouth daily.  allopurinoL (ZYLOPRIM) 300 mg tablet Take 300 mg by mouth daily.  amLODIPine (Norvasc) 2.5 mg tablet Take 2.5 mg by mouth daily.  dilTIAZem IR (CARDIZEM) 120 mg tablet Take 1 Tablet by mouth daily.  docusate sodium (COLACE) 100 mg capsule Take 1 Capsule by mouth daily.  albuterol-ipratropium (DUO-NEB) 2.5 mg-0.5 mg/3 ml nebu 3 mL by Nebulization route three (3) times daily for 180 days. (Patient not taking: Reported on 6/25/2021)    rosuvastatin (Crestor) 40 mg tablet Take 1 Tab by mouth nightly.  nitroglycerin (NITROSTAT) 0.4 mg SL tablet 1 Tab by SubLINGual route every five (5) minutes as needed for Chest Pain (call EMS if pain fails to resolve after 2 tabs. max daily dose of 3 tabs).  Indications: after 15 minutes or 3 doses, if chest pain persists, contact EMS/911    tamsulosin (FLOMAX) 0.4 mg capsule Take 1 Cap by mouth daily.  aspirin delayed-release 81 mg tablet Take 81 mg by mouth daily.  albuterol (PROVENTIL HFA, VENTOLIN HFA, PROAIR HFA) 90 mcg/actuation inhaler TAKE 2 PUFFS BY MOUTH EVERY 4 HOURS AS NEEDED FOR WHEEZE    latanoprost (XALATAN) 0.005 % ophthalmic solution LOCATION  BOTH EYES. INSTILL ONE DROP INTO EACH EYE AT BEDTIME    metoprolol succinate (TOPROL-XL) 50 mg XL tablet Take 1 Tab by mouth daily.  famotidine (PEPCID) 40 mg tablet Take 1 Tab by mouth daily.  albuterol (PROVENTIL VENTOLIN) 2.5 mg /3 mL (0.083 %) nebu Take 2.5 mg by inhalation every six (6) hours as needed for Shortness of Breath or Wheezing.  budesonide (PULMICORT) 0.5 mg/2 mL nbsp 2 mL by Nebulization route two (2) times a day.  lisinopril (PRINIVIL, ZESTRIL) 10 mg tablet Take 1 Tab by mouth daily.  OXYGEN-AIR DELIVERY SYSTEMS Take 2 L/min by inhalation daily. 2 L/min continuously inhalation via nasal canula    brimonidine (ALPHAGAN P) 0.1 % ophthalmic solution Administer 1 Drop to both eyes two (2) times a day. Review of Systems   Constitutional: Positive for malaise/fatigue and weight loss. HENT: Negative. Eyes: Negative. Respiratory: Negative. Cardiovascular: Negative. Gastrointestinal: Negative. Genitourinary: Negative. Musculoskeletal: Negative. Skin: Negative. Neurological: Positive for dizziness and weakness. Endo/Heme/Allergies: Negative. Psychiatric/Behavioral: Negative. Physical Exam  Vitals:    07/01/21 1402 07/01/21 1441 07/01/21 1449 07/01/21 1454   BP: 137/61 126/64 126/64    Pulse: (!) 103 (!) 160 (!) 135 94   Resp:  26     Temp:       SpO2: 91% (!) 87%  97%   Weight:       Height:           Physical Exam:  Physical Exam  Constitutional:       Appearance: He is ill-appearing. Interventions: Nasal cannula in place.    Eyes:      Pupils: Pupils are equal, round, and reactive to light. Cardiovascular:      Rate and Rhythm: Tachycardia present. Rhythm irregular. Pulmonary:      Effort: Pulmonary effort is normal.      Breath sounds: Decreased breath sounds present. Abdominal:      General: Bowel sounds are normal.   Musculoskeletal:         General: Normal range of motion. Skin:     General: Skin is warm and dry. Neurological:      General: No focal deficit present. Mental Status: He is alert and oriented to person, place, and time. Psychiatric:         Mood and Affect: Mood normal.         Behavior: Behavior normal.         Thought Content: Thought content normal.         Judgment: Judgment normal.         Cardiographics    Telemetry: a flutter   ECG: a flutter   Echocardiogram:   · LV: Estimated LVEF is 30 - 35%. Normal cavity size, wall thickness and systolic function (ejection fraction normal). Mild (grade 1) left ventricular diastolic dysfunction. · Image quality for this study was technically difficult. Labs:   Recent Labs     07/01/21  1245   *   K PENDING   MG 2.1   BUN 31*   CREA 1.65*   GLU 98   WBC 10.0   HGB 11.8*   HCT 38.3*           Assessment/Plan:     Assessment:      Principal Problem:    COPD exacerbation -- steroids PO and pulmicort ordered. Active Problems:    AAA (abdominal aortic aneurysm) without rupture -- repaired       Hypertension -- continue BB and ACE. Monitor BP and titrate as needed. Atrial flutter with RVR -- increase Toprol to BID and wean cardizem as tolerated. Plan for rate control. No OAC due to recurrent GI bleeds and anemia. Mycobacterial pneumonia       Overview: MAC followed by Dr. Mari Rosario at [facility]. Chronic hypoxemic respiratory failure -- on home O2, 2L NC      CKD (chronic kidney disease), stage III -- monitor renal function, repeat labs in AM          Thank you very much for this referral. We appreciate the opportunity to participate in this patient's care.  We will follow along with above stated plan.     Josh Lew NP  Attending MD: Yeny Anderson

## 2021-07-01 NOTE — ED PROVIDER NOTES
70-year-old male with history of COPD on 2 L home oxygen, congestive heart failure, chronic kidney disease, MAC presents with \"feeling faint\" and increased shortness of breath since last night. Denies any actual syncopal episodes. Denies chest pain or change in chronic cough. No fevers or chills. No nausea, vomiting, diarrhea or changes in medications. Given Solu-Medrol and duoneb by EMS. Also reports a sore area on his chest that has been \"growing. \"    Last admission 6/9:  \"Hospital Course :  Please refer to the admission H&P for details of presentation. In summary, Yusuf Chiang is a 68 y.o. male with past medical history significant for  COPD, chronic hypoxic resp failure (on 2-3L NC at home) CKD stage 3, chronic systolic CHF, SAMANTHA spiculated lung mass (stable per CT readings at [facility]), hx of MAC unable to complete treatment secondary to qT prolongation, AAA s/p repair who presented to the ER with report of sudden onset dyspnea that woke patient from sleep. Was placed on CPAP by EMS and on BIPAP in ER. Admitted for acute on chronic respiratory failure with hypoxia and hypercarbia. Pt had a CTA chest on 6/9 which revealed no PE, mildly spiculated 9mm L apical pulmonary nodule, and moderate centrilobular emphysema. Pulmonary was consulted. Patient was started on prednisone for his wheezing. Echocardiogram shows EF of 30 to 59% with diastolic dysfunction. Patient was started on IV Lasix for diuresis. His respiratory status has improved and his O2 requirement has been weaned down to 1 L nasal cannula.     Patient will need to get outpatient PET scans to monitor left lower lobe spiculated nodule as well as monitoring of his hypercarbia to evaluate for benefits from BiPAP. He has been following with Dr. Sandra Longo of the lung center and will need to continue following up upon discharge. \"      Shortness of Breath  Pertinent negatives include no fever, no headaches, no cough, no chest pain, no vomiting, no abdominal pain and no rash. 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    OA (osteoarthritis) 2014    Peptic ulcer disease 2017    Poor historian     Prostate cancer ([address]     Pulmonary embolus ([address] 2017    Supplemental oxygen dependent 2014    Warfarin anticoagulation 2014       Past Surgical History:   Procedure Laterality Date    COLONOSCOPY N/A 2019    COLONOSCOPY/ 26 ROOM 614 performed by Dung Fenton MD at [facility] ENDOSCOPY    EGD  2019         HX HEART CATHETERIZATION      VASCULAR SURGERY PROCEDURE UNLIST  2019     Bilateral common femoral artery cutdown with exposure and placement of catheter in aorta for aortogram,Endovascular repair of infrarenal abdominal aortic aneurysm with bifurcated modulated device (31 x 14 x 13 main body) via the left common femoral, right iliac extender measuring 27 x 10.          Family History:   Problem Relation Age of Onset    Cancer Mother     Heart Disease Father        Social History     Socioeconomic History    Marital status: SINGLE     Spouse name: Not on file    Number of children: Not on file    Years of education: Not on file    Highest education level: Not on file   Occupational History    Not on file   Tobacco Use    Smoking status: Former Smoker     Packs/day: 2.00     Years: 40.00     Pack years: 80.00     Quit date: 2014     Years since quittin.9    Smokeless tobacco: Never Used    Tobacco comment: still taking Chantix    Substance and Sexual Activity    Alcohol use: No    Drug use: No    Sexual activity: Yes     Partners: Female     Birth control/protection: None   Other Topics Concern    Not on file   Social History Narrative    Not on file     Social Determinants of Health     Financial Resource Strain:     Difficulty of Paying Living Expenses:    Food Insecurity:     Worried About Running Out of Food in the Last Year:     [address] in the Last Year:    Transportation Needs:     Lack of Transportation (Medical):  Lack of Transportation (Non-Medical):    Physical Activity:     Days of Exercise per Week:     Minutes of Exercise per Session:    Stress:     Feeling of Stress :    Social Connections:     Frequency of Communication with Friends and Family:     Frequency of Social Gatherings with Friends and Family:     Attends Ca[employer] Services:     Active Member of Clubs or Organizations:     Attends Club or Organization Meetings:     Marital Status:    Intimate Partner Violence:     Fear of Current or Ex-Partner:     Emotionally Abused:     Physically Abused:     Sexually Abused: ALLERGIES: Statins-hmg-coa reductase inhibitors    Review of Systems   Constitutional: Positive for fatigue. Negative for fever. HENT: Negative for hearing loss. Eyes: Negative for visual disturbance. Respiratory: Positive for shortness of breath. Negative for cough. Cardiovascular: Negative for chest pain. Gastrointestinal: Negative for abdominal pain, diarrhea, nausea and vomiting. Musculoskeletal: Negative for back pain. Skin: Negative for rash. Neurological: Positive for light-headedness. Negative for headaches. Psychiatric/Behavioral: Negative for confusion. All other systems reviewed and are negative.       Vitals:    07/01/21 1230   BP: (!) 122/55   Pulse: 81   Resp: 18   Temp: 98.2 °F (36.8 °C)   SpO2: 100%   Weight: 69.9 kg (154 lb) Height: 5' 9\" (1.753 m)            Physical Exam  Vitals and nursing note reviewed. Constitutional:       Appearance: Normal appearance. He is well-developed. HENT:      Head: Normocephalic and atraumatic. Nose: Nose normal.      Mouth/Throat:      Mouth: Mucous membranes are moist.   Eyes:      Pupils: Pupils are equal, round, and reactive to light. Cardiovascular:      Rate and Rhythm: Rhythm irregular. Heart sounds: Normal heart sounds. Pulmonary:      Effort: Pulmonary effort is normal. Tachypnea present. Breath sounds: Rhonchi present. Chest:      Chest wall: Tenderness present. Abdominal:      Palpations: Abdomen is soft. Tenderness: There is no abdominal tenderness. Musculoskeletal:         General: No deformity. Normal range of motion. Cervical back: Normal range of motion and neck supple. Skin:     General: Skin is warm and dry. Neurological:      General: No focal deficit present. Mental Status: He is alert. Mental status is at baseline. Psychiatric:         Mood and Affect: Mood normal.         Behavior: Behavior normal.          MDM  Number of Diagnoses or Management Options  Diagnosis management comments: Parts of this document were created using dragon voice recognition software. The chart has been reviewed but errors may still be present. I wore appropriate PPE throughout this patient's ED visit. Clarisse Green MD, 12:47 PM    2:50 PM  Has history of paroxysmal atrial fibrillation, but has been in sinus rhythm on recent admissions. Currently in A. fib/flutter with rapid rate, likely contributing to lightheadedness and fatigue. Exacerbated by neb treatment with COPD exacerbation. Placed on Cardizem drip. Discussed with hospitalist and cardiology for admission.        Amount and/or Complexity of Data Reviewed  Clinical lab tests: ordered and reviewed (Results for orders placed or performed during the hospital encounter of 07/01/21  -CBC WITH AUTOMATED DIFF       Result                      Value             Ref Range           WBC                         10.0              4.3 - 11.1 K*       RBC                         4.31              4.23 - 5.6 M*       HGB                         11.8 (L)          13.6 - 17.2 *       HCT                         38.3 (L)          41.1 - 50.3 %       MCV                         88.9              79.6 - 97.8 *       MCH                         27.4              26.1 - 32.9 *       MCHC                        30.8 (L)          31.4 - 35.0 *       RDW                         17.3 (H)          11.9 - 14.6 %       PLATELET                    345               150 - 450 K/*       MPV                         11.6              9.4 - 12.3 FL       ABSOLUTE NRBC               0.02              0.0 - 0.2 K/*       DF                          AUTOMATED                             NEUTROPHILS                 54                43 - 78 %           LYMPHOCYTES                 33                13 - 44 %           MONOCYTES                   9                 4.0 - 12.0 %        EOSINOPHILS                 1                 0.5 - 7.8 %         BASOPHILS                   1                 0.0 - 2.0 %         IMMATURE GRANULOCYTES       2                 0.0 - 5.0 %         ABS. NEUTROPHILS            5.4               1.7 - 8.2 K/*       ABS. LYMPHOCYTES            3.3               0.5 - 4.6 K/*       ABS. MONOCYTES              0.9               0.1 - 1.3 K/*       ABS. EOSINOPHILS            0.1               0.0 - 0.8 K/*       ABS. BASOPHILS              0.1               0.0 - 0.2 K/*       ABS. IMM.  GRANS.            0.2               0.0 - 0.5 K/*  -METABOLIC PANEL, COMPREHENSIVE       Result                      Value             Ref Range           Sodium                      130 (L)           138 - 145 mm*       Potassium                   PENDING           mmol/L              Chloride                    99 98 - 107 mmo*       CO2                         32                21 - 32 mmol*       Anion gap                                     7 - 16 mmol/L   Cannot be calculated       Glucose                     98                65 - 100 mg/*       BUN                         31 (H)            8 - 23 MG/DL        Creatinine                  1.65 (H)          0.8 - 1.5 MG*       GFR est AA                  52 (L)            >60 ml/min/1*       GFR est non-AA              43 (L)            >60 ml/min/1*       Calcium                     8.3               8.3 - 10.4 M*       Bilirubin, total            PENDING           MG/DL               ALT (SGPT)                  PENDING           U/L                 AST (SGOT)                  PENDING           U/L                 Alk. phosphatase            82                50 - 136 U/L        Protein, total              8.6 (H)           6.3 - 8.2 g/*       Albumin                     3.0 (L)           3.2 - 4.6 g/*       Globulin                    5.6 (H)           2.3 - 3.5 g/*       A-G Ratio                   0.5 (L)           1.2 - 3.5      -MAGNESIUM       Result                      Value             Ref Range           Magnesium                   2.1               1.8 - 2.4 mg*  -NT-PRO BNP       Result                      Value             Ref Range           NT pro-BNP                  4,322 (H)         <450 PG/ML     -TROPONIN-HIGH SENSITIVITY       Result                      Value             Ref Range           Troponin-High Sensitiv*     47.6 (H)          0 - 14 pg/mL   -EKG, 12 LEAD, INITIAL       Result                      Value             Ref Range           Ventricular Rate            82                BPM                 Atrial Rate                 340               BPM                 QRS Duration                64                ms                  Q-T Interval                344               ms                  QTC Calculation (Bezet)     401 ms                  Calculated P Axis           -96               degrees             Calculated R Axis           68                degrees             Calculated T Axis           89                degrees             Diagnosis                                                     !! AGE AND GENDER SPECIFIC ECG ANALYSIS !! Atrial flutter with variable A-V block   Septal infarct (cited on or before 09-JUN-2021)   Abnormal ECG   When compared with ECG of 09-JUN-2021 07:10,   Atrial flutter has replaced Sinus rhythm     )  Tests in the radiology section of [facility]®: ordered and reviewed (XR CHEST PORT    Result Date: 7/1/2021  EXAMINATION: CHEST RADIOGRAPH 7/1/2021 12:56 PM INDICATION: Shortness of breath with exertion COMPARISON: Chest radiograph 6/9/2021 TECHNIQUE: A single view of the chest was obtained. FINDINGS: Support Devices: None Osseous : No acute abnormality. Cardiomediastinal Silhouette: Unchanged with prominent pulmonary arteries. Lungs: Clear lungs. Normal pulmonary vascularity. Pleura: No pleural fluid or pneumothorax. Upper Abdomen: No abnormality. No acute abnormality.  Unchanged prominent bilateral pulmonary arteries.     )  Tests in the medicine section of [facility]®: ordered and reviewed           EKG    Date/Time: 7/1/2021 1:19 PM  Performed by: Ailyn Peraza MD  Authorized by: Ailyn Peraza MD     ECG reviewed by ED Physician in the absence of a cardiologist: yes    Interpretation:     Interpretation: abnormal    Rate:     ECG rate:  82    ECG rate assessment: normal    Rhythm:     Rhythm: atrial flutter    Ectopy:     Ectopy: none    ST segments:     ST segments:  Non-specific    Critical Care  Performed by: Ailyn Peraza MD  Authorized by: Ailyn Peraza MD     Critical care provider statement:     Critical care time (minutes):  45    Critical care time was exclusive of:  Separately billable procedures and treating other patients    Critical care was necessary to treat or prevent imminent or life-threatening deterioration of the following conditions:  Cardiac failure    Critical care was time spent personally by me on the following activities:  Development of treatment plan with patient or surrogate, discussions with consultants, evaluation of patient's response to treatment, examination of patient, ordering and performing treatments and interventions, ordering and review of laboratory studies, ordering and review of radiographic studies, pulse oximetry, re-evaluation of patient's condition and review of old charts    I assumed direction of critical care for this patient from another provider in my specialty: yes

## 2021-07-02 PROBLEM — I48.92 ATRIAL FLUTTER (HCC): Status: ACTIVE | Noted: 2018-07-23

## 2021-07-02 PROBLEM — I50.22 CHRONIC SYSTOLIC CONGESTIVE HEART FAILURE (HCC): Status: ACTIVE | Noted: 2021-01-01

## 2021-07-02 NOTE — PROGRESS NOTES
[facility] Hospitalist Progress Note     Name:  Ra Ackerman. Age:80 y.o. Sex:male   :  1940    MRN:  [id number]     Admit Date:  2021    Reason for Admission:  Atrial fibrillation with RVR ([facility] [I48.91]  COPD exacerbation [facility]) [J44.1]    Hospital Course/Interval history:     Patient is a 68 y.o. male who presented to the ED for cc increased SOB since last night. Hx of COPD on 2L NC continuously, paroxysmal a fib not on anticoagulation follows [facility] cardiology, CKD stage 3, dCHF EF 35%, SAMANTHA spiculated mass that is stable per CT at [facility] unable to complete treatment due to qT prolongations, AAA s/p repair. Recent dc on  for similar concerns. Patient is very poor historian. Pt noted to be in afib rvr. Admitted to telemetry and placed on cardizem gtt. Subjective (21): Feeling better, not as dyspneic. No cough. Sitting in bedside chair    Review of Systems: 14 point review of systems is otherwise negative with the exception of the elements mentioned above. Assessment & Plan     # afib rvr  - cardiology following  - rate control - toprol increased  to 50bid  - holding lisinopril   - titrating off cardizem gtt     # COPDe  # chronic hypoxic respiratory failure  - continue steroid burst  - scheduled pulmicort, prn alb  - on 2L NC (as at home)  - clinically improved    # Hyponatremia  resolved    # CKD stage 3  stable    # diastolic/systolic CHF EF 41%  - appears compensated  - lasix 40mg po daily  - BB, ACE-I (on hold)    # SAMANTHA spiculated mass  - stable per CT reading at [facility]    # MAC  - unable to complete treatment d/t qt prolongations    # AAA  - s/p repair      Diet:  DIET ADULT  DVT PPx: scd  Code status: DNR  Disposition/Expected LOS: ?DC AM pending rate control and cardizem gtt weaning.                Objective:     Patient Vitals for the past 24 hrs:   Temp Pulse Resp BP SpO2   21 1836  82  118/65    21 1600 97.7 °F (36.5 °C) 80 22 116/61 98 %   07/02/21 1221 97.7 °F (36.5 °C) 80 18 101/61 97 %   07/02/21 0829 97.7 °F (36.5 °C) 75 16 136/84 97 %   07/02/21 0826     98 %   07/02/21 0626  80  (!) 106/58    07/02/21 0426  76  101/63    07/02/21 0326 97.4 °F (36.3 °C) 76 16 121/67 100 %   07/02/21 0226  78  121/64    07/02/21 0127    (!) 102/53    07/02/21 0011 98.1 °F (36.7 °C) 77 18  98 %   07/01/21 2345  76 19 114/65    07/01/21 2331  75  118/64    07/01/21 2320     99 %   07/01/21 2301  76 19 (!) 129/93 100 %   07/01/21 2245  77  110/67 98 %   07/01/21 2200  67  118/75 98 %   07/01/21 2045  78  120/64 97 %   07/01/21 2014     100 %   07/01/21 1901  78  123/67 99 %     Oxygen Therapy  O2 Sat (%): 98 % (07/02/21 1600)  Pulse via Oximetry: 75 beats per minute (07/02/21 0826)  O2 Device: Nasal cannula (07/02/21 1632)  Skin Assessment: Clean, dry, & intact (07/02/21 1632)  O2 Flow Rate (L/min): 2 l/min (07/02/21 1632)    Body mass index is 22.74 kg/m².     Physical Exam:   General:  No acute distress, speaking in full sentences, no use of accessory muscles   Lungs:  Diminished bilaterally w/o wheezing or rhonchi  CV:  Regular rate and rhythm with normal S1 and S2   Abdomen:  Soft, nontender, nondistended, normoactive bowel sounds   Extremities:  No cyanosis clubbing or edema   Neuro:  Nonfocal, A&O x3   Psych:  Normal affect     Data Review:  I have reviewed all labs, meds, and studies from the last 24 hours:    Labs:    Recent Results (from the past 24 hour(s))   METABOLIC PANEL, BASIC    Collection Time: 07/01/21 10:35 PM   Result Value Ref Range    Sodium 138 136 - 145 mmol/L    Potassium 5.0 3.5 - 5.1 mmol/L    Chloride 108 (H) 98 - 107 mmol/L    CO2 27 21 - 32 mmol/L    Anion gap 3 (L) 7 - 16 mmol/L    Glucose 120 (H) 65 - 100 mg/dL    BUN 38 (H) 8 - 23 MG/DL    Creatinine 1.82 (H) 0.8 - 1.5 MG/DL    GFR est AA 47 (L) >60 ml/min/1.73m2    GFR est non-AA 38 (L) >60 ml/min/1.73m2    Calcium 8.3 8.3 - 10.4 MG/DL METABOLIC PANEL, BASIC    Collection Time: 07/02/21  3:59 AM   Result Value Ref Range    Sodium 140 136 - 145 mmol/L    Potassium 5.2 (H) 3.5 - 5.1 mmol/L    Chloride 104 98 - 107 mmol/L    CO2 31 21 - 32 mmol/L    Anion gap 5 (L) 7 - 16 mmol/L    Glucose 167 (H) 65 - 100 mg/dL    BUN 43 (H) 8 - 23 MG/DL    Creatinine 2.01 (H) 0.8 - 1.5 MG/DL    GFR est AA 42 (L) >60 ml/min/1.73m2    GFR est non-AA 34 (L) >60 ml/min/1.73m2    Calcium 8.9 8.3 - 10.4 MG/DL   CBC W/O DIFF    Collection Time: 07/02/21  3:59 AM   Result Value Ref Range    WBC 8.9 4.3 - 11.1 K/uL    RBC 3.99 (L) 4.23 - 5.6 M/uL    HGB 10.6 (L) 13.6 - 17.2 g/dL    HCT 35.3 (L) 41.1 - 50.3 %    MCV 88.5 79.6 - 97.8 FL    MCH 26.6 26.1 - 32.9 PG    MCHC 30.0 (L) 31.4 - 35.0 g/dL    RDW 16.5 (H) 11.9 - 14.6 %    PLATELET 008 453 - 167 K/uL    MPV 10.0 9.4 - 12.3 FL    ABSOLUTE NRBC 0.02 0.0 - 0.2 K/uL       All Micro Results     None          EKG Results     Procedure 720 Value Units Date/Time    EKG [[id number]] Collected: 07/01/21 1241    Order Status: Completed Updated: 07/01/21 1608     Ventricular Rate 82 BPM      Atrial Rate 340 BPM      QRS Duration 64 ms      Q-T Interval 344 ms      QTC Calculation (Bezet) 401 ms      Calculated P Axis -96 degrees      Calculated R Axis 68 degrees      Calculated T Axis 89 degrees      Diagnosis --     Atrial flutter with variable A-V block  Septal infarct (cited on or before 09-JUN-2021)  Abnormal ECG  When compared with ECG of 09-JUN-2021 07:10,  Atrial flutter has replaced Sinus rhythm  Confirmed by Sophia Canales ([id number]) on 7/1/2021 4:07:57 PM            Other Studies:  No results found.     Current Meds:   Current Facility-Administered Medications   Medication Dose Route Frequency    nitroglycerin (NITROSTAT) tablet 0.4 mg  0.4 mg SubLINGual PRN    sodium chloride (NS) flush 5-10 mL  5-10 mL IntraVENous Q8H    sodium chloride (NS) flush 5-10 mL  5-10 mL IntraVENous PRN    dilTIAZem (CARDIZEM) 100 mg in 0.9% sodium chloride (MBP/ADV) 100 mL infusion  5-15 mg/hr IntraVENous TITRATE    albuterol (PROVENTIL VENTOLIN) nebulizer solution 2.5 mg  2.5 mg Nebulization Q6H PRN    aspirin delayed-release tablet 81 mg  81 mg Oral DAILY    brimonidine (ALPHAGAN) 0.2 % ophthalmic solution 1 Drop  1 Drop Both Eyes BID    docusate sodium (COLACE) capsule 100 mg  100 mg Oral DAILY    famotidine (PEPCID) tablet 40 mg  40 mg Oral DAILY    furosemide (LASIX) tablet 40 mg  40 mg Oral DAILY    guaiFENesin ER (MUCINEX) tablet 600 mg  600 mg Oral BID    latanoprost (XALATAN) 0.005 % ophthalmic solution 1 Drop  1 Drop Both Eyes QPM    tamsulosin (FLOMAX) capsule 0.4 mg  0.4 mg Oral DAILY    predniSONE (DELTASONE) tablet 40 mg  40 mg Oral DAILY WITH DINNER    acetaminophen (TYLENOL) tablet 650 mg  650 mg Oral Q6H PRN    Or    acetaminophen (TYLENOL) suppository 650 mg  650 mg Rectal Q6H PRN    polyethylene glycol (MIRALAX) packet 17 g  17 g Oral DAILY PRN    allopurinoL (ZYLOPRIM) tablet 50 mg  50 mg Oral DAILY    metoprolol succinate (TOPROL-XL) XL tablet 50 mg  50 mg Oral BID    budesonide (PULMICORT) 500 mcg/2 ml nebulizer suspension  500 mcg Nebulization BID RT       Problem List:  Hospital Problems as of 7/2/2021 Date Reviewed: 6/24/2021        Codes Class Noted - Resolved POA    Chronic systolic congestive heart failure (HCC) ICD-10-CM: I50.22  ICD-9-CM: 428.22, 428.0  7/2/2021 - Present Unknown        * (Principal) COPD exacerbation (HCC) ICD-10-CM: J44.1  ICD-9-CM: 491.21  5/17/2021 - Present Yes        Chronic hypoxemic respiratory failure (HCC) (Chronic) ICD-10-CM: J96.11  ICD-9-CM: 518.83, 799.02  9/9/2019 - Present Yes        CKD (chronic kidney disease), stage III (HCC) (Chronic) ICD-10-CM: N18.30  ICD-9-CM: 585.3  9/9/2019 - Present Yes        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HCC) (Chronic) ICD-10-CM: J15.8, A31.9  ICD-9-CM: 482.89, 031.9  7/25/2018 - Present Yes    Overview Signed 8/30/2018  8:54 AM by Macy Freedman, NP SAMIR followed by Dr. Aletha Girard at [facility]. In March 2018 was found with new SAMANTHA nodules with spiculation. CT-guided biopsy of SAMANTHA nodule revealed Fabio Chau was started on ETB/azithro/rif.               Atrial flutter ([facility] [address] ICD-10-CM: I48.92  ICD-9-CM: 427.32  7/23/2018 - Present         Hypertension ICD-10-CM: I10  ICD-9-CM: 401.9  6/1/2017 - Present Yes        AAA (abdominal aortic aneurysm) without rupture (HCC) (Chronic) ICD-10-CM: I71.4  ICD-9-CM: 441.4  5/2/2014 - Present Yes    Overview Signed 5/2/2014 10:53 AM by Britni Turner NP     3.2 on US 2/14 [address]             COPD (chronic obstructive pulmonary disease) ([facility] [address] (Chronic) ICD-10-CM: J44.9  ICD-9-CM: 812  5/2/2014 - Present Yes        Hyperlipemia (Chronic) ICD-10-CM: E78.5  ICD-9-CM: 272.4  5/2/2014 - Present Yes        Glaucoma (Chronic) ICD-10-CM: H40.9  ICD-9-CM: 365.9  5/2/2014 - Present Yes                 Part of this note was written by using a voice dictation software and the note has been proof read but may still contain some grammatical/other typographical errors.     Signed By: DO Elsi Angelo Hospitalist Service    July 2, 2021  5:15 PM

## 2021-07-02 NOTE — PROGRESS NOTES
Spiritual Care Visit, initial visit. Visited with patient at bedside. Prayed for patient's healing and health. Patient wanted  to pray for him. He only requested prayer of thanksgiving because, in a family in which people  in their [de-identified], he has lived to be 68. Visit by Melody Omer, Staff .  BILLIE.Kendall., Th.B., B.A.

## 2021-07-02 NOTE — PROGRESS NOTES
[facility] CARDIOLOGY PROGRESS NOTE           7/2/2021 8:10 AM    Admit Date: 7/1/2021      Subjective:   Patient denies any chest pain. Dyspnea persistent. BP controlled. Remains in atrial flutter with controlled rate. On IV Cardizem at 5 mg/hr. Toprol increased yesterday. ROS:  Cardiovascular:  As noted above    Objective:      Vitals:    07/02/21 0226 07/02/21 0326 07/02/21 0426 07/02/21 0626   BP: 121/64 121/67 101/63 (!) 106/58   Pulse: 78 76 76 80   Resp:  16     Temp:  97.4 °F (36.3 °C)     SpO2:  100%     Weight:       Height:           Physical Exam:  General-No Acute Distress  Neck- supple, no JVD  CV- IRIR  Lung- decreased breath sounds bilaterally. Abd- soft, nontender, nondistended  Ext- trivial edema bilaterally. Skin- warm and dry      Data Review:   Recent Labs     07/02/21  0359 07/01/21  2235 07/01/21  1502 07/01/21  1245    138   < > 130*   K 5.2* 5.0   < > >10.0*   MG  --   --   --  2.1   BUN 43* 38*   < > 31*   CREA 2.01* 1.82*   < > 1.65*   * 120*   < > 98   WBC 8.9  --   --  10.0   HGB 10.6*  --   --  11.8*   HCT 35.3*  --   --  38.3*     --   --  345    < > = values in this interval not displayed. No results found for: AL Estrada    Assessment/Plan:     Principal Problem:    COPD exacerbation ([facility] (5/17/2021)    Continue Prednisone and inhalers. .      Active Problems:    Hypertension (6/1/2017)    BP controlled. Atrial flutter ([address] (7/23/2018)    Attempt rate control. Continue Toprol at 50 BID. When Cardizem. Hold Lisinopril until see if needs to titrate further (allow BP to increase for titration). 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        Chronic hypoxemic respiratory failure ([address] (9/9/2019)    Continue treatment for COPD. CKD (chronic kidney disease), stage III ([facility] (9/9/2019)    Worse. Hold Lisinopril. Chronic CHF  Appears compensated.   Hold Lisinopril. COntinue Toprol and Lasix.                    Carrington Poe MD  7/2/2021 8:10 AM

## 2021-07-02 NOTE — PROGRESS NOTES
Pt called asking for sublingual nitro stating chest pain 5/10. No diaphoresis, vitals stable. Anya Lopez DO gave verbal order for med, med given [phone]. Continuing to monitor pt.

## 2021-07-02 NOTE — ROUTINE PROCESS
Bedside and Verbal shift change report given to [facility] (oncoming nurse) by myself (offgoing nurse). Report included the following information SBAR, Kardex, MAR, Recent Results and Med Rec Status. Cardizem gtt infusing at bedside.

## 2021-07-02 NOTE — ED NOTES
TRANSFER - OUT REPORT:    Verbal report given to Devorah Sharma RN(name) on [employer].  being transferred to 3rd floor(unit) for routine progression of care       Report consisted of patients Situation, Background, Assessment and   Recommendations(SBAR). Information from the following report(s) SBAR was reviewed with the receiving nurse. Lines:   Peripheral IV 07/01/21 Anterior; Left Hand (Active)        Opportunity for questions and clarification was provided.       Patient transported with:   Registered Nurse

## 2021-07-02 NOTE — PROGRESS NOTES
Problem: Falls - Risk of  Goal: *Absence of Falls  Description: Document Radha Spare Fall Risk and appropriate interventions in the flowsheet.   Outcome: Progressing Towards Goal  Note: Fall Risk Interventions:  Mobility Interventions: Communicate number of staff needed for ambulation/transfer         Medication Interventions: Teach patient to arise slowly, Evaluate medications/consider consulting pharmacy    Elimination Interventions: Call light in reach, Patient to call for help with toileting needs, Stay With Me (per policy)              Problem: Heart Failure: Day 1  Goal: Activity/Safety  Outcome: Progressing Towards Goal     Problem: Heart Failure: Day 1  Goal: Diagnostic Test/Procedures  Outcome: Progressing Towards Goal     Problem: Heart Failure: Day 1  Goal: Nutrition/Diet  Outcome: Progressing Towards Goal     Problem: Heart Failure: Day 1  Goal: Respiratory  Outcome: Progressing Towards Goal     Problem: Heart Failure: Day 1  Goal: Treatments/Interventions/Procedures  Outcome: Progressing Towards Goal     Problem: Heart Failure: Day 1  Goal: Psychosocial  Outcome: Progressing Towards Goal

## 2021-07-02 NOTE — ROUTINE PROCESS
TRANSFER - IN REPORT:    Verbal report received from [place] on Trudie Meckel. being received from ER for routine progression of care. Report consisted of patients Situation, Background, Assessment and Recommendations(SBAR). Information from the following report(s) SBAR, Kardex, MAR, Recent Results and Med Rec Status was reviewed. Opportunity for questions and clarification was provided. Assessment completed upon patients arrival to unit and care assumed. Patient received to room 319. Patient connected to monitor and assessment completed. Plan of care reviewed. Patient oriented to room and call light. Patient aware to use call light to communicate any chest pain or needs. Admission skin assessment completed with second RN and reveals the following: Sacrum is free of any redness. Heels are dry and flaky, but no redness present. Toenails are thick. Patient has a 2 inch scar on his sternum. He has a scar on his left side of his face. Overall, skin is dry and flaky.

## 2021-07-02 NOTE — ROUTINE PROCESS
Bedside and Verbal shift change report given to self (oncoming nurse) by Evelina Cano (offgoing nurse). Report included the following information SBAR, Kardex, MAR and Recent Results.

## 2021-07-03 NOTE — ROUTINE PROCESS
Bedside and Verbal shift change report given to Shahnaz Venegas RN (oncoming nurse) by self [facility] nurse). Report included the following information SBAR, Kardex, Intake/Output, MAR and Recent Results.

## 2021-07-03 NOTE — PROGRESS NOTES
Bedside and Verbal shift change report given to [facility]. Report included the following information SBAR, Kardex, MAR, Accordion and Recent Results.

## 2021-07-03 NOTE — PROGRESS NOTES
Bedside report received from [address] and [address] Pt resting in bed. No distress noted. Will monitor.

## 2021-07-03 NOTE — ROUTINE PROCESS
Bedside and Verbal shift change report given to *** (oncoming nurse) by *** (offgoing nurse). Report included the following information {SBAR REPORTS OSMO:[id number]}.

## 2021-07-03 NOTE — ROUTINE PROCESS
Bedside and Verbal shift change report given to self (oncoming nurse) by Niki Shetty RN (offgoing nurse). Report included the following information SBAR, Kardex, Intake/Output, MAR and Recent Results.

## 2021-07-03 NOTE — PROGRESS NOTES
[facility]           7/3/2021   Admit Date: 7/1/2021      Subjective:   Patient denies any chest pain. Dyspnea persistent. BP controlled. Remains in atrial flutter with controlled rate. ROS:  Cardiovascular:  As noted above    Objective:      Vitals:    07/02/21 2115 07/03/21 0030 07/03/21 0453 07/03/21 0814   BP: 117/68 (!) 113/55 121/69 120/63   Pulse: 79 62 77 73   Resp:  20 22 18   Temp:  98 °F (36.7 °C) 97.8 °F (36.6 °C) 97.6 °F (36.4 °C)   SpO2:  100% 94% 95%   Weight:   161 lb 3.2 oz (73.1 kg)    Height:           Physical Exam:  General-No Acute Distress  Neck- supple, no JVD  CV- IRIR  Lung- decreased breath sounds bilaterally. Abd- soft, nontender, nondistended  Ext- trivial edema bilaterally. Skin- warm and dry      Data Review:   Recent Labs     07/03/21  0456 07/02/21  0359 07/01/21  1502 07/01/21  1245    140   < > 130*   K 5.4* 5.2*   < > >10.0*   MG  --   --   --  2.1   BUN 64* 43*   < > 31*   CREA 1.86* 2.01*   < > 1.65*   * 167*   < > 98   WBC 15.2* 8.9   < > 10.0   HGB 10.7* 10.6*   < > 11.8*   HCT 35.5* 35.3*   < > 38.3*    262   < > 345    < > = values in this interval not displayed. No results found for: TROIQ, ZINA, TROPT, TNIPOC     Interpretation Summary    · LV: Estimated LVEF is 30 - 35%. Normal cavity size, wall thickness and systolic function (ejection fraction normal). Mild (grade 1) left ventricular diastolic dysfunction. · Image quality for this study was technically difficult. · Contrast used: DEFINITY. · RV: Not well visualized. · LA: Mildly dilated left atrium. Assessment/Plan:     Principal Problem:    COPD exacerbation ([address] (5/17/2021)    Continue Prednisone and inhalers. .      Active Problems:    Hypertension (6/1/2017)    BP controlled. Atrial flutter ([address] (7/23/2018)  Rate controlled on metoprolol succinate and diltiazem.   We will try to simplify therapy and change metoprolol succinate to 100 mg twice daily. Discontinue short acting diltiazem for now and monitor rate control. Patient is not anticoagulated due to severe COPD and frailty. He is felt not to be a watchman candidate. 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        Chronic hypoxemic respiratory failure ([facility] (9/9/2019)    Continue treatment for COPD. CKD (chronic kidney disease), stage III ([facility] (9/9/2019)    Worse. Hold Lisinopril. Chronic CHF  Appears compensated. Holding Lisinopril. Continue Toprol and Lasix.            Zoraida Ordaz MD  7/3/2021

## 2021-07-03 NOTE — PROGRESS NOTES
Progress Note    Patient: Leeann Schwab MRN: [id number]  SSN: [id number]    YOB: 1940  Age: 68 y.o. Sex: male      Admit Date: 7/1/2021    LOS: 2 days     Assessment and Plan:   69 y/o M with PMH of  paroxysmal a fib not on anticoagulation follows [facility] cardiology, COPD, CKD stage 3, HFrEF EF 35%, SAMNATHA spiculated mass that is stable per CT at [facility] unable to complete treatment due to qT prolongations, AAA s/p repair, that presented in the setting of worsening SOB    1. Acute hypoxic respiratory failure 2/2 COPD exacerbation  - O2 therapy to maintain O2 Sat >92%  - Continue pulmicot neb q12h  - Xopenex neb q12h  - Continue prednisone   - CXR today obtained, unremarkable     2. Afib RVR  - Off cardizem gtt this am  - Continue metoprolol  - Not on anticoagulation   - Cardiology recs appreciated    3. HFrEF  - Continue lasix  - Continue metoprolol  - ACEI on hold  - Cardiology recs appreciated     4. CKD III  - Avoid nephrotoxic agents  - Monitor renal function    5. SAMANTHA spiculated mass  - Stable per CT reading at [facility]  - Outpatient follow up    6. MAC  - Unable to complete treatment due to qt prolongations    7. Metabolic encephalopathy  - Avoid sedatives  - Monitor mental status  - Delirium precautions  - Ammonia, B12 levels  - VBG  - CT brain obtained today no acute findings  - Obtain UA    DVT ppx SCD      Subjective:   69 y/o M with PMH of  paroxysmal a fib not on anticoagulation follows [facility] cardiology, COPD, CKD stage 3, HFrEF EF 35%, SAMANTHA spiculated mass that is stable per CT at [facility] unable to complete treatment due to qT prolongations, AAA s/p repair, that presented in the setting of worsening SOB. Patient seen and examined at bedside. This morning alert but mildly confused. Denies chest pain, no abdominal pain, no nausea.      Objective:     Vitals:    07/03/21 0030 07/03/21 0453 07/03/21 0814 07/03/21 1124   BP: (!) 113/55 121/69 120/63 100/66   Pulse: 62 77 73 88 Resp: 20 22 18 24   Temp: 98 °F (36.7 °C) 97.8 °F (36.6 °C) 97.6 °F (36.4 °C) 97.7 °F (36.5 °C)   SpO2: 100% 94% 95% 94%   Weight:  73.1 kg (161 lb 3.2 oz)     Height:            Intake and Output:  Current Shift: 07/03 0701 - 07/03 1900  In: -   Out: 350 [Urine:350]  Last three shifts: 07/01 1901 - 07/03 0700  In: 845.6 [P.O.:650;  I.V.:195.6]  Out: 250 [Urine:250]    ROS  10 ROS negative except from stated on subjective    Physical Exam:   General: Alert, confused, NAD  HEENT: NC/AT, EOM are intact  Neck: supple, no JVD  Cardiovascular: RRR, S1, S2, no murmurs  Respiratory: Lungs are clear, no wheezes or rales  Abdomen: Soft, NT, ND  Back: No CVA tenderness, no paraspinal tenderness  Extremities: LE without pedal edema, no erythema  Neuro: CN are intact, no focal deficits  Skin: no rash or ulcers    Lab/Data Review:  I have personally reviewed patients laboratory data showing  Recent Results (from the past 24 hour(s))   METABOLIC PANEL, BASIC    Collection Time: 07/03/21  4:56 AM   Result Value Ref Range    Sodium 141 138 - 145 mmol/L    Potassium 5.4 (H) 3.5 - 5.1 mmol/L    Chloride 106 98 - 107 mmol/L    CO2 32 21 - 32 mmol/L    Anion gap 3 (L) 7 - 16 mmol/L    Glucose 120 (H) 65 - 100 mg/dL    BUN 64 (H) 8 - 23 MG/DL    Creatinine 1.86 (H) 0.8 - 1.5 MG/DL    GFR est AA 45 (L) >60 ml/min/1.73m2    GFR est non-AA 38 (L) >60 ml/min/1.73m2    Calcium 8.9 8.3 - 10.4 MG/DL   CBC WITH AUTOMATED DIFF    Collection Time: 07/03/21  4:56 AM   Result Value Ref Range    WBC 15.2 (H) 4.3 - 11.1 K/uL    RBC 3.96 (L) 4.23 - 5.6 M/uL    HGB 10.7 (L) 13.6 - 17.2 g/dL    HCT 35.5 (L) 41.1 - 50.3 %    MCV 89.6 79.6 - 97.8 FL    MCH 27.0 26.1 - 32.9 PG    MCHC 30.1 (L) 31.4 - 35.0 g/dL    RDW 16.6 (H) 11.9 - 14.6 %    PLATELET 517 944 - 402 K/uL    MPV 11.4 9.4 - 12.3 FL    ABSOLUTE NRBC 0.04 0.0 - 0.2 K/uL    DF AUTOMATED      NEUTROPHILS 88 (H) 43 - 78 %    LYMPHOCYTES 8 (L) 13 - 44 %    MONOCYTES 3 (L) 4.0 - 12.0 % EOSINOPHILS 0 (L) 0.5 - 7.8 %    BASOPHILS 0 0.0 - 2.0 %    IMMATURE GRANULOCYTES 1 0.0 - 5.0 %    ABS. NEUTROPHILS 13.4 (H) 1.7 - 8.2 K/UL    ABS. LYMPHOCYTES 1.1 0.5 - 4.6 K/UL    ABS. MONOCYTES 0.5 0.1 - 1.3 K/UL    ABS. EOSINOPHILS 0.0 0.0 - 0.8 K/UL    ABS. BASOPHILS 0.0 0.0 - 0.2 K/UL    ABS. IMM. GRANS. 0.1 0.0 - 0.5 K/UL      All Micro Results     None           Image:  I have personally reviewed patients imaging showing  CT HEAD WO CONT   Final Result   EXTENSIVE SMALL VESSEL ISCHEMIC DISEASE WITH NO ACUTE   INTRACRANIAL ABNORMALITY IDENTIFIED AT NONCONTRAST CT.               XR CHEST SNGL V   Final Result   Unremarkable portable chest radiograph. XR CHEST PORT   Final Result      No acute abnormality. Unchanged prominent bilateral pulmonary arteries. Hospital problems     Principal Problem:    COPD exacerbation ([address] (5/17/2021)    Active Problems:    AAA (abdominal aortic aneurysm) without rupture ([address] (5/2/2014)      Overview: 3.2 on US 2/14 [facility]      COPD (chronic obstructive pulmonary disease) ([address] (5/2/2014)      Hyperlipemia (5/2/2014)      Glaucoma (5/2/2014)      Hypertension (6/1/2017)      Atrial flutter ([address] (7/23/2018)      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[address] (7/25/2018)      Overview: MAC followed by Dr. Alesha Chen at [facility]. In March 2018 was found with new SAMANTHA       nodules with spiculation. 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        He was started on ETB/azithro/rif.        Chronic hypoxemic respiratory failure ([address] (9/9/2019)      CKD (chronic kidney disease), stage III ([address] (9/9/2019)      Chronic systolic congestive heart failure ([address] (7/2/2021)        I have reviewed, updated, and verified this note's content and spent 38 minutes of my 42 minutes visit performing counseling and coordination of care regarding medical management.        Signed By: Nathan Welch MD     July 3, 2021

## 2021-07-04 NOTE — ROUTINE PROCESS
Bedside and Verbal shift change report given to self (oncoming nurse) by Ulisses Gregory (offgoing nurse). Report included the following information SBAR, Kardex, MAR and Recent Results.

## 2021-07-04 NOTE — PROGRESS NOTES
ACUTE OT GOALS:  (Developed with and agreed upon by patient and/or caregiver.)  1. Patient will complete lower body bathing and dressing with MOD I and adaptive equipment as needed. 2. Patient will complete toileting with MOD I.   3. Patient will tolerate 25 minutes of OT treatment with 1-2 rest breaks to increase activity tolerance for ADLs. 4. Patient will complete functional transfers with MOD I and adaptive equipment as needed. 5. Patient will complete functional mobility for household distances with MOD I and adaptive equipment as needed. 6. Patient will complete self-grooming while standing edge of sink with MOD I and adaptive equipment as needed. Timeframe: 7 visits         OCCUPATIONAL THERAPY ASSESSMENT: Initial Assessment OT Treatment Day # 1    Prosper Melo is a 68 y.o. male   PRIMARY DIAGNOSIS: COPD exacerbation ([facility])  Atrial fibrillation with RVR ([facility]) [I48.91]  [facility] [J44.1]       Reason for Referral:   ICD-10: Treatment Diagnosis: Generalized Muscle Weakness (M62.81)  INPATIENT: Payor: [facility] MEDICARE / Plan: [address] / Product Type: [address] Care Medicare /   ASSESSMENT:     REHAB RECOMMENDATIONS:   Recommendation to date pending progress:  Setting:  [address] Therapy  Equipment:    To Be Determined     PRIOR LEVEL OF FUNCTION:  (Prior to Hospitalization)  INITIAL/CURRENT LEVEL OF FUNCTION:  (Based on today's evaluation)   Bathing:   Independent  Dressing:   Independent  Feeding/Grooming:   Independent  Toileting:   Independent  Functional Mobility:   Modified Independent Bathing:   Minimal Assistance  Dressing:   Minimal Assistance  Feeding/Grooming:   Set Up  Toileting:   Minimal Assistance  Functional Mobility:   Minimal Assistance     ASSESSMENT:  Mr. Hesham Gordon presents with deficits in overall strength, activity tolerance, ADL performance and functional mobility.  Admitted for COPD exacerbation; reports chronic use of 2L 02. BUE AROM and strength (4/5) are generally decreased but WFL. CGA to min A for functional bed mobility/transfers. Intact sitting balance EOB. States he is just having a bad day and would like to rest. At this time, Richard Carvalho is functioning below baseline for ADLs and functional mobility. Pt would benefit from skilled OT services to address OT goals and and plan of care. .     SUBJECTIVE:   Mr. Allie Briseno states, \"I just feel off today. .\"    SOCIAL HISTORY/LIVING ENVIRONMENT: Lives alone in 9th floor apartment with elevator entry.    Home Environment: Apartment  One/Two Story Residence: Other (Comment) (lives on 9th floor @ [place] [address])  Living Alone: Yes  Support Systems: Child(elizabeth), Friends \ neighbors    OBJECTIVE:     PAIN: VITAL SIGNS: LINES/DRAINS:   Pre Treatment: Pain Screen  Pain Scale 1: Numeric (0 - 10)  Pain Intensity 1: 0  Post Treatment: 0   none  O2 Device: Nasal cannula     GROSS EVALUATION:  BUEs Within Functional Limits Abnormal/ Functional Abnormal/ Non-Functional (see comments) Not Tested Comments:   AROM [] [x] [] []    PROM [] [x] [] []    Strength [] [x] [] [] 4/5   Balance [] [x] [] [] Intact sitting; did not attempt standing   Posture [] [x] [] []    Sensation [x] [] [] []    Coordination [x] [] [] []    Tone [x] [] [] []    Edema [] [x] [] []    Activity Tolerance [] [] [] [] Chronic 2L 02    [] [] [] []      COGNITION/  PERCEPTION: Intact Impaired   (see comments) Comments:   Orientation [] [x] Intact but slightly confused   Vision [x] []    Hearing [x] []    Judgment/ Insight [x] []    Attention [x] []    Memory [] [x]    Command Following [x] []    Emotional Regulation [x] []     [] []      ACTIVITIES OF DAILY LIVING: I Mod I S SBA CGA Min Mod Max Total NT Comments   BASIC ADLs:              Bathing/ Showering [] [] [] [] [] [] [] [] [] [x]    Toileting [] [] [] [] [] [] [] [] [] [x]    Dressing [] [] [] [] [] [] [] [] [] [x]    Feeding [] [] [] [] [] [] [] [] [] [x]    Grooming [] [] [] [] [] [] [] [] [] [x]    Personal Device Care [] [] [] [] [] [] [] [] [] [x]    Functional Mobility [] [] [] [] [x] [x] [] [] [] []    I=Independent, Mod I=Modified Independent, S=Supervision, SBA=Standby Assistance, CGA=Contact Guard Assistance,   Min=Minimal Assistance, Mod=Moderate Assistance, Max=Maximal Assistance, Total=Total Assistance, NT=Not Tested    MOBILITY: I Mod I S SBA CGA Min Mod Max Total  NT x2 Comments:   Supine to sit [] [] [] [] [x] [x] [] [] [] [] [] Left sitting up on EOB   Sit to supine [] [] [] [] [] [] [] [] [] [x] []    Sit to stand [] [] [] [] [] [] [] [] [] [x] []    Bed to chair [] [] [] [] [] [] [] [] [] [x] []    I=Independent, Mod I=Modified Independent, S=Supervision, SBA=Standby Assistance, CGA=Contact Guard Assistance,   Min=Minimal Assistance, Mod=Moderate Assistance, Max=Maximal Assistance, Total=Total Assistance, NT=Not Tested    [address] [place] [address] AM-PAC 6 Clicks   Daily Activity Inpatient Short Form        How much help from another person does the patient currently need. .. Total A Lot A Little None   1. Putting on and taking off regular lower body clothing? [] 1   [] 2   [x] 3   [] 4   2. Bathing (including washing, rinsing, drying)? [] 1   [] 2   [x] 3   [] 4   3. Toileting, which includes using toilet, bedpan or urinal?   [] 1   [] 2   [x] 3   [] 4   4. Putting on and taking off regular upper body clothing? [] 1   [] 2   [x] 3   [] 4   5. Taking care of personal grooming such as brushing teeth? [] 1   [] 2   [x] 3   [] 4   6. Eating meals? [] 1   [] 2   [x] 3   [] 4   © 2007, Trustees of [address] [place] [id number], under license to [employer]. All rights reserved     Score:  Initial: 18 Most Recent: X (Date: -- )   Interpretation of Tool:  Represents activities that are increasingly more difficult (i.e. Bed mobility, Transfers, Gait).     PLAN:   FREQUENCY/DURATION: OT Plan of Care: 3 times/week for duration of hospital stay or until stated goals are met, whichever comes first.    PROBLEM LIST:   (Skilled intervention is medically necessary to address:)  1. Decreased ADL/Functional Activities  2. Decreased Activity Tolerance  3. Decreased Balance  4. Decreased Cognition  5. Decreased Coordination  6. Decreased Gait Ability  7. Decreased Strength  8. Decreased Transfer Abilities   INTERVENTIONS PLANNED:   (Benefits and precautions of occupational therapy have been discussed with the patient.)  1. Self Care Training  2. Therapeutic Activity  3. Therapeutic Exercise/HEP  4. Neuromuscular Re-education  5. Education     TREATMENT:     EVALUATION: Low Complexity : (Untimed Charge)    TREATMENT:   (     )  eval only.      TREATMENT GRID:  N/A    AFTER TREATMENT POSITION/PRECAUTIONS:  RN notified and sitting EOB    INTERDISCIPLINARY COLLABORATION:  RN/PCT and OT/HARRISON    TOTAL TREATMENT DURATION:  OT Patient Time In/Time Out  Time In: 5509  Time Out: [address] OT

## 2021-07-04 NOTE — PROGRESS NOTES
Progress Note    Patient: Kayla Faustin. MRN: [id number]  SSN: [id number]    YOB: 1940  Age: 68 y.o. Sex: male      Admit Date: 7/1/2021    LOS: 3 days     Assessment and Plan:   67 y/o M with PMH of  paroxysmal a fib not on anticoagulation follows [facility] cardiology, COPD, CKD stage 3, HFrEF EF 35%, SAMANTHA spiculated mass that is stable per CT at [facility] [place] unable to complete treatment due to qT prolongations, AAA s/p repair, that presented in the setting of worsening SOB    1. Acute hypoxic respiratory failure 2/2 COPD exacerbation  - O2 therapy to maintain O2 Sat >92%  - Continue pulmicot neb q12h  - Xopenex neb q12h  - Continue prednisone   - CXR yesterday unremarkable     2. Afib RVR  - Off cardizem gtt   - Continue metoprolol  - Not on anticoagulation   - Cardiology recs appreciated    3. HFrEF  - Continue lasix  - Continue metoprolol  - ACEI on hold  - Cardiology recs appreciated     4. CKD III  - Avoid nephrotoxic agents  - Monitor renal function    5. SAMANTHA spiculated mass  - Stable per CT reading at [facility]  - Outpatient follow up    6. MAC  - Unable to complete treatment due to qt prolongations    7. Metabolic encephalopathy, improved  - Avoid sedatives  - Monitor mental status  - Delirium precautions  - Ammonia, B12 levels WNL  - CT brain yesterday no acute findings  - UA without pyuria     DVT ppx SCD      Subjective:   67 y/o M with PMH of  paroxysmal a fib not on anticoagulation follows [facility] cardiology, COPD, CKD stage 3, HFrEF EF 35%, SAMANTHA spiculated mass that is stable per CT at [facility], [facility] [place] unable to complete treatment due to qT prolongations, AAA s/p repair, that presented in the setting of worsening SOB. Patient seen and examined at bedside. This morning alert and oriented. Feeling better. Denies chest pain, no abdominal pain, no nausea.      Objective:     Vitals:    07/04/21 0001 07/04/21 0350 07/04/21 0758 07/04/21 0821   BP: 103/63 (!) 109/56  (!) 107/56   Pulse: 78 76  79 Resp: 18 18  18   Temp: 97.9 °F (36.6 °C) 97.2 °F (36.2 °C)  98.3 °F (36.8 °C)   SpO2: 99% 99% 98% 99%   Weight:  75.1 kg (165 lb 8 oz)     Height:            Intake and Output:  Current Shift: No intake/output data recorded.   Last three shifts: 07/02 1901 - 07/04 0700  In: 1113 [P.O.:960; I.V.:153]  Out: 1400 [Urine:1400]    ROS  10 ROS negative except from stated on subjective    Physical Exam:   General: Alert, NAD  HEENT: NC/AT, EOM are intact  Neck: supple, no JVD  Cardiovascular: RRR, S1, S2, no murmurs  Respiratory: No rales, mil dwheezes  Abdomen: Soft, NT, ND  Back: No CVA tenderness, no paraspinal tenderness  Extremities: LE without pedal edema, no erythema  Neuro: CN are intact, no focal deficits  Skin: no rash or ulcers    Lab/Data Review:  I have personally reviewed patients laboratory data showing  Recent Results (from the past 24 hour(s))   AMMONIA    Collection Time: 07/03/21  4:16 PM   Result Value Ref Range    Ammonia 19 11 - 32 UMOL/L   VITAMIN B12    Collection Time: 07/03/21  4:16 PM   Result Value Ref Range    Vitamin B12 417 193 - 986 pg/mL   URINALYSIS W/ RFLX MICROSCOPIC    Collection Time: 07/04/21  2:15 AM   Result Value Ref Range    Color YELLOW      Appearance CLEAR      Specific gravity 1.018 1.001 - 1.023      pH (UA) 5.0 5.0 - 9.0      Protein Negative NEG mg/dL    Glucose Negative mg/dL    Ketone Negative NEG mg/dL    Bilirubin Negative NEG      Blood Negative NEG      Urobilinogen 0.2 0.2 - 1.0 EU/dL    Nitrites Negative NEG      Leukocyte Esterase Negative NEG     CBC WITH AUTOMATED DIFF    Collection Time: 07/04/21  4:22 AM   Result Value Ref Range    WBC 8.7 4.3 - 11.1 K/uL    RBC 3.56 (L) 4.23 - 5.6 M/uL    HGB 9.7 (L) 13.6 - 17.2 g/dL    HCT 31.9 (L) 41.1 - 50.3 %    MCV 89.6 79.6 - 97.8 FL    MCH 27.2 26.1 - 32.9 PG    MCHC 30.4 (L) 31.4 - 35.0 g/dL    RDW 16.4 (H) 11.9 - 14.6 %    PLATELET 491 688 - 518 K/uL    MPV 11.1 9.4 - 12.3 FL    ABSOLUTE NRBC 0.02 0.0 - 0.2 K/uL DF AUTOMATED      NEUTROPHILS 85 (H) 43 - 78 %    LYMPHOCYTES 10 (L) 13 - 44 %    MONOCYTES 5 4.0 - 12.0 %    EOSINOPHILS 0 (L) 0.5 - 7.8 %    BASOPHILS 0 0.0 - 2.0 %    IMMATURE GRANULOCYTES 1 0.0 - 5.0 %    ABS. NEUTROPHILS 7.4 1.7 - 8.2 K/UL    ABS. LYMPHOCYTES 0.9 0.5 - 4.6 K/UL    ABS. MONOCYTES 0.4 0.1 - 1.3 K/UL    ABS. EOSINOPHILS 0.0 0.0 - 0.8 K/UL    ABS. BASOPHILS 0.0 0.0 - 0.2 K/UL    ABS. IMM. GRANS. 0.1 0.0 - 0.5 K/UL   METABOLIC PANEL, BASIC    Collection Time: 07/04/21  4:22 AM   Result Value Ref Range    Sodium 141 138 - 145 mmol/L    Potassium 5.3 (H) 3.5 - 5.1 mmol/L    Chloride 108 (H) 98 - 107 mmol/L    CO2 30 21 - 32 mmol/L    Anion gap 3 (L) 7 - 16 mmol/L    Glucose 114 (H) 65 - 100 mg/dL    BUN 69 (H) 8 - 23 MG/DL    Creatinine 1.54 (H) 0.8 - 1.5 MG/DL    GFR est AA 56 (L) >60 ml/min/1.73m2    GFR est non-AA 47 (L) >60 ml/min/1.73m2    Calcium 8.4 8.3 - 10.4 MG/DL      All Micro Results     None           Image:  I have personally reviewed patients imaging showing  CT HEAD WO CONT   Final Result   EXTENSIVE SMALL VESSEL ISCHEMIC DISEASE WITH NO ACUTE   INTRACRANIAL ABNORMALITY IDENTIFIED AT NONCONTRAST CT.               XR CHEST SNGL V   Final Result   Unremarkable portable chest radiograph. XR CHEST PORT   Final Result      No acute abnormality. Unchanged prominent bilateral pulmonary arteries. 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 Mariana Springer at [facility]. In March 2018 was found with new SAMANTHA       nodules with spiculation. 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        He was started on ETB/azithro/rif.        Chronic hypoxemic respiratory failure ([facility] (9/9/2019)      CKD (chronic kidney disease), stage III ([facility] (9/9/2019)      Chronic systolic congestive heart failure ([facility] (7/2/2021)        I have reviewed, updated, and verified this note's content and spent 35 minutes of my 40 minutes visit performing counseling and coordination of care regarding medical management.        Signed By: Bubba Sanchez MD     July 4, 2021

## 2021-07-04 NOTE — PROGRESS NOTES
Bedside and Verbal shift change report given to [facility]. Report included the following information SBAR, Kardex, MAR, Accordion and Recent Results.

## 2021-07-04 NOTE — ROUTINE PROCESS
Bedside and Verbal shift change report given to [address] [place] [address] Rd (oncoming nurse) by self (offgoing nurse). Report included the following information SBAR, Kardex, MAR and Recent Results.

## 2021-07-04 NOTE — PROGRESS NOTES
Physical Therapy Note:    Physical therapy evaluation orders received and chart reviewed. Attempted to see patient this PM to initiate assessment. Patient kindly requesting to rest, defer PT evaluation/mobility until tomorrow morning, as he is fatigued this afternoon and hopes he will feel more up to it tomorrow. Will follow and re-attempt at a later time/date as schedule permits/patient available.     Thank you,  Evelina Ybarra, PT, DPT

## 2021-07-04 NOTE — PROGRESS NOTES
[facility] CARDIOLOGY PROGRESS NOTE           7/4/2021   Admit Date: 7/1/2021      Subjective:   Patient denies any chest pain. Dyspnea persistent. BP controlled. Remains in atrial flutter with controlled rate. ROS:  Cardiovascular:  As noted above    Objective:      Vitals:    07/03/21 2004 07/03/21 2040 07/04/21 0001 07/04/21 0350   BP:  102/81 103/63 (!) 109/56   Pulse:  93 78 76   Resp:  19 18 18   Temp:  97.8 °F (36.6 °C) 97.9 °F (36.6 °C) 97.2 °F (36.2 °C)   SpO2: 90% 100% 99% 99%   Weight:    165 lb 8 oz (75.1 kg)   Height:           Physical Exam:  General-No Acute Distress  Neck- supple, no JVD  CV- IRIR  Lung- decreased breath sounds bilaterally. Abd- soft, nontender, nondistended  Ext- trivial edema bilaterally. Skin- warm and dry      Data Review:   Recent Labs     07/04/21  0422 07/03/21  0456 07/01/21  1502 07/01/21  1245    141   < > 130*   K 5.3* 5.4*   < > >10.0*   MG  --   --   --  2.1   BUN 69* 64*   < > 31*   CREA 1.54* 1.86*   < > 1.65*   * 120*   < > 98   WBC 8.7 15.2*   < > 10.0   HGB 9.7* 10.7*   < > 11.8*   HCT 31.9* 35.5*   < > 38.3*    259   < > 345    < > = values in this interval not displayed. No results found for: TROIQ, ZINA, TROPT, TNIPOC     Interpretation Summary    · LV: Estimated LVEF is 30 - 35%. Normal cavity size, wall thickness and systolic function (ejection fraction normal). Mild (grade 1) left ventricular diastolic dysfunction. · Image quality for this study was technically difficult. · Contrast used: DEFINITY. · RV: Not well visualized. · LA: Mildly dilated left atrium. Assessment/Plan:     Principal Problem:    COPD exacerbation ([address] (5/17/2021)    Continue Prednisone and inhalers. .      Active Problems:    Hypertension (6/1/2017)    BP controlled. Atrial flutter ([address] (7/23/2018)  Rate controlled on metoprolol succinate.    Patient is not anticoagulated due to severe COPD, worsening unexplained anemia and frailty. He is felt not to be a watchman candidate. 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        Chronic hypoxemic respiratory failure ([facility] (9/9/2019)    Continue treatment for COPD. CKD (chronic kidney disease), stage III ([facility] (9/9/2019)    Worse. Hold Lisinopril in the face of hyperkalemia and acute on chronic kidney injury. Chronic CHF  Appears compensated. Holding Lisinopril. Continue Toprol and Lasix.            Luisa Dowling MD  7/4/2021

## 2021-07-05 PROBLEM — G93.41 ACUTE METABOLIC ENCEPHALOPATHY: Status: ACTIVE | Noted: 2021-01-01

## 2021-07-05 NOTE — PROGRESS NOTES
[facility] CARDIOLOGY PROGRESS NOTE           7/5/2021   Admit Date: 7/1/2021      Subjective:   Patient denies any chest pain. Dyspnea persistent. BP controlled. Remains in atrial flutter with controlled rate. ROS:  Cardiovascular:  As noted above    Objective:      Vitals:    07/05/21 0041 07/05/21 0448 07/05/21 0716 07/05/21 0828   BP: (!) 128/50 (!) 119/58  130/69   Pulse: 65 74  80   Resp: 20 18  18   Temp: 97.7 °F (36.5 °C) 98.1 °F (36.7 °C)  98.4 °F (36.9 °C)   SpO2: 99% 100% 99% 100%   Weight:  159 lb 4.8 oz (72.3 kg)     Height:           Physical Exam:  General-No Acute Distress  Neck- supple, no JVD  CV- IRIR  Lung- decreased breath sounds bilaterally. Abd- soft, nontender, nondistended  Ext- trivial edema bilaterally. Skin- warm and dry      Data Review:   Recent Labs     07/05/21  0407 07/04/21  0422    141   K 5.2* 5.3*   BUN 68* 69*   CREA 1.75* 1.54*   * 114*   WBC 7.1 8.7   HGB 9.8* 9.7*   HCT 32.9* 31.9*    220      No results found for: TROIQ, ZINA, TROPT, TNIPOC     Interpretation Summary    · LV: Estimated LVEF is 30 - 35%. Normal cavity size, wall thickness and systolic function (ejection fraction normal). Mild (grade 1) left ventricular diastolic dysfunction. · Image quality for this study was technically difficult. · Contrast used: DEFINITY. · RV: Not well visualized. · LA: Mildly dilated left atrium. Assessment/Plan:     Principal Problem:    COPD exacerbation ([address] (5/17/2021)    Continue Prednisone and inhalers. .      Active Problems:    Hypertension (6/1/2017)    BP controlled. Atrial flutter ([address] (7/23/2018)  Rate controlled on metoprolol succinate. Patient is not anticoagulated due to severe COPD, worsening unexplained anemia and frailty. He is felt not to be a watchman candidate.       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[address] (7/25/2018)    Defer management to primary team.        Chronic hypoxemic respiratory failure ([facility] (9/9/2019)    Continue treatment for COPD. CKD (chronic kidney disease), stage III ([facility] (9/9/2019)    Worse. Hold Lisinopril in the face of hyperkalemia and acute on chronic kidney injury. Chronic CHF  Appears compensated. Holding Lisinopril. Continue Toprol and Lasix.      Will be on standby      Hilario Noel MD  7/5/2021

## 2021-07-05 NOTE — PROGRESS NOTES
Pt is discharging home in stable condition. He stated he lives in an apartment alone on 9th floor; elevator to enter. Has a [facility] aid 5 days/week for 4 hours/day. Aid provides transportation for appointments and assists with ADLS. Pt has oxygen provided from [employer] and nebulizer at home. Uses a RW for ambulation. PT/OT consulted. PT seen by [place], [place] sent MICHA for RN, PT & OT services. Pt will contact his aid for transportation home and portable O2 tank. No other d/c needs identified. Tx goals met. Care Management Interventions  PCP Verified by CM: Yes Kevin Knox)  Mode of Transport at Discharge:  Other (see comment) ([facility] aid)  Transition of Care Consult (CM Consult): Home Health, Discharge Planning ([address] with [facility])  [address]: Yes  Discharge Durable Medical Equipment: No  Physical Therapy Consult: Yes  Occupational Therapy Consult: Yes  Speech Therapy Consult: No  Current Support Network: Own Home, Lives Alone  Confirm Follow Up Transport: Self ([facility] aid)  The Plan for Transition of Care is Related to the Following Treatment Goals : Return home and back to his baseline  The Patient and/or Patient Representative was Provided with a Choice of Provider and Agrees with the Discharge Plan?: Yes  Name of the Patient Representative Who was Provided with a Choice of Provider and Agrees with the Discharge Plan: Patient  Freedom of Choice List was Provided with Basic Dialogue that Supports the Patient's Individualized Plan of Care/Goals, Treatment Preferences and Shares the Quality Data Associated with the Providers?: Yes  [place] Resource Information Provided?: No  Discharge Location  Discharge Placement: Home with home health

## 2021-07-05 NOTE — ROUTINE PROCESS
Discharge instructions reviewed with patient and caregiver, Celena Escobar. Prescriptions given for prednisone and med info sheets provided for all new medications. Opportunity for questions provided. Patient voiced understanding of all discharge instructions. IV And telemetry d/c.

## 2021-07-05 NOTE — PROGRESS NOTES
Problem: Falls - Risk of  Goal: *Absence of Falls  Description: Document Nohelia Pillow Fall Risk and appropriate interventions in the flowsheet.   7/5/2021 0843 by Angie Santillan  Outcome: Resolved/Met  7/5/2021 0842 by Angie Santillan  Outcome: Resolved/Not Met  Note: Fall Risk Interventions:  Mobility Interventions: Bed/chair exit alarm, Patient to call before getting OOB    Mentation Interventions: Bed/chair exit alarm, Adequate sleep, hydration, pain control, Room close to nurse's station    Medication Interventions: Bed/chair exit alarm, Patient to call before getting OOB, Teach patient to arise slowly    Elimination Interventions: Call light in reach, Patient to call for help with toileting needs              Problem: Patient Education: Go to Patient Education Activity  Goal: Patient/Family Education  7/5/2021 0843 by Angie Santillan  Outcome: Resolved/Met  7/5/2021 0842 by Angie Santillan  Outcome: Resolved/Not Met     Problem: Heart Failure: Day 1  Goal: Activity/Safety  7/5/2021 0843 by Angie Santillan  Outcome: Resolved/Met  7/5/2021 0842 by Angie Santillan  Outcome: Resolved/Not Met  Goal: Consults, if ordered  7/5/2021 0843 by Angie Santillan  Outcome: Resolved/Met  7/5/2021 0842 by Angie Santillan  Outcome: Resolved/Not Met  Goal: Diagnostic Test/Procedures  7/5/2021 0843 by Angie Santillan  Outcome: Resolved/Met  7/5/2021 0842 by Angie Santillan  Outcome: Resolved/Not Met  Goal: Nutrition/Diet  7/5/2021 0843 by Angie Santillan  Outcome: Resolved/Met  7/5/2021 0842 by Angie Santillan  Outcome: Resolved/Not Met  Goal: Respiratory  7/5/2021 0843 by Angie Santillan  Outcome: Resolved/Met  7/5/2021 0842 by Angie Santillan  Outcome: Resolved/Not Met  Goal: Treatments/Interventions/Procedures  7/5/2021 0843 by Angie Santillan  Outcome: Resolved/Met  7/5/2021 0842 by Angie Santillan  Outcome: Resolved/Not Met  Goal: Psychosocial  7/5/2021 0843 by Angie Santillan  Outcome: Resolved/Met  7/5/2021 0842 by Dragan Yeung  Outcome: Resolved/Not Met     Problem: Patient Education: Go to Patient Education Activity  Goal: Patient/Family Education  7/5/2021 0843 by Dragan Yeung  Outcome: Resolved/Met  7/5/2021 0842 by Dragan Yeung  Outcome: Resolved/Not Met

## 2021-07-05 NOTE — DISCHARGE SUMMARY
Hospitalist Discharge Summary     Admit Date:  2021 12:27 PM   DC note date: 2021  Name:  Richard Carter. Age:  68 y.o.  :  1940   MRN:  [id number]   PCP:  Eva Varner MD  Treatment Team: Attending Provider: Lisa Tijerina MD; Consulting Provider: Venancio Mejia MD; Consulting Provider: Jett Tom MD; Utilization Review: Monika Fuller; Care Manager: Jax Gonzalez RN; Primary Nurse: Venancio Chappell; Physical Therapist: Mason Ortega PT  Presenting Complaint: Shortness of Breath    Initial Admission Diagnosis: Atrial fibrillation with RVR ([facility] [I48.91]  COPD exacerbation ([facility] [J44.1]     Problem List for this Hospitalization:  Hospital Problems as of 2021 Date Reviewed: 2021        Codes Class Noted - Resolved POA    Acute metabolic encephalopathy PCE-95-IT: G93.41  ICD-9-CM: 348.31  2021 - Present Yes        Chronic systolic congestive heart failure ([facility] ICD-10-CM: I50.22  ICD-9-CM: 428.22, 428.0  2021 - Present Yes        * (Principal) COPD exacerbation ([facility] ICD-10-CM: J44.1  ICD-9-CM: 491.21  2021 - Present Yes        Chronic hypoxemic respiratory failure ([facility] (Chronic) ICD-10-CM: J96.11  ICD-9-CM: 518.83, 799.02  2019 - Present Yes        CKD (chronic kidney disease), stage III ([facility] (Chronic) ICD-10-CM: N18.30  ICD-9-CM: 585.3  2019 - Present Yes        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 Kaylee Benavides at [facility]. In 2018 was found with new SAMANTHA nodules with spiculation.   CT-guided biopsy of SAMANTHA nodule revealed Buzzy Bennie was started on ETB/azithro/rif.               Atrial flutter with rapid ventricular response (HCC) ICD-10-CM: I48.92  ICD-9-CM: 427.32  2018 - Present Yes        Hypertension ICD-10-CM: I10  ICD-9-CM: 401.9  2017 - Present Yes AAA (abdominal aortic aneurysm) without rupture (HCC) (Chronic) ICD-10-CM: I71.4  ICD-9-CM: 441.4  5/2/2014 - Present Yes    Overview Signed 5/2/2014 10:53 AM by Estela Kehr, NP     3.2 on US 2/14 [facility]             COPD (chronic obstructive pulmonary disease) ([address] (Chronic) ICD-10-CM: J44.9  ICD-9-CM: 496  5/2/2014 - Present Yes        Hyperlipemia (Chronic) ICD-10-CM: E78.5  ICD-9-CM: 272.4  5/2/2014 - Present Yes        Glaucoma (Chronic) ICD-10-CM: H40.9  ICD-9-CM: 365.9  5/2/2014 - Present Yes                Admission HPI from 7/1/2021:    \" Patient is a 68 y.o. male who presented to the ED for cc increased SOB since last night. Hx of COPD on 2L NC continuously, paroxysmal a fib not on anticoagulation follows [facility] cardiology, CKD stage 3, dCHF EF 35%, SAMANTHA spiculated mass that is stable per CT at [facility], MAC unable to complete treatment due to qT prolongations, AAA s/p repair. Recent dc on 6/14 for similar concerns. Patient is very poor historian.      Vitals - 87% on 2L NC,  but now 94     Labs- Na 130     EKG with atrial flutter.  \"    Hospital Course:  Admitted with COPD exacerbation and Flutter RVR. Started on steroids, cardizem gtt. Metoprolol dose increased and currently rate controlled, off cardizem. COPD exac improved, on home 2L NC, stable on prednisone. Concerns about medication compliance at home per notes. He is stable enough for discharge at this point. Disposition: Home Health Care Svc  Activity: PT/OT per Home Health  Diet: ADULT DIET Regular; Low Fat/Low Chol/High Fiber/ABDIAZIZ; Low Sodium (2 gm); Low Potassium (Less than 3000 mg/day); 2000 ml  Code Status: DNR    Follow Up Orders: Follow-up Appointments   Procedures    FOLLOW UP VISIT Appointment in: Two Weeks 2 weeks with cardiology     2 weeks with cardiology     Standing Status:   Standing     Number of Occurrences:   1     Order Specific Question:   Appointment in     Answer:    Two Weeks       Follow-up Information Follow up With Specialties Details Why Contact Info    Nuria Mattson MD Family Medicine, Family Medicine Call As needed [phone] [facility]      [facility] In 2 weeks  [address] [facility] [address] [facility]  [phone]          Time spent in patient discharge and coordination 34 minutes. Plan was discussed with pt. All questions answered. Patient was stable at time of discharge. Given instructions to call a physician or return if any concerns. Discharge summary and encounter summary was sent to PCP electronically via \"Comm Mgt\" link in [facility], if possible. Discharge Info:   Current Discharge Medication List      CONTINUE these medications which have CHANGED    Details   metoprolol succinate (TOPROL-XL) 100 mg tablet Take 1 Tablet by mouth two (2) times a day. Indications: ventricular rate control in atrial fibrillation  Qty: 60 Tablet, Refills: 1  Start date: 7/5/2021      predniSONE (DELTASONE) 20 mg tablet Take 40mg qday x3d, then 20mg qday x3d, then 10mg x2d, then stop. Take with dinner  Qty: 10 Tablet, Refills: 0  Start date: 7/5/2021         CONTINUE these medications which have NOT CHANGED    Details   guaiFENesin ER (MUCINEX) 600 mg ER tablet Take 1 Tablet by mouth two (2) times a day. Qty: 14 Tablet, Refills: 0    Associated Diagnoses: COPD exacerbation (HCC)      furosemide (Lasix) 40 mg tablet Take 1 Tablet by mouth daily. Qty: 90 Tablet, Refills: 3    Associated Diagnoses: Congestive heart failure, unspecified HF chronicity, unspecified heart failure type (HCC)      allopurinoL (ZYLOPRIM) 300 mg tablet Take 300 mg by mouth daily. docusate sodium (COLACE) 100 mg capsule Take 1 Capsule by mouth daily.   Qty: 90 Capsule, Refills: 2    Associated Diagnoses: Chronic constipation      albuterol-ipratropium (DUO-NEB) 2.5 mg-0.5 mg/3 ml nebu 3 mL by Nebulization route three (3) times daily for 180 days. Qty: 810 mL, Refills: 1    Associated Diagnoses: Chronic obstructive pulmonary disease, unspecified COPD type (HCC)      rosuvastatin (Crestor) 40 mg tablet Take 1 Tab by mouth nightly. Qty: 90 Tab, Refills: 1    Associated Diagnoses: Heart failure with reduced ejection fraction ([facility])      nitroglycerin (NITROSTAT) 0.4 mg SL tablet 1 Tab by SubLINGual route every five (5) minutes as needed for Chest Pain (call EMS if pain fails to resolve after 2 tabs. max daily dose of 3 tabs). Indications: after 15 minutes or 3 doses, if chest pain persists, contact EMS/911  Qty: 1 Bottle, Refills: 11    Associated Diagnoses: Heart failure with reduced ejection fraction (HCC)      tamsulosin (FLOMAX) 0.4 mg capsule Take 1 Cap by mouth daily. Qty: 90 Cap, Refills: 3    Associated Diagnoses: Prostate cancer ([facility]      aspirin delayed-release 81 mg tablet Take 81 mg by mouth daily. albuterol (PROVENTIL HFA, VENTOLIN HFA, PROAIR HFA) 90 mcg/actuation inhaler TAKE 2 PUFFS BY MOUTH EVERY 4 HOURS AS NEEDED FOR WHEEZE      latanoprost (XALATAN) 0.005 % ophthalmic solution LOCATION  BOTH EYES. INSTILL ONE DROP INTO EACH EYE AT BEDTIME      famotidine (PEPCID) 40 mg tablet Take 1 Tab by mouth daily. Qty: 90 Tab, Refills: 3      albuterol (PROVENTIL VENTOLIN) 2.5 mg /3 mL (0.083 %) nebu Take 2.5 mg by inhalation every six (6) hours as needed for Shortness of Breath or Wheezing. budesonide (PULMICORT) 0.5 mg/2 mL nbsp 2 mL by Nebulization route two (2) times a day. Qty: 1 Each, Refills: 0      OXYGEN-AIR DELIVERY SYSTEMS Take 2 L/min by inhalation daily. 2 L/min continuously inhalation via nasal canula      brimonidine (ALPHAGAN P) 0.1 % ophthalmic solution Administer 1 Drop to both eyes two (2) times a day.          STOP taking these medications       doxycycline (VIBRAMYCIN) 100 mg capsule Comments:   Reason for Stopping:         amLODIPine (Norvasc) 2.5 mg tablet Comments:   Reason for Stopping: dilTIAZem IR (CARDIZEM) 120 mg tablet Comments:   Reason for Stopping:         lisinopril (PRINIVIL, ZESTRIL) 10 mg tablet Comments:   Reason for Stopping:               Procedures done this admission:  * No surgery found *    Consults this admission:  IP CONSULT TO CARDIOLOGY    Echocardiogram/EKG results:  Results for orders placed or performed during the hospital encounter of 07/01/21   EKG, 12 LEAD, INITIAL   Result Value Ref Range    Ventricular Rate 82 BPM    Atrial Rate 340 BPM    QRS Duration 64 ms    Q-T Interval 344 ms    QTC Calculation (Bezet) 401 ms    Calculated P Axis -96 degrees    Calculated R Axis 68 degrees    Calculated T Axis 89 degrees    Diagnosis       Atrial flutter with variable A-V block  Septal infarct (cited on or before 09-JUN-2021)  Abnormal ECG  When compared with ECG of 09-JUN-2021 07:10,  Atrial flutter has replaced Sinus rhythm  Confirmed by Bhargavi Geiger ([id number]) on 7/1/2021 4:07:57 PM     Results for orders placed or performed in visit on 06/22/17   AMB POC EKG ROUTINE W/ 12 LEADS, INTER & REP    Impression    Sinus  Tachycardia   -Right atrial enlargement.    -  Nonspecific T-abnormality. Diagnostic Imaging/Tests:   XR CHEST SNGL V    Result Date: 7/3/2021  Portable chest: History: PCXR:  SOB Comparison: 07/01/2021 Findings: Portable AP views were obtained at 9:20 and 9:21 AM. The patient is rotated towards the right. The cardiac silhouette normal in size and configuration. The lungs and pleural spaces are clear. The pulmonary vascularity is within normal limits. Unremarkable portable chest radiograph. XR CHEST SNGL V    Result Date: 6/9/2021  EXAM: CHEST SINGLE VW INDICATION: Chest pain COMPARISON: Chest radiographs, 5/16/2021 FINDINGS: The cardiomediastinal silhouette remains unchanged with prominence of the pulmonary arteries and atherosclerotic calcifications in the aorta. No pleural effusion or pneumothorax. No focal parenchymal process.  No acute osseous abnormality. No acute cardiopulmonary abnormality. CT HEAD WO CONT    Result Date: 7/3/2021  NONCONTRAST HEAD CT CLINICAL HISTORY:  Decreased level of consciousness. TECHNIQUE:  Axial images were obtained with spiral technique. Radiation dose reduction was achieved using one or all of the following techniques: automated exposure control, weight-based dosing, iterative reconstruction. COMPARISON:  None. REPORT:   Standard noncontrast head CT demonstrates no definite intracranial mass effect, hemorrhage, or evidence of acute geographic infarction. Extensive white matter hypodensities are most consistent with small vessel ischemic disease. The ventricles are normal in size and configuration, accounting for the patient's age. Orbits  and paranasal sinuses are clear where imaged. Bone windows demonstrate no definite fracture or destruction. EXTENSIVE SMALL VESSEL ISCHEMIC DISEASE WITH NO ACUTE INTRACRANIAL ABNORMALITY IDENTIFIED AT NONCONTRAST CT.     XR CHEST PORT    Result Date: 7/1/2021  EXAMINATION: CHEST RADIOGRAPH 7/1/2021 12:56 PM INDICATION: Shortness of breath with exertion COMPARISON: Chest radiograph 6/9/2021 TECHNIQUE: A single view of the chest was obtained. FINDINGS: Support Devices: None Osseous : No acute abnormality. Cardiomediastinal Silhouette: Unchanged with prominent pulmonary arteries. Lungs: Clear lungs. Normal pulmonary vascularity. Pleura: No pleural fluid or pneumothorax. Upper Abdomen: No abnormality. No acute abnormality. Unchanged prominent bilateral pulmonary arteries. ECHO ADULT COMPLETE    Result Date: 6/11/2021  · LV: Estimated LVEF is 30 - 35%. Normal cavity size, wall thickness and systolic function (ejection fraction normal). Mild (grade 1) left ventricular diastolic dysfunction. · Image quality for this study was technically difficult. · Contrast used: DEFINITY. · RV: Not well visualized. · LA: Mildly dilated left atrium.       CT CHEST PULMONARY EMBOLISM    Result Date: 6/9/2021  EXAM: CT Chest with contrast. INDICATION: Concern for PE. History of PE and atrial fibrillation. Not on anticoagulation. Comparison: None. Multiple axial images were obtained through the chest during intravenous infusion of 100mL of Isovue 370. Coronal 2D MIP image reformats were performed. Radiation dose reduction techniques were used for this study: All CT scans performed at this facility use one or all of the following: Automated exposure control, adjustment of the mA and/or kVp according to patient's size, iterative reconstruction. FINDINGS: Examination is diagnostic through the segmental level, except in the right lower lobe where motion artifact limits evaluation to the lobar level. No evidence of acute or chronic pulmonary embolism. The main pulmonary artery and thoracic aorta are normal in caliber. Atherosclerotic calcifications throughout the aorta and coronary arteries. Partially visualized and vascular stent in the infrarenal abdominal aorta which appears aneurysmal. LUNGS/PLEURA: Central airways are patent. Moderate upper lobe predominant centrilobular emphysema. There is a mildly spiculated left apical solid pulmonary nodule measuring 9 mm (axial image 24). No consolidation to suggest pneumonia. No pleural effusion or pneumothorax. MEDIASTINUM: Thyroid is mildly enlarged with streak artifacts tearing evaluation. The thoracic esophagus is within normal limits. No mediastinal or hilar lymphadenopathy. Heart is normal in size without pericardial effusion. BONES/SUBCUTANEOUS TISSUE: No aggressive osseous lesion. No subcutaneous soft tissue abnormality. UPPER ABDOMEN: Multiple simple cysts are again seen throughout the liver with a few additional subcentimeter hypodensities are too small to characterize but stable. There are stable simple cyst on both kidneys. Unchanged subcentimeter nodule in the right adrenal gland, likely representing an adenoma given stability.      1. No evidence of pulmonary embolism or other acute process in the chest. 2. Mildly spiculated 9 mm left apical pulmonary nodule worrisome for primary lung malignancy. Consider further evaluation with PET/CT. 3. Moderate centrilobular emphysema. All Micro Results     None          SARS-CoV-2 Lab Results  \"Novel Coronavirus\" Test: No results found for: COV2NT   \"Emergent Disease\" Test: No results found for: EDPR  \"SARS-COV-2\" Test: No results found for: XGCOVT  Rapid Test: No results found for: COVR         Labs: Results:       BMP, Mg, Phos Recent Labs     07/05/21 0407 07/04/21 0422 07/03/21  0456    141 141   K 5.2* 5.3* 5.4*   * 108* 106   CO2 30 30 32   AGAP 4* 3* 3*   BUN 68* 69* 64*   CREA 1.75* 1.54* 1.86*   CA 8.5 8.4 8.9   * 114* 120*      CBC Recent Labs     07/05/21 0407 07/04/21 0422 07/03/21  0456   WBC 7.1 8.7 15.2*   RBC 3.67* 3.56* 3.96*   HGB 9.8* 9.7* 10.7*   HCT 32.9* 31.9* 35.5*    220 259   GRANS 79* 85* 88*   LYMPH 14 10* 8*   EOS 0* 0* 0*   MONOS 6 5 3*   BASOS 0 0 0   IG 1 1 1   ANEU 5.6 7.4 13.4*   ABL 1.0 0.9 1.1   JEANNE 0.0 0.0 0.0   ABM 0.4 0.4 0.5   ABB 0.0 0.0 0.0   AIG 0.1 0.1 0.1      LFT No results for input(s): ALT, TBIL, AP, TP, ALB, GLOB, AGRAT in the last 72 hours.     No lab exists for component: SGOT, GPT   Cardiac Testing Lab Results   Component Value Date/Time     (H) 09/15/2019 03:14 AM    BNP 1,516 09/13/2018 08:18 PM    BNP 1,067 08/30/2018 07:08 AM    BNP 7 12/21/2014 08:45 PM    Troponin-I, Qt. <0.02 (L) 04/09/2020 10:46 AM    Troponin-I, Qt. 0.09 (H) 09/10/2019 11:09 AM    Troponin-I, Qt. 0.06 (H) 09/10/2019 02:14 AM      Coagulation Tests Lab Results   Component Value Date/Time    Prothrombin time 12.6 12/21/2020 07:37 AM    Prothrombin time 16.1 (H) 12/18/2019 05:19 AM    Prothrombin time 18.1 (H) 12/17/2019 11:26 AM    INR 0.9 12/21/2020 07:37 AM    INR 1.3 12/18/2019 05:19 AM    INR 1.5 12/17/2019 11:26 AM    aPTT 73.1 (H) 08/31/2018 01:40 AM    aPTT 80.6 (H) 08/30/2018 06:34 PM    aPTT 91.1 (HH) 08/30/2018 04:29 PM      A1c No results found for: HBA1C, AOX0XSFS, RII2URTH   Lipid Panel Lab Results   Component Value Date/Time    Cholesterol, total 186 09/24/2020 10:07 AM    HDL Cholesterol 50 09/24/2020 10:07 AM    LDL, calculated 97 09/24/2020 10:07 AM    LDL, calculated 98 08/13/2019 08:57 AM    VLDL, calculated 39 09/24/2020 10:07 AM    VLDL, calculated 21 08/13/2019 08:57 AM    Triglyceride 230 (H) 09/24/2020 10:07 AM      Thyroid Panel Lab Results   Component Value Date/Time    TSH 0.541 07/01/2021 03:02 PM    TSH 0.992 03/27/2019 11:03 AM    T4, Free 1.3 07/01/2021 03:02 PM        Most Recent UA Lab Results   Component Value Date/Time    Color YELLOW 07/04/2021 02:15 AM    Appearance CLEAR 07/04/2021 02:15 AM    Specific gravity 1.016 09/17/2019 01:40 PM    pH (UA) 5.0 07/04/2021 02:15 AM    Protein Negative 07/04/2021 02:15 AM    Glucose Negative 07/04/2021 02:15 AM    Ketone Negative 07/04/2021 02:15 AM    Bilirubin Negative 07/04/2021 02:15 AM    Blood Negative 07/04/2021 02:15 AM    Urobilinogen 0.2 07/04/2021 02:15 AM    Nitrites Negative 07/04/2021 02:15 AM    Leukocyte Esterase Negative 07/04/2021 02:15 AM    WBC 0-3 09/10/2019 03:00 AM    RBC 0 09/10/2019 03:00 AM    Epithelial cells 0-3 09/17/2019 01:40 PM    Bacteria 0 09/17/2019 01:40 PM    Casts 3-5 09/17/2019 01:40 PM    Other observations RESULTS VERIFIED MANUALLY 09/17/2019 01:40 PM          All Labs from Last 24 Hrs:  Recent Results (from the past 24 hour(s))   CBC WITH AUTOMATED DIFF    Collection Time: 07/05/21  4:07 AM   Result Value Ref Range    WBC 7.1 4.3 - 11.1 K/uL    RBC 3.67 (L) 4.23 - 5.6 M/uL    HGB 9.8 (L) 13.6 - 17.2 g/dL    HCT 32.9 (L) 41.1 - 50.3 %    MCV 89.6 79.6 - 97.8 FL    MCH 26.7 26.1 - 32.9 PG    MCHC 29.8 (L) 31.4 - 35.0 g/dL    RDW 16.6 (H) 11.9 - 14.6 %    PLATELET 530 032 - 723 K/uL    MPV 10.7 9.4 - 12.3 FL    ABSOLUTE NRBC 0.00 0.0 - 0.2 K/uL    DF AUTOMATED      NEUTROPHILS 79 (H) 43 - 78 %    LYMPHOCYTES 14 13 - 44 %    MONOCYTES 6 4.0 - 12.0 %    EOSINOPHILS 0 (L) 0.5 - 7.8 %    BASOPHILS 0 0.0 - 2.0 %    IMMATURE GRANULOCYTES 1 0.0 - 5.0 %    ABS. NEUTROPHILS 5.6 1.7 - 8.2 K/UL    ABS. LYMPHOCYTES 1.0 0.5 - 4.6 K/UL    ABS. MONOCYTES 0.4 0.1 - 1.3 K/UL    ABS. EOSINOPHILS 0.0 0.0 - 0.8 K/UL    ABS. BASOPHILS 0.0 0.0 - 0.2 K/UL    ABS. IMM. GRANS. 0.1 0.0 - 0.5 K/UL   METABOLIC PANEL, BASIC    Collection Time: 07/05/21  4:07 AM   Result Value Ref Range    Sodium 142 138 - 145 mmol/L    Potassium 5.2 (H) 3.5 - 5.1 mmol/L    Chloride 108 (H) 98 - 107 mmol/L    CO2 30 21 - 32 mmol/L    Anion gap 4 (L) 7 - 16 mmol/L    Glucose 152 (H) 65 - 100 mg/dL    BUN 68 (H) 8 - 23 MG/DL    Creatinine 1.75 (H) 0.8 - 1.5 MG/DL    GFR est AA 49 (L) >60 ml/min/1.73m2    GFR est non-AA 40 (L) >60 ml/min/1.73m2    Calcium 8.5 8.3 - 10.4 MG/DL       Discharge Exam:  Patient Vitals for the past 24 hrs:   Temp Pulse Resp BP SpO2   07/05/21 0716     99 %   07/05/21 0448 98.1 °F (36.7 °C) 74 18 (!) 119/58 100 %   07/05/21 0041 97.7 °F (36.5 °C) 65 20 (!) 128/50 99 %   07/04/21 2045 97.2 °F (36.2 °C) 72 18 117/60 100 %   07/04/21 2035     99 %   07/04/21 1724     100 %   07/04/21 1717     98 %   07/04/21 1606 97.8 °F (36.6 °C) 75 18 134/71 99 %   07/04/21 1144 97.5 °F (36.4 °C) 77 22 119/60 99 %     Oxygen Therapy  O2 Sat (%): 99 % (07/05/21 0716)  Pulse via Oximetry: 100 beats per minute (07/05/21 0716)  O2 Device: Nasal cannula (07/05/21 0716)  Skin Assessment: Clean, dry, & intact (07/03/21 2012)  O2 Flow Rate (L/min): 2 l/min (07/05/21 0716)  FIO2 (%): 29 % (07/05/21 0716)    Estimated body mass index is 23.52 kg/m² as calculated from the following:    Height as of this encounter: 5' 9\" (1.753 m). Weight as of this encounter: 72.3 kg (159 lb 4.8 oz).     Intake/Output Summary (Last 24 hours) at 7/5/2021 4741  Last data filed at 7/5/2021 0408  Gross per 24 hour   Intake 480 ml   Output 800 ml   Net -320 ml       *Note that automatically entered I/Os may not be accurate; dependent on patient compliance with collection and accurate  by assistants. General:    Well nourished. No overt distress  Eyes:   Normal sclerae. Extraocular movements intact. ENT:  Normocephalic, atraumatic. Moist mucous membranes  CV:   Regular rate and rhythm. No m/r/g. No edema. Lungs:  Even,  Unlabored  Abdomen:  nondistended. Extremities: no cyanosis or clubbing  Neurologic: CN II-XII grossly intact. No gross focal deficits. Alert. Skin:     No rashes. No jaundice. Psych:  Normal mood and affect.     Current Med List in Hospital:   Current Facility-Administered Medications   Medication Dose Route Frequency    sodium polystyrene (KAYEXALATE) 15 gram/60 mL oral suspension 15 g  15 g Oral NOW    albuterol (PROVENTIL VENTOLIN) nebulizer solution 2.5 mg  2.5 mg Nebulization BID RT    metoprolol succinate (TOPROL-XL) tablet 100 mg  100 mg Oral BID    nitroglycerin (NITROSTAT) tablet 0.4 mg  0.4 mg SubLINGual PRN    sodium chloride (NS) flush 5-10 mL  5-10 mL IntraVENous Q8H    sodium chloride (NS) flush 5-10 mL  5-10 mL IntraVENous PRN    albuterol (PROVENTIL VENTOLIN) nebulizer solution 2.5 mg  2.5 mg Nebulization Q6H PRN    aspirin delayed-release tablet 81 mg  81 mg Oral DAILY    brimonidine (ALPHAGAN) 0.2 % ophthalmic solution 1 Drop  1 Drop Both Eyes BID    docusate sodium (COLACE) capsule 100 mg  100 mg Oral DAILY    famotidine (PEPCID) tablet 40 mg  40 mg Oral DAILY    furosemide (LASIX) tablet 40 mg  40 mg Oral DAILY    guaiFENesin ER (MUCINEX) tablet 600 mg  600 mg Oral BID    latanoprost (XALATAN) 0.005 % ophthalmic solution 1 Drop  1 Drop Both Eyes QPM    tamsulosin (FLOMAX) capsule 0.4 mg  0.4 mg Oral DAILY    predniSONE (DELTASONE) tablet 40 mg  40 mg Oral DAILY WITH DINNER    acetaminophen (TYLENOL) tablet 650 mg  650 mg Oral Q6H PRN    Or    acetaminophen (TYLENOL) suppository 650 mg  650 mg Rectal Q6H PRN    polyethylene glycol (MIRALAX) packet 17 g  17 g Oral DAILY PRN    allopurinoL (ZYLOPRIM) tablet 50 mg  50 mg Oral DAILY    budesonide (PULMICORT) 500 mcg/2 ml nebulizer suspension  500 mcg Nebulization BID RT       Allergies   Allergen Reactions    Statins-Hmg-Coa Reductase Inhibitors Myalgia     Muscle pain     Immunization History   Administered Date(s) Administered    Influenza Vaccine 10/27/2015    Influenza Vaccine (Quad) PF (>6 Mo Flulaval, Fluarix, and >3 Yrs Afluria, Fluzone 38981) 10/31/2012, 10/06/2014, 10/20/2016, 09/15/2018, 09/18/2019    Influenza Vaccine PF 10/26/2017    Influenza, Quadrivalent, Adjuvanted (>65 Yrs FLUAD QUAD 78020) 10/05/2020    Pneumococcal Conjugate (PCV-13) 06/01/2016, 10/26/2017    Pneumococcal Polysaccharide (PPSV-23) 06/06/2011, 11/25/2012, 05/12/2015, 05/13/2015, 12/03/2015    TB Skin Test (PPD) Intradermal 09/14/2018, 09/09/2019, 09/15/2019    Tdap 12/03/2015       Signed:  Della Montenegro MD

## 2021-07-05 NOTE — ROUTINE PROCESS
Bedside and Verbal shift change report given to Garry Teague (oncoming nurse) by self (offgoing nurse). Report included the following information SBAR, Kardex, MAR and Recent Results.

## 2021-07-06 NOTE — PROGRESS NOTES
Chart opened in response to call from Calvin Davison with [facility] [employer] re: oxygen order from 6/11/21 admission. All questions answered.

## 2021-07-07 PROBLEM — E87.5 HYPERKALEMIA: Status: ACTIVE | Noted: 2021-01-01

## 2021-07-07 NOTE — Clinical Note
Sheath #5: Sheath: inserted. Sheath inserted/placed in the right femoral  vein. Upon evaluation of the common femoral artery stick using fluoroscopy, the access site puncture was within the safe zone.

## 2021-07-07 NOTE — ROUTINE PROCESS
TRANSFER - IN REPORT:    Verbal report received from [facility] [place] [facility] [employer]. being received from PACU for routine progression of care      Report consisted of patients Situation, Background, Assessment and Recommendations(SBAR). Information from the following report(s) SBAR, Kardex, Procedure Summary, Intake/Output and Recent Results was reviewed with the receiving nurse. Opportunity for questions and clarification was provided. Assessment completed upon patients arrival to unit and care assumed.

## 2021-07-07 NOTE — ED TRIAGE NOTES
Patient present via [facility] with complaints of dizziness. Patient was found to have HR 160s by EMS. Given 20 Cardizem by EMS and to 82 after Cardizem. Patient denies chest pain/shortness of breath.

## 2021-07-07 NOTE — Clinical Note
Sheath #4: Sheath: removed. Upon evaluation of the common femoral artery stick using fluoroscopy, the access site puncture was within the safe zone.

## 2021-07-07 NOTE — ROUTINE PROCESS
Bedside and Verbal shift change report given to self (oncoming nurse) by Gin Ludwig (offgoing nurse). Report included the following information SBAR, Kardex, MAR, Recent Results and Med Rec Status. Cardizem gtt infusing at bedside.

## 2021-07-07 NOTE — Clinical Note
Bilateral groin and left chest clipped prepped with ChloraPrep and draped. Wet prep solution applied at: 957. Wet prep solution dried at: 1003. Wet prep elapsed drying time: 6 mins.

## 2021-07-07 NOTE — ED NOTES
TRANSFER - OUT REPORT:    Verbal report given to 3rd floor RN  (name) on Jose Girard.  being transferred to 3rd floor (unit) for routine progression of care       Report consisted of patients Situation, Background, Assessment and   Recommendations(SBAR). Information from the following report(s) SBAR, ED Summary and MAR was reviewed with the receiving nurse. Lines:   Peripheral IV 07/07/21 Right Antecubital (Active)   Site Assessment Clean, dry, & intact 07/07/21 1128        Opportunity for questions and clarification was provided.       Patient transported with:   Registered Nurse

## 2021-07-07 NOTE — Clinical Note
Sheath #4: Dressed using transparent dressing. Site: clean, dry, & intact, no bleeding and no hematoma.

## 2021-07-07 NOTE — H&P
[facility] Initial Cardiac Evaluation                 Date of  Admission: 7/7/2021 11:26 AM     Primary Care Physician: Dr. Abelardo Flores  Primary Cardiologist: Dr. Nelida Arambula Cardiologist: Dr. Sharry Mohs       HPI:  Osei Karimi. is a 68 y.o. male with h/o COPD on 3L O2 by NC, h/o MAC infection, R lung mass, pAF (no OAC due to anemia and recurrent GI bleed ), h/o GI bleed Oct and Dec 2020 due to AVMs, chronic AYAN on IV iron, prostate cancer and sHF w echo 6/11/21 w EF 30-35%, AAA with repair, and C in 2006 w mild CAD and NICM. Patient was d/c from [facility] 2 days ago with COPD exacerbation and A. Flutter. He was d/c home on Toprol 100 bid for rate control strategy. Patient presented to ED at [facility] with palpitations. He reports didn't feel good at d/c several days ago. Been on with dizziness and palpitations. When EMS arrived at home HR 160s. He was given IVP cardizem with improvement in his sx. In ED, labs showed K+ 5.4, Cr 1.63. No chest xray done but denies any SOB. He reports constipation and decreased UOP. Currently in A. Flutter rates 130s. Nurse starting cardizem gtt. He reports being compliant with his meds.          Patient Active Problem List   Diagnosis Code    AAA (abdominal aortic aneurysm) without rupture ([facility]) I71.4    COPD (chronic obstructive pulmonary disease) ([facility]) J44.9    Hyperlipemia E78.5    Glaucoma H40.9    Supplemental oxygen dependent Z99.81    OA (osteoarthritis) M19.90    Renal cysts, acquired, bilateral N28.1    Tobacco use disorder F17.200    Hypertension I10    Cardiomyopathy ([facility] I42.9    Peptic ulcer disease K27.9    History of pulmonary embolism Z86.711    Atrial flutter with rapid ventricular response ([facility]) I48.92    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, 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    Acute on chronic respiratory failure with hypoxia ([facility]) J96.21    Multiple pulmonary nodules R91.8    Absolute anemia D64.9    GI bleed K92.2    Chronic hypoxemic respiratory failure ([address] J96.11    Heart failure with reduced ejection fraction (HCC) I50.20    CKD (chronic kidney disease), stage III (HCC) N18.30    AAA (abdominal aortic aneurysm) (HCC) I71.4    Chronic combined systolic and diastolic heart failure (HCC) I50.42    History of GI bleed Z87.19    Iron deficiency anemia D50.9    Prostate cancer (HCC) C61    COPD exacerbation (HCC) J44.1    Short-term memory loss R41.3    Moderate protein-calorie malnutrition (HCC) E44.0    Vitamin D deficiency E55.9    Nontoxic multinodular goiter E04.2    Diverticulosis K57.90    Acute on chronic respiratory failure with hypoxia and hypercapnia (HCC) J96.21, J96.22    Chronic systolic congestive heart failure (HCC) I50.22    Acute metabolic encephalopathy X71.11    Hyperkalemia E87.5       Past Medical History:   Diagnosis Date    A-fib ([facility] [address] 5/2/2014    AAA (abdominal aortic aneurysm) without rupture ([facility] [address] 5/2/2014    3.2 on  2/14 [facility]    Acute metabolic encephalopathy 2/3/3799    Arthritis     Cancer (HCC)     hx prostate cancer    COPD (chronic obstructive pulmonary disease) (HCC) 5/2/2014    Elevated prostate specific antigen (PSA)     Glaucoma 5/2/2014    Heart disease     Heart failure ([address]     Hyperlipemia 5/2/2014    Hypertension     Hypertrophy of prostate with urinary obstruction and other lower urinary tract symptoms (LUTS)     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HCC) 7/25/2018    OA (osteoarthritis) 5/2/2014    Peptic ulcer disease 6/1/2017    Poor historian     Prostate cancer ([address]     Pulmonary embolus ([address] 6/1/2017    Supplemental oxygen dependent 5/2/2014    Warfarin anticoagulation 5/2/2014      Past Surgical History:   Procedure Laterality Date    COLONOSCOPY N/A 9/12/2019    COLONOSCOPY/ 26 RO[address] 614 performed by Rogers Rodriguez MD at [facility] ENDOSCOPY    EGD  12/18/2019         HX HEART CATHETERIZATION      VASCULAR SURGERY PROCEDURE UNLIST  09/14/2019     Bilateral common femoral artery cutdown with exposure and placement of catheter in aorta for aortogram,Endovascular repair of infrarenal abdominal aortic aneurysm with bifurcated modulated device (31 x 14 x 13 main body) via the left common femoral, right iliac extender measuring 27 x 10. Allergies   Allergen Reactions    Statins-Hmg-Coa Reductase Inhibitors Myalgia     Muscle pain      Family History   Problem Relation Age of Onset    Cancer Mother     Heart Disease Father         No current facility-administered medications for this encounter. Current Outpatient Medications   Medication Sig    metoprolol succinate (TOPROL-XL) 100 mg tablet Take 1 Tablet by mouth two (2) times a day. Indications: ventricular rate control in atrial fibrillation    predniSONE (DELTASONE) 20 mg tablet Take 40mg qday x3d, then 20mg qday x3d, then 10mg x2d, then stop. Take with dinner    guaiFENesin ER (MUCINEX) 600 mg ER tablet Take 1 Tablet by mouth two (2) times a day.  furosemide (Lasix) 40 mg tablet Take 1 Tablet by mouth daily.  allopurinoL (ZYLOPRIM) 300 mg tablet Take 300 mg by mouth daily.  docusate sodium (COLACE) 100 mg capsule Take 1 Capsule by mouth daily.  albuterol-ipratropium (DUO-NEB) 2.5 mg-0.5 mg/3 ml nebu 3 mL by Nebulization route three (3) times daily for 180 days.  rosuvastatin (Crestor) 40 mg tablet Take 1 Tab by mouth nightly.  nitroglycerin (NITROSTAT) 0.4 mg SL tablet 1 Tab by SubLINGual route every five (5) minutes as needed for Chest Pain (call EMS if pain fails to resolve after 2 tabs. max daily dose of 3 tabs). Indications: after 15 minutes or 3 doses, if chest pain persists, contact EMS/911    tamsulosin (FLOMAX) 0.4 mg capsule Take 1 Cap by mouth daily.  aspirin delayed-release 81 mg tablet Take 81 mg by mouth daily.     albuterol (PROVENTIL HFA, VENTOLIN HFA, PROAIR HFA) 90 mcg/actuation inhaler TAKE 2 PUFFS BY MOUTH EVERY 4 HOURS AS NEEDED FOR WHEEZE    latanoprost (XALATAN) 0.005 % ophthalmic solution LOCATION  BOTH EYES. INSTILL ONE DROP INTO EACH EYE AT BEDTIME    famotidine (PEPCID) 40 mg tablet Take 1 Tab by mouth daily.  albuterol (PROVENTIL VENTOLIN) 2.5 mg /3 mL (0.083 %) nebu Take 2.5 mg by inhalation every six (6) hours as needed for Shortness of Breath or Wheezing.  budesonide (PULMICORT) 0.5 mg/2 mL nbsp 2 mL by Nebulization route two (2) times a day.  OXYGEN-AIR DELIVERY SYSTEMS Take 2 L/min by inhalation daily. 2 L/min continuously inhalation via nasal canula    brimonidine (ALPHAGAN P) 0.1 % ophthalmic solution Administer 1 Drop to both eyes two (2) times a day. Review of Systems   Constitutional: Negative for diaphoresis and malaise/fatigue. HENT: Negative for congestion. Cardiovascular: Positive for irregular heartbeat and palpitations. Negative for chest pain, claudication, cyanosis, dyspnea on exertion, leg swelling, near-syncope, orthopnea, paroxysmal nocturnal dyspnea and syncope. Respiratory: Negative for cough, shortness of breath and wheezing. Endocrine: Negative for cold intolerance and heat intolerance. Hematologic/Lymphatic: Does not bruise/bleed easily. Skin: Negative for nail changes. Gastrointestinal: Positive for constipation. Genitourinary:        + decreased UOP   Neurological: Positive for dizziness. Negative for headaches and weakness.         Physical Exam  Vitals:    07/07/21 1137 07/07/21 1302 07/07/21 1324 07/07/21 1328   BP:  109/71  (!) 103/59   Pulse:  (!) 139 (!) 127 82   Resp:       Temp:       SpO2:  (!) 83% 100% 100%   Weight: 72.6 kg (160 lb)      Height: 5' 9\" (1.753 m)          Physical Exam:  General: Well Developed, Well Nourished, No Acute Distress  HEENT: pupils equal and round, no abnormalities noted  Neck: supple, no JVD, no carotid bruits  Heart: S1S2 irregular irregular and tachycardic   Lungs: Clear throughout auscultation bilaterally without adventitious sounds  Abd: soft, nontender, nondistended, with good bowel sounds  Ext: warm, no edema, calves supple/nontender, pulses 2+ bilaterally  Skin: warm and dry  Psychiatric: Normal mood and affect  Neurologic: Alert and oriented X 3    Cardiographics    Telemetry: A.flutter rates 130s. ECG: A.flutter with variable rates   Echocardiogram: June 2021 showed LV: Estimated LVEF is 30 - 35%. Normal cavity size, wall thickness and systolic function (ejection fraction normal). Mild (grade 1) left ventricular diastolic dysfunction. Image quality for this study was technically difficult. RV: Not well visualized. LA: Mildly dilated left atrium. Labs:   Recent Labs     07/07/21  1207 07/05/21  0407    142   K 5.4* 5.2*   MG 2.2  --    BUN 55* 68*   CREA 1.63* 1.75*   GLU 93 152*   WBC 10.5 7.1   HGB 11.2* 9.8*   HCT 37.0* 32.9*    194        Assessment/Plan:     Assessment:      Principal Problem:    Atrial flutter with rapid ventricular response ([address] (7/23/2018)- looks typical flutter; Will admit to tele. Continue home dose toprol. Not a AC candidate with prior anemia and recurrent GIB. Will add low dose cardizem po (short term only d/t known NICM) and wean off  cardizem gtt in ED. EP to see in am for possible pace and ablate. Active Problems:    COPD (chronic obstructive pulmonary disease) ([address] (5/2/2014)- will continue prednisone taper from d/c several days ago       Heart failure with reduced ejection fraction ([address] (9/9/2019)- looks euvolemic; continue home dose Toprol and lasix po. Labs in am.       CKD (chronic kidney disease), stage III ([address] (9/9/2019)- at baseline; will follow       Hyperkalemia (7/7/2021)- mild; recheck labs in am.       We appreciate the opportunity to participate in this patient's care.      Jay Arce NP  Supervising MD: Jay Jay Kelly

## 2021-07-07 NOTE — ROUTINE PROCESS
TRANSFER - IN REPORT:    [facility] [employer] being received from ER for routine progression of care      Report consisted of patients Situation, Background, Assessment and Recommendations(SBAR). Information from the following report(s) SBAR, Kardex, ED Summary, MAR, Recent Results, Med Rec Status and Cardiac Rhythm afib was reviewed with the receiving nurse. Opportunity for questions and clarification was provided. Assessment completed upon patients arrival to unit and care assumed.

## 2021-07-08 NOTE — ROUTINE PROCESS
Bedside and Verbal shift change report given to self (oncoming nurse) by Renetta Milligan RN (offgoing nurse). Report included the following information SBAR, Kardex, MAR, Recent Results and Cardiac Rhythm aflutter. Cardizem gtt infusing at 5ml/hr.

## 2021-07-08 NOTE — PROGRESS NOTES
Care Management Interventions  PCP Verified by CM: Yes Lisa Walton)  Last Visit to PCP: 06/07/21  Transition of Care Consult (CM Consult): Discharge Planning  Discharge Durable Medical Equipment: No  Physical Therapy Consult: Yes  Occupational Therapy Consult: Yes  Speech Therapy Consult: No  Discharge Location  Discharge Placement: Home with home health    CM made attempts to speak with pt for CMI but pt is bathing. Pt admitted with atrial flutter with RVR. Pt most recently admitted to [facility] 7/1-7/5. Pt is active with [facility]. Home oxygen supplied by [employer]. CLTC 5 days/wk/4 hrs. CM will follow up with pt to discuss any changes in status and possible DC needs.

## 2021-07-08 NOTE — ROUTINE PROCESS
Bedside and Verbal shift change report given to [id number] Conrado (oncoming nurse) by self (offgoing nurse). Report included the following information SBAR, Kardex, MAR, Recent Results and Med Rec Status. Cardizem gtt infusing at bedside.

## 2021-07-08 NOTE — CONSULTS
[facility] CARDIOLOGY  [address] [facility], [address], [address]  [place]of[place]lin, [address] [facility]  PHONE: [phone]        21    NAME:  Peter Kim. : 1940  MRN: [id number]     Referring Cardiologist: Nicho Potts MD    Reason for Consultation: Atrial flutter     ASSESSMENT and PLAN:  Diagnoses and all orders for this visit:    1. Atrial flutter, paroxysmal ([address]    2. Atrial flutter with rapid ventricular response (HCC)    3. Stage 3b chronic kidney disease ([address]    4. Chronic obstructive pulmonary disease, unspecified COPD type ([address] on home 3L nasal cannula    5. Heart failure with reduced ejection fraction ([address]    6. Right lung mass, pseudotumor??    7. GIB from AVMs. 8. Mild nonobstructive CAD. 9. Poor candidate for Watchman (high anesthesia risk for     68year old male with recurrent AFL and decompensated medical problems including COPD, anemia and chronic MAC infection. We are asked to consider further mgmt strategies for recurrent AFL. His AFL appears typical. He is also noncompliant with medicines. He was in NSR in the last month. I would have some concern regarding a device implant in this gentleman with chronic infections among other issues (noncompliance) that increase risk of device infection. Given he is in AFL, it's not unreasonable to consider a flutter ablation as well. Will discuss options with the patient.    -Consider options for AF/AFL. -Dilated CM - consider ICD if pace/ablate strategy is pursued.   -Close pulmonary follow up.   -Continue AV node blockers for now. -ID consult for history of MAC infection. Patient has been instructed and agrees to call our office with any issues or other concerns related to their cardiac condition(s) and/or complaint(s). Thank you for allowing me to participate in the electrophysiologic care of Mr. Peter Kim. . Please contact me if any questions or concerns were to arise. Washington Zee Knight MD, MS  Clinical Cardiac Electrophysiology  [facility]  07/08/21  6:38 AM    ===================================================================  Chief Complant:    Chief Complaint   Patient presents with    Dizziness        Consultation is requested by No ref. provider found for evaluation of Dizziness      History:  Robbie Escalante is a most pleasant 68 y.o. male with a past medical and cardiac history significant for COPD on 3L O2 by NC, h/o MAC infection, R lung mass, pAF (no OAC due to anemia and recurrent GI bleed ), h/o GI bleed Oct and Dec 2020 due to AVMs, chronic AYAN on IV iron, prostate cancer and sHF w echo 6/11/21 w EF 30-35%, AAA with repair, and [facility] in 2006 w mild CAD and NICM. Patient was d/c from [facility] 2 days ago with COPD exacerbation and A. Flutter. He was d/c home on Toprol 100 bid for rate control strategy.      Patient presented to ED at [facility] with palpitations. He reports didn't feel good at d/c several days ago. Been on with dizziness and palpitations. When EMS arrived at home HR 160s. He was given IVP cardizem with improvement in his sx. In ED, labs showed K+ 5.4, Cr 1.63. No chest xray done but denies any SOB. He reports constipation and decreased UOP. Currently in A. Flutter rates 130s. Nurse starting cardizem gtt. He reports being compliant with his meds. for     Cardiac PMH: (Old records have been reviewed and summarized below)    EKG:  (EKG has been independently visualized by me with interpretation below): AFL with variable AV block. ECHO: 7/8/2021  · LV: Estimated LVEF is 30 - 35%. Normal cavity size, wall thickness and systolic function (ejection fraction normal). Mild (grade 1) left ventricular diastolic dysfunction. · Image quality for this study was technically difficult. · Contrast used: DEFINITY. · RV: Not well visualized. · LA: Mildly dilated left atrium. Previous Heart Catheterization: 2006, stable.      Stress Test: n/a     DEVICE INTERROGATION: n/a     Past Medical History, Past Surgical History, Family history, Social History, and Medications were all reviewed with the patient today and updated as necessary.      Current Facility-Administered Medications   Medication Dose Route Frequency Provider Last Rate Last Admin    albuterol (PROVENTIL VENTOLIN) nebulizer solution 2.5 mg  2.5 mg Inhalation Q6H PRN Christopher Del Rosario NP        albuterol-ipratropium (DUO-NEB) 2.5 MG-0.5 MG/3 ML  3 mL Nebulization TID [facility] Clark D, NP   3 mL at 07/07/21 1945    allopurinoL (ZYLOPRIM) tablet 300 mg  300 mg Oral DAILY Christopher Del Rosario NP        aspirin delayed-release tablet 81 mg  81 mg Oral DAILY Christopher Del Rosario NP        brimonidine (ALPHAGAN P) 0.1 % ophthalmic solution 1 Drop (Patient Supplied)  1 Drop Both Eyes BID Oma FERRELL NP        budesonide (PULMICORT) 500 mcg/2 ml nebulizer suspension  500 mcg Nebulization BID [facility] Clark D, NP   500 mcg at 07/07/21 1945    docusate sodium (COLACE) capsule 100 mg  100 mg Oral DAILY Christopher Del Rosario NP        famotidine (PEPCID) tablet 40 mg  40 mg Oral DAILY Christopher Del Rosario NP        furosemide (LASIX) tablet 40 mg  40 mg Oral DAILY Christopher Del Rosario NP        guaiFENesin [facility]) tablet 600 mg  600 mg Oral BID Oma Anon D, NP   600 mg at 07/07/21 1726    latanoprost (XALATAN) 0.005 % ophthalmic solution 1 Drop  1 Drop Both Eyes QPM Oma Clark FERRELL NP   1 Drop at 07/07/21 1728    metoprolol succinate (TOPROL-XL) tablet 100 mg  100 mg Oral BID Oma Anon D, NP   100 mg at 07/07/21 1726    nitroglycerin (NITROSTAT) tablet 0.4 mg  0.4 mg SubLINGual Q5MIN PRN Christopher Del Rosario NP        rosuvastatin (CRESTOR) tablet 40 mg  40 mg Oral QHS Oma Anojavon D, NP   40 mg at 07/07/21 2152    tamsulosin (FLOMAX) capsule 0.4 mg  0.4 mg Oral DAILY Christopher Del Rosario NP        sodium chloride (NS) flush 5-40 mL  5-40 mL IntraVENous Q8H Oma FERRELL NP   10 mL at 07/08/21 0554    sodium chloride (NS) flush 5-40 mL  5-40 mL IntraVENous PRN Fran Danish, NP        acetaminophen (TYLENOL) tablet 650 mg  650 mg Oral Q4H PRN Fran Danish, NP        dilTIAZem IR (CARDIZEM) tablet 30 mg  30 mg Oral Q6H aMribel Genaro D, NP   30 mg at 07/08/21 0604    predniSONE (DELTASONE) tablet 40 mg  40 mg Oral DAILY WITH Renny Adame, NP   40 mg at 07/07/21 1726    Followed by   Gabriella Lopez ON 7/9/2021] predniSONE (DELTASONE) tablet 20 mg  20 mg Oral DAILY WITH Lianna FERRELL, NP        Followed by   Gabriella Lopez ON 7/12/2021] predniSONE (DELTASONE) tablet 10 mg  10 mg Oral DAILY WITH Candie Adame NP        dilTIAZem (CARDIZEM) 100 mg in 0.9% sodium chloride (MBP/ADV) 100 mL infusion  0-15 mg/hr IntraVENous TITRATE Carmita FERRELL NP 5 mL/hr at 07/08/21 8688 5 mg/hr at 07/08/21 9966     Allergies   Allergen Reactions    Statins-Hmg-Coa Reductase Inhibitors Myalgia     Muscle pain     Patient Active Problem List    Diagnosis    Hyperkalemia    Acute metabolic encephalopathy    Chronic systolic congestive heart failure (HCC)    Acute on chronic respiratory failure with hypoxia and hypercapnia (HCC)    Diverticulosis     Formatting of this note might be different from the original.  Noted on colonoscopy 2013      Moderate protein-calorie malnutrition (HCC)    Short-term memory loss    COPD exacerbation (HCC)    Prostate cancer ([address]     Last Assessment & Plan:   Follows with Dr Maggie Garsia Chronic combined systolic and diastolic heart failure ([address]    History of GI bleed    Iron deficiency anemia    AAA (abdominal aortic aneurysm) (HCC)    GI bleed    Chronic hypoxemic respiratory failure (HCC)    Heart failure with reduced ejection fraction (HCC)    CKD (chronic kidney disease), stage III (HCC)    Absolute anemia    Multiple pulmonary nodules    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 Jose Bruno at [facility]. In March 2018 was found with new SAMANTHA nodules with spiculation.   CT-guided biopsy of SAMANTHA nodule revealed Mouna Slaughter was started on ETB/azithro/rif.        Acute on chronic respiratory failure with hypoxia (HCC)    Atrial flutter with rapid ventricular response (HCC)    History of pulmonary embolism    Tobacco use disorder    Hypertension    Cardiomyopathy ([address]    Peptic ulcer disease    Nontoxic multinodular goiter     Last Assessment & Plan:   Formatting of this note might be different from the original.  Thyroid ultrasound was done today. This study was difficult due to his chronic shortness of breath while lying supine. He was somewhat of a moving target. 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 The right lobe measures 1.90 x 2.38 x 3.55 cm. The isthmus is thickened and irregular. The left lobe measures 2.46 x 1.56 x 4.59 cm. There are numerous scattered cystic or partially cystic nodules throughout both lobes the thyroid gland. Essentially there is no normal thyroid tissue. The largest nodule that I couldn't visualize is in the right lobe measuring 0.84 x 1.29 x 1.29 cm. It is partially cystic and surrounded by a halo sign. There is a similar-sized purely cystic nodule in the left lobe also. He appears to have long-standing multinodular/multicystic disease. I don't recommend anything further be done for this other than observation. I don't feel that he needs to have biopsy of any these lesions as they appear to be low risk for malignancy. Because of his rather significant COPD he is at high risk for any surgery such as thyroidectomy and I would only recommend moving to this as a last resort and only if absolutely necessary. I will see him again in 6 months, and then annually thereafter at least for a while to watch his thyroid gland.       Vitamin D deficiency     Last Assessment & Plan:   Formatting of this note might be different from the original.  Vitamin D at goal no changes      Renal cysts, acquired, bilateral    AAA (abdominal aortic aneurysm) without rupture ([address] 3.2 on US  [facility]      COPD (chronic obstructive pulmonary disease) ([address]    Hyperlipemia    Glaucoma    Supplemental oxygen dependent    OA (osteoarthritis)       Past Medical History:   Diagnosis Date    A-fib ([address] 2014    AAA (abdominal aortic aneurysm) without rupture ([address] 2014    3.2 on US  [facility]    Acute metabolic encephalopathy 8/3/9252    Arthritis     Cancer (HCC)     hx prostate cancer    COPD (chronic obstructive pulmonary disease) ([address] 2014    Elevated prostate specific antigen (PSA)     Glaucoma 2014    Heart disease     Heart failure ([address]     Hyperlipemia 2014    Hypertension     Hypertrophy of prostate with urinary obstruction and other lower urinary tract symptoms (LUTS)     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    OA (osteoarthritis) 2014    Peptic ulcer disease 2017    Poor historian     Prostate cancer ([address]     Pulmonary embolus ([address] 2017    Supplemental oxygen dependent 2014    Warfarin anticoagulation 2014     Past Surgical History:   Procedure Laterality Date    COLONOSCOPY N/A 2019    COLONOSCOPY/ 26 ROOM 614 performed by [facility]inda [facility] at [facility] ENDOSCOPY    EGD  2019         HX HEART CATHETERIZATION      VASCULAR SURGERY PROCEDURE UNLIST  2019     Bilateral common femoral artery cutdown with exposure and placement of catheter in aorta for aortogram,Endovascular repair of infrarenal abdominal aortic aneurysm with bifurcated modulated device (31 x 14 x 13 main body) via the left common femoral, right iliac extender measuring 27 x 10.      Family History   Problem Relation Age of Onset    Cancer Mother     Heart Disease Father      Social History     Tobacco Use    Smoking status: Former Smoker     Packs/day: 2.00     Years: 40.00     Pack years: 80.00     Quit date: 2014     Years since quittin.9    Smokeless tobacco: Never Used    Tobacco comment: still taking Chantix    Substance Use Topics    Alcohol use: No       ROS:  A comprehensive review of systems was performed with the pertinent positives and negatives as noted in the HPI in addition to:    Review of Systems   Constitutional: Negative. HENT: Negative. Eyes: Negative. Respiratory: Positive for shortness of breath. Cardiovascular: Positive for palpitations. Gastrointestinal: Negative. Genitourinary: Negative. Musculoskeletal: Negative. Skin: Negative. Neurological: Negative. Endo/Heme/Allergies: Negative. Psychiatric/Behavioral: Negative. PHYSICAL EXAM:     Visit Vitals  BP (!) 112/58 (BP 1 Location: Left upper arm, BP Patient Position: At rest;Lying)   Pulse 96   Temp 97.7 °F (36.5 °C)   Resp 22   Ht 5' 9\" (1.753 m)   Wt 160 lb 4.8 oz (72.7 kg)   SpO2 100%   BMI 23.67 kg/m²        Wt Readings from Last 3 Encounters:   07/08/21 160 lb 4.8 oz (72.7 kg)   07/05/21 159 lb 4.8 oz (72.3 kg)   06/24/21 162 lb (73.5 kg)     BP Readings from Last 3 Encounters:   07/08/21 (!) 112/58   07/06/21 116/66   07/05/21 130/69       Gen: Well appearing, well developed, no acute distress  Eyes: Pupils equal, round. Extraocular movements are intact  ENT: Oropharynx clear, no oral lesions, normal dentition  CV: S1S2, IRRR, no murmurs, rubs or gallops, normal JVD, no carotid bruits, normal distal pulses, no JOVITA  Pulm: Diminished breath sounds bilaterally. GI: Soft, NT, ND, +BS  Neuro: Alert and oriented, nonfocal  Psych: Appropriate affect  Skin: Normal color and skin turgor  MSK: Normal muscle bulk and tone    Medical problems and test results were reviewed with the patient today.      Results for orders placed or performed during the hospital encounter of 03/76/78   METABOLIC PANEL, BASIC   Result Value Ref Range    Sodium 143 136 - 145 mmol/L    Potassium 5.4 (H) 3.5 - 5.1 mmol/L    Chloride 107 98 - 107 mmol/L    CO2 32 21 - 32 mmol/L    Anion gap 4 (L) 7 - 16 mmol/L    Glucose 93 65 - 100 mg/dL    BUN 55 (H) 8 - 23 MG/DL    Creatinine 1.63 (H) 0.8 - 1.5 MG/DL    GFR est AA 53 (L) >60 ml/min/1.73m2    GFR est non-AA 44 (L) >60 ml/min/1.73m2    Calcium 8.5 8.3 - 10.4 MG/DL   CBC WITH AUTOMATED DIFF   Result Value Ref Range    WBC 10.5 4.3 - 11.1 K/uL    RBC 4.06 (L) 4.23 - 5.6 M/uL    HGB 11.2 (L) 13.6 - 17.2 g/dL    HCT 37.0 (L) 41.1 - 50.3 %    MCV 91.1 79.6 - 97.8 FL    MCH 27.6 26.1 - 32.9 PG    MCHC 30.3 (L) 31.4 - 35.0 g/dL    RDW 17.2 (H) 11.9 - 14.6 %    PLATELET 584 633 - 880 K/uL    MPV 11.7 9.4 - 12.3 FL    ABSOLUTE NRBC 0.12 0.0 - 0.2 K/uL    DF AUTOMATED      NEUTROPHILS 64 43 - 78 %    LYMPHOCYTES 18 13 - 44 %    MONOCYTES 14 (H) 4.0 - 12.0 %    EOSINOPHILS 0 (L) 0.5 - 7.8 %    BASOPHILS 0 0.0 - 2.0 %    IMMATURE GRANULOCYTES 3 0.0 - 5.0 %    ABS. NEUTROPHILS 6.7 1.7 - 8.2 K/UL    ABS. LYMPHOCYTES 1.9 0.5 - 4.6 K/UL    ABS. MONOCYTES 1.5 (H) 0.1 - 1.3 K/UL    ABS. EOSINOPHILS 0.0 0.0 - 0.8 K/UL    ABS. BASOPHILS 0.0 0.0 - 0.2 K/UL    ABS. IMM. GRANS.  0.4 0.0 - 0.5 K/UL   MAGNESIUM   Result Value Ref Range    Magnesium 2.2 1.8 - 2.4 mg/dL   POTASSIUM   Result Value Ref Range    Potassium 3.6 3.5 - 5.1 mmol/L   METABOLIC PANEL, BASIC   Result Value Ref Range    Sodium 143 136 - 145 mmol/L    Potassium 5.3 (H) 3.5 - 5.1 mmol/L    Chloride 108 (H) 98 - 107 mmol/L    CO2 35 (H) 21 - 32 mmol/L    Anion gap 0 (L) 7 - 16 mmol/L    Glucose 120 (H) 65 - 100 mg/dL    BUN 54 (H) 8 - 23 MG/DL    Creatinine 1.60 (H) 0.8 - 1.5 MG/DL    GFR est AA 54 (L) >60 ml/min/1.73m2    GFR est non-AA 45 (L) >60 ml/min/1.73m2    Calcium 8.5 8.3 - 10.4 MG/DL   CBC W/O DIFF   Result Value Ref Range    WBC 9.9 4.3 - 11.1 K/uL    RBC 3.69 (L) 4.23 - 5.6 M/uL    HGB 10.0 (L) 13.6 - 17.2 g/dL    HCT 33.2 (L) 41.1 - 50.3 %    MCV 90.0 79.6 - 97.8 FL    MCH 27.1 26.1 - 32.9 PG    MCHC 30.1 (L) 31.4 - 35.0 g/dL    RDW 16.9 (H) 11.9 - 14.6 %    PLATELET 811 672 - 608 K/uL    MPV 11.3 9.4 - 12.3 FL    ABSOLUTE NRBC 0.11 0.0 - 0.2 K/uL   EKG Narrative    Kamala De Dios DO     7/7/2021  1:03 PM  EKG    Date/Time: 7/7/2021 11:49 AM  Performed by: Kamala De Dios DO  Authorized by: Kamala De Dios DO     ECG reviewed by ED Physician in the absence of a cardiologist: yes    Comments:      Patient EKG shows atrial flutter with a ventricular rate of 84 bpm.   Critical Care    Narrative    Kamala De Dios DO     7/7/2021  1:03 PM  Critical Care  Performed by: Kamala De Dios DO  Authorized by: Kamala De Dios DO     Comments:      Critical care time: 79 minutes of critical care time was performed in   the emergency department. This was separate from any other procedures   listed during the patients emergency department course. The failure to   initiate these interventions on an urgent basis would likely have resulted   in sudden, clinically significant or life-threatening deterioration in the   patients condition.      EKG, 12 LEAD, INITIAL   Result Value Ref Range    Ventricular Rate 84 BPM    Atrial Rate 326 BPM    QRS Duration 96 ms    Q-T Interval 344 ms    QTC Calculation (Bezet) 406 ms    Calculated P Axis -91 degrees    Calculated R Axis 77 degrees    Calculated T Axis 75 degrees    Diagnosis       Atrial flutter with variable A-V block with premature ventricular or   aberrantly conducted complexes  Nonspecific ST abnormality  Abnormal ECG  When compared with ECG of 01-JUL-2021 12:41,  Nonspecific T wave abnormality now evident in Inferior leads  Confirmed by Dea Forbes ([id number]) on 7/7/2021 3:47:16 PM         Lab Results   Component Value Date/Time    Potassium 5.3 (H) 07/08/2021 03:24 AM     Lab Results   Component Value Date/Time    Creatinine 1.60 (H) 07/08/2021 03:24 AM     Lab Results   Component Value Date/Time    HGB 10.0 (L) 07/08/2021 03:24 AM     Lab Results   Component Value Date/Time    INR 0.9 12/21/2020 07:37 AM    INR 1.3 12/18/2019 05:19 AM    INR 1.5 12/17/2019 11:26 AM    Prothrombin time 12.6 12/21/2020 07:37 AM    Prothrombin time 16.1 (H) 12/18/2019 05:19 AM    Prothrombin time 18.1 (H) 12/17/2019 11:26 AM

## 2021-07-08 NOTE — CONSULTS
Infectious Disease Consult    Today's Date: 7/8/2021   Admit Date: 7/7/2021    Impression:   · Hx chronic MAC infection, managed by [facility]: s/p 6 months of ethambutol, azithromycin, and rifampin x2: stopped December 2008 and then again December 2018 secondary to prolonged QTC. Stable serial imaging, and symptoms since that time  · Left upper lobe pulmonary nodule, present since at least since 2018, followed with serial imaging. Felt to be pseudotumor   · Paroxysmal atrial flutter/dilated CM, needing intervention-ICD/ablation  · CKD3b  · COPD, on chronic O2  · Hx AAA repair    Plan:   · No contraindication to proceed with implantable devices to manage arrhythmias   · ID will sign off, please call with questions or concerns      Anti-infectives:   · none    Subjective:   Date of Consultation:  July 8, 2021  Referring Physician: Dr Kyle King NP    Patient is a 68 y.o. male admitted with heart palpitations, and noted to have recurrent atrial flutter with RVR. No fever, or leukocytosis. ID is consulted to assist with infection rule out, given potential plans for ICD placement. Pt has a hx significant for pulmonary MAC, for which he follow with [facility] pulmonary team. He was treated for this for 6 months with trippled drug therapy, with treatment stopped sec to QTc prolongation in 2008; after this he was again noted to have a nodule in the SAMANTHA-CT guided biopsy revealed MAC again. He began treatment with azithromycin, ethambutol and rifampin on June 1, 2018. In December 2018 his azithromycin, rifampin, and ethambutol for pulmonary MAC were stopped secondary to prolonged QTC. [facility] chart reviewed and notes that on serial chest imaging and symptomatic assessment, he has remained stable off treatment with no change in SOB and sputum production. Most recently it appears he was admitted to [facility], and had negative BC with the exception of one cx noting CONS.  Admitted 6/2021 for COPD exacerbation, and given PO Doxycycline. No acute change to his breathing to report, cough is minimal. Denies fever, chills, sweats, nausea or diarrhea. Reports constipation.      Patient Active Problem List   Diagnosis Code    AAA (abdominal aortic aneurysm) without rupture ([facility]) I71.4    COPD (chronic obstructive pulmonary disease) (HCC) J44.9    Hyperlipemia E78.5    Glaucoma H40.9    Supplemental oxygen dependent Z99.81    OA (osteoarthritis) M19.90    Renal cysts, acquired, bilateral N28.1    Tobacco use disorder F17.200    Hypertension I10    Cardiomyopathy ([facility] I42.9    Peptic ulcer disease K27.9    History of pulmonary embolism Z86.711    Atrial flutter with rapid ventricular response ([facility]) I48.92    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, 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    Acute on chronic respiratory failure with hypoxia (HCC) J96.21    Multiple pulmonary nodules R91.8    Absolute anemia D64.9    GI bleed K92.2    Chronic hypoxemic respiratory failure (HCC) J96.11    Heart failure with reduced ejection fraction (HCC) I50.20    CKD (chronic kidney disease), stage III (HCC) N18.30    AAA (abdominal aortic aneurysm) (HCC) I71.4    Chronic combined systolic and diastolic heart failure (HCC) I50.42    History of GI bleed Z87.19    Iron deficiency anemia D50.9    Prostate cancer (HCC) C61    COPD exacerbation (HCC) J44.1    Short-term memory loss R41.3    Moderate protein-calorie malnutrition (HCC) E44.0    Vitamin D deficiency E55.9    Nontoxic multinodular goiter E04.2    Diverticulosis K57.90    Acute on chronic respiratory failure with hypoxia and hypercapnia (HCC) J96.21, J96.22    Chronic systolic congestive heart failure (HCC) I50.22    Acute metabolic encephalopathy A61.16    Hyperkalemia E87.5     Past Medical History:   Diagnosis Date    A-fib ([facility] 5/2/2014    AAA (abdominal aortic aneurysm) without rupture ([facility] 5/2/2014    3.2 on US 2/14 [facility]    Acute metabolic encephalopathy 3/9/4865    Arthritis     Cancer ([facility] hx prostate cancer    COPD (chronic obstructive pulmonary disease) ([facility] 2014    Elevated prostate specific antigen (PSA)     Glaucoma 2014    Heart disease     Heart failure ([facility]     Hyperlipemia 2014    Hypertension     Hypertrophy of prostate with urinary obstruction and other lower urinary tract symptoms (LUTS)     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[facility] 2018    OA (osteoarthritis) 2014    Peptic ulcer disease 2017    Poor historian     Prostate cancer ([facility]     Pulmonary embolus ([facility] 2017    Supplemental oxygen dependent 2014    Warfarin anticoagulation 2014      Family History   Problem Relation Age of Onset    Cancer Mother     Heart Disease Father       Social History     Tobacco Use    Smoking status: Former Smoker     Packs/day: 2.00     Years: 40.00     Pack years: 80.00     Quit date: 2014     Years since quittin.9    Smokeless tobacco: Never Used    Tobacco comment: still taking Chantix    Substance Use Topics    Alcohol use: No     Past Surgical History:   Procedure Laterality Date    COLONOSCOPY N/A 2019    COLONOSCOPY/ 26 ROOM 614 performed by Audery Dubin, MD at [facility] ENDOSCOPY    EGD  2019         HX HEART CATHETERIZATION      VASCULAR SURGERY PROCEDURE UNLIST  2019     Bilateral common femoral artery cutdown with exposure and placement of catheter in aorta for aortogram,Endovascular repair of infrarenal abdominal aortic aneurysm with bifurcated modulated device (31 x 14 x 13 main body) via the left common femoral, right iliac extender measuring 27 x 10. Prior to Admission medications    Medication Sig Start Date End Date Taking? Authorizing Provider   metoprolol succinate (TOPROL-XL) 100 mg tablet Take 1 Tablet by mouth two (2) times a day.  Indications: ventricular rate control in atrial fibrillation 21   Shimon Orta MD   predniSONE (DELTASONE) 20 mg tablet Take 40mg qday x3d, then 20mg qday x3d, then 10mg x2d, then stop. Take with dinner 7/5/21   Per Jerez MD   guaiFENesin ER [facility]) 600 mg ER tablet Take 1 Tablet by mouth two (2) times a day. 6/24/21   Dash Simms MD   furosemide (Lasix) 40 mg tablet Take 1 Tablet by mouth daily. 6/14/21   Isrrael Frost MD   allopurinoL (ZYLOPRIM) 300 mg tablet Take 300 mg by mouth daily. Provider, Historical   docusate sodium (COLACE) 100 mg capsule Take 1 Capsule by mouth daily. 5/27/21   Dash Simms MD   albuterol-ipratropium (DUO-NEB) 2.5 mg-0.5 mg/3 ml nebu 3 mL by Nebulization route three (3) times daily for 180 days. 3/24/21 9/20/21  Dash Simms MD   rosuvastatin (Crestor) 40 mg tablet Take 1 Tab by mouth nightly. 3/24/21   Dash Simms MD   nitroglycerin (NITROSTAT) 0.4 mg SL tablet 1 Tab by SubLINGual route every five (5) minutes as needed for Chest Pain (call EMS if pain fails to resolve after 2 tabs. max daily dose of 3 tabs). Indications: after 15 minutes or 3 doses, if chest pain persists, contact EMS/911 3/24/21   Dash Simms MD   tamsulosin [facility]) 0.4 mg capsule Take 1 Cap by mouth daily. 3/24/21   Dash Simms MD   aspirin delayed-release 81 mg tablet Take 81 mg by mouth daily. Provider, Historical   albuterol (PROVENTIL HFA, VENTOLIN HFA, PROAIR HFA) 90 mcg/actuation inhaler TAKE 2 PUFFS BY MOUTH EVERY 4 HOURS AS NEEDED FOR WHEEZE 1/3/21   Provider, Historical   latanoprost (XALATAN) 0.005 % ophthalmic solution LOCATION  BOTH EYES. INSTILL ONE DROP INTO EACH EYE AT BEDTIME 11/12/20   Provider, Historical   famotidine (PEPCID) 40 mg tablet Take 1 Tab by mouth daily. 1/22/21   Srinath Yo MD   albuterol (PROVENTIL VENTOLIN) 2.5 mg /3 mL (0.083 %) nebu Take 2.5 mg by inhalation every six (6) hours as needed for Shortness of Breath or Wheezing. 9/14/19   Provider, Historical   budesonide (PULMICORT) 0.5 mg/2 mL nbsp 2 mL by Nebulization route two (2) times a day.  9/15/18   [facility], DO OXYGEN-AIR DELIVERY SYSTEMS Take 2 L/min by inhalation daily. 2 L/min continuously inhalation via nasal canula 9/15/15   Provider, Historical   brimonidine (ALPHAGAN P) 0.1 % ophthalmic solution Administer 1 Drop to both eyes two (2) times a day. Provider, Historical       Allergies   Allergen Reactions    Statins-Hmg-Coa Reductase Inhibitors Myalgia     Muscle pain        Review of Systems:  A comprehensive review of systems was negative except for that written in the History of Present Illness. Objective:     Visit Vitals  BP (!) 117/58 (BP 1 Location: Left upper arm, BP Patient Position: At rest)   Pulse (!) 113   Temp 97.8 °F (36.6 °C)   Resp 18   Ht 5' 9\" (1.753 m)   Wt 72.7 kg (160 lb 4.8 oz)   SpO2 100%   BMI 23.67 kg/m²     Temp (24hrs), Av °F (36.7 °C), Min:97.5 °F (36.4 °C), Max:98.7 °F (37.1 °C)       Lines:  Peripheral IV:   PIV    Physical Exam:    General:  Alert, cooperative, well noursished, well developed, appears stated age   Eyes:  Sclera anicteric. Pupils equally round and reactive to light. Mouth/Throat: Mucous membranes normal, oral pharynx clear   Neck: Supple   Lungs:   Mostly clear, some course sounds, 3LNC, good effort   CV:  Irregular rate and rhythm, no murmur, click, rub or gallop   Abdomen:   Soft, non-tender.  bowel sounds normal. non-distended   Extremities: No cyanosis or edema   Skin: Skin color, texture, turgor normal. no acute rash or lesions   Lymph nodes: Cervical and supraclavicular normal   Musculoskeletal: No swelling or deformity   Lines/Devices:  Intact, no erythema, drainage or tenderness   Psych: Alert and oriented, normal mood affect given the setting       Data Review:     CBC:  Recent Labs     21  0324 21  1207   WBC 9.9 10.5   GRANS  --  64   MONOS  --  14*   EOS  --  0*   ANEU  --  6.7   ABL  --  1.9   HGB 10.0* 11.2*   HCT 33.2* 37.0*    218       BMP:  Recent Labs     21  0324 21  1321 21  1207   CREA 1.60*  -- 1.63*   BUN 54*  --  55*     --  143   K 5.3* 3.6 5.4*   *  --  107   CO2 35*  --  32   AGAP 0*  --  4*   *  --  93       LFTS:  No results for input(s): TBILI, ALT, AP, TP, ALB in the last 72 hours.     No lab exists for component: SGOT    Microbiology:     All Micro Results     None          Imaging:   Reviewed     Signed By: Piero Weller NP     July 8, 2021

## 2021-07-09 NOTE — PROGRESS NOTES
[facility] CARDIOLOGY PROGRESS NOTE           7/9/2021 6:37 AM    Admit Date: 7/7/2021    Admit Diagnosis: Atrial flutter with rapid ventricular response (HCC) [I48.92]      Subjective:   No complaints this AM, no chest pain or shortness of breath    Interval History: (History of pertinent interval events obtained from nursing staff)  Remains in aflutter    ROS:  GEN:  No fever or chills  Cardiovascular:  As noted above  Pulmonary:  As noted above  Neuro:  No new focal motor or sensory loss      Objective:     Vitals:    07/09/21 0300 07/09/21 0500 07/09/21 0529 07/09/21 0600   BP: 119/63 123/69 127/61    Pulse: 77 76 (!) 50 100   Resp:   18    Temp:   98.2 °F (36.8 °C)    SpO2:   100%    Weight:   160 lb 1.6 oz (72.6 kg)    Height:           Physical Exam:  General-Well Developed, Well Nourished, No Acute Distress, Alert & Oriented x 3, appropriate mood. Neck- supple, no JVD  CV-  Irreg, distant S1, S2, no MRG  Lung- clear bilaterally  Abd- soft, nontender, nondistended  Ext- no edema bilaterally.   Skin- warm and dry    Current Facility-Administered Medications   Medication Dose Route Frequency    budesonide (PULMICORT) 500 mcg/2 ml nebulizer suspension  500 mcg Nebulization BID RT    albuterol-ipratropium (DUO-NEB) 2.5 MG-0.5 MG/3 ML  3 mL Nebulization TID RT    dilTIAZem IR (CARDIZEM) tablet 60 mg  60 mg Oral TIDAC    albuterol (PROVENTIL VENTOLIN) nebulizer solution 2.5 mg  2.5 mg Inhalation Q6H PRN    allopurinoL (ZYLOPRIM) tablet 300 mg  300 mg Oral DAILY    aspirin delayed-release tablet 81 mg  81 mg Oral DAILY    brimonidine (ALPHAGAN P) 0.1 % ophthalmic solution 1 Drop (Patient Supplied)  1 Drop Both Eyes BID    docusate sodium (COLACE) capsule 100 mg  100 mg Oral DAILY    famotidine (PEPCID) tablet 40 mg  40 mg Oral DAILY    furosemide (LASIX) tablet 40 mg  40 mg Oral DAILY    guaiFENesin ER (MUCINEX) tablet 600 mg  600 mg Oral BID    latanoprost (XALATAN) 0.005 % ophthalmic solution 1 Drop  1 Drop Both Eyes QPM    metoprolol succinate (TOPROL-XL) tablet 100 mg  100 mg Oral BID    nitroglycerin (NITROSTAT) tablet 0.4 mg  0.4 mg SubLINGual Q5MIN PRN    rosuvastatin (CRESTOR) tablet 40 mg  40 mg Oral QHS    tamsulosin (FLOMAX) capsule 0.4 mg  0.4 mg Oral DAILY    sodium chloride (NS) flush 5-40 mL  5-40 mL IntraVENous Q8H    sodium chloride (NS) flush 5-40 mL  5-40 mL IntraVENous PRN    acetaminophen (TYLENOL) tablet 650 mg  650 mg Oral Q4H PRN    predniSONE (DELTASONE) tablet 20 mg  20 mg Oral DAILY WITH DINNER    Followed by   Earnstine Laser ON 7/12/2021] predniSONE (DELTASONE) tablet 10 mg  10 mg Oral DAILY WITH DINNER    dilTIAZem (CARDIZEM) 100 mg in 0.9% sodium chloride (MBP/ADV) 100 mL infusion  0-15 mg/hr IntraVENous TITRATE     Data Review:   Recent Results (from the past 24 hour(s))   METABOLIC PANEL, BASIC    Collection Time: 07/09/21  3:42 AM   Result Value Ref Range    Sodium 140 138 - 145 mmol/L    Potassium 5.3 (H) 3.5 - 5.1 mmol/L    Chloride 105 98 - 107 mmol/L    CO2 34 (H) 21 - 32 mmol/L    Anion gap 1 (L) 7 - 16 mmol/L    Glucose 118 (H) 65 - 100 mg/dL    BUN 58 (H) 8 - 23 MG/DL    Creatinine 1.79 (H) 0.8 - 1.5 MG/DL    GFR est AA 47 (L) >60 ml/min/1.73m2    GFR est non-AA 39 (L) >60 ml/min/1.73m2    Calcium 8.5 8.3 - 10.4 MG/DL       EKG:  (EKG has been independently visualized by me with interpretation below)  Assessment:     Principal Problem:    Atrial flutter with rapid ventricular response (HCC) (7/23/2018)    Active Problems:    COPD [facility]chronic obstructive pulmonary disease) [facility] (5/2/2014)      Heart failure with reduced ejection fraction ([facility] (9/9/2019)      CKD (chronic kidney disease), stage III ([facility] (9/9/2019)      Hyperkalemia (7/7/2021)      Plan:   1. NICM, EF 30%, NYHA class III-IV, uncontrolled: I had a discussion today with the patient regarding the risks, benefits, and alternatives of a biventricular implantable cardiac rhythm device. I discussed in detail the potential benefits of ICD therapy, including improved mortality and prevention of SCD. Additionally, I discussed with the patient the potential risks of ICD implantation, including the risk of bleeding, infection, large blood vessel injury, lung or cardiac injury, venous occlusion, DVT/PE, pneumothorax, cardiac tamponade, perforation, need for urgent open heart surgery or additional procedures, device/lead failure, lead dislodgement, inappropriate shock(s), heart attack, stroke, arrhythmia, oversedation, respiratory arrest, and even death. We also discussed at length the indications for the addition of an LV lead for cardiac resynchronization in the setting of underlying conduction system disease and/or for the clinical suspicion for a high degree of ventricular pacing. We discussed not every patient has clinical improvement with a biventricular device, and implantation of a biventricular device does slightly increase the risks of the procedure. In addition, on rare occasions a patient's anatomy or underlying ventricular scar does not allow for successful placement of an LV lead or acceptable pacing parameters. After our comprehensive discussion, the patient would like to proceed with scheduling the procedure. I will have our  meet to discuss today or call the patient as soon as possible to make the arrangements. Patient has my professional contact information and was encouraged to call with additional questions or concerns. --BiV ICD and AV node ablation   2. Persistent atrial flutter: We have discussed rhythm control options. Given Pt history of noncompliance, multiple comorbidities including significant pulmonary and renal disease, feel a definitive approach with device and AV node ablation is the best option.   I discussed the potential benefits of biventricular pacing for cardiac resynchronization in the clinical scenario of atrial fibrillation that has failed medical therapy with plans for an AV node ablation resulting in a high degree of ventricular pacing. We discussed not every patient has clinical improvement with a biventricular device, and implantation of a biventricular device does slightly increase the risks of the procedure. In addition, on rare occasions a patient's anatomy or underlying ventricular scar does not allow for successful placement of an LV lead or acceptable pacing parameters. After our comprehensive discussion, the patient would like to proceed. Carolina Keller MD  Cardiology/Electrophysiology

## 2021-07-09 NOTE — PROGRESS NOTES
Harper catheter inserted for urinary retention and abdominal pain. Patient was holding 450mls. Patient states abdominal pain has resolved.

## 2021-07-09 NOTE — ROUTINE PROCESS
Bedside and Verbal shift change report given to Chantal Jones RN (oncoming nurse) by self (offgoing nurse). Report included the following information SBAR, Kardex, MAR, Recent Results and Cardiac Rhythm aflutter.

## 2021-07-09 NOTE — ROUTINE PROCESS
Bedside and Verbal shift change report given to self (oncoming nurse) by SAULO Reaevs (offgoing nurse). Report included the following information SBAR, Kardex, Procedure Summary, MAR and Cardiac Rhythm A/V paced. Left subclavian pacer site c/d/i, sling in place. Rt groin ablation site c/d/i. Gary Chi

## 2021-07-09 NOTE — PROGRESS NOTES
Problem: Falls - Risk of  Goal: *Absence of Falls  Description: Document Alex Sites Fall Risk and appropriate interventions in the flowsheet.   Outcome: Progressing Towards Goal  Note: Fall Risk Interventions:  Mobility Interventions: Bed/chair exit alarm         Medication Interventions: Patient to call before getting OOB, Teach patient to arise slowly    Elimination Interventions: Call light in reach, Patient to call for help with toileting needs              Problem: Patient Education: Go to Patient Education Activity  Goal: Patient/Family Education  Outcome: Progressing Towards Goal

## 2021-07-09 NOTE — PROGRESS NOTES
Problem: Falls - Risk of  Goal: *Absence of Falls  Description: Document Brandt Palencia Fall Risk and appropriate interventions in the flowsheet.   Outcome: Progressing Towards Goal  Note: Fall Risk Interventions:  Mobility Interventions: Patient to call before getting OOB         Medication Interventions: Patient to call before getting OOB, Teach patient to arise slowly    Elimination Interventions: Call light in reach, Patient to call for help with toileting needs         Problem: Patient Education: Go to Patient Education Activity  Goal: Patient/Family Education  Outcome: Progressing Towards Goal     Problem: Patient Education: Go to Patient Education Activity  Goal: Patient/Family Education  Outcome: Progressing Towards Goal     Problem: Pacer/ICD: Pre-Procedure  Goal: Nutrition/Diet  Outcome: Progressing Towards Goal  Goal: Respiratory  Outcome: Progressing Towards Goal     Problem: Pacer/ICD: Post-Procedure  Goal: Off Pathway (Use only if patient is Off Pathway)  Outcome: Progressing Towards Goal  Goal: Activity/Safety  Outcome: Progressing Towards Goal  Goal: Consults, if ordered  Outcome: Progressing Towards Goal  Goal: Diagnostic Test/Procedures  Outcome: Progressing Towards Goal  Goal: Nutrition/Diet  Outcome: Progressing Towards Goal  Goal: Discharge Planning  Outcome: Progressing Towards Goal  Goal: Medications  Outcome: Progressing Towards Goal  Goal: Treatments/Interventions/Procedures  Outcome: Progressing Towards Goal  Goal: Psychosocial  Outcome: Progressing Towards Goal  Goal: *Hemodynamically stable  Outcome: Progressing Towards Goal  Goal: *Optimal pain control at patient's stated goal  Outcome: Progressing Towards Goal  Goal: *Absence of signs and symptoms of infection or wound complication  Outcome: Progressing Towards Goal     Problem: Pacer/ICD: Day 1  Goal: Activity/Safety  Outcome: Progressing Towards Goal

## 2021-07-09 NOTE — PROGRESS NOTES
Problem: Falls - Risk of  Goal: *Absence of Falls  Description: Document Santos Dent Fall Risk and appropriate interventions in the flowsheet.   Outcome: Progressing Towards Goal  Note: Fall Risk Interventions:  Mobility Interventions: Patient to call before getting OOB         Medication Interventions: Patient to call before getting OOB, Teach patient to arise slowly    Elimination Interventions: Call light in reach, Patient to call for help with toileting needs              Problem: Patient Education: Go to Patient Education Activity  Goal: Patient/Family Education  Outcome: Progressing Towards Goal     Problem: Patient Education: Go to Patient Education Activity  Goal: Patient/Family Education  Outcome: Progressing Towards Goal     Problem: Pacer/ICD: Pre-Procedure  Goal: Nutrition/Diet  Outcome: Progressing Towards Goal  Goal: Respiratory  Outcome: Progressing Towards Goal     Problem: Pacer/ICD: Post-Procedure  Goal: Off Pathway (Use only if patient is Off Pathway)  Outcome: Progressing Towards Goal  Goal: Activity/Safety  Outcome: Progressing Towards Goal  Goal: Consults, if ordered  Outcome: Progressing Towards Goal  Goal: Diagnostic Test/Procedures  Outcome: Progressing Towards Goal  Goal: Nutrition/Diet  Outcome: Progressing Towards Goal  Goal: Discharge Planning  Outcome: Progressing Towards Goal  Goal: Medications  Outcome: Progressing Towards Goal  Goal: Treatments/Interventions/Procedures  Outcome: Progressing Towards Goal  Goal: Psychosocial  Outcome: Progressing Towards Goal  Goal: *Hemodynamically stable  Outcome: Progressing Towards Goal  Goal: *Optimal pain control at patient's stated goal  Outcome: Progressing Towards Goal  Goal: *Absence of signs and symptoms of infection or wound complication  Outcome: Progressing Towards Goal     Problem: Pacer/ICD: Day 1  Goal: Activity/Safety  Outcome: Progressing Towards Goal

## 2021-07-10 NOTE — PROGRESS NOTES
ACUTE PHYSICAL THERAPY GOALS:  (Developed with and agreed upon by patient and/or caregiver.)  STG:  (1.)Mr. Proctor will move from supine to sit and sit to supine , scoot up and down and roll side to side with CONTACT GUARD ASSIST within 4 treatment day(s). (2.)Mr. Proctor will transfer from bed to chair and chair to bed with CONTACT GUARD ASSIST using the least restrictive device within 4 treatment day(s). (3.)Mr. Proctor will ambulate with CONTACT GUARD ASSIST for 50 feet with the least restrictive device within 4 treatment day(s). LTG:  (1.)Mr. Proctor will move from supine to sit and sit to supine , scoot up and down and roll side to side in bed with SUPERVISION within 7 treatment day(s). (2.)Mr. Proctor will transfer from bed to chair and chair to bed with SUPERVISION using the least restrictive device within 7 treatment day(s). (3.)Mr. Proctor will ambulate with SUPERVISION for 100 feet with the least restrictive device within 7 treatment day(s).   ________________________________________________________________________________________________      PHYSICAL THERAPY ASSESSMENT: Initial Assessment and PM PT Treatment Day # 1      Ra Trevino is a 68 y.o. male   PRIMARY DIAGNOSIS: Atrial flutter with rapid ventricular response (HCC)  Atrial flutter with rapid ventricular response (HCC) [I48.92]  Procedure(s) (LRB):  ABLATION AV NODE (N/A)  INSERT ICD BIV MULTI (N/A)  1 Day Post-Op  Reason for Referral:    ICD-10: Treatment Diagnosis: Generalized Muscle Weakness (M62.81)  Other lack of cordination (R27.8)  Difficulty in walking, Not elsewhere classified (R26.2)  Other abnormalities of gait and mobility (R26.89)  INPATIENT: Payor: [facility] MEDICARE / Plan: [address] / Product Type: Managed Care Medicare /     ASSESSMENT:     REHAB RECOMMENDATIONS:   Recommendation to date pending progress:  Settin [facility]  Equipment:    To Be Determined     PRIOR LEVEL OF FUNCTION:  (Prior to Hospitalization) INITIAL/CURRENT LEVEL OF FUNCTION:  (Most Recently Demonstrated)   Bed Mobility:   Modified Independent  Sit to Stand:   Modified Independent  Transfers:   Modified Independent  Gait/Mobility:   Modified Independent Bed Mobility:   Minimal Assistance  Sit to Stand:   Minimal Assistance  Transfers:   Minimal Assistance  Gait/Mobility:   Minimal Assistance     ASSESSMENT:  Mr. Thornton Baumgarten is a pleasant frail elderly male with recent admission with above diagnosis who demonstrates with decreased transfers, ambulation and mobility below his prior functional baseline. All transfers are currently limited at [facility] A x 1 out of bed to sit and stand followed by ambulation for 15 feet with left UE sling with the deficits stated below s/p ablation. Brissa Klein. then returns to  seated edge of bed before return to supine with MIN A x 1. Skilled PT is indicated for this patient's functional mobility deficits. SUBJECTIVE:   Mr. Thornton Baumgarten states, \"I am back here again. \"    SOCIAL HISTORY/LIVING ENVIRONMENT:  Lives alone in apartment on 9th floor, has elevator. Has rollator, doesn't typically use. Independent baseline without DME use, does wear 2-3L O2 at baseline. Has CLTC aide and HH PT/OT  Home Environment: Apartment  # Steps to Enter: 0  One/Two Story Residence: One story  Living Alone: Yes  Support Systems: Home care staff, Therapist     OBJECTIVE:     PAIN: VITAL SIGNS: LINES/DRAINS:   Pre Treatment: Pain Screen  Pain Scale 1: Numeric (0 - 10)  Pain Intensity 1: 0  Post Treatment: 0/10 no pain reported. IV  O2 Device: Nasal cannula     GROSS EVALUATION:   Within Functional Limits Abnormal/ Functional Abnormal/ Non-Functional (see comments) Not Tested Comments:   AROM [] [x] [] [] Left UE sling.      PROM [] [x] [] []    Strength [] [x] [] []    Balance [] [x] [] []    Posture [] [x] [] []    Sensation [] [x] [] []    Coordination [] [x] [] []    Tone [x] [] [] []    Edema [x] [] [] []    Activity Tolerance [] [x] [] []     [] [] [] []      COGNITION/  PERCEPTION: Intact Impaired   (see comments) Comments:   Orientation [x] []    Vision [x] []    Hearing [x] []    Command Following [x] []    Safety Awareness [x] []     [] []      MOBILITY: I Mod I S SBA CGA Min Mod Max Total  NT x2 Comments:   Bed Mobility    Rolling [] [] [] [] [] [x] [] [] [] [] []    Supine to Sit [] [] [] [] [] [x] [] [] [] [] []    Scooting [] [] [] [] [] [x] [] [] [] [] []    Sit to Supine [] [] [] [] [] [x] [] [] [] [] []    Transfers    Sit to Stand [] [] [] [] [] [x] [] [] [] [] []    Bed to Chair [] [] [] [] [] [x] [] [] [] [] []    Stand to Sit [] [] [] [] [] [x] [] [] [] [] []    I=Independent, Mod I=Modified Independent, S=Supervision, SBA=Standby Assistance, CGA=Contact Guard Assistance,   Min=Minimal Assistance, Mod=Moderate Assistance, Max=Maximal Assistance, Total=Total Assistance, NT=Not Tested  GAIT: I Mod I S SBA CGA Min Mod Max Total  NT x2 Comments:   Level of Assistance [] [] [] [] [] [x] [] [] [] [] []    Distance 15 feet     DME None and left UE sling so HHA x 1 right side. Gait Quality Fair dynamic standing balance. Weightbearing Status WBAT     I=Independent, Mod I=Modified Independent, S=Supervision, SBA=Standby Assistance, CGA=Contact Guard Assistance,   Min=Minimal Assistance, Mod=Moderate Assistance, Max=Maximal Assistance, Total=Total Assistance, NT=Not [facility] Form       How much difficulty does the patient currently have. .. Unable A Lot A Little None   1. Turning over in bed (including adjusting bedclothes, sheets and blankets)? [] 1   [] 2   [x] 3   [] 4   2. Sitting down on and standing up from a chair with arms ( e.g., wheelchair, bedside commode, etc.)   [] 1   [] 2   [x] 3   [] 4   3. Moving from lying on back to sitting on the side of the bed?    [] 1   [] 2   [x] 3   [] 4   How much help from another person does the patient currently need. .. Total A Lot A Little None   4. Moving to and from a bed to a chair (including a wheelchair)? [] 1   [] 2   [x] 3   [] 4   5. Need to walk in hospital room? [] 1   [] 2   [x] 3   [] 4   6. Climbing 3-5 steps with a railing? [] 1   [x] 2   [] 3   [] 4   © 2007, Trustees of [address] [place] [address] [address] [address], under license to [employer]. All rights reserved     Score:  Initial: 17 Most Recent: X (Date: -- )    Interpretation of Tool:  Represents activities that are increasingly more difficult (i.e. Bed mobility, Transfers, Gait). PLAN:   FREQUENCY/DURATION: PT Plan of Care: 3 times/week for duration of hospital stay or until stated goals are met, whichever comes first.    PROBLEM LIST:   (Skilled intervention is medically necessary to address:)  1. Decreased ADL/Functional Activities  2. Decreased Activity Tolerance  3. Decreased AROM/PROM  4. Decreased Balance  5. Decreased Cognition  6. Decreased Coordination  7. Decreased Gait Ability  8. Decreased Strength  9. Decreased Transfer Abilities   INTERVENTIONS PLANNED:   (Benefits and precautions of physical therapy have been discussed with the patient.)  1. Therapeutic Activity  2. Therapeutic Exercise/HEP  3. Neuromuscular Re-education  4. Gait Training  5. Manual Therapy  6. Education     TREATMENT:     EVALUATION: Low Complexity : (Untimed Charge)    TREATMENT:   ($$ Therapeutic Activity: 23-37 mins    )  Therapeutic Activity (23 Minutes): Therapeutic activity included Rolling, Supine to Sit, Sit to Supine, Scooting, Lateral Scooting, Transfer Training, Ambulation on level ground, Sitting balance  and Standing balance to improve functional Mobility, Strength, ROM and Activity tolerance.     TREATMENT GRID:   Date:   Date:   Date:     Activity/Exercise Parameters Parameters Parameters                                                 AFTER TREATMENT POSITION/PRECAUTIONS:  Bed, Needs within reach and RN notified    INTERDISCIPLINARY COLLABORATION:  RN/PCT and PT/PTA    TOTAL TREATMENT DURATION:  PT Patient Time In/Time Out  Time In: 1600  Time Out: [address], [place]

## 2021-07-10 NOTE — ROUTINE PROCESS
Bedside and Verbal shift change report given to [facility], RN (oncoming nurse) by self (offgoing nurse). Report included the following information SBAR, Kardex, MAR and Cardiac Rhythm A/V paced. Left subclavian s/p pacer site, dsg c/d/i. Sling in place. Rt groin s/p ablation, c/d/i.

## 2021-07-10 NOTE — PROGRESS NOTES
Problem: Falls - Risk of  Goal: *Absence of Falls  Description: Document Jayden Hernandez Fall Risk and appropriate interventions in the flowsheet.   Outcome: Progressing Towards Goal  Note: Fall Risk Interventions:  Mobility Interventions: Patient to call before getting OOB         Medication Interventions: Teach patient to arise slowly, Patient to call before getting OOB, Bed/chair exit alarm    Elimination Interventions: Bed/chair exit alarm, Call light in reach, Patient to call for help with toileting needs              Problem: Patient Education: Go to Patient Education Activity  Goal: Patient/Family Education  Outcome: Progressing Towards Goal     Problem: Patient Education: Go to Patient Education Activity  Goal: Patient/Family Education  Outcome: Progressing Towards Goal     Problem: Pacer/ICD: Post-Procedure  Goal: Activity/Safety  Outcome: Progressing Towards Goal  Goal: Medications  Outcome: Progressing Towards Goal  Goal: Treatments/Interventions/Procedures  Outcome: Progressing Towards Goal  Goal: Psychosocial  Outcome: Progressing Towards Goal  Goal: *Hemodynamically stable  Outcome: Progressing Towards Goal  Goal: *Optimal pain control at patient's stated goal  Outcome: Progressing Towards Goal  Goal: *Absence of signs and symptoms of infection or wound complication  Outcome: Progressing Towards Goal     Problem: Pacer/ICD: Day 1  Goal: Activity/Safety  Outcome: Progressing Towards Goal

## 2021-07-10 NOTE — PROGRESS NOTES
[facility] CARDIOLOGY PROGRESS NOTE           7/10/2021 9:51 AM    Admit Date: 7/7/2021    Admit Diagnosis: Atrial flutter with rapid ventricular response (HCC) [I48.92]    Assessment:   Principal Problem:    [facility] (7/23/2018)    Active Problems:    COPD (chronic obstructive pulmonary disease) ([facility] (5/2/2014)      Heart failure with reduced ejection fraction ([facility] (9/9/2019)      CKD (chronic kidney disease), stage III ([facility] (9/9/2019)      Hyperkalemia (7/7/2021)        Plan:   1. AFL/AF with RVR - s/p CRT-D and AV node ablation, stable wound, device interrogation and CXR. Consider repeat AV node ablation if recurrent conduction, in CHB today. Stop diltiazem for now. Wean metoprolol as outpt as able. 2. MAC infection - per outpt pulmonologist.   3. Stroke ppx - no OAC due to history of AVMs, anemia and GI bleeding. 4. Dispo - anticipate 24-48 hours. Thank you for allowing me to participate in the electrophysiologic care of this most pleasant patient. Please feel free to contact me if there are any questions or concerns. Krissy Knight MD, MS  Clinical Cardiac Electrophysiology  [facility]    Subjective:   No complaints this AM, no chest pain or shortness of breath    Interval History: (History of pertinent interval events obtained from nursing staff)    ROS:  GEN:  No fever or chills  Cardiovascular:  As noted above  Pulmonary:  As noted above  Neuro:  No new focal motor or sensory loss      Objective:     Vitals:    07/10/21 0000 07/10/21 0400 07/10/21 0747 07/10/21 0802   BP: 136/63 (!) 147/71 119/79    Pulse: 75 78 83    Resp: 22 20 18    Temp: 98.4 °F (36.9 °C) 98 °F (36.7 °C) 97.6 °F (36.4 °C)    SpO2: 97% 100% 100% 92%   Weight:  170 lb 11.2 oz (77.4 kg)     Height:           Physical Exam:  General-Well Developed, Well Nourished, No Acute Distress, Alert & Oriented x 3, appropriate mood.   Neck- supple, no JVD  CV- regular rate and rhythm no MRG, left sided CIED C/D/I. Lung- clear bilaterally  Abd- soft, nontender, nondistended  Ext- no edema bilaterally.   Skin- warm and dry    Current Facility-Administered Medications   Medication Dose Route Frequency    sodium chloride (NS) flush 5-40 mL  5-40 mL IntraVENous Q8H    sodium chloride (NS) flush 5-40 mL  5-40 mL IntraVENous PRN    HYDROcodone-acetaminophen (NORCO) 5-325 mg per tablet 1 Tablet  1 Tablet Oral Q4H PRN    budesonide (PULMICORT) 500 mcg/2 ml nebulizer suspension  500 mcg Nebulization BID RT    albuterol-ipratropium (DUO-NEB) 2.5 MG-0.5 MG/3 ML  3 mL Nebulization TID RT    dilTIAZem IR (CARDIZEM) tablet 60 mg  60 mg Oral TIDAC    albuterol (PROVENTIL VENTOLIN) nebulizer solution 2.5 mg  2.5 mg Inhalation Q6H PRN    allopurinoL (ZYLOPRIM) tablet 300 mg  300 mg Oral DAILY    aspirin delayed-release tablet 81 mg  81 mg Oral DAILY    brimonidine (ALPHAGAN P) 0.1 % ophthalmic solution 1 Drop (Patient Supplied)  1 Drop Both Eyes BID    docusate sodium (COLACE) capsule 100 mg  100 mg Oral DAILY    famotidine (PEPCID) tablet 40 mg  40 mg Oral DAILY    furosemide (LASIX) tablet 40 mg  40 mg Oral DAILY    guaiFENesin ER (MUCINEX) tablet 600 mg  600 mg Oral BID    latanoprost (XALATAN) 0.005 % ophthalmic solution 1 Drop  1 Drop Both Eyes QPM    metoprolol succinate (TOPROL-XL) tablet 100 mg  100 mg Oral BID    nitroglycerin (NITROSTAT) tablet 0.4 mg  0.4 mg SubLINGual Q5MIN PRN    rosuvastatin (CRESTOR) tablet 40 mg  40 mg Oral QHS    tamsulosin (FLOMAX) capsule 0.4 mg  0.4 mg Oral DAILY    sodium chloride (NS) flush 5-40 mL  5-40 mL IntraVENous Q8H    sodium chloride (NS) flush 5-40 mL  5-40 mL IntraVENous PRN    acetaminophen (TYLENOL) tablet 650 mg  650 mg Oral Q4H PRN    predniSONE (DELTASONE) tablet 20 mg  20 mg Oral DAILY WITH DINNER    Followed by   Yenny Moreno ON 7/12/2021] predniSONE (DELTASONE) tablet 10 mg  10 mg Oral DAILY WITH DINNER    dilTIAZem (CARDIZEM) 100 mg in 0.9% sodium chloride (MBP/ADV) 100 mL infusion  0-15 mg/hr IntraVENous TITRATE       Data Review:   Recent Results (from the past 24 hour(s))   EKG, 12 LEAD, INITIAL    Collection Time: 07/09/21  1:02 PM   Result Value Ref Range    Ventricular Rate 75 BPM    Atrial Rate 72 BPM    P-R Interval 136 ms    QRS Duration 114 ms    Q-T Interval 428 ms    QTC Calculation (Bezet) 477 ms    Calculated R Axis -95 degrees    Calculated T Axis 84 degrees    Diagnosis       AV dual-paced rhythm  Abnormal ECG    Confirmed by JER RASHEED ([id number]) on 7/9/2021 2:01:49 PM     METABOLIC PANEL, BASIC    Collection Time: 07/10/21  4:10 AM   Result Value Ref Range    Sodium 142 136 - 145 mmol/L    Potassium 5.3 (H) 3.5 - 5.1 mmol/L    Chloride 107 98 - 107 mmol/L    CO2 33 (H) 21 - 32 mmol/L    Anion gap 2 (L) 7 - 16 mmol/L    Glucose 104 (H) 65 - 100 mg/dL    BUN 43 (H) 8 - 23 MG/DL    Creatinine 1.50 0.8 - 1.5 MG/DL    GFR est AA 58 (L) >60 ml/min/1.73m2    GFR est non-AA 48 (L) >60 ml/min/1.73m2    Calcium 8.3 8.3 - 10.4 MG/DL       EKG:  (EKG has been independently visualized by me with interpretation below): A paced BIV paced.

## 2021-07-10 NOTE — ROUTINE PROCESS
Verbal bedside report given to [facility], oncoming RN. Patient's situation, background, assessment and recommendations provided. Opportunity for questions provided. Oncoming RN assumed care of patient. L subclavian, R groin site visualized.

## 2021-07-10 NOTE — PROGRESS NOTES
Bedside shift change report given to self (oncoming nurse) by Yolanda Powell (offgoing nurse). Report included the following information SBAR, Kardex, Procedure Summary, Intake/Output, MAR and Cardiac Rhythm paced.

## 2021-07-11 NOTE — PROGRESS NOTES
[facility] CARDIOLOGY PROGRESS NOTE           7/11/2021 8:05 AM    Admit Date: 7/7/2021    Admit Diagnosis: Atrial flutter with rapid ventricular response (HCC) [I48.92]    Assessment:   Principal Problem:    [facility] (7/23/2018)    Active Problems:    COPD (chronic obstructive pulmonary disease) ([facility] (5/2/2014)      Heart failure with reduced ejection fraction ([facility] (9/9/2019)      CKD (chronic kidney disease), stage III ([facility] (9/9/2019)      Hyperkalemia (7/7/2021)      Plan:   1. AFL/AF with RVR - s/p CRT-D and AV node ablation, stable wound, device interrogation and CXR. Consider repeat AV node ablation if recurrent conduction, in CHB today. Stop diltiazem for now. Wean metoprolol as outpt as able. 2. MAC infection - per outpt pulmonologist.   3. Stroke ppx - no OAC due to history of AVMs, anemia and GI bleeding. 4. Dispo - anticipate 24-48 hours. Consider HHN. Appreciate PT/SW eval.     Thank you for allowing me to participate in the electrophysiologic care of this most pleasant patient. Please feel free to contact me if there are any questions or concerns. Dayanara Knight MD, MS  Clinical Cardiac Electrophysiology  [facility]    Subjective:   No complaints this AM, no chest pain or shortness of breath    Interval History: (History of pertinent interval events obtained from nursing staff)    ROS:  GEN:  No fever or chills  Cardiovascular:  As noted above  Pulmonary:  As noted above  Neuro:  No new focal motor or sensory loss      Objective:     Vitals:    07/10/21 2012 07/10/21 2122 07/10/21 2353 07/11/21 0351   BP:  116/66 129/60 (!) 141/74   Pulse:  79 73 86   Resp:  16 16 15   Temp:  97.5 °F (36.4 °C) 98.3 °F (36.8 °C) 98 °F (36.7 °C)   SpO2: 99% 100% 99% 100%   Weight:    177 lb 1.6 oz (80.3 kg)   Height:           Physical Exam:  General-Well Developed, Well Nourished, No Acute Distress, Alert & Oriented x 3, appropriate mood.  Neck- supple, no JVD  CV- regular rate and rhythm no MRG, left sided CIED C/D/I. Lung- clear bilaterally  Abd- soft, nontender, nondistended  Ext- no edema bilaterally.   Skin- warm and dry    Current Facility-Administered Medications   Medication Dose Route Frequency    tuberculin injection 5 Units  5 Units IntraDERMal ONCE    sodium chloride (NS) flush 5-40 mL  5-40 mL IntraVENous Q8H    sodium chloride (NS) flush 5-40 mL  5-40 mL IntraVENous PRN    HYDROcodone-acetaminophen (NORCO) 5-325 mg per tablet 1 Tablet  1 Tablet Oral Q4H PRN    budesonide (PULMICORT) 500 mcg/2 ml nebulizer suspension  500 mcg Nebulization BID RT    albuterol-ipratropium (DUO-NEB) 2.5 MG-0.5 MG/3 ML  3 mL Nebulization TID RT    albuterol (PROVENTIL VENTOLIN) nebulizer solution 2.5 mg  2.5 mg Inhalation Q6H PRN    allopurinoL (ZYLOPRIM) tablet 300 mg  300 mg Oral DAILY    aspirin delayed-release tablet 81 mg  81 mg Oral DAILY    brimonidine (ALPHAGAN P) 0.1 % ophthalmic solution 1 Drop (Patient Supplied)  1 Drop Both Eyes BID    docusate sodium (COLACE) capsule 100 mg  100 mg Oral DAILY    famotidine (PEPCID) tablet 40 mg  40 mg Oral DAILY    furosemide (LASIX) tablet 40 mg  40 mg Oral DAILY    guaiFENesin ER (MUCINEX) tablet 600 mg  600 mg Oral BID    latanoprost (XALATAN) 0.005 % ophthalmic solution 1 Drop  1 Drop Both Eyes QPM    metoprolol succinate (TOPROL-XL) tablet 100 mg  100 mg Oral BID    nitroglycerin (NITROSTAT) tablet 0.4 mg  0.4 mg SubLINGual Q5MIN PRN    rosuvastatin (CRESTOR) tablet 40 mg  40 mg Oral QHS    tamsulosin (FLOMAX) capsule 0.4 mg  0.4 mg Oral DAILY    sodium chloride (NS) flush 5-40 mL  5-40 mL IntraVENous Q8H    sodium chloride (NS) flush 5-40 mL  5-40 mL IntraVENous PRN    acetaminophen (TYLENOL) tablet 650 mg  650 mg Oral Q4H PRN    predniSONE (DELTASONE) tablet 20 mg  20 mg Oral DAILY WITH DINNER    Followed by   Sharon Peterson ON 7/12/2021] predniSONE (DELTASONE) tablet 10 mg  10 mg Oral DAILY WITH DINNER       Data Review:   Recent Results (from the past 24 hour(s))   CBC WITH AUTOMATED DIFF    Collection Time: 07/11/21  5:18 AM   Result Value Ref Range    WBC 12.6 (H) 4.3 - 11.1 K/uL    RBC 3.40 (L) 4.23 - 5.6 M/uL    HGB 9.2 (L) 13.6 - 17.2 g/dL    HCT 31.4 (L) 41.1 - 50.3 %    MCV 92.4 79.6 - 97.8 FL    MCH 27.1 26.1 - 32.9 PG    MCHC 29.3 (L) 31.4 - 35.0 g/dL    RDW 17.3 (H) 11.9 - 14.6 %    PLATELET 335 (L) 604 - 450 K/uL    MPV 10.3 9.4 - 12.3 FL    ABSOLUTE NRBC 0.11 0.0 - 0.2 K/uL    DF AUTOMATED      NEUTROPHILS 77 43 - 78 %    LYMPHOCYTES 10 (L) 13 - 44 %    MONOCYTES 9 4.0 - 12.0 %    EOSINOPHILS 0 (L) 0.5 - 7.8 %    BASOPHILS 0 0.0 - 2.0 %    IMMATURE GRANULOCYTES 4 0.0 - 5.0 %    ABS. NEUTROPHILS 9.7 (H) 1.7 - 8.2 K/UL    ABS. LYMPHOCYTES 1.3 0.5 - 4.6 K/UL    ABS. MONOCYTES 1.1 0.1 - 1.3 K/UL    ABS. EOSINOPHILS 0.0 0.0 - 0.8 K/UL    ABS. BASOPHILS 0.0 0.0 - 0.2 K/UL    ABS. IMM. GRANS. 0.5 0.0 - 0.5 K/UL   NT-PRO BNP    Collection Time: 07/11/21  5:18 AM   Result Value Ref Range    NT pro-BNP 3,875 (H) <450 PG/ML       EKG:  (EKG has been independently visualized by me with interpretation below): A paced BIV paced.

## 2021-07-11 NOTE — ROUTINE PROCESS
Bedside and Verbal shift change report given to [address] [place] [address] (oncoming nurse) by Carroll Spain RN (offgoing nurse). Report included the following information SBAR, Kardex, Procedure Summary, Intake/Output, MAR, Recent Results and Cardiac Rhythm AV paced.      Site c/d/i

## 2021-07-11 NOTE — PROGRESS NOTES
Bedside and Verbal shift change report given to self (oncoming nurse) by Daniel Beard (offgoing nurse). Report included the following information SBAR, Kardex, ED Summary, Procedure Summary, Intake/Output, MAR, Recent Results and Cardiac Rhythm AV Paced.

## 2021-07-11 NOTE — PROGRESS NOTES
ACUTE OT GOALS:  (Developed with and agreed upon by patient and/or caregiver.)  1. Patient will verbalize and demonstrate understanding of cardiac device precautions with 100% accuracy during OT treatment session. 2. Patient will complete functional transfers with mod I.   3. Patient will tolerate at least 23 minutes of OT treatment with less than 2 rest breaks to increase activity tolerance for ADLs. 4. Patient will complete full body bathing and dressing with min A.   5. Patient will complete toileting with mod I.   6. Patient will complete self feeding and grooming with mod I.   7. Patient will complete household distances with mod I and least restrictive device.      Timeframe: 7 visits     OCCUPATIONAL THERAPY ASSESSMENT: Initial Assessment, Daily Note and Am    OT Treatment Day # 1    Beata Carpenter is a 68 y.o. male   PRIMARY DIAGNOSIS: Atrial flutter with rapid ventricular response (HCC)  Atrial flutter with rapid ventricular response (HCC) [I48.92]  Procedure(s) (LRB):  ABLATION AV NODE (N/A)  INSERT ICD BIV MULTI (N/A)  2 Days Post-Op  Reason for Referral:  weakness  ICD-10: Treatment Diagnosis: Generalized Muscle Weakness (M62.81)  Other lack of cordination (R27.8)  Difficulty in walking, Not elsewhere classified (R26.2)  Other abnormalities of gait and mobility (R26.89)  INPATIENT: Payor: [facility] MEDICARE / Plan: [address] / Product Type: Managed Care Medicare /   ASSESSMENT:     REHAB RECOMMENDATIONS:   Recommendation to date pending progress:  [address] [place] [address]  Equipment:    To Be Determined     PRIOR LEVEL OF FUNCTION:  (Prior to Hospitalization)  INITIAL/CURRENT LEVEL OF FUNCTION:  (Based on today's evaluation)   Bathing:   Standby Assistance  Dressing:   Modified Independent  Feeding/Grooming:   Modified Independent edentulous  Toileting:   Modified Independent  Functional Mobility:   Modified Independent with and without rollator Bathing:   Moderate Assistance  Dressing:   Moderate Assistance  Feeding/Grooming:   Set Up  Toileting:   Moderate Assistance  Functional Mobility:   Minimal Assistance     ASSESSMENT:  Mr. Mady Resendiz is a 67 YO R dominant AAM admitted from home and underwent above surgery. Presents today with L UE in sling, bandage on L upper chest, eating breakfast. Reviewed L UE precautions as pt was lifting his L UE near the limit and sling was off the elbow. R UE WFLs. Asked for help going to the bathroom. Pt with SCDs in place, Min A bed mobs, Mod A to terrence socks, min A ambulating to BR with HHA, Toileting mod A, stood at sink for handwashing with SBA, returned to bed with min A. All lines replaced. Setup for breakfast again and pt self feeding. Cardiologist in room as OT leaving. Pt is functioning below his baseline and could benefit from skilled OT to address his deficits and maximize his safety, activity tolerance and independence with ADLs and functional mobility. Should be OK to return home with [address] [place], [place] [address] and PT and his CLTC aide. Has tgece who checks on him. SUBJECTIVE:   Mr. Mady Resendiz states, \"I am feeling much better. \"    SOCIAL HISTORY/LIVING ENVIRONMENT: lives alone in 9th floor single level apartment, uses elevator all the time, has rollator he sometimes uses in his apt, but mostly uses it out in the community, Independent baseline, but does have CLTC aide (3 hours daily) for help with transportation and shopping needs, mostly microwave meals, SPV with showers, was getting HHOT and PT. Uses 2-3 L NC 24/7. Admits to no falls.    Home Environment: Apartment (9th floor, uses elevator)  # Steps to Enter:  (0)  One/Two Story Residence:  (9th floor apartment, uses elevator)  Living Alone: Yes  Support Systems: Family member(s), Home care staff (has ashley)    OBJECTIVE:     PAIN: VITAL SIGNS: LINES/DRAINS:   Pre Treatment: Pain Screen  Pain Scale 1: Numeric (0 - 10)  Pain Intensity 1: 0  Post Treatment: no complaint of pain   IV and L UE in sling, heart monitoring  O2 Device: Nasal cannula     GROSS EVALUATION:  B UEs, R dom Within Functional Limits Abnormal/ Functional Abnormal/ Non-Functional (see comments) Not Tested Comments:   AROM [] [x] [] [] L UE in sling, Shoulder not tested, R UE WFLs   PROM [] [x] [] []    Strength [] [x] [] []    Balance [] [x] [] []    Posture [] [x] [] []    Sensation [] [x] [] []    Coordination [] [x] [] []    Tone [x] [] [] []    Edema [x] [] [] []    Activity Tolerance [] [x] [] [] Needs rest breaks    [] [] [] []      COGNITION/  PERCEPTION: Intact Impaired   (see comments) Comments:   Orientation [x] [] x4   Vision [x] []    Hearing [x] []    Judgment/ Insight [x] []    Attention [x] []    Memory [x] []    Command Following [x] []    Emotional Regulation [x] []     [] []      ACTIVITIES OF DAILY LIVING: I Mod I S SBA CGA Min Mod Max Total NT Comments   BASIC ADLs:              Bathing/ Showering [] [] [] [] [] [x] [] [] [] []    Toileting [] [] [] [] [] [] [x] [] [] []    Dressing [] [] [] [] [] [] [x] [] [] []    Feeding [] [] [x] [] [] [] [] [] [] []    Grooming [] [] [] [] [] [x] [] [] [] []    Personal Device Care [] [] [] [] [] [] [] [] [] []    Functional Mobility [] [] [] [] [] [x] [] [] [] [] HHA   I=Independent, Mod I=Modified Independent, S=Supervision, SBA=Standby Assistance, CGA=Contact Guard Assistance,   Min=Minimal Assistance, Mod=Moderate Assistance, Max=Maximal Assistance, Total=Total Assistance, NT=Not Tested    MOBILITY: I Mod I S SBA CGA Min Mod Max Total  NT x2 Comments:   Supine to sit [] [] [] [] [] [x] [] [] [] [] []    Sit to supine [] [] [] [] [] [x] [] [] [] [] []    Sit to stand [] [] [] [] [] [x] [] [] [] [] []    Bed to chair [] [] [] [] [] [x] [] [] [] [] []    I=Independent, Mod I=Modified Independent, S=Supervision, SBA=Standby Assistance, CGA=Contact Guard Assistance,   Min=Minimal Assistance, Mod=Moderate Assistance, Max=Maximal Assistance, Total=Total Assistance, NT=Not Tested    [facility] AM-PAC 6 Clicks   Daily Activity Inpatient Short Form        How much help from another person does the patient currently need. .. Total A Lot A Little None   1. Putting on and taking off regular lower body clothing? [] 1   [x] 2   [] 3   [] 4   2. Bathing (including washing, rinsing, drying)? [] 1   [x] 2   [] 3   [] 4   3. Toileting, which includes using toilet, bedpan or urinal?   [] 1   [x] 2   [] 3   [] 4   4. Putting on and taking off regular upper body clothing? [] 1   [x] 2   [] 3   [] 4   5. Taking care of personal grooming such as brushing teeth? [] 1   [] 2   [x] 3   [] 4   6. Eating meals? [] 1   [] 2   [] 3   [x] 4   © 2007, Trustees of [facility], under license to [employer]. All rights reserved     Score:  Initial: 15, completed 7/11/2021 Most Recent: X (Date: -- )   Interpretation of Tool:  Represents activities that are increasingly more difficult (i.e. Bed mobility, Transfers, Gait). PLAN:   FREQUENCY/DURATION: OT Plan of Care: 3 times/week for duration of hospital stay or until stated goals are met, whichever comes first.    PROBLEM LIST:   (Skilled intervention is medically necessary to address:)  1. Decreased ADL/Functional Activities  2. Decreased Activity Tolerance  3. Decreased AROM/PROM  4. Decreased Balance  5. Decreased Coordination  6. Decreased Gait Ability  7. Decreased Strength  8. Decreased Transfer Abilities   INTERVENTIONS PLANNED:   (Benefits and precautions of occupational therapy have been discussed with the patient.)  1. Self Care Training  2. Therapeutic Activity  3.  Therapeutic Exercise/HEP  4. Education     TREATMENT:     EVALUATION: Moderate Complexity : (Untimed Charge)    TREATMENT:   ($$ Self Care/Home Management: 8-22 mins    )  Self Care (14 Minutes): Self care including Toileting, Upper Body Dressing, Lower Body Dressing, Self Feeding, Grooming, ADL Adaptive Equipment Training, Energy Conservation Training and safety in ADLs with L UE in sling to increase independence and decrease level of assistance required.     TREATMENT GRID:  N/A    AFTER TREATMENT POSITION/PRECAUTIONS:  Bed, Needs within reach, RN notified and MD at bedside    INTERDISCIPLINARY COLLABORATION:  MD/PA/NP, RN/PCT and OT/HARRISON    TOTAL TREATMENT DURATION:  14 mins  OT Patient Time In/Time Out  Time In: 3875  Time Out: 0813    YOUSUF Lester, MS, OTR/L

## 2021-07-11 NOTE — PROGRESS NOTES
Bedside shift change report given to Edy (oncoming nurse) by self (offgoing nurse). Report included the following information SBAR, Kardex, Procedure Summary, Intake/Output, MAR and Cardiac Rhythm AV paced.

## 2021-07-11 NOTE — PROGRESS NOTES
Problem: Falls - Risk of  Goal: *Absence of Falls  Description: Document Belinda Bustos Fall Risk and appropriate interventions in the flowsheet.   Outcome: Progressing Towards Goal  Note: Fall Risk Interventions:  Mobility Interventions: Bed/chair exit alarm         Medication Interventions: Teach patient to arise slowly    Elimination Interventions: Call light in reach              Problem: Patient Education: Go to Patient Education Activity  Goal: Patient/Family Education  Outcome: Progressing Towards Goal     Problem: Patient Education: Go to Patient Education Activity  Goal: Patient/Family Education  Outcome: Progressing Towards Goal     Problem: Pacer/ICD: Pre-Procedure  Goal: Nutrition/Diet  Outcome: Progressing Towards Goal  Goal: Respiratory  Outcome: Progressing Towards Goal     Problem: Pacer/ICD: Post-Procedure  Goal: Off Pathway (Use only if patient is Off Pathway)  Outcome: Progressing Towards Goal  Goal: Activity/Safety  Outcome: Progressing Towards Goal  Goal: Consults, if ordered  Outcome: Progressing Towards Goal  Goal: Diagnostic Test/Procedures  Outcome: Progressing Towards Goal  Goal: Nutrition/Diet  Outcome: Progressing Towards Goal  Goal: Discharge Planning  Outcome: Progressing Towards Goal  Goal: Medications  Outcome: Progressing Towards Goal  Goal: Treatments/Interventions/Procedures  Outcome: Progressing Towards Goal  Goal: Psychosocial  Outcome: Progressing Towards Goal  Goal: *Hemodynamically stable  Outcome: Progressing Towards Goal  Goal: *Optimal pain control at patient's stated goal  Outcome: Progressing Towards Goal  Goal: *Absence of signs and symptoms of infection or wound complication  Outcome: Progressing Towards Goal     Problem: Pacer/ICD: Day 1  Goal: Activity/Safety  Outcome: Progressing Towards Goal

## 2021-07-11 NOTE — RT PROTOCOL NOTE
RT Inhaler-Nebulizer Bronchodilator Protocol Note    There is a bronchodilator order in the chart from a provider indicating to follow the RT Bronchodilator Protocol and there is an Initiate RT Bronchodilator Protocol order as well (see protocol at bottom of note). The findings from the last RT Protocol Assessment were as follows:  Smoking: >15 Pack years  Surgical Status: No surgery  Xray: Clear  Respiratory Pattern: RR 12-20  Mental Status: Alert and oriented  Breath Sounds: Diminished and/or crackles  Cough: Strong, spontaneous, non-productive  Activity Level:    Oxygen Requirement: Room Air - 2LNC/28% or home setting (2L Home O2)  Indication for Bronchodilator Therapy: On home bronchodilators  Bronchodilator Assessment Score: 3    Aerosolized bronchodilator medication orders have been revised according to the RT Bronchodilator Protocol. RT Bronchodilator Protocol is as follows:    Respiratory Therapist to perform RT Therapy Protocol Assessment then follow the protocol. No Indications - adjust the frequency to every 6 hours PRN wheezing or bronchospasm, if no treatments needed after 48 hours then discontinue using Per Protocol order mode. If indication present, adjust the RT bronchodilator orders based on the Bronchodilator Assessment Score as follows:    0-6 - enter or revise RT bronchodilator order to Albuterol Inhaler order with frequency of every 2 hours PRN for wheezing or increased work of breathing using Per Protocol order mode. If Albuterol Inhaler not tolerate or not effective, then discontinue the Albuterol Inhaler order and enter Albuterol Nebulizer order with same frequency and PRN reasons. Repeat RT Therapy Protocol Assessment as needed.     7-10 - discontinue any other Inpatient aerosolized bronchodilator medication orders and enter or revise two Albuterol Inhaler orders, one with BID frequency and one with frequency of every 2 hours PRN wheezing or increased work of breathing using Per Protocol order mode. Repeat RT Therapy Protocol Assessment with second treatment then BID and as needed. If Albuterol Inhaler not tolerate or not effective, then discontinue the Albuterol Inhaler orders and enter two Albuterol Nebulizer orders with same frequencies and PRN reasons. 11-13 - discontinue any other Inpatient aerosolized bronchodilator medication orders and enter DuoNeb Nebulizer orders QID frequency and an Albuterol Nebulizer order every 2 hours PRN wheezing or increased work of breathing using Per Protocol order mode. Repeat RT Therapy Protocol Assessment with second treatment then QID and as needed. Greater than 13 - discontinue any other Inpatient bronchodilator aerosolized medication orders and enter DuoNeb Nebulizer order every 4 hours frequency and Albuterol Nebulizer every 2 hours PRN wheezing or increased work of breathing using Per Protocol order mode. Repeat RT Therapy Protocol Assessment with second treatment then every 4 hours and as needed. RT to enter RT Home Evaluation for COPD & MDI Assessment order using Per Protocol order mode.     Electronically signed by RT Nette on 7/11/2021 at 10:50 AM

## 2021-07-12 NOTE — PROGRESS NOTES
Physical Therapy Note    Chart reviewed and RN consulted about physical therapy today. RN stated patient has had swelling around his pacer site and to hold therapy for today (7/12/21). PT to resume with POC tomorrow as able. Thank you,   Shirley Parks, PT DPT.

## 2021-07-12 NOTE — ANESTHESIA POSTPROCEDURE EVALUATION
Procedure(s):  ABLATION AV NODE  INSERT ICD BIV MULTI.    total IV anesthesia    Anesthesia Post Evaluation      Multimodal analgesia: multimodal analgesia used between 6 hours prior to anesthesia start to PACU discharge  Patient location during evaluation: bedside  Patient participation: complete - patient participated  Level of consciousness: awake and responsive to light touch  Pain management: adequate  Airway patency: patent  Anesthetic complications: no  Cardiovascular status: acceptable, hemodynamically stable, blood pressure returned to baseline and stable  Respiratory status: acceptable, unassisted, spontaneous ventilation and nonlabored ventilation  Hydration status: acceptable        INITIAL Post-op Vital signs:   Vitals Value Taken Time   /78 07/12/21 0352   Temp 36.7 °C (98 °F) 07/12/21 0352   Pulse 85 07/12/21 0352   Resp 22 07/12/21 0352   SpO2 100 % 07/12/21 0352

## 2021-07-12 NOTE — ROUTINE PROCESS
Bedside and Verbal shift change report given to self (oncoming nurse) by Bailey Guerrero (offgoing nurse). Report included the following information SBAR, Kardex, MAR and Recent Results.

## 2021-07-12 NOTE — ROUTINE PROCESS
Bedside and Verbal shift change report given to [address] Bat[address] (oncoming nurse) by self (offgoing nurse). Report included the following information SBAR, Kardex, MAR and Recent Results.

## 2021-07-12 NOTE — PROGRESS NOTES
CM met with pt to discuss DCP. Pt lives alone in an apartment on the 9th floor. Pt has a CLTC 5 days/wk for 4 hours/daily. Pt has home O2 with [employer]. Pt states he get Meals on Wheels. PT/OT recommending home health. Pt is agreeable. Care Management Interventions  PCP Verified by CM:  Yes [address])  Last Visit to PCP: 06/07/21  Transition of Care Consult (CM Consult): Discharge Planning  Discharge Durable Medical Equipment: No  Physical Therapy Consult: Yes  Occupational Therapy Consult: Yes  Speech Therapy Consult: No  Discharge Location  Discharge Placement: Home with home health

## 2021-07-12 NOTE — PROGRESS NOTES
Spiritual Care Visit, initial visit. Visited with patient at bedside. Prayed for patient's healing and health. Visit by Jo Mejía, Staff .  Isidro., Fabiano.B., B.A.

## 2021-07-12 NOTE — PROGRESS NOTES
Pt having some swelling and bruising around pacer site. Had assssed this AM and was stable but upon assessing again at 8:30 swelling looked like it had increased. Pt assisted back into bed and sandbag placed. Maynor Sarah NP made aware. Will continue to monitor.

## 2021-07-12 NOTE — PROGRESS NOTES
OT Note:    OT attempted to see patient this afternoon for therapy. RN stated patient has had swelling around his pacer site and to hold therapy for today (7/12/21). OT to resume with POC tomorrow as able.      Thanks,  Luis Lr

## 2021-07-12 NOTE — PROGRESS NOTES
Comprehensive Nutrition Assessment    Type and Reason for Visit: Initial, Positive nutrition screen  Best Practice Alert for Malnutrition Screening Tool: Recently Lost Weight Without Trying: Yes, If Yes, How Much Weight Loss: 2-13 lbs, Eating Poorly Due to Decreased Appetite: Yes    Nutrition Recommendations/Plan:   Meals and Snacks:  Continue current diet. Add sodium restriction. Nutrition Supplement Therapy:   Medical food supplement therapy:  Initiate Nepro once per day (this provides 420 kcal and 19 grams protein per bottle)     Malnutrition Assessment:  Malnutrition Status: Mild malnutrition  Context: Chronic illness  Findings of clinical characteristics of malnutrition:   Energy Intake:  Mild decrease in energy intake (specify) (poor PO during recurrent admissions)  Weight Loss:  Mild weight loss (specify amount and time period) (9.8% in 6 months)     Body Fat Loss:  No significant body fat loss,     Muscle Mass Loss:  1 - Mild muscle mass loss (mild to moderate), Temples (temporalis)  Fluid Accumulation:  No significant fluid accumulation,     Strength:  Not performed     Nutrition Assessment:   Nutrition History: Patient was only home ~2 days prior to being readmitted. When asked how he was eating while at home, he states \"I don't know. \" He has been followed by nutrition during recent previous admissions and noted with poor PO that eventaully improved. Patient confirms this. Nutrition Background: Patient with PMH significant for COPD, left lung mass, Afib, anemia and recurrent GI bleed. HLD, HTN, CHF, prostate Ca, AAA, CHF, CKD. He was recently discharged after being admitted for COPD exacerbation. He presented back 2 days later with palpatations and admitted with recurrent Afib with RVR. Now s/p CRT-D and AV node ablation. Daily Update:  Patient seen lying in bed. He states he ate all of his breakfast and lunch today. He states he cannot remember his meals yesterday.  When RD addressing wt loss, he states that it seems like his appetite is variable. He states that he does enjoy Ensure that he has received in the past and states he thinks they make him feel better. Noted K has been high to high normal since admission. Current Nutrition Therapies:  ADULT DIET Regular    Current Intake:   Average Meal Intake: % Average Supplement Intake: None ordered      Anthropometric Measures:  Height: 5' 9\" (175.3 cm)  Current Body Wt: 73.6 kg (162 lb 4.1 oz) (7/12), Weight source: Standing scale  BMI: 24, Normal weight (BMI 22.0-24.9) age over 72     Ideal Body Weight (lbs) (Calculated): 160 lbs (73 kg), 101.4 %  Usual Body Wt: 81.6 kg (179 lb 14.3 oz) (office wt 1/22), Percent weight change: -9.8          Edema: No data recorded   Estimated Daily Nutrient Needs:  Energy (kcal/day): 5796-1791 (Kcal/kg (30-35), Weight Used: Ideal (73 kg))  Protein (g/day): 73-88 (1-1.2 g/kg) Weight Used: (Ideal)  Fluid (ml/day):   (1 ml/kcal)    Nutrition Diagnosis:   · Unintended weight loss related to  (recurrent hospitalizations with variable PO) as evidenced by  (noted recent admissions with poor PO, 9.8% wt loss)    Nutrition Interventions:   Food and/or Nutrient Delivery: Modify current diet, Start oral nutrition supplement     Coordination of Nutrition Care: Continue to monitor while inpatient  Plan of Care discussed with Chyna Gonzalez RN    Goals:       Active Goal: Meet at >75% nutrition needs by RD follow-up    Nutrition Monitoring and Evaluation:      Food/Nutrient Intake Outcomes: Food and nutrient intake, Supplement intake       Discharge Planning:    Continue oral nutrition supplement    [facility] LD on 7/12/2021 at 2:35 PM  Contact: [phone]

## 2021-07-12 NOTE — ROUTINE PROCESS
Bedside and Verbal shift change report given to self (oncoming nurse) by Libia Shelton (offgoing nurse). Report included the following information SBAR, Kardex, MAR and Recent Results.

## 2021-07-12 NOTE — PROGRESS NOTES
[facility] CARDIOLOGY PROGRESS NOTE           7/12/2021 1:15 PM    Admit Date: 7/7/2021      Subjective:   No cp or inc sob      Objective:      Vitals:    07/12/21 0401 07/12/21 0725 07/12/21 0834 07/12/21 1135   BP:  (!) 143/63  (!) 151/76   Pulse:  83  88   Resp:  18  18   Temp:  98.1 °F (36.7 °C)  96.8 °F (36 °C)   SpO2:  100% 94% 100%   Weight: 73.6 kg (162 lb 3.2 oz)      Height:           Physical Exam:  General-No Acute Distress  Neck- supple, no JVD  CV- regular rate and rhythm no MRG, some   Lung- + wheezing  Abd- soft, nontender, nondistended  Ext- no edema bilaterally. Skin- warm and dry    Data Review:   Recent Labs     07/12/21  0809 07/11/21  0518 07/10/21  0410     --  142   K 4.9  --  5.3*   BUN 42*  --  43*   CREA 1.29  --  1.50   *  --  104*   WBC  --  12.6*  --    HGB  --  9.2*  --    HCT  --  31.4*  --    PLT  --  115*  --        Assessment/Plan:     S/p CRT-D and AVNA  Severe copd  Nicm  Ckd    ////    Dec Toprol to 100 since he has had an AVNA and to dec. lung effect. Add ACEI since he has been on in the past and keep eye on K+. No OAC due to prior bleed/anemia and turned down for watchman in the past.  Home/rehab when all stable.     Belkis Rao MD  7/12/2021 1:15 PM

## 2021-07-13 PROBLEM — E44.1 MILD PROTEIN-CALORIE MALNUTRITION (HCC): Status: ACTIVE | Noted: 2021-01-01

## 2021-07-13 NOTE — PROGRESS NOTES
CM met with pt to discuss DCP with possible discharge today. Pt verbalizes \"I'm feeling like I did before and they rushed me out of here\". Pt encouraged to get up and move around and work with PT/OT for eval since pt not seen since 7/10. Pt had CLTC 5 days/wk for 3 hours but lives alone. CM sent referrals to [address] [place] [address] for possible STR to LTC. Pt with both Medicare and Medicaid. CM to continue to follow. 1425 Pt accepted to [address] by Sridhar Jeter. Sridhar Jeter verified that she is beginning prior Fady John with pt's "[employer]. Pt in agreement. CM will continue to follow for discharge needs.

## 2021-07-13 NOTE — ROUTINE PROCESS
Bedside and Verbal shift change report given to Brandee Loya (oncoming nurse) by self (offgoing nurse). Report included the following information SBAR, Kardex, MAR and Recent Results.

## 2021-07-13 NOTE — ROUTINE PROCESS
Bedside and Verbal shift change report given to self (oncoming nurse) by Buffy Adams (offgoing nurse). Report included the following information SBAR, Kardex, Intake/Output and MAR.

## 2021-07-13 NOTE — PROGRESS NOTES
[facility] CARDIOLOGY PROGRESS NOTE           7/13/2021 9:41 AM    Admit Date: 7/7/2021      Subjective:   Patient with chronic systolic heart failure that appears volume stable. He has severe O2 dependent COPD. He is severely disabled with severe deconditioning at baseline. Patient is now stable status post biventricular ICD AV node ablation. ROS:  Cardiovascular:  As noted above    Objective:      Vitals:    07/13/21 0119 07/13/21 0508 07/13/21 0715 07/13/21 0725   BP: 120/67 (!) 144/76  (!) 144/66   Pulse: 91 78  83   Resp: 18 18  18   Temp: 98.3 °F (36.8 °C) 98.5 °F (36.9 °C)  98 °F (36.7 °C)   SpO2: 100% 100% 95% 98%   Weight:  162 lb 6.4 oz (73.7 kg)     Height:           Physical Exam:  General-No Acute Distress  Neck- supple, no JVD  CV- regular rate and rhythm no MRG  Lung- clear bilaterally  Abd- soft, nontender, nondistended  Ext- no edema bilaterally. Skin- warm and dry    Data Review:   Recent Labs     07/13/21  0756 07/12/21  0809 07/11/21  0518    142  --    K 4.8 4.9  --    MG 2.1  --   --    BUN 36* 42*  --    CREA 1.08 1.29  --    GLU 81 136*  --    WBC 10.5  --  12.6*   HGB 8.6*  --  9.2*   HCT 28.8*  --  31.4*   PLT 81*  --  115*       Assessment/Plan:     Principal Problem:    Atrial flutter with rapid ventricular response ([facility] (7/23/2018)  Patient with very difficult to rate control atrial flutter. He is now status post biventricular ICD and AV node ablation. Patient is deemed not an anticoagulation candidate due to severe deconditioning weakness and frequent falls. Active Problems:    COPD (chronic obstructive pulmonary disease) ([facility] (5/2/2014)  Severe O2 dependent COPD managed by pulmonary. Heart failure with reduced ejection fraction ([facility] (9/9/2019)  Ejection fraction 30-35%. Patient stable on medical therapy. Volume status is stable. CKD (chronic kidney disease), stage III ([facility] (9/9/2019)  Renal function is stable.       Mild protein-calorie malnutrition ([facility] (5/19/2021)  Chronic      Hyperkalemia (7/7/2021)  This is stable. Anemia of chronic disease  This appears mildly worse. Will need to be followed closely as an outpatient. We will speak with , physical therapy, Occupational Therapy to determine appropriate discharge planning. Patient is high risk for readmission due to medical noncompliance and extensive poorly controlled chronic health issues.           Nikos Falcon MD  7/13/2021 9:41 AM

## 2021-07-13 NOTE — DISCHARGE SUMMARY
[facility] Cardiology Discharge Summary     Patient ID:  Terell Taylorkeeper  [id number]  68 y.o.  1940    Admit date: 7/7/2021    Discharge date:  7/14/2021    Admitting Physician: Salvador Roche MD     Discharge Physician: Dr. Chelly Martínez    Admission Diagnoses: Atrial flutter with rapid ventricular response [facility]) [I48.92]    Discharge Diagnoses:    Diagnosis    Atrial flutter with RVR    Non ischemic dilated cardiomyopathy    Hyperkalemia     Chronic systolic congestive heart failure ([address]    COPD     Iron deficiency anemia    AAA (abdominal aortic aneurysm) (HCC)    Chronic hypoxemic respiratory failure (HCC)    CKD (chronic kidney disease), stage III (HCC)    Hypertension    Hyperlipemia    Supplemental oxygen dependent    OA (osteoarthritis)     Cardiology Procedures this admission:  AV node ablation, Biotronik BiV ICD    Consults: ID and EP    Hospital Course: Patient is 68year old male with longstanding heart failure with reduced ejection fraction that is nonischemic in nature. 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 He is known to have recurrent paroxysmal atrial flutter with rapid ventricular response. He is readmitted after recent discharge with recurrent RVR. A Cardizem bolus was given in the ER with good response. The patient was admitted to telemetry and Cardizem drip was continued. Not a candidate for [address] [place] [address] Road secondary to prior anemia and recurrent GIB. Electrophysiology consulted to assess patient for possible pace ablate strategy given underlying nonischemic cardiomyopathy patient may benefit from BiV pacing plus or minus ICD and AV node ablation. Patient was taken to the EP lab and underwent AV node ablation and successful implantation of of Biotronik biventricular implantable cardioverter defibrillator by Dr. Perlita Maradiaga. Patient tolerated the procedure well and was taken to the telemetry floor for recovery.  Follow up chest xray showed no pneumothorax. The following morning device interrogation showed normal function. Patient was monitor on telemetry with medications adjustments. On the day of discharge patient was up feeling well without any complaints. Patient's left subclavian cath site was clean, dry and intact without hematoma. Patient's labs were stable. Patient was seen and examined by Dr. Primitivo Guthrie and determined stable and ready for discharge. Patient was instructed on the importance of medication compliance and outpatient follow up. Patient has been scheduled for follow-up with [address] [place] [address] [address] Cardiology -- device clinic on July 20th at 3:00 pm. Has chronic anemia but seemed worse while inpatient. Will follow closely as outpatient. CBC in one week. DISPOSITION: Patient has been instructed to keep affected arm below shoulder level for the next 4 weeks or until cleared by doctor. The arm sling should be worn while sleeping. The dressing will be removed at follow-up. The incision site must be kept clean and dry. The patient may shower in a few days. Lotions, powders, or creams should be avoided as these can increase the risk of infection. The affected arm should not be used for any pushing, pulling, or lifting until cleared by doctor. Driving is prohibited until cleared by doctor in a follow up appointment. Any signs of infection including increased redness, suspicious drainage, or unexplained fever should be reported to the doctor immediately by calling [phone]. Discharge Exam:   Visit Vitals  BP (!) 122/50 (BP 1 Location: Right upper arm, BP Patient Position: Sitting)   Pulse 70   Temp 98.3 °F (36.8 °C)   Resp 18   Ht 5' 9\" (1.753 m)   Wt 79 kg (174 lb 3.2 oz)   SpO2 98%   BMI 25.72 kg/m²     Patient has been seen by Dr. Primitivo Guthrie: see his progress note for exam details.     Recent Results (from the past 24 hour(s))   METABOLIC PANEL, BASIC    Collection Time: 07/14/21  4:17 AM   Result Value Ref Range    Sodium 142 136 - 145 mmol/L Potassium 4.7 3.5 - 5.1 mmol/L    Chloride 105 98 - 107 mmol/L    CO2 39 (H) 21 - 32 mmol/L    Anion gap Cannot be calculated 7 - 16 mmol/L    Glucose 101 (H) 65 - 100 mg/dL    BUN 39 (H) 8 - 23 MG/DL    Creatinine 1.29 0.8 - 1.5 MG/DL    GFR est AA >60 >60 ml/min/1.73m2    GFR est non-AA 57 (L) >60 ml/min/1.73m2    Calcium 8.2 (L) 8.3 - 10.4 MG/DL   CBC WITH AUTOMATED DIFF    Collection Time: 07/14/21  4:17 AM   Result Value Ref Range    WBC 11.1 4.3 - 11.1 K/uL    RBC 3.19 (L) 4.23 - 5.6 M/uL    HGB 8.7 (L) 13.6 - 17.2 g/dL    HCT 29.7 (L) 41.1 - 50.3 %    MCV 93.1 79.6 - 97.8 FL    MCH 27.3 26.1 - 32.9 PG    MCHC 29.3 (L) 31.4 - 35.0 g/dL    RDW 17.0 (H) 11.9 - 14.6 %    PLATELET 81 (L) 671 - 450 K/uL    MPV 10.3 9.4 - 12.3 FL    ABSOLUTE NRBC 0.05 0.0 - 0.2 K/uL    DF AUTOMATED      NEUTROPHILS 70 43 - 78 %    LYMPHOCYTES 16 13 - 44 %    MONOCYTES 11 4.0 - 12.0 %    EOSINOPHILS 0 (L) 0.5 - 7.8 %    BASOPHILS 0 0.0 - 2.0 %    IMMATURE GRANULOCYTES 2 0.0 - 5.0 %    ABS. NEUTROPHILS 7.8 1.7 - 8.2 K/UL    ABS. LYMPHOCYTES 1.8 0.5 - 4.6 K/UL    ABS. MONOCYTES 1.2 0.1 - 1.3 K/UL    ABS. EOSINOPHILS 0.0 0.0 - 0.8 K/UL    ABS. BASOPHILS 0.0 0.0 - 0.2 K/UL    ABS. IMM. GRANS. 0.3 0.0 - 0.5 K/UL   MAGNESIUM    Collection Time: 07/14/21  4:17 AM   Result Value Ref Range    Magnesium 2.1 1.8 - 2.4 mg/dL     Patient Instructions:   Current Discharge Medication List      START taking these medications    Details   lisinopriL (PRINIVIL, ZESTRIL) 10 mg tablet Take 1 Tablet by mouth daily. Qty: 30 Tablet, Refills: 3  Start date: 7/15/2021         CONTINUE these medications which have CHANGED    Details   metoprolol succinate (TOPROL-XL) 100 mg tablet Take 1 Tablet by mouth daily.  Indications: ventricular rate control in atrial fibrillation  Qty: 30 Tablet, Refills: 1  Start date: 7/14/2021         CONTINUE these medications which have NOT CHANGED    Details   guaiFENesin ER (MUCINEX) 600 mg ER tablet Take 1 Tablet by mouth two (2) times a day. Qty: 14 Tablet, Refills: 0    Associated Diagnoses: COPD exacerbation (HCC)      furosemide (Lasix) 40 mg tablet Take 1 Tablet by mouth daily. Qty: 90 Tablet, Refills: 3    Associated Diagnoses: Congestive heart failure, unspecified HF chronicity, unspecified heart failure type (HCC)      allopurinoL (ZYLOPRIM) 300 mg tablet Take 300 mg by mouth daily. docusate sodium (COLACE) 100 mg capsule Take 1 Capsule by mouth daily. Qty: 90 Capsule, Refills: 2    Associated Diagnoses: Chronic constipation      albuterol-ipratropium (DUO-NEB) 2.5 mg-0.5 mg/3 ml nebu 3 mL by Nebulization route three (3) times daily for 180 days. Qty: 810 mL, Refills: 1    Associated Diagnoses: Chronic obstructive pulmonary disease, unspecified COPD type (HCC)      rosuvastatin (Crestor) 40 mg tablet Take 1 Tab by mouth nightly. Qty: 90 Tab, Refills: 1    Associated Diagnoses: Heart failure with reduced ejection fraction ([facility])      nitroglycerin (NITROSTAT) 0.4 mg SL tablet 1 Tab by SubLINGual route every five (5) minutes as needed for Chest Pain (call EMS if pain fails to resolve after 2 tabs. max daily dose of 3 tabs). Indications: after 15 minutes or 3 doses, if chest pain persists, contact EMS/911  Qty: 1 Bottle, Refills: 11    Associated Diagnoses: Heart failure with reduced ejection fraction (HCC)      tamsulosin (FLOMAX) 0.4 mg capsule Take 1 Cap by mouth daily. Qty: 90 Cap, Refills: 3    Associated Diagnoses: Prostate cancer ([facility] [address] [facility]      aspirin delayed-release 81 mg tablet Take 81 mg by mouth daily. albuterol (PROVENTIL HFA, VENTOLIN HFA, PROAIR HFA) 90 mcg/actuation inhaler TAKE 2 PUFFS BY MOUTH EVERY 4 HOURS AS NEEDED FOR WHEEZE      latanoprost (XALATAN) 0.005 % ophthalmic solution LOCATION  BOTH EYES. INSTILL ONE DROP INTO EACH EYE AT BEDTIME      famotidine (PEPCID) 40 mg tablet Take 1 Tab by mouth daily.   Qty: 90 Tab, Refills: 3      albuterol (PROVENTIL VENTOLIN) 2.5 mg /3 mL (0.083 %) nebu Take 2.5 mg by inhalation every six (6) hours as needed for Shortness of Breath or Wheezing. budesonide (PULMICORT) 0.5 mg/2 mL nbsp 2 mL by Nebulization route two (2) times a day. Qty: 1 Each, Refills: 0      OXYGEN-AIR DELIVERY SYSTEMS 2 L/min by Nasal route daily. nasal canula      brimonidine (ALPHAGAN P) 0.1 % ophthalmic solution Administer 1 Drop to both eyes two (2) times a day. Signed:  Jamar Barnett PA-C  7/14/2021  11:14 AM    ATTENDING ADDENDUM:    Patient seen and examined by me. Agree with above note by physician extender. Key findings are:  No CP or SIM, pacing appropriately on telemetry and defibrillator site clean and dry. No angina, CHF, or palpitations overnight and lying supine comfortably. He is changed his mind and wants to go home and does not want rehab. We will continue medications as noted above. He is deemed to be a poor anticoagulation candidate due to severe deconditioning, anemia, weakness and frequent falls. He will follow-up in pacer clinic and with his primary cardiologist in a couple of weeks. CV- RRR without murmur  Lungs- Clear bilaterally  Abd- soft, nontender, nondistended  Ext- no edema, defibrillator site in the left chest clean dry and intact with no wounds or bleeding. Plan: As above.     Kelvin Hernandez MD  [facility] Cardiology  Pager [phone]

## 2021-07-13 NOTE — ROUTINE PROCESS
Bedside and Verbal shift change report given to self (oncoming nurse) by Megan Schmitt RN (offgoing nurse). Report included the following information SBAR, Kardex, Procedure Summary, MAR and Recent Results. A/V paced.

## 2021-07-14 NOTE — CASE COMMUNICATION
Patient discharged [facility] and was admitted to [address] [place] [address] rehab. Discharge home care services.

## 2021-07-14 NOTE — PROGRESS NOTES
PT attempted treatment this morning but the patient states he is preparing to go home and had already been up with OT and had walked some already in the room with nursing. He did not want another therapy treatment before going home today.     Matthew Lim, TUNRERT

## 2021-07-14 NOTE — ROUTINE PROCESS
Bedside and Verbal shift change report given to Kathy Bertrand RN (oncoming nurse) by self (offgoing nurse). Report included the following information SBAR, Kardex, Procedure Summary, MAR, Recent Results and Cardiac Rhythm AV paced. Left subclavian s/p pacer site, stable bruising, edema noted, unchanged. dsg c/d/i. Sling in place. Rt groin s/p ablation c/d/i. Keara Fernandez

## 2021-07-14 NOTE — ROUTINE PROCESS
Discharge instructions reviewed with Lanny Bonilla. Prescriptions given for lisinopril and metoprolol and med info sheets provided for all new medications. Opportunity for questions provided. Patient voiced understanding of all discharge instructions.      IV and heart monitor removed   Esign not available

## 2021-07-14 NOTE — PROGRESS NOTES
ACUTE OT GOALS:  (Developed with and agreed upon by patient and/or caregiver.)  1. Patient will verbalize and demonstrate understanding of cardiac device precautions with 100% accuracy during OT treatment session. 2. Patient will complete functional transfers with mod I.   3. Patient will tolerate at least 23 minutes of OT treatment with less than 2 rest breaks to increase activity tolerance for ADLs. 4. Patient will complete full body bathing and dressing with min A.   5. Patient will complete toileting with mod I.   6. Patient will complete self feeding and grooming with mod I.   7. Patient will complete household distances with mod I and least restrictive device.      Timeframe: 7 visits     OCCUPATIONAL THERAPY: Daily Note OT Treatment Day # 1    Brendon Swan is a 68 y.o. male   PRIMARY DIAGNOSIS: Atrial flutter with rapid ventricular response (HCC)  Atrial flutter with rapid ventricular response (HCC) [I48.92]  Procedure(s) (LRB):  ABLATION AV NODE (N/A)  INSERT ICD BIV MULTI (N/A)  5 Days Post-Op  Payor: [employer] MEDICARE / Plan: [employer] / Product Type: [employer] Care Medicare /   ASSESSMENT:     REHAB RECOMMENDATIONS: CURRENT LEVEL OF FUNCTION:  (Most Recently Demonstrated)   Recommendation to date pending progress:  [address] [place] [address]  Equipment:    To Be Determined Bathing:   Minimal Assistance  Dressing:   Minimal Assistance  Feeding/Grooming:   Set Up  Toileting:   Standby Assistance  Functional Mobility:   Contact Guard Assistance     ASSESSMENT:  Mr. Yaquelin Butterfield presents with mild deficits in overall strength, activity tolerance, ADL performance, and functional mobility. S/p ablation with LUE placed in sling. Doing well; managed bed mobility with supervision. Stood and completed functional mobility and bathroom mobility with CGA. Performed toileting with supervision. OOB to chair. Progressing well towards goals.  Will continue OT efforts. SUBJECTIVE:   Mr. Ambrocio Lagos states, \"I just don't want to be pushed out too soon. \"    SOCIAL HISTORY/LIVING ENVIRONMENT:   Home Environment: Apartment (9th floor, uses elevator)  # Steps to Enter:  (0)  One/Two Story Residence:  (9th floor apartment, uses elevator)  Living Alone: Yes  Support Systems: Family member(s), Home care staff (has niece)    OBJECTIVE:     PAIN: VITAL SIGNS: LINES/DRAINS:   Pre Treatment: Pain Screen  Pain Scale 1: Numeric (0 - 10)  Pain Intensity 1: 0  Post Treatment: 0   none  O2 Device: Nasal cannula     ACTIVITIES OF DAILY LIVING: I Mod I S SBA CGA Min Mod Max Total NT Comments   BASIC ADLs:              Bathing/ Showering [] [] [] [] [] [] [] [] [] [x]    Toileting [] [] [] [] [] [] [] [] [] [x]    Dressing [] [] [] [] [] [] [] [] [] [x]    Feeding [] [] [] [] [] [] [] [] [] [x]    Grooming [] [] [] [] [] [] [] [] [] [x]    Personal Device Care [] [] [] [] [] [] [] [] [] [x]    Functional Mobility [] [] [] [] [x] [] [] [] [] []    I=Independent, Mod I=Modified Independent, S=Supervision, SBA=Standby Assistance, CGA=Contact Guard Assistance,   Min=Minimal Assistance, Mod=Moderate Assistance, Max=Maximal Assistance, Total=Total Assistance, NT=Not Tested    MOBILITY: I Mod I S SBA CGA Min Mod Max Total  NT x2 Comments:   Supine to sit [] [] [] [x] [] [] [] [] [] [] []    Sit to supine [] [] [] [] [] [] [] [] [] [x] []    Sit to stand [] [] [] [x] [x] [] [] [] [] [] []    Bed to chair [] [] [] [x] [x] [] [] [] [] [] []    I=Independent, Mod I=Modified Independent, S=Supervision, SBA=Standby Assistance, CGA=Contact Guard Assistance,   Min=Minimal Assistance, Mod=Moderate Assistance, Max=Maximal Assistance, Total=Total Assistance, NT=Not Tested    BALANCE: Good Fair+ Fair Fair- Poor NT Comments   Sitting Static [x] [] [] [] [] []    Sitting Dynamic [x] [] [] [] [] []              Standing Static [] [x] [] [] [] []    Standing Dynamic [] [x] [] [] [] []      PLAN: FREQUENCY/DURATION: OT Plan of Care: 3 times/week for duration of hospital stay or until stated goals are met, whichever comes first.    TREATMENT:   TREATMENT:   ( $$ Neuromuscular Re-Education: 8-22 mins   )  Neuromuscular Re-education (10 Minutes): Neuromuscular Re-education included Balance Training, Coordination training, Functional mobility with facilitation, Postural training, Sitting balance training and Standing balance training to improve Balance, Coordination, Functional Mobility and Postural Control.     TREATMENT GRID:  N/A    AFTER TREATMENT POSITION/PRECAUTIONS:  Chair, Needs within reach and RN notified    INTERDISCIPLINARY COLLABORATION:  RN/PCT and OT/HARRISON    TOTAL TREATMENT DURATION:  OT Patient Time In/Time Out  Time In: 1496  Time Out: Eulogio 21, OT

## 2021-07-14 NOTE — DISCHARGE INSTRUCTIONS
CARDIAC DEVICE INSTRUCTION SHEET    1. You should have received a 1-2 weeks follow up appointment upon discharge from the hospital.  If you did not receive this appointment prior to leaving the hospital, please contact us at [phone]. 2.  Keep your incision dry for 14 days. DO NOT put ointments, creams or lotions on the incision for 2 weeks. 3.  Your dressing will be removed at your follow up appointment. If you have sutures/staples, the nurse will remove them at the follow up appointment. 4.  Call us IMMEDIATELY if you develop fever, pain, redness, or drainage at the incision site. 5.  You may use your pacemaker/ICD arm, but keep your arm lower than your shoulder for the first 8 weeks (or until cleared by a physician) to prevent the device lead(s) from moving. 6.  Do not lift more than 10 pounds for 4 weeks and 20 pounds for 8 weeks. 7.  Within 6 weeks you should receive your cardiac device ID card. Carry your card with you at all times. Always show it to any doctor or dentist you see. 8.    You will need to have your device evaluated every 3 months via office, remote, or telephone. 9.  Microwaves WILL NOT harm your device. Warnings do not apply to you. 10.  At airports, always show your cardiac device ID card. You may walk through metal detectors but do not allow the hand held wand near your device. 11.  DO NOT have an MRI without contacting your cardiologists office first.     12.  Please refer to the education booklet given to you at the hospital for the activities and equipment you should avoid. Some may reprogram or interfere with your cardiac device. Patient has been instructed to keep affected arm below shoulder level for the next 4 weeks or until cleared by doctor. The arm sling should be worn while sleeping. The dressing will be removed at follow-up. The incision site must be kept clean and dry. The patient may shower in a few days.   Lotions, powders, or creams should be avoided as these can increase the risk of infection. The affected arm should not be used for any pushing, pulling, or lifting until cleared by doctor. Driving is prohibited until cleared by doctor in a follow up appointment. Any signs of infection including increased redness, suspicious drainage, or unexplained fever should be reported to the doctor immediately by calling [phone]. Patient Education        Pacemaker Placement: What to Expect at Home  Your Recovery     Pacemaker placement is surgery to put a pacemaker in your chest. This surgery may be done if you have bradycardia (a slow heart rate). Your doctor made a cut (incision) in your chest. The doctor put the pacemaker leads through the cut, into a large blood vessel, then into the heart. The doctor put the pacemaker under the skin of your chest and attached the leads to it. Your chest may be sore where the doctor made the cut. You also may have a bruise and mild swelling. These symptoms usually get better in 1 to 2 weeks. You may feel a hard ridge along the incision. This usually gets softer in the months after surgery. You may be able to see or feel the outline of the pacemaker under your skin. You will probably be able to go back to work or your usual routine 1 to 2 weeks after surgery. Pacemaker batteries usually last 5 to 15 years. Your doctor will talk to you about how often you will need to have your pacemaker checked. You'll need to take steps to safely use electric devices. Some of these devices can stop your pacemaker from working right for a short time. Check with your doctor about what to avoid and what to keep a short distance away from your pacemaker. For example, you will need to stay away from things with strong magnetic and electrical fields. An example is an MRI machine (unless your pacemaker is safe for an MRI).  You can use a cellphone and other wireless devices, but keep them at least 6 inches away from your pacemaker. Many household and office electronics don't affect a pacemaker. These include kitchen appliances and computers. This care sheet gives you a general idea about how long it will take for you to recover. But each person recovers at a different pace. Follow the steps below to get better as quickly as possible. How can you care for yourself at home? Activity    · Rest when you feel tired.     · Be active. Walking is a good choice.     · For 4 to 6 weeks:  ? Avoid activities that strain your chest or upper arm muscles. This includes pushing a  or vacuum, or mopping floors. It also includes swimming, or swinging a golf club or tennis racquet. ? Do not raise your arm (the one on the side of your body where the pacemaker is located) above your shoulder. ? Allow your body to heal. Don't move quickly or lift anything heavy until you are feeling better.     · Many people are able to return to work within 1 to 2 weeks after surgery.     · Ask your doctor when it is okay for you to have sex. Diet    · You can eat your normal diet. If your stomach is upset, try bland, low-fat foods like plain rice, broiled chicken, toast, and yogurt. Medicines    · Your doctor will tell you if and when you can restart your medicines. He or she will also give you instructions about taking any new medicines.     · If you take aspirin or some other blood thinner, be sure to talk to your doctor. He or she will tell you if and when to start taking this medicine again. Make sure that you understand exactly what your doctor wants you to do.     · Be safe with medicines. Read and follow all instructions on the label. ? If the doctor gave you a prescription medicine for pain, take it as prescribed. ? If you are not taking a prescription pain medicine, ask your doctor if you can take an over-the-counter medicine.   ? Do not take aspirin, ibuprofen (Advil, Motrin), naproxen (Aleve), or other nonsteroidal anti-inflammatory drugs (NSAIDs) unless your doctor says it is okay.     · If your doctor prescribed antibiotics, take them as directed. Do not stop taking them just because you feel better. You need to take the full course of antibiotics. Incision care    · If you have strips of tape on the incision, leave the tape on for a week or until it falls off.     · Keep the incision dry while it heals. Your doctor may recommend sponge baths for about 7 days, but do not get the incision wet. Your doctor will let you know when you may take showers. After a shower, pat the incision dry.     · Don't use hydrogen peroxide or alcohol on the incision, which can slow healing. You may cover the area with a gauze bandage if it oozes fluid or rubs against clothing. Change the bandage every day.     · Do not take a bath or get into a hot tub for the first 2 weeks, or until your doctor tells you it is okay. Other instructions    · Keep a medical ID card with you at all times that says you have a pacemaker. The card should include the  and model information.     · Wear medical alert jewelry stating that you have a pacemaker. You can buy this at most [employer].     · Check your pulse as directed by your doctor.     · Have your pacemaker checked as often as your doctor recommends. In some cases, this may be done over the phone or the Internet. Your doctor will give you instructions about how to do this. Follow-up care is a key part of your treatment and safety. Be sure to make and go to all appointments, and call your doctor if you are having problems. It's also a good idea to know your test results and keep a list of the medicines you take. When should you call for help? Call 911 anytime you think you may need emergency care. For example, call if:    · You passed out (lost consciousness).     · You have trouble breathing.    Call your doctor now or seek immediate medical care if:    · You are dizzy or light-headed, or you feel like you may faint.     · You have pain that does not get better after you take pain medicine.     · You hear an alarm or feel a vibration from your pacemaker.     · You have loose stitches, or your incision comes open.     · Bright red blood has soaked through the bandage over your incision.     · You have signs of infection, such as:  ? Increased pain, swelling, warmth, or redness. ? Red streaks leading from the incision. ? Pus draining from the incision. ? A fever. Watch closely for changes in your health, and be sure to contact your doctor if:    · You have any problems with your pacemaker. Where can you learn more? Go to http://www.gray.com/  Enter G550 in the search box to learn more about \"Pacemaker Placement: What to Expect at Home. \"  Current as of: August 31, 2020               Content Version: 12.8  © 2006-2021 [employer]. Care instructions adapted under license by [employer] (which disclaims liability or warranty for this information). If you have questions about a medical condition or this instruction, always ask your healthcare professional. [place] [address] any warranty or liability for your use of this information. Patient Education      Lisinopril (Prinivil, Zestril) - (By mouth)   Why this medicine is used:   Treats high blood pressure and heart failure. Contact a nurse or doctor right away if you have:  · Blistering, peeling, red skin rash  · Change in how much or how often you urinate  · Confusion, weakness, uneven heartbeat, trouble breathing  · Lightheadedness, dizziness, fainting  · Numbness or tingling in your hands, feet, or lips     Common side effects:  · Dry cough  © 2017 [facility] Information is for End User's use only and may not be sold, redistributed or otherwise used for commercial purposes.

## 2021-07-14 NOTE — PROGRESS NOTES
CM spoke with OT and  pt this AM. Pt had just worked with OT. OT recommending HH. Pt is agreeable. CM discussed situation with Nelda Harding PA-C.    CM contacted Ilana with [facility] and informed her of DCP change. Jerri Thompson had just received prior auth from [employer] for [employer]. CM contacted pt's Ayala Perez ( [phone]) for transport. Kody Ruby will come to [facility] for pt's keys to obtain pt's portable tank for transport. Pt is agreeable to home health. CM set up home health with resumption of care with [facility]  for RN, PT.   CM sent referral to [facility] and confirmed delivery. DCP: Home with [address] and resume CLTC.   1200 CM spoke with pt about discharge plans. Pt has his lunch delivered but stating \"I feel bloated\". \"This is what happens, my lungs won't let me breathe like last time\". CM contacted pt's PCP for f/u regarding bloating. Pt given an appointment with NP, Jose Alberto Heading on Wed. July 21st. Pt's Hodan Reynolds, informed. He states he will be the transporter for the appointment. No other DC needs noted at discharge. [id number] [facility] contacted by Jerri Thompson with [facility] to inform this CM that she received a call from pt's Hodan Reynolds, that pt had decided to go to rehab after getting home. [facility] was able to admit pt to rehab with the prior auth that had been obtained. Jerri Thompson stated that pt's niece/HCPOA Heavenly Bass, had been notified and in agreement. CM notified Nelda Harding PA-C of pt's DCP. DCP to [address] [facility]

## 2021-07-14 NOTE — ROUTINE PROCESS
Bedside and Verbal shift change report given to self (oncoming nurse) by Nabeel Garvey (offgoing nurse). Report included the following information SBAR, Kardex, Intake/Output and MAR.

## 2021-07-14 NOTE — PROGRESS NOTES
Left chest PPM site dressing removed due to old drainage - incision intact - no active oozing noted - site cleansed with chlorprep and redressed with new primaseal dressing using sterile technique

## 2021-07-21 NOTE — ED NOTES
I have reviewed discharge instructions with the patient and son. The patient and son verbalized understanding. Patient left ED via Discharge Method: wheelchair to Home with (son    Opportunity for questions and clarification provided. No esign  Patient given 2 scripts. To continue your aftercare when you leave the hospital, you may receive an automated call from our care team to check in on how you are doing. This is a free service and part of our promise to provide the best care and service to meet your aftercare needs.  If you have questions, or wish to unsubscribe from this service please call [phone]. Thank you for Choosing our [employer] Emergency Department.

## 2021-07-21 NOTE — ED TRIAGE NOTES
Pt arrives from home with EMS c/o [facility] and gave himself breathing tx at home. Pt reports having these episodes 3-4 times a day but aid wanted pt to be seen. bgl 119, 98.9 oral. 140/70. Hr 70. 99% 2L and wears at home. Hx of COPD. Pt presents with short shallow breaths. Denies any discomforts. Reports recent pacemaker placed and unsure of the reason why.

## 2021-07-21 NOTE — ED PROVIDER NOTES
Presents with complaint of shortness of breath. Patient states he has been getting short of breath in the mornings. He denies any recent steroid use. He is out of his albuterol nebulizer packets. He denies any chest pain fever change in his cough or sputum production. He states this happens all the time but he got scared this morning when he felt like he could not get his air back. Has been worse since getting PM.      The history is provided by the patient. Shortness of Breath  This is a new problem. The problem occurs continuously. The current episode started more than 1 week ago. The problem has been gradually worsening. Pertinent negatives include no fever and no rash. Associated medical issues include COPD.         Past Medical History:   Diagnosis Date    A-fib [facility]) 5/2/2014    AAA (abdominal aortic aneurysm) without rupture ([address] 5/2/2014    3.2 on  2/14 [facility]    Acute metabolic encephalopathy 0/7/5076    Arthritis     Cancer (HCC)     hx prostate cancer    COPD (chronic obstructive pulmonary disease) ([address] 5/2/2014    Elevated prostate specific antigen (PSA)     Glaucoma 5/2/2014    Heart disease     Heart failure ([address]     Hyperlipemia 5/2/2014    Hypertension     Hypertrophy of prostate with urinary obstruction and other lower urinary tract symptoms (LUTS)     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    OA (osteoarthritis) 5/2/2014    Peptic ulcer disease 6/1/2017    Poor historian     Prostate cancer ([address]     Pulmonary embolus ([address] 6/1/2017    Supplemental oxygen dependent 5/2/2014    Warfarin anticoagulation 5/2/2014       Past Surgical History:   Procedure Laterality Date    COLONOSCOPY N/A 9/12/2019    COLONOSCOPY/ [address] performed by Lj Pappas MD at [address] EGD  12/18/2019         HX HEART CATHETERIZATION      VASCULAR SURGERY PROCEDURE UNLIST  09/14/2019     Bilateral common femoral artery cutdown with exposure and placement of catheter in aorta for aortogram,Endovascular repair of infrarenal abdominal aortic aneurysm with bifurcated modulated device (31 x 14 x 13 main body) via the left common femoral, right iliac extender measuring 27 x 10. Family History:   Problem Relation Age of Onset    Cancer Mother     Heart Disease Father        Social History     Socioeconomic History    Marital status: SINGLE     Spouse name: Not on file    Number of children: Not on file    Years of education: Not on file    Highest education level: Not on file   Occupational History    Not on file   Tobacco Use    Smoking status: Former Smoker     Packs/day: 2.00     Years: 40.00     Pack years: 80.00     Quit date: 2014     Years since quittin.9    Smokeless tobacco: Never Used    Tobacco comment: still taking Chantix    Substance and Sexual Activity    Alcohol use: No    Drug use: No    Sexual activity: Yes     Partners: Female     Birth control/protection: None   Other Topics Concern    Not on file   Social History Narrative    Not on file     Social Determinants of Health     Financial Resource Strain:     Difficulty of Paying Living Expenses:    Food Insecurity:     Worried About Running Out of Food in the Last Year:     Ran Out of Food in the Last Year:    Transportation Needs:     Lack of Transportation (Medical):  Lack of Transportation (Non-Medical):    Physical Activity:     Days of Exercise per Week:     Minutes of Exercise per Session:    Stress:     Feeling of Stress :    Social Connections:     Frequency of Communication with Friends and Family:     Frequency of Social Gatherings with Friends and Family:     Attends Nondenominational Services:     Active Member of Clubs or Organizations:     Attends Club or Organization Meetings:     Marital Status:    Intimate Partner Violence:     Fear of Current or Ex-Partner:     Emotionally Abused:     Physically Abused:     Sexually Abused:           ALLERGIES: Statins-hmg-coa reductase inhibitors    Review of Systems   Constitutional: Negative for chills and fever. Respiratory: Positive for shortness of breath. Skin: Negative for rash and wound. All other systems reviewed and are negative. Vitals:    07/21/21 1002 07/21/21 1116 07/21/21 1131 07/21/21 1201   BP:  (!) 145/65 134/64 (!) 126/59   Pulse:  73 70 73   Resp:       Temp:       SpO2: 100% 99% 98% 97%   Weight:       Height:                Physical Exam  Vitals and nursing note reviewed. Constitutional:       General: He is in acute distress (mild respiratory). Appearance: He is well-developed. He is not diaphoretic. HENT:      Head: Normocephalic and atraumatic. Cardiovascular:      Rate and Rhythm: Normal rate and regular rhythm. Pulmonary:      Effort: Respiratory distress (mild) present. Breath sounds: Wheezing (faint wheezes at bases, poor air movement.  ) present. Musculoskeletal:         General: Normal range of motion. Cervical back: Normal range of motion and neck supple. Right lower leg: No edema. Left lower leg: No edema. Skin:     General: Skin is warm and dry. Neurological:      General: No focal deficit present. Mental Status: He is alert and oriented to person, place, and time. Psychiatric:         Mood and Affect: Mood normal.         Behavior: Behavior normal.          MDM  Number of Diagnoses or Management Options  Acute exacerbation of chronic obstructive pulmonary disease (COPD) ([facility]  Diagnosis management comments: Patient with improved wheezing after 10mg albuterol. Given decadron. obs for 1 hr and wheezing air movement improved.          Amount and/or Complexity of Data Reviewed  Clinical lab tests: ordered and reviewed  Tests in the radiology section of CPT®: ordered and reviewed  Discuss the patient with other providers: yes  Independent visualization of images, tracings, or specimens: yes (Paced 71)    Risk of Complications, Morbidity, and/or Mortality  Presenting problems: high  Diagnostic procedures: minimal  Management options: moderate    Patient Progress  Patient progress: improved         Procedures

## 2021-08-01 NOTE — ED PROVIDER NOTES
81-year-old male with a history of COPD and congestive heart failure who had increasing shortness of breath tonight. He is usually on 2 L of oxygen at home and had to be increased to 3 after that he felt much better. He denied chest pain no cough cold or flulike symptoms. Patient does have a heart failure as well as COPD. Last known echo was 2018:    SUMMARY:     -  Left ventricle: Systolic function was moderately to markedly reduced. Ejection fraction was estimated in the range of 30 % to 35 %. There were no  regional wall motion abnormalities. There was moderate diffuse hypokinesis   with  no identifiable regional variations.     -  Left atrium: The atrium was dilatation.     -  Mitral valve: There was mild annular calcification.     -  Additional impressions: No LA or GASTON thrombus identified.       The history is provided by the patient. Shortness of Breath  This is a chronic problem. The problem occurs continuously. The current episode started 3 to 5 hours ago. The problem has been resolved. Associated symptoms include cough, wheezing and orthopnea. Pertinent negatives include no fever, no headaches, no coryza and no chest pain. It is unknown what precipitated the problem.         Past Medical History:   Diagnosis Date    A-fib [facility]) 5/2/2014    AAA (abdominal aortic aneurysm) without rupture ([address] 5/2/2014    3.2 on US 2/14 [facility]    Acute metabolic encephalopathy 2/5/0606    Arthritis     Cancer (HCC)     hx prostate cancer    COPD (chronic obstructive pulmonary disease) ([address] 5/2/2014    Elevated prostate specific antigen (PSA)     Glaucoma 5/2/2014    Heart disease     Heart failure ([address]     Hyperlipemia 5/2/2014    Hypertension     Hypertrophy of prostate with urinary obstruction and other lower urinary tract symptoms (LUTS)     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    OA (osteoarthritis) 5/2/2014    Peptic ulcer disease 6/1/2017    Poor historian     Prostate cancer ([address]     Pulmonary embolus ([facility] 2017    Supplemental oxygen dependent 2014    Warfarin anticoagulation 2014       Past Surgical History:   Procedure Laterality Date    COLONOSCOPY N/A 2019    COLONOSCOPY/ 26 ROOM 614 performed by Rafael Freeman MD at [address] [facility] EGD  2019         HX HEART CATHETERIZATION      VASCULAR SURGERY PROCEDURE UNLIST  2019     Bilateral common femoral artery cutdown with exposure and placement of catheter in aorta for aortogram,Endovascular repair of infrarenal abdominal aortic aneurysm with bifurcated modulated device (31 x 14 x 13 main body) via the left common femoral, right iliac extender measuring 27 x 10. Family History:   Problem Relation Age of Onset    Cancer Mother     Heart Disease Father        Social History     Socioeconomic History    Marital status: SINGLE     Spouse name: Not on file    Number of children: Not on file    Years of education: Not on file    Highest education level: Not on file   Occupational History    Not on file   Tobacco Use    Smoking status: Former Smoker     Packs/day: 2.00     Years: 40.00     Pack years: 80.00     Quit date: 2014     Years since quittin.0    Smokeless tobacco: Never Used    Tobacco comment: still taking Chantix    Substance and Sexual Activity    Alcohol use: No    Drug use: No    Sexual activity: Yes     Partners: Female     Birth control/protection: None   Other Topics Concern    Not on file   Social History Narrative    Not on file     Social Determinants of Health     Financial Resource Strain: Medium Risk    Difficulty of Paying Living Expenses: Somewhat hard   Food Insecurity: No Food Insecurity    Worried About Running Out of Food in the Last Year: Never true    Salas of Food in the Last Year: Never true   Transportation Needs: No Transportation Needs    Lack of Transportation (Medical): No    Lack of Transportation (Non-Medical):  No   Physical Activity:     Days of Exercise per Week:     Minutes of Exercise per Session:    Stress:     Feeling of Stress :    Social Connections:     Frequency of Communication with Friends and Family:     Frequency of Social Gatherings with Friends and Family:     Attends Scientologist Services:     Active Member of Clubs or Organizations:     Attends Club or Organization Meetings:     Marital Status:    Intimate Partner Violence:     Fear of Current or Ex-Partner:     Emotionally Abused:     Physically Abused:     Sexually Abused: ALLERGIES: Statins-hmg-coa reductase inhibitors    Review of Systems   Constitutional: Negative. Negative for activity change and fever. HENT: Negative. Eyes: Negative. Respiratory: Positive for cough, shortness of breath and wheezing. Cardiovascular: Positive for orthopnea. Negative for chest pain. Gastrointestinal: Negative. Genitourinary: Negative. Musculoskeletal: Negative. Skin: Negative. Neurological: Negative. Negative for headaches. Psychiatric/Behavioral: Negative. All other systems reviewed and are negative. Vitals:    08/01/21 0014 08/01/21 0209   BP: (!) 141/73 (!) 161/77   Pulse: 70 72   Resp: 18    Temp: 98.8 °F (37.1 °C)    SpO2: 99% 97%            Physical Exam  Vitals and nursing note reviewed. Constitutional:       General: He is not in acute distress. Appearance: He is well-developed. HENT:      Head: Normocephalic and atraumatic. Right Ear: External ear normal.      Left Ear: External ear normal.      Nose: Nose normal.   Eyes:      General: No scleral icterus. Right eye: No discharge. Left eye: No discharge. Conjunctiva/sclera: Conjunctivae normal.      Pupils: Pupils are equal, round, and reactive to light. Cardiovascular:      Rate and Rhythm: Regular rhythm. Pulmonary:      Effort: Pulmonary effort is normal. No respiratory distress. Breath sounds: Normal breath sounds. No stridor.  No wheezing or rales. Abdominal:      General: Bowel sounds are normal. There is no distension. Palpations: Abdomen is soft. Tenderness: There is no abdominal tenderness. Musculoskeletal:         General: Normal range of motion. Cervical back: Normal range of motion. Skin:     General: Skin is warm and dry. Findings: No rash. Neurological:      Mental Status: He is alert and oriented to person, place, and time. Motor: No abnormal muscle tone. Coordination: Coordination normal.   Psychiatric:         Behavior: Behavior normal.          MDM  Number of Diagnoses or Management Options  Acute on chronic congestive heart failure, unspecified heart failure type ([facility]  Diagnosis management comments: Differential diagnosis: COPD exacerbation, CHF exacerbation, fluid overload, pneumonia, pneumothorax,    Patient got relief with increasing of his oxygen from 2 to 3 L given new DuoNeb treatment in the ER as well as Solu-Medrol Lasix.   Patient stated he felt much better       Amount and/or Complexity of Data Reviewed  Clinical lab tests: ordered and reviewed  Tests in the radiology section of CPT®: ordered and reviewed  Tests in the medicine section of CPT®: ordered and reviewed  Decide to obtain previous medical records or to obtain history from someone other than the patient: yes  Review and summarize past medical records: yes  Independent visualization of images, tracings, or specimens: yes    Risk of Complications, Morbidity, and/or Mortality  Presenting problems: high  Diagnostic procedures: high  Management options: high           EKG    Date/Time: 8/1/2021 7:05 AM  Performed by: Louisa Mcguire MD  Authorized by: Louisa Mcguire MD     ECG reviewed by ED Physician in the absence of a cardiologist: yes    Previous ECG:     Previous ECG:  Compared to current    Similarity:  No change  Interpretation:     Interpretation: abnormal    Rhythm:     Rhythm: paced    Pacing:     Capture: Complete  Ectopy:     Ectopy: none    Conduction:     Conduction: abnormal      Abnormal conduction: complete LBBB

## 2021-08-01 NOTE — ED NOTES
I have reviewed discharge instructions with the patient. The patient verbalized understanding. Patient left ED via Discharge Method: ambulatory to Home with home aid    Opportunity for questions and clarification provided. Patient given 1 scripts. Pt in no acute distress at time of d/c        To continue your aftercare when you leave the hospital, you may receive an automated call from our care team to check in on how you are doing. This is a free service and part of our promise to provide the best care and service to meet your aftercare needs.  If you have questions, or wish to unsubscribe from this service please call [phone]. Thank you for Choosing our [facility] Emergency Department.

## 2021-08-01 NOTE — ED NOTES
Kaley Rand, pt's aid called to come and pick pt up. States that he needs to come get pt's loree to get pt's O2 tank then can come back to get pt.  Charge nurse aware

## 2021-08-10 NOTE — PROGRESS NOTES
Pt is waiting for a call back with some questions she has about her upcoming surgery. Writer did verify the number. Thank You   Verbalizes/demonstrates understanding of purpose/procedure/potential side effects of feraheme.

## 2021-08-25 NOTE — ED NOTES
I have reviewed discharge instructions with the patient. The patient verbalized understanding. Patient left ED via Discharge Method: stretcher to Home with becka transport  Opportunity for questions and clarification provided. Patient given 0 scripts. To continue your aftercare when you leave the hospital, you may receive an automated call from our care team to check in on how you are doing. This is a free service and part of our promise to provide the best care and service to meet your aftercare needs.  If you have questions, or wish to unsubscribe from this service please call [phone]. Thank you for Choosing our [facility] Emergency Department.

## 2021-08-25 NOTE — ED NOTES
Patient being transported home via [facility] at this time. All appropriate paperwork has been provided. Patient appears in no acute distress upon exit.

## 2021-08-25 NOTE — ED TRIAGE NOTES
Pt here via ems for dark, tarry stools since last night. Pt takes baby ASA and iron. States had 2 bouts of black stools. Wears 2L O2 all the time. Vital signs stable for ems.

## 2021-08-25 NOTE — ED TRIAGE NOTES
Patient presents via [facility] with complaints of dark tarry stool. Patient with shortness of breath. Patient on chronic o2 2L sat 100% enroute.

## 2021-08-25 NOTE — ED PROVIDER NOTES
82-year-old -American male presents complaining of dark stool. States he noticed it yesterday evening and again this morning. No abdominal pain back pain, vomiting or weakness. Does report that he was started on iron yesterday. Denies history of GI bleeding. The history is provided by the patient. Melena   Pertinent negatives include no fever, no abdominal pain, no nausea, no vomiting, no chest pain, no headaches, no coughing and no rash. Past Medical History:   Diagnosis Date    A-fib [facility]) 5/2/2014    AAA (abdominal aortic aneurysm) without rupture ([address] 5/2/2014    3.2 on US 2/14 [facility]    Acute metabolic encephalopathy 7/2/1077    Arthritis     Cancer (HCC)     hx prostate cancer    COPD (chronic obstructive pulmonary disease) ([address] 5/2/2014    Elevated prostate specific antigen (PSA)     Glaucoma 5/2/2014    Heart disease     Heart failure ([address]     Hyperlipemia 5/2/2014    Hypertension     Hypertrophy of prostate with urinary obstruction and other lower urinary tract symptoms (LUTS)     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    OA (osteoarthritis) 5/2/2014    Peptic ulcer disease 6/1/2017    Poor historian     Prostate cancer ([address]     Pulmonary embolus ([address] 6/1/2017    Supplemental oxygen dependent 5/2/2014    Warfarin anticoagulation 5/2/2014       Past Surgical History:   Procedure Laterality Date    COLONOSCOPY N/A 9/12/2019    COLONOSCOPY/ 26 ROOM 614 performed by Essence B[facility]ia MD at [facility] ENDOSCOPY    EGD  12/18/2019         HX HEART CATHETERIZATION      VASCULAR SURGERY PROCEDURE UNLIST  09/14/2019     Bilateral common femoral artery cutdown with exposure and placement of catheter in aorta for aortogram,Endovascular repair of infrarenal abdominal aortic aneurysm with bifurcated modulated device (31 x 14 x 13 main body) via the left common femoral, right iliac extender measuring 27 x 10.          Family History:   Problem Relation Age of Onset    Cancer Mother  Heart Disease Father        Social History     Socioeconomic History    Marital status: SINGLE     Spouse name: Not on file    Number of children: Not on file    Years of education: Not on file    Highest education level: Not on file   Occupational History    Not on file   Tobacco Use    Smoking status: Former Smoker     Packs/day: 2.00     Years: 40.00     Pack years: 80.00     Quit date: 2014     Years since quittin.0    Smokeless tobacco: Never Used    Tobacco comment: still taking Chantix    Substance and Sexual Activity    Alcohol use: No    Drug use: No    Sexual activity: Yes     Partners: Female     Birth control/protection: None   Other Topics Concern    Not on file   Social History Narrative    Not on file     Social Determinants of Health     Financial Resource Strain: Medium Risk    Difficulty of Paying Living Expenses: Somewhat hard   Food Insecurity: No Food Insecurity    Worried About Running Out of Food in the Last Year: Never true    Salas of Food in the Last Year: Never true   Transportation Needs: No Transportation Needs    Lack of Transportation (Medical): No    Lack of Transportation (Non-Medical): No   Physical Activity:     Days of Exercise per Week:     Minutes of Exercise per Session:    Stress:     Feeling of Stress :    Social Connections:     Frequency of Communication with Friends and Family:     Frequency of Social Gatherings with Friends and Family:     Attends Sabianist Services:     Active Member of Clubs or Organizations:     Attends Club or Organization Meetings:     Marital Status:    Intimate Partner Violence:     Fear of Current or Ex-Partner:     Emotionally Abused:     Physically Abused:     Sexually Abused: ALLERGIES: Statins-hmg-coa reductase inhibitors    Review of Systems   Constitutional: Negative for fever. HENT: Negative for congestion. Respiratory: Negative for cough and shortness of breath. Cardiovascular: Negative for chest pain. Gastrointestinal: Positive for melena. Negative for abdominal pain, nausea and vomiting. Genitourinary: Negative for dysuria. Musculoskeletal: Negative for back pain and neck pain. Skin: Negative for rash. Neurological: Negative for headaches. All other systems reviewed and are negative. Vitals:    08/25/21 1445   BP: 111/72   Pulse: 70   Resp: 20   Temp: 97.9 °F (36.6 °C)   SpO2: 100%   Weight: 73.5 kg (162 lb)   Height: 5' 9\" (1.753 m)            Physical Exam  Vitals and nursing note reviewed. Constitutional:       General: He is not in acute distress. Appearance: Normal appearance. He is not toxic-appearing. HENT:      Head: Normocephalic and atraumatic. Nose: Nose normal.      Mouth/Throat:      Mouth: Mucous membranes are moist.      Pharynx: Oropharynx is clear. Eyes:      Conjunctiva/sclera: Conjunctivae normal.      Pupils: Pupils are equal, round, and reactive to light. Cardiovascular:      Rate and Rhythm: Normal rate and regular rhythm. Heart sounds: No murmur heard. Pulmonary:      Effort: Pulmonary effort is normal.      Breath sounds: Normal breath sounds. Abdominal:      General: There is no distension. Palpations: Abdomen is soft. Tenderness: There is no abdominal tenderness. There is no guarding. Genitourinary:     Rectum: Guaiac result positive. Musculoskeletal:         General: No swelling. Normal range of motion. Cervical back: Normal range of motion. Skin:     General: Skin is warm and dry. Neurological:      Mental Status: He is alert and oriented to person, place, and time. Psychiatric:         Mood and Affect: Mood normal.         Behavior: Behavior normal.          MDM  Number of Diagnoses or Management Options  Diagnosis management comments: Lab work shows hemoglobin 10.5 which is above his baseline. Stable renal insufficiency.   Coags normal.  Patient stool is dark which is likely due to being on iron. He is heme positive which may be due to local bleeding. Patient appears hemodynamically stable with stable blood work. He appears safe for discharge home. Will give referral to GI as he may warrant further outpatient work-up.        Amount and/or Complexity of Data Reviewed  Clinical lab tests: ordered and reviewed    Risk of Complications, Morbidity, and/or Mortality  Presenting problems: moderate  Diagnostic procedures: moderate  Management options: moderate           Procedures

## 2021-08-31 PROBLEM — D62 ACUTE BLOOD LOSS ANEMIA: Status: ACTIVE | Noted: 2021-01-01

## 2021-08-31 PROBLEM — K92.2 GIB (GASTROINTESTINAL BLEEDING): Status: ACTIVE | Noted: 2021-01-01

## 2021-08-31 NOTE — PROGRESS NOTES
I was called by the ER, Dr. Rose Mary Hernandez, to evaluate Mr. Derek Manriquez for admission due to likely lower GI bleed with hemoglobin dropping from 10-8 in 5 days. He is on aspirin and reports significant bloody bowel movements in the last 24 to 48 hours. He was noted to have bowel movement with bright red blood and clots here x2. He is hemodynamically stable. He does have chronic illnesses including combined systolic and diastolic heart failure, COPD with chronic oxygen requirements of 2 L via nasal cannula. He is noted to be hemodynamically stable. He appears to be a good candidate for transfer to [facility]. I have discussed this patient with both nursing supervisors at [facility], Dr. Amanda Rodriguez the accepting attending hospitalist at [address], and Dr. Nicky Bernardo the accepting attending GI consultant. All agree with plan. I appreciate the involvement in all in the ongoing care and optimization of this patient.

## 2021-08-31 NOTE — PROGRESS NOTES
On initial assessment patient was SOB and wheezing O2 at 2L NC, Lungs audible expiretory wheezing bilateral, patient did coughing to help clear secretion, thick mucus cleared with cough. Lungs sound clearer now, Vitals rechecked patient sating 100% on 2L via NC, B/P 108/60, HR 70, R-24 patient stated he now feels more comfortable would like a breathing treatment.

## 2021-08-31 NOTE — CONSULTS
[facility] Consult Note       Primary GI Physician:     Referring Provider:  Jeanette Turner NP    Consulting GI Physician: Dr. Naqvi Doctor    Consult Date:  8/31/2021    Admit Date:  8/31/2021    Chief Complaint:  GI bleed    Subjective:     History of Present Illness:  Patient is a 68 y.o. male being seen in GI consult at the request of Jeanette Turner NP for GI bleed. He was admitted to [facility] 8/31/21 after he presented for rectal bleeding with complaints that he passed multiple bloody BMs in the past 24 hours. Hgb is 7.7 from Hgb 8.3 yesterday and Hgb 10.5 on 8/25/21 with BUN 26, Creatinine 1.60, normal WBC, platelets. He has PMH significant for hx GI bleeding and anemia. His last EGD 12/2019 showed a hyperplastic polyp that was removed and gastric and duodenal AVMs that were cauterized. Colonoscopy 9/2019 showed four diminutive, hyperplastic colon polyps that were removed and moderate sigmoid and descending colon diverticulosis (see details below). Other PMH includes CHF, HTN, HLD, Afib (off [facility], unable to get Watchman), CHF (LVEF is 30 - 35%), AAA, CKD, COPD on 2-3L O2 NC at home. Patient reports having \"black stools\" last week but had been on iron and attributed it to that. No Peptobismol. Yesterday, he passed multiple bloody stools (red blood) with clots. No abdominal pain, nausea, vomiting, reflux or dysphagia. He denies NSAIDs but is on 81 mg aspirin daily. Today, he has passed two bloody stools. Has received 1 unit PRBCs.     PMH:  Past Medical History:   Diagnosis Date    A-fib [facility]) 5/2/2014    AAA (abdominal aortic aneurysm) without rupture ([facility] 5/2/2014    3.2 on US 2/14 [facility]    Acute metabolic encephalopathy 5/1/8207    Arthritis     Cancer (HCC)     hx prostate cancer    COPD (chronic obstructive pulmonary disease) ([facility] 5/2/2014    Elevated prostate specific antigen (PSA)     Glaucoma 5/2/2014    Heart disease     Heart failure (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    OA (osteoarthritis) 5/2/2014    Peptic ulcer disease 6/1/2017    Poor historian     Prostate cancer ([facility]     Pulmonary embolus ([facility] 6/1/2017    Supplemental oxygen dependent 5/2/2014    Warfarin anticoagulation 5/2/2014       PSH:  Past Surgical History:   Procedure Laterality Date    COLONOSCOPY N/A 9/12/2019    COLONOSCOPY/ 26 ROOM 614 performed by Gabriella Robert MD at [facility] ENDOSCOPY    EGD  12/18/2019         HX HEART CATHETERIZATION      VASCULAR SURGERY PROCEDURE UNLIST  09/14/2019     Bilateral common femoral artery cutdown with exposure and placement of catheter in aorta for aortogram,Endovascular repair of infrarenal abdominal aortic aneurysm with bifurcated modulated device (31 x 14 x 13 main body) via the left common femoral, right iliac extender measuring 27 x 10. Allergies: Allergies   Allergen Reactions    Statins-Hmg-Coa Reductase Inhibitors Myalgia     Muscle pain       Home Medications:  Prior to Admission medications    Medication Sig Start Date End Date Taking? Authorizing Provider   ferrous sulfate (IRON) 325 mg (65 mg iron) EC tablet Take 1 Tablet by mouth two (2) times a day. 8/23/21   Susan Dixon MD   guaiFENesin ER [facility]) 600 mg ER tablet Take 1 Tablet by mouth two (2) times a day. Indications: cough 7/26/21   Berkley VICTORIA, NP   fluticasone furoate (ARNUITY ELLIPTA) 100 mcg/actuation dsdv inhaler Take 1 Puff by inhalation daily. Wash mouth out with water after each dose.   Indications: worsening of debilitating chronic lung disease called COPD 7/26/21   Berkley VICTORIA, NP   albuterol (PROVENTIL VENTOLIN) 2.5 mg /3 mL (0.083 %) nebu Take 3 mL by inhalation every six (6) hours as needed for Shortness of Breath or Wheezing. 7/21/21   Magnus Smith MD   dexAMETHasone (DECADRON) 2 mg tablet 4 tabs po x3 days, 3 tabs po x3 days, 2 tabs po x3 days, 1 tab po x3 days, 1/2 tab po x3 days then stop. Patient not taking: Reported on 7/29/2021 7/21/21   Neli Cary MD   lisinopriL (PRINIVIL, ZESTRIL) 10 mg tablet Take 1 Tablet by mouth daily. Patient not taking: Reported on 7/29/2021 7/15/21   Rosalio Bautista MD   metoprolol succinate (TOPROL-XL) 100 mg tablet Take 1 Tablet by mouth daily. Indications: ventricular rate control in atrial fibrillation 7/14/21   Rosalio Bautista MD   predniSONE (DELTASONE) 20 mg tablet Take 40mg qday x3d, then 20mg qday x3d, then 10mg x2d, then stop. Take with dinner  Patient taking differently: Take 40 mg by mouth daily for 3 days, then 20 mg by mouth daily for 3 days, then take 10 mg by mouth daily for 2 days - then stop - take with dinner 7/5/21   Ajay Will MD   allopurinoL (ZYLOPRIM) 300 mg tablet Take 300 mg by mouth daily. Provider, Historical   docusate sodium (COLACE) 100 mg capsule Take 1 Capsule by mouth daily. 5/27/21   Arlie Dakins, MD   albuterol-ipratropium (DUO-NEB) 2.5 mg-0.5 mg/3 ml nebu 3 mL by Nebulization route three (3) times daily for 180 days. 3/24/21 9/20/21  Arlie Dakins, MD   rosuvastatin (Crestor) 40 mg tablet Take 1 Tab by mouth nightly. 3/24/21   Arlie Dakins, MD   nitroglycerin (NITROSTAT) 0.4 mg SL tablet 1 Tab by SubLINGual route every five (5) minutes as needed for Chest Pain (call EMS if pain fails to resolve after 2 tabs. max daily dose of 3 tabs). Indications: after 15 minutes or 3 doses, if chest pain persists, contact EMS/911 3/24/21   Arlie Dakins, MD   tamsulosin [facility]) 0.4 mg capsule Take 1 Cap by mouth daily. 3/24/21   Arlie Dakins, MD   aspirin delayed-release 81 mg tablet Take 81 mg by mouth daily.     Provider, Historical   albuterol (PROVENTIL HFA, VENTOLIN HFA, PROAIR HFA) 90 mcg/actuation inhaler TAKE 2 PUFFS BY MOUTH EVERY 4 HOURS AS NEEDED FOR WHEEZE 1/3/21   Provider, Historical   latanoprost (XALATAN) 0.005 % ophthalmic solution LOCATION  BOTH EYES. INSTILL ONE DROP INTO EACH EYE AT BEDTIME 11/12/20   Provider, Historical   famotidine (PEPCID) 40 mg tablet Take 1 Tab by mouth daily. 1/22/21   Zainab Villegas MD   OXYGEN-AIR DELIVERY SYSTEMS 2 L/min by Nasal route daily. nasal canula 9/15/15   Provider, Historical   brimonidine (ALPHAGAN P) 0.1 % ophthalmic solution Administer 1 Drop to both eyes two (2) times a day.     Provider, Historical       Hospital Medications:  Current Facility-Administered Medications   Medication Dose Route Frequency    sodium chloride (NS) flush 5-40 mL  5-40 mL IntraVENous Q8H    sodium chloride (NS) flush 5-40 mL  5-40 mL IntraVENous PRN    ondansetron (ZOFRAN) injection 4 mg  4 mg IntraVENous Q4H PRN    pantoprazole (PROTONIX) 40 mg in 0.9% sodium chloride 10 mL injection  40 mg IntraVENous BID    albuterol (PROVENTIL VENTOLIN) nebulizer solution 2.5 mg  2.5 mg Inhalation Q6H PRN    albuterol-ipratropium (DUO-NEB) 2.5 MG-0.5 MG/3 ML  3 mL Nebulization TID    allopurinoL (ZYLOPRIM) tablet 300 mg  300 mg Oral DAILY    brimonidine (ALPHAGAN) 0.2 % ophthalmic solution 1 Drop  1 Drop Both Eyes BID    docusate sodium (COLACE) capsule 100 mg  100 mg Oral DAILY    ferrous sulfate tablet 325 mg  1 Tablet Oral BID WITH MEALS    fluticasone (FLOVENT HFA) 110 mcg inhaler  1 Puff Inhalation DAILY    latanoprost (XALATAN) 0.005 % ophthalmic solution 1 Drop  1 Drop Both Eyes QHS    lisinopriL (PRINIVIL, ZESTRIL) tablet 10 mg  10 mg Oral DAILY    metoprolol succinate (TOPROL-XL) tablet 100 mg  100 mg Oral DAILY    nitroglycerin (NITROSTAT) tablet 0.4 mg  0.4 mg SubLINGual Q5MIN PRN    rosuvastatin (CRESTOR) tablet 40 mg  40 mg Oral QHS    tamsulosin (FLOMAX) capsule 0.4 mg  0.4 mg Oral DAILY       Social History:  Social History     Tobacco Use    Smoking status: Former Smoker     Packs/day: 2.00     Years: 40.00     Pack years: 80.00     Quit date: 7/26/2014     Years since quittin.1    Smokeless tobacco: Never Used    Tobacco comment: still taking Chantix    Substance Use Topics    Alcohol use: No         Family History:  Family History   Problem Relation Age of Onset    Cancer Mother     Heart Disease Father        Review of Systems:  A detailed 10 system ROS is obtained, with pertinent positives as listed above and admission H&P. All others are negative. Diet:  NPO    Objective:     Physical Exam:  Vitals:  Visit Vitals  BP (!) 102/55   Pulse 69   Temp 98 °F (36.7 °C)   Resp 20   Ht 5' 9\" (1.753 m)   Wt 72.6 kg (160 lb)   SpO2 100%   BMI 23.63 kg/m²     Gen:  Pt is alert, cooperative, no acute distress  Skin:  Extremities and face reveal no rashes. HEENT: Sclerae anicteric. Extra-occular muscles are intact. No oral ulcers. No abnormal pigmentation of the lips. The neck is supple. Cardiovascular: Regular rate and rhythm. No murmurs, gallops, or rubs. Respiratory:  Comfortable breathing with no accessory muscle use on 2 L o2 via NC  GI:  Abdomen nondistended, soft, and nontender. Normal active bowel sounds. No enlargement of the liver or spleen. No masses palpable. Rectal:  Deferred  Musculoskeletal:  No pitting edema of the lower legs. Neurological:  Gross memory appears intact. Patient is alert and oriented. Psychiatric:  Mood appears appropriate with judgement intact. Laboratory:    Recent Labs     21  0301 21  0035   WBC  --  7.4   HGB 7.7* 8.3*   HCT 26.5* 28.5*   PLT  --  392   MCV  --  92.8   NA  --  143   K  --  4.1   CL  --  107   CO2  --  35*   BUN  --  26*   CREA  --  1.60*   CA  --  8.9   GLU  --  122*   AP  --  70   AST  --  8*   ALT  --  12   TBILI  --  0.1*   ALB  --  2.7*   TP  --  6.1*   PTP  --  12.8   INR  --  0.9     AAS 21 IMPRESSION:Nonspecific, nonobstructed bowel gas pattern. No acute intrathoracic process      EGD 19 for AYAN: Transient esophageal rings but no stricture.   Few small gastric polyps <3-4mm, each with some erosions suggestive or prior bleeding. A:  \"DUODENAL BIOPSIES\":  FRAGMENTS OF HISTOLOGICALLY UNREMARKABLE SMALL INTESTINE.        B:  \"GASTRIC BIOPSIES\":  FRAGMENTS OF GASTRIC MUCOSA HAVING CHANGES OF REPAIR. Colonoscopy 9/12/19: moderate sigmoid and descending colon Diverticulosis. Diminutive colon polyps removed from descending colon (2), transverse colon (x3), ascending colon (x3). C:  \"COLON POLYPS\":  FRAGMENTS OF HYPERPLASTIC POLYP, FOCALLY ACUTELY INFLAMED. EGD 12/18/19 for melena, AYAN: Small avms of fundus and duodenal bulb- bicap. Minor lesser curvature inflammatory polyps- snare  \"GASTRIC POLYPS\":  FRAGMENTS OF GASTRIC MUCOSA HAVING INCREASED SECRETORY CELLS CONSISTENT WITH HYPERPLASTIC POLYP, FOCALLY INFLAMED.  FOCAL GOBLET CELL TYPE INTESTINAL METAPLASIA.       Assessment:     Principal Problem:    GIB (gastrointestinal bleeding) (8/31/2021)    Active Problems:    AAA (abdominal aortic aneurysm) without rupture ([address] (5/2/2014)      Overview: 3[facility]2 [facility]      COPD (chronic obstructive pulmonary disease) ([address] (5/2/2014)      Hyperlipemia (5/2/2014)      Glaucoma (5/2/2014)      Supplemental oxygen dependent (5/2/2014)      OA (osteoarthritis) (5/2/2014)      Hypertension (6/1/2017)      History of pulmonary embolism (6/22/2017)      Upper GI bleed (9/9/2019)      Chronic hypoxemic respiratory failure ([address] (9/9/2019)      Heart failure with reduced ejection fraction ([address] (9/9/2019)      CKD (chronic kidney disease), stage III ([address] (9/9/2019)      Acute blood loss anemia (8/31/2021)      68 y.o. male with PMH HTN, HLD, CHF (LVEF 30-35%, Afib (no OAC d/t hx GIB/anemia, unable to get Watchman), AAA, CKD, COPD on 2-3L O2 at home, hx GIB with known hx gastric and duodenal AVMs, gastric polyps, Diverticulosis, colon polyps, being seen in GI consult at the request of Kayla Wiley NP for GI bleed with painless hematocezia and drop in hgb from 10.1 6 days ago to 7.7 today. S/p 1 unit PRBCs. He is s/p EGD and Colonoscopy in 2019 with findings as outlined above. Suspect diverticular bleeding. Plan:   1) tagged RBC scan   2) serial h/h (currently ordered for q 8 hours) and transfuse prn  3) ok for clear liquids    Patient is seen and examined in collaboration with Dr. Chantelle Marquez. Assessment and plan as per Dr. Chantelle Marquez.   EDGAR Brice

## 2021-08-31 NOTE — PROGRESS NOTES
Patient up to void again passed loose bloody stool from rectum. Patient stated he does not feel very good and slumped on commode. It took 2 person to lift and place back into bed, patient very weak. IVF started NS at 75 ml /Hr.

## 2021-08-31 NOTE — CONSULTS
EDGAR Swan   Physician Assistant   Gastroenterology   Consults       Shared   Date of Service:  08/31/21 1056            Consult Orders   IP CONSULT TO GASTROENTEROLOGY [[id number]] ordered by Soraida Beebe NP at 08/31/21 3999               []Hide copied text    []Julito for details  [facility] Consult Note       Primary GI Physician:      Referring Provider:  Alex Diez NP     Consulting GI Physician: Dr. Chantelle Marquez     Consult Date:  8/31/2021     Admit Date:  8/31/2021     Chief Complaint:  GI bleed     Subjective:      History of Present Illness:  Patient is a 68 y.o. male being seen in GI consult at the request of Alex Diez NP for GI bleed.       He was admitted to [facility] 8/31/21 after he presented for rectal bleeding with complaints that he passed multiple bloody BMs in the past 24 hours. Hgb is 7.7 from Hgb 8.3 yesterday and Hgb 10.5 on 8/25/21 with BUN 26, Creatinine 1.60, normal WBC, platelets.     He has PMH significant for hx GI bleeding and anemia. His last EGD 12/2019 showed a hyperplastic polyp that was removed and gastric and duodenal AVMs that were cauterized.    Colonoscopy 9/2019 showed four diminutive, hyperplastic colon polyps that were removed and moderate sigmoid and descending colon diverticulosis (see details below).       Other PMH includes CHF, HTN, HLD, Afib (off [facility], unable to get Watchman), CHF (LVEF is 30 - 35%), AAA, CKD, COPD on 2-3L O2 NC at home.       PMH:       Past Medical History:   Diagnosis Date    A-fib ([address] 5/2/2014    AAA (abdominal aortic aneurysm) without rupture ([address] 5/2/2014     3.2 on US 2/14 [facility]    Acute metabolic encephalopathy 2/8/4742    Arthritis      Cancer [facility])       hx prostate cancer    COPD (chronic obstructive pulmonary disease) ([address] 5/2/2014    Elevated prostate specific antigen (PSA)      Glaucoma 5/2/2014    Heart disease      Heart failure ([address]      Hyperlipemia 5/2/2014    Hypertension      Hypertrophy of prostate with urinary obstruction and other lower urinary tract symptoms (LUTS)      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/2018    OA (osteoarthritis) 5/2/2014    Peptic ulcer disease 6/1/2017    Poor historian      Prostate cancer ([address]      Pulmonary embolus ([address] 6/1/2017    Supplemental oxygen dependent 5/2/2014    Warfarin anticoagulation 5/2/2014         PSH:        Past Surgical History:   Procedure Laterality Date    COLONOSCOPY N/A 9/12/2019     COLONOSCOPY/ 26 ROOM 614 performed by Dima Cunningham MD at [facility] ENDOSCOPY    EGD   12/18/2019          HX HEART CATHETERIZATION        VASCULAR SURGERY PROCEDURE UNLIST   09/14/2019      Bilateral common femoral artery cutdown with exposure and placement of catheter in aorta for aortogram,Endovascular repair of infrarenal abdominal aortic aneurysm with bifurcated modulated device (31 x 14 x 13 main body) via the left common femoral, right iliac extender measuring 27 x 10.         Allergies: Allergies   Allergen Reactions    Statins-Hmg-Coa Reductase Inhibitors Myalgia       Muscle pain         Home Medications:          Prior to Admission medications    Medication Sig Start Date End Date Taking? Authorizing Provider   ferrous sulfate (IRON) 325 mg (65 mg iron) EC tablet Take 1 Tablet by mouth two (2) times a day. 8/23/21     Ingrid Bradford MD   guaiFENesin ER [facility]) 600 mg ER tablet Take 1 Tablet by mouth two (2) times a day. Indications: cough 7/26/21     Ok Olegier B, NP   fluticasone furoate (ARNUITY ELLIPTA) 100 mcg/actuation dsdv inhaler Take 1 Puff by inhalation daily. Wash mouth out with water after each dose.   Indications: worsening of debilitating chronic lung disease called COPD 7/26/21     Ok Olegier B, NP   albuterol (PROVENTIL VENTOLIN) 2.5 mg /3 mL (0.083 %) nebu Take 3 mL by inhalation every six (6) hours as needed for Shortness of Breath or Wheezing. 7/21/21     Janie Smith MD   dexAMETHasone (DECADRON) 2 mg tablet 4 tabs po x3 days, 3 tabs po x3 days, 2 tabs po x3 days, 1 tab po x3 days, 1/2 tab po x3 days then stop. Patient not taking: Reported on 7/29/2021 7/21/21     Rupinder Smith MD   lisinopriL (PRINIVIL, ZESTRIL) 10 mg tablet Take 1 Tablet by mouth daily. Patient not taking: Reported on 7/29/2021 7/15/21     Samuel Velazquez MD   metoprolol succinate (TOPROL-XL) 100 mg tablet Take 1 Tablet by mouth daily. Indications: ventricular rate control in atrial fibrillation 7/14/21     Manas Shi MD   predniSONE (DELTASONE) 20 mg tablet Take 40mg qday x3d, then 20mg qday x3d, then 10mg x2d, then stop. Take with dinner  Patient taking differently: Take 40 mg by mouth daily for 3 days, then 20 mg by mouth daily for 3 days, then take 10 mg by mouth daily for 2 days - then stop - take with dinner 7/5/21     Jagdeep Durbin MD   allopurinoL (ZYLOPRIM) 300 mg tablet Take 300 mg by mouth daily.       Provider, Historical   docusate sodium (COLACE) 100 mg capsule Take 1 Capsule by mouth daily. 5/27/21     Manisha Leyva MD   albuterol-ipratropium (DUO-NEB) 2.5 mg-0.5 mg/3 ml nebu 3 mL by Nebulization route three (3) times daily for 180 days. 3/24/21 9/20/21   Manisha Leyva MD   rosuvastatin (Crestor) 40 mg tablet Take 1 Tab by mouth nightly. 3/24/21     Manisha Leyva MD   nitroglycerin (NITROSTAT) 0.4 mg SL tablet 1 Tab by SubLINGual route every five (5) minutes as needed for Chest Pain (call EMS if pain fails to resolve after 2 tabs. max daily dose of 3 tabs). Indications: after 15 minutes or 3 doses, if chest pain persists, contact EMS/911 3/24/21     Manisha Leyva MD   tamsulosin [facility]) 0.4 mg capsule Take 1 Cap by mouth daily.  3/24/21     Manisha Leyva MD   aspirin delayed-release 81 mg tablet Take 81 mg by mouth daily.       Provider, Historical   albuterol (PROVENTIL HFA, VENTOLIN HFA, PROAIR HFA) 90 mcg/actuation inhaler TAKE 2 PUFFS BY MOUTH EVERY 4 HOURS AS NEEDED FOR WHEEZE 1/3/21     Provider, Historical   latanoprost (XALATAN) 0.005 % ophthalmic solution LOCATION  BOTH EYES. INSTILL ONE DROP INTO EACH EYE AT BEDTIME 11/12/20     Provider, Historical   famotidine (PEPCID) 40 mg tablet Take 1 Tab by mouth daily. 1/22/21     Lisa Garza MD   OXYGEN-AIR DELIVERY SYSTEMS 2 L/min by Nasal route daily.  nasal canula 9/15/15     Provider, Historical   brimonidine (ALPHAGAN P) 0.1 % ophthalmic solution Administer 1 Drop to both eyes two (2) times a day.       Provider, Historical         Hospital Medications:         Current Facility-Administered Medications   Medication Dose Route Frequency    sodium chloride (NS) flush 5-40 mL  5-40 mL IntraVENous Q8H    sodium chloride (NS) flush 5-40 mL  5-40 mL IntraVENous PRN    ondansetron (ZOFRAN) injection 4 mg  4 mg IntraVENous Q4H PRN    pantoprazole (PROTONIX) 40 mg in 0.9% sodium chloride 10 mL injection  40 mg IntraVENous BID    albuterol (PROVENTIL VENTOLIN) nebulizer solution 2.5 mg  2.5 mg Inhalation Q6H PRN    albuterol-ipratropium (DUO-NEB) 2.5 MG-0.5 MG/3 ML  3 mL Nebulization TID    allopurinoL (ZYLOPRIM) tablet 300 mg  300 mg Oral DAILY    brimonidine (ALPHAGAN) 0.2 % ophthalmic solution 1 Drop  1 Drop Both Eyes BID    docusate sodium (COLACE) capsule 100 mg  100 mg Oral DAILY    ferrous sulfate tablet 325 mg  1 Tablet Oral BID WITH MEALS    fluticasone (FLOVENT HFA) 110 mcg inhaler  1 Puff Inhalation DAILY    latanoprost (XALATAN) 0.005 % ophthalmic solution 1 Drop  1 Drop Both Eyes QHS    lisinopriL (PRINIVIL, ZESTRIL) tablet 10 mg  10 mg Oral DAILY    metoprolol succinate (TOPROL-XL) tablet 100 mg  100 mg Oral DAILY    nitroglycerin (NITROSTAT) tablet 0.4 mg  0.4 mg SubLINGual Q5MIN PRN    rosuvastatin (CRESTOR) tablet 40 mg  40 mg Oral QHS    tamsulosin (FLOMAX) capsule 0.4 mg  0.4 mg Oral DAILY         Social History:  Social History            Tobacco Use    Smoking status: Former Smoker       Packs/day: 2.00       Years: 40.00       Pack years: 80.00       Quit date: 2014       Years since quittin.1    Smokeless tobacco: Never Used    Tobacco comment: still taking Chantix    Substance Use Topics    Alcohol use: No         Pt denies any history of drug use, blood transfusions, or tattoos.     Family History:        Family History   Problem Relation Age of Onset    Cancer Mother      Heart Disease Father           Review of Systems:  A detailed 10 system ROS is obtained, with pertinent positives as listed above. All others are negative.     Diet:  NPO     Objective:      Physical Exam:  Vitals:  Visit Vitals  BP (!) 102/55   Pulse 69   Temp 98 °F (36.7 °C)   Resp 20   Ht 5' 9\" (1.753 m)   Wt 72.6 kg (160 lb)   SpO2 100%   BMI 23.63 kg/m²      Gen:  Pt is alert, cooperative, no acute distress  Skin:  Extremities and face reveal no rashes. HEENT: Sclerae anicteric. Extra-occular muscles are intact. No oral ulcers. No abnormal pigmentation of the lips. The neck is supple. Cardiovascular: Regular rate and rhythm. No murmurs, gallops, or rubs. Respiratory:  Comfortable breathing with no accessory muscle use. Clear breath sounds anteriorly with no wheezes, rales, or rhonchi. GI:  Abdomen nondistended, soft, and nontender. Normal active bowel sounds. No enlargement of the liver or spleen. No masses palpable. Rectal:  Deferred  Musculoskeletal:  No pitting edema of the lower legs. Neurological:  Gross memory appears intact. Patient is alert and oriented. Psychiatric:  Mood appears appropriate with judgement intact.   Lymphatic:  No cervical or supraclavicular adenopathy.     Laboratory:         Recent Labs     21  0301 21  0035   WBC  --  7.4   HGB 7.7* 8.3*   HCT 26.5* 28.5*   PLT  --  392   MCV  --  92.8   NA  --  143   K  --  4.1   CL  --  107   CO2  --  35*   BUN  --  26*   CREA  --  1.60*   CA  --  8.9   GLU  --  122*   AP  --  70   AST  --  8*   ALT  --  12   TBILI  --  0.1*   ALB  --  2.7*   TP  --  6.1* PTP  --  12.8   INR  --  0.9      AAS 8/31/21 IMPRESSION:Nonspecific, nonobstructed bowel gas pattern. No acute intrathoracic process        EGD 9/12/19 for AYAN: Transient esophageal rings but no stricture. Few small gastric polyps <3-4mm, each with some erosions suggestive or prior bleeding. A:  \"DUODENAL BIOPSIES\":  FRAGMENTS OF HISTOLOGICALLY UNREMARKABLE SMALL INTESTINE.        B:  \"GASTRIC BIOPSIES\":  FRAGMENTS OF GASTRIC MUCOSA HAVING CHANGES OF REPAIR.      Colonoscopy 9/12/19: moderate sigmoid and descending colon Diverticulosis. Diminutive colon polyps removed from descending colon (2), transverse colon (x3), ascending colon (x3). C:  \"COLON POLYPS\":  FRAGMENTS OF HYPERPLASTIC POLYP, FOCALLY ACUTELY INFLAMED.         EGD 12/18/19 for melena, AYAN: Small avms of fundus and duodenal bulb- bicap. Minor lesser curvature inflammatory polyps- snare  \"GASTRIC POLYPS\":  FRAGMENTS OF GASTRIC MUCOSA HAVING INCREASED SECRETORY CELLS CONSISTENT WITH HYPERPLASTIC POLYP, FOCALLY INFLAMED.  FOCAL GOBLET CELL TYPE INTESTINAL METAPLASIA.        Assessment:      Principal Problem:    GIB (gastrointestinal bleeding) (8/31/2021)     Active Problems:    AAA (abdominal aortic aneurysm) without rupture ([address] (5/2/2014)      Overview: 3.2 on  2/14 [facility]       COPD (chronic obstructive pulmonary disease) ([address] (5/2/2014)       Hyperlipemia (5/2/2014)       Glaucoma (5/2/2014)       Supplemental oxygen dependent (5/2/2014)       OA (osteoarthritis) (5/2/2014)       Hypertension (6/1/2017)       History of pulmonary embolism (6/22/2017)       Upper GI bleed (9/9/2019)       Chronic hypoxemic respiratory failure ([address] (9/9/2019)       Heart failure with reduced ejection fraction ([address] (9/9/2019)       CKD (chronic kidney disease), stage III ([address] (9/9/2019)       Acute blood loss anemia (8/31/2021)        68 y.o. male with PMH HTN, HLD, CHF (LVEF 30-35%, Afib (no OAC d/t hx GIB/anemia, unable to get Watchman), AAA, CKD, COPD on 2-3L O2 at home, hx GIB with known hx gastric and duodenal AVMs, gastric polyps, Diverticulosis, colon polyps, being seen in GI consult at the request of Dandy Plunkett NP for GI bleed. He was admitted to [facility] 8/31/21 with multiple bloody BMs within 24 hours. Hgb is 7.7 from Hgb 8.3 yesterday and Hgb 10.1. He was initially hypotensive and now BP remains low but improved. He is s/p EGD and Colonoscopy in 2019 with findings as outlined above.       Plan:      Chart reviewed from [facility]. Pt to be seen later today.     -Monitor for overt GI bleeding  -Follow H/H. Consider transfusion pRBCs as needed.  -Continue IV PPI bid, supportive care. -If ongoing GI bleeding, consider Tagged RBC scan, EGD +/- Colon. Will follow  ATTENDING NOTE:  I have seen and examined the patient along with my NP/PA. The assessment and plan above is my own.      He did start with black stool last week but may have been due to taking oral iron  This episode seems to have started, and continued with painless hematochezia  Hg is 8.4  Hemodynamically stable but seems slightly SOB at rest  Plan to watch - obtain tRBC scan if continues to bleed  No plans to repeat colo - diverticulosis on study 2019  Continue PPI for possible brisk UGI source but I think that is unlikely    Alycia Jules MD

## 2021-08-31 NOTE — PROGRESS NOTES
TRANSFER - IN REPORT:    Verbal report received from [place] [id number] [facility] on [employer].  being received from [facility] for routine progression of care      Report consisted of patients Situation, Background, Assessment and   Recommendations(SBAR). Information from the following report(s) ED Summary, Intake/Output, MAR, Recent Results and Quality Measures was reviewed with the receiving nurse. Opportunity for questions and clarification was provided. Assessment completed upon patients arrival to unit and care assumed.

## 2021-08-31 NOTE — H&P
[facility] Hospitalist Service  History and Physical    Patient ID:  Ernestina Abdalla.  male  1940  [id number]    Admission Date: 8/31/2021  Chief Complaint: Bloody bowel movement   Reason for Admission: GI Bleed     ASSESSMENT & PLAN:  GI Bleed  Patient reports episodes of bowel movements with BRB  Hold blood thinners; NPO except meds with GI consult; IV protonix     COPD  Stable; Continue home medications once able; prn inhalers and supplemental O2 at 2-3L which is patient's home dosing     AAA without rupture  Manage BP    CHF with reduced EF; ICD  Continue home medications; monitor for fluid overload     CKD Stage III  Patient appears at baseline; hold nephrotoxics as able; renally dose medications    Afib  Stable HR NSR; Telemetry; BB; OAC discontinued due to recurrent GIB and anemia     Normocytic anemia  Patient has chronic anemia and acute GI bleed; Monitor and transfuse for Hgb < 7; Continue home Fe    HTN  Continue home medications     HLD  Continue home medications        Disposition: Dispo pending   Diet: NPO  VTE ppx: SCDs  GI ppx: PPI  CODE STATUS: Full Code (DNI)  Surrogate decision-maker:     HISTORY OF PRESENT ILLNESS:  Patient is a 68 y.o. male with medical h/o CHF, HTN, HLD, OA, Afib, AAA  who presented to [facility] ED with a complaint of BRBPR. He was eventually transferred to [facility] for further medical management. Patient reports multiple bloody bowel movements in the past 24 hours PTA. Per ER notes patient had x2  bloody bowel movements while in ER. Patient does report a history of GIB and had [address] (Warfarin) discontinued. Reports he is unsure of what medications he takes at this time. Patient denies any abdominal pain, N/V, chest pain dizziness. He does endorse SOB/SIM which is chronic and he requires 2L O2 at home and sometimes has to advance to 3L. Abd imaging negative.  Hgb 7.7 Cr 1.60 BUN 26       Allergies   Allergen Reactions    Statins-Hmg-Coa Reductase Inhibitors Myalgia     Muscle pain Prior to Admission Medications   Prescriptions Last Dose Informant Patient Reported? Taking? OXYGEN-AIR DELIVERY SYSTEMS   Yes No   Si L/min by Nasal route daily. nasal canula   albuterol (PROVENTIL HFA, VENTOLIN HFA, PROAIR HFA) 90 mcg/actuation inhaler   Yes No   Sig: TAKE 2 PUFFS BY MOUTH EVERY 4 HOURS AS NEEDED FOR WHEEZE   albuterol (PROVENTIL VENTOLIN) 2.5 mg /3 mL (0.083 %) nebu   No No   Sig: Take 3 mL by inhalation every six (6) hours as needed for Shortness of Breath or Wheezing. albuterol-ipratropium (DUO-NEB) 2.5 mg-0.5 mg/3 ml nebu   No No   Sig: 3 mL by Nebulization route three (3) times daily for 180 days. allopurinoL (ZYLOPRIM) 300 mg tablet   Yes No   Sig: Take 300 mg by mouth daily. aspirin delayed-release 81 mg tablet   Yes No   Sig: Take 81 mg by mouth daily. brimonidine (ALPHAGAN P) 0.1 % ophthalmic solution   Yes No   Sig: Administer 1 Drop to both eyes two (2) times a day. dexAMETHasone (DECADRON) 2 mg tablet   No No   Si tabs po x3 days, 3 tabs po x3 days, 2 tabs po x3 days, 1 tab po x3 days, 1/2 tab po x3 days then stop. Patient not taking: Reported on 2021   docusate sodium (COLACE) 100 mg capsule   No No   Sig: Take 1 Capsule by mouth daily. famotidine (PEPCID) 40 mg tablet   No No   Sig: Take 1 Tab by mouth daily. ferrous sulfate (IRON) 325 mg (65 mg iron) EC tablet   No No   Sig: Take 1 Tablet by mouth two (2) times a day. fluticasone furoate (ARNUITY ELLIPTA) 100 mcg/actuation dsdv inhaler   No No   Sig: Take 1 Puff by inhalation daily. Wash mouth out with water after each dose. Indications: worsening of debilitating chronic lung disease called COPD   guaiFENesin ER (MUCINEX) 600 mg ER tablet   No No   Sig: Take 1 Tablet by mouth two (2) times a day.  Indications: cough   latanoprost (XALATAN) 0.005 % ophthalmic solution   Yes No   Sig: LOCATION  BOTH EYES. INSTILL ONE DROP INTO EACH EYE AT BEDTIME   lisinopriL (PRINIVIL, ZESTRIL) 10 mg tablet   No No   Sig: Take 1 Tablet by mouth daily. Patient not taking: Reported on 2021   metoprolol succinate (TOPROL-XL) 100 mg tablet   No No   Sig: Take 1 Tablet by mouth daily. Indications: ventricular rate control in atrial fibrillation   nitroglycerin (NITROSTAT) 0.4 mg SL tablet   No No   Si Tab by SubLINGual route every five (5) minutes as needed for Chest Pain (call EMS if pain fails to resolve after 2 tabs. max daily dose of 3 tabs). Indications: after 15 minutes or 3 doses, if chest pain persists, contact EMS/911   predniSONE (DELTASONE) 20 mg tablet   No No   Sig: Take 40mg qday x3d, then 20mg qday x3d, then 10mg x2d, then stop. Take with dinner   Patient taking differently: Take 40 mg by mouth daily for 3 days, then 20 mg by mouth daily for 3 days, then take 10 mg by mouth daily for 2 days - then stop - take with dinner   rosuvastatin (Crestor) 40 mg tablet   No No   Sig: Take 1 Tab by mouth nightly. tamsulosin (FLOMAX) 0.4 mg capsule   No No   Sig: Take 1 Cap by mouth daily.       Facility-Administered Medications: None       Past Medical History:   Diagnosis Date    A-fib [facility]) 2014    AAA (abdominal aortic aneurysm) without rupture ([address] 2014    3.2 on   [facility]    Acute metabolic encephalopathy 7379    Arthritis     Cancer (HCC)     hx prostate cancer    COPD (chronic obstructive pulmonary disease) ([address] 2014    Elevated prostate specific antigen (PSA)     Glaucoma 2014    Heart disease     Heart failure ([address]     Hyperlipemia 2014    Hypertension     Hypertrophy of prostate with urinary obstruction and other lower urinary tract symptoms (LUTS)     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    OA (osteoarthritis) 2014    Peptic ulcer disease 2017    Poor historian     Prostate cancer ([address]     Pulmonary embolus ([address] 2017    Supplemental oxygen dependent 2014    Warfarin anticoagulation 2014     Past Surgical History:   Procedure Laterality Date    COLONOSCOPY N/A 2019    COLONOSCOPY/ 26 ROOM 614 performed by Sin Rudd [facility] at [facility] ENDOSCOPY    EGD  2019         HX HEART CATHETERIZATION      VASCULAR SURGERY PROCEDURE UNLIST  2019     Bilateral common femoral artery cutdown with exposure and placement of catheter in aorta for aortogram,Endovascular repair of infrarenal abdominal aortic aneurysm with bifurcated modulated device (31 x 14 x 13 main body) via the left common femoral, right iliac extender measuring 27 x 10. Social History     Tobacco Use    Smoking status: Former Smoker     Packs/day: 2.00     Years: 40.00     Pack years: 80.00     Quit date: 2014     Years since quittin.1    Smokeless tobacco: Never Used    Tobacco comment: still taking Chantix    Substance Use Topics    Alcohol use: No       FAMILY HISTORY:  Mother (cancer) Father (Heart Disease)  Family History   Problem Relation Age of Onset    Cancer Mother     Heart Disease Father        REVIEW OF SYSTEMS:  A 14 point review of systems was taken and pertinent positive as per HPI.       PHYSICAL EXAM:    Visit Vitals  BP (!) 102/55   Pulse 69   Temp 98 °F (36.7 °C)   Resp 20   Ht 5' 9\" (1.753 m)   Wt 72.6 kg (160 lb)   SpO2 100%   BMI 23.63 kg/m²       General: No acute distress, speaking in full sentences, no use of accessory muscles   HEENT: Pupils equal; oropharynx is clear   Neck: no JVD   Lungs: Clear to auscultation bilaterally   Cardiovascular: Regular rate and rhythm with normal S1 and S2   Abdomen: Soft, nontender, nondistended, normoactive bowel sounds   Extremities: No cyanosis clubbing or edema   Neuro: Nonfocal, A&O x3   Psych: Normal mood and affect     Intake/Output last 3 shifts:        Labs:  CMP: No results found for: NA, K, CL, CO2, AGAP, GLU, BUN, CREA, GFRAA, GFRNA, CA, MG, PHOS, ALB, TBIL, TBILI, TP, ALB, GLOB, AGRAT, ALT      CBC:  No results found for: WBC, HGB, HCT, PLT, HGBEXT, HCTEXT, PLTEXT    Lab Results Component Value Date/Time    INR 0.9 08/31/2021 12:35 AM    INR 1.0 08/25/2021 02:53 PM    INR 0.9 12/21/2020 07:37 AM    Prothrombin time 12.8 08/31/2021 12:35 AM    Prothrombin time 13.6 08/25/2021 02:53 PM    Prothrombin time 12.6 12/21/2020 07:37 AM       ABG:  No results found for: PH, PHI, PCO2, PCO2I, PO2, PO2I, HCO3, HCO3I, FIO2, FIO2I        Lab Results   Component Value Date/Time    Troponin-I, Qt. <0.02 (L) 04/09/2020 10:46 AM     (H) 09/15/2019 03:14 AM    BNP 7 12/21/2014 08:45 PM         Imaging & Other Studies:    XR Results (maximum last 3): Results from [place] encounter on 08/31/21    XR ABD ACUTE W 1 V CHEST    Narrative  EXAMINATION: Acute Abdominal Series. HISTORY: GI bleed    TECHNIQUE: Supine and upright views of the abdomen. Single frontal view of the  chest.    COMPARISON: Chest x-ray dated 8/1/2021    FINDINGS:  There is a nonspecific, nonobstructed bowel gas pattern. No definitive evidence  of free air. An aortic graft is in place. The cardiac silhouette mediastinum and pulmonary vasculature are within normal  limits. The lungs are clear and there is no pleural effusion or pneumothorax. Osseous structures and soft tissues appear within normal limits. Stable left  cardiac device. Impression  Nonspecific, nonobstructed bowel gas pattern. No acute intrathoracic process. Results from Hospital Encounter encounter on 08/01/21    XR CHEST PORT    Narrative  Portable chest xray    COMPARISON: Syncope, shortness of breath    INDICATION: July 21, 2021    FINDINGS:    Stable left-sided cardiac pacer. Heart is enlarged. Mediastinal contour is  within normal limits. There is no focal pulmonary consolidation, pleural  effusion or pneumothorax. No pulmonary edema. There is emphysema. Surrounding  bones are intact. Impression  1. Emphysema. 2. No evidence of pneumonia or pulmonary edema.       Results from [place] encounter on 07/21/21    XR CHEST PORT    Narrative  EXAM: XR CHEST PORT    INDICATION: Syncope    COMPARISON: 7/9/2021    FINDINGS: A portable AP radiograph of the chest was obtained at 0908 hours. Unremarkable AICD. Jose Bear The lungs are clear. The cardiac and mediastinal contours  and pulmonary vascularity are normal.  The bones and soft tissues are grossly  within normal limits. Impression  Normal chest.      CT Results (maximum last 3): Results from [place] encounter on 07/01/21    CT HEAD WO CONT    Narrative  NONCONTRAST HEAD CT    CLINICAL HISTORY:  Decreased level of consciousness. TECHNIQUE:  Axial images were obtained with spiral technique. Radiation dose  reduction was achieved using one or all of the following techniques: automated  exposure control, weight-based dosing, iterative reconstruction. COMPARISON:  None. REPORT:   Standard noncontrast head CT demonstrates no definite intracranial  mass effect, hemorrhage, or evidence of acute geographic infarction. Extensive  white matter hypodensities are most consistent with small vessel ischemic  disease. The ventricles are normal in size and configuration, accounting for  the patient's age. Orbits  and paranasal sinuses are clear where imaged. Bone  windows demonstrate no definite fracture or destruction. Impression  EXTENSIVE SMALL VESSEL ISCHEMIC DISEASE WITH NO ACUTE  INTRACRANIAL ABNORMALITY IDENTIFIED AT NONCONTRAST CT. Results from [place] encounter on 06/09/21    CT CHEST PULMONARY EMBOLISM    Narrative  EXAM: CT Chest with contrast.  INDICATION: Concern for PE. History of PE and atrial fibrillation. Not on  anticoagulation. Comparison: None. Multiple axial images were obtained through the chest during intravenous  infusion of 100mL of Isovue 370. Coronal 2D MIP image reformats were performed. Radiation dose reduction techniques were used for this study:   All CT scans  performed at this facility use one or all of the following: Automated exposure  control, adjustment of the mA and/or kVp according to patient's size, iterative  reconstruction. FINDINGS:  Examination is diagnostic through the segmental level, except in the right lower  lobe where motion artifact limits evaluation to the lobar level. No evidence of  acute or chronic pulmonary embolism. The main pulmonary artery and thoracic  aorta are normal in caliber. Atherosclerotic calcifications throughout the aorta  and coronary arteries. Partially visualized and vascular stent in the infrarenal  abdominal aorta which appears aneurysmal.    LUNGS/PLEURA: Central airways are patent. Moderate upper lobe predominant  centrilobular emphysema. There is a mildly spiculated left apical solid  pulmonary nodule measuring 9 mm (axial image 24). No consolidation to suggest  pneumonia. No pleural effusion or pneumothorax. MEDIASTINUM: Thyroid is mildly enlarged with streak artifacts tearing  evaluation. The thoracic esophagus is within normal limits. No mediastinal or  hilar lymphadenopathy. Heart is normal in size without pericardial effusion. BONES/SUBCUTANEOUS TISSUE: No aggressive osseous lesion. No subcutaneous soft  tissue abnormality. UPPER ABDOMEN: Multiple simple cysts are again seen throughout the liver with a  few additional subcentimeter hypodensities are too small to characterize but  stable. There are stable simple cyst on both kidneys. Unchanged subcentimeter  nodule in the right adrenal gland, likely representing an adenoma given  stability. Impression  1. No evidence of pulmonary embolism or other acute process in the chest.  2. Mildly spiculated 9 mm left apical pulmonary nodule worrisome for primary  lung malignancy. Consider further evaluation with PET/CT. 3. Moderate centrilobular emphysema. Results from [place] encounter on 09/09/19    CTA ABD PELV W WO CONT    Narrative  Title:  CT arteriogram of the abdomen and pelvis.     Indication:  Known abdominal aortic aneurysm in a patient with new abdominal  pain. Unenhanced CT scan earlier in the day demonstrates stranding density in  the periaortic retroperitoneal fat. Technique: Axial images of the abdomen and pelvis were obtained after the  administration of intravenous iodinated contrast media. Contrast was used to  best identify the arterial structures. Images were reviewed on a separate, free  standing, three-dimensional workstation as per the referring physicians request.      All CT scans at this facility are performed using dose reduction/dose modulation  techniques, as appropriate the performed exam, including the following:  Automated Exposure Control; Adjustment of the mA and/or kV according to patient  size (this includes techniques or standardized protocols for targeted exams  where dose is matched to indication/reason for exam); and Use of Iterative  Reconstruction Technique. Comparison: Unenhanced CT 9/13/2019. Findings:    Chest:  The visualized lung bases demonstrate hyperexpanded lungs consistent  with emphysema. Coronary artery calcification. Abdomen:  Multiple, low density, nonenhancing, rounded lesions scattered  throughout both lobes of the liver. These most likely represent cysts. Gallbladder is not visualized. Unremarkable pancreas, spleen and adrenal glands. Symmetric nephrograms. Multiple, rounded, low-density cystic lesions in the  kidneys. No hydronephrosis. Oral contrast from the earlier examination has  passed into the colon. There are no dilated bowel loops. There is sigmoid  diverticulosis. Musculoskeletal:  No destructive bone lesion. Vascular:  Atherosclerotic calcification and wall thickening in the visualized  portion of descending thoracic aorta and in the perimesenteric aorta. There is  a fusiform infrarenal abdominal aortic aneurysm with a thick thrombus rind. The  aneurysm measures 4.8 x 4.9 cm (AP by transverse).   There is anterior angulation  of the aneurysm neck. There is an increased, stranding the, density in the  retroperitoneal fat associated with the aneurysm. This stranding density  extends to the level of the common iliac arteries. This finding is suggestive  of aneurysm leak or retroperitoneal inflammation. The aneurysm extends into the common iliac arteries. The right common iliac  artery measures 2.1 cm. The left common iliac artery measures 1.4 cm. Although narrowed, both internal iliac arteries are patent. There is fairly  extensive atherosclerotic disease in the tortuous external iliac and common  femoral arteries. However, there is no high-grade stenosis in these vessels. The origins of the superficial femoral and profunda femoral arteries are patent. Diseased, but patent celiac artery. Patent splenic and hepatic arteries. Patent superior mesenteric artery. Patent inferior mesenteric artery (arising  from the aneurysm). 40% narrowing of the main right renal artery. Stenotic accessory right renal  artery arises adjacent to the main renal artery. Diseased, but patent, single  left renal artery. Impression  Impression:  Fusiform infrarenal abdominal aortic aneurysm measures 4.8 x 4.9  cm. Periaortic, retroperitoneal, stranding density suggests aneurysm leak or  retroperitoneal inflammation. Given the clinical description of abdominal pain,  ongoing, slow aneurysm leak is most likely. Multiple low density liver lesions. Recommend abdominal ultrasound on an  elective basis to confirm that these are cysts. These images were reviewed with Dr. Brando Sorto at the time of dictation. MRI Results (maximum last 3): No results found for this or any previous visit. Nuclear Medicine Results (maximum last 3): No results found for this or any previous visit. US Results (maximum last 3): No results found for this or any previous visit. DEXA Results (maximum last 3):   No results found for this or any previous visit. TAVO Results (maximum last 3): No results found for this or any previous visit. IR Results (maximum last 3): Results from [place] encounter on 09/09/19    IR FLUOROSCOPY < 1 HOUR    Narrative  Administrative Report    Impression  IMPRESSION: Fluoroscopy for greater than 60 minutes was provided in the  operating room. VAS/US Results (maximum last 3): Results from Clinical Support encounter on 03/26/21    DUPLEX LOW EXT ARTERIES WITH COMFORT      Results from Office Visit encounter on 03/12/21    DUPLEX AORTA/IVC/ILIAC/GRAFTS COMPLETE      Results from Office Visit encounter on 09/11/20    DUPLEX AORTA/IVC/ILIAC/GRAFTS COMPLETE    Narrative  Final Vascular Lab ultrasound report scanned under the imaging tab. Click RESULTS link to view. PET Results (maximum last 3): No results found for this or any previous visit.          EKG Results     None          Active Problems:  Patient Active Problem List    Diagnosis Date Noted    Acute blood loss anemia 08/31/2021    GIB (gastrointestinal bleeding) 08/31/2021    Hyperkalemia 07/07/2021    Acute metabolic encephalopathy 37/65/7062    Chronic systolic congestive heart failure ([address] 07/02/2021    Acute on chronic respiratory failure with hypoxia and hypercapnia (HCC) 06/09/2021    Diverticulosis 05/27/2021    Mild protein-calorie malnutrition ([address] 05/19/2021    Short-term memory loss 05/18/2021    COPD exacerbation ([address] 05/17/2021    Prostate cancer ([address] 03/25/2021    Chronic combined systolic and diastolic heart failure ([address] 12/21/2020    History of GI bleed 12/21/2020    Iron deficiency anemia 12/21/2020    AAA (abdominal aortic aneurysm) ([address] 09/14/2019    Upper GI bleed 09/09/2019    Chronic hypoxemic respiratory failure ([address] 09/09/2019    Heart failure with reduced ejection fraction ([address] 09/09/2019    CKD (chronic kidney disease), stage III (HCC) 09/09/2019    Absolute anemia 04/12/2019  Multiple pulmonary nodules 08/30/2018    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 07/25/2018    Acute on chronic respiratory failure with hypoxia (HCC) 07/25/2018    Atrial flutter with rapid ventricular response ([facility] 07/23/2018    History of pulmonary embolism 06/22/2017    Tobacco use disorder 06/01/2017    Hypertension 06/01/2017    Cardiomyopathy ([address] 06/01/2017    Peptic ulcer disease 06/01/2017    Nontoxic multinodular goiter 05/19/2017    Vitamin D deficiency 02/02/2016    Renal cysts, acquired, bilateral 05/18/2015    AAA (abdominal aortic aneurysm) without rupture ([facility] 05/02/2014    COPD (chronic obstructive pulmonary disease) ([facility] 05/02/2014    Hyperlipemia 05/02/2014    Glaucoma 05/02/2014    Supplemental oxygen dependent 05/02/2014    OA (osteoarthritis) 05/02/2014         Siomara Diggs NP  [facility]  8/31/2021 10:24 AM

## 2021-08-31 NOTE — ED PROVIDER NOTES
79-year-old male complaining of bright red blood per rectum. Patient has a history of GI bleeding had melena seen in the ER last week for the same vital signs are stable he was sent home to follow-up as an outpatient with GI. Patient started having increasing blood tonight and started passing clots. Patient has a history of GI bleeding patient has a history of A. fib but is not on any anticoagulation therapy. Rectal Bleeding   This is a new problem. The current episode started 3 to 5 hours ago. The stool is described as blood tinged. Pertinent negatives include no abdominal pain, no abdominal distention, no chills and no fever. He has tried nothing for the symptoms.         Past Medical History:   Diagnosis Date    A-fib [facility]) 5/2/2014    AAA (abdominal aortic aneurysm) without rupture ([address] 5/2/2014    3.2 on US 2/14 [facility]    Acute metabolic encephalopathy 3/2/8632    Arthritis     Cancer (HCC)     hx prostate cancer    COPD (chronic obstructive pulmonary disease) ([address] 5/2/2014    Elevated prostate specific antigen (PSA)     Glaucoma 5/2/2014    Heart disease     Heart failure ([address]     Hyperlipemia 5/2/2014    Hypertension     Hypertrophy of prostate with urinary obstruction and other lower urinary tract symptoms (LUTS)     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    OA (osteoarthritis) 5/2/2014    Peptic ulcer disease 6/1/2017    Poor historian     Prostate cancer ([address]     Pulmonary embolus ([address] 6/1/2017    Supplemental oxygen dependent 5/2/2014    Warfarin anticoagulation 5/2/2014       Past Surgical History:   Procedure Laterality Date    COLONOSCOPY N/A 9/12/2019    COLONOSCOPY/ 26 RO[address] 614 performed by Rogers Rodriguez MD at [place] [address] EGD  12/18/2019         HX HEART CATHETERIZATION      VASCULAR SURGERY PROCEDURE UNLIST  09/14/2019     Bilateral common femoral artery cutdown with exposure and placement of catheter in aorta for aortogram,Endovascular repair of infrarenal abdominal aortic aneurysm with bifurcated modulated device (31 x 14 x 13 main body) via the left common femoral, right iliac extender measuring 27 x 10. Family History:   Problem Relation Age of Onset    Cancer Mother     Heart Disease Father        Social History     Socioeconomic History    Marital status: SINGLE     Spouse name: Not on file    Number of children: Not on file    Years of education: Not on file    Highest education level: Not on file   Occupational History    Not on file   Tobacco Use    Smoking status: Former Smoker     Packs/day: 2.00     Years: 40.00     Pack years: 80.00     Quit date: 2014     Years since quittin.1    Smokeless tobacco: Never Used    Tobacco comment: still taking Chantix    Substance and Sexual Activity    Alcohol use: No    Drug use: No    Sexual activity: Yes     Partners: Female     Birth control/protection: None   Other Topics Concern    Not on file   Social History Narrative    Not on file     Social Determinants of Health     Financial Resource Strain: Medium Risk    Difficulty of Paying Living Expenses: Somewhat hard   Food Insecurity: No Food Insecurity    Worried About Running Out of Food in the Last Year: Never true    Salas of Food in the Last Year: Never true   Transportation Needs: No Transportation Needs    Lack of Transportation (Medical): No    Lack of Transportation (Non-Medical):  No   Physical Activity:     Days of Exercise per Week:     Minutes of Exercise per Session:    Stress:     Feeling of Stress :    Social Connections:     Frequency of Communication with Friends and Family:     Frequency of Social Gatherings with Friends and Family:     Attends Mandaen Services:     Active Member of Clubs or Organizations:     Attends Club or Organization Meetings:     Marital Status:    Intimate Partner Violence:     Fear of Current or Ex-Partner:     Emotionally Abused:     Physically Abused:     Sexually Abused: ALLERGIES: Statins-hmg-coa reductase inhibitors    Review of Systems   Constitutional: Negative. Negative for activity change, chills and fever. HENT: Negative. Eyes: Negative. Respiratory: Negative. Cardiovascular: Negative. Gastrointestinal: Positive for anal bleeding. Negative for abdominal distention and abdominal pain. Genitourinary: Negative. Musculoskeletal: Negative. Skin: Negative. Neurological: Negative. Psychiatric/Behavioral: Negative. All other systems reviewed and are negative. Vitals:    08/31/21 0019   BP: (!) 114/56   Pulse: 70   Resp: 16   Temp: 97.1 °F (36.2 °C)   SpO2: 98%   Weight: 72.6 kg (160 lb)   Height: 5' 9\" (1.753 m)            Physical Exam  Vitals and nursing note reviewed. Constitutional:       General: He is not in acute distress. Appearance: He is well-developed. HENT:      Head: Normocephalic and atraumatic. Right Ear: External ear normal.      Left Ear: External ear normal.      Nose: Nose normal.   Eyes:      General: No scleral icterus. Right eye: No discharge. Left eye: No discharge. Conjunctiva/sclera: Conjunctivae normal.      Pupils: Pupils are equal, round, and reactive to light. Cardiovascular:      Rate and Rhythm: Regular rhythm. Pulmonary:      Effort: Pulmonary effort is normal. No respiratory distress. Breath sounds: Normal breath sounds. No stridor. No wheezing or rales. Abdominal:      General: Bowel sounds are normal. There is no distension. Palpations: Abdomen is soft. Tenderness: There is no abdominal tenderness. Musculoskeletal:         General: Normal range of motion. Cervical back: Normal range of motion. Skin:     General: Skin is warm and dry. Findings: No rash. Neurological:      Mental Status: He is alert and oriented to person, place, and time. Motor: No abnormal muscle tone.       Coordination: Coordination normal.   Psychiatric: Behavior: Behavior normal.          MDM  Number of Diagnoses or Management Options  Acute GI bleeding  Diagnosis management comments: Differential diagnosis: Diverticular bleed, polyp bleed, hemorrhoid bleeding, rectal fissure, enteritis,       Amount and/or Complexity of Data Reviewed  Clinical lab tests: ordered and reviewed  Tests in the radiology section of CPT®: ordered and reviewed  Tests in the medicine section of CPT®: ordered and reviewed  Decide to obtain previous medical records or to obtain history from someone other than the patient: yes  Review and summarize past medical records: yes  Independent visualization of images, tracings, or specimens: yes    Risk of Complications, Morbidity, and/or Mortality  Presenting problems: high  Diagnostic procedures: high  Management options: high    Patient Progress  Patient progress: stable         Procedures

## 2021-08-31 NOTE — PROGRESS NOTES
08/31/21 0750   Oxygen Therapy   O2 Sat (%) 100 %   Pulse via Oximetry 70 beats per minute   O2 Device Nasal cannula   O2 Flow Rate (L/min) 2 l/min     Oxygen check.

## 2021-08-31 NOTE — ED TRIAGE NOTES
Pt BIB [facility] from home for complaint of bright red stool that began tonight. Pt was seen recently for black tarry stools. EMS states there was bright red blood with clots in the toilet when they picked him up. Pt always on 3L o2.

## 2021-08-31 NOTE — PROGRESS NOTES
08/31/21 1504   Skin Integumentary   Skin Integumentary (WDL) WDL    Pressure  Injury Documentation No Pressure Injury Noted-Pressure Ulcer Prevention Initiated     No rashes skin intact.

## 2021-09-01 PROBLEM — N17.9 AKI (ACUTE KIDNEY INJURY) (HCC): Status: ACTIVE | Noted: 2021-01-01

## 2021-09-01 NOTE — PROGRESS NOTES
Blood transfusion started at 0550. Two RN verified. Nurse explained side effect and s/sx of adverse reaction pt verbalized understanding.

## 2021-09-01 NOTE — PROGRESS NOTES
Hospitalist Progress Note     Admit Date:  2021  7:15 AM   Name:  Richard Carter. Age:  68 y.o.  :  1940   MRN:  [id number]     Presenting Complaint: No chief complaint on file. Initial Admission Diagnosis: Acute blood loss anemia [D62]  GIB (gastrointestinal bleeding) [K92.2]     Assessment and Plan:   # Acute blood loss anemia   - Likely 2/2 lower GIB/diveritcular bleed. Bloody stool has improved, transfused 1U RBCs  AM. F/u H/H. BP medications are on hold and SBP staying around . # KAYLA on CKD   - Likely from volume loss yesterday, start gentle IVFs, was given 1L IVF total yesterday. BMP tomorrow. # Hypotension   - MAPs ok, BP meds on hold. Volume depletion. # AYAN   - Iron supp    # GERD   - PPI    # HLD   - STatin    # BPH   - Flomax    # Gout   - Allopurinol    Discharge Planning: Home when able pending improvement in Hb. Diet:  ADULT DIET Clear Liquid  DVT PPx: SCDs only.   Code status: Full Code    Active Hospital Problems    Diagnosis Date Noted    K[facility]LA (acute kidney injury) [facility] 2021    Acute blood loss anemia 2021    GIB (gastrointestinal bleeding) 2021    Chronic hypoxemic respiratory failure ([facility] 2019    CKD (chronic kidney disease), stage III ([facility] 2019    Heart failure with reduced ejection fraction ([facility] 2019    Upper GI bleed 2019    History of pulmonary embolism 2017    Hypertension 2017    AAA (abdominal aortic aneurysm) without rupture ([facility] 2014     3.2 on US  [facility]      COPD (chronic obstructive pulmonary disease) ([facility] 2014    Hyperlipemia 2014    Glaucoma 2014    Supplemental oxygen dependent 2014    OA (osteoarthritis) 2014       Hospital Course:   Mr. Allie Briseno is a nice 67 y/o AAM with multiple medical problems including atrial fibrillation/flutter (not on [address] 2/2 recurrent GI bleeding) s/p AVN ablation and BiV ICD placement 2021, COPD and chronic hypoxemic respiratory failure, iron deficiency anemia, AAA s/p EVAR 9/2019, prostate cancer, HLD, CKD, HTN, gastric AVMs, chronic sCHF who initially presented to the ER on 8/25 with complaints of melena. Hb was 10.5 which was above his baseline (in the 9s), FOBT positive, ultimately he was discharged home. About 24 hours ago he began to get maroon/bright red in color so he returned to the ER late the night of 8/30. He had bloody stool with clots in the ER. His Hb was 8.3. He was transferred to [facility] and GI was consulted. He had ongoing maroon bleeding per rectum. Tagged RBC scan was negative for source of bleeding. Hb is 6.5 on 9/1 and has developed KAYLA in the interim, now on IVFs. 24hr Events/Subjective (09/01/21):   9/1: Hb 6.5 and Cr up to 2.6 this AM. Now on gentle fluids. He feels better, not as weak and no dizziness like yesterday. Some mild BRBPR still but improved compared to yesterday. Already transfused 1U RBCs. No chest pain, SOB or N/V/D.      Objective:     Patient Vitals for the past 24 hrs:   Temp Pulse Resp BP SpO2   09/01/21 0848 98.4 °F (36.9 °C) 70 20 100/66 99 %   09/01/21 0833 98.4 °F (36.9 °C) 70 20 101/66 98 %   09/01/21 0750 98.5 °F (36.9 °C) 71 20 (!) 92/58 100 %   09/01/21 0730 98.6 °F (37 °C) 70 20 (!) 99/49 99 %   09/01/21 0635 98.3 °F (36.8 °C) 71 22 (!) 98/52 100 %   09/01/21 0615 98.4 °F (36.9 °C) 70 22 (!) 91/52    09/01/21 0605 98.4 °F (36.9 °C) 70 24 (!) 91/52 100 %   09/01/21 0550 98.4 °F (36.9 °C) 70 16 (!) 92/49 98 %   09/01/21 0400  70      09/01/21 0346 98.6 °F (37 °C) 71 22 (!) 88/42 93 %   09/01/21 0200    (!) 90/53    08/31/21 2327 98.8 °F (37.1 °C) 71 22 (!) 90/54 90 %   08/31/21 2017     100 %   08/31/21 2000  70      08/31/21 1928 98.4 °F (36.9 °C) 71 22 (!) 90/50 96 %   08/31/21 1649     100 %   08/31/21 1312 98 °F (36.7 °C) 70 22 (!) 86/47 100 %   08/31/21 1142 97.9 °F (36.6 °C) 71 20 107/83 91 %     Oxygen Therapy  O2 Sat (%): 99 % (09/01/21 0848)  Pulse via Oximetry: 70 beats per minute (08/31/21 2017)  O2 Device: Nasal cannula (09/01/21 0836)  O2 Flow Rate (L/min): 2 l/min (09/01/21 0836)    Estimated body mass index is 23.63 kg/m² as calculated from the following:    Height as of this encounter: 5' 9\" (1.753 m). Weight as of this encounter: 72.6 kg (160 lb). Intake/Output Summary (Last 24 hours) at 9/1/2021 1109  Last data filed at 9/1/2021 0848  Gross per 24 hour   Intake 354.6 ml   Output 900 ml   Net -545.4 ml         General:    Well nourished. Chronically ill AAM.  Head:  Normocephalic, atraumatic  Eyes:  Sclerae appear normal.  Pupils equally round. HENT:  Nares appear normal, no drainage. Moist mucous membranes  Neck:  No restricted ROM. Trachea midline  CV:   RRR. No m/r/g. No JVD  Lungs:   CTAB. No wheezing, rhonchi, or rales. Appears even, unlabored. 2L NC O2. Abdomen: Bowel sounds present. Soft, nontender, nondistended. Extremities: Warm and dry. No cyanosis or clubbing. Mild edema b/l LEs. Skin:     No rashes. Normal turgor. Normal coloration  Neuro:  Cranial nerves II-XII grossly intact. Sensation intact  Psych:  Normal mood and affect.   Alert and oriented x3    Data Ordered and Personally Reviewed:    Last 24hr Labs:  Recent Results (from the past 24 hour(s))   HEMOGLOBIN    Collection Time: 08/31/21 12:23 PM   Result Value Ref Range    HGB 8.4 (L) 13.6 - 17.2 g/dL   HEMOGLOBIN    Collection Time: 08/31/21  7:04 PM   Result Value Ref Range    HGB 7.5 (L) 13.6 - 25.1 g/dL   METABOLIC PANEL, COMPREHENSIVE    Collection Time: 09/01/21  2:45 AM   Result Value Ref Range    Sodium 141 136 - 145 mmol/L    Potassium 4.5 3.5 - 5.1 mmol/L    Chloride 107 98 - 107 mmol/L    CO2 33 (H) 21 - 32 mmol/L    Anion gap 1 (L) 7 - 16 mmol/L    Glucose 112 (H) 65 - 100 mg/dL    BUN 34 (H) 8 - 23 MG/DL    Creatinine 2.65 (H) 0.8 - 1.5 MG/DL    GFR est AA 30 (L) >60 ml/min/1.73m2    GFR est non-AA 25 (L) >60 ml/min/1.73m2    Calcium 8.2 (L) 8.3 - 10.4 MG/DL    Bilirubin, total 0.2 0.2 - 1.1 MG/DL    ALT (SGPT) <6 (L) 12 - 65 U/L    AST (SGOT) 8 (L) 15 - 37 U/L    Alk. phosphatase 42 (L) 50 - 136 U/L    Protein, total 4.9 (L) 6.3 - 8.2 g/dL    Albumin 2.1 (L) 3.2 - 4.6 g/dL    Globulin 2.8 2.3 - 3.5 g/dL    A-G Ratio 0.8 (L) 1.2 - 3.5     HEMOGLOBIN    Collection Time: 09/01/21  2:45 AM   Result Value Ref Range    HGB 6.5 (LL) 13.6 - 17.2 g/dL   RBC, ALLOCATE    Collection Time: 09/01/21  3:30 AM   Result Value Ref Range    HISTORY CHECKED? Historical check performed    TYPE & SCREEN    Collection Time: 09/01/21  3:58 AM   Result Value Ref Range    Crossmatch Expiration 09/04/2021,2359     ABO/Rh(D) B POSITIVE     Antibody screen NEG     Unit number [id number]     Blood component type [facility]     Unit division 00     Status of unit ISSUED     Crossmatch result Compatible     Unit number [id number]     Blood component type [facility]     Unit division 00     Status of unit ALLOCATED     Crossmatch result Compatible        All Micro Results     Procedure Component Value Units Date/Time    COVID-19 RAPID TEST [[id number]] Collected: 08/31/21 1051    Order Status: Completed Specimen: Nasopharyngeal Updated: 08/31/21 1122     Specimen source Nasopharyngeal        COVID-19 rapid test Not detected        Comment:      The specimen is NEGATIVE for SARS-CoV-2, the novel coronavirus associated with COVID-19. A negative result does not rule out COVID-19. This test has been authorized by the FDA under an Emergency Use Authorization (EUA) for use by authorized laboratories.         Fact sheet for Healthcare Providers: ConventionUpdate.co.nz  Fact sheet for Patients: ConventionUpdate.co.nz       Methodology: Isothermal Nucleic Acid Amplification               Current Meds:  Current Facility-Administered Medications   Medication Dose Route Frequency    0.9% sodium chloride infusion 250 mL  250 mL IntraVENous PRN    0.9% sodium chloride infusion  75 mL/hr IntraVENous CONTINUOUS    [START ON 9/2/2021] pantoprazole (PROTONIX) tablet 40 mg  40 mg Oral ACB    sodium chloride (NS) flush 5-40 mL  5-40 mL IntraVENous Q8H    sodium chloride (NS) flush 5-40 mL  5-40 mL IntraVENous PRN    ondansetron (ZOFRAN) injection 4 mg  4 mg IntraVENous Q4H PRN    albuterol (PROVENTIL VENTOLIN) nebulizer solution 2.5 mg  2.5 mg Inhalation Q6H PRN    albuterol-ipratropium (DUO-NEB) 2.5 MG-0.5 MG/3 ML  3 mL Nebulization TID    allopurinoL (ZYLOPRIM) tablet 300 mg  300 mg Oral DAILY    brimonidine (ALPHAGAN) 0.2 % ophthalmic solution 1 Drop  1 Drop Both Eyes BID    docusate sodium (COLACE) capsule 100 mg  100 mg Oral DAILY    ferrous sulfate tablet 325 mg  1 Tablet Oral BID WITH MEALS    fluticasone (FLOVENT HFA) 110 mcg inhaler  1 Puff Inhalation DAILY    latanoprost (XALATAN) 0.005 % ophthalmic solution 1 Drop  1 Drop Both Eyes QHS    [Held by provider] lisinopriL (PRINIVIL, ZESTRIL) tablet 10 mg  10 mg Oral DAILY    [Held by provider] metoprolol succinate (TOPROL-XL) tablet 100 mg  100 mg Oral DAILY    nitroglycerin (NITROSTAT) tablet 0.4 mg  0.4 mg SubLINGual Q5MIN PRN    rosuvastatin (CRESTOR) tablet 40 mg  40 mg Oral QHS    tamsulosin (FLOMAX) capsule 0.4 mg  0.4 mg Oral DAILY    tuberculin injection 5 Units  5 Units IntraDERMal ONCE       Other Studies:    NM ACUTE GI BLEED SCAN    Result Date: 8/31/2021  NUCLEAR MEDICINE GI BLEEDING SCAN HISTORY: Lower GI bleed TECHNIQUE: The patient received 26.3 mCi of technetium 99m tagged autologous red blood cells. Imaging of the abdomen was performed. FINDINGS: Normal blood pool activity is present along with activity in the reticuloendothelial system and penile activity. There are no areas of abnormal GI activity. No source of GI bleeding identified.        Signed:  Trino Balbuena MD    Part of this note may have been written by using a voice dictation software. The note has been proof read but may still contain some grammatical/other typographical errors.

## 2021-09-01 NOTE — PROGRESS NOTES
2nd unit PRBC's started at 50 ml / hr though #20 L AC , Vital signs are stable with no signs or symptoms of reaction noted, at 5 minutes vitals are stable no reaction noted, at 15 minutes vitals are still stable no reaction noted patient tolerating well, rate graduley increase to a running rate of 125 ml / hr, patient tolerating well.

## 2021-09-01 NOTE — PROGRESS NOTES
[facility] Progress Note         Admit Date:  8/31/2021    Today's Date:  9/1/2021    CC:  LGIB    Subjective:     Patient had 8 bloody stools yesterday/last night. However, his RN states the volume progressively decreased since yesterday afternoon and he hasn't had any BM/bleeding so far this morning. Still denies abdominal pain. No nausea, vomiting. Tolerating clears and RN asks about advancing. Tagged scan yesterday negative. Hgb today down to 6.5 and he is getting 1 unit PRBCs.     Medications:   Current Facility-Administered Medications   Medication Dose Route Frequency    0.9% sodium chloride infusion 250 mL  250 mL IntraVENous PRN    0.9% sodium chloride infusion  75 mL/hr IntraVENous CONTINUOUS    sodium chloride (NS) flush 5-40 mL  5-40 mL IntraVENous Q8H    sodium chloride (NS) flush 5-40 mL  5-40 mL IntraVENous PRN    ondansetron (ZOFRAN) injection 4 mg  4 mg IntraVENous Q4H PRN    pantoprazole (PROTONIX) 40 mg in 0.9% sodium chloride 10 mL injection  40 mg IntraVENous BID    albuterol (PROVENTIL VENTOLIN) nebulizer solution 2.5 mg  2.5 mg Inhalation Q6H PRN    albuterol-ipratropium (DUO-NEB) 2.5 MG-0.5 MG/3 ML  3 mL Nebulization TID    allopurinoL (ZYLOPRIM) tablet 300 mg  300 mg Oral DAILY    brimonidine (ALPHAGAN) 0.2 % ophthalmic solution 1 Drop  1 Drop Both Eyes BID    docusate sodium (COLACE) capsule 100 mg  100 mg Oral DAILY    ferrous sulfate tablet 325 mg  1 Tablet Oral BID WITH MEALS    fluticasone (FLOVENT HFA) 110 mcg inhaler  1 Puff Inhalation DAILY    latanoprost (XALATAN) 0.005 % ophthalmic solution 1 Drop  1 Drop Both Eyes QHS    [Held by provider] lisinopriL (PRINIVIL, ZESTRIL) tablet 10 mg  10 mg Oral DAILY    [Held by provider] metoprolol succinate (TOPROL-XL) tablet 100 mg  100 mg Oral DAILY    nitroglycerin (NITROSTAT) tablet 0.4 mg  0.4 mg SubLINGual Q5MIN PRN    rosuvastatin (CRESTOR) tablet 40 mg  40 mg Oral QHS    tamsulosin (FLOMAX) capsule 0.4 mg  0.4 mg Oral DAILY    tuberculin injection 5 Units  5 Units IntraDERMal ONCE       Review of Systems:  No chest pain or SOB. Diet:  clears    Objective:   Vitals:  Visit Vitals  /66 (BP 1 Location: Right arm)   Pulse 70   Temp 98.4 °F (36.9 °C)   Resp 20   Ht 5' 9\" (1.753 m)   Wt 72.6 kg (160 lb)   SpO2 99%   BMI 23.63 kg/m²     Intake/Output:  09/01 0701 - 09/01 1900  In: 339.6   Out: -   08/30 1901 - 09/01 0700  In: 54 [P.O.:40]  Out: 1800 [Urine:1000]  Exam:  General appearance: alert, cooperative, no distress  Lungs: clear to auscultation bilaterally anteriorly; on O2 via NC  Heart: regular rate and rhythm  Abdomen: soft, non-tender.  Bowel sounds normal. No masses, no organomegaly  Extremities: extremities normal, atraumatic, no cyanosis or edema  Neuro:  alert and oriented    Data Review (Labs):    Recent Labs     09/01/21  0245 08/31/21  1904 08/31/21  1223 08/31/21  0301 08/31/21  0035   WBC  --   --   --   --  7.4   HGB 6.5* 7.5* 8.4* 7.7* 8.3*   HCT  --   --   --  26.5* 28.5*   PLT  --   --   --   --  392   MCV  --   --   --   --  92.8     --   --   --  143   K 4.5  --   --   --  4.1     --   --   --  107   CO2 33*  --   --   --  35*   BUN 34*  --   --   --  26*   CREA 2.65*  --   --   --  1.60*   CA 8.2*  --   --   --  8.9   *  --   --   --  122*   AP 42*  --   --   --  70   AST 8*  --   --   --  8*   ALT <6*  --   --   --  12   TBILI 0.2  --   --   --  0.1*   ALB 2.1*  --   --   --  2.7*   TP 4.9*  --   --   --  6.1*   PTP  --   --   --   --  12.8   INR  --   --   --   --  0.9       Assessment:     Principal Problem:    GIB (gastrointestinal bleeding) (8/31/2021)    Active Problems:    AAA (abdominal aortic aneurysm) without rupture (HCC) (5/2/2014)      Overview: 3.2 on  2/14 [facility]      COPD (chronic obstructive pulmonary disease) ([facility] (5/2/2014)      Hyperlipemia (5/2/2014)      Glaucoma (5/2/2014)      Supplemental oxygen dependent (5/2/2014)      OA (osteoarthritis) (5/2/2014)      Hypertension (6/1/2017)      History of pulmonary embolism (6/22/2017)      Upper GI bleed (9/9/2019)      Chronic hypoxemic respiratory failure ([facility] (9/9/2019)      Heart failure with reduced ejection fraction ([facility] (9/9/2019)      CKD (chronic kidney disease), stage III ([facility] (9/9/2019)      Acute blood loss anemia (8/31/2021)      68 y.o. male with PMH HTN, HLD, CHF (LVEF 30-35%, Afib (no OAC d/t hx GIB/anemia, unable to get Watchman), AAA, CKD, COPD on 2-3L O2 at home, hx GIB with known hx gastric and duodenal AVMs, gastric polyps, Diverticulosis, HP colon polyps, being seen in GI consult at the request of Mateo Casillas NP for GI bleed with painless hematocezia and drop in hgb from 10.1 one week ago to 6.5 today. Getting 2 units PRBCs. He is s/p EGD and Colonoscopy in 2019 (gastric/duodenal AVMs, colonic tics and HP colon polyps). Suspect diverticular bleeding. Tagged RBC scan 8/31 negative, and the bleeding appears to be resolving. Plan:   1) f/u h/h after PRBC  2) full liquid diet  3) decrease protonix to once daily    Patient is seen and examined in collaboration with Dr. Radha Thomas. Assessment and plan as per Dr. David Mello. EDGAR Maldonado   ATTENDING NOTE:  I have seen and examined the patient along with my NP/PA. The assessment and plan above is my own. tRBC negative and no clinical signs of active GIB despite further drop in Hg   Plan transfusion   Will follow.     Kamini Ferrera MD

## 2021-09-01 NOTE — PROGRESS NOTES
Pt working toward goals. Pt denies pain. Pt out of bed multiple times this shift have small stool with some blood in them. New order received to transfuse PRBC this a.m. Pt informed and verbalied understanding.

## 2021-09-01 NOTE — PROGRESS NOTES
Replaced patient's IV line: Left AC 20  Infiltrated, placed new #20 left wrist.  Patient has rested well throughout shift was able to tolerate 2nd unit of PRBC's with no difficulty. Patient had 2two episodes of small black tarry stool today .

## 2021-09-02 NOTE — PROGRESS NOTES
Received call from RN that the patient had a medium loose bloody stool. Blood pressure within normal limits. Ordered stat H&H. Transfuse if hemoglobin less than 7.

## 2021-09-02 NOTE — PROGRESS NOTES
Problem: Falls - Risk of  Goal: *Absence of Falls  Description: Document Santos Dent Fall Risk and appropriate interventions in the flowsheet.   Outcome: Progressing Towards Goal  Note: Fall Risk Interventions:            Medication Interventions: Patient to call before getting OOB, Teach patient to arise slowly    Elimination Interventions: Call light in reach, Elevated toilet seat, Patient to call for help with toileting needs, Urinal in reach              Problem: Patient Education: Go to Patient Education Activity  Goal: Patient/Family Education  Outcome: Progressing Towards Goal     Problem: Patient Education: Go to Patient Education Activity  Goal: Patient/Family Education  Outcome: Progressing Towards Goal     Problem: Upper and Lower GI Bleed: Day 2  Goal: Off Pathway (Use only if patient is Off Pathway)  Outcome: Progressing Towards Goal  Goal: Activity/Safety  Outcome: Progressing Towards Goal  Goal: Consults, if ordered  Outcome: Progressing Towards Goal  Goal: Diagnostic Test/Procedures  Outcome: Progressing Towards Goal  Goal: Nutrition/Diet  Outcome: Progressing Towards Goal  Goal: Discharge Planning  Outcome: Progressing Towards Goal  Goal: Medications  Outcome: Progressing Towards Goal  Goal: Respiratory  Outcome: Progressing Towards Goal  Goal: Treatments/Interventions/Procedures  Outcome: Progressing Towards Goal  Goal: Psychosocial  Outcome: Progressing Towards Goal  Goal: *Optimal pain control at patient's stated goal  Outcome: Progressing Towards Goal  Goal: *Hemodynamically stable  Outcome: Progressing Towards Goal  Goal: *Tolerating diet  Outcome: Progressing Towards Goal  Goal: *Demonstrates progressive activity  Outcome: Progressing Towards Goal     Problem: Upper and Lower GI Bleed: Day 3  Goal: Off Pathway (Use only if patient is Off Pathway)  Outcome: Progressing Towards Goal  Goal: Activity/Safety  Outcome: Progressing Towards Goal  Goal: Diagnostic Test/Procedures  Outcome: Progressing Towards Goal  Goal: Nutrition/Diet  Outcome: Progressing Towards Goal  Goal: Discharge Planning  Outcome: Progressing Towards Goal  Goal: Medications  Outcome: Progressing Towards Goal  Goal: Treatments/Interventions/Procedures  Outcome: Progressing Towards Goal  Goal: Psychosocial  Outcome: Progressing Towards Goal     Problem: Upper and Lower GI Bleed:  Discharge Outcomes  Goal: *Hemodynamically stable  Outcome: Progressing Towards Goal  Goal: *Lungs clear or at baseline  Outcome: Progressing Towards Goal  Goal: *Demonstrates independent activity or return to baseline  Outcome: Progressing Towards Goal  Goal: *Pain is controlled to three or less  Outcome: Progressing Towards Goal  Goal: *Verbalizes understanding and describes prescribed diet  Outcome: Progressing Towards Goal  Goal: *Tolerating diet  Outcome: Progressing Towards Goal  Goal: *Verbalizes name, dosage, time, side effects, and number of days to continue medications  Outcome: Progressing Towards Goal  Goal: *Anxiety reduced or absent  Outcome: Progressing Towards Goal  Goal: *Understands and describes signs and symptoms to report to providers(Stroke Metric)  Outcome: Progressing Towards Goal  Goal: *Describes follow-up/return visits to physicians  Outcome: Progressing Towards Goal  Goal: *Describes available resources and support systems  Outcome: Progressing Towards Goal

## 2021-09-02 NOTE — PROGRESS NOTES
[facility] Progress Note         Admit Date:  8/31/2021    Today's Date:  9/2/2021    CC:  LGIB    Subjective:     Has not had bm since last night. None today so far. Hg stable after another unit of blood given. Medications:   Current Facility-Administered Medications   Medication Dose Route Frequency    0.9% sodium chloride infusion 250 mL  250 mL IntraVENous PRN    0.9% sodium chloride infusion  75 mL/hr IntraVENous CONTINUOUS    pantoprazole (PROTONIX) tablet 40 mg  40 mg Oral ACB    albuterol-ipratropium (DUO-NEB) 2.5 MG-0.5 MG/3 ML  3 mL Nebulization TID    sodium chloride (NS) flush 5-40 mL  5-40 mL IntraVENous Q8H    sodium chloride (NS) flush 5-40 mL  5-40 mL IntraVENous PRN    ondansetron (ZOFRAN) injection 4 mg  4 mg IntraVENous Q4H PRN    albuterol (PROVENTIL VENTOLIN) nebulizer solution 2.5 mg  2.5 mg Inhalation Q6H PRN    allopurinoL (ZYLOPRIM) tablet 300 mg  300 mg Oral DAILY    brimonidine (ALPHAGAN) 0.2 % ophthalmic solution 1 Drop  1 Drop Both Eyes BID    docusate sodium (COLACE) capsule 100 mg  100 mg Oral DAILY    ferrous sulfate tablet 325 mg  1 Tablet Oral BID WITH MEALS    fluticasone (FLOVENT HFA) 110 mcg inhaler  1 Puff Inhalation DAILY    latanoprost (XALATAN) 0.005 % ophthalmic solution 1 Drop  1 Drop Both Eyes QHS    [Held by provider] lisinopriL (PRINIVIL, ZESTRIL) tablet 10 mg  10 mg Oral DAILY    [Held by provider] metoprolol succinate (TOPROL-XL) tablet 100 mg  100 mg Oral DAILY    nitroglycerin (NITROSTAT) tablet 0.4 mg  0.4 mg SubLINGual Q5MIN PRN    rosuvastatin (CRESTOR) tablet 40 mg  40 mg Oral QHS    tamsulosin (FLOMAX) capsule 0.4 mg  0.4 mg Oral DAILY       Review of Systems:  No chest pain or SOB. Diet:  clears    Objective:   Vitals:  Visit Vitals  /60   Pulse 68   Temp 98.4 °F (36.9 °C)   Resp 24   Ht 5' 9\" (1.753 m)   Wt 72.6 kg (160 lb)   SpO2 91%   BMI 23.63 kg/m²     Intake/Output:  No intake/output data recorded.   08/31 1901 - 09/02 0700  In: 715.4   Out: 20   Exam:  General appearance: alert, cooperative, no distress  Lungs: clear to auscultation bilaterally anteriorly; on O2 via NC  Heart: regular rate and rhythm  Abdomen: soft, non-tender.  Bowel sounds normal. No masses, no organomegaly  Extremities: extremities normal, atraumatic, no cyanosis or edema  Neuro:  alert and oriented    Data Review (Labs):    Recent Labs     09/02/21  0310 09/01/21  1700 09/01/21  0245 08/31/21  1904 08/31/21  1223 08/31/21  0301 08/31/21  0035   WBC 9.3  --   --   --   --   --  7.4   HGB 8.7* 8.6* 6.5* 7.5* 8.4* 7.7* 8.3*   HCT 27.3* 26.6*  --   --   --  26.5* 28.5*     --   --   --   --   --  392   MCV 88.9  --   --   --   --   --  92.8     --  141  --   --   --  143   K 4.3  --  4.5  --   --   --  4.1   *  --  107  --   --   --  107   CO2 28  --  33*  --   --   --  35*   BUN 29*  --  34*  --   --   --  26*   CREA 2.23*  --  2.65*  --   --   --  1.60*   CA 7.7*  --  8.2*  --   --   --  8.9   GLU 99  --  112*  --   --   --  122*   AP  --   --  42*  --   --   --  70   AST  --   --  8*  --   --   --  8*   ALT  --   --  <6*  --   --   --  12   TBILI  --   --  0.2  --   --   --  0.1*   ALB  --   --  2.1*  --   --   --  2.7*   TP  --   --  4.9*  --   --   --  6.1*   PTP  --   --   --   --   --   --  12.8   INR  --   --   --   --   --   --  0.9       Assessment:     Principal Problem:    GIB (gastrointestinal bleeding) (8/31/2021)    Active Problems:    AAA (abdominal aortic aneurysm) without rupture ([facility] (5/2/2014)      Overview: 3.2 on  2/14 [facility]      COPD (chronic obstructive pulmonary disease) ([facility] (5/2/2014)      Hyperlipemia (5/2/2014)      Glaucoma (5/2/2014)      Supplemental oxygen dependent (5/2/2014)      OA (osteoarthritis) (5/2/2014)      Hypertension (6/1/2017)      History of pulmonary embolism (6/22/2017)      Upper GI bleed (9/9/2019)      Chronic hypoxemic respiratory failure (HCC) (9/9/2019)      Heart failure with reduced ejection fraction ([facility] (9/9/2019)      CKD (chronic kidney disease), stage III ([facility] (9/9/2019)      Acute blood loss anemia (8/31/2021)      KAYLA (acute kidney injury) ([facility] (9/1/2021)      68 y.o. male with PMH HTN, HLD, CHF (LVEF 30-35%, Afib (no OAC d/t hx GIB/anemia, unable to get Watchman), AAA, CKD, COPD on 2-3L O2 at home, hx GIB with known hx gastric and duodenal AVMs, gastric polyps, Diverticulosis, HP colon polyps, being seen in GI consult at the request of Angelia Lopez NP for GI bleed with painless hematocezia and drop in hgb from 10.1 one week ago to 6.5 today. Getting 2 units PRBCs. He is s/p EGD and Colonoscopy in 2019 (gastric/duodenal AVMs, colonic tics and HP colon polyps). Suspect diverticular bleeding. Tagged RBC scan 8/31 negative, and the bleeding appears to be resolving. Plan:   1) f/u h/h after PRBC  2) full liquid diet  3) decrease protonix to once daily   OK to discharge after today if stable.   Mariano Temple MD

## 2021-09-02 NOTE — PROGRESS NOTES
Hospitalist Progress Note   Admit Date:  2021  7:15 AM   Name:  Geoffrey Galdamez. Age:  68 y.o. Sex:  male  :  1940   MRN:  [id number]     Presenting Complaint: No chief complaint on file. Reason(s) for Admission: Acute blood loss anemia [D62]  GIB (gastrointestinal bleeding) [facility] Course & Interval History:     Patient with past medical history of    atrial fibrillation/flutter (not on [address] / recurrent GI bleeding) s/p AVN ablation and BiV ICD placement 2021,   COPD and chronic hypoxemic respiratory failure,   Iron deficiency anemia,   abdominal aortic aneurysm s/p EVAR 2019,   prostate cancer,   HLD,   CKD,   HTN,   gastric AVMs,   chronic sCHF    Patient is admitted due to melena with subsequent drop in Hb. Tagged RBC scan was negative. Hb dropped to 6.5 from baseline of 8.4 and he was transfused with 3 units of RBC so far. GI is consulted. \" His last EGD 2019 showed a hyperplastic polyp that was removed and gastric and duodenal AVMs that were cauterized.   Colonoscopy 2019 showed four diminutive, hyperplastic colon polyps that were removed and moderate sigmoid and descending colon diverticulosis \"     Plan is to continue with PPI although UGI source is unlikely as per GI service. Subjective (21):  21   He had some blood per rectum last night. No abdominal pain. No hematemesis. No fever. No shaking. No chills. No shortness of breath. No chest pain. No nausea. No vomiting. Assessment & Plan:     Principal Problem:    GIB (gastrointestinal bleeding) (2021)  Likely due to lower GI bleeding from diverticular bleeding. Follow up on CBC and Hb   BP seems to be on the low side. BP medications are on hold. Patient is not complaining of lightheadedness, or dizziness. Monitor closely. Active Problems:    AAA (abdominal aortic aneurysm) without rupture ([address] (2014)  Monitor. No impending rupture.        COPD (chronic obstructive pulmonary disease) ([address] (5/2/2014)  Not exacerbated. On 2 LPM of oxygen cannula. Hyperlipemia (5/2/2014)  On statin. Glaucoma (5/2/2014)    Supplemental oxygen dependent (5/2/2014)  On 2 LPM       OA (osteoarthritis) (5/2/2014)        Hypertension (6/1/2017)  BP is on the low side now. Hold BP medications. History of pulmonary embolism (6/22/2017)  Can not be on anticoagulant due to current GI bleeding. History of Upper GI bleed (9/9/2019)      Chronic hypoxemic respiratory failure ([address] (9/9/2019)  Dependent on oxygen cannula. Heart failure with reduced ejection fraction ([address] (9/9/2019)      CKD (chronic kidney disease), stage III ([address] (9/9/2019)      Acute blood loss anemia (8/31/2021)  Monitor Hb and transfuse as needed. KAYLA (acute kidney injury) ([address] (9/1/2021)  Creatinine went up from 1.60 to 2.65 due to GI bleeding and lower BP. Creatinine is improving. Monitor renal function and intake and output. Avoid nephrotoxic agents.        Dispo/Discharge Planning:    to be determined     Diet:  ADULT DIET Full Liquid  DVT PPx: SCD   Code status: Full Code    Active Hospital Problems    Diagnosis Date Noted    KAYLA (acute kidney injury) ([facility] 09/01/2021    Acute blood loss anemia 08/31/2021    GIB (gastrointestinal bleeding) 08/31/2021    Chronic hypoxemic respiratory failure ([address] 09/09/2019    CKD (chronic kidney disease), stage III ([address] 09/09/2019    Heart failure with reduced ejection fraction ([address] 09/09/2019    Upper GI bleed 09/09/2019    History of pulmonary embolism 06/22/2017    Hypertension 06/01/2017    AAA (abdominal aortic aneurysm) without rupture ([address] 05/02/2014     3.2 on US 2/14 [facility]      COPD (chronic obstructive pulmonary disease) ([address] 05/02/2014    Hyperlipemia 05/02/2014    Glaucoma 05/02/2014    Supplemental oxygen dependent 05/02/2014    OA (osteoarthritis) 05/02/2014       Objective:     Patient Vitals for the past 24 hrs:   Temp Pulse Resp BP SpO2   09/02/21 0759     91 %   09/02/21 0733 98.4 °F (36.9 °C) 68 24 109/60 96 %   09/02/21 0315 98.2 °F (36.8 °C) 82 16 (!) 97/57 98 %   09/02/21 0306     96 %   09/01/21 2340 98.8 °F (37.1 °C) 69 16 (!) 99/43 90 %   09/01/21 2129     93 %   09/01/21 2120    (!) 93/52    09/01/21 1936 98.8 °F (37.1 °C) 70 16 (!) 83/46 91 %   09/01/21 1925    (!) 87/44    09/01/21 1540 98.8 °F (37.1 °C)  20 (!) 98/48 97 %   09/01/21 1518 99 °F (37.2 °C) 70 20 (!) 96/47 96 %   09/01/21 1450 98.1 °F (36.7 °C) 70 20 (!) 109/46 97 %   09/01/21 1447     97 %   09/01/21 1350 98.2 °F (36.8 °C)  20 (!) 91/53 97 %   09/01/21 1251 97.9 °F (36.6 °C)  20 (!) 90/45 98 %   09/01/21 1241 97.8 °F (36.6 °C) 71 20 (!) 93/43 97 %   09/01/21 1236 97.9 °F (36.6 °C) 70 20 (!) 88/45 98 %   09/01/21 1157 98.1 °F (36.7 °C) 68 20 93/60 98 %     Oxygen Therapy  O2 Sat (%): 91 % (09/02/21 0759)  Pulse via Oximetry: 90 beats per minute (09/02/21 0759)  O2 Device: Nasal cannula (09/02/21 0759)  Skin Assessment: Clean, dry, & intact (09/02/21 0306)  Skin Protection for O2 Device: N/A (09/02/21 0306)  O2 Flow Rate (L/min): 2 l/min (09/02/21 0759)    Estimated body mass index is 23.63 kg/m² as calculated from the following:    Height as of this encounter: 5' 9\" (1.753 m). Weight as of this encounter: 72.6 kg (160 lb). Intake/Output Summary (Last 24 hours) at 9/2/2021 1036  Last data filed at 9/1/2021 1540  Gross per 24 hour   Intake 360.8 ml   Output 20 ml   Net 340.8 ml         Physical Exam:     Visit Vitals  /60   Pulse 68   Temp 98.4 °F (36.9 °C)   Resp 24   Ht 5' 9\" (1.753 m)   Wt 72.6 kg (160 lb)   SpO2 91%   BMI 23.63 kg/m²      General:    Well nourished. No overt distress. Patient is sitting up in bed and talking well. On oxygen cannula 2 LPM.   Head:  Pale. No jaundice. Normocephalic, atraumatic  Eyes:  Sclerae appear normal.  Pupils equally round.   ENT:  Nares appear normal, no drainage. Moist oral mucosa  Neck:  No restricted ROM. Trachea midline   CV:   RRR. No m/r/g. No jugular venous distension. Lungs:   CTAB. No wheezing, rhonchi, or rales. Respirations even, unlabored  Abdomen: Bowel sounds present. Soft, nontender, mildly distended. No guarding. Extremities: No cyanosis or clubbing. No edema  Skin:     No rashes and normal coloration. Warm and dry. Neuro:  Cranial nerves II-XII grossly intact. Sensation intact  Psych:  Normal mood and affect.   Alert and oriented x3    I have reviewed ordered lab tests and independently visualized imaging below:    Last 24hr Labs:  Recent Results (from the past 24 hour(s))   PLEASE READ & DOCUMENT PPD TEST IN 24 HRS    Collection Time: 09/01/21 12:45 PM   Result Value Ref Range    PPD Negative Negative    mm Induration 0 0 - 5 mm   HGB & HCT    Collection Time: 09/01/21  5:00 PM   Result Value Ref Range    HGB 8.6 (L) 13.6 - 17.2 g/dL    HCT 26.6 (L) 41.1 - 84.6 %   METABOLIC PANEL, BASIC    Collection Time: 09/02/21  3:10 AM   Result Value Ref Range    Sodium 143 136 - 145 mmol/L    Potassium 4.3 3.5 - 5.1 mmol/L    Chloride 112 (H) 98 - 107 mmol/L    CO2 28 21 - 32 mmol/L    Anion gap 3 (L) 7 - 16 mmol/L    Glucose 99 65 - 100 mg/dL    BUN 29 (H) 8 - 23 MG/DL    Creatinine 2.23 (H) 0.8 - 1.5 MG/DL    GFR est AA 37 (L) >60 ml/min/1.73m2    GFR est non-AA 30 (L) >60 ml/min/1.73m2    Calcium 7.7 (L) 8.3 - 10.4 MG/DL   CBC W/O DIFF    Collection Time: 09/02/21  3:10 AM   Result Value Ref Range    WBC 9.3 4.3 - 11.1 K/uL    RBC 3.07 (L) 4.23 - 5.6 M/uL    HGB 8.7 (L) 13.6 - 17.2 g/dL    HCT 27.3 (L) 41.1 - 50.3 %    MCV 88.9 79.6 - 97.8 FL    MCH 28.3 26.1 - 32.9 PG    MCHC 31.9 31.4 - 35.0 g/dL    RDW 16.9 (H) 11.9 - 14.6 %    PLATELET 281 502 - 268 K/uL    MPV 10.1 9.4 - 12.3 FL    ABSOLUTE NRBC 0.12 0.0 - 0.2 K/uL       All Micro Results     Procedure Component Value Units Date/Time    COVID-19 RAPID TEST [[id number]] Collected: 08/31/21 1051    Order Status: Completed Specimen: Nasopharyngeal Updated: 08/31/21 1122     Specimen source Nasopharyngeal        COVID-19 rapid test Not detected        Comment:      The specimen is NEGATIVE for SARS-CoV-2, the novel coronavirus associated with COVID-19. A negative result does not rule out COVID-19. This test has been authorized by the FDA under an Emergency Use Authorization (EUA) for use by authorized laboratories. Fact sheet for Healthcare Providers: Loop.co.nz  Fact sheet for Patients: Loop[employer]co.nz       Methodology: Isothermal Nucleic Acid Amplification               Other Studies:  No results found.     Current Meds:  Current Facility-Administered Medications   Medication Dose Route Frequency    0.9% sodium chloride infusion 250 mL  250 mL IntraVENous PRN    0.9% sodium chloride infusion  75 mL/hr IntraVENous CONTINUOUS    pantoprazole (PROTONIX) tablet 40 mg  40 mg Oral ACB    albuterol-ipratropium (DUO-NEB) 2.5 MG-0.5 MG/3 ML  3 mL Nebulization TID    sodium chloride (NS) flush 5-40 mL  5-40 mL IntraVENous Q8H    sodium chloride (NS) flush 5-40 mL  5-40 mL IntraVENous PRN    ondansetron (ZOFRAN) injection 4 mg  4 mg IntraVENous Q4H PRN    albuterol (PROVENTIL VENTOLIN) nebulizer solution 2.5 mg  2.5 mg Inhalation Q6H PRN    allopurinoL (ZYLOPRIM) tablet 300 mg  300 mg Oral DAILY    brimonidine (ALPHAGAN) 0.2 % ophthalmic solution 1 Drop  1 Drop Both Eyes BID    docusate sodium (COLACE) capsule 100 mg  100 mg Oral DAILY    ferrous sulfate tablet 325 mg  1 Tablet Oral BID WITH MEALS    fluticasone (FLOVENT HFA) 110 mcg inhaler  1 Puff Inhalation DAILY    latanoprost (XALATAN) 0.005 % ophthalmic solution 1 Drop  1 Drop Both Eyes QHS    [Held by provider] lisinopriL (PRINIVIL, ZESTRIL) tablet 10 mg  10 mg Oral DAILY    [Held by provider] metoprolol succinate (TOPROL-XL) tablet 100 mg  100 mg Oral DAILY    nitroglycerin (NITROSTAT) tablet 0.4 mg  0.4 mg SubLINGual Q5MIN PRN    rosuvastatin (CRESTOR) tablet 40 mg  40 mg Oral QHS    tamsulosin (FLOMAX) capsule 0.4 mg  0.4 mg Oral DAILY       Signed:  Chucho Connelly MD    Part of this note may have been written by using a voice dictation software. The note has been proof read but may still contain some grammatical/other typographical errors.

## 2021-09-02 NOTE — PROGRESS NOTES
Pt assessment unchanged from this am other than pt has a medium loose bloody BM in BSC. MD made aware and orders received.

## 2021-09-03 NOTE — PROGRESS NOTES
ZOË Douglas, notified pt needs to be transferred DT as soon as possible per Hospitalist and GI request. No beds available at this time. Sumanth Hauser RN to notify ES when a bed is available.

## 2021-09-03 NOTE — PROGRESS NOTES
No bed available at this time for patient DT per Nursing Supervisor; states may have a bed after discharges.

## 2021-09-03 NOTE — PROGRESS NOTES
Hospitalist Progress Note   Admit Date:  2021  7:15 AM   Name:  Kavon Geiger. Age:  68 y.o. Sex:  male  :  1940   MRN:  [id number]     Presenting Complaint: No chief complaint on file. Reason(s) for Admission: Acute blood loss anemia [D62]  GIB (gastrointestinal bleeding) [facility] Course & Interval History:     Patient with past medical history of    atrial fibrillation/flutter (not on [address] / recurrent GI bleeding) s/p AVN ablation and BiV ICD placement 2021,   COPD and chronic hypoxemic respiratory failure,   Iron deficiency anemia,   abdominal aortic aneurysm s/p EVAR 2019,   prostate cancer,   HLD,   CKD,   HTN,   gastric AVMs,   chronic sCHF    Patient is admitted due to melena with subsequent drop in Hb. Tagged RBC scan was negative. GI is consulted. Note from GI specialist is as below. \" His last EGD 2019 showed a hyperplastic polyp that was removed and gastric and duodenal AVMs that were cauterized.   Colonoscopy 2019 showed four diminutive, hyperplastic colon polyps that were removed and moderate sigmoid and descending colon diverticulosis \"       Subjective (21):  21   He had some blood per rectum last night. No abdominal pain. No hematemesis. No fever. No shaking. No chills. No shortness of breath. No chest pain. No nausea. No vomiting. 9/3/21   Patient had bloody loose stool yesterday evening and this morning. Patient is feeling weak and dizzy. Hb is lower to 5.8 this morning. PRC is transfused. GI saw patient and wanted him transferred to [facility] for better monitoring and possible intervention. No chest pain. No shortness of breath. No fever. No shaking. No chills. No nausea. No vomiting. Assessment & Plan:     Principal Problem:  GIB (gastrointestinal bleeding) (2021)  Likely due to lower GI bleeding from diverticular bleeding. Hb is dropping further. PRC transfusion is given this morning.  Will check CBC later today and transfuse further is Hb remains lower than 7.   BP seems to be on the low side. BP medications are on hold. Monitor closely. May need more IV fluid. GI service saw patient and wants patient to be transferred to [facility]. Will arrange for that. Active Problems:    AAA (abdominal aortic aneurysm) without rupture ([address] (5/2/2014)  Monitor. No impending rupture. No abdominal pain. COPD (chronic obstructive pulmonary disease) ([address] (5/2/2014)  Not exacerbated. On 2 LPM of oxygen cannula. Currently on 4 LPM which is more than yesterday to compensate for his worsened anemia. Hyperlipemia (5/2/2014)  On statin. Glaucoma (5/2/2014)    Supplemental oxygen dependent (5/2/2014)  On 4 LPM       OA (osteoarthritis) (5/2/2014)        Hypertension (6/1/2017)  BP is on the low side now. Hold BP medications. History of pulmonary embolism (6/22/2017)  Can not be on anticoagulant due to current GI bleeding. History of Upper GI bleed (9/9/2019)      Chronic hypoxemic respiratory failure ([address] (9/9/2019)  Dependent on oxygen cannula. Heart failure with reduced ejection fraction ([address] (9/9/2019)      CKD (chronic kidney disease), stage III ([address] (9/9/2019)      Acute blood loss anemia (8/31/2021)  Monitor Hb and transfuse as needed. KAYLA (acute kidney injury) ([address] (9/1/2021)  Creatinine went up from 1.60 to 2.65 due to GI bleeding and lower BP. Creatinine is improving to 2.23 today. Monitor renal function and intake and output. Avoid nephrotoxic agents.        Dispo/Discharge Planning:    to be determined     Diet:  ADULT DIET Full Liquid  DVT PPx: SCD   Code status: Full Code    Active Hospital Problems    Diagnosis Date Noted    KAYLA (acute kidney injury) ([facility] 09/01/2021    Acute blood loss anemia 08/31/2021    GIB (gastrointestinal bleeding) 08/31/2021    Chronic hypoxemic respiratory failure ([address] 09/09/2019    CKD (chronic kidney disease[facility] III [facility] 09/09/2019    Heart failure with reduced ejection fraction ([facility] 09/09/2019    Upper GI bleed 09/09/2019    History of pulmonary embolism 06/22/2017    Hypertension 06/01/2017    AAA (abdominal aortic aneurysm) without rupture (HCC) 05/02/2014     3.2 on US 2/14 [facility]      COPD (chronic obstructive pulmonary disease) ([facility] 05/02/2014    Hyperlipemia 05/02/2014    Glaucoma 05/02/2014    Supplemental oxygen dependent 05/02/2014    OA (osteoarthritis) 05/02/2014       Objective:     Patient Vitals for the past 24 hrs:   Temp Pulse Resp BP SpO2   09/03/21 0854     100 %   09/03/21 0817 98 °F (36.7 °C) (!) 109 24 126/63 100 %   09/03/21 0630 98.3 °F (36.8 °C) 95 18 (!) 129/58 99 %   09/03/21 0534 98.2 °F (36.8 °C) 94 16 (!) 103/55 99 %   09/03/21 0524 98.3 °F (36.8 °C) 96 18 (!) 119/59 99 %   09/03/21 0519 98.5 °F (36.9 °C) 100 18 (!) 120/54 97 %   09/03/21 0335 97.9 °F (36.6 °C) 91 18 (!) 87/62 100 %   09/02/21 2305 98.2 °F (36.8 °C) 73 18 (!) 107/56 92 %   09/02/21 2214     96 %   09/02/21 1912 98.2 °F (36.8 °C) 70 18 (!) 142/54 96 %   09/02/21 1636     98 %   09/02/21 1522 98 °F (36.7 °C) 91 24 136/65 92 %   09/02/21 1111 98 °F (36.7 °C) 84 22 130/64 96 %     Oxygen Therapy  O2 Sat (%): 100 % (09/03/21 0854)  Pulse via Oximetry: 98 beats per minute (09/03/21 0854)  O2 Device: Nasal cannula (09/03/21 0854)  Skin Assessment: Clean, dry, & intact (09/02/21 1636)  Skin Protection for O2 Device: N/A (09/02/21 1636)  O2 Flow Rate (L/min): 4 l/min (weaned to 2 L) (09/03/21 0854)    Estimated body mass index is 23.63 kg/m² as calculated from the following:    Height as of this encounter: 5' 9\" (1.753 m). Weight as of this encounter: 72.6 kg (160 lb).     Intake/Output Summary (Last 24 hours) at 9/3/2021 0929  Last data filed at 9/3/2021 0900  Gross per 24 hour   Intake 360.8 ml   Output    Net 360.8 ml         Physical Exam:     Visit Vitals  /63   Pulse (!) 109   Temp 98 °F (36.7 °C)   Resp 24   Ht 5' 9\" (1.753 m)   Wt 72.6 kg (160 lb)   SpO2 100%   BMI 23.63 kg/m²      General:    Well nourished. No overt distress. Patient is sitting up in bed and talking well. On oxygen cannula 4 LPM. Oxygen amount is increased compared to yesterday. Patient appears more sick compared to yesterday. Afebrile. Head:  Pale. No jaundice. Normocephalic, atraumatic  Eyes:  Sclerae appear normal.  Pupils equally round. ENT:  Nares appear normal, no drainage. Moist oral mucosa  Neck:  No restricted ROM. Trachea midline   CV:   RRR. No m/r/g. No jugular venous distension. Lungs:   CTAB. No wheezing, rhonchi, or rales. Respirations even, unlabored  Abdomen: Bowel sounds present. Soft, nontender, mildly distended. No guarding. Extremities: No cyanosis or clubbing. No edema  Skin:     No rashes and normal coloration. Warm and dry. Neuro:  Cranial nerves II-XII grossly intact. Sensation intact  Psych:  Normal mood and affect. Alert and oriented x3    I have reviewed ordered lab tests and independently visualized imaging below:    Last 24hr Labs:  Recent Results (from the past 24 hour(s))   HGB & HCT    Collection Time: 09/02/21  7:01 PM   Result Value Ref Range    HGB 7.5 (L) 13.6 - 17.2 g/dL    HCT 24.9 (L) 41.1 - 50.3 %   CBC WITH AUTOMATED DIFF    Collection Time: 09/03/21  3:01 AM   Result Value Ref Range    WBC 7.0 4.3 - 11.1 K/uL    RBC 2.07 (L) 4.23 - 5.6 M/uL    HGB 5.8 (LL) 13.6 - 17.2 g/dL    HCT 19.1 (L) 41.1 - 50.3 %    MCV 92.3 79.6 - 97.8 FL    MCH 28.0 26.1 - 32.9 PG    MCHC 30.4 (L) 31.4 - 35.0 g/dL    RDW 16.7 (H) 11.9 - 14.6 %    PLATELET 717 534 - 367 K/uL    MPV 10.5 9.4 - 12.3 FL    ABSOLUTE NRBC 0.08 0.0 - 0.2 K/uL    DF AUTOMATED      NEUTROPHILS 57 43 - 78 %    LYMPHOCYTES 24 13 - 44 %    MONOCYTES 13 (H) 4.0 - 12.0 %    EOSINOPHILS 3 0.5 - 7.8 %    BASOPHILS 0 0.0 - 2.0 %    IMMATURE GRANULOCYTES 4 0.0 - 5.0 %    ABS. NEUTROPHILS 4.0 1.7 - 8.2 K/UL    ABS.  LYMPHOCYTES 1.7 0.5 - 4.6 K/UL    ABS. MONOCYTES 0.9 0.1 - 1.3 K/UL    ABS. EOSINOPHILS 0.2 0.0 - 0.8 K/UL    ABS. BASOPHILS 0.0 0.0 - 0.2 K/UL    ABS. IMM. GRANS. 0.3 0.0 - 0.5 K/UL   RBC, ALLOCATE    Collection Time: 09/03/21  5:00 AM   Result Value Ref Range    HISTORY CHECKED? Historical check performed    RBC, ALLOCATE    Collection Time: 09/03/21  7:45 AM   Result Value Ref Range    HISTORY CHECKED? Historical check performed        All Micro Results     Procedure Component Value Units Date/Time    COVID-19 RAPID TEST [[id number]] Collected: 08/31/21 1051    Order Status: Completed Specimen: Nasopharyngeal Updated: 08/31/21 1122     Specimen source Nasopharyngeal        COVID-19 rapid test Not detected        Comment:      The specimen is NEGATIVE for SARS-CoV-2, the novel coronavirus associated with COVID-19. A negative result does not rule out COVID-19. This test has been authorized by the FDA under an Emergency Use Authorization (EUA) for use by authorized laboratories. Fact sheet for Healthcare Providers: ConventionUpdate.co.nz  Fact sheet for Patients: ConventionUpdate.co.nz       Methodology: Isothermal Nucleic Acid Amplification               Other Studies:  No results found.     Current Meds:  Current Facility-Administered Medications   Medication Dose Route Frequency    0.9% sodium chloride infusion 250 mL  250 mL IntraVENous PRN    0.9% sodium chloride infusion 250 mL  250 mL IntraVENous PRN    0.9% sodium chloride infusion 250 mL  250 mL IntraVENous PRN    0.9% sodium chloride infusion  75 mL/hr IntraVENous CONTINUOUS    pantoprazole (PROTONIX) tablet 40 mg  40 mg Oral ACB    albuterol-ipratropium (DUO-NEB) 2.5 MG-0.5 MG/3 ML  3 mL Nebulization TID    sodium chloride (NS) flush 5-40 mL  5-40 mL IntraVENous Q8H    sodium chloride (NS) flush 5-40 mL  5-40 mL IntraVENous PRN    ondansetron (ZOFRAN) injection 4 mg  4 mg IntraVENous Q4H PRN    albuterol (PROVENTIL VENTOLIN) nebulizer solution 2.5 mg  2.5 mg Inhalation Q6H PRN    allopurinoL (ZYLOPRIM) tablet 300 mg  300 mg Oral DAILY    brimonidine (ALPHAGAN) 0.2 % ophthalmic solution 1 Drop  1 Drop Both Eyes BID    docusate sodium (COLACE) capsule 100 mg  100 mg Oral DAILY    ferrous sulfate tablet 325 mg  1 Tablet Oral BID WITH MEALS    fluticasone (FLOVENT HFA) 110 mcg inhaler  1 Puff Inhalation DAILY    latanoprost (XALATAN) 0.005 % ophthalmic solution 1 Drop  1 Drop Both Eyes QHS    [Held by provider] lisinopriL (PRINIVIL, ZESTRIL) tablet 10 mg  10 mg Oral DAILY    [Held by provider] metoprolol succinate (TOPROL-XL) tablet 100 mg  100 mg Oral DAILY    nitroglycerin (NITROSTAT) tablet 0.4 mg  0.4 mg SubLINGual Q5MIN PRN    rosuvastatin (CRESTOR) tablet 40 mg  40 mg Oral QHS    tamsulosin (FLOMAX) capsule 0.4 mg  0.4 mg Oral DAILY       Signed:  Timmy Green MD    Part of this note may have been written by using a voice dictation software. The note has been proof read but may still contain some grammatical/other typographical errors.

## 2021-09-03 NOTE — PROGRESS NOTES
Problem: Falls - Risk of  Goal: *Absence of Falls  Description: Document Elizabeth Sabillon Fall Risk and appropriate interventions in the flowsheet.   Outcome: Progressing Towards Goal  Note: Fall Risk Interventions:  Mobility Interventions: OT consult for ADLs, Patient to call before getting OOB, PT Consult for mobility concerns, PT Consult for assist device competence, Strengthening exercises (ROM-active/passive), Utilize walker, cane, or other assistive device         Medication Interventions: Patient to call before getting OOB, Teach patient to arise slowly    Elimination Interventions: Call light in reach, Elevated toilet seat, Patient to call for help with toileting needs, Urinal in reach              Problem: Patient Education: Go to Patient Education Activity  Goal: Patient/Family Education  Outcome: Progressing Towards Goal     Problem: Patient Education: Go to Patient Education Activity  Goal: Patient/Family Education  Outcome: Progressing Towards Goal     Problem: Upper and Lower GI Bleed: Day 2  Goal: Off Pathway (Use only if patient is Off Pathway)  Outcome: Progressing Towards Goal  Goal: Activity/Safety  Outcome: Progressing Towards Goal  Goal: Consults, if ordered  Outcome: Progressing Towards Goal  Goal: Diagnostic Test/Procedures  Outcome: Progressing Towards Goal  Goal: Nutrition/Diet  Outcome: Progressing Towards Goal  Goal: Discharge Planning  Outcome: Progressing Towards Goal  Goal: Medications  Outcome: Progressing Towards Goal  Goal: Respiratory  Outcome: Progressing Towards Goal  Goal: Treatments/Interventions/Procedures  Outcome: Progressing Towards Goal  Goal: Psychosocial  Outcome: Progressing Towards Goal  Goal: *Optimal pain control at patient's stated goal  Outcome: Progressing Towards Goal  Goal: *Hemodynamically stable  Outcome: Progressing Towards Goal  Goal: *Tolerating diet  Outcome: Progressing Towards Goal  Goal: *Demonstrates progressive activity  Outcome: Progressing Towards Goal Problem: Upper and Lower GI Bleed: Day 3  Goal: Off Pathway (Use only if patient is Off Pathway)  Outcome: Progressing Towards Goal  Goal: Activity/Safety  Outcome: Progressing Towards Goal  Goal: Diagnostic Test/Procedures  Outcome: Progressing Towards Goal  Goal: Nutrition/Diet  Outcome: Progressing Towards Goal  Goal: Discharge Planning  Outcome: Progressing Towards Goal  Goal: Medications  Outcome: Progressing Towards Goal  Goal: Treatments/Interventions/Procedures  Outcome: Progressing Towards Goal  Goal: Psychosocial  Outcome: Progressing Towards Goal     Problem: Upper and Lower GI Bleed:  Discharge Outcomes  Goal: *Hemodynamically stable  Outcome: Progressing Towards Goal  Goal: *Lungs clear or at baseline  Outcome: Progressing Towards Goal  Goal: *Demonstrates independent activity or return to baseline  Outcome: Progressing Towards Goal  Goal: *Pain is controlled to three or less  Outcome: Progressing Towards Goal  Goal: *Verbalizes understanding and describes prescribed diet  Outcome: Progressing Towards Goal  Goal: *Tolerating diet  Outcome: Progressing Towards Goal  Goal: *Verbalizes name, dosage, time, side effects, and number of days to continue medications  Outcome: Progressing Towards Goal  Goal: *Anxiety reduced or absent  Outcome: Progressing Towards Goal  Goal: *Understands and describes signs and symptoms to report to providers(Stroke Metric)  Outcome: Progressing Towards Goal  Goal: *Describes follow-up/return visits to physicians  Outcome: Progressing Towards Goal  Goal: *Describes available resources and support systems  Outcome: Progressing Towards Goal

## 2021-09-03 NOTE — PROGRESS NOTES
09/03/21 0354   Critical Result Types   Type of Critical Result Laboratory   Critical Lab Result Types   Critical Lab Value Hemoglobin & Hematocrit   Hemoglobin & Hematocrit Value 5.8   Notification Information   Notified By (Name) Trev Pate ()    Time of Critical Result Notification 5799   Verbal Readback Provided Yes   Provider Notification   Was Provider Notified Yes   Name of Provider  Melina Flores MD    Provider [facility] Yes   Time Provider Notified 2220   Date Provider Notified 09/03/21

## 2021-09-03 NOTE — PROGRESS NOTES
2nd unit of PRBCs complete.  [facility] does not have a bed yet

## 2021-09-03 NOTE — DISCHARGE SUMMARY
Patient is being transferred to [facility] for further treatment and monitoring due to continuing GI bleeding.

## 2021-09-03 NOTE — PROGRESS NOTES
Patient has had 3 medium bloody bowel movements in BSC. Currently 1 unit of PRBC infusing at 100 mL/hr.

## 2021-09-03 NOTE — H&P
Hospitalist History and Physical   Admit Date:  No admission date for patient encounter. Name:  Dusty Malcolm. Age:  68 y.o. Sex:  male  :  1940   MRN:  [id number]     Presenting Complaint: passing blood per rectum     Reason(s) for Admission: GI Bleed     History of Present Illness:   Dusty Perez is a 68 y.o. male with medical history of   CHF   Hypertension   Hyperlipidemia  OA   atrial fibrillation/flutter (not on [address] 2/2 recurrent GI bleeding) s/p AVN ablation and BiV ICD placement 2021,   COPD and chronic hypoxemic respiratory failure,   Iron deficiency anemia,   abdominal aortic aneurysm s/p EVAR 2019,   prostate cancer,   CKD,   gastric AVMs,   chronic sCHF    who presented with passing blood per rectum and was admitted on 2021. He was seen in consultation by GI service. Hb was dropping and he received 1 unit of PRC transfusion first and tagged RBC scan was done and it was negative for active bleeding or source of bleeding. GI is consulted. Note from GI specialist is as below.      \" His last EGD 2019 showed a hyperplastic polyp that was removed and gastric and duodenal AVMs that were cauterized.   Colonoscopy 2019 showed four diminutive, hyperplastic colon polyps that were removed and moderate sigmoid and descending colon diverticulosis \"     Patient developed active bleeding per rectum in the past 24 hours with drop of Hb again to 5+. He received additional PRC transfusion and GI service would like him be transferred to [facility]. Review of Systems:  10 systems reviewed and negative except as noted in HPI. Assessment & Plan:     Principal Problem:  GIB (gastrointestinal bleeding) (2021)  Likely due to lower GI bleeding from diverticular bleeding. Hb is dropping further. PRC transfusion is given this morning. Will check CBC later today and transfuse further is Hb remains lower than 7.   BP seems to be on the low side. BP medications are on hold. Monitor closely. May need more IV fluid. GI service saw patient and wants patient to be transferred to [facility].    Active Problems:    AAA (abdominal aortic aneurysm) without rupture ([address] (5/2/2014)  Monitor. No impending rupture. No abdominal pain.        COPD (chronic obstructive pulmonary disease) ([address] (5/2/2014)  Not exacerbated. On 2 LPM of oxygen cannula. Currently on 4 LPM which is more than yesterday to compensate for his worsened anemia.        Hyperlipemia (5/2/2014)  On statin.        Glaucoma (5/2/2014)     Supplemental oxygen dependent (5/2/2014)  On 4 LPM        OA (osteoarthritis) (5/2/2014)          Hypertension (6/1/2017)  BP is on the low side now. Hold BP medications.        History of pulmonary embolism (6/22/2017)  Can not be on anticoagulant due to current GI bleeding.        History of Upper GI bleed (9/9/2019)       Chronic hypoxemic respiratory failure ([address] (9/9/2019)  Dependent on oxygen cannula.        Heart failure with reduced ejection fraction ([address] (9/9/2019)       CKD (chronic kidney disease), stage III ([address] (9/9/2019)       Acute blood loss anemia (8/31/2021)  Monitor Hb and transfuse as needed.        KAYLA (acute kidney injury) ([address] (9/1/2021)  Creatinine went up from 1.60 to 2.65 due to GI bleeding and lower BP. Creatinine is improving to 2.23 today. Monitor renal function and intake and output.    Avoid nephrotoxic agents.        Dispo/Discharge Planning:    to be determined     I have discussed the plan of care with patient.        Diet:  ADULT DIET Full Liquid  DVT PPx: SCD   Code status: Full Code    Past Medical History:   Diagnosis Date    A-fib ([address] 5/2/2014    AAA (abdominal aortic aneurysm) without rupture ([address] 5/2/2014    3.2 on US 2/14 [facility]    Acute metabolic encephalopathy 0/3/4003    Arthritis     Cancer ([address]     hx prostate cancer    COPD (chronic obstructive pulmonary disease) ([address] 5/2/2014    Elevated prostate specific antigen (PSA)     Glaucoma 2014    Heart disease     Heart failure ([address]     Hyperlipemia 2014    Hypertension     Hypertrophy of prostate with urinary obstruction and other lower urinary tract symptoms (LUTS)     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    OA (osteoarthritis) 2014    Peptic ulcer disease 2017    Poor historian     [facility]     Pulmonary embolus ([facility] 2017    Supplemental oxygen dependent 2014    Warfarin anticoagulation 2014     Past Surgical History:   Procedure Laterality Date    COLONOSCOPY N/A 2019    COLONOSCOPY/  ROOM 614 performed by Yaima Solis MD at [facility] ENDOSCOPY    EGD  2019         HX HEART CATHETERIZATION      VASCULAR SURGERY PROCEDURE UNLIST  2019     Bilateral common femoral artery cutdown with exposure and placement of catheter in aorta for aortogram,Endovascular repair of infrarenal abdominal aortic aneurysm with bifurcated modulated device (31 x 14 x 13 main body) via the left common femoral, right iliac extender measuring 27 x 10. Allergies   Allergen Reactions    Statins-Hmg-Coa Reductase Inhibitors Myalgia     Muscle pain      Social History     Tobacco Use    Smoking status: Former Smoker     Packs/day: 2.00     Years: 40.00     Pack years: 80.00     Quit date: 2014     Years since quittin.1    Smokeless tobacco: Never Used    Tobacco comment: still taking Chantix    Substance Use Topics    Alcohol use: No      Family History   Problem Relation Age of Onset    Cancer Mother     Heart Disease Father       Family history reviewed and negative except as noted above.   Immunization History   Administered Date(s) Administered    Influenza Vaccine 10/27/2015    Influenza Vaccine (Quad) PF (>6 Mo Flulaval, Fluarix, and >3 Yrs Afluria, Fluzone 31409) 10/31/2012, 10/06/2014, 10/20/2016, 09/15/2018, 2019    Influenza Vaccine PF 10/26/2017    Influenza, Quadrivalent, Adjuvanted (>65 Yrs FLUAD QUAD 04618) 10/05/2020    Pneumococcal Conjugate (PCV-13) 06/01/2016, 10/26/2017    Pneumococcal Polysaccharide (PPSV-23) 06/06/2011, 11/25/2012, 05/12/2015, 05/13/2015, 12/03/2015    TB Skin Test (PPD) Intradermal 09/14/2018, 09/09/2019, 09/15/2019, 07/10/2021, 08/31/2021    Tdap 12/03/2015     PTA Medications:  Current Outpatient Medications   Medication Instructions    albuterol (PROVENTIL HFA, VENTOLIN HFA, PROAIR HFA) 90 mcg/actuation inhaler TAKE 2 PUFFS BY MOUTH EVERY 4 HOURS AS NEEDED FOR WHEEZE    albuterol (PROVENTIL VENTOLIN) 2.5 mg, Inhalation, EVERY 6 HOURS AS NEEDED    albuterol-ipratropium (DUO-NEB) 2.5 mg-0.5 mg/3 ml nebu 3 mL, Nebulization, 3 TIMES DAILY    allopurinoL (ZYLOPRIM) 300 mg, Oral, DAILY    aspirin delayed-release 81 mg, Oral, DAILY    brimonidine (ALPHAGAN P) 0.1 % ophthalmic solution 1 Drop, Both Eyes, 2 TIMES DAILY    dexAMETHasone (DECADRON) 2 mg tablet 4 tabs po x3 days, 3 tabs po x3 days, 2 tabs po x3 days, 1 tab po x3 days, 1/2 tab po x3 days then stop.  docusate sodium (COLACE) 100 mg, Oral, DAILY    famotidine (PEPCID) 40 mg, Oral, DAILY    ferrous sulfate (IRON) 325 mg, Oral, 2 TIMES DAILY    fluticasone furoate (ARNUITY ELLIPTA) 100 mcg/actuation dsdv inhaler 1 Puff, Inhalation, DAILY, Wash mouth out with water after each dose.  guaiFENesin ER (MUCINEX) 600 mg, Oral, 2 TIMES DAILY    latanoprost (XALATAN) 0.005 % ophthalmic solution LOCATION  BOTH EYES. INSTILL ONE DROP INTO EACH EYE AT BEDTIME    lisinopriL (PRINIVIL, ZESTRIL) 10 mg, Oral, DAILY    metoprolol succinate (TOPROL-XL) 100 mg, Oral, DAILY    nitroglycerin (NITROSTAT) 0.4 mg, SubLINGual, EVERY 5 MIN AS NEEDED    OXYGEN-AIR DELIVERY SYSTEMS 2 L/min, Nasal, DAILY, nasal canula    predniSONE (DELTASONE) 20 mg tablet Take 40mg qday x3d, then 20mg qday x3d, then 10mg x2d, then stop.   Take with dinner    rosuvastatin (CRESTOR) 40 mg, Oral, EVERY BEDTIME    tamsulosin (FLOMAX) 0.4 mg, Oral, DAILY Objective:   No data found. Estimated body mass index is 23.63 kg/m² as calculated from the following:    Height as of 8/31/21: 5' 9\" (1.753 m). Weight as of 8/31/21: 72.6 kg (160 lb). Intake/Output Summary (Last 24 hours) at 9/3/2021 1613  Last data filed at 9/3/2021 1420  Gross per 24 hour   Intake 732.1 ml   Output --   Net 732.1 ml         Physical Exam:  Visit Vitals  /63   Pulse (!) 109   Temp 98 °F (36.7 °C)   Resp 24   Ht 5' 9\" (1.753 m)   Wt 72.6 kg (160 lb)   SpO2 100%   BMI 23.63 kg/m²           General:    Well nourished. No overt distress  Head:  Normocephalic, atraumatic  Eyes:  Sclerae appear normal.  Pupils equally round. ENT:  Nares appear normal, no drainage. Moist oral mucosa  Neck:  No restricted ROM. Trachea midline   CV:   RRR. No m/r/g. No jugular venous distension. Lungs:   CTAB. No wheezing, rhonchi, or rales. Respirations even, unlabored  Abdomen: Bowel sounds present. Soft, nontender, nondistended. Extremities: No cyanosis or clubbing. No edema  Skin:     No rashes and normal coloration. Warm and dry. Neuro:  Cranial nerves II-XII grossly intact. Sensation intact  Psych:  Normal mood and affect.   Alert and oriented x3    I have reviewed ordered lab tests and independently visualized imaging below:    Last 24hr Labs:  Recent Results (from the past 24 hour(s))   HGB & HCT    Collection Time: 09/02/21  7:01 PM   Result Value Ref Range    HGB 7.5 (L) 13.6 - 17.2 g/dL    HCT 24.9 (L) 41.1 - 50.3 %   CBC WITH AUTOMATED DIFF    Collection Time: 09/03/21  3:01 AM   Result Value Ref Range    WBC 7.0 4.3 - 11.1 K/uL    RBC 2.07 (L) 4.23 - 5.6 M/uL    HGB 5.8 (LL) 13.6 - 17.2 g/dL    HCT 19.1 (L) 41.1 - 50.3 %    MCV 92.3 79.6 - 97.8 FL    MCH 28.0 26.1 - 32.9 PG    MCHC 30.4 (L) 31.4 - 35.0 g/dL    RDW 16.7 (H) 11.9 - 14.6 %    PLATELET 424 723 - 050 K/uL    MPV 10.5 9.4 - 12.3 FL    ABSOLUTE NRBC 0.08 0.0 - 0.2 K/uL    DF AUTOMATED      NEUTROPHILS 57 43 - 78 %    LYMPHOCYTES 24 13 - 44 %    MONOCYTES 13 (H) 4.0 - 12.0 %    EOSINOPHILS 3 0.5 - 7.8 %    BASOPHILS 0 0.0 - 2.0 %    IMMATURE GRANULOCYTES 4 0.0 - 5.0 %    ABS. NEUTROPHILS 4.0 1.7 - 8.2 K/UL    ABS. LYMPHOCYTES 1.7 0.5 - 4.6 K/UL    ABS. MONOCYTES 0.9 0.1 - 1.3 K/UL    ABS. EOSINOPHILS 0.2 0.0 - 0.8 K/UL    ABS. BASOPHILS 0.0 0.0 - 0.2 K/UL    ABS. IMM. GRANS. 0.3 0.0 - 0.5 K/UL   RBC, ALLOCATE    Collection Time: 09/03/21  5:00 AM   Result Value Ref Range    HISTORY CHECKED? Historical check performed    RBC, ALLOCATE    Collection Time: 09/03/21  7:45 AM   Result Value Ref Range    HISTORY CHECKED? Historical check performed    CBC W/O DIFF    Collection Time: 09/03/21  3:34 PM   Result Value Ref Range    WBC 9.0 4.3 - 11.1 K/uL    RBC 3.11 (L) 4.23 - 5.6 M/uL    HGB 8.4 (L) 13.6 - 17.2 g/dL    HCT 26.8 (L) 41.1 - 50.3 %    MCV 86.2 79.6 - 97.8 FL    MCH 27.0 26.1 - 32.9 PG    MCHC 31.3 (L) 31.4 - 35.0 g/dL    RDW 19.8 (H) 11.9 - 14.6 %    PLATELET 152 023 - 397 K/uL    MPV 9.9 9.4 - 12.3 FL    ABSOLUTE NRBC 0.15 0.0 - 0.2 K/uL       All Micro Results     None          Other Studies:  No results found. Signed:  Doris Yu MD    Part of this note may have been written by using a voice dictation software. The note has been proof read but may still contain some grammatical/other typographical errors.

## 2021-09-03 NOTE — PROGRESS NOTES
[facility] Progress Note         Admit Date:  8/31/2021    Today's Date:  9/3/2021    CC:  LGIB    Subjective:   Patient has had 3 medium sized bloody stools since last night--most recently just now. Hgb down to 5.8 (<--7.5 <--8.7) and PRBCs ordered. Patient sitting up on the side of the bed and appears dyspneic. He is on 4 L O2 via NC (baseline is 2L). Still denies abdominal pain, nasuea, vomiting.      Medications:   Current Facility-Administered Medications   Medication Dose Route Frequency    0.9% sodium chloride infusion 250 mL  250 mL IntraVENous PRN    0.9% sodium chloride infusion 250 mL  250 mL IntraVENous PRN    0.9% sodium chloride infusion 250 mL  250 mL IntraVENous PRN    0.9% sodium chloride infusion  75 mL/hr IntraVENous CONTINUOUS    pantoprazole (PROTONIX) tablet 40 mg  40 mg Oral ACB    albuterol-ipratropium (DUO-NEB) 2.5 MG-0.5 MG/3 ML  3 mL Nebulization TID    sodium chloride (NS) flush 5-40 mL  5-40 mL IntraVENous Q8H    sodium chloride (NS) flush 5-40 mL  5-40 mL IntraVENous PRN    ondansetron (ZOFRAN) injection 4 mg  4 mg IntraVENous Q4H PRN    albuterol (PROVENTIL VENTOLIN) nebulizer solution 2.5 mg  2.5 mg Inhalation Q6H PRN    allopurinoL (ZYLOPRIM) tablet 300 mg  300 mg Oral DAILY    brimonidine (ALPHAGAN) 0.2 % ophthalmic solution 1 Drop  1 Drop Both Eyes BID    docusate sodium (COLACE) capsule 100 mg  100 mg Oral DAILY    ferrous sulfate tablet 325 mg  1 Tablet Oral BID WITH MEALS    fluticasone (FLOVENT HFA) 110 mcg inhaler  1 Puff Inhalation DAILY    latanoprost (XALATAN) 0.005 % ophthalmic solution 1 Drop  1 Drop Both Eyes QHS    [Held by provider] lisinopriL (PRINIVIL, ZESTRIL) tablet 10 mg  10 mg Oral DAILY    [Held by provider] metoprolol succinate (TOPROL-XL) tablet 100 mg  100 mg Oral DAILY    nitroglycerin (NITROSTAT) tablet 0.4 mg  0.4 mg SubLINGual Q5MIN PRN    rosuvastatin (CRESTOR) tablet 40 mg  40 mg Oral QHS    tamsulosin (FLOMAX) capsule 0.4 mg  0.4 mg Oral DAILY       Review of Systems:  No chest pain or SOB. Diet:  Full liquid    Objective:   Vitals:  Visit Vitals  /63   Pulse (!) 109   Temp 98 °F (36.7 °C)   Resp 24   Ht 5' 9\" (1.753 m)   Wt 72.6 kg (160 lb)   SpO2 100%   BMI 23.63 kg/m²     Intake/Output:  No intake/output data recorded. 09/01 1901 - 09/03 0700  In: 240 [P.O.:240]  Out: -   Exam:  General appearance: alert, cooperative, appears dyspneic just sitting up on the side of the bed  Lungs: clear to auscultation bilaterally but decreased in bases. On O2 via NC at 4 L/min  Heart: regular rate and rhythm  Abdomen: soft, non-tender.  Bowel sounds normal. No masses, no organomegaly  Extremities: extremities normal, atraumatic, no cyanosis or edema  Neuro:  alert and oriented    Data Review (Labs):    Recent Labs     09/03/21  0301 09/02/21  1901 09/02/21  0310 09/01/21  1700 09/01/21  0245 08/31/21  1904 08/31/21  1223   WBC 7.0  --  9.3  --   --   --   --    HGB 5.8* 7.5* 8.7* 8.6* 6.5* 7.5* 8.4*   HCT 19.1* 24.9* 27.3* 26.6*  --   --   --      --  270  --   --   --   --    MCV 92.3  --  88.9  --   --   --   --    NA  --   --  143  --  141  --   --    K  --   --  4.3  --  4.5  --   --    CL  --   --  112*  --  107  --   --    CO2  --   --  28  --  33*  --   --    BUN  --   --  29*  --  34*  --   --    CREA  --   --  2.23*  --  2.65*  --   --    CA  --   --  7.7*  --  8.2*  --   --    GLU  --   --  99  --  112*  --   --    AP  --   --   --   --  42*  --   --    AST  --   --   --   --  8*  --   --    ALT  --   --   --   --  <6*  --   --    TBILI  --   --   --   --  0.2  --   --    ALB  --   --   --   --  2.1*  --   --    TP  --   --   --   --  4.9*  --   --        Assessment:     Principal Problem:    GIB (gastrointestinal bleeding) (8/31/2021)    Active Problems:    AAA (abdominal aortic aneurysm) without rupture ([facility] (5/2/2014)      Overview: 3.2 on US 2/14 [facility]      COPD (chronic obstructive pulmonary disease) ([address] (5/2/2014)      Hyperlipemia (5/2/2014)      Glaucoma (5/2/2014)      Supplemental oxygen dependent (5/2/2014)      OA (osteoarthritis) (5/2/2014)      Hypertension (6/1/2017)      History of pulmonary embolism (6/22/2017)      Upper GI bleed (9/9/2019)      Chronic hypoxemic respiratory failure ([address] (9/9/2019)      Heart failure with reduced ejection fraction ([address] (9/9/2019)      CKD (chronic kidney disease), stage III ([address] (9/9/2019)      Acute blood loss anemia (8/31/2021)      KAYLA (acute kidney injury) ([address] (9/1/2021)      68 y.o. male with PMH HTN, HLD, CHF (LVEF 30-35%, Afib (no OAC d/t hx GIB/anemia, unable to get Watchman), AAA, CKD, COPD on 2-3L O2 at home, hx GIB with known hx gastric and duodenal AVMs, gastric polyps, Diverticulosis, HP colon polyps, being seen in GI consult at the request of Nikky Burch NP for GI bleed with painless hematocezia and 4 gram drop in hgb in one week. He is s/p EGD and Colonoscopy in 2019 (gastric/duodenal AVMs, colonic tics and HP colon polyps). Suspect diverticular bleeding. Tagged RBC scan 8/31 negative, and the bleeding initially appeared to be resolving but then recurred last night and hgb back down to  5.8 (<--7.5 <--8.7) and more PRBCs ordered by primary team   Plan:   1) f/u CBC (ordered for 1500 today)  2) full liquid diet  3) with continued active bleeding, will discuss with primary team the possibility of transfer downtown. I am unsure of the protocol however, since patient has O2 dependent COPD and is COVID negative     EDGAR Snyder    ADDENDUM: Spoke to house supervisor--no beds available downtown. Spoke to IR--they cannot perform angio/embolization at [facility].  Will discuss options with EDGAR Cuello

## 2021-09-03 NOTE — ROUTINE PROCESS
Bedside and Verbal shift change report given to self (oncoming nurse) by Arianna Thompson (offgoing nurse). Report included the following information SBAR, Kardex, MAR and Recent Results. Talked to mother and notified that forms ready to pick p from . Mother also stated that she need Epipen refill.

## 2021-09-04 NOTE — PROGRESS NOTES
Hospitalist Progress Note for Rapid Response  2021  Admit Date: 2021  7:15 AM   NAME: Ra Ackerman. :  1940   MRN:  [id number]   Attending: Brookie Simmonds, MD  PCP:  Luis M Matthew MD    SUBJECTIVE:   CC:  Rapid Response called for: Acute change in condition    S: Patient is a 68 y.o. male who is admitted for GI bleed. He was admitted 4 days ago and subsequently has received several units of blood. He is actually awaiting transfer to [facility], once beds become available. I was called earlier in the evening and nursing reported that patient was again having large bloody bowel movements and getting tachycardic and tachypneic while up to the bedside commode. We did a stat hemoglobin, which was 7.3. However, given his degree of symptoms, I went ahead and ordered a unit of blood at that time. This rapid response was called for much the same thing. When I arrived in the room, patient is sitting on the bedside commode. He appears to be in acute distress with tachypnea and appearing as though he might pass out. He is still able to follow commands and answer questions. Due to his symptoms throughout the night and not improving with blood or fluids, we will transfer to ICU.       Review of Systems all reviewed and negative; with exception of pertinent positives/negatives noted above  PHYSICAL EXAM     Visit Vitals  /61   Pulse 96   Temp 98.2 °F (36.8 °C)   Resp 16   Ht 5' 9\" (1.753 m)   Wt 72.6 kg (160 lb)   SpO2 95%   BMI 23.63 kg/m²      Temp (24hrs), Av.4 °F (36.9 °C), Min:97.3 °F (36.3 °C), Max:99.3 °F (37.4 °C)    Oxygen Therapy  O2 Sat (%): 95 % (21)  Pulse via Oximetry: 100 beats per minute (21)  O2 Device: Nasal cannula (21)  Skin Assessment: Clean, dry, & intact (21)  Skin Protection for O2 Device: N/A (21)  O2 Flow Rate (L/min): 3 l/min (21 0358)    Intake/Output Summary (Last 24 hours) at 2021 0422  Last data filed at 9/3/2021 1420  Gross per 24 hour   Intake 732.1 ml   Output    Net 732.1 ml          General: He is in acute distress   Eyes:  PERRL; EOMI  HENT:  Oropharynx clear;  Head atraumatic  Lungs:  CTA Bilaterally. Tachypneic, labored, on oxygen  Heart:  Tachycardic rate and rhythm,  No murmur, rub, or gallop  Abdomen: Soft, Non distended, Non tender, Positive bowel sounds  Extremities: No cyanosis, clubbing or edema  Neurologic:  No focal deficits  Psych:  A&Ox4       Data Review:   Recent Results (from the past 24 hour(s))   RBC, ALLOCATE    Collection Time: 09/03/21  7:45 AM   Result Value Ref Range    HISTORY CHECKED? Historical check performed    CBC W/O DIFF    Collection Time: 09/03/21  3:34 PM   Result Value Ref Range    WBC 9.0 4.3 - 11.1 K/uL    RBC 3.11 (L) 4.23 - 5.6 M/uL    HGB 8.4 (L) 13.6 - 17.2 g/dL    HCT 26.8 (L) 41.1 - 50.3 %    MCV 86.2 79.6 - 97.8 FL    MCH 27.0 26.1 - 32.9 PG    MCHC 31.3 (L) 31.4 - 35.0 g/dL    RDW 19.8 (H) 11.9 - 14.6 %    PLATELET 136 073 - 799 K/uL    MPV 9.9 9.4 - 12.3 FL    ABSOLUTE NRBC 0.15 0.0 - 0.2 K/uL   HEMOGLOBIN    Collection Time: 09/04/21  3:15 AM   Result Value Ref Range    HGB 7.3 (L) 13.6 - 17.2 g/dL   RBC, ALLOCATE    Collection Time: 09/04/21  4:15 AM   Result Value Ref Range    HISTORY CHECKED?  Historical check performed      CXR Results  (Last 48 hours)    None        CT Results  (Last 48 hours)    None          ASSESSMENT      Active Hospital Problems    Diagnosis Date Noted    KAYLA (acute kidney injury) ([facility] 09/01/2021    Acute blood loss anemia 08/31/2021    GIB (gastrointestinal bleeding) 08/31/2021    [facility] failure [facility] 09/09/2019    CKD (chronic kidney disease), stage III (HCC) 09/09/2019    Heart failure with reduced ejection fraction ([facility] 09/09/2019    Upper GI bleed 09/09/2019    History of pulmonary embolism 06/22/2017    Hypertension 06/01/2017    AAA (abdominal aortic aneurysm) without rupture ([facility] 05/02/2014     3.2 on US 2/14 [facility]      COPD (chronic obstructive pulmonary disease) ([facility] 05/02/2014    Hyperlipemia 05/02/2014    Glaucoma 05/02/2014    Supplemental oxygen dependent 05/02/2014    OA (osteoarthritis) 05/02/2014         Plan:  · Transfer to ICU  · Give remainder of blood transfusion as fast as possible  · 1 L normal saline bolus   · Check stat labs: CMP, lactic acid, magnesium, troponins x3, check hemoglobin 30 minutes after blood is finished transfusing  · Stat EKG and troponins to evaluate for possible coexisting cardiac etiology  · Place Harper to monitor strict I's and O's, he has been getting a lot of blood products as well as fluids and does have a history of heart failure with reduced ejection fraction  · Addendum: Called by ICU nurse to report borderline low blood pressure. We will go ahead and order Levophed to keep MAP greater than 65  · Of note, informed by ICU RN that patient only has 1 small IV and is a difficult stick. She request a PICC line. Unfortunately the PICC team will not come to [address] on Saturday, he is likely going to need a central line placed by anesthesia early this morning    There is a high probability of acute organ impairment or life-threatening deterioration in the patient's condition from: Hypotension secondary to GI bleeding    Critical care interventions: Blood transfusions, continuous monitoring, starting pressors    Total critical care time spent: 52 minutes. Time is indicative of direct patient attendance at bedside and on the patient's floor nearby. Includes time spent at bedside performing history and exam, performing chart review, discussing findings and treatment plan with patient and/or family, discussing patient with consultants and colleagues, ordering and reviewing pertinent laboratory and radiographic evaluations, and discussing patient with nursing staff. Time excludes procedures.            Signed By: Khoi Corado MD September 4, 2021

## 2021-09-04 NOTE — PROGRESS NOTES
[facility] Progress Note         Admit Date:  8/31/2021    Today's Date:  9/4/2021    CC:  LGIB    Subjective:   Patient continued to have multiple bloody stools, and last night/early this morning, a rapid response was called when he became tachypneic/tachycardic/presyncopal on the bedside commode. Stat hgb was 7.3 but he was transfused 1 unit of blood and hgb this morning is 7.9. He was transferred to ICU. Just had central line placed. Plan is to transfer downtown when there is a bed.  Stil denies nausea, vomiting and abdominal pain    Medications:   Current Facility-Administered Medications   Medication Dose Route Frequency    0.9% sodium chloride infusion 250 mL  250 mL IntraVENous PRN    propofoL (DIPRIVAN) 10 mg/mL injection        0.9% sodium chloride infusion 250 mL  250 mL IntraVENous PRN    0.9% sodium chloride infusion 250 mL  250 mL IntraVENous PRN    albuterol-ipratropium (DUO-NEB) 2.5 MG-0.5 MG/3 ML  3 mL Nebulization TID RT    0.9% sodium chloride infusion 250 mL  250 mL IntraVENous PRN    0.9% sodium chloride infusion  75 mL/hr IntraVENous CONTINUOUS    pantoprazole (PROTONIX) tablet 40 mg  40 mg Oral ACB    sodium chloride (NS) flush 5-40 mL  5-40 mL IntraVENous Q8H    sodium chloride (NS) flush 5-40 mL  5-40 mL IntraVENous PRN    ondansetron (ZOFRAN) injection 4 mg  4 mg IntraVENous Q4H PRN    albuterol (PROVENTIL VENTOLIN) nebulizer solution 2.5 mg  2.5 mg Inhalation Q6H PRN    allopurinoL (ZYLOPRIM) tablet 300 mg  300 mg Oral DAILY    brimonidine (ALPHAGAN) 0.2 % ophthalmic solution 1 Drop  1 Drop Both Eyes BID    docusate sodium (COLACE) capsule 100 mg  100 mg Oral DAILY    ferrous sulfate tablet 325 mg  1 Tablet Oral BID WITH MEALS    fluticasone (FLOVENT HFA) 110 mcg inhaler  1 Puff Inhalation DAILY    latanoprost (XALATAN) 0.005 % ophthalmic solution 1 Drop  1 Drop Both Eyes QHS    [Held by provider] lisinopriL (PRINIVIL, ZESTRIL) tablet 10 mg  10 mg Oral DAILY    [Held by provider] metoprolol succinate (TOPROL-XL) tablet 100 mg  100 mg Oral DAILY    nitroglycerin (NITROSTAT) tablet 0.4 mg  0.4 mg SubLINGual Q5MIN PRN    rosuvastatin (CRESTOR) tablet 40 mg  40 mg Oral QHS    tamsulosin (FLOMAX) capsule 0.4 mg  0.4 mg Oral DAILY       Review of Systems:  No chest pain or SOB currently. Diet:  Full liquid    Objective:   Vitals:  Visit Vitals  BP (!) 110/58   Pulse (!) 112   Temp 98.4 °F (36.9 °C)   Resp 20   Ht 5' 9\" (1.753 m)   Wt 71.2 kg (157 lb)   SpO2 98%   BMI 23.18 kg/m²     Intake/Output:  No intake/output data recorded. 09/02 1901 - 09/04 0700  In: 1082.1   Out: -   Exam:  General appearance: alert, cooperative, NAD  Lungs: clear to auscultation bilaterally but decreased in bases. On O2 via NC  Heart: regular rate and rhythm  Abdomen: soft, non-tender.  Bowel sounds normal. No masses, no organomegaly  Extremities: extremities normal, atraumatic, no cyanosis or edema  Neuro:  alert and oriented    Data Review (Labs):    Recent Labs     09/04/21  0809 09/04/21  0315 09/03/21  1534 09/03/21  0301 09/02/21  1901 09/02/21  0310 09/01/21  1700   WBC 10.5  --  9.0 7.0  --  9.3  --    HGB 7.9* 7.3* 8.4* 5.8* 7.5* 8.7* 8.6*   HCT 25.4*  --  26.8* 19.1* 24.9* 27.3* 26.6*   *  --  197 209  --  270  --    MCV 89.1  --  86.2 92.3  --  88.9  --    NA  --   --   --   --   --  143  --    K  --   --   --   --   --  4.3  --    CL  --   --   --   --   --  112*  --    CO2  --   --   --   --   --  28  --    BUN  --   --   --   --   --  29*  --    CREA  --   --   --   --   --  2.23*  --    CA  --   --   --   --   --  7.7*  --    GLU  --   --   --   --   --  99  --        Assessment:     Principal Problem:    GIB (gastrointestinal bleeding) (8/31/2021)    Active Problems:    AAA (abdominal aortic aneurysm) without rupture ([facility] (5/2/2014)      Overview: 3.2 on  2/14 [facility]      COPD (chronic obstructive pulmonary disease) ([facility] (5/2/2014)      Hyperlipemia (5/2/2014)      Glaucoma (5/2/2014)      Supplemental oxygen dependent (5/2/2014)      OA (osteoarthritis) (5/2/2014)      Hypertension (6/1/2017)      History of pulmonary embolism (6/22/2017)      Upper GI bleed (9/9/2019)      Chronic hypoxemic respiratory failure ([address] (9/9/2019)      Heart failure with reduced ejection fraction ([address] (9/9/2019)      CKD (chronic kidney disease), stage III ([address] (9/9/2019)      Acute blood loss anemia (8/31/2021)      KAYLA (acute kidney injury) ([facility] (9/1/2021)      68 y.o. male with PMH HTN, HLD, CHF (LVEF 30-35%, Afib (no OAC d/t hx GIB/anemia, unable to get Watchman), AAA, CKD, COPD on 2-3L O2 at home, hx GIB with known hx gastric and duodenal AVMs, gastric polyps, Diverticulosis, HP colon polyps, being seen in GI consult at the request of Corina Simental NP for GI bleed with painless hematocezia and 4 gram drop in hgb in one week. He is s/p EGD and Colonoscopy in 2019 (gastric/duodenal AVMs, colonic tics and HP colon polyps). Suspect diverticular bleeding. Tagged RBC scan 8/31 negative, and the bleeding initially appeared to be resolving but then recurred two nights ago and has continued with rapid response called early this morning and transfer to ICU.  I believe he has had a total of 5 units PRBCs this admission, and hgb today is 7.9  Plan:   1) h/h q 8 hours  2) change back to clear liquid diet  3) plan is [place] [facility] for IR angio/embolization when a bed is available  EDGAR Mandujano

## 2021-09-04 NOTE — PROGRESS NOTES
Dr Cindy Yu gave ok for patient to transport off monitor to [facility] with [facility] for scan.

## 2021-09-04 NOTE — PROGRESS NOTES
Patient had an XL bloody liquid bowel movement with few small clots. Notified hospitalist MD Larry Bo, a STAT Hemoglobin was ordered.

## 2021-09-04 NOTE — PROGRESS NOTES
Patient transported off floor to Nuclear Medicine. Ok to transport patient off ICU floor without RN per Patric Cleary.       Yaw Miles [facility]

## 2021-09-04 NOTE — PROGRESS NOTES
Bedside and Verbal shift change report given to Lee Ann Little RN  (oncoming nurse) by Kelsy Butler RN  (offgoing nurse). Report included the following information SBAR, Kardex, ED Summary, Procedure Summary, Intake/Output, Recent Results, Cardiac Rhythm ST and Alarm Parameters .

## 2021-09-04 NOTE — PROGRESS NOTES
Hospitalist Progress Note   Admit Date:  2021  7:15 AM   Name:  Geri Ba. Age:  68 y.o. Sex:  male  :  1940   MRN:  [id number]     Presenting Complaint: No chief complaint on file. Reason(s) for Admission: Acute blood loss anemia [D62]  GIB (gastrointestinal bleeding) [facility] Course & Interval History:     Patient with past medical history of    atrial fibrillation/flutter (not on [address] 2/ recurrent GI bleeding) s/p AVN ablation and BiV ICD placement 2021,   COPD and chronic hypoxemic respiratory failure,   Iron deficiency anemia,   abdominal aortic aneurysm s/p EVAR 2019,   prostate cancer,   HLD,   CKD,   HTN,   gastric AVMs,   chronic sCHF    Patient is admitted due to melena with subsequent drop in Hb. Tagged RBC scan was negative. GI is consulted. Note from GI specialist is as below. \" His last EGD 2019 showed a hyperplastic polyp that was removed and gastric and duodenal AVMs that were cauterized.   Colonoscopy 2019 showed four diminutive, hyperplastic colon polyps that were removed and moderate sigmoid and descending colon diverticulosis \"       Subjective (21):  21   He had some blood per rectum last night. No abdominal pain. No hematemesis. No fever. No shaking. No chills. No shortness of breath. No chest pain. No nausea. No vomiting. 9/3/21   Patient had bloody loose stool yesterday evening and this morning. Patient is feeling weak and dizzy. Hb is lower to 5.8 this morning. PRC is transfused. GI saw patient and wanted him transferred to [facility] for better monitoring and possible intervention. No chest pain. No shortness of breath. No fever. No shaking. No chills. No nausea. No vomiting. 21   Patient is pending a bed at [facility]. However, last night he continuedto have large clotted blood passed from rectum. Hb dropped and BP dropped. He is in ICU now. BP improved with hydration and PRC transfusion. Assessment & Plan:     Principal Problem:  GIB (gastrointestinal bleeding) (8/31/2021)  Likely due to lower GI bleeding from diverticular bleeding. Continues to have episodes of clotted blood passed per rectum. BP last night was low and patient was moved to ICU. BP medications are on hold. Monitor closely. May need more IV fluid. Will establish better IV access in case he has more pronounced blood loss. I have asked anesthesiologist to help with central line placement. Pending bed at [facility]. Active Problems:    AAA (abdominal aortic aneurysm) without rupture ([address] (5/2/2014)  Monitor. No impending rupture. No abdominal pain. COPD (chronic obstructive pulmonary disease) ([address] (5/2/2014)  Not exacerbated. On 2 LPM of oxygen cannula. Currently on 5 LPM which is more than yesterday to compensate for his worsened anemia. Hyperlipemia (5/2/2014)  On statin. Glaucoma (5/2/2014)    Supplemental oxygen dependent (5/2/2014)  On 5 LPM       OA (osteoarthritis) (5/2/2014)        Hypertension (6/1/2017)  BP is on the low side now. Hold BP medications. History of pulmonary embolism (6/22/2017)  Can not be on anticoagulant due to current GI bleeding. History of Upper GI bleed (9/9/2019)      Chronic hypoxemic respiratory failure ([address] (9/9/2019)  Dependent on oxygen cannula. Heart failure with reduced ejection fraction ([address] (9/9/2019)      CKD (chronic kidney disease), stage III ([address] (9/9/2019)      Acute blood loss anemia (8/31/2021)  Monitor Hb and transfuse as needed. KAYLA (acute kidney injury) ([address] (9/1/2021)  Creatinine went up from 1.60 to 2.65 due to GI bleeding and lower BP. Creatinine is improving to 1.83 today. Monitor renal function and intake and output. Avoid nephrotoxic agents.        Dispo/Discharge Planning:    to be determined     Diet:  ADULT DIET Clear Liquid; no red color  DVT PPx: SCD   Code status: Full Code    C/Birgit Patton 1106 Problems    Diagnosis Date Noted    KAYLA (acute kidney injury) ([facility] 09/01/2021    Acute blood loss anemia 08/31/2021    GIB (gastrointestinal bleeding) 08/31/2021    Chronic hypoxemic respiratory failure (HCC) 09/09/2019    CKD (chronic kidney disease), stage III (HCC) 09/09/2019    Heart failure with reduced ejection fraction ([facility] 09/09/2019    Upper GI bleed 09/09/2019    History of pulmonary embolism 06/22/2017    Hypertension 06/01/2017    AAA (abdominal aortic aneurysm) without rupture (HCC) 05/02/2014     3.2 on US 2/14 [address]      COPD (chronic obstructive pulmonary disease) ([facility] 05/02/2014    Hyperlipemia 05/02/2014    Glaucoma 05/02/2014    Supplemental oxygen dependent 05/02/2014    OA (osteoarthritis) 05/02/2014       Objective:     Patient Vitals for the past 24 hrs:   Temp Pulse Resp BP SpO2   09/04/21 0804     98 %   09/04/21 0745  (!) 112 20 (!) 110/58 99 %   09/04/21 0730  (!) 116 21 96/73 (!) 71 %   09/04/21 0715  74 24 (!) 143/51 100 %   09/04/21 0709 98.4 °F (36.9 °C) 70 21 138/66 94 %   09/04/21 0700  70 21 138/66 96 %   09/04/21 0645  70 25 (!) 114/53 100 %   09/04/21 0630 98.2 °F (36.8 °C) 70 19 95/64 100 %   09/04/21 0615  70 21 (!) 102/55 100 %   09/04/21 0611  70 23 (!) 84/45 (!) 61 %   09/04/21 0606 98.3 °F (36.8 °C)       09/04/21 0600  70 22 (!) 96/51 96 %   09/04/21 0550  70 24 (!) 110/49    09/04/21 0454 98.2 °F (36.8 °C) 96 16 124/61 95 %   09/04/21 0440 97.3 °F (36.3 °C) 62 18 (!) 109/59 95 %   09/04/21 0358     99 %   09/04/21 0335 98.7 °F (37.1 °C) 70 16 (!) 107/49 98 %   09/03/21 2311  100 16 (!) 93/57 100 %   09/03/21 2039     99 %   09/03/21 1937 98.4 °F (36.9 °C) (!) 107 18 (!) 102/54    09/03/21 1552  (!) 122 22 (!) 104/59 100 %   09/03/21 1539     94 %   09/03/21 1538  85  (!) 83/51 94 %   09/03/21 1328 99.3 °F (37.4 °C) (!) 115 22 120/64 98 %   09/03/21 1151 98 °F (36.7 °C) 63 22 (!) 111/56 94 %   09/03/21 1045 99 °F (37.2 °C) (!) 103 22 124/63 97 %   09/03/21 1032 98.7 °F (37.1 °C) (!) 107 24 129/63 100 %     Oxygen Therapy  O2 Sat (%): 98 % (09/04/21 0804)  Pulse via Oximetry: 123 beats per minute (09/04/21 0804)  O2 Device: Hi flow nasal cannula (09/04/21 0804)  Skin Assessment: Clean, dry, & intact (09/02/21 1636)  Skin Protection for O2 Device: N/A (09/02/21 1636)  O2 Flow Rate (L/min): 5 l/min (09/04/21 0804)    Estimated body mass index is 23.18 kg/m² as calculated from the following:    Height as of this encounter: 5' 9\" (1.753 m). Weight as of this encounter: 71.2 kg (157 lb). Intake/Output Summary (Last 24 hours) at 9/4/2021 0953  Last data filed at 9/4/2021 0630  Gross per 24 hour   Intake 721.3 ml   Output    Net 721.3 ml         Physical Exam:     Visit Vitals  BP (!) 110/58   Pulse (!) 112   Temp 98.4 °F (36.9 °C)   Resp 20   Ht 5' 9\" (1.753 m)   Wt 71.2 kg (157 lb)   SpO2 98%   BMI 23.18 kg/m²      General:    Well nourished. No overt distress. Patient is sitting up in bed and talking well. On oxygen cannula 5 LPM. Oxygen amount is increased compared to yesterday. Patient appears ill. Afebrile. Head:  Pale. No jaundice. Normocephalic, atraumatic  Eyes:  Sclerae appear normal.  Pupils equally round. ENT:  Nares appear normal, no drainage. Moist oral mucosa  Neck:  No restricted ROM. Trachea midline   CV:   RRR. No m/r/g. No jugular venous distension. Lungs:   CTAB. No wheezing, rhonchi, or rales. Respirations even, unlabored  Abdomen: Bowel sounds present. Soft, nontender, mildly distended. No guarding. Extremities: No cyanosis or clubbing. No edema  Skin:     No rashes and normal coloration. Warm and dry. Neuro:  Cranial nerves II-XII grossly intact. Sensation intact  Psych:  Normal mood and affect.   Alert and oriented x3    I have reviewed ordered lab tests and independently visualized imaging below:    Last 24hr Labs:  Recent Results (from the past 24 hour(s))   CBC W/O DIFF    Collection Time: 09/03/21  3:34 PM   Result Value Ref Range    WBC 9.0 4.3 - 11.1 K/uL    RBC 3.11 (L) 4.23 - 5.6 M/uL    HGB 8.4 (L) 13.6 - 17.2 g/dL    HCT 26.8 (L) 41.1 - 50.3 %    MCV 86.2 79.6 - 97.8 FL    MCH 27.0 26.1 - 32.9 PG    MCHC 31.3 (L) 31.4 - 35.0 g/dL    RDW 19.8 (H) 11.9 - 14.6 %    PLATELET 257 916 - 469 K/uL    MPV 9.9 9.4 - 12.3 FL    ABSOLUTE NRBC 0.15 0.0 - 0.2 K/uL   HEMOGLOBIN    Collection Time: 09/04/21  3:15 AM   Result Value Ref Range    HGB 7.3 (L) 13.6 - 17.2 g/dL   RBC, ALLOCATE    Collection Time: 09/04/21  4:15 AM   Result Value Ref Range    HISTORY CHECKED? Historical check performed    BLOOD GAS & LYTES, ARTERIAL    Collection Time: 09/04/21  7:01 AM   Result Value Ref Range    pH (POC) 7.31 (L) 7.35 - 7.45      pCO2 (POC) 41.5 35 - 45 MMHG    pO2 (POC) 89 75 - 100 MMHG    Sodium,  136 - 145 MMOL/L    Potassium, POC 5.1 3.5 - 5.1 MMOL/L    Calcium, ionized (POC) 1.27 1.12 - 1.32 mmol/L    Glucose,  (H) 65 - 100 MG/DL    Base deficit (POC) 5.1 mmol/L    HCO3 (POC) 20.8 (L) 22 - 26 MMOL/L    CO2, POC 22 13 - 23 MMOL/L    O2 SAT 96 %    Sample source ARTERIAL      SITE RIGHT RADIAL      JAUN'S TEST NOT APPLICABLE      Device: NASAL CANNULA      Performed by Rajeev     GFRAA, POC Cannot be calculated >60 ml/min/1.73m2    GFRNA, POC Cannot be calculated >60 FI/CESAR/3.86B1   METABOLIC PANEL, COMPREHENSIVE    Collection Time: 09/04/21  8:09 AM   Result Value Ref Range    Sodium 144 136 - 145 mmol/L    Potassium 5.2 (H) 3.5 - 5.1 mmol/L    Chloride 115 (H) 98 - 107 mmol/L    CO2 25 21 - 32 mmol/L    Anion gap 4 (L) 7 - 16 mmol/L    Glucose 105 (H) 65 - 100 mg/dL    BUN 26 (H) 8 - 23 MG/DL    Creatinine 1.83 (H) 0.8 - 1.5 MG/DL    GFR est AA 46 (L) >60 ml/min/1.73m2    GFR est non-AA 38 (L) >60 ml/min/1.73m2    Calcium 7.9 (L) 8.3 - 10.4 MG/DL    Bilirubin, total 0.3 0.2 - 1.1 MG/DL    ALT (SGPT) 9 (L) 12 - 65 U/L    AST (SGOT) 16 15 - 37 U/L    Alk.  phosphatase 32 (L) 50 - 136 U/L    Protein, total 4.0 (L) 6.3 - 8.2 g/dL    Albumin 1.8 (L) 3.2 - 4.6 g/dL    Globulin 2.2 (L) 2.3 - 3.5 g/dL    A-G Ratio 0.8 (L) 1.2 - 3.5     LACTIC ACID    Collection Time: 09/04/21  8:09 AM   Result Value Ref Range    Lactic acid 1.1 0.4 - 2.0 MMOL/L   TROPONIN-HIGH SENSITIVITY    Collection Time: 09/04/21  8:09 AM   Result Value Ref Range    Troponin-High Sensitivity 56.6 (H) 0 - 14 pg/mL   MAGNESIUM    Collection Time: 09/04/21  8:09 AM   Result Value Ref Range    Magnesium 2.0 1.8 - 2.4 mg/dL   CBC WITH AUTOMATED DIFF    Collection Time: 09/04/21  8:09 AM   Result Value Ref Range    WBC 10.5 4.3 - 11.1 K/uL    RBC 2.85 (L) 4.23 - 5.6 M/uL    HGB 7.9 (L) 13.6 - 17.2 g/dL    HCT 25.4 (L) 41.1 - 50.3 %    MCV 89.1 79.6 - 97.8 FL    MCH 27.7 26.1 - 32.9 PG    MCHC 31.1 (L) 31.4 - 35.0 g/dL    RDW 18.3 (H) 11.9 - 14.6 %    PLATELET 357 (L) 125 - 450 K/uL    MPV 10.5 9.4 - 12.3 FL    ABSOLUTE NRBC 0.17 0.0 - 0.2 K/uL    DF AUTOMATED      NEUTROPHILS 77 43 - 78 %    LYMPHOCYTES 12 (L) 13 - 44 %    MONOCYTES 8 4.0 - 12.0 %    EOSINOPHILS 1 0.5 - 7.8 %    BASOPHILS 1 0.0 - 2.0 %    IMMATURE GRANULOCYTES 2 0.0 - 5.0 %    ABS. NEUTROPHILS 8.0 1.7 - 8.2 K/UL    ABS. LYMPHOCYTES 1.3 0.5 - 4.6 K/UL    ABS. MONOCYTES 0.8 0.1 - 1.3 K/UL    ABS. EOSINOPHILS 0.1 0.0 - 0.8 K/UL    ABS. BASOPHILS 0.1 0.0 - 0.2 K/UL    ABS. IMM. GRANS. 0.2 0.0 - 0.5 K/UL       All Micro Results     Procedure Component Value Units Date/Time    COVID-19 RAPID TEST [[id number]] Collected: 08/31/21 1051    Order Status: Completed Specimen: Nasopharyngeal Updated: 08/31/21 1122     Specimen source Nasopharyngeal        COVID-19 rapid test Not detected        Comment:      The specimen is NEGATIVE for SARS-CoV-2, the novel coronavirus associated with COVID-19. A negative result does not rule out COVID-19. This test has been authorized by the FDA under an Emergency Use Authorization (EUA) for use by authorized laboratories.         Fact sheet for Healthcare Providers: TimeBridgete.co.nz  Fact sheet for Patients: ConventionUpGeoPalzte.co.nz       Methodology: Isothermal Nucleic Acid Amplification               Other Studies:  XR CHEST SNGL V    Result Date: 9/4/2021   Portable view of the chest COMPARISON: August 31, 2021 CLINICAL HISTORY: Central line placement. FINDINGS: There is a right IJ line, with the tip in the area of SVC/right atrium junction. No evidence of pneumothorax Left-sided cardiac pacer is stable. Cardiac mediastinal silhouette is within normal limits. There is no focal consolidation, pulmonary edema or large pleural effusion. Surrounding bones are intact. 1. Right IJ line tip is in the area of SVC/right atrium junction. Negative for pneumothorax. 2. No focal pulmonary consolidation or pulmonary edema.       Current Meds:  Current Facility-Administered Medications   Medication Dose Route Frequency    0.9% sodium chloride infusion 250 mL  250 mL IntraVENous PRN    propofoL (DIPRIVAN) 10 mg/mL injection        0.9% sodium chloride infusion 250 mL  250 mL IntraVENous PRN    0.9% sodium chloride infusion 250 mL  250 mL IntraVENous PRN    albuterol-ipratropium (DUO-NEB) 2.5 MG-0.5 MG/3 ML  3 mL Nebulization TID RT    0.9% sodium chloride infusion 250 mL  250 mL IntraVENous PRN    0.9% sodium chloride infusion  75 mL/hr IntraVENous CONTINUOUS    pantoprazole (PROTONIX) tablet 40 mg  40 mg Oral ACB    sodium chloride (NS) flush 5-40 mL  5-40 mL IntraVENous Q8H    sodium chloride (NS) flush 5-40 mL  5-40 mL IntraVENous PRN    ondansetron (ZOFRAN) injection 4 mg  4 mg IntraVENous Q4H PRN    albuterol (PROVENTIL VENTOLIN) nebulizer solution 2.5 mg  2.5 mg Inhalation Q6H PRN    allopurinoL (ZYLOPRIM) tablet 300 mg  300 mg Oral DAILY    brimonidine (ALPHAGAN) 0.2 % ophthalmic solution 1 Drop  1 Drop Both Eyes BID    docusate sodium (COLACE) capsule 100 mg  100 mg Oral DAILY    ferrous sulfate tablet 325 mg  1 Tablet Oral BID WITH MEALS    fluticasone (FLOVENT HFA) 110 mcg inhaler  1 Puff Inhalation DAILY    latanoprost (XALATAN) 0.005 % ophthalmic solution 1 Drop  1 Drop Both Eyes QHS    [Held by provider] lisinopriL (PRINIVIL, ZESTRIL) tablet 10 mg  10 mg Oral DAILY    [Held by provider] metoprolol succinate (TOPROL-XL) tablet 100 mg  100 mg Oral DAILY    nitroglycerin (NITROSTAT) tablet 0.4 mg  0.4 mg SubLINGual Q5MIN PRN    rosuvastatin (CRESTOR) tablet 40 mg  40 mg Oral QHS    tamsulosin (FLOMAX) capsule 0.4 mg  0.4 mg Oral DAILY       Signed:  Kana Charles MD    Part of this note may have been written by using a voice dictation software. The note has been proof read but may still contain some grammatical/other typographical errors.

## 2021-09-04 NOTE — PROGRESS NOTES
Discussed patient's care with Dr Maged Sanchez and reviewed patient's chart. Surgery will be on standby. He appears to be high surgical risks from cardiovascular standpoint.

## 2021-09-04 NOTE — PROGRESS NOTES
GI    + tagged scan at splenic flexure and proximal descending colon. I've talked with Dr Oneal Lucas of IR who reviewed the scan and old CTA showing aneurysm which may make embolization tough. States the pt needs to be transferred and [facility] [place] [facility] in [place] to proceed with embolization. I discussed the case with Dr Toby Gagnon as well who is trying to prioritize his transfer. If he's able to transfer, they will contact IR and let them know. In the meantime, I will consult surgery to be on standby in case pt acutely decompensates- would need emergent L hemicolectomy.      Melody Rojas MD

## 2021-09-04 NOTE — PROGRESS NOTES
TRANSFER - IN REPORT:    Verbal report received from Anu Rodríguez [facility] on [employer].  being received from [address] [place] [address] for routine progression of care      Report consisted of patients Situation, Background, Assessment and   Recommendations(SBAR). Information from the following report(s) SBAR was reviewed with the receiving nurse. Opportunity for questions and clarification was provided.

## 2021-09-04 NOTE — PROGRESS NOTES
Patient hypotensive and short of breath on arrival.  Lung sounds are very diminished. No crackles.  Messaged Dr. Dianna Murcia via perfect serve orders received to increase blood infusion rate, obtain stat abg, and 1 liter bolus

## 2021-09-04 NOTE — PROCEDURES
Central Line Placement    Start time: 9/4/2021 8:25 AM  End time: 9/4/2021 8:41 AM  Performed by: Carlos Enrique Adrian MD  Authorized by: Carlos Enrique Adrian MD     Indications: vascular access, central pressure monitoring and need for vasopressors  Preanesthetic Checklist: patient identified, risks and benefits discussed, anesthesia consent, site marked, patient being monitored and timeout performed    Timeout Time: 08:23 EDT       Pre-procedure: All elements of maximal sterile barrier technique followed? Yes    2% Chlorhexidine for cutaneous antisepsis, Hand hygiene performed prior to catheter insertion and Ultrasound guidance    Sterile Ultrasound Technique followed?: Yes            Procedure:   Prep:  Chlorhexidine and ChloraPrep    Orientation:  Right  Patient position:  Trendelenburg  Catheter type:  Triple lumen  Catheter size:  9 Fr    Number of attempts:  1  Successful placement: Yes      Assessment:   Post-procedure:  Catheter secured, sterile dressing applied and sterile dressing with CHG applied  Assessment:  Placement verified by x-ray, free fluid flow, blood return through all ports, other (comment) and guidewire removal verified  Insertion:  Uncomplicated  Patient tolerance:  Patient tolerated the procedure well with no immediate complications      Access site was examined with ultrasound and found to be patent and acceptable for placement of CVL. Internal Jugular vein and Carotid artery were identified. Needle path and vein access were visualized with real time ultrasound guidance. Catheter was advanced off needle, IV tubing connected to catheter, and veinous non pulsatile blood confirmed in tubing. Wire was advanced through catheter and image of wire in vessel was obtained with ultrasound. Image was saved, printed, and placed in chart.

## 2021-09-04 NOTE — PROGRESS NOTES
Positive tagged scan. Patient continues to have clotted blood per rectum. Dr. Alexa Harvey has talked with Dr. Chelsey Chiu, interventional radiology. Patient will be arranged for transferred to [facility] ICU and have interventional radiology work on stopping bleeding. I am told by discussing with , Emerald Dinero that potentially there is a bed in CVICU at [facility]. Housing supervisor is working on getting a bed.

## 2021-09-04 NOTE — PROGRESS NOTES
GI    Spoke with IR. Moving pt without being transferred/admitted to [facility] doesn't appear feasible. He's not sure how accurate CTA will be with recent negative tagged scan. Given his recent bleeding, will repeat tagged scan today. Still plan for transfer to [facility] if bed becomes available.      Corina Monk MD

## 2021-09-04 NOTE — PROGRESS NOTES
Emptied bedside commode with moderate amt of bloody liquid stool. Patient became very dizzy and felt more short of breath. RT was called and notified Hospitalist MD Travon Hinojosa.

## 2021-09-04 NOTE — PROGRESS NOTES
Patient just returned from NM study. Patient assisted to bedpan by PCT. After using bedpan patient extremely agitated and severely SOB with increased WOB. O2 sat remained 100% on 7L HFNC. Worked with patient on breathing exercises to take slow/deep breaths. Patient recovery very slow. Patient RR 35-40 bpm. Dr Radha Collins notified via perfect serve. Received orders to stop IVF, and also received orders for CXR. Will cont to monitor patient.

## 2021-09-04 NOTE — PROGRESS NOTES
TRANSFER - OUT REPORT:    Verbal report given to Marc Ventura on [employer].  being transferred to ICU for routine progression of care       Report consisted of patients Situation, Background, Assessment and   Recommendations(SBAR). Information from the following report(s) SBAR, Kardex, Intake/Output, MAR and Recent Results was reviewed with the receiving nurse. Lines:   Peripheral IV 09/03/21 Left;Posterior Forearm (Active)   Site Assessment Clean, dry, & intact 09/03/21 1328   Phlebitis Assessment 0 09/03/21 1328   Infiltration Assessment 1 09/03/21 1328   Dressing Status Clean, dry, & intact 09/03/21 1328   Dressing Type Transparent;Tape 09/03/21 1328        Opportunity for questions and clarification was provided.       Patient transported with:   O2 @ 4 liters

## 2021-09-04 NOTE — PROGRESS NOTES
Problem: Falls - Risk of  Goal: *Absence of Falls  Description: Document Shellie Coy Fall Risk and appropriate interventions in the flowsheet.   Outcome: Progressing Towards Goal  Note: Fall Risk Interventions:  Mobility Interventions: Assess mobility with egress test, Bed/chair exit alarm, Communicate number of staff needed for ambulation/transfer, OT consult for ADLs, Patient to call before getting OOB, PT Consult for mobility concerns, Strengthening exercises (ROM-active/passive), Utilize walker, cane, or other assistive device         Medication Interventions: Assess postural VS orthostatic hypotension, Bed/chair exit alarm, Evaluate medications/consider consulting pharmacy, Patient to call before getting OOB, Teach patient to arise slowly    Elimination Interventions: Bed/chair exit alarm, Call light in reach, Patient to call for help with toileting needs, Urinal in reach              Problem: Patient Education: Go to Patient Education Activity  Goal: Patient/Family Education  Outcome: Progressing Towards Goal     Problem: Patient Education: Go to Patient Education Activity  Goal: Patient/Family Education  Outcome: Progressing Towards Goal     Problem: Upper and Lower GI Bleed: Day 2  Goal: Off Pathway (Use only if patient is Off Pathway)  Outcome: Progressing Towards Goal  Goal: Activity/Safety  Outcome: Progressing Towards Goal  Goal: Consults, if ordered  Outcome: Progressing Towards Goal  Goal: Diagnostic Test/Procedures  Outcome: Progressing Towards Goal  Goal: Nutrition/Diet  Outcome: Progressing Towards Goal  Goal: Discharge Planning  Outcome: Progressing Towards Goal  Goal: Medications  Outcome: Progressing Towards Goal  Goal: Respiratory  Outcome: Progressing Towards Goal  Goal: Treatments/Interventions/Procedures  Outcome: Progressing Towards Goal  Goal: Psychosocial  Outcome: Progressing Towards Goal  Goal: *Optimal pain control at patient's stated goal  Outcome: Progressing Towards Goal  Goal: *Hemodynamically stable  Outcome: Progressing Towards Goal  Goal: *Tolerating diet  Outcome: Progressing Towards Goal  Goal: *Demonstrates progressive activity  Outcome: Progressing Towards Goal     Problem: Upper and Lower GI Bleed: Day 3  Goal: Off Pathway (Use only if patient is Off Pathway)  Outcome: Progressing Towards Goal  Goal: Activity/Safety  Outcome: Progressing Towards Goal  Goal: Diagnostic Test/Procedures  Outcome: Progressing Towards Goal  Goal: Nutrition/Diet  Outcome: Progressing Towards Goal  Goal: Discharge Planning  Outcome: Progressing Towards Goal  Goal: Medications  Outcome: Progressing Towards Goal  Goal: Treatments/Interventions/Procedures  Outcome: Progressing Towards Goal  Goal: Psychosocial  Outcome: Progressing Towards Goal     Problem: Upper and Lower GI Bleed:  Discharge Outcomes  Goal: *Hemodynamically stable  Outcome: Progressing Towards Goal  Goal: *Lungs clear or at baseline  Outcome: Progressing Towards Goal  Goal: *Demonstrates independent activity or return to baseline  Outcome: Progressing Towards Goal  Goal: *Pain is controlled to three or less  Outcome: Progressing Towards Goal  Goal: *Verbalizes understanding and describes prescribed diet  Outcome: Progressing Towards Goal  Goal: *Tolerating diet  Outcome: Progressing Towards Goal  Goal: *Verbalizes name, dosage, time, side effects, and number of days to continue medications  Outcome: Progressing Towards Goal  Goal: *Anxiety reduced or absent  Outcome: Progressing Towards Goal  Goal: *Understands and describes signs and symptoms to report to providers(Stroke Metric)  Outcome: Progressing Towards Goal  Goal: *Describes follow-up/return visits to physicians  Outcome: Progressing Towards Goal  Goal: *Describes available resources and support systems  Outcome: Progressing Towards Goal

## 2021-09-04 NOTE — PROGRESS NOTES
Patient very restless, unable to tolerate sitting up or laying flat due in increased SOB/WOB. Patient just had episode of dry heaves, medicated with PRN dose of zofran. Patient also has only had 75ml UOP for this shift. Dr Toby Gagnon hopitalist and Dr Pawan Andre with GI notified of patient status. Received orders to restart IVF at 100ml/hr. Discussed if patient needed CT scan of abdomen r/t bleed and respiratory distress. Awaiting MD reply. Will cont to monitor. 1800: Dr Guy Montoya called and spoke with this RN regarding patient status and VS and requested to speak with hospitalist. Dr Toby Gagnon sent via perfect serve Dr Augusta Juarez request and phone number. MD's placed order for general sx consult at this time. Consult sent via perfect serve to [facility].

## 2021-09-04 NOTE — PROGRESS NOTES
Problem: Falls - Risk of  Goal: *Absence of Falls  Description: Document Helen Asher Fall Risk and appropriate interventions in the flowsheet.   Outcome: Progressing Towards Goal  Note: Fall Risk Interventions:  Mobility Interventions: Bed/chair exit alarm, Communicate number of staff needed for ambulation/transfer, OT consult for ADLs, Patient to call before getting OOB, PT Consult for mobility concerns, PT Consult for assist device competence, Strengthening exercises (ROM-active/passive)         Medication Interventions: Bed/chair exit alarm, Evaluate medications/consider consulting pharmacy, Patient to call before getting OOB, Teach patient to arise slowly    Elimination Interventions: Call light in reach, Elevated toilet seat, Patient to call for help with toileting needs, Stay With Me (per policy), Toileting schedule/hourly rounds, Toilet paper/wipes in reach              Problem: Patient Education: Go to Patient Education Activity  Goal: Patient/Family Education  Outcome: Progressing Towards Goal     Problem: Patient Education: Go to Patient Education Activity  Goal: Patient/Family Education  Outcome: Progressing Towards Goal     Problem: Upper and Lower GI Bleed: Day 2  Goal: Off Pathway (Use only if patient is Off Pathway)  Outcome: Progressing Towards Goal  Goal: Activity/Safety  Outcome: Progressing Towards Goal  Goal: Consults, if ordered  Outcome: Progressing Towards Goal  Goal: Diagnostic Test/Procedures  Outcome: Progressing Towards Goal  Goal: Nutrition/Diet  Outcome: Progressing Towards Goal  Goal: Discharge Planning  Outcome: Progressing Towards Goal  Goal: Medications  Outcome: Progressing Towards Goal  Goal: Respiratory  Outcome: Progressing Towards Goal  Goal: Treatments/Interventions/Procedures  Outcome: Progressing Towards Goal  Goal: Psychosocial  Outcome: Progressing Towards Goal  Goal: *Optimal pain control at patient's stated goal  Outcome: Progressing Towards Goal  Goal: *Hemodynamically stable  Outcome: Progressing Towards Goal  Goal: *Tolerating diet  Outcome: Progressing Towards Goal  Goal: *Demonstrates progressive activity  Outcome: Progressing Towards Goal     Problem: Upper and Lower GI Bleed: Day 3  Goal: Off Pathway (Use only if patient is Off Pathway)  Outcome: Progressing Towards Goal  Goal: Activity/Safety  Outcome: Progressing Towards Goal  Goal: Diagnostic Test/Procedures  Outcome: Progressing Towards Goal  Goal: Nutrition/Diet  Outcome: Progressing Towards Goal  Goal: Discharge Planning  Outcome: Progressing Towards Goal  Goal: Medications  Outcome: Progressing Towards Goal  Goal: Treatments/Interventions/Procedures  Outcome: Progressing Towards Goal  Goal: Psychosocial  Outcome: Progressing Towards Goal     Problem: Upper and Lower GI Bleed:  Discharge Outcomes  Goal: *Hemodynamically stable  Outcome: Progressing Towards Goal  Goal: *Lungs clear or at baseline  Outcome: Progressing Towards Goal  Goal: *Demonstrates independent activity or return to baseline  Outcome: Progressing Towards Goal  Goal: *Pain is controlled to three or less  Outcome: Progressing Towards Goal  Goal: *Verbalizes understanding and describes prescribed diet  Outcome: Progressing Towards Goal  Goal: *Tolerating diet  Outcome: Progressing Towards Goal  Goal: *Verbalizes name, dosage, time, side effects, and number of days to continue medications  Outcome: Progressing Towards Goal  Goal: *Anxiety reduced or absent  Outcome: Progressing Towards Goal  Goal: *Understands and describes signs and symptoms to report to providers(Stroke Metric)  Outcome: Progressing Towards Goal  Goal: *Describes follow-up/return visits to physicians  Outcome: Progressing Towards Goal  Goal: *Describes available resources and support systems  Outcome: Progressing Towards Goal

## 2021-09-04 NOTE — PROGRESS NOTES
Dual skin assessment complete with Hurman Osgood., RN. No pressure ulcers or deep tissue injuries noted. Allevyn placed over sacrum.

## 2021-09-04 NOTE — PROGRESS NOTES
Bedside and Verbal shift change report given to Shreya Ragsdale RN  (oncoming nurse) by Monica Holt RN  (offgoing nurse). Report included the following information SBAR, Kardex, ED Summary, Procedure Summary, Intake/Output, Recent Results, Cardiac Rhythm Paced and Alarm Parameters .

## 2021-09-05 PROBLEM — D69.6 THROMBOCYTOPENIA (HCC): Status: ACTIVE | Noted: 2021-01-01

## 2021-09-05 PROBLEM — N18.9 ACUTE ON CHRONIC RENAL INSUFFICIENCY: Status: ACTIVE | Noted: 2021-01-01

## 2021-09-05 PROBLEM — E78.5 HYPERLIPIDEMIA: Status: ACTIVE | Noted: 2021-01-01

## 2021-09-05 PROBLEM — K92.2 LOWER GI BLEED: Status: ACTIVE | Noted: 2021-01-01

## 2021-09-05 PROBLEM — R57.8 HEMORRHAGIC SHOCK (HCC): Status: ACTIVE | Noted: 2021-01-01

## 2021-09-05 PROBLEM — I50.22 CHRONIC SYSTOLIC HEART FAILURE (HCC): Status: RESOLVED | Noted: 2021-01-01 | Resolved: 2021-01-01

## 2021-09-05 PROBLEM — J95.821 RESPIRATORY FAILURE, POST-OPERATIVE (HCC): Status: ACTIVE | Noted: 2021-01-01

## 2021-09-05 PROBLEM — N28.9 ACUTE ON CHRONIC RENAL INSUFFICIENCY: Status: ACTIVE | Noted: 2021-01-01

## 2021-09-05 NOTE — PROGRESS NOTES
TRANSFER - OUT REPORT:    Verbal report given to [facility] RN(name) on [employer].  being transferred to BMT(unit) for routine progression of care       Report consisted of patients Situation, Background, Assessment and   Recommendations(SBAR). Information from the following report(s) SBAR, Procedure Summary and MAR was reviewed with the receiving nurse. Opportunity for questions and clarification was provided. Conscious Sedation:   100 Mcg of Fentanyl administered  2 Mg of Versed administered    Pt tolerated procedure well. Visit Vitals  BP (!) 116/51   Pulse (!) 107   Temp 99 °F (37.2 °C)   Resp 20   Ht 5' 9\" (1.753 m)   Wt 71.2 kg (156 lb 15.5 oz)   SpO2 100%   BMI 23.18 kg/m²     Past Medical History:   Diagnosis Date    A-fib ([address] 5/2/2014    AAA (abdominal aortic aneurysm) without rupture ([address] 5/2/2014    3.2 on  2/14 [facility]    Acute metabolic encephalopathy 1/9/3826    Arthritis     Cancer (HCC)     hx prostate cancer    COPD (chronic obstructive pulmonary disease) ([address] 5/2/2014    Elevated prostate specific antigen (PSA)     Glaucoma 5/2/2014    Heart disease     Heart failure ([address]     Hyperlipemia 5/2/2014    Hypertension     Hypertrophy of prostate with urinary obstruction and other lower urinary tract symptoms (LUTS)     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    OA (osteoarthritis) 5/2/2014    Peptic ulcer disease 6/1/2017    Poor historian     Prostate cancer ([address]     Pulmonary embolus ([address] 6/1/2017    Supplemental oxygen dependent 5/2/2014    Warfarin anticoagulation 5/2/2014     Peripheral IV 09/04/21 Anterior; Left Forearm (Active)   Site Assessment Clean, dry, & intact 09/05/21 0025   Phlebitis Assessment 0 09/05/21 0025   Infiltration Assessment 0 09/05/21 0025   Dressing Status Clean, dry, & intact 09/05/21 0025   Dressing Type Tape;Transparent 09/05/21 0025   Hub Color/Line Status Pink;Flushed;Patent 09/05/21 0025   Alcohol Cap Used No 09/05/21 0025 Triple Lumen Right IJ 09/04/21 Anterior;Right Neck (Active)         transported with this RN with blood infusing. Bedside report give. Dual groin assessments with Lam Jean. Dressing dry and intact, no swellieng. Knee immobilizer in place.

## 2021-09-05 NOTE — H&P
[facility] Cardiology Initial Cardiac Evaluation      Date of  Admission: 9/5/2021 12:22 AM     Primary Care Physician: Talisha Bennett MD  Primary Cardiologist: Dr Rose Marie Santiago  Referring Physician: Dr Leonardo Tran  Supervising Physician: Dr Carlton Proctor    CC/Reason for evaluation: pre operative clearance, diverticular bleed     HPI:  Odalys Mcmanus is a 68 y.o. male with PMH of NICM, EF 66%, chronic systolic heart failure, persistent atrial flutter (no OAC due to anemia and recurrent GI bleed) s/p AV node ablation 7/9/21, s/p Biotronik BiV ICD 7/9/2021, HTN, HLD, abdominal aortic aneurysm s/p EVAR 9/2019, COPD and chronic respiratory failure on 3L NC O2, right lung mass and chronic MAC infection, recurrent GI bleed due to AVM's, chroinc AYAN on IV iron, and prostate cancer who presented to [facility] ED on 8/31 via EMS with complaint of BRB in stool. Upon evaluation in ED, Hgb was 8.3 down from 10.5 in 5 days. Patinet was initially hypotensive but this improved with IV hydration. Patient was admitted to hospitalist service for acute blood loss anemia/GI bleed with consult to GI. Patient received PRBC transfusion. Tagged RBC scan was performed and was negative for active bleeding or source of bleeding. Patient developed active bleeding per rectum on 9/3 with drop of Hb again to 5+. Patient continued to have blood stool and pre syncopal episode requiring rapid response. He received additional PRC transfusion and transferred to [facility] for further care. Interventional radiology consulted. General surgery consulted in case intervention necessary for GI bleed. Cardiology consulted for pre operative clearance if IR unsuccessful and need surgical intervention.       Past Medical History:   Diagnosis Date    A-fib [facility]) 5/2/2014    AAA (abdominal aortic aneurysm) without rupture ([facility] [address] 5/2/2014    3.2 on US 2/14 [facility]    Acute metabolic encephalopathy 6/4/6689    Arthritis     Cancer (HCC)     hx prostate cancer    COPD (chronic obstructive pulmonary disease) ([facility] [address] 2014    Elevated prostate specific antigen (PSA)     Glaucoma 2014    Heart disease     Heart failure ([facility] [address]     Hyperlipemia 2014    Hypertension     Hypertrophy of prostate with urinary obstruction and other lower urinary tract symptoms (LUTS)     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[facility] [address] 2018    OA (osteoarthritis) 2014    Peptic ulcer disease 2017    Poor historian     Prostate cancer ([facility] [address]     Pulmonary embolus ([facility] [address] 2017    Supplemental oxygen dependent 2014    Warfarin anticoagulation 2014      Past Surgical History:   Procedure Laterality Date    COLONOSCOPY N/A 2019    COLONOSCOPY/ 26 ROOM 614 performed by King Shandra [facility] at [facility] ENDOSCOPY    EGD  2019         HX HEART CATHETERIZATION      VASCULAR SURGERY PROCEDURE UNLIST  2019     Bilateral common femoral artery cutdown with exposure and placement of catheter in aorta for aortogram,Endovascular repair of infrarenal abdominal aortic aneurysm with bifurcated modulated device (31 x 14 x 13 main body) via the left common femoral, right iliac extender measuring 27 x 10.        Allergies   Allergen Reactions    Statins-Hmg-Coa Reductase Inhibitors Myalgia     Muscle pain      Social History     Socioeconomic History    Marital status: SINGLE     Spouse name: Not on file    Number of children: Not on file    Years of education: Not on file    Highest education level: Not on file   Occupational History    Not on file   Tobacco Use    Smoking status: Former Smoker     Packs/day: 2.00     Years: 40.00     Pack years: 80.00     Quit date: 2014     Years since quittin.1    Smokeless tobacco: Never Used    Tobacco comment: still taking Chantix    Substance and Sexual Activity    Alcohol use: No    Drug use: No    Sexual activity: Yes     Partners: Female     Birth control/protection: None   Other Topics Concern    Not on file   Social History Narrative  Not on file     Social Determinants of Health     Financial Resource Strain: Medium Risk    Difficulty of Paying Living Expenses: Somewhat hard   Food Insecurity: No Food Insecurity    Worried About Running Out of Food in the Last Year: Never true    Ran Out of Food in the Last Year: Never true   Transportation Needs: No Transportation Needs    Lack of Transportation (Medical): No    Lack of Transportation (Non-Medical):  No   Physical Activity:     Days of Exercise per Week:     Minutes of Exercise per Session:    Stress:     Feeling of Stress :    Social Connections:     Frequency of Communication with Friends and Family:     Frequency of Social Gatherings with Friends and Family:     Attends [employer] Services:     Active Member of Clubs or Organizations:     Attends Club or Organization Meetings:     Marital Status:    Intimate Partner Violence:     Fear of Current or Ex-Partner:     Emotionally Abused:     Physically Abused:     Sexually Abused:      Family History   Problem Relation Age of Onset    Cancer Mother     Heart Disease Father         Current Facility-Administered Medications   Medication Dose Route Frequency    sodium chloride (NS) flush 5-40 mL  5-40 mL IntraVENous Q8H    sodium chloride (NS) flush 5-40 mL  5-40 mL IntraVENous PRN    ondansetron (ZOFRAN) injection 4 mg  4 mg IntraVENous Q4H PRN    albuterol (PROVENTIL VENTOLIN) nebulizer solution 2.5 mg  2.5 mg Inhalation Q6H PRN    allopurinoL (ZYLOPRIM) tablet 300 mg  300 mg Oral DAILY    brimonidine (ALPHAGAN) 0.2 % ophthalmic solution 1 Drop  1 Drop Both Eyes BID    docusate sodium (COLACE) capsule 100 mg  100 mg Oral DAILY    ferrous sulfate tablet 325 mg  1 Tablet Oral BID WITH MEALS    fluticasone (FLOVENT HFA) 110 mcg inhaler  1 Puff Inhalation DAILY    latanoprost (XALATAN) 0.005 % ophthalmic solution 1 Drop  1 Drop Both Eyes QHS    [Held by provider] lisinopriL (PRINIVIL, ZESTRIL) tablet 10 mg  10 mg Oral DAILY    metoprolol succinate (TOPROL-XL) XL tablet 100 mg  100 mg Oral DAILY    nitroglycerin (NITROSTAT) tablet 0.4 mg  0.4 mg SubLINGual Q5MIN PRN    rosuvastatin (CRESTOR) tablet 40 mg  40 mg Oral QHS    tamsulosin (FLOMAX) capsule 0.4 mg  0.4 mg Oral DAILY    0.9% sodium chloride infusion 250 mL  250 mL IntraVENous PRN    pantoprazole (PROTONIX) tablet 40 mg  40 mg Oral ACB    0.9% sodium chloride infusion 250 mL  250 mL IntraVENous PRN    0.9% sodium chloride infusion 250 mL  250 mL IntraVENous PRN    albuterol-ipratropium (DUO-NEB) 2.5 MG-0.5 MG/3 ML  3 mL Nebulization TID RT    0.9% sodium chloride infusion 250 mL  250 mL IntraVENous PRN    acetaminophen (TYLENOL) tablet 650 mg  650 mg Oral Q6H PRN    oxyCODONE IR (ROXICODONE) tablet 5 mg  5 mg Oral Q6H PRN    NOREPINephrine (LEVOPHED) 4 mg in 5% dextrose 250 mL infusion  0.5-30 mcg/min IntraVENous TITRATE    0.9% sodium chloride infusion 250 mL  250 mL IntraVENous PRN    sodium chloride (NS) flush 5-40 mL  5-40 mL IntraVENous Q8H    sodium chloride (NS) flush 5-40 mL  5-40 mL IntraVENous PRN    0.9% sodium chloride infusion 250 mL  250 mL IntraVENous PRN    [Held by provider] 0.9% sodium chloride infusion 500 mL  500 mL IntraVENous CONTINUOUS    heparin (PF) 2 units/ml in NS infusion 2,000-16,000 Units  1,000-8,000 mL Irrigation CONTINUOUS    0.9% sodium chloride infusion 250 mL  250 mL IntraVENous PRN     Review of Symptoms:    General: Positive for generalized weakness. No weight changes, fever or chills  Skin: no rashes, lumps, or other skin changes  HEENT: no headache, dizziness, lightheadedness, vision changes, hearing changes, tinnitus, vertigo, sinus pressure/pain, bleeding gums, sore throat, or hoarseness  Neck: no swollen glands, goiter, pain or stiffness  Respiratory: Positive for dyspnea. No cough, sputum, hemoptysis, wheezing  Cardiovascular: + as per HPI  Gastrointestinal: Positive for BRBPR.  History of AVM's and recurrent GI bleed  Urinary: no frequency, urgency , hematuria, burning/pain with urination, recent flank pain, polyuria, nocturia, or difficulty urinating  Peripheral Vascular: no claudication, leg cramps, prior DVTs, swelling of calves, legs, or feet, color change, or swelling with redness or tenderness  Musculoskeletal: no muscle or joint pain/stiffness, joint swelling, erythema of joints, or back pain  Psychiatric: no depression or excessive stress  Neurological: no sensory or motor loss, seizures, syncope, tremors, numbness, no dementia  Hematologic: Positive for anemia  Endocrine: No thyroid problems, heat or cold intolerance, excessive sweating, polyuria, polydipsia, diabetes. Subjective:     Physical Exam:    Vitals:    09/05/21 1033 09/05/21 1039 09/05/21 1044 09/05/21 1049   BP: (!) 90/51 (!) 111/50 (!) 104/59 (!) 116/51   Pulse: (!) 118 (!) 109 (!) 109 (!) 107   Resp: 20 18 20 20   Temp:       SpO2: 93% 100% 100% 100%   Weight:       Height:         General: Well Developed, Well Nourished, No Acute Distress  HEENT: pupils equal and round, no abnormalities noted  Neck: supple, no JVD, no carotid bruits  Heart: S1S2 with RRR without murmurs or gallops  Lungs: Clear throughout auscultation bilaterally without adventitious sounds  Abd: soft, nontender, nondistended, with good bowel sounds  Ext: warm, no edema, calves supple/nontender, pulses 2+ bilaterally  Skin: warm and dry  Psychiatric: Normal mood and affect  Neurologic: Alert and oriented X 3    Cardiographics    ECG: sinus tachycardia  Echocardiogram: 6/11/2021  Left Ventricle Normal cavity size, wall thickness and systolic function (ejection fraction normal). The estimated EF is 30 - 35%. There is mild (grade 1) left ventricular diastolic dysfunction. Avg. E/e'= 9.8.    Wall Scoring The following segments are dyskinetic: mid anterolateral and apical lateral.  The following segments are hypokinetic: basal anterior, basal inferoseptal, basal inferior, basal inferolateral, basal anterolateral, mid anterior, mid inferoseptal, mid inferior, mid inferolateral, apical anterior, apical inferior and apex. All other segments are normal.            Left Atrium Left atrium not well visualized. Mildly dilated left atrium. Right Ventricle Not well visualized. Normal cavity size, wall thickness and global systolic function. RVSP= 34. Right Atrium Right atrium not well visualized. Normal cavity size. Aortic Valve No stenosis and no regurgitation. Aortic valve sclerosis. Mitral Valve Normal valve structure, no stenosis and no regurgitation. Tricuspid Valve Normal valve structure, no stenosis and no regurgitation. Pulmonic Valve Pulmonic valve not well visualized. Normal valve structure, no stenosis and no regurgitation. Aorta Normal aortic root, ascending aortic, and aortic arch. IVC/Hepatic Veins Inferior vena cava not well visualized. Normal structure. Normal central venous pressure (3 mmHg); IVC diameter is less than 21 mm and collapses more than 50% with respiration. Pericardium No evidence of pericardial effusion.      Labs:   Recent Results (from the past 24 hour(s))   TROPONIN-HIGH SENSITIVITY    Collection Time: 09/04/21  4:58 PM   Result Value Ref Range    Troponin-High Sensitivity 59.6 (H) 0 - 14 pg/mL   HEMOGLOBIN    Collection Time: 09/04/21  4:58 PM   Result Value Ref Range    HGB 7.4 (L) 13.6 - 17.2 g/dL   EKG, 12 LEAD, INITIAL    Collection Time: 09/04/21  8:23 PM   Result Value Ref Range    Ventricular Rate 117 BPM    Atrial Rate 117 BPM    P-R Interval 174 ms    QRS Duration 118 ms    Q-T Interval 336 ms    QTC Calculation (Bezet) 468 ms    Calculated P Axis 73 degrees    Calculated R Axis -95 degrees    Calculated T Axis 85 degrees    Diagnosis       Sinus tachycardia  Possible Right ventricular hypertrophy  Anterolateral infarct , age undetermined  Abnormal ECG  When compared with ECG of 21-JUL-2021 09:04,  Sinus rhythm has replaced Electronic ventricular pacemaker  Vent. rate has increased BY  47 BPM     HGB & HCT    Collection Time: 09/04/21  9:39 PM   Result Value Ref Range    HGB 6.9 (LL) 13.6 - 17.2 g/dL    HCT 22.2 (L) 41.1 - 50.3 %   TYPE & SCREEN    Collection Time: 09/05/21  1:40 AM   Result Value Ref Range    Crossmatch Expiration 09/08/2021,2359     ABO/Rh(D) B POSITIVE     Antibody screen NEG     Unit number [id number]     Blood component type RC LR     Unit division 00     Status of unit ISSUED     Crossmatch result Compatible     Unit number [id number]     Blood component type RC LR     Unit division 00     Status of unit ISSUED     Crossmatch result Compatible     Unit number [id number]     Blood component type RC LR     Unit division 00     Status of unit ALLOCATED     Crossmatch result Compatible     Unit number [id number]     Blood component type RC LR     Unit division 00     Status of unit ALLOCATED     Crossmatch result Compatible     Unit number [id number]     Blood component type RC LR     Unit division 00     Status of unit ALLOCATED     Crossmatch result Compatible    METABOLIC PANEL, COMPREHENSIVE    Collection Time: 09/05/21  1:43 AM   Result Value Ref Range    Sodium 143 136 - 145 mmol/L    Potassium 5.5 (H) 3.5 - 5.1 mmol/L    Chloride 116 (H) 98 - 107 mmol/L    CO2 23 21 - 32 mmol/L    Anion gap 4 (L) 7 - 16 mmol/L    Glucose 108 (H) 65 - 100 mg/dL    BUN 25 (H) 8 - 23 MG/DL    Creatinine 2.12 (H) 0.8 - 1.5 MG/DL    GFR est AA 39 (L) >60 ml/min/1.73m2    GFR est non-AA 32 (L) >60 ml/min/1.73m2    Calcium 7.7 (L) 8.3 - 10.4 MG/DL    Bilirubin, total 0.3 0.2 - 1.1 MG/DL    ALT (SGPT) 7 (L) 12 - 65 U/L    AST (SGOT) 14 (L) 15 - 37 U/L    Alk.  phosphatase 31 (L) 50 - 136 U/L    Protein, total 3.7 (L) 6.3 - 8.2 g/dL    Albumin 1.5 (L) 3.2 - 4.6 g/dL    Globulin 2.2 (L) 2.3 - 3.5 g/dL    A-G Ratio 0.7 (L) 1.2 - 3.5     CBC WITH AUTOMATED DIFF    Collection Time: 09/05/21  1:43 AM   Result Value Ref Range    WBC 14.3 (H) 4.3 - 11.1 K/uL    RBC 2.75 (L) 4.23 - 5.6 M/uL    HGB 7.7 (L) 13.6 - 17.2 g/dL    HCT 24.7 (L) 41.1 - 50.3 %    MCV 89.8 79.6 - 97.8 FL    MCH 28.0 26.1 - 32.9 PG    MCHC 31.2 (L) 31.4 - 35.0 g/dL    RDW 17.1 (H) 11.9 - 14.6 %    PLATELET 474 (L) 506 - 450 K/uL    MPV 10.7 9.4 - 12.3 FL    ABSOLUTE NRBC 0.28 (H) 0.0 - 0.2 K/uL    DF AUTOMATED      NEUTROPHILS 72 43 - 78 %    LYMPHOCYTES 14 13 - 44 %    MONOCYTES 12 4.0 - 12.0 %    EOSINOPHILS 1 0.5 - 7.8 %    BASOPHILS 0 0.0 - 2.0 %    IMMATURE GRANULOCYTES 2 0.0 - 5.0 %    ABS. NEUTROPHILS 10.3 (H) 1.7 - 8.2 K/UL    ABS. LYMPHOCYTES 2.0 0.5 - 4.6 K/UL    ABS. MONOCYTES 1.7 (H) 0.1 - 1.3 K/UL    ABS. EOSINOPHILS 0.1 0.0 - 0.8 K/UL    ABS. BASOPHILS 0.1 0.0 - 0.2 K/UL    ABS. IMM. GRANS. 0.2 0.0 - 0.5 K/UL   RBC, ALLOCATE    Collection Time: 09/05/21  2:45 AM   Result Value Ref Range    HISTORY CHECKED? Historical check performed    HGB & HCT    Collection Time: 09/05/21  5:57 AM   Result Value Ref Range    HGB 9.1 (L) 13.6 - 17.2 g/dL    HCT 28.7 (L) 41.1 - 50.3 %   CBC W/O DIFF    Collection Time: 09/05/21  9:09 AM   Result Value Ref Range    WBC 15.9 (H) 4.3 - 11.1 K/uL    RBC 3.05 (L) 4.23 - 5.6 M/uL    HGB 8.6 (L) 13.6 - 17.2 g/dL    HCT 26.9 (L) 41.1 - 50.3 %    MCV 88.2 79.6 - 97.8 FL    MCH 28.2 26.1 - 32.9 PG    MCHC 32.0 31.4 - 35.0 g/dL    RDW 16.2 (H) 11.9 - 14.6 %    PLATELET 860 (L) 920 - 450 K/uL    MPV 10.2 9.4 - 12.3 FL    ABSOLUTE NRBC 0.36 (H) 0.0 - 0.2 K/uL   RBC, ALLOCATE    Collection Time: 09/05/21  9:15 AM   Result Value Ref Range    HISTORY CHECKED? Historical check performed      Pt has been seen and examined by Dr. Sandra Barron. He agrees with the following assessment and plan.      Assessment/Plan:       Hemorrhagic shock ([address] (9/5/2021)  - on Levophed  - per primary       Acute blood loss anemia/GI bleed  - likely diverticular bleeding  - s/p multiple PRBC transfusions   - s/p IVF  - GI following  - IR procedure today  - General surgery consulted for possible L hemicolectomy if IR procedure unsuccessful       NICM, EF 30%  - Biotronik BiV ICD 7/9/21      Atrial flutter  - AV node ablation 7/9/21  - no OAC secondary to recurrent GI bleeds       HTN  - antihypertensive held on admission  - hypotensive on Levophed     HLD  - on statin      KAYLA on CKD  - per primary       Pre operative clearance  - pt at elevated risk for surgical invention in view of multiple co-morbidities       Thank you for requesting cardiac evaluation and allowing us to participate in the care of this patient. We will continue to follow along with you. Anup Rae PA-C  Supervising Physician: Dr Kellie García     Attending Addendum    Patient independently seen and examined by me. Agree with above note by physician extender with the following additions and exceptions:  68 y.o. male with PMH of NICM, EF 70%, chronic systolic heart failure, persistent atrial flutter (no OAC due to anemia and recurrent GI bleed) s/p AV node ablation 7/9/21, s/p Biotronik BiV ICD 7/9/2021, HTN, HLD, abdominal aortic aneurysm s/p EVAR 9/2019, COPD and chronic respiratory failure on 3L NC O2, right lung mass and chronic MAC infection, recurrent GI bleed due to AVM's, chroinc AYAN on IV iron, and prostate cancer with ongoing active bleeding and hemorrhagic shock needing emergency surgery. Key findings are:  No CP or SIM  CV- RR, tachycardic without murmur  Lungs- Clear bilaterally  Ext- no edema    Plan: active bleeding, hemorrhagic shock on pressors, IR unable to intervene       --Pt needs emergency surgery, no cardiac testing is indicated        --would place magnet over ICD during procedure       --further plan as noted above and pending clinical course     [facility] L[facility]pez Cardiology

## 2021-09-05 NOTE — H&P
Hospitalist History and Physical   Admit Date:  2021 12:22 AM   Name:  America Benavides. Age:  68 y.o. Sex:  male  :  1940   MRN:  [id number]     Presenting Complaint: No chief complaint on file. Reason(s) for Admission: Hemorrhagic shock ([facility] [address] [R57.8]  Lower GI bleed [K92.2]  Hypertension [I10]  Hyperlipidemia [E78.5]     History of Present Illness:       68years old gentleman with past medical history of chronic congestive heart failure, hypertension, dyslipidemia, chronic atrial fibrillation on anticoagulation secondary to GI bleeds in the past, status post AVN ablation, history of biventricular ICD, history of COPD, chronic hypoxic respiratory failure, abdominal aortic aneurysm status post EVAR presented to [address] emergency room with blood in stools, patient was seen by gastroenterology, admitted to ICU, hemoglobin continued to drop, patient received 1 unit of blood transfusion, nuclear scan shows evidence of lower GI bleed, patient is transferred here for IR intervention. For details please see history and physical from [address] hospitalist.            Review of Systems:  10 systems reviewed and negative except as noted in HPI. Assessment & Plan:     Principal diagnosis:    Acute GI bleed: Likely diverticular bleed, patient hemoglobin continued to drop, needing multiple transfusions, admitted to ICU, blood pressure is low and initiated on Levophed due to hemorrhagic shock. Patient will be monitored closely in ICU, interventional radiology team contacted when patient arrived, patient likely get procedure done tomorrow. Hemorrhagic shock: Currently on Levophed, patient has chronic systolic congestive heart failure, will monitor fluid status closely to avoid overload. Patient already got multiple transfusions. COPD: Not in acute exacerbation. Continue on supplemental oxygen. Dyslipidemia: Continue on statins.       Disposition/Discharge Planning: Home with family    Diet: DIET NPO  VTE ppx: SCDs. Code status: Full Code      Past Medical History:   Diagnosis Date    A-fib ([address] 2014    AAA (abdominal aortic aneurysm) without rupture ([address] 2014    3.2 on US  [facility]    Acute metabolic encephalopathy     Arthritis     Cancer ([address]     hx prostate cancer    COPD (chronic obstructive pulmonary disease) ([address] 2014    Elevated prostate specific antigen (PSA)     Glaucoma 2014    Heart disease     Heart failure ([address]     Hyperlipemia 2014    Hypertension     Hypertrophy of prostate with urinary obstruction and other lower urinary tract symptoms (LUTS)     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    OA (osteoarthritis) 2014    Peptic ulcer disease 2017    Poor historian     Prostate cancer ([address]     Pulmonary embolus ([address] 2017    Supplemental oxygen dependent 2014    Warfarin anticoagulation 2014     Past Surgical History:   Procedure Laterality Date    COLONOSCOPY N/A 2019    COLONOSCOPY/ [address] performed by Bran [facility] at [facility] ENDOSCOPY    EGD  2019         HX HEART CATHETERIZATION      VASCULAR SURGERY PROCEDURE UNLIST  2019     Bilateral common femoral artery cutdown with exposure and placement of catheter in aorta for aortogram,Endovascular repair of infrarenal abdominal aortic aneurysm with bifurcated modulated device (31 x 14 x 13 main body) via the left common femoral, right iliac extender measuring 27 x 10.       Allergies   Allergen Reactions    Statins-Hmg-Coa Reductase Inhibitors Myalgia     Muscle pain      Social History     Tobacco Use    Smoking status: Former Smoker     Packs/day: 2.00     Years: 40.00     Pack years: 80.00     Quit date: 2014     Years since quittin.1    Smokeless tobacco: Never Used    Tobacco comment: still taking Chantix    Substance Use Topics    Alcohol use: No      Family History   Problem Relation Age of Onset    Cancer Mother     Heart Disease Father       Family history reviewed and noncontributory to patient's acute condition; no relevant family history unless otherwise noted above. Immunization History   Administered Date(s) Administered    Influenza Vaccine 10/27/2015    Influenza Vaccine (Quad) PF (>6 Mo Flulaval, Fluarix, and >3 Yrs Afluria, Fluzone 36400) 10/31/2012, 10/06/2014, 10/20/2016, 09/15/2018, 09/18/2019    Influenza Vaccine PF 10/26/2017    Influenza, Quadrivalent, Adjuvanted (>65 Yrs FLUAD QUAD 78327) 10/05/2020    Pneumococcal Conjugate (PCV-13) 06/01/2016, 10/26/2017    Pneumococcal Polysaccharide (PPSV-23) 06/06/2011, 11/25/2012, 05/12/2015, 05/13/2015, 12/03/2015    TB Skin Test (PPD) Intradermal 09/14/2018, 09/09/2019, 09/15/2019, 07/10/2021, 08/31/2021    Tdap 12/03/2015     PTA Medications:  Current Outpatient Medications   Medication Instructions    albuterol (PROVENTIL HFA, VENTOLIN HFA, PROAIR HFA) 90 mcg/actuation inhaler TAKE 2 PUFFS BY MOUTH EVERY 4 HOURS AS NEEDED FOR WHEEZE    albuterol (PROVENTIL VENTOLIN) 2.5 mg, Inhalation, EVERY 6 HOURS AS NEEDED    albuterol-ipratropium (DUO-NEB) 2.5 mg-0.5 mg/3 ml nebu 3 mL, Nebulization, 3 TIMES DAILY    allopurinoL (ZYLOPRIM) 300 mg, Oral, DAILY    aspirin delayed-release 81 mg, Oral, DAILY    brimonidine (ALPHAGAN P) 0.1 % ophthalmic solution 1 Drop, Both Eyes, 2 TIMES DAILY    dexAMETHasone (DECADRON) 2 mg tablet 4 tabs po x3 days, 3 tabs po x3 days, 2 tabs po x3 days, 1 tab po x3 days, 1/2 tab po x3 days then stop.  docusate sodium (COLACE) 100 mg, Oral, DAILY    famotidine (PEPCID) 40 mg, Oral, DAILY    ferrous sulfate (IRON) 325 mg, Oral, 2 TIMES DAILY    fluticasone furoate (ARNUITY ELLIPTA) 100 mcg/actuation dsdv inhaler 1 Puff, Inhalation, DAILY, Wash mouth out with water after each dose.     guaiFENesin ER (MUCINEX) 600 mg, Oral, 2 TIMES DAILY    latanoprost (XALATAN) 0.005 % ophthalmic solution LOCATION  BOTH EYES. INSTILL ONE DROP INTO EACH EYE AT BEDTIME    lisinopriL (PRINIVIL, ZESTRIL) 10 mg, Oral, DAILY    metoprolol succinate (TOPROL-XL) 100 mg, Oral, DAILY    nitroglycerin (NITROSTAT) 0.4 mg, SubLINGual, EVERY 5 MIN AS NEEDED    OXYGEN-AIR DELIVERY SYSTEMS 2 L/min, Nasal, DAILY, nasal canula    predniSONE (DELTASONE) 20 mg tablet Take 40mg qday x3d, then 20mg qday x3d, then 10mg x2d, then stop. Take with dinner    rosuvastatin (CRESTOR) 40 mg, Oral, EVERY BEDTIME    tamsulosin (FLOMAX) 0.4 mg, Oral, DAILY       Objective:     Patient Vitals for the past 24 hrs:   Pulse Resp BP   09/05/21 0130 98 15 (!) 96/46   09/05/21 0115 (!) 101 17 (!) 103/50   09/05/21 0100 (!) 102 15 (!) 103/49   09/05/21 0045 99 15 (!) 109/48   09/05/21 0030 (!) 104 15 (!) 109/49   09/05/21 0026 (!) 103 16 (!) 97/45   09/05/21 0025 (!) 102 29 (!) 106/50          Estimated body mass index is 23.18 kg/m² as calculated from the following:    Height as of this encounter: 5' 9\" (1.753 m). Weight as of this encounter: 71.2 kg (156 lb 15.5 oz). No intake or output data in the 24 hours ending 09/05/21 0140      Physical Exam:    General:    Well nourished. No overt distress  Head:  Normocephalic, atraumatic  Eyes:  Sclerae appear normal.  Pupils equally round. HENT:  Nares appear normal, no drainage. Moist mucous membranes  Neck:  No restricted ROM. Trachea midline  CV:   RRR. No m/r/g. No JVD  Lungs:   CTAB. No wheezing, rhonchi, or rales. Appears even, unlabored  Abdomen: Bowel sounds present. Soft, nontender, nondistended. Extremities: Warm and dry. No cyanosis or clubbing. No edema. Skin:     No rashes. Normal turgor. Normal coloration  Neuro:  Cranial nerves II-XII grossly intact. Sensation intact  Psych:  Normal mood and affect.   Alert and oriented x3    Data Ordered and Personally Reviewed:    Last 24hr Labs:  Recent Results (from the past 24 hour(s))   HEMOGLOBIN    Collection Time: 09/04/21  3:15 AM   Result Value Ref Range    HGB 7.3 (L) 13.6 - 17.2 g/dL   RBC, ALLOCATE    Collection Time: 09/04/21  4:15 AM   Result Value Ref Range    HISTORY CHECKED? Historical check performed    BLOOD GAS & LYTES, ARTERIAL    Collection Time: 09/04/21  7:01 AM   Result Value Ref Range    pH (POC) 7.31 (L) 7.35 - 7.45      pCO2 (POC) 41.5 35 - 45 MMHG    pO2 (POC) 89 75 - 100 MMHG    Sodium,  136 - 145 MMOL/L    Potassium, POC 5.1 3.5 - 5.1 MMOL/L    Calcium, ionized (POC) 1.27 1.12 - 1.32 mmol/L    Glucose,  (H) 65 - 100 MG/DL    Base deficit (POC) 5.1 mmol/L    HCO3 (POC) 20.8 (L) 22 - 26 MMOL/L    CO2, POC 22 13 - 23 MMOL/L    O2 SAT 96 %    Sample source ARTERIAL      SITE RIGHT RADIAL      JUAN'S TEST NOT APPLICABLE      Device: NASAL CANNULA      Performed by [employer]     GFRAA, POC Cannot be calculated >60 ml/min/1.73m2    GFRNA, POC Cannot be calculated >60 IC/QQN/2.67X1   METABOLIC PANEL, COMPREHENSIVE    Collection Time: 09/04/21  8:09 AM   Result Value Ref Range    Sodium 144 136 - 145 mmol/L    Potassium 5.2 (H) 3.5 - 5.1 mmol/L    Chloride 115 (H) 98 - 107 mmol/L    CO2 25 21 - 32 mmol/L    Anion gap 4 (L) 7 - 16 mmol/L    Glucose 105 (H) 65 - 100 mg/dL    BUN 26 (H) 8 - 23 MG/DL    Creatinine 1.83 (H) 0.8 - 1.5 MG/DL    GFR est AA 46 (L) >60 ml/min/1.73m2    GFR est non-AA 38 (L) >60 ml/min/1.73m2    Calcium 7.9 (L) 8.3 - 10.4 MG/DL    Bilirubin, total 0.3 0.2 - 1.1 MG/DL    ALT (SGPT) 9 (L) 12 - 65 U/L    AST (SGOT) 16 15 - 37 U/L    Alk.  phosphatase 32 (L) 50 - 136 U/L    Protein, total 4.0 (L) 6.3 - 8.2 g/dL    Albumin 1.8 (L) 3.2 - 4.6 g/dL    Globulin 2.2 (L) 2.3 - 3.5 g/dL    A-G Ratio 0.8 (L) 1.2 - 3.5     LACTIC ACID    Collection Time: 09/04/21  8:09 AM   Result Value Ref Range    Lactic acid 1.1 0.4 - 2.0 MMOL/L   TROPONIN-HIGH SENSITIVITY    Collection Time: 09/04/21  8:09 AM   Result Value Ref Range    Troponin-High Sensitivity 56.6 (H) 0 - 14 pg/mL   MAGNESIUM    Collection Time: 09/04/21  8:09 AM Result Value Ref Range    Magnesium 2.0 1.8 - 2.4 mg/dL   CBC WITH AUTOMATED DIFF    Collection Time: 09/04/21  8:09 AM   Result Value Ref Range    WBC 10.5 4.3 - 11.1 K/uL    RBC 2.85 (L) 4.23 - 5.6 M/uL    HGB 7.9 (L) 13.6 - 17.2 g/dL    HCT 25.4 (L) 41.1 - 50.3 %    MCV 89.1 79.6 - 97.8 FL    MCH 27.7 26.1 - 32.9 PG    MCHC 31.1 (L) 31.4 - 35.0 g/dL    RDW 18.3 (H) 11.9 - 14.6 %    PLATELET 228 (L) 920 - 450 K/uL    MPV 10.5 9.4 - 12.3 FL    ABSOLUTE NRBC 0.17 0.0 - 0.2 K/uL    DF AUTOMATED      NEUTROPHILS 77 43 - 78 %    LYMPHOCYTES 12 (L) 13 - 44 %    MONOCYTES 8 4.0 - 12.0 %    EOSINOPHILS 1 0.5 - 7.8 %    BASOPHILS 1 0.0 - 2.0 %    IMMATURE GRANULOCYTES 2 0.0 - 5.0 %    ABS. NEUTROPHILS 8.0 1.7 - 8.2 K/UL    ABS. LYMPHOCYTES 1.3 0.5 - 4.6 K/UL    ABS. MONOCYTES 0.8 0.1 - 1.3 K/UL    ABS. EOSINOPHILS 0.1 0.0 - 0.8 K/UL    ABS. BASOPHILS 0.1 0.0 - 0.2 K/UL    ABS. IMM. GRANS. 0.2 0.0 - 0.5 K/UL   TROPONIN-HIGH SENSITIVITY    Collection Time: 09/04/21 12:08 PM   Result Value Ref Range    Troponin-High Sensitivity 60.3 (H) 0 - 14 pg/mL   TROPONIN-HIGH SENSITIVITY    Collection Time: 09/04/21  4:58 PM   Result Value Ref Range    Troponin-High Sensitivity 59.6 (H) 0 - 14 pg/mL   HEMOGLOBIN    Collection Time: 09/04/21  4:58 PM   Result Value Ref Range    HGB 7.4 (L) 13.6 - 17.2 g/dL   HGB & HCT    Collection Time: 09/04/21  9:39 PM   Result Value Ref Range    HGB 6.9 (LL) 13.6 - 17.2 g/dL    HCT 22.2 (L) 41.1 - 50.3 %       All Micro Results     None          Other Studies:  NM ACUTE GI BLEED SCAN    Result Date: 9/4/2021  Clinical history: Lower GI bleed. TECHNIQUE: Nuclear medicine GI bleed scan was performed following the administration of 25.0 mCi of technetium 99m tagged red blood cell imaging was performed for 60 minutes.  COMPARISON: August 31, 2021 FINDINGS: There is bowel activity in the region of the splenic flexure, with progressive intensity of the radiotracer on the subsequent images, suggesting active GI bleed. There is normal radiotracer activity in the vessel. 1. Acute GI bleed in the region of the splenic flexure/proximal descending colon. DC4      XR CHEST SNGL V    Result Date: 9/4/2021  Chest X-ray INDICATION: Shortness of breath A portable AP view of the chest was obtained. FINDINGS: The lungs are clear. There are no infiltrates or effusions. The heart size is normal.  Pacemaker is present. No acute findings in the chest     XR CHEST SNGL V    Result Date: 9/4/2021   Portable view of the chest COMPARISON: August 31, 2021 CLINICAL HISTORY: Central line placement. FINDINGS: There is a right IJ line, with the tip in the area of SVC/right atrium junction. No evidence of pneumothorax Left-sided cardiac pacer is stable. Cardiac mediastinal silhouette is within normal limits. There is no focal consolidation, pulmonary edema or large pleural effusion. Surrounding bones are intact. 1. Right IJ line tip is in the area of SVC/right atrium junction. Negative for pneumothorax. 2. No focal pulmonary consolidation or pulmonary edema. Signed:  Sinai Sanders MD    Part of this note may have been written by using a voice dictation software. The note has been proof read but may still contain some grammatical/other typographical errors.

## 2021-09-05 NOTE — PROGRESS NOTES
Dr. Russell Jules at bedside. Pt then assisted with bedpan and very large amount of bright, jaiden red blood with clots noted in stool. Breathing also very labored and dyspneic with minimal exertion to get on and off bedpan.

## 2021-09-05 NOTE — PROGRESS NOTES
Pt back on unit and placed on monitor. Bedside report from [facility]. VSS. Levophed infusing at 12 mcg/min. Immediately requesting bedpan with subsequent large amount of bright red bloody clots. Hgb 9.5 at 12:38 after large bloody stooling and 1 unit PRBCs given since last check at 09:09.

## 2021-09-05 NOTE — CONSULTS
H&P/Consult Note/Progress Note/Office Note:   Jax Harrington. MRN: [id number]  :1940  Age:80 y.o. General Surgery Consult ordered by: Dr. Jack Walters  Reason for General Surgery Consult: Diverticular Bleed    HPI: Jax Harrington. is a 68 y.o. male with a past medical history of CHF, HTN, Hyperlipidemia, OA, AFib/ flutter (not on [address]  recurrent GI bleeding) s/p AVN ablation and BiV ICD placement 2021, COPD and chronic hypoxemic respiratory failure, iron deficiency anemia, abdominal aortic aneurysm s/p EVAR 2019, CKD, prostate cancer, and Gastric AVM who initially presented to [facility] ED 21 with C/o dark stools x 24 hours. Pt denied pain and hx of similar problem. At that time, pt was found to be hemodynamically stable with hgb 10.5 which was above baseline. Pt was discharges with outpatient referral made to GI for further workup. Pt returned to ED 21 with C/o bright red blood per rectum. Pt states he noticed an increase in the amount of blood he was passing and now passing blood clots. Pt's hgb down from 10.5 on  to 7.7. Pt was transferred to [address] for admission by Hospitalist due to bed shortage. GI was consulted and has been following. Pt's hgb has continued to drop requiring multiple transfusions. Pt also hypotensive requiring Levophed. Nuclear scan showing evidence of lower GI bleed. Pt transferred downtown 21 for IR consultation. General Surgery consulted in case surgical intervention necessary for GI bleed. 21 Last hgb 9.1. Pt currently passing jaiden red blood with clots in stool.       Past Medical History:   Diagnosis Date    A-fib [facility]) 2014    AAA (abdominal aortic aneurysm) without rupture ([facility] 2014    3.2 on   [facility]    Acute metabolic encephalopathy 1609    Arthritis     Cancer (HCC)     hx prostate cancer    COPD (chronic obstructive pulmonary disease) ([facility] 2014    Elevated prostate specific antigen (PSA)     Glaucoma 5/2/2014    Heart disease     Heart failure ([facility]     Hyperlipemia 5/2/2014    Hypertension     Hypertrophy of prostate with urinary obstruction and other lower urinary tract symptoms (LUTS)     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[facility] 7/25/2018    OA (osteoarthritis) 5/2/2014    Peptic ulcer disease 6/1/2017    Poor historian     Prostate cancer ([facility]     Pulmonary embolus ([facility] 6/1/2017    Supplemental oxygen dependent 5/2/2014    Warfarin anticoagulation 5/2/2014     Past Surgical History:   Procedure Laterality Date    COLONOSCOPY N/A 9/12/2019    COLONOSCOPY/ 26 ROOM 614 performed by Alpa Fraga MD at [facility] ENDOSCOPY    EGD  12/18/2019         HX HEART CATHETERIZATION      VASCULAR SURGERY PROCEDURE UNLIST  09/14/2019     Bilateral common femoral artery cutdown with exposure and placement of catheter in aorta for aortogram,Endovascular repair of infrarenal abdominal aortic aneurysm with bifurcated modulated device (31 x 14 x 13 main body) via the left common femoral, right iliac extender measuring 27 x 10.      Current Facility-Administered Medications   Medication Dose Route Frequency    sodium chloride (NS) flush 5-40 mL  5-40 mL IntraVENous Q8H    sodium chloride (NS) flush 5-40 mL  5-40 mL IntraVENous PRN    ondansetron (ZOFRAN) injection 4 mg  4 mg IntraVENous Q4H PRN    albuterol (PROVENTIL VENTOLIN) nebulizer solution 2.5 mg  2.5 mg Inhalation Q6H PRN    allopurinoL (ZYLOPRIM) tablet 300 mg  300 mg Oral DAILY    brimonidine (ALPHAGAN) 0.2 % ophthalmic solution 1 Drop  1 Drop Both Eyes BID    docusate sodium (COLACE) capsule 100 mg  100 mg Oral DAILY    ferrous sulfate tablet 325 mg  1 Tablet Oral BID WITH MEALS    fluticasone (FLOVENT HFA) 110 mcg inhaler  1 Puff Inhalation DAILY    latanoprost (XALATAN) 0.005 % ophthalmic solution 1 Drop  1 Drop Both Eyes QHS    [Held by provider] lisinopriL (PRINIVIL, ZESTRIL) tablet 10 mg  10 mg Oral DAILY    metoprolol succinate (TOPROL-XL) XL tablet 100 mg  100 mg Oral DAILY    nitroglycerin (NITROSTAT) tablet 0.4 mg  0.4 mg SubLINGual Q5MIN PRN    rosuvastatin (CRESTOR) tablet 40 mg  40 mg Oral QHS    tamsulosin (FLOMAX) capsule 0.4 mg  0.4 mg Oral DAILY    0.9% sodium chloride infusion 250 mL  250 mL IntraVENous PRN    pantoprazole (PROTONIX) tablet 40 mg  40 mg Oral ACB    0.9% sodium chloride infusion 250 mL  250 mL IntraVENous PRN    0.9% sodium chloride infusion 250 mL  250 mL IntraVENous PRN    albuterol-ipratropium (DUO-NEB) 2.5 MG-0.5 MG/3 ML  3 mL Nebulization TID RT    0.9% sodium chloride infusion 250 mL  250 mL IntraVENous PRN    acetaminophen (TYLENOL) tablet 650 mg  650 mg Oral Q6H PRN    oxyCODONE IR (ROXICODONE) tablet 5 mg  5 mg Oral Q6H PRN    0.9% sodium chloride infusion  100 mL/hr IntraVENous CONTINUOUS    NOREPINephrine (LEVOPHED) 4 mg in 5% dextrose 250 mL infusion  0.5-30 mcg/min IntraVENous TITRATE    0.9% sodium chloride infusion 250 mL  250 mL IntraVENous PRN    sodium chloride (NS) flush 5-40 mL  5-40 mL IntraVENous Q8H    sodium chloride (NS) flush 5-40 mL  5-40 mL IntraVENous PRN    0.9% sodium chloride infusion 250 mL  250 mL IntraVENous PRN     Statins-hmg-coa reductase inhibitors  Social History     Socioeconomic History    Marital status: SINGLE     Spouse name: Not on file    Number of children: Not on file    Years of education: Not on file    Highest education level: Not on file   Tobacco Use    Smoking status: Former Smoker     Packs/day: 2.00     Years: 40.00     Pack years: 80.00     Quit date: 2014     Years since quittin.1    Smokeless tobacco: Never Used    Tobacco comment: still taking Chantix    Substance and Sexual Activity    Alcohol use: No    Drug use: No    Sexual activity: Yes     Partners: Female     Birth control/protection: None     Social Determinants of Health     Financial Resource Strain: Medium Risk    Difficulty of Paying Living Expenses: Somewhat hard   Food Insecurity: No Food Insecurity    Worried About Running Out of Food in the Last Year: Never true    Ran Out of Food in the Last Year: Never true   Transportation Needs: No Transportation Needs    Lack of Transportation (Medical): No    Lack of Transportation (Non-Medical): No   Physical Activity:     Days of Exercise per Week:     Minutes of Exercise per Session:    Stress:     Feeling of Stress :    Social Connections:     Frequency of Communication with Friends and Family:     Frequency of Social Gatherings with Friends and Family:     Attends Baptism Services:     Active Member of Clubs or Organizations:     Attends Club or Organization Meetings:     Marital Status:      Social History     Tobacco Use   Smoking Status Former Smoker    Packs/day: 2.00    Years: 40.00    Pack years: 80.00    Quit date: 2014    Years since quittin.1   Smokeless Tobacco Never Used   Tobacco Comment    still taking Chantix      Family History   Problem Relation Age of Onset    Cancer Mother     Heart Disease Father      ROS: No fever or chills. Comprehensive review of systems was otherwise unremarkable except as noted above. Physical Exam:   Visit Vitals  BP (!) 110/57 (BP 1 Location: Right upper arm, BP Patient Position: At rest;Supine)   Pulse 78   Temp 98.2 °F (36.8 °C)   Resp 20   Ht 5' 9\" (1.753 m)   Wt 156 lb 15.5 oz (71.2 kg)   SpO2 100%   BMI 23.18 kg/m²     Vitals:    21 0600 21 0708 21 0718 21 0722   BP:  (!) 73/47 (!) 91/45 (!) 110/57   Pulse: (!) 102 (!) 112 (!) 112 78   Resp: 20 16  20   Temp:    98.2 °F (36.8 °C)   SpO2:       Weight:       Height:         No intake/output data recorded.  1901 -  0700  In: 1301.3 [I.V.:801.3]  Out: 250 [Urine:250]    Constitutional: Alert, cooperative, NAD  Eyes: Sclera are clear. EOMs intact  ENMT: no external lesions gross hearing normal; no obvious neck masses, no ear or lip lesions, nares normal  CV: RRR.  Normal perfusion  Resp: No JVD. Breathing is  non-labored; no audible wheezing. GI: soft and non-distended, non-tender     Musculoskeletal: No embolic signs or cyanosis.    Neuro: moves all 4; no focal deficits  Psychiatric: normal affect and mood    Recent vitals (if inpt):  Patient Vitals for the past 24 hrs:   BP Temp Pulse Resp SpO2 Height Weight   09/05/21 0722 (!) 110/57 98.2 °F (36.8 °C) 78 20 -- -- --   09/05/21 0718 (!) 91/45 -- (!) 112 -- -- -- --   09/05/21 0708 (!) 73/47 -- (!) 112 16 -- -- --   09/05/21 0600 -- -- (!) 102 20 -- -- --   09/05/21 0545 (!) 95/53 -- (!) 106 20 -- -- --   09/05/21 0530 -- -- (!) 116 (!) 33 -- -- --   09/05/21 0515 (!) 110/54 -- (!) 107 (!) 36 -- -- --   09/05/21 0500 (!) 129/47 -- 100 17 -- -- --   09/05/21 0445 -- -- (!) 105 22 -- -- --   09/05/21 0430 (!) 101/49 -- (!) 105 17 -- -- --   09/05/21 0415 (!) 122/58 -- (!) 110 18 -- -- --   09/05/21 0400 -- 98.9 °F (37.2 °C) (!) 101 16 100 % -- --   09/05/21 0347 -- -- 100 14 -- -- --   09/05/21 0345 (!) 98/45 -- (!) 104 15 -- -- --   09/05/21 0332 (!) 90/43 98.9 °F (37.2 °C) 99 15 100 % -- --   09/05/21 0330 -- -- 99 17 -- -- --   09/05/21 0317 (!) 108/48 98.9 °F (37.2 °C) (!) 101 18 100 % -- --   09/05/21 0315 (!) 108/48 -- 99 14 -- -- --   09/05/21 0302 (!) 108/48 98.7 °F (37.1 °C) (!) 101 16 100 % -- --   09/05/21 0300 (!) 112/48 -- 98 21 -- -- --   09/05/21 0259 (!) 108/48 98.7 °F (37.1 °C) (!) 101 16 100 % -- --   09/05/21 0245 (!) 107/50 -- 99 14 -- -- --   09/05/21 0230 (!) 102/50 -- 96 15 -- -- --   09/05/21 0215 (!) 112/44 -- 98 14 -- -- --   09/05/21 0200 (!) 82/44 -- 99 15 -- -- --   09/05/21 0145 (!) 106/48 -- 100 20 -- -- --   09/05/21 0130 (!) 96/46 -- 98 15 -- -- --   09/05/21 0122 -- -- -- -- -- 5' 9\" (1.753 m) 156 lb 15.5 oz (71.2 kg)   09/05/21 0115 (!) 103/50 98.9 °F (37.2 °C) (!) 101 17 -- -- --   09/05/21 0100 (!) 103/49 98.9 °F (37.2 °C) (!) 102 15 100 % -- --   09/05/21 0045 (!) 109/48 -- 99 15 -- -- -- 09/05/21 0030 (!) 109/49 -- (!) 104 15 -- -- --   09/05/21 0026 (!) 97/45 -- (!) 103 16 -- -- --   09/05/21 0025 (!) 106/50 99.1 °F (37.3 °C) (!) 102 29 100 % 5' 9\" (1.753 m) 156 lb 15.5 oz (71.2 kg)       Amount and/or Complexity of Data Reviewed and Analyzed:  I reviewed and analyzed all of the unique labs and radiologic studies that are shown below as well as any that are in the HPI, and any that are in the expanded problem list below  *Each unique test, order, or document contributes to the combination of 2 or combination of 3 in Category 1 below. For this visit I also reviewed old records and prior notes. Recent Labs     09/05/21 0557 09/05/21 0143 09/05/21 0143   WBC  --   --  14.3*   HGB 9.1*   < > 7.7*   PLT  --   --  132*   NA  --   --  143   K  --   --  5.5*   CL  --   --  116*   CO2  --   --  23   BUN  --   --  25*   CREA  --   --  2.12*   GLU  --   --  108*   TBILI  --   --  0.3   ALT  --   --  7*   AP  --   --  31*    < > = values in this interval not displayed.      Review of most recent CBC  Lab Results   Component Value Date/Time    WBC 14.3 (H) 09/05/2021 01:43 AM    HGB 9.1 (L) 09/05/2021 05:57 AM    HCT 28.7 (L) 09/05/2021 05:57 AM    PLATELET 926 (L) 77/22/9036 01:43 AM    MCV 89.8 09/05/2021 01:43 AM       Review of most recent BMP  Lab Results   Component Value Date/Time    Sodium 143 09/05/2021 01:43 AM    Potassium 5.5 (H) 09/05/2021 01:43 AM    Chloride 116 (H) 09/05/2021 01:43 AM    CO2 23 09/05/2021 01:43 AM    Anion gap 4 (L) 09/05/2021 01:43 AM    Glucose 108 (H) 09/05/2021 01:43 AM    BUN 25 (H) 09/05/2021 01:43 AM    Creatinine 2.12 (H) 09/05/2021 01:43 AM    BUN/Creatinine ratio 12 06/24/2021 09:53 AM    GFR est AA 39 (L) 09/05/2021 01:43 AM    GFR est non-AA 32 (L) 09/05/2021 01:43 AM    Calcium 7.7 (L) 09/05/2021 01:43 AM       Review of most recent LFTs (and lipase if done)  Lab Results   Component Value Date/Time    ALT (SGPT) 7 (L) 09/05/2021 01:43 AM    AST (SGOT) 14 (L) 09/05/2021 01:43 AM    Alk. phosphatase 31 (L) 09/05/2021 01:43 AM    Bilirubin, total 0.3 09/05/2021 01:43 AM     No results found for: LPSE    Lab Results   Component Value Date/Time    INR 0.9 08/31/2021 12:35 AM    aPTT 73.1 (H) 08/31/2018 01:40 AM    Lactic acid 2.4 (H) 12/21/2014 08:38 PM    Ammonia 19 07/03/2021 04:16 PM    Troponin-I, Qt. <0.02 (L) 04/09/2020 10:46 AM       Review of most recent HgbA1c  Lab Results   Component Value Date/Time    Hemoglobin A1c (POC) 5.1 08/13/2019 10:10 AM       Nutritional assessment screen for wound healing issues:  Lab Results   Component Value Date/Time    Protein, total 3.7 (L) 09/05/2021 01:43 AM    Albumin 1.5 (L) 09/05/2021 01:43 AM       @lastcovr@  XR Results (most recent):  Results from Hospital Encounter encounter on 08/31/21    XR CHEST SNGL V    Narrative  Chest X-ray    INDICATION: Shortness of breath    A portable AP view of the chest was obtained. FINDINGS: The lungs are clear. There are no infiltrates or effusions. The heart  size is normal.  Pacemaker is present. Impression  No acute findings in the chest      CT Results (most recent):  Results from Hospital Encounter encounter on 07/01/21    CT HEAD WO CONT    Narrative  NONCONTRAST HEAD CT    CLINICAL HISTORY:  Decreased level of consciousness. TECHNIQUE:  Axial images were obtained with spiral technique. Radiation dose  reduction was achieved using one or all of the following techniques: automated  exposure control, weight-based dosing, iterative reconstruction. COMPARISON:  None. REPORT:   Standard noncontrast head CT demonstrates no definite intracranial  mass effect, hemorrhage, or evidence of acute geographic infarction. Extensive  white matter hypodensities are most consistent with small vessel ischemic  disease. The ventricles are normal in size and configuration, accounting for  the patient's age. Orbits  and paranasal sinuses are clear where imaged.  Bone  windows demonstrate no definite fracture or destruction. Impression  EXTENSIVE SMALL VESSEL ISCHEMIC DISEASE WITH NO ACUTE  INTRACRANIAL ABNORMALITY IDENTIFIED AT NONCONTRAST CT.    US Results (most recent):  No results found for this or any previous visit.         Admission date (for inpatients): 9/5/2021   * No surgery found *  * No surgery found *        ASSESSMENT/PLAN:  Problem List  Date Reviewed: 6/24/2021        Codes Class Noted    Hemorrhagic shock ([facility] ICD-10-CM: R57.8  ICD-9-CM: 785.59  9/5/2021        Hyperlipidemia ICD-10-CM: E78.5  ICD-9-CM: 272.4  9/5/2021        Lower GI bleed ICD-10-CM: K92.2  ICD-9-CM: 578.9  9/5/2021        KAYLA (acute kidney injury) ([facility] ICD-10-CM: N17.9  ICD-9-CM: 584.9  9/1/2021        Acute blood loss anemia ICD-10-CM: D62  ICD-9-CM: 285.1  8/31/2021        GIB (gastrointestinal bleeding) ICD-10-CM: K92.2  ICD-9-CM: 578.9  8/31/2021        Hyperkalemia ICD-10-CM: E87.5  ICD-9-CM: 276.7  7/7/2021        Acute metabolic encephalopathy XQN-65-DO: G93.41  ICD-9-CM: 348.31  7/5/2021        Chronic systolic congestive heart failure (HCC) ICD-10-CM: I50.22  ICD-9-CM: 428.22, 428.0  7/2/2021        Acute on chronic respiratory failure with hypoxia and hypercapnia (HCC) ICD-10-CM: J96.21, J96.22  ICD-9-CM: 518.84, 786.09, 799.02  6/9/2021        Diverticulosis ICD-10-CM: K57.90  ICD-9-CM: 562.10  5/27/2021    Overview Signed 5/27/2021  8:48 AM by Sunday Gonzalez MD     Formatting of this note might be different from the original.  Noted on colonoscopy 2013             Mild protein-calorie malnutrition ([facility] ICD-10-CM: E44.1  ICD-9-CM: 263.1  5/19/2021        Short-term memory loss ICD-10-CM: R41.3  ICD-9-CM: 780.93  5/18/2021        COPD exacerbation ([address] ICD-10-CM: J44.1  ICD-9-CM: 491.21  5/17/2021        Prostate cancer [facility]) ICD-10-CM: Q45  ICD-9-CM: 185  3/25/2021    Overview Signed 3/25/2021  6:12 AM by Sunday Gonzalez MD     Last Assessment & Plan:   Follows with Dr Jeannette Alas Chronic combined systolic and diastolic heart failure (HCC) ICD-10-CM: I50.42  ICD-9-CM: 428.42  12/21/2020        History of GI bleed ICD-10-CM: Z87.19  ICD-9-CM: V12.79  12/21/2020        Iron deficiency anemia ICD-10-CM: D50.9  ICD-9-CM: 280.9  12/21/2020        AAA (abdominal aortic aneurysm) ([facility] ICD-10-CM: I71.4  ICD-9-CM: 441.4  9/14/2019        Upper GI bleed ICD-10-CM: K92.2  ICD-9-CM: 578.9  9/9/2019        Chronic hypoxemic respiratory failure (HCC) (Chronic) ICD-10-CM: J96.11  ICD-9-CM: 518.83, 799.02  9/9/2019        Heart failure with reduced ejection fraction (HCC) (Chronic) ICD-10-CM: I50.20  ICD-9-CM: 428.20  9/9/2019        CKD (chronic kidney disease), stage III (HCC) (Chronic) ICD-10-CM: N18.30  ICD-9-CM: 585.3  9/9/2019        Absolute anemia ICD-10-CM: D64.9  ICD-9-CM: 285.9  4/12/2019        Multiple pulmonary nodules (Chronic) ICD-10-CM: R91.8  ICD-9-CM: 793.19  8/30/2018        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HCC) (Chronic) ICD-10-CM: J15.8, A31.9  ICD-9-CM: 482.89, 031.9  7/25/2018    Overview Signed 8/30/2018  8:54 AM by MESSI kSy followed by Dr. Albert Jerez at [facility]. In March 2018 was found with new SAMANTHA nodules with spiculation.   CT-guided biopsy of SAMANTHA nodule revealed Finn Livermore was started on ETB/azithro/rif.               Acute on chronic respiratory failure with hypoxia (HCC) ICD-10-CM: J96.21  ICD-9-CM: 518.84, 799.02  7/25/2018        Atrial flutter with rapid ventricular response [facility]) ICD-10-CM: I48.92  ICD-9-CM: 427.32  7/23/2018        History of pulmonary embolism ICD-10-CM: A66.316  ICD-9-CM: V12.55  6/22/2017        Tobacco use disorder ICD-10-CM: F17.200  ICD-9-CM: 305.1  6/1/2017        Hypertension ICD-10-CM: I10  ICD-9-CM: 401.9  6/1/2017        Cardiomyopathy ([facility] ICD-10-CM: I42.9  ICD-9-CM: 425.4  6/1/2017        Peptic ulcer disease ICD-10-CM: K27.9  ICD-9-CM: 533.90  6/1/2017        Nontoxic multinodular goiter ICD-10-CM: R18.3  ICD-9-CM: 241.1  5/19/2017 Overview Signed 5/27/2021  8:48 AM by Arabella Tyler MD     Last Assessment & Plan:   Formatting of this note might be different from the original.  Thyroid ultrasound was done today. This study was difficult due to his chronic shortness of breath while lying supine. He was somewhat of a moving target. 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 The right lobe measures 1.90 x 2.38 x 3.55 cm. The isthmus is thickened and irregular. The left lobe measures 2.46 x 1.56 x 4.59 cm. There are numerous scattered cystic or partially cystic nodules throughout both lobes the thyroid gland. Essentially there is no normal thyroid tissue. The largest nodule that I couldn't visualize is in the right lobe measuring 0.84 x 1.29 x 1.29 cm. It is partially cystic and surrounded by a halo sign. There is a similar-sized purely cystic nodule in the left lobe also. He appears to have long-standing multinodular/multicystic disease. I don't recommend anything further be done for this other than observation. I don't feel that he needs to have biopsy of any these lesions as they appear to be low risk for malignancy. Because of his rather significant COPD he is at high risk for any surgery such as thyroidectomy and I would only recommend moving to this as a last resort and only if absolutely necessary. I will see him again in 6 months, and then annually thereafter at least for a while to watch his thyroid gland.              Vitamin D deficiency ICD-10-CM: E55.9  ICD-9-CM: 268.9  2/2/2016    Overview Signed 5/27/2021  8:48 AM by Arabella Tyler MD     Last Assessment & Plan:   Formatting of this note might be different from the original.  Vitamin D at goal no changes             Renal cysts, acquired, bilateral ICD-10-CM: N28.1  ICD-9-CM: 593.2  5/18/2015        AAA (abdominal aortic aneurysm) without rupture (HCC) (Chronic) ICD-10-CM: I71.4  ICD-9-CM: 441.4  5/2/2014    Overview Signed 5/2/2014 10:53 AM by Arjun Bennett WHIT, NP     3.2 on US 2/14 [facility]             COPD (chronic obstructive pulmonary disease) ([facility] (Chronic) ICD-10-CM: J44.9  ICD-9-CM: 021  5/2/2014        Hyperlipemia (Chronic) ICD-10-CM: E78.5  ICD-9-CM: 272.4  5/2/2014        Glaucoma (Chronic) ICD-10-CM: H40.9  ICD-9-CM: 365.9  5/2/2014        Supplemental oxygen dependent (Chronic) ICD-10-CM: Z99.81  ICD-9-CM: V46.2  5/2/2014        OA (osteoarthritis) (Chronic) ICD-10-CM: M19.90  ICD-9-CM: 715.90  5/2/2014            Active Problems:    Hypertension (6/1/2017)      Hemorrhagic shock ([facility] (9/5/2021)      Hyperlipidemia (9/5/2021)      Lower GI bleed (9/5/2021)           Number and Complexity of Problems addressed and   Risks of complications and/or morbidity of management  Plan:  Care Management per Hospitalist  GI Following  IR Consulted  General Surgery will follow  --Serial Exams  --Follow hgb  --Stat Cardiology consult for evaluation in case surgical intervention necessary. Tamie Don NP      I have seen and examined the patient with the Nurse Practitioner and agree with the above assessment and plan. Diverticular bleeding around splenic flexure, several large bloody BM overnight. He has very complicated medical issues, especially cardiovascular risks. On exam he is awake, alert, tacky, on levophad with 's  Lung clear, abd soft. Hb 9    Persistent GI bleeding with significant surgical risks. IR has planned embolization. Surgery will be last resort. Urgent cardio eval just in case. Discussed care with GI last night.      Rafaela Mora MD

## 2021-09-05 NOTE — CONSULTS
LEAPFROG PROTOCOL NOTE    Bradley Numbers.  9/5/2021    The patient is currently in the critical care setting managed by Dr. Sarah Torres with GIB. He is going to IR for intervention. He is presently on Levophed. We are on standby if needed after intervention and blood transfusion. The patient's chart is reviewed and the patient is discussed with the staff. Please notify us if can be of assistance. No charge billed to the patient. Thank you.     EDGAR Tristan

## 2021-09-05 NOTE — PROGRESS NOTES
INITIAL SPIRITUAL ASSESSMENT     NOTED:    NO CATY PREFERENCE    RELATIVE -  KADEN    LIVES LOCALLY    5TH ADMIT PAST YEAR    CHRONIC GLAUCOMA    SHORT TERM MEMORY LOSS    HIGH RISK FOR DECLINE OR READMISSION            WILL ASSESS HOW WE CAN BEST SERVE THIS FAMILY

## 2021-09-05 NOTE — PROGRESS NOTES
GI    Pt transferred last night. Currently off the floor in IR for possible embolization. If unsuccessful and pt continues to bleed, will need surgery (L hemicolectomy). BP lower now requiring levo.      Cory Jean MD

## 2021-09-05 NOTE — PROGRESS NOTES
Bedside report to Deng Jara RN. Pt to IR. Keystone Flap Text: The defect edges were debeveled with a #15 scalpel blade.  Given the location of the defect, shape of the defect a keystone flap was deemed most appropriate.  Using a sterile surgical marker, an appropriate keystone flap was drawn incorporating the defect, outlining the appropriate donor tissue and placing the expected incisions within the relaxed skin tension lines where possible. The area thus outlined was incised deep to adipose tissue with a #15 scalpel blade.  The skin margins were undermined to an appropriate distance in all directions around the primary defect and laterally outward around the flap utilizing iris scissors.

## 2021-09-05 NOTE — ANESTHESIA PROCEDURE NOTES
Arterial Line Placement    Start time: 9/5/2021 6:17 PM  End time: 9/5/2021 6:20 PM  Performed by: Omaira Aragon MD  Authorized by: Omaira Argaon MD     Pre-Procedure  Indications:  Arterial pressure monitoring  Preanesthetic Checklist: patient identified, risks and benefits discussed, anesthesia consent, site marked, patient being monitored, timeout performed and patient being monitored    Timeout Time: 18:16 EDT        Procedure:   Prep:  Chlorhexidine  Seldinger Technique?: Yes    Orientation:  Right  Location:  Radial artery  Catheter size:  20 G  Number of attempts:  1    Assessment:   Post-procedure:  Line secured and sterile dressing applied  Patient Tolerance:  Patient tolerated the procedure well with no immediate complications

## 2021-09-05 NOTE — PROGRESS NOTES
Hospitalist Progress Note   Admit Date:  2021 12:22 AM   Name:  Malia Amezquita. Age:  68 y.o. Sex:  male  :  1940   MRN:  [id number]     Presenting Complaint: No chief complaint on file. Reason(s) for Admission: Hemorrhagic shock ([facility] [address] [R57.8]  Lower GI bleed [K92.2]  Hypertension [I10]  Hyperlipidemia [E78.5]     Hospital Course & Interval History:      Pt is a 68year-old M with past medical history of chronic congestive heart failure (most recent EF 30-35%), hypertension, dyslipidemia, chronic atrial fibrillation on anticoagulation secondary to GI bleeds in the past, status post AVN ablation, history of biventricular ICD, history of COPD, chronic hypoxic respiratory failure, abdominal aortic aneurysm status post EVAR presented to [address] emergency room with blood in stools, patient was seen by gastroenterology, admitted to ICU, hemoglobin continued to drop, patient received 1 unit of blood transfusion, nuclear scan shows evidence of lower GI bleed, patient is transferred to [facility] on  for IR intervention. Subjective (21):  Pt seen and examined after large BM of BRBPR with clots. Pt became hypotensive. Currently in bed, states he feels \"woozy. \" Attempted to lower HOB and pt with increased WOB. Is going urgently to IR. Denies pain. Assessment & Plan:     Principal Problem:    Hemorrhagic shock ([facility] [address] - another unit stat PRBCs started as patient transferred to IR for procedure. Started on Levophed as well for hypotension due to HFrEF and attempting to avoid additional fluids    Active Problems:    Lower GI bleed - to IR for hopeful embolization. GI and surgery following. Cont to trend H/H with transfusions. Suspect diverticular bleed. Hypertension - holding antihypertensive due to hypotension/shock      Chronic hypoxemic respiratory failure - had increased demand due to active bleeding this morning, increasing O2 requirement.  Cont to monitor on NC and wean to baseline as tolerated. Cardiology consulted by general surgery for possible surgical intervention. Hyperkalemia - repeat level this afternoon. Hold lisinopril. Acute blood loss anemia due to LGIB- transfused at [facility] and here. Cont to monitor H/H. Hyperlipidemia - statin therapy as tolerated      Acute on chronic renal insufficiency - suspect from hypovolemia, shock. Monitor BMP. Dispo/Discharge Planning:      Pt is medically unstable for discharge at this time. Further dispo pending IR procedure. Diet:  DIET NPO  DVT PPx: SCDs.  Pt is actively bleeding  Code status: Full Code    Care d/w Dr. Ramana Durant Problems    Diagnosis Date Noted    Hemorrhagic shock ([address] 09/05/2021    Hyperlipidemia 09/05/2021    Lower GI bleed 09/05/2021    Hypertension 06/01/2017       Objective:     Patient Vitals for the past 24 hrs:   Temp Pulse Resp BP SpO2   09/05/21 0841 -- (!) 123 18 (!) 79/56 --   09/05/21 0838 -- (!) 127 (!) 34 (!) 74/58 --   09/05/21 0822 -- (!) 113 16 (!) 126/58 --   09/05/21 0808 -- (!) 123 20 (!) 107/56 --   09/05/21 0753 -- (!) 106 22 (!) 140/56 --   09/05/21 0737 -- (!) 106 21 (!) 113/55 --   09/05/21 0722 98.2 °F (36.8 °C) 78 20 (!) 110/57 --   09/05/21 0718 -- (!) 112 -- (!) 91/45 --   09/05/21 0708 -- (!) 112 16 (!) 73/47 --   09/05/21 0600 -- (!) 102 20 -- --   09/05/21 0545 -- (!) 106 20 (!) 95/53 --   09/05/21 0530 -- (!) 116 (!) 33 -- --   09/05/21 0515 -- (!) 107 (!) 36 (!) 110/54 --   09/05/21 0500 -- 100 17 (!) 129/47 --   09/05/21 0445 -- (!) 105 22 -- --   09/05/21 0430 -- (!) 105 17 (!) 101/49 --   09/05/21 0415 -- (!) 110 18 (!) 122/58 --   09/05/21 0400 98.9 °F (37.2 °C) (!) 101 16 -- 100 %   09/05/21 0347 -- 100 14 -- --   09/05/21 0345 -- (!) 104 15 (!) 98/45 --   09/05/21 0332 98.9 °F (37.2 °C) 99 15 (!) 90/43 100 %   09/05/21 0330 -- 99 17 -- --   09/05/21 0317 98.9 °F (37.2 °C) (!) 101 18 (!) 108/48 100 %   09/05/21 0315 -- 99 14 (!) 108/48 -- 09/05/21 0302 98.7 °F (37.1 °C) (!) 101 16 (!) 108/48 100 %   09/05/21 0300 -- 98 21 (!) 112/48 --   09/05/21 0259 98.7 °F (37.1 °C) (!) 101 16 (!) 108/48 100 %   09/05/21 0245 -- 99 14 (!) 107/50 --   09/05/21 0230 -- 96 15 (!) 102/50 --   09/05/21 0215 -- 98 14 (!) 112/44 --   09/05/21 0200 -- 99 15 (!) 82/44 --   09/05/21 0145 -- 100 20 (!) 106/48 --   09/05/21 0130 -- 98 15 (!) 96/46 --   09/05/21 0115 98.9 °F (37.2 °C) (!) 101 17 (!) 103/50 --   09/05/21 0100 98.9 °F (37.2 °C) (!) 102 15 (!) 103/49 100 %   09/05/21 0045 -- 99 15 (!) 109/48 --   09/05/21 0030 -- (!) 104 15 (!) 109/49 --   09/05/21 0026 -- (!) 103 16 (!) 97/45 --   09/05/21 0025 99.1 °F (37.3 °C) (!) 102 29 (!) 106/50 100 %     Oxygen Therapy  O2 Sat (%): 100 % (09/05/21 0400)  Pulse via Oximetry: 102 beats per minute (09/05/21 0025)  O2 Device: Hi flow nasal cannula (09/05/21 0722)  Skin Assessment: Clean, dry, & intact (09/05/21 0025)  Skin Protection for O2 Device: N/A (09/05/21 0025)  O2 Flow Rate (L/min): 3 l/min (weaned from 5L) (09/05/21 2131)    Estimated body mass index is 23.18 kg/m² as calculated from the following:    Height as of this encounter: 5' 9\" (1.753 m). Weight as of this encounter: 71.2 kg (156 lb 15.5 oz). Intake/Output Summary (Last 24 hours) at 9/5/2021 1624  Last data filed at 9/5/2021 0600  Gross per 24 hour   Intake 1301.25 ml   Output 250 ml   Net 1051.25 ml         Physical Exam:     General:    Appears chronically ill, very weak  Head:  Normocephalic, atraumatic  Eyes:  Sclerae appear normal.  Pupils equally round. +Pale conjunctiva  ENT:  Nares appear normal, no drainage. Moist oral mucosa  Neck:  No restricted ROM. Trachea midline   CV:   Tachycardic with PVCs. No m/r/g. No jugular venous distension. Lungs:   Poor inspiratory effort. CTAB anterior chest. Decreased bibasilar breath sounds  Abdomen: Bowel sounds present. Soft, nontender, nondistended. Extremities: No cyanosis or clubbing.   No edema  Skin:     No rashes. Warm and dry. Appears pale  Neuro:  Cranial nerves II-XII grossly intact.    Sensation intact  Psych:   Alert and oriented x3    I have reviewed ordered lab tests and independently visualized imaging below:    Last 24hr Labs:  Recent Results (from the past 24 hour(s))   TROPONIN-HIGH SENSITIVITY    Collection Time: 09/04/21 12:08 PM   Result Value Ref Range    Troponin-High Sensitivity 60.3 (H) 0 - 14 pg/mL   TROPONIN-HIGH SENSITIVITY    Collection Time: 09/04/21  4:58 PM   Result Value Ref Range    Troponin-High Sensitivity 59.6 (H) 0 - 14 pg/mL   HEMOGLOBIN    Collection Time: 09/04/21  4:58 PM   Result Value Ref Range    HGB 7.4 (L) 13.6 - 17.2 g/dL   HGB & HCT    Collection Time: 09/04/21  9:39 PM   Result Value Ref Range    HGB 6.9 (LL) 13.6 - 17.2 g/dL    HCT 22.2 (L) 41.1 - 50.3 %   TYPE & SCREEN    Collection Time: 09/05/21  1:40 AM   Result Value Ref Range    Crossmatch Expiration 09/08/2021,2359     ABO/Rh(D) B POSITIVE     Antibody screen NEG     Unit number [id number]     Blood component type [facility]     Unit division 00     Status of unit ISSUED     Crossmatch result Compatible     Unit number [id number]     Blood component type [facility]     Unit division 00     Status of unit ALLOCATED     Crossmatch result Compatible     Unit number [id number]     Blood component type [facility]     Unit division 00     Status of unit ALLOCATED     Crossmatch result Compatible    METABOLIC PANEL, COMPREHENSIVE    Collection Time: 09/05/21  1:43 AM   Result Value Ref Range    Sodium 143 136 - 145 mmol/L    Potassium 5.5 (H) 3.5 - 5.1 mmol/L    Chloride 116 (H) 98 - 107 mmol/L    CO2 23 21 - 32 mmol/L    Anion gap 4 (L) 7 - 16 mmol/L    Glucose 108 (H) 65 - 100 mg/dL    BUN 25 (H) 8 - 23 MG/DL    Creatinine 2.12 (H) 0.8 - 1.5 MG/DL    GFR est AA 39 (L) >60 ml/min/1.73m2    GFR est non-AA 32 (L) >60 ml/min/1.73m2    Calcium 7.7 (L) 8.3 - 10.4 MG/DL    Bilirubin, total 0.3 0.2 - 1.1 MG/DL ALT (SGPT) 7 (L) 12 - 65 U/L    AST (SGOT) 14 (L) 15 - 37 U/L    Alk. phosphatase 31 (L) 50 - 136 U/L    Protein, total 3.7 (L) 6.3 - 8.2 g/dL    Albumin 1.5 (L) 3.2 - 4.6 g/dL    Globulin 2.2 (L) 2.3 - 3.5 g/dL    A-G Ratio 0.7 (L) 1.2 - 3.5     CBC WITH AUTOMATED DIFF    Collection Time: 09/05/21  1:43 AM   Result Value Ref Range    WBC 14.3 (H) 4.3 - 11.1 K/uL    RBC 2.75 (L) 4.23 - 5.6 M/uL    HGB 7.7 (L) 13.6 - 17.2 g/dL    HCT 24.7 (L) 41.1 - 50.3 %    MCV 89.8 79.6 - 97.8 FL    MCH 28.0 26.1 - 32.9 PG    MCHC 31.2 (L) 31.4 - 35.0 g/dL    RDW 17.1 (H) 11.9 - 14.6 %    PLATELET 311 (L) 996 - 450 K/uL    MPV 10.7 9.4 - 12.3 FL    ABSOLUTE NRBC 0.28 (H) 0.0 - 0.2 K/uL    DF AUTOMATED      NEUTROPHILS 72 43 - 78 %    LYMPHOCYTES 14 13 - 44 %    MONOCYTES 12 4.0 - 12.0 %    EOSINOPHILS 1 0.5 - 7.8 %    BASOPHILS 0 0.0 - 2.0 %    IMMATURE GRANULOCYTES 2 0.0 - 5.0 %    ABS. NEUTROPHILS 10.3 (H) 1.7 - 8.2 K/UL    ABS. LYMPHOCYTES 2.0 0.5 - 4.6 K/UL    ABS. MONOCYTES 1.7 (H) 0.1 - 1.3 K/UL    ABS. EOSINOPHILS 0.1 0.0 - 0.8 K/UL    ABS. BASOPHILS 0.1 0.0 - 0.2 K/UL    ABS. IMM. GRANS. 0.2 0.0 - 0.5 K/UL   RBC, ALLOCATE    Collection Time: 09/05/21  2:45 AM   Result Value Ref Range    HISTORY CHECKED? Historical check performed    HGB & HCT    Collection Time: 09/05/21  5:57 AM   Result Value Ref Range    HGB 9.1 (L) 13.6 - 17.2 g/dL    HCT 28.7 (L) 41.1 - 50.3 %       All Micro Results     None          Other Studies:  NM ACUTE GI BLEED SCAN    Result Date: 9/4/2021  Clinical history: Lower GI bleed. TECHNIQUE: Nuclear medicine GI bleed scan was performed following the administration of 25.0 mCi of technetium 99m tagged red blood cell imaging was performed for 60 minutes. COMPARISON: August 31, 2021 FINDINGS: There is bowel activity in the region of the splenic flexure, with progressive intensity of the radiotracer on the subsequent images, suggesting active GI bleed. There is normal radiotracer activity in the vessel. 1. Acute GI bleed in the region of the splenic flexure/proximal descending colon. DC4      XR CHEST SNGL V    Result Date: 9/4/2021  Chest X-ray INDICATION: Shortness of breath A portable AP view of the chest was obtained. FINDINGS: The lungs are clear. There are no infiltrates or effusions. The heart size is normal.  Pacemaker is present.       No acute findings in the chest       Current Meds:  Current Facility-Administered Medications   Medication Dose Route Frequency    sodium chloride (NS) flush 5-40 mL  5-40 mL IntraVENous Q8H    sodium chloride (NS) flush 5-40 mL  5-40 mL IntraVENous PRN    ondansetron (ZOFRAN) injection 4 mg  4 mg IntraVENous Q4H PRN    albuterol (PROVENTIL VENTOLIN) nebulizer solution 2.5 mg  2.5 mg Inhalation Q6H PRN    allopurinoL (ZYLOPRIM) tablet 300 mg  300 mg Oral DAILY    brimonidine (ALPHAGAN) 0.2 % ophthalmic solution 1 Drop  1 Drop Both Eyes BID    docusate sodium (COLACE) capsule 100 mg  100 mg Oral DAILY    ferrous sulfate tablet 325 mg  1 Tablet Oral BID WITH MEALS    fluticasone (FLOVENT HFA) 110 mcg inhaler  1 Puff Inhalation DAILY    latanoprost (XALATAN) 0.005 % ophthalmic solution 1 Drop  1 Drop Both Eyes QHS    [Held by provider] lisinopriL (PRINIVIL, ZESTRIL) tablet 10 mg  10 mg Oral DAILY    metoprolol succinate (TOPROL-XL) XL tablet 100 mg  100 mg Oral DAILY    nitroglycerin (NITROSTAT) tablet 0.4 mg  0.4 mg SubLINGual Q5MIN PRN    rosuvastatin (CRESTOR) tablet 40 mg  40 mg Oral QHS    tamsulosin (FLOMAX) capsule 0.4 mg  0.4 mg Oral DAILY    0.9% sodium chloride infusion 250 mL  250 mL IntraVENous PRN    pantoprazole (PROTONIX) tablet 40 mg  40 mg Oral ACB    0.9% sodium chloride infusion 250 mL  250 mL IntraVENous PRN    0.9% sodium chloride infusion 250 mL  250 mL IntraVENous PRN    albuterol-ipratropium (DUO-NEB) 2.5 MG-0.5 MG/3 ML  3 mL Nebulization TID RT    0.9% sodium chloride infusion 250 mL  250 mL IntraVENous PRN    acetaminophen (TYLENOL) tablet 650 mg  650 mg Oral Q6H PRN    oxyCODONE IR (ROXICODONE) tablet 5 mg  5 mg Oral Q6H PRN    0.9% sodium chloride infusion  100 mL/hr IntraVENous CONTINUOUS    NOREPINephrine (LEVOPHED) 4 mg in 5% dextrose 250 mL infusion  0.5-30 mcg/min IntraVENous TITRATE    0.9% sodium chloride infusion 250 mL  250 mL IntraVENous PRN    sodium chloride (NS) flush 5-40 mL  5-40 mL IntraVENous Q8H    sodium chloride (NS) flush 5-40 mL  5-40 mL IntraVENous PRN    0.9% sodium chloride infusion 250 mL  250 mL IntraVENous PRN    0.9% sodium chloride infusion 500 mL  500 mL IntraVENous CONTINUOUS    iopamidoL (ISOVUE 300) 61 % contrast injection 2-200 mL  2-200 mL IntraCATHeter ONCE    heparin (PF) 2 units/ml in NS infusion 2,000-16,000 Units  1,000-8,000 mL Irrigation CONTINUOUS    lidocaine (XYLOCAINE) 20 mg/mL (2 %) injection  mg   mg IntraDERMal Rad Multiple    fentaNYL citrate (PF) injection  mcg   mcg IntraVENous Rad Multiple    midazolam (VERSED) injection 0.5-2 mg  0.5-2 mg IntraVENous Rad Multiple    0.9% sodium chloride infusion 250 mL  250 mL IntraVENous PRN       Signed:  EDGAR Bojorquez d/w with Dr. Ethelyn Klinefelter

## 2021-09-05 NOTE — PROGRESS NOTES
MD Dr. Pato Wilkes notified of patient arrival to [facility] - he states to contact him ASAP if the patient becomes unstable otherwise they will proceed with his surgery in the morning.

## 2021-09-05 NOTE — PROGRESS NOTES
2140: Verbal report received from [address] [place], [place] [address], RN(name) on [employer]. being received from ICU ES (unit) for routine progression of care. Report consisted of patients Situation, Background, Assessment and   Recommendations(SBAR). Information from the following report(s) SBAR, Kardex, OR Summary, Procedure Summary, Intake/Output, MAR, Recent Results, Cardiac Rhythm ST and Alarm Parameters  was reviewed with the receiving nurse. Opportunity for questions and clarification was provided. 2225: Received callback from [facility] for [address], RN stating that they are not able to hang the patients blood prior to transfer. Patient with active GI bleed and HGB 6.9. Faisal Pompa notified.

## 2021-09-05 NOTE — PROGRESS NOTES
Patient being transferred to [facility] room 531 for continuance of care. Blu Pyle arrived to  patient. Patient has IVF, levo (11mcg) and blood infusing.

## 2021-09-05 NOTE — PROCEDURES
Department of Interventional Radiology  [phone]        Interventional Radiology Brief Procedure Note    Patient: Marjan Hernandez MRN: [id number]  SSN: [id number]    YOB: 1940  Age: 68 y.o. Sex: male      Date of Procedure: 9/5/2021    Pre-Procedure Diagnosis: Acute GI hemorrhage    Post-Procedure Diagnosis: SAME    Procedure(s): Angiography of the SMA    Brief Description of Procedure: Note made of prior EVAR. Angiography of the SMA reveals no active arterial hemorrhage. The area of concern near the splenic flexure is perfused by an arc of Riolan collateral from the SMA as the DUANE origin is occluded. Attempts to become superselective with the microcatheter in this collateral vessel were unsuccessful due to vessel tortuosity and poor catheter push-ability. Embolization may be hazardous in this patient with limited bowel perfusion due to DUANE occlusion. Right CFA access. Mynx closure.     Performed By: Cindy Arvizu MD     Assistants: None    Anesthesia:Moderate Sedation    Estimated Blood Loss: Less than 10ml    Specimens:  None    Implants:  None    Findings: As above    Complications: None    Signed By: Cindy Arvizu MD     September 5, 2021

## 2021-09-05 NOTE — ANESTHESIA PREPROCEDURE EVALUATION
Anesthetic History   No history of anesthetic complications            Review of Systems / Medical History  Patient summary reviewed and pertinent labs reviewed    Pulmonary    COPD (O2 dependent): severe      Smoker (quit 5 years)         Neuro/Psych   Within defined limits           Cardiovascular    Hypertension: well controlled      CHF (EF 30%): NYHA Classification III  Dysrhythmias : atrial flutter and atrial fibrillation  Pacemaker and PAD (4.3 cm AAA)    Exercise tolerance: <4 METS     GI/Hepatic/Renal         Renal disease: CRI and ARF      Comments: GIB Endo/Other        Arthritis, cancer (prostate) and anemia     Other Findings   Comments: Glaucoma         Physical Exam    Airway  Mallampati: II  TM Distance: 4 - 6 cm  Neck ROM: normal range of motion   Mouth opening: Normal     Cardiovascular  Regular rate and rhythm,  S1 and S2 normal,  no murmur, click, rub, or gallop  Rhythm: regular  Rate: normal         Dental    Dentition: Edentulous, Full lower dentures and Full upper dentures     Pulmonary                 Abdominal         Other Findings            Anesthetic Plan    ASA: 5, emergent  Anesthesia type: general    Monitoring Plan: Arterial line    Post procedure ventilation   Induction: Intravenous  Anesthetic plan and risks discussed with: Patient      Patient very lethargic. HFrEF, severe COPD, anemic, on pressors. High risk.   Magnet for ICD

## 2021-09-06 NOTE — PROGRESS NOTES
Orders received to extubate per Dr. Samantha Campbell. Respiratory Mechanics completed and are as follows:  Weaning Parameters  Spontaneous Breathing Trial Complete: Yes  Resp Rate Observed: 17  Ve: 9.6  VT: 509  RSBI: 33  Baig Agitation Sedation Scale (RASS): Alert and calm  Patient extubated to a 4L NC. Patient is able to communicate and is negative for stridor. Breath sounds are diminished. No complications with extubation.      Shane Hess, RT

## 2021-09-06 NOTE — PROGRESS NOTES
Bedside, Verbal and Written shift change report given to SAULO Dixon (oncoming nurse) by Jessica Streeter RN (offgoing nurse). Report included the following information SBAR, Kardex, ED Summary, OR Summary, Procedure Summary, Intake/Output, MAR, Recent Results, Med Rec Status, Cardiac Rhythm V paced/A.fib, Alarm Parameters  and Quality Measures.

## 2021-09-06 NOTE — BRIEF OP NOTE
Brief Postoperative Note    Patient: Bradley Cosby. YOB: 1940  MRN: [id number]    Date of Procedure: 9/5/2021     Pre-Op Diagnosis: gi bleed, multiorgan failures    Post-Op Diagnosis: Same as preoperative diagnosis. Procedure(s):  LAPAROTOMY EXPLORATORY EXTENDED LEFT HEMICOLECTOMY WITH COLOSTOMY  Mobilization of splenic flexure    Surgeon(s): Jennifer Torres MD    Surgical Assistant: None    Anesthesia: General     Estimated Blood Loss (mL): 798     Complications: None    Specimens:   ID Type Source Tests Collected by Time Destination   1 : extended left hemicolectomy Fresh Abdomen  Jennifer Torres MD 9/5/2021 1939 Pathology        Implants: * No implants in log *    Drains:   [place] Danger #1 09/05/21 Abdomen (Active)       Findings: 1 root of mesentery adhesions;      Electronically Signed by Rubén Ford MD on 9/5/2021 at 8:43 PM

## 2021-09-06 NOTE — PROGRESS NOTES
[facility] Critical Care Note[de-identified] 9/6/2021  Ozzie Hannon. Admission Date: 9/5/2021     Length of Stay: 1 days    Background: 68 y.o. y/o male with lower GI bleed and multiple medical problems now post colectomy in PACU and still on vent. Notable PMH:  has a past medical history of A-fib ([address] (5/2/2014), AAA (abdominal aortic aneurysm) without rupture ([address] (6/1/7965), Acute metabolic encephalopathy (0/9/6854), Arthritis, Cancer ([address] COPD (chronic obstructive pulmonary disease) ([address] (5/2/2014), Elevated prostate specific antigen (PSA), Glaucoma (5/2/2014), Heart disease, Heart failure ([address] Hyperlipemia (5/2/2014), Hypertension, Hypertrophy of prostate with urinary obstruction and other lower urinary tract symptoms (LUTS), 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[address] (7/25/2018), OA (osteoarthritis) (5/2/2014), Peptic ulcer disease (6/1/2017), Poor historian, Prostate cancer ([address] Pulmonary embolus ([address] (6/1/2017), Supplemental oxygen dependent (5/2/2014), and Warfarin anticoagulation (5/2/2014). He also has no past medical history of Autoimmune disease ([address] Dementia, Liver disease, Other ill-defined conditions(799.89), Psychiatric disorder, Seizures ([address] or Sleep disorder. 24 Hour events: Post hemicolectomy yesterday after IR felt circulation to colon inadequate for embolization. Remains on vent and Dipesh/Levophed. Marked drop in BP with narcotics and dilaudid changed to fentanyl. Has been acidotic and hypotensive. Getting HCO3 and fluid bolus. WBC up and platelets down. UOP poor since coming to PACU with only 100 ml out and only 5 ml over past 1 hour. ROS: unable to obtain/negative except as listed elsewhere.      Lines: (insertion date)   ETT: (9/5)  Harper: (9/5)  OGT: (9/5)  Central line: (9/4)  Arterial Line (9/5)    Drips: current dose (range)  Phenylephrine Dose (mcg/min): 45 mcg/min  Levophed Dose (mcg/kg/min): *30 mcg/min     Pertinent Exam:         Blood pressure (!) 74/49, pulse (!) 120, temperature 99.3 °F (37.4 °C), resp. rate 21, height 5' 9\" (1.753 m), weight 176 lb 2.4 oz (79.9 kg), SpO2 100 %. Intake/Output Summary (Last 24 hours) at 9/6/2021 0405  Last data filed at 9/6/2021 0330  Gross per 24 hour   Intake 3340.63 ml   Output 845 ml   Net 2495.63 ml     Constitutional:  intubated and mechanically ventilated. EENMT:  Sclera clear, pupils equal, oral mucosa moist  Respiratory: decreased BS   Cardiovascular:  Tachy  Gastrointestinal:  soft with no tenderness; positive bowel sounds present  Musculoskeletal:  warm with no cyanosis, no lower extremity edema  Skin:  no jaundice or ecchymosis  Neurologic: arouses a little  Psychiatric: calm    CXR:     9/6, 5:        Recent Labs     09/06/21 0220 09/05/21 2136 09/05/21 1238 09/05/21  0909 09/05/21  0909 09/04/21  1658 09/04/21  0809   WBC 22.2* 15.8*  --   --  15.9*   < > 10.5   HGB 9.5* 11.2* 9.5*   < > 8.6*   < > 7.9*   HCT 28.9* 34.5*  --   --  26.9*   < > 25.4*   PLT 67* 78*  --   --  113*   < > 148*   LAC  --   --   --   --   --   --  1.1    < > = values in this interval not displayed. Recent Labs     09/06/21 0220 09/05/21 2136 09/05/21 1238 09/05/21  0143 09/05/21  0143 09/04/21  0809 09/04/21  0809   * 143  --   --  143   < > 144   K 4.5 5.4* 5.7*   < > 5.5*   < > 5.2*   * 119*  --   --  116*   < > 115*   CO2 19* 17*  --   --  23   < > 25   * 147*  --   --  108*   < > 105*   BUN 25* 27*  --   --  25*   < > 26*   CREA 2.28* 2.32*  --   --  2.12*   < > 1.83*   MG  --   --   --   --   --   --  2.0   CA 6.5* 7.9*  --   --  7.7*   < > 7.9*   ALB  --   --   --   --  1.5*  --  1.8*   AST  --   --   --   --  14*  --  16   ALT  --   --   --   --  7*  --  9*   AP  --   --   --   --  31*  --  32*    < > = values in this interval not displayed.      Recent Labs     09/04/21  1658 09/04/21  1208 09/04/21  0809   LAC  --   --  1.1   TROPHS 59.6* 60.3* 56.6*     Recent Labs     09/04/21  0701   GLUCPOC 118* ECHO: Results from Hospital Encounter encounter on 06/09/21    ECHO ADULT COMPLETE    Interpretation Summary  · LV: Estimated LVEF is 30 - 35%. Normal cavity size, wall thickness and systolic function (ejection fraction normal). Mild (grade 1) left ventricular diastolic dysfunction. · Image quality for this study was technically difficult. · Contrast used: DEFINITY. · RV: Not well visualized. · LA: Mildly dilated left atrium. Results     Procedure Component Value Units Date/Time    COVID-19 RAPID TEST [[id number]] Collected: 09/05/21 1639    Order Status: Completed Specimen: Nasopharyngeal Updated: 09/05/21 1707     Specimen source Nasopharyngeal        COVID-19 rapid test Not detected        Comment:      The specimen is NEGATIVE for SARS-CoV-2, the novel coronavirus associated with COVID-19. A negative result does not rule out COVID-19. This test has been authorized by the FDA under an Emergency Use Authorization (EUA) for use by authorized laboratories. Fact sheet for Healthcare Providers: Signature.co.nz  Fact sheet for Patients: Signature.co.nz       Methodology: Isothermal Nucleic Acid Amplification         COVID-19 RAPID TEST [[id number]] Collected: 08/31/21 1051    Order Status: Completed Specimen: Nasopharyngeal Updated: 08/31/21 1122     Specimen source Nasopharyngeal        COVID-19 rapid test Not detected        Comment:      The specimen is NEGATIVE for SARS-CoV-2, the novel coronavirus associated with COVID-19. A negative result does not rule out COVID-19. This test has been authorized by the FDA under an Emergency Use Authorization (EUA) for use by authorized laboratories.         Fact sheet for Healthcare Providers: Imsysdate.co.nz  Fact sheet for Patients: Imsysdate.co.nz       Methodology: Isothermal Nucleic Acid Amplification             Inpat Anti-Infectives (From admission, onward)     Start     Ordered Stop    09/05/21 1600  ceFAZolin (ANCEF) 2 g/20 mL in sterile water IV syringe  2 g,   IntraVENous,   ON CALL TO OR      09/05/21 1549 09/06/21 1600              Ventilator Settings:  Ideal body weight: 70.7 kg (155 lb 13.8 oz)   Mode FIO2 Rate Tidal Volume Pressure PEEP      40 %               Peak airway pressure:         Minute ventilation:    ABG:  Recent Labs     09/06/21  0313 09/05/21  2350 09/05/21  2108   PHI 7.23* 7.27* 7.15*   PCO2I 48.2* 40.8 47.6*   PO2I 69* 123* 475*   HCO3I 20.1* 18.7* 16.4*     Assessment and Plan:  (Medical Decision Making)   Impression: 68 y.o. y/o male with advanced COPD with hypercapneic RF post colectomy. NEURO:   Sedation: None  Analgesia: fentanyl  Paralytics: N/A  CV:   Volume Status: may be dry. Septic shock: On levophed and nilton. Albumin low and need to replace to spare pressors    PULM:   Acute hypoxemic/hypercapneic respiratory failure: Weaned to 40% but still with significant resp acidosis. RENAL:  KAYLA: Cr 2.28; start LR infusion with poor UOP. Lactic acidosis: AG only 6  Electrolytes: hypernatremia and hyperchloremia  GI:   Nutrition: likely malnourished and will need TPN post colectomy. HEME:   Anemia: Hgb 9.5. Need to follow.    Thrombocytopenia: platelets 83F  Anticoagulation: None  ID:   Add Abx for possible abdominal infection  ENDO:   BS controlled  Skin: no decub, turns, preventive care  Prophy: protonix        Full Code    The patient is critically ill with respiratory failure, circulatory failure and requires high complexity decision making for assessment and support including frequent ventilator adjustment , frequent evaluation and titration of therapies , application of advanced monitoring technologies and extensive interpretation of multiple databases  Time devoted to patient care services described in this note- 15 min face to face/ 20 min total evaluation time    Cumulative time devoted to patient care services by me for day of service -35 min     Wen Palmer MD

## 2021-09-06 NOTE — ROUTINE PROCESS
TRANSFER - OUT REPORT:    Verbal report given to Patrick on [employer].  being transferred to ICU(unit) for routine progression of care       Report consisted of patients Situation, Background, Assessment and   Recommendations(SBAR). Information from the following report(s) SBAR and OR Summary was reviewed with the receiving nurse. Lines:   Triple Lumen Right IJ 09/04/21 Anterior;Right Neck (Active)       Peripheral IV 09/04/21 Anterior; Left Forearm (Active)   Site Assessment Clean, dry, & intact 09/05/21 0025   Phlebitis Assessment 0 09/05/21 0025   Infiltration Assessment 0 09/05/21 0025   Dressing Status Clean, dry, & intact 09/05/21 0025   Dressing Type Tape;Transparent 09/05/21 0025   Hub Color/Line Status Pink;Flushed;Patent 09/05/21 0025   Alcohol Cap Used No 09/05/21 0025       Arterial Line 09/05/21 Right Radial artery (Active)        Opportunity for questions and clarification was provided.       Patient transported with:   Monitor  O2 @ 15 liters  Registered Nurse  MDA

## 2021-09-06 NOTE — PROGRESS NOTES
Problem: Falls - Risk of  Goal: *Absence of Falls  Description: Document Jenniffer Ayon Fall Risk and appropriate interventions in the flowsheet.   Outcome: Progressing Towards Goal  Note: Fall Risk Interventions:  Mobility Interventions: Bed/chair exit alarm, Patient to call before getting OOB, Strengthening exercises (ROM-active/passive)         Medication Interventions: Bed/chair exit alarm, Teach patient to arise slowly    Elimination Interventions: Bed/chair exit alarm, Call light in reach, Toileting schedule/hourly rounds              Problem: Patient Education: Go to Patient Education Activity  Goal: Patient/Family Education  Outcome: Progressing Towards Goal

## 2021-09-06 NOTE — PROGRESS NOTES
Evaluated. Patient is awake, alert, on PSV 5/8, was able to breath well with RR 16 and pulled volume of nearly 1L. Agree with extubation now. He remains on Levophed 15.      Keyanna Hamilton MD

## 2021-09-06 NOTE — PROGRESS NOTES
Ventilator check complete; patient has a #8. 0 ET tube secured at the 23 at the lip. Patient is not sedated. Patient is able to follow commands. Breath sounds are diminished. Trachea is midline, Negative for subcutaneous air, and chest excursion is symmetric. Patient is also Negative for cyanosis. All alarms are set and audible. Resuscitation bag is at the head of the bed.       Ventilator Settings  Mode FIO2 Rate Tidal Volume Pressure PEEP I:E Ratio   VC+  35 %  18  500 ml     8 cm H20  1:2.0      Peak airway pressure: 25 cm H2O   Minute ventilation: 9.7 l/min     Anna Fernandes, RT

## 2021-09-06 NOTE — OP NOTES
[facility]  OPERATIVE REPORT    Name:  Tania Francisco  MR#:  [id number]  :  1940  ACCOUNT #:  [de-identified]  DATE OF SERVICE:  2021    PREOPERATIVE DIAGNOSIS:  Uncontrolled gastrointestinal bleeding with multiorgan failure. POSTOPERATIVE DIAGNOSIS:  Uncontrolled gastrointestinal bleeding with multiorgan failure. PROCEDURE PERFORMED:  1. Exploratory laparotomy with extended left hemicolectomy with colostomy. 2.  Mobilization of splenic flexure. SURGEON:  Shant Calhoun MD    ANESTHESIA: general    COMPLICATIONS:  none. SPECIMENS REMOVED: as above    IMPLANTS:  Giovanni x 1    ESTIMATED BLOOD LOSS:  300 ml    INDICATION:  This is a 81-year-old male who presented with a large amount of lower GI bleeding. Nuclear Medicine showed the bleeding at left colon and he had attempted intervention radiology procedure to embolize the blood vessel; however, this was considered not feasible due to his previous abdominal aneurysm repair with Endostent and he continued to have bleeding. He became hypotensive and required vasopressors. As noted, he has very complicated medical issues including severe COPD, congestive heart failure, A. flutter, and he is in clearly renal failure and he required about 6 units of blood over the last three days and especially he requires 3 units for today. It was pretty clear he needs this bleeding to be stopped for a life-saving and this has been discussed with the patient and he agreed to proceed with this high-risk surgery for saving his life. FINDINGS:  He does have quite a bit of adhesion at the root of mesentery consistent with his previous endovascular surgeries. PROCEDURE:  After informed consent obtained, the patient was brought into the operating room, left in supine position. General anesthesia was administered. As noted, the patient has been hypotensive from the very beginning and he did get 2 units of blood transfusion during the surgery. His abdomen was prepped and draped in routine fashion. We took a generous midline incision and dissection carried through the dermal layer. Fascia was entered. Peritoneal cavity was entered easily and then we placed a Bookwalter retractor. We got the pelvis exposed first and he does have a thickened sigmoid colon and the rectum was also thickened and there was quite a bit of adhesions around the colon and the root of mesentery consistent with previous surgery. The pelvis was fully exposed and we mobilized the upper rectum first and then a Contour stapler was used to divide upper rectum; and then I continue dissection right along the rectum and the sigmoid colon. This was done with bipolar device and then the descending colon was mobilized, and then the mobilization continued to take down the entire splenic flexure. The distal transverse colon also feels abnormal.  I decided to take this too, so I continued to mobilize, take down the mesentery with bipolar device past the middle point of the transverse colon. Great care was used to avoid the proximal jejunum and especially the ligament of Treitz appeared to be stuck down to the mesentery root. Then, the colon was divided in the mid portion of the transverse colon and the specimens were removed and then surgical field was inspected. Good hemostasis obtained and the small bowel was ran from ligament of Treitz all the way to the terminal ileum. Terminal ileum was also stuck into the pelvis. This was carefully taken down and then, I left a #19 Giovanni drain in the pelvis and then the transverse colon was brought up to the left upper quadrant through a skin incision. A cone of abdominal wall tissue was removed. Then, the colon reached to the skin without any tension and then surgical field reinspected.   Again, no evidence of bleeding or bowel injury and I removed half of the omentum on the right side and then left the rest of the omentum to cover the intestine. Then, the midline fascia was closed with #1 looped PDS in a running fashion. Skin closed with staples and lastly, the stoma was matured with a 3-0 Vicryl stitch in interrupted fashion and the appliance was applied at the end of the case. The patient has been hypotensive during the surgery, required intermittent dose of Levophed per Anesthesia. He also got 2 units of blood transfusion during surgery and he will be transferred to ICU intubated and sedated. .  All the instrument and lap count were correct. Estimated blood loss about 300 mL in this case.       Adriano Jensen MD      [id number]  D:  09/05/2021 20:56  T:  09/06/2021 2:34  JOB #:  [id number]

## 2021-09-06 NOTE — ANESTHESIA POSTPROCEDURE EVALUATION
Procedure(s):  LAPAROTOMY EXPLORATORY EXTENDED LEFT HEMICOLECTOMY WITH COLOSTOMY  Mobilization of splenic flexure. general    Anesthesia Post Evaluation      Multimodal analgesia: multimodal analgesia not used between 6 hours prior to anesthesia start to PACU discharge  Patient location during evaluation: ICU  Patient participation: complete - patient cannot participate  Level of consciousness: obtunded/minimal responses  Pain management: adequate  Airway patency: patent  Anesthetic complications: no  Cardiovascular status: hypotensive and hemodynamically unstable  Respiratory status: acceptable and intubated  Hydration status: acceptable  Comments: Patient transferred from OR to ICU intubated/sedated. VSS during transport.   Post anesthesia nausea and vomiting:  none      INITIAL Post-op Vital signs:   Vitals Value Taken Time   BP 95/60 09/05/21 2057   Temp     Pulse 128 09/05/21 2057   Resp 12 09/05/21 2057   SpO2 98 % 09/05/21 2057

## 2021-09-06 NOTE — PROGRESS NOTES
Pharmacokinetic Consult to Pharmacist    Keyana Li. is a 68 y.o. male being treated  with vancomycin. Height: 5' 9\" (175.3 cm)  Weight: 79.9 kg (176 lb 2.4 oz)  Lab Results   Component Value Date/Time    BUN 25 (H) 09/06/2021 02:20 AM    Creatinine 2.28 (H) 09/06/2021 02:20 AM    WBC 22.2 (H) 09/06/2021 02:20 AM    Procalcitonin 0.09 05/18/2021 07:22 AM    Lactic acid 1.1 09/04/2021 08:09 AM    Lactic acid 2.4 (H) 12/21/2014 08:38 PM    Lactic Acid (POC) 1.26 12/17/2019 01:09 PM      Estimated Creatinine Clearance: 26.7 mL/min (A) (based on SCr of 2.28 mg/dL (H)). Day 1 of vancomycin. Goal trough is 15-20. Vancomycin dose initiated at 1750 mg x 1 dose; followed by intermittent dosing. Will continue to follow patient and order levels when clinically indicated.     Thank you,  Liz Corral, PharmD

## 2021-09-06 NOTE — PROGRESS NOTES
Awaking up, still on levo and nilton, BP is improving. No bleeding from rectum or ostomy bag.  abd soft, some oozing from incision, reji serosanguinous  Hb is stable    Hope to extubate and wean pressors.

## 2021-09-06 NOTE — PROGRESS NOTES
GI DAILY PROGRESS NOTE    Admit Date:  9/5/2021    Today's Date:  9/6/2021    CC:  GI bleed    Subjective:     Patient underwent surgery last night. Just extubated- asleep.     Medications:   Current Facility-Administered Medications   Medication Dose Route Frequency    PHENYLephrine (LASHLEL-SYNEPHRINE) 30 mg in 0.9% sodium chloride 250 mL infusion   mcg/min IntraVENous TITRATE    vasopressin (VASOSTRICT) 20 Units in 0.9% sodium chloride 100 mL infusion  0-0.03 Units/min IntraVENous TITRATE    albumin human 25% (BUMINATE) solution 25 g  25 g IntraVENous Q4H    hydrocortisone Sod Succ (PF) (SOLU-CORTEF) injection 50 mg  50 mg IntraVENous Q6H    lactated Ringers infusion  100 mL/hr IntraVENous CONTINUOUS    piperacillin-tazobactam (ZOSYN) 3.375 g in 0.9% sodium chloride (MBP/ADV) 100 mL MBP  3.375 g IntraVENous Q8H    albuterol (PROVENTIL VENTOLIN) nebulizer solution 2.5 mg  2.5 mg Nebulization Q6H RT    Vancomycin Intermittent Dosing   Other Rx Dosing/Monitoring    HYDROmorphone (DILAUDID) injection 0.5 mg  0.5 mg IntraVENous Q2H PRN    sodium chloride (NS) flush 5-40 mL  5-40 mL IntraVENous Q8H    sodium chloride (NS) flush 5-40 mL  5-40 mL IntraVENous PRN    ondansetron (ZOFRAN) injection 4 mg  4 mg IntraVENous Q4H PRN    albuterol (PROVENTIL VENTOLIN) nebulizer solution 2.5 mg  2.5 mg Inhalation Q6H PRN    allopurinoL (ZYLOPRIM) tablet 300 mg  300 mg Oral DAILY    brimonidine (ALPHAGAN) 0.2 % ophthalmic solution 1 Drop  1 Drop Both Eyes BID    docusate sodium (COLACE) capsule 100 mg  100 mg Oral DAILY    [Held by provider] ferrous sulfate tablet 325 mg  1 Tablet Oral BID WITH MEALS    fluticasone (FLOVENT HFA) 110 mcg inhaler  1 Puff Inhalation DAILY    latanoprost (XALATAN) 0.005 % ophthalmic solution 1 Drop  1 Drop Both Eyes QHS    [Held by provider] lisinopriL (PRINIVIL, ZESTRIL) tablet 10 mg  10 mg Oral DAILY    metoprolol succinate (TOPROL-XL) XL tablet 100 mg  100 mg Oral DAILY    nitroglycerin (NITROSTAT) tablet 0.4 mg  0.4 mg SubLINGual Q5MIN PRN    rosuvastatin (CRESTOR) tablet 40 mg  40 mg Oral QHS    [Held by provider] tamsulosin (FLOMAX) capsule 0.4 mg  0.4 mg Oral DAILY    0.9% sodium chloride infusion 250 mL  250 mL IntraVENous PRN    0.9% sodium chloride infusion 250 mL  250 mL IntraVENous PRN    0.9% sodium chloride infusion 250 mL  250 mL IntraVENous PRN    0.9% sodium chloride infusion 250 mL  250 mL IntraVENous PRN    acetaminophen (TYLENOL) tablet 650 mg  650 mg Oral Q6H PRN    NOREPINephrine (LEVOPHED) 4 mg in 5% dextrose 250 mL infusion  0.5-30 mcg/min IntraVENous TITRATE    0.9% sodium chloride infusion 250 mL  250 mL IntraVENous PRN    sodium chloride (NS) flush 5-40 mL  5-40 mL IntraVENous Q8H    sodium chloride (NS) flush 5-40 mL  5-40 mL IntraVENous PRN    0.9% sodium chloride infusion 250 mL  250 mL IntraVENous PRN    0.9% sodium chloride infusion 250 mL  250 mL IntraVENous PRN    ceFAZolin (ANCEF) 2 g/20 mL in sterile water IV syringe  2 g IntraVENous ON CALL TO OR    0.9% sodium chloride infusion 250 mL  250 mL IntraVENous PRN    pantoprazole (PROTONIX) 40 mg in 0.9% sodium chloride 10 mL injection  40 mg IntraVENous Q24H       Review of Systems:  A review of system was obtained, with pertinent positives as listed above. All others are negative. Objective:   Vitals:  Visit Vitals  BP (!) 132/57   Pulse (!) 130   Temp 98.3 °F (36.8 °C)   Resp 23   Ht 5' 9\" (1.753 m)   Wt 79.9 kg (176 lb 2.4 oz)   SpO2 93%   BMI 26.01 kg/m²     Intake/Output:  09/06 0701 - 09/06 1900  In: -   Out: 80 [Drains:80]  09/04 1901 - 09/06 0700  In: 9403 [I.V.:3754]  Out: 5566 [Urine:595; Drains:490]  Exam:  General appearance: asleep, NGT in place  Lungs: clear to auscultation bilaterally anteriorly  Heart: regular rate and rhythm  Abdomen: soft, ostomy in place with mild amount of blood. Drain in place.   Extremities: extremities normal, atraumatic, no cyanosis or edema  Neuro:  asleep    Data Review (Labs):    Recent Labs     09/06/21  0822 09/06/21  0220 09/05/21 2136 09/05/21  1238 09/05/21  0909 09/05/21  0557 09/05/21  0143 09/04/21 2139 09/04/21  1658 09/04/21  0809 09/04/21  0315 09/03/21  1534   WBC  --  22.2* 15.8*  --  15.9*  --  14.3*  --   --  10.5  --  9.0   HGB 9.0* 9.5* 11.2* 9.5* 8.6* 9.1* 7.7* 6.9* 7.4* 7.9* 7.3* 8.4*   HCT  --  28.9* 34.5*  --  26.9* 28.7* 24.7* 22.2*  --  25.4*  --  26.8*   PLT  --  67* 78*  --  113*  --  132*  --   --  148*  --  197   MCV  --  86.3 88.5  --  88.2  --  89.8  --   --  89.1  --  86.2   NA  --  147* 143  --   --   --  143  --   --  144  --   --    K  --  4.5 5.4* 5.7*  --   --  5.5*  --   --  5.2*  --   --    CL  --  122* 119*  --   --   --  116*  --   --  115*  --   --    CO2  --  19* 17*  --   --   --  23  --   --  25  --   --    BUN  --  25* 27*  --   --   --  25*  --   --  26*  --   --    CREA  --  2.28* 2.32*  --   --   --  2.12*  --   --  1.83*  --   --    CA  --  6.5* 7.9*  --   --   --  7.7*  --   --  7.9*  --   --    GLU  --  135* 147*  --   --   --  108*  --   --  105*  --   --    AP  --   --   --   --   --   --  31*  --   --  32*  --   --    ALT  --   --   --   --   --   --  7*  --   --  9*  --   --    TBILI  --   --   --   --   --   --  0.3  --   --  0.3  --   --        Assessment:     Principal Problem:    Hemorrhagic shock (HCC) (9/5/2021)    Active Problems:    COPD (chronic obstructive pulmonary disease) ([facility] (5/2/2014)      Hypertension (6/1/2017)      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[facility] (7/25/2018)      Overview: MAC followed by Dr. Stephanie Fernandes at [facility]. In March 2018 was found with new SAMANTHA       nodules with spiculation.   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        He was started on ETB/azithro/rif.        Chronic hypoxemic respiratory failure ([facility] (9/9/2019)      CKD (chronic kidney disease), stage III ([facility] (9/9/2019)      Chronic combined systolic and diastolic heart failure ([facility] (12/21/2020) Hyperkalemia (7/7/2021)      Acute blood loss anemia (8/31/2021)      Hyperlipidemia (9/5/2021)      Lower GI bleed (9/5/2021)      Acute on chronic renal insufficiency (9/5/2021)      Respiratory failure, post-operative ([address] (9/5/2021)      Thrombocytopenia ([facility] (9/5/2021)        Plan:     70yo AAM with LGIB due to diverticulosis with initial negative TRBCS. Stopped bleeding but then restarted. Repeat scan was positive for active bleed at splenic flexure. Transferred to [facility] and underwent attempted emobolization per IR- unable due to anatomy. Was taken for L hemicolectomy yesterday. Just extubated now off Levo. No recs for GI at that time. Appreciate surgery and IR help. Will sign off but around if needed.     Heidi Delcid MD

## 2021-09-06 NOTE — CONSULTS
History and Physical Initial Visit NOTE           2021    [facility]. Date of Admission:  2021    The patient's chart is reviewed and the patient is discussed with the staff. Subjective: The patient is a 68 y.o.  male seen and evaluated at the request of Dr. Enrrique Christian for post op respiratory management. He has a hx of COPD, Pulmonary RON, Chronic hypoxic respiratory failure, chronic combined systolic and diastolic heart failure, HTN, afib/flutter not on anticoagulation, AVN ablation, BIV ICD, chronic GI bleeding, gastric AVM, iron deficiency, AAA s/p EVAR 2019, and prostate cancer. He presented on  with melena and was stable with a Hgb of 10.5 with arrangements made for OP GI evaluation. He returned on  with BRBPR. Hgb continued to drop despite transfusions. Hypotension developed and levophed was started. A bleeding scan showed evidence for a lower GI bleed and IR was consulted for ablation. IR felt intervention was too risky after poor anatomy demostrated so he was taken to the OR today by Dr. Enrrique Christian who performed and exploratory lap with extended L hemicolectomy and colostomy. He is now intubated in the PACU. He remains on levophed weaned to 12. He is beginning to wake up and seems to verbalized to nurse that he wants to die. Review of Systems  A comprehensive review of systems was negative except for that written in the HPI. Prior to Admission Medications   Prescriptions Last Dose Informant Patient Reported? Taking? OXYGEN-AIR DELIVERY SYSTEMS   Yes No   Si L/min by Nasal route daily. nasal canula   albuterol (PROVENTIL HFA, VENTOLIN HFA, PROAIR HFA) 90 mcg/actuation inhaler   Yes No   Sig: TAKE 2 PUFFS BY MOUTH EVERY 4 HOURS AS NEEDED FOR WHEEZE   albuterol (PROVENTIL VENTOLIN) 2.5 mg /3 mL (0.083 %) nebu   No No   Sig: Take 3 mL by inhalation every six (6) hours as needed for Shortness of Breath or Wheezing. albuterol-ipratropium (DUO-NEB) 2.5 mg-0.5 mg/3 ml nebu   No No   Sig: 3 mL by Nebulization route three (3) times daily for 180 days. allopurinoL (ZYLOPRIM) 300 mg tablet   Yes No   Sig: Take 300 mg by mouth daily. aspirin delayed-release 81 mg tablet   Yes No   Sig: Take 81 mg by mouth daily. brimonidine (ALPHAGAN P) 0.1 % ophthalmic solution   Yes No   Sig: Administer 1 Drop to both eyes two (2) times a day. dexAMETHasone (DECADRON) 2 mg tablet   No No   Si tabs po x3 days, 3 tabs po x3 days, 2 tabs po x3 days, 1 tab po x3 days, 1/2 tab po x3 days then stop. Patient not taking: Reported on 2021   docusate sodium (COLACE) 100 mg capsule   No No   Sig: Take 1 Capsule by mouth daily. famotidine (PEPCID) 40 mg tablet   No No   Sig: Take 1 Tab by mouth daily. ferrous sulfate (IRON) 325 mg (65 mg iron) EC tablet   No No   Sig: Take 1 Tablet by mouth two (2) times a day. fluticasone furoate (ARNUITY ELLIPTA) 100 mcg/actuation dsdv inhaler   No No   Sig: Take 1 Puff by inhalation daily. Wash mouth out with water after each dose. Indications: worsening of debilitating chronic lung disease called COPD   guaiFENesin ER (MUCINEX) 600 mg ER tablet   No No   Sig: Take 1 Tablet by mouth two (2) times a day. Indications: cough   latanoprost (XALATAN) 0.005 % ophthalmic solution   Yes No   Sig: LOCATION  BOTH EYES. INSTILL ONE DROP INTO EACH EYE AT BEDTIME   lisinopriL (PRINIVIL, ZESTRIL) 10 mg tablet   No No   Sig: Take 1 Tablet by mouth daily. Patient not taking: Reported on 2021   metoprolol succinate (TOPROL-XL) 100 mg tablet   No No   Sig: Take 1 Tablet by mouth daily. Indications: ventricular rate control in atrial fibrillation   nitroglycerin (NITROSTAT) 0.4 mg SL tablet   No No   Si Tab by SubLINGual route every five (5) minutes as needed for Chest Pain (call EMS if pain fails to resolve after 2 tabs. max daily dose of 3 tabs).  Indications: after 15 minutes or 3 doses, if chest pain persists, contact EMS/911   predniSONE (DELTASONE) 20 mg tablet   No No   Sig: Take 40mg qday x3d, then 20mg qday x3d, then 10mg x2d, then stop. Take with dinner   Patient taking differently: Take 40 mg by mouth daily for 3 days, then 20 mg by mouth daily for 3 days, then take 10 mg by mouth daily for 2 days - then stop - take with dinner   rosuvastatin (Crestor) 40 mg tablet   No No   Sig: Take 1 Tab by mouth nightly. tamsulosin (FLOMAX) 0.4 mg capsule   No No   Sig: Take 1 Cap by mouth daily.       Facility-Administered Medications: None     Past Medical History:   Diagnosis Date    A-fib ([address] 5/2/2014    AAA (abdominal aortic aneurysm) without rupture ([address] 5/2/2014    3.2 on  2/14 [facility]    Acute metabolic encephalopathy 4/0/3956    Arthritis     Cancer (HCC)     hx prostate cancer    COPD (chronic obstructive pulmonary disease) ([address] 5/2/2014    Elevated prostate specific antigen (PSA)     Glaucoma 5/2/2014    Heart disease     Heart failure ([address]     Hyperlipemia 5/2/2014    Hypertension     Hypertrophy of prostate with urinary obstruction and other lower urinary tract symptoms (LUTS)     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    OA (osteoarthritis) 5/2/2014    Peptic ulcer disease 6/1/2017    Poor historian     Prostate cancer ([address]     Pulmonary embolus ([address] 6/1/2017    Supplemental oxygen dependent 5/2/2014    Warfarin anticoagulation 5/2/2014     Past Surgical History:   Procedure Laterality Date    COLONOSCOPY N/A 9/12/2019    COLONOSCOPY/ 26 RO[address] 614 performed by Mimi [facility] at [facility] ENDOSCOPY    EGD  12/18/2019         HX HEART CATHETERIZATION      VASCULAR SURGERY PROCEDURE UNLIST  09/14/2019     Bilateral common femoral artery cutdown with exposure and placement of catheter in aorta for aortogram,Endovascular repair of infrarenal abdominal aortic aneurysm with bifurcated modulated device (31 x 14 x 13 main body) via the left common femoral, right iliac extender measuring 27 x 10. Social History     Socioeconomic History    Marital status: SINGLE     Spouse name: Not on file    Number of children: Not on file    Years of education: Not on file    Highest education level: Not on file   Occupational History    Not on file   Tobacco Use    Smoking status: Former Smoker     Packs/day: 2.00     Years: 40.00     Pack years: 80.00     Quit date: 2014     Years since quittin.1    Smokeless tobacco: Never Used    Tobacco comment: still taking Chantix    Substance and Sexual Activity    Alcohol use: No    Drug use: No    Sexual activity: Yes     Partners: Female     Birth control/protection: None   Other Topics Concern    Not on file   Social History Narrative    Not on file     Social Determinants of Health     Financial Resource Strain: Medium Risk    Difficulty of Paying Living Expenses: Somewhat hard   Food Insecurity: No Food Insecurity    Worried About Running Out of Food in the Last Year: Never true    Salas of Food in the Last Year: Never true   Transportation Needs: No Transportation Needs    Lack of Transportation (Medical): No    Lack of Transportation (Non-Medical):  No   Physical Activity:     Days of Exercise per Week:     Minutes of Exercise per Session:    Stress:     Feeling of Stress :    Social Connections:     Frequency of Communication with Friends and Family:     Frequency of Social Gatherings with Friends and Family:     Attends Jewish Services:     Active Member of Clubs or Organizations:     Attends Club or Organization Meetings:     Marital Status:    Intimate Partner Violence:     Fear of Current or Ex-Partner:     Emotionally Abused:     Physically Abused:     Sexually Abused:      Family History   Problem Relation Age of Onset    Cancer Mother     Heart Disease Father      Allergies   Allergen Reactions    Statins-Hmg-Coa Reductase Inhibitors Myalgia     Muscle pain     Current Facility-Administered Medications   Medication Dose Route Frequency    sodium chloride (NS) flush 5-40 mL  5-40 mL IntraVENous Q8H    sodium chloride (NS) flush 5-40 mL  5-40 mL IntraVENous PRN    ondansetron (ZOFRAN) injection 4 mg  4 mg IntraVENous Q4H PRN    albuterol (PROVENTIL VENTOLIN) nebulizer solution 2.5 mg  2.5 mg Inhalation Q6H PRN    allopurinoL (ZYLOPRIM) tablet 300 mg  300 mg Oral DAILY    brimonidine (ALPHAGAN) 0.2 % ophthalmic solution 1 Drop  1 Drop Both Eyes BID    docusate sodium (COLACE) capsule 100 mg  100 mg Oral DAILY    ferrous sulfate tablet 325 mg  1 Tablet Oral BID WITH MEALS    fluticasone (FLOVENT HFA) 110 mcg inhaler  1 Puff Inhalation DAILY    latanoprost (XALATAN) 0.005 % ophthalmic solution 1 Drop  1 Drop Both Eyes QHS    [Held by provider] lisinopriL (PRINIVIL, ZESTRIL) tablet 10 mg  10 mg Oral DAILY    metoprolol succinate (TOPROL-XL) XL tablet 100 mg  100 mg Oral DAILY    nitroglycerin (NITROSTAT) tablet 0.4 mg  0.4 mg SubLINGual Q5MIN PRN    rosuvastatin (CRESTOR) tablet 40 mg  40 mg Oral QHS    tamsulosin (FLOMAX) capsule 0.4 mg  0.4 mg Oral DAILY    0.9% sodium chloride infusion 250 mL  250 mL IntraVENous PRN    pantoprazole (PROTONIX) tablet 40 mg  40 mg Oral ACB    0.9% sodium chloride infusion 250 mL  250 mL IntraVENous PRN    0.9% sodium chloride infusion 250 mL  250 mL IntraVENous PRN    albuterol-ipratropium (DUO-NEB) 2.5 MG-0.5 MG/3 ML  3 mL Nebulization TID RT    0.9% sodium chloride infusion 250 mL  250 mL IntraVENous PRN    acetaminophen (TYLENOL) tablet 650 mg  650 mg Oral Q6H PRN    NOREPINephrine (LEVOPHED) 4 mg in 5% dextrose 250 mL infusion  0.5-30 mcg/min IntraVENous TITRATE    0.9% sodium chloride infusion 250 mL  250 mL IntraVENous PRN    sodium chloride (NS) flush 5-40 mL  5-40 mL IntraVENous Q8H    sodium chloride (NS) flush 5-40 mL  5-40 mL IntraVENous PRN    0.9% sodium chloride infusion 250 mL  250 mL IntraVENous PRN    heparin (PF) 2 units/ml in NS infusion 2,000-16,000 Units  1,000-8,000 mL Irrigation CONTINUOUS    0.9% sodium chloride infusion 250 mL  250 mL IntraVENous PRN    sodium chloride 0.9 % bolus infusion 1,000 mL  1,000 mL IntraVENous ONCE    ceFAZolin (ANCEF) 2 g/20 mL in sterile water IV syringe  2 g IntraVENous ON CALL TO OR    0.9% sodium chloride infusion 250 mL  250 mL IntraVENous PRN    sodium bicarbonate (8.4%) injection 50 mEq  50 mEq IntraVENous ONCE     Objective:   Blood pressure (!) 76/44, pulse (!) 110, temperature 98.7 °F (37.1 °C), resp. rate 12, height 5' 9\" (1.753 m), weight 156 lb 15.5 oz (71.2 kg), SpO2 (!) 85 %. Intake/Output Summary (Last 24 hours) at 9/5/2021 2206  Last data filed at 9/5/2021 1748  Gross per 24 hour   Intake 2340.63 ml   Output 475 ml   Net 1865.63 ml     PHYSICAL EXAM   Constitutional:  the patient is well developed and in no acute distress  EENMT:  Sclera clear, pupils equal, oral mucosa moist  Respiratory: decreased BS bilaterally  Cardiovascular:  Tachy RRR without M,G,R  Gastrointestinal: soft and non-tender; with positive bowel sounds. Musculoskeletal: warm without cyanosis. There is no lower extremity edema.   Skin:  no jaundice or rashes  Neurologic: no gross neuro deficits     Psychiatric:  Beginning to arouse    CXR:      Hyperinflated and hyperlucent lungs R > L. ETT well positioned        Recent Labs     09/05/21  2136 09/05/21  1238 09/05/21  0909 09/05/21  0557 09/05/21  0557 09/05/21  0143 09/05/21  0143 09/04/21  2139 09/04/21  1658 09/04/21  1208 09/04/21  0809 09/04/21  0809   WBC 15.8*  --  15.9*  --   --   --  14.3*  --   --   --    < > 10.5   HGB 11.2* 9.5* 8.6*   < > 9.1*   < > 7.7*   < > 7.4*  --    < > 7.9*   HCT 34.5*  --  26.9*  --  28.7*   < > 24.7*   < >  --   --   --  25.4*   PLT 78*  --  113*  --   --   --  132*  --   --   --    < > 148*   LAC  --   --   --   --   --   --   --   --   --   --   --  1.1   NA  --   --   --   --   --   --  143  --   --   --   --  144   K --  5.7*  --   --   --   --  5.5*  --   --   --   --  5.2*   CL  --   --   --   --   --   --  116*  --   --   --   --  115*   CO2  --   --   --   --   --   --  23  --   --   --   --  25   GLU  --   --   --   --   --   --  108*  --   --   --   --  105*   BUN  --   --   --   --   --   --  25*  --   --   --   --  26*   CREA  --   --   --   --   --   --  2.12*  --   --   --   --  1.83*   MG  --   --   --   --   --   --   --   --   --   --   --  2.0   CA  --   --   --   --   --   --  7.7*  --   --   --   --  7.9*   ALB  --   --   --   --   --   --  1.5*  --   --   --   --  1.8*   AST  --   --   --   --   --   --  14*  --   --   --   --  16   ALT  --   --   --   --   --   --  7*  --   --   --   --  9*   AP  --   --   --   --   --   --  31*  --   --   --   --  32*   TROPHS  --   --   --   --   --   --   --   --  59.6* 60.3*  --  56.6*    < > = values in this interval not displayed. ECHO: Results from Hospital Encounter encounter on 06/09/21    ECHO ADULT COMPLETE    Interpretation Summary  · LV: Estimated LVEF is 30 - 35%. Normal cavity size, wall thickness and systolic function (ejection fraction normal). Mild (grade 1) left ventricular diastolic dysfunction. · Image quality for this study was technically difficult. · Contrast used: DEFINITY. · RV: Not well visualized. · LA: Mildly dilated left atrium. Results     Procedure Component Value Units Date/Time    COVID-19 RAPID TEST [[id number]] Collected: 09/05/21 4070    Order Status: Completed Specimen: Nasopharyngeal Updated: 09/05/21 3595     Specimen source Nasopharyngeal        COVID-19 rapid test Not detected        Comment:      The specimen is NEGATIVE for SARS-CoV-2, the novel coronavirus associated with COVID-19. A negative result does not rule out COVID-19. This test has been authorized by the FDA under an Emergency Use Authorization (EUA) for use by authorized laboratories.         Fact sheet for Healthcare Providers: [employer]  Fact sheet for Patients: [employer]       Methodology: Isothermal Nucleic Acid Amplification         COVID-19 RAPID TEST [[id number]] Collected: 08/31/21 1051    Order Status: Completed Specimen: Nasopharyngeal Updated: 08/31/21 1122     Specimen source Nasopharyngeal        COVID-19 rapid test Not detected        Comment:      The specimen is NEGATIVE for SARS-CoV-2, the novel coronavirus associated with COVID-19. A negative result does not rule out COVID-19. This test has been authorized by the FDA under an Emergency Use Authorization (EUA) for use by authorized laboratories. Fact sheet for Healthcare Providers: [employer]  Fact sheet for Patients: [employer]       Methodology: Isothermal Nucleic Acid Amplification             Inpat Anti-Infectives (From admission, onward)     Start     Ordered Stop    09/05/21 1600  ceFAZolin (ANCEF) 2 g/20 mL in sterile water IV syringe  2 g,   IntraVENous,   ON CALL TO OR      09/05/21 1549 09/06/21 1600              Assessment and Plan:  (Medical Decision Making)   Principal Problem:    Hemorrhagic shock ([address] (9/5/2021): On levophed with acceptable BP  --wean levophed    Active Problems:    COPD (chronic obstructive pulmonary disease) ([address] (5/2/2014): was 61 PY smoker. Has been followed a the [address] at [facility]. Last FEV1 0.76L in 2018  --Inhaled BD      Hypertension (6/1/2017): Currently hypotensive on levophed    _- Currently on levophed      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[address] (7/25/2018): reportedly off Rx but ethambutol was listed as a med in 4/2021         Chronic hypoxemic respiratory failure ([address] (9/9/2019)   Follow ABG      CKD (chronic kidney disease), stage III ([address] (9/9/2019)  --Follow labs.  Cr was 1.08 in July      Chronic combined systolic and diastolic heart failure ([address] (12/21/2020): last echo 6/11/21  --Follow clinically    Hyperkalemia (7/7/2021)  --Follow labs and ABG      Acute blood loss anemia (8/31/2021): Last Hgb 11.2 post op    --Follow      Hyperlipidemia (9/5/2021)        Lower GI bleed (9/5/2021): Now post colectomy. --Follow now that colon removed. Needs pain control. Acute on chronic renal insufficiency (9/5/2021): as above        Respiratory failure, post-operative ([address] (9/5/2021): Acidotic on ABG. -- Repeat ABG after Bicarb x 2 and vent adjustment      Thrombocytopenia ([address] (9/5/2021); 78K. Was 113K earlier  --Follow     Full Code    More than 50% of the time documented was spent in face-to-face contact with the patient and in the care of the patient on the floor/unit where the patient is located. Thank you very much for this referral.  We appreciate the opportunity to participate in this patient's care. Will follow along with above stated plan.     Elaine Walker MD

## 2021-09-06 NOTE — PROGRESS NOTES
Called for severe hypotension after pt received dilaudid 1 mg for pain. HCO3 x 2 ordered over 30 min. Dipesh started and fluid bolus given without initial benefit. About to start  when pt began to wake up with improvement in BP. Will continue fluid bolus and 2nd dose of HCO3. Hold  and observe. Stop Dilaudid and give fentanyl for future pain control.     Luís Aguilar MD

## 2021-09-07 NOTE — PROGRESS NOTES
[facility] Critical Care Note[de-identified] 9/7/2021  Barry Yanes. Admission Date: 9/5/2021     Length of Stay: 2 days    Background: 68 y.o. y/o male with lower GI bleed and multiple medical problems now post colectomy in PACU and still on vent. Notable PMH:  has a past medical history of A-fib ([address] (5/2/2014), AAA (abdominal aortic aneurysm) without rupture ([address] (5/8/8048), Acute metabolic encephalopathy (3/1/8460), Arthritis, Cancer ([address] COPD (chronic obstructive pulmonary disease) ([address] (5/2/2014), Elevated prostate specific antigen (PSA), Glaucoma (5/2/2014), Heart disease, Heart failure ([address] Hyperlipemia (5/2/2014), Hypertension, Hypertrophy of prostate with urinary obstruction and other lower urinary tract symptoms (LUTS), 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[address] (7/25/2018), OA (osteoarthritis) (5/2/2014), Peptic ulcer disease (6/1/2017), Poor historian, Prostate cancer ([address] Pulmonary embolus ([address] (6/1/2017), Supplemental oxygen dependent (5/2/2014), and Warfarin anticoagulation (5/2/2014). He also has no past medical history of Autoimmune disease ([address] Dementia, Liver disease, Other ill-defined conditions(799.89), Psychiatric disorder, Seizures ([address] or Sleep disorder. 24 Hour events:  Remains on levophed 3 mics , able to be extubated yesterday-now on nc    ROS: unable to obtain/negative except as listed elsewhere. Lines: (insertion date)     ETT: (9/5)  Harper: (9/5)  OGT: (9/5)  Central line: (9/4)  Arterial Line (9/5)  Drips: current dose (range)  PitRESSIN Dose (Units/kg/min): 0 Units/kg/min (bp stable)  Phenylephrine Dose (mcg/min): 0 mcg/min  Levophed Dose (mcg/kg/min): *3 mcg/min     Pertinent Exam:         Blood pressure (!) 117/49, pulse (!) 113, temperature 98 °F (36.7 °C), resp. rate 17, height 5' 9\" (1.753 m), weight 187 lb 9.8 oz (85.1 kg), SpO2 100 %.      Intake/Output Summary (Last 24 hours) at 9/7/2021 1329  Last data filed at 9/7/2021 1328  Gross per 24 hour   Intake 3723.24 ml   Output 1360 ml   Net 2363.24 ml     Constitutional:  intubated and mechanically ventilated. EENMT:  Sclera clear, pupils equal, oral mucosa moist  Respiratory:  clear  Cardiovascular:  RRR  Gastrointestinal:  soft with no tenderness; positive bowel sounds present  Musculoskeletal:  warm with no cyanosis, no lower extremity edema  Skin:  no jaundice or ecchymosis  Neurologic:alert  Psychiatric: sedated and paralyzed    CXR:       Recent Labs     09/07/21 0920 09/07/21 0312 09/06/21 2032 09/06/21 0822 09/06/21 0220 09/05/21 2136 09/05/21 2136   WBC  --  22.3*  --   --  22.2*  --  15.8*   HGB 7.7* 7.5* 7.8*   < > 9.5*   < > 11.2*   HCT  --  22.8*  --   --  28.9*  --  34.5*   PLT  --  84*  --   --  67*  --  78*    < > = values in this interval not displayed. Recent Labs     09/07/21 0312 09/06/21 0220 09/05/21 2136 09/05/21  1238 09/05/21  0143    147* 143  --  143   K 5.1 4.5 5.4*   < > 5.5*   * 122* 119*  --  116*   CO2 24 19* 17*  --  23   * 135* 147*  --  108*   BUN 31* 25* 27*  --  25*   CREA 3.49* 2.28* 2.32*  --  2.12*   CA 7.9* 6.5* 7.9*  --  7.7*   ALB 2.1*  --   --   --  1.5*   AST 31  --   --   --  14*   ALT <6*  --   --   --  7*   AP 24*  --   --   --  31*    < > = values in this interval not displayed. Recent Labs     09/04/21  1658   TROPHS 59.6*     Recent Labs     09/07/21  0923 09/07/21  0602 09/07/21  0219   GLUCPOC 128* 80 119*     ECHO: Results from [place] encounter on 06/09/21    ECHO ADULT COMPLETE    Interpretation Summary  · LV: Estimated LVEF is 30 - 35%. Normal cavity size, wall thickness and systolic function (ejection fraction normal). Mild (grade 1) left ventricular diastolic dysfunction. · Image quality for this study was technically difficult. · Contrast used: DEFINITY. · RV: Not well visualized. · LA: Mildly dilated left atrium.      Results     Procedure Component Value Units Date/Time    COVID-19 RAPID TEST [[id number]] Collected: 09/05/21 1639    Order Status: Completed Specimen: Nasopharyngeal Updated: 09/05/21 1707     Specimen source Nasopharyngeal        COVID-19 rapid test Not detected        Comment:      The specimen is NEGATIVE for SARS-CoV-2, the novel coronavirus associated with COVID-19. A negative result does not rule out COVID-19. This test has been authorized by the FDA under an Emergency Use Authorization (EUA) for use by authorized laboratories. Fact sheet for Healthcare Providers: [employer]  Fact sheet for Patients: [employer]       Methodology: Isothermal Nucleic Acid Amplification         COVID-19 RAPID TEST [[id number]] Collected: 08/31/21 1051    Order Status: Completed Specimen: Nasopharyngeal Updated: 08/31/21 1122     Specimen source Nasopharyngeal        COVID-19 rapid test Not detected        Comment:      The specimen is NEGATIVE for SARS-CoV-2, the novel coronavirus associated with COVID-19. A negative result does not rule out COVID-19. This test has been authorized by the FDA under an Emergency Use Authorization (EUA) for use by authorized laboratories.         Fact sheet for Healthcare Providers: [employer]  Fact sheet for Patients: [employer]       Methodology: Isothermal Nucleic Acid Amplification             Inpat Anti-Infectives (From admission, onward)     Start     Ordered Stop    09/07/21 1733  piperacillin-tazobactam (ZOSYN) 3.375 g in 0.9% sodium chloride (MBP/ADV) 100 mL MBP  3.375 g,   IntraVENous,   EVERY 12 HOURS      09/07/21 0824 --    09/06/21 0450  Vancomycin Intermittent Dosing  Other,   RX DOSING/MONITORING      09/06/21 0450 --    09/05/21 1600  ceFAZolin (ANCEF) 2 g/20 mL in sterile water IV syringe  2 g,   IntraVENous,   ON CALL TO OR      09/05/21 1549 09/06/21 1600              Ventilator Settings:  Ideal body weight: 70.7 kg (155 lb 13.8 oz)   Mode FIO2 Rate Tidal Volume Pressure PEEP   Pressure support  36 %    500 ml  10 cm H2O  8 cm H20    Peak airway pressure: 18 cm H2O       Minute ventilation: 9.64 l/min  ABG:  Recent Labs     09/06/21  0909 09/06/21  0313 09/05/21  2350   PHI 7.29* 7.23* 7.27*   PCO2I 45.9* 48.2* 40.8   PO2I 85 69* 123*   HCO3I 22.1 20.1* 18.7*     Assessment and Plan:  (Medical Decision Making)   Impression: 68 y.o. y/o male with advanced COPD with hypercapneic RF post colectomy. extubated yesterday     NEURO:   Sedation: None  Analgesia: dilaudid prn  Paralytics: N/A  CV:   Volume Status: may be dry. Septic shock: On levophed and nilton. Albumin low and need to replace to spare pressors    PULM:   Acute hypoxemic/hypercapneic respiratory failure:  extubated yesterday      RENAL:  KAYLA: Cr  3.49 - getting iv fluid -worse-consult palliative care  Lactic acidosis: AG only 6  Electrolytes: hypernatremia and hyperchloremia  GI:   Nutrition: likely malnourished and will need TPN post colectomy. HEME:   Anemia: hg 7.5  Thrombocytopenia: 84  Anticoagulation: None  ID:   Add Abx for possible abdominal infection  ENDO:   BS controlled  Skin: no decub, turns, preventive care  Prophy: protonix    Family updated by phone after rounds.   Called neice    Full Code    The patient is critically ill with respiratory failure, circulatory failure and requires high complexity decision making for assessment and support including frequent ventilator adjustment , frequent evaluation and titration of therapies , application of advanced monitoring technologies and extensive interpretation of multiple databases  Time devoted to patient care services described in this note- 15 min face to face/ 20 min total evaluation time    Cumulative time devoted to patient care services by me for day of service -35 min     Bg Garcia MD

## 2021-09-07 NOTE — ACP (ADVANCE CARE PLANNING)
Advance Care Planning     General Advance Care Planning (ACP) Conversation      Date of Conversation: 9/3/2021    Healthcare Decision Maker:     Primary Decision Maker: Callie Robb - Other Relative - [phone]  Click here to complete [place] including selection of the Healthcare Decision Maker Relationship (ie \"Primary\")      Today we documented Decision Maker(s) consistent with ACP documents on file. Content/Action Overview:   HCPOA on file. Full code per MD orders.      Length of Voluntary ACP Conversation in minutes:  16 minutes    Gwyn Curran RN
[phone]

## 2021-09-07 NOTE — PROGRESS NOTES
LTG: Patient will tolerate least restrictive diet without overt signs or symptoms of airway compromise. STG: Patient will tolerate soft and bite sized diet and thin liquids without overt signs or symptoms of airway compromise. STG: Patient will participate in modified barium swallow study as clinically indicated. SPEECH LANGUAGE PATHOLOGY: DYSPHAGIA- Initial Assessment    NAME/AGE/GENDER: Jenna Gusman is a 68 y.o. male  DATE: 9/7/2021  PRIMARY DIAGNOSIS: Hemorrhagic shock ([facility] [address] [facility] [R57.8]  Lower GI bleed [K92.2]  Hypertension [I10]  Hyperlipidemia [E78.5]  Procedure(s) (LRB):  LAPAROTOMY EXPLORATORY EXTENDED LEFT HEMICOLECTOMY WITH COLOSTOMY  Mobilization of splenic flexure (N/A) 2 Days Post-Op  ICD-10: Treatment Diagnosis: R13.12 Dysphagia, Oropharyngeal Phase    RECOMMENDATIONS   DIET:   Pureed  Thin Liquids    MEDICATIONS: As tolerated     PRECAUTIONS, MODIFICATIONS, AND STRATEGIES  Slow rate of intake  Small bites/sips  Upright at 90 degrees during meal   Provide assistance with all PO intake. RECOMMENDATIONS FOR CONTINUED SPEECH THERAPY:   YES: Anticipate need for ongoing speech therapy during this hospitalization and at next level of care. ASSESSMENT   Patient presents with oral dysphagia characterized by increased time for prep and transfer of solid trials. Unable to rule out pharyngeal phase dysphagia at bedside. .     Recommend Puree foods with Thin Liquids. Provide assistance with all PO intake. EDUCATION:  Recommendations discussed with Patient    REHABILITATION POTENTIAL FOR STATED GOALS: Fair    PLAN    FREQUENCY/DURATION:   Continue to follow patient 3 times a week for duration of hospital stay to address above goals. Recommendations for next treatment session: Next treatment session will address diet tolerance    CONTINUATION OF SKILLED SERVICES/MEDICAL NECESSITY:  Patient continues to require skilled intervention due to dysphagia. .     SUBJECTIVE   Patient awake upon entry. When engaged, patient agreeable to speech therapy services via nod. Patient denied pain when questioned of status. Oxygen Device: Nasal Cannula at 2-3 liters  Pain: Pain Scale 1: Numeric (0 - 10)  Pain Intensity 1: 0  Pain Location 1: Abdomen  Pain Intervention(s) 1: Medication (see MAR)    History of Present Injury/Illness: Mr. Lina Faulkner  has a past medical history of A-fib ([facility] [address] (5/2/2014), AAA (abdominal aortic aneurysm) without rupture ([address] (3/8/2488), Acute metabolic encephalopathy (8/6/1550), Arthritis, Cancer ([address] COPD (chronic obstructive pulmonary disease) ([address] (5/2/2014), Elevated prostate specific antigen (PSA), Glaucoma (5/2/2014), Heart disease, Heart failure ([address] Hyperlipemia (5/2/2014), Hypertension, Hypertrophy of prostate with urinary obstruction and other lower urinary tract symptoms (LUTS), 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[address] (7/25/2018), OA (osteoarthritis) (5/2/2014), Peptic ulcer disease (6/1/2017), Poor historian, Prostate cancer ([address] Pulmonary embolus ([address] (6/1/2017), Supplemental oxygen dependent (5/2/2014), and Warfarin anticoagulation (5/2/2014). He also has no past medical history of Autoimmune disease ([address] Dementia, Liver disease, Other ill-defined conditions(799.89), Psychiatric disorder, Seizures ([address] or Sleep disorder. Ryan Lewisri He also  has a past surgical history that includes hx heart catheterization; colonoscopy (N/A, 9/12/2019); vascular surgery procedure unlist (09/14/2019); and egd (12/18/2019). PRECAUTIONS/ALLERGIES: Statins-hmg-coa reductase inhibitors     Problem List:  (Impairments causing functional limitations):  Dysphagia    Previous Dysphagia: NONE REPORTED  Diet Prior to Evaluation: NPO due to GI bleed. Patient is not a reliable historian and unable to state diet prior to hospitalization. Orientation:  Person  Patient stated \"I don't know\" when questioned of time and location.      Cognitive-Linguistic Screening:  Alertness  Patient able to sustain wakeful state but presenting with reduced attention to therapy tasks. Speech Production:   Limited verbalizations in session. Intelligibility may be impacted by patient's respiratory and cognitive status. Expressive Language:  Fluent speech and patient mostly using 1-2 word utterances spontaneously during session. Receptive Language:  Patient able to follow simple on step commands with visual/tactile cues provided. Cognition:   Limited assessment due to patient's level of function. Patient does present with reduced orientation and alertness. Speech therapy will perform additional assessment of cognition as patient is appropriate. Prior Level of Function: Unable to determine at time of assessment. Recommendations: Given results of screening, further evaluation is indicated to assess swallowing function and PO tolerance. .    OBJECTIVE   Oral Motor:   COMMENTS: Patient's face, tongue, and velum present as symmetrical at rest and when retracted/protruded. Patient is edentulous. Minimal pooling of secretions noted in oral cavity. Dysphagia evaluation:   Patient consumed trials of ice chips x's 2, thin liquids via spoon x's 2 and via straw x's 8, purees via spoon x's 4, and solids/sri crackers x's 1. Clinician assisted with all PO presentations. Patient's oral phase of swallow required increased time for prep and transfer of solids. Pharyngeal phase of swallow was unremarkable as able to be assessed at bedside. No overt signs of aspiration were noted with all PO Intake. Education provided as to role of ST services, diet recommendation. Patient able to verbally acknowledge information though unable to restate information to display comprehension.      Tool Used: Dysphagia Outcome and Severity Scale (MALINI)    Score Comments   Normal Diet  [] 7 With no strategies or extra time needed   Functional Swallow  [] 6 May have mild oral or pharyngeal delay   Mild Dysphagia  [] 5 Which may require one diet consistency restricted    Mild-Moderate Dysphagia  [x] 4 With 1-2 diet consistencies restricted   Moderate Dysphagia  [] 3 With 2 or more diet consistencies restricted   Moderate-Severe Dysphagia  [] 2 With partial PO strategies (trials with ST only)   Severe Dysphagia  [] 1 With inability to tolerate any PO safely      Score:  Initial: 4 Most Recent: x (Date 09/07/21 )   Interpretation of Tool: The Dysphagia Outcome and Severity Scale (MALINI) is a simple, easy-to-use, 7-point scale developed to systematically rate the functional severity of dysphagia based on objective assessment and make recommendations for diet level, independence level, and type of nutrition. Current Medications:   No current facility-administered medications on file prior to encounter. Current Outpatient Medications on File Prior to Encounter   Medication Sig Dispense Refill    ferrous sulfate (IRON) 325 mg (65 mg iron) EC tablet Take 1 Tablet by mouth two (2) times a day. 60 Tablet 2    guaiFENesin ER (MUCINEX) 600 mg ER tablet Take 1 Tablet by mouth two (2) times a day. Indications: cough 60 Tablet 0    fluticasone furoate (ARNUITY ELLIPTA) 100 mcg/actuation dsdv inhaler Take 1 Puff by inhalation daily. Wash mouth out with water after each dose. Indications: worsening of debilitating chronic lung disease called COPD 30 Blister 1    albuterol (PROVENTIL VENTOLIN) 2.5 mg /3 mL (0.083 %) nebu Take 3 mL by inhalation every six (6) hours as needed for Shortness of Breath or Wheezing. 30 Nebule 1    dexAMETHasone (DECADRON) 2 mg tablet 4 tabs po x3 days, 3 tabs po x3 days, 2 tabs po x3 days, 1 tab po x3 days, 1/2 tab po x3 days then stop. (Patient not taking: Reported on 7/29/2021) 32 Tablet 0    lisinopriL (PRINIVIL, ZESTRIL) 10 mg tablet Take 1 Tablet by mouth daily. (Patient not taking: Reported on 7/29/2021) 30 Tablet 3    metoprolol succinate (TOPROL-XL) 100 mg tablet Take 1 Tablet by mouth daily.  Indications: ventricular rate control in atrial fibrillation 30 Tablet 1    predniSONE (DELTASONE) 20 mg tablet Take 40mg qday x3d, then 20mg qday x3d, then 10mg x2d, then stop. Take with dinner (Patient taking differently: Take 40 mg by mouth daily for 3 days, then 20 mg by mouth daily for 3 days, then take 10 mg by mouth daily for 2 days - then stop - take with dinner) 10 Tablet 0    allopurinoL (ZYLOPRIM) 300 mg tablet Take 300 mg by mouth daily.  docusate sodium (COLACE) 100 mg capsule Take 1 Capsule by mouth daily. 90 Capsule 2    albuterol-ipratropium (DUO-NEB) 2.5 mg-0.5 mg/3 ml nebu 3 mL by Nebulization route three (3) times daily for 180 days. 810 mL 1    rosuvastatin (Crestor) 40 mg tablet Take 1 Tab by mouth nightly. 90 Tab 1    nitroglycerin (NITROSTAT) 0.4 mg SL tablet 1 Tab by SubLINGual route every five (5) minutes as needed for Chest Pain (call EMS if pain fails to resolve after 2 tabs. max daily dose of 3 tabs). Indications: after 15 minutes or 3 doses, if chest pain persists, contact EMS/911 1 Bottle 11    tamsulosin (FLOMAX) 0.4 mg capsule Take 1 Cap by mouth daily. 90 Cap 3    aspirin delayed-release 81 mg tablet Take 81 mg by mouth daily.  albuterol (PROVENTIL HFA, VENTOLIN HFA, PROAIR HFA) 90 mcg/actuation inhaler TAKE 2 PUFFS BY MOUTH EVERY 4 HOURS AS NEEDED FOR WHEEZE      latanoprost (XALATAN) 0.005 % ophthalmic solution LOCATION  BOTH EYES. INSTILL ONE DROP INTO EACH EYE AT BEDTIME      famotidine (PEPCID) 40 mg tablet Take 1 Tab by mouth daily. 90 Tab 3    OXYGEN-AIR DELIVERY SYSTEMS 2 L/min by Nasal route daily. nasal canula      brimonidine (ALPHAGAN P) 0.1 % ophthalmic solution Administer 1 Drop to both eyes two (2) times a day.           INTERDISCIPLINARY COLLABORATION: RN    After treatment position/precautions:  Upright in bed  RN  notified    Total Treatment Duration:   Time In: 1435  Time Out: Adrianne Husain 89, M.S., [facility]

## 2021-09-07 NOTE — PROGRESS NOTES
Niece at bedside. Patient alert and oriented at this time. Wishes to be made \"more comfortable\" because \"I have been sick a long time\". Palliative speaking on phone with cheri Hernandez.

## 2021-09-07 NOTE — PROGRESS NOTES
Chart reviewed s/p tx from ES to DT and post surgery Exp lap with hemicolectomy with colostomy by Dr. Rossy Caldwell. Currently on levo gtt and 2L O2 via NC. Screen completed prior to tx. Pt lives at home alone. Does have CLTC aid 5 days week/3 hours day. HCPOA on file. PPD for potential rehab needs already placed. PT/OT/ST per MD. CM will follow for d/c needs/POC when stable per MD.     Care Management Interventions  PCP Verified by CM: Yes Shanthi Cowan)  Mode of Transport at Discharge:  Other (see comment)  Transition of Care Consult (CM Consult): Discharge Planning (CLTC aid 5 days  week/3 hours day)  Discharge Durable Medical Equipment:  (O2, walker, w/c)  Current Support Network: Lives Alone, Own Home  Confirm Follow Up Transport: Family  Freedom of Choice List was Provided with Basic Dialogue that Supports the Patient's Individualized Plan of Care/Goals, Treatment Preferences and Shares the Quality Data Associated with the Providers?: Yes  [place] Resource Information Provided?:  [facility])  Discharge Location  Discharge Placement: Unable to determine at this time

## 2021-09-07 NOTE — PROGRESS NOTES
H&P/Consult Note/Progress Note/Office Note:   Idalia Mcdonald MRN: [id number]  :1940  Age:80 y.o. General Surgery Consult ordered by: Dr. Aiden Wang  Reason for General Surgery Consult: Diverticular Bleed    HPI: Idalia Briseno. is a 68 y.o. male with a past medical history of CHF, HTN, Hyperlipidemia, OA, AFib/ flutter (not on [facility] [place] recurrent GI bleeding) s/p AVN ablation and BiV ICD placement 2021, COPD and chronic hypoxemic respiratory failure, iron deficiency anemia, abdominal aortic aneurysm s/p EVAR 2019, CKD, prostate cancer, and Gastric AVM who initially presented to [facility] ED 21 with C/o dark stools x 24 hours. Pt denied pain and hx of similar problem. At that time, pt was found to be hemodynamically stable with hgb 10.5 which was above baseline. Pt was discharges with outpatient referral made to GI for further workup. Pt returned to ED 21 with C/o bright red blood per rectum. Pt states he noticed an increase in the amount of blood he was passing and now passing blood clots. Pt's hgb down from 10.5 on  to 7.7. Pt was transferred to HOSP[facility] [address] for admission by Hospitalist due to bed shortage. GI was consulted and has been following. Pt's hgb has continued to drop requiring multiple transfusions. Pt also hypotensive requiring Levophed. Nuclear scan showing evidence of lower GI bleed. Pt transferred downtown 21 for IR consultation. General Surgery consulted in case surgical intervention necessary for GI bleed. 21 Last hgb 9.1. Pt currently passing jaiden red blood with clots in stool. 21- moaning, but alert and oriented. Continues on Levo, nurses states pressures unable to tolerate dilaudid. Trying to wean. Ostomy stoma pink, minimal bloody output. Staples intact.  Hgb 7.5 , platelets 84, continues to  Elevate 3.49      Past Medical History:   Diagnosis Date    A-fib [facility]) 2014    AAA (abdominal aortic aneurysm) without rupture ([facility] 2014    3.2 on US 2/14 [facility]    Acute metabolic encephalopathy 0/8/5945    Arthritis     Cancer (HCC)     hx prostate cancer    COPD (chronic obstructive pulmonary disease) (HCC) 5/2/2014    Elevated prostate specific antigen (PSA)     Glaucoma 5/2/2014    Heart disease     Heart failure ([facility]     Hyperlipemia 5/2/2014    Hypertension     Hypertrophy of prostate with urinary obstruction and other lower urinary tract symptoms (LUTS)     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[facility] 7/25/2018    OA (osteoarthritis) 5/2/2014    Peptic ulcer disease 6/1/2017    Poor historian     Prostate cancer ([facility]     Pulmonary embolus ([facility] 6/1/2017    Supplemental oxygen dependent 5/2/2014    Warfarin anticoagulation 5/2/2014     Past Surgical History:   Procedure Laterality Date    COLONOSCOPY N/A 9/12/2019    COLONOSCOPY/ 26 ROOM 614 performed by [facility]ia Peralta [facility] ENDOSCOPY    EGD  12/18/2019         HX HEART CATHETERIZATION      VASCULAR SURGERY PROCEDURE UNLIST  09/14/2019     Bilateral common femoral artery cutdown with exposure and placement of catheter in aorta for aortogram,Endovascular repair of infrarenal abdominal aortic aneurysm with bifurcated modulated device (31 x 14 x 13 main body) via the left common femoral, right iliac extender measuring 27 x 10.      Current Facility-Administered Medications   Medication Dose Route Frequency    alteplase (CATHFLO) injection 1 mg  1 mg InterCATHeter ONCE    piperacillin-tazobactam (ZOSYN) 3.375 g in 0.9% sodium chloride (MBP/ADV) 100 mL MBP  3.375 g IntraVENous Q12H    heparin (porcine) injection 5,000 Units  5,000 Units SubCUTAneous Q12H    PHENYLephrine (LASHELL-SYNEPHRINE) 30 mg in 0.9% sodium chloride 250 mL infusion   mcg/min IntraVENous TITRATE    vasopressin (VASOSTRICT) 20 Units in 0.9% sodium chloride 100 mL infusion  0-0.03 Units/min IntraVENous TITRATE    hydrocortisone Sod Succ (PF) (SOLU-CORTEF) injection 50 mg  50 mg IntraVENous Q6H    lactated Ringers infusion  75 mL/hr IntraVENous CONTINUOUS    albuterol (PROVENTIL VENTOLIN) nebulizer solution 2.5 mg  2.5 mg Nebulization Q6H RT    Vancomycin Intermittent Dosing   Other Rx Dosing/Monitoring    HYDROmorphone (DILAUDID) injection 0.5 mg  0.5 mg IntraVENous Q2H PRN    insulin lispro (HUMALOG) injection   SubCUTAneous Q4H    sodium chloride (NS) flush 5-40 mL  5-40 mL IntraVENous Q8H    sodium chloride (NS) flush 5-40 mL  5-40 mL IntraVENous PRN    ondansetron (ZOFRAN) injection 4 mg  4 mg IntraVENous Q4H PRN    albuterol (PROVENTIL VENTOLIN) nebulizer solution 2.5 mg  2.5 mg Inhalation Q6H PRN    allopurinoL (ZYLOPRIM) tablet 300 mg  300 mg Oral DAILY    brimonidine (ALPHAGAN) 0.2 % ophthalmic solution 1 Drop  1 Drop Both Eyes BID    docusate sodium (COLACE) capsule 100 mg  100 mg Oral DAILY    [Held by provider] ferrous sulfate tablet 325 mg  1 Tablet Oral BID WITH MEALS    fluticasone (FLOVENT HFA) 110 mcg inhaler  1 Puff Inhalation DAILY    latanoprost (XALATAN) 0.005 % ophthalmic solution 1 Drop  1 Drop Both Eyes QHS    [Held by provider] lisinopriL (PRINIVIL, ZESTRIL) tablet 10 mg  10 mg Oral DAILY    metoprolol succinate (TOPROL-XL) XL tablet 100 mg  100 mg Oral DAILY    nitroglycerin (NITROSTAT) tablet 0.4 mg  0.4 mg SubLINGual Q5MIN PRN    rosuvastatin (CRESTOR) tablet 40 mg  40 mg Oral QHS    [Held by provider] tamsulosin (FLOMAX) capsule 0.4 mg  0.4 mg Oral DAILY    0.9% sodium chloride infusion 250 mL  250 mL IntraVENous PRN    acetaminophen (TYLENOL) tablet 650 mg  650 mg Oral Q6H PRN    NOREPINephrine (LEVOPHED) 4 mg in 5% dextrose 250 mL infusion  0.5-30 mcg/min IntraVENous TITRATE    sodium chloride (NS) flush 5-40 mL  5-40 mL IntraVENous Q8H    sodium chloride (NS) flush 5-40 mL  5-40 mL IntraVENous PRN    pantoprazole (PROTONIX) 40 mg in 0.9% sodium chloride 10 mL injection  40 mg IntraVENous Q24H     Statins-hmg-coa reductase inhibitors  Social History     Socioeconomic History    Marital status: SINGLE     Spouse name: Not on file    Number of children: Not on file    Years of education: Not on file    Highest education level: Not on file   Tobacco Use    Smoking status: Former Smoker     Packs/day: 2.00     Years: 40.00     Pack years: 80.00     Quit date: 2014     Years since quittin.1    Smokeless tobacco: Never Used    Tobacco comment: still taking Chantix    Substance and Sexual Activity    Alcohol use: No    Drug use: No    Sexual activity: Yes     Partners: Female     Birth control/protection: None     Social Determinants of Health     Financial Resource Strain: Medium Risk    Difficulty of Paying Living Expenses: Somewhat hard   Food Insecurity: No Food Insecurity    Worried About Running Out of Food in the Last Year: Never true    Salas of Food in the Last Year: Never true   Transportation Needs: No Transportation Needs    Lack of Transportation (Medical): No    Lack of Transportation (Non-Medical): No   Physical Activity:     Days of Exercise per Week:     Minutes of Exercise per Session:    Stress:     Feeling of Stress :    Social Connections:     Frequency of Communication with Friends and Family:     Frequency of Social Gatherings with Friends and Family:     Attends Alevism Services:     Active Member of Clubs or Organizations:     Attends Club or Organization Meetings:     Marital Status:      Social History     Tobacco Use   Smoking Status Former Smoker    Packs/day: 2.00    Years: 40.00    Pack years: 80.00    Quit date: 2014    Years since quittin.1   Smokeless Tobacco Never Used   Tobacco Comment    still taking Chantix      Family History   Problem Relation Age of Onset    Cancer Mother     Heart Disease Father      ROS: No fever or chills. Comprehensive review of systems was otherwise unremarkable except as noted above.     Physical Exam:   Visit Vitals  BP (!) 117/49   Pulse (!) 113   Temp 98 °F (36.7 °C) Resp 17   Ht 5' 9\" (1.753 m)   Wt 187 lb 9.8 oz (85.1 kg)   SpO2 100%   BMI 27.71 kg/m²     Vitals:    09/07/21 1030 09/07/21 1045 09/07/21 1100 09/07/21 1115   BP:       Pulse: (!) 117 (!) 113 (!) 112 (!) 113   Resp: 27 25 25 17   Temp:   98 °F (36.7 °C)    SpO2: 100% 100% 100% 100%   Weight:       Height:         09/07 0701 - 09/07 1900  In: -   Out: 140 [Urine:110; Drains:30]  09/05 1901 - 09/07 0700  In: 6034.6 [I.V.:6034.6]  Out: 2040 [Urine:950; Drains:830]    Constitutional: Alert, cooperative, NAD  Eyes: Sclera are clear. EOMs intact  ENMT: no external lesions gross hearing normal; no obvious neck masses, no ear or lip lesions, nares normal, ngt  CV: RRR. Normal perfusion  Resp: No JVD. Breathing is  non-labored; no audible wheezing. GI: soft and slightly distended, staples intact without redness or drainage. Ostomy with minimal bloody drainage   Musculoskeletal: No embolic signs or cyanosis. Edema noted bilateral upper ext.    Neuro: moves all 4; no focal deficits  Psychiatric: normal affect and mood    Recent vitals (if inpt):  Patient Vitals for the past 24 hrs:   BP Temp Pulse Resp SpO2 Weight   09/07/21 1115 -- -- (!) 113 17 100 % --   09/07/21 1100 -- 98 °F (36.7 °C) (!) 112 25 100 % --   09/07/21 1045 -- -- (!) 113 25 100 % --   09/07/21 1030 -- -- (!) 117 27 100 % --   09/07/21 1015 -- -- 100 16 100 % --   09/07/21 1000 -- -- (!) 109 19 100 % --   09/07/21 0945 -- -- (!) 103 16 100 % --   09/07/21 0930 -- -- 98 14 99 % --   09/07/21 0915 -- -- (!) 108 17 100 % --   09/07/21 0900 -- -- (!) 105 14 100 % --   09/07/21 0845 -- -- (!) 101 18 100 % --   09/07/21 0830 -- -- 95 14 100 % --   09/07/21 0818 -- -- -- -- -- 187 lb 9.8 oz (85.1 kg)   09/07/21 0815 -- -- 97 14 100 % --   09/07/21 0800 -- -- (!) 101 14 100 % --   09/07/21 0745 -- -- (!) 103 15 100 % --   09/07/21 0730 -- -- (!) 107 19 100 % --   09/07/21 0715 -- 98.1 °F (36.7 °C) 94 14 100 % --   09/07/21 0700 -- -- 100 16 100 % --   09/07/21 0645 -- -- (!) 105 21 100 % --   09/07/21 0630 -- -- (!) 102 20 100 % --   09/07/21 0615 -- -- 100 16 100 % --   09/07/21 0600 -- -- 98 16 100 % --   09/07/21 0545 -- -- (!) 114 13 100 % --   09/07/21 0530 -- -- (!) 101 13 100 % --   09/07/21 0510 -- -- -- -- 100 % --   09/07/21 0505 -- -- (!) 123 23 100 % --   09/07/21 0445 -- -- (!) 101 18 100 % --   09/07/21 0432 (!) 117/49 -- 99 16 100 % --   09/07/21 0415 -- -- 99 25 100 % --   09/07/21 0413 -- -- -- -- 100 % --   09/07/21 0400 -- -- 95 15 100 % --   09/07/21 0332 (!) 110/50 -- 91 14 100 % --   09/07/21 0315 -- -- 92 13 100 % --   09/07/21 0303 (!) 105/51 -- 93 14 100 % --   09/07/21 0300 (!) 130/44 98.2 °F (36.8 °C) 93 14 100 % --   09/07/21 0245 -- -- 96 15 100 % --   09/07/21 0232 (!) 105/46 -- 93 14 100 % --   09/07/21 0215 -- -- 97 14 100 % --   09/07/21 0202 (!) 119/52 -- (!) 105 22 100 % --   09/07/21 0130 -- -- 92 13 100 % --   09/07/21 0115 -- -- 97 15 100 % --   09/07/21 0100 -- -- 98 17 100 % --   09/06/21 2315 -- -- 90 13 93 % --   09/06/21 2300 (!) 152/48 98.2 °F (36.8 °C) 93 14 100 % --   09/06/21 2232 (!) 109/47 -- 93 13 100 % --   09/06/21 2215 -- -- 97 17 100 % --   09/06/21 2200 -- -- (!) 102 17 100 % --   09/06/21 2145 -- -- 95 12 100 % --   09/06/21 2132 (!) 110/50 -- 99 15 100 % --   09/06/21 2115 -- -- (!) 103 14 100 % --   09/06/21 2100 -- -- (!) 104 20 100 % --   09/06/21 2045 -- -- 100 13 95 % --   09/06/21 2032 (!) 125/52 -- (!) 106 16 100 % --   09/06/21 2015 -- -- (!) 105 15 100 % --   09/06/21 2004 -- -- -- -- 100 % --   09/06/21 2000 -- -- 98 12 -- --   09/06/21 1951 (!) 116/49 -- 99 12 -- --   09/06/21 1933 (!) 116/49 -- 100 15 -- --   09/06/21 1915 -- -- (!) 102 13 -- --   09/06/21 1900 (!) 150/44 98.1 °F (36.7 °C) 100 13 100 % --   09/06/21 1845 -- -- (!) 101 14 -- --   09/06/21 1832 (!) 108/53 -- 99 14 -- --   09/06/21 1815 -- -- 100 13 -- --   09/06/21 1802 (!) 117/48 -- 97 13 -- --   09/06/21 1733 (!) 101/41 -- (!) 105 13 -- --   09/06/21 1708 (!) 110/44 -- (!) 104 16 -- --   09/06/21 1614 (!) 97/45 -- (!) 112 15 -- --   09/06/21 1506 -- -- -- -- 96 % --   09/06/21 1500 -- 98.6 °F (37 °C) (!) 108 15 -- --   09/06/21 1427 (!) 120/54 -- (!) 113 19 -- --   09/06/21 1400 -- -- (!) 121 (!) 32 -- --   09/06/21 1330 -- -- (!) 115 16 -- --   09/06/21 1300 -- -- 70 27 -- --   09/06/21 1253 (!) 110/45 -- (!) 119 20 -- --   09/06/21 1201 -- -- (!) 107 18 100 % --   09/06/21 1145 -- -- (!) 109 16 100 % --   09/06/21 1141 (!) 97/53 -- (!) 110 14 100 % --       Amount and/or Complexity of Data Reviewed and Analyzed:  I reviewed and analyzed all of the unique labs and radiologic studies that are shown below as well as any that are in the HPI, and any that are in the expanded problem list below  *Each unique test, order, or document contributes to the combination of 2 or combination of 3 in Category 1 below. For this visit I also reviewed old records and prior notes. Recent Labs     09/07/21 0920 09/07/21 0312 09/07/21 0312   WBC  --   --  22.3*   HGB 7.7*   < > 7.5*   PLT  --   --  84*   NA  --   --  143   K  --   --  5.1   CL  --   --  111*   CO2  --   --  24   BUN  --   --  31*   CREA  --   --  3.49*   GLU  --   --  110*   TBILI  --   --  0.4   ALT  --   --  <6*   AP  --   --  24*    < > = values in this interval not displayed.      Review of most recent CBC  Lab Results   Component Value Date/Time    WBC 22.3 (H) 09/07/2021 03:12 AM    HGB 7.7 (L) 09/07/2021 09:20 AM    HCT 22.8 (L) 09/07/2021 03:12 AM    PLATELET 84 (L) 68/72/3159 03:12 AM    MCV 88.4 09/07/2021 03:12 AM       Review of most recent BMP  Lab Results   Component Value Date/Time    Sodium 143 09/07/2021 03:12 AM    Potassium 5.1 09/07/2021 03:12 AM    Chloride 111 (H) 09/07/2021 03:12 AM    CO2 24 09/07/2021 03:12 AM    Anion gap 8 09/07/2021 03:12 AM    Glucose 110 (H) 09/07/2021 03:12 AM    BUN 31 (H) 09/07/2021 03:12 AM    Creatinine 3.49 (H) 09/07/2021 03:12 AM BUN/Creatinine ratio 12 06/24/2021 09:53 AM    GFR est AA 22 (L) 09/07/2021 03:12 AM    GFR est non-AA 18 (L) 09/07/2021 03:12 AM    Calcium 7.9 (L) 09/07/2021 03:12 AM       Review of most recent LFTs (and lipase if done)  Lab Results   Component Value Date/Time    ALT (SGPT) <6 (L) 09/07/2021 03:12 AM    AST (SGOT) 31 09/07/2021 03:12 AM    Alk. phosphatase 24 (L) 09/07/2021 03:12 AM    Bilirubin, total 0.4 09/07/2021 03:12 AM     No results found for: LPSE    Lab Results   Component Value Date/Time    INR 0.9 08/31/2021 12:35 AM    aPTT 73.1 (H) 08/31/2018 01:40 AM    Lactic acid 2.4 (H) 12/21/2014 08:38 PM    Ammonia 19 07/03/2021 04:16 PM    Troponin-I, Qt. <0.02 (L) 04/09/2020 10:46 AM       Review of most recent HgbA1c  Lab Results   Component Value Date/Time    Hemoglobin A1c (POC) 5.1 08/13/2019 10:10 AM       Nutritional assessment screen for wound healing issues:  Lab Results   Component Value Date/Time    Protein, total 4.1 (L) 09/07/2021 03:12 AM    Albumin 2.1 (L) 09/07/2021 03:12 AM       @[email or web address]@  XR Results (most recent):  Results from Hospital Encounter encounter on 09/05/21    XR CHEST PORT    Narrative  EXAM: Chest x-ray. INDICATION: Dyspnea. COMPARISON: Yesterday's chest x-ray. TECHNIQUE: Frontal view chest x-ray. FINDINGS: Again noted are emphysematous changes in the lungs, with no focal  infiltrate. The heart size is normal. No pneumothorax or pleural effusion is  seen. Again noted is a left chest wall biventricular pacemaker. The enteric tube  projects below the diaphragms. The right jugular central line tip is at the  cavoatrial junction. Impression  No acute process. Emphysema. CT Results (most recent):  Results from Hospital Encounter encounter on 07/01/21    CT HEAD WO CONT    Narrative  NONCONTRAST HEAD CT    CLINICAL HISTORY:  Decreased level of consciousness. TECHNIQUE:  Axial images were obtained with spiral technique.   Radiation dose  reduction was achieved using one or all of the following techniques: automated  exposure control, weight-based dosing, iterative reconstruction. COMPARISON:  None. REPORT:   Standard noncontrast head CT demonstrates no definite intracranial  mass effect, hemorrhage, or evidence of acute geographic infarction. Extensive  white matter hypodensities are most consistent with small vessel ischemic  disease. The ventricles are normal in size and configuration, accounting for  the patient's age. Orbits  and paranasal sinuses are clear where imaged. Bone  windows demonstrate no definite fracture or destruction. Impression  EXTENSIVE SMALL VESSEL ISCHEMIC DISEASE WITH NO ACUTE  INTRACRANIAL ABNORMALITY IDENTIFIED AT NONCONTRAST CT.    US Results (most recent):  No results found for this or any previous visit.         Admission date (for inpatients): 9/5/2021   * No surgery found *  Procedure(s):  LAPAROTOMY EXPLORATORY EXTENDED LEFT HEMICOLECTOMY WITH COLOSTOMY  Mobilization of splenic flexure        ASSESSMENT/PLAN:  Problem List  Date Reviewed: 9/5/2021        Codes Class Noted    * (Principal) Hemorrhagic shock ([facility] ICD-10-CM: R57.8  ICD-9-CM: 785.59  9/5/2021        Hyperlipidemia ICD-10-CM: E78.5  ICD-9-CM: 272.4  9/5/2021        Lower GI bleed ICD-10-CM: K92.2  ICD-9-CM: 578.9  9/5/2021        Acute on chronic renal insufficiency ICD-10-CM: N28.9, N18.9  ICD-9-CM: 593.9, 585.9  9/5/2021        Respiratory failure, post-operative ([facility] ICD-10-CM: N46.083  ICD-9-CM: 518.51  9/5/2021        Thrombocytopenia (HCC) ICD-10-CM: D69.6  ICD-9-CM: 287.5  9/5/2021        KAYLA (acute kidney injury) ([facility] ICD-10-CM: N17.9  ICD-9-CM: 584.9  9/1/2021        Acute blood loss anemia ICD-10-CM: D62  ICD-9-CM: 285.1  8/31/2021        GIB (gastrointestinal bleeding) ICD-10-CM: K92.2  ICD-9-CM: 578.9  8/31/2021        Hyperkalemia ICD-10-CM: E87.5  ICD-9-CM: 276.7  7/7/2021        Acute metabolic encephalopathy ZMB-65-OG: G93.41  ICD-9-CM: 348.31 7/5/2021        Acute on chronic respiratory failure with hypoxia and hypercapnia (HCC) ICD-10-CM: J96.21, J96.22  ICD-9-CM: 518.84, 786.09, 799.02  6/9/2021        Diverticulosis ICD-10-CM: K57.90  ICD-9-CM: 562.10  5/27/2021    Overview Signed 5/27/2021  8:48 AM by Helena Latif MD     Formatting of this note might be different from the original.  Noted on colonoscopy 2013             Mild protein-calorie malnutrition ([facility] ICD-10-CM: E44.1  ICD-9-CM: 263.1  5/19/2021        Short-term memory loss ICD-10-CM: R41.3  ICD-9-CM: 780.93  5/18/2021        COPD exacerbation ([facility] ICD-10-CM: J44.1  ICD-9-CM: 491.21  5/17/2021        Prostate cancer ([facility] ICD-10-CM: C61  ICD-9-CM: 185  3/25/2021    Overview Signed 3/25/2021  6:12 AM by Helena Latif MD     Last Assessment & Plan:   Follows with Dr Shepard Husbands             Chronic combined systolic and diastolic heart failure [facility]) ICD-10-CM: I50.42  ICD-9-CM: 428.42  12/21/2020        History of GI bleed ICD-10-CM: Z87.19  ICD-9-CM: V12.79  12/21/2020        Iron deficiency anemia ICD-10-CM: D50.9  ICD-9-CM: 280.9  12/21/2020        AAA (abdominal aortic aneurysm) ([facility] [facility] ICD-10-CM: I71.4  ICD-9-CM: 441.4  9/14/2019        Upper GI bleed ICD-10-CM: K92.2  ICD-9-CM: 578.9  9/9/2019        Chronic hypoxemic respiratory failure (HCC) (Chronic) ICD-10-CM: J96.11  ICD-9-CM: 518.83, 799.02  9/9/2019        Heart failure with reduced ejection fraction (HCC) (Chronic) ICD-10-CM: I50.20  ICD-9-CM: 428.20  9/9/2019        CKD (chronic kidney disease), stage III (HCC) (Chronic) ICD-10-CM: N18.30  ICD-9-CM: 585.3  9/9/2019        Absolute anemia ICD-10-CM: D64.9  ICD-9-CM: 285.9  4/12/2019        Multiple pulmonary nodules (Chronic) ICD-10-CM: R91.8  ICD-9-CM: 793.19  8/30/2018        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HCC) (Chronic) ICD-10-CM: J15.8, A31.9  ICD-9-CM: 482.89, 031.9  7/25/2018    Overview Signed 8/30/2018  8:54 AM by MESSI Avelar followed by Dr. Martina Peterson at [facility].  In March 2018 was found with new SAMANTHA nodules with spiculation. CT-guided biopsy of SAMANTHA nodule revealed Geni Burr was started on ETB/azithro/rif.               Acute on chronic respiratory failure with hypoxia (HCC) ICD-10-CM: J96.21  ICD-9-CM: 518.84, 799.02  7/25/2018        Atrial flutter with rapid ventricular response [facility]) ICD-10-CM: I48.92  ICD-9-CM: 427.32  7/23/2018        History of pulmonary embolism ICD-10-CM: F81.748  ICD-9-CM: V12.55  6/22/2017        Tobacco use disorder ICD-10-CM: F17.200  ICD-9-CM: 305.1  6/1/2017        Hypertension ICD-10-CM: I10  ICD-9-CM: 401.9  6/1/2017        Cardiomyopathy ([address] ICD-10-CM: I42.9  ICD-9-CM: 425.4  6/1/2017        Peptic ulcer disease ICD-10-CM: K27.9  ICD-9-CM: 533.90  6/1/2017        Nontoxic multinodular goiter ICD-10-CM: E04.2  ICD-9-CM: 241.1  5/19/2017    Overview Signed 5/27/2021  8:48 AM by Joseph Pederson MD     Last Assessment & Plan:   Formatting of this note might be different from the original.  Thyroid ultrasound was done today. This study was difficult due to his chronic shortness of breath while lying supine. He was somewhat of a moving target. 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 The right lobe measures 1.90 x 2.38 x 3.55 cm. The isthmus is thickened and irregular. The left lobe measures 2.46 x 1.56 x 4.59 cm. There are numerous scattered cystic or partially cystic nodules throughout both lobes the thyroid gland. Essentially there is no normal thyroid tissue. The largest nodule that I couldn't visualize is in the right lobe measuring 0.84 x 1.29 x 1.29 cm. It is partially cystic and surrounded by a halo sign. There is a similar-sized purely cystic nodule in the left lobe also. He appears to have long-standing multinodular/multicystic disease. I don't recommend anything further be done for this other than observation. I don't feel that he needs to have biopsy of any these lesions as they appear to be low risk for malignancy. Because of his rather significant COPD he is at high risk for any surgery such as thyroidectomy and I would only recommend moving to this as a last resort and only if absolutely necessary. I will see him again in 6 months, and then annually thereafter at least for a while to watch his thyroid gland. Vitamin D deficiency ICD-10-CM: E55.9  ICD-9-CM: 268.9  2/2/2016    Overview Signed 5/27/2021  8:48 AM by Joseph Pederson MD     Last Assessment & Plan:   Formatting of this note might be different from the original.  Vitamin D at goal no changes             Renal cysts, acquired, bilateral ICD-10-CM: N28.1  ICD-9-CM: 593.2  5/18/2015        AAA (abdominal aortic aneurysm) without rupture (HCC) (Chronic) ICD-10-CM: I71.4  ICD-9-CM: 441.4  5/2/2014    Overview Signed 5/2/2014 10:53 AM by Mahesh Trevino NP     3.2 on  2/14 [facility]             COPD (chronic obstructive pulmonary disease) ([facility] (Chronic) ICD-10-CM: J44.9  ICD-9-CM: 869  5/2/2014        Hyperlipemia (Chronic) ICD-10-CM: E78.5  ICD-9-CM: 272.4  5/2/2014        Glaucoma (Chronic) ICD-10-CM: H40.9  ICD-9-CM: 365.9  5/2/2014        Supplemental oxygen dependent (Chronic) ICD-10-CM: Z99.81  ICD-9-CM: V46.2  5/2/2014        OA (osteoarthritis) (Chronic) ICD-10-CM: M19.90  ICD-9-CM: 715.90  5/2/2014            Principal Problem:    Hemorrhagic shock ([facility] (9/5/2021)    Active Problems:    COPD (chronic obstructive pulmonary disease) ([facility] (5/2/2014)      Hypertension (6/1/2017)      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[facility] (7/25/2018)      Overview: MAC followed by Dr. Citlalli Mayer at [address] In March 2018 was found with new SAMANTHA       nodules with spiculation.   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        He was started on ETB/azithro/rif.        Chronic hypoxemic respiratory failure ([facility] [address] (9/9/2019)      CKD (chronic kidney disease), stage III ([facility] [address] (9/9/2019)      Chronic combined systolic and diastolic heart failure ([facility] [address] (12/21/2020)      Hyperkalemia (7/7/2021)      Acute blood loss anemia (8/31/2021)      Hyperlipidemia (9/5/2021)      Lower GI bleed (9/5/2021)      Acute on chronic renal insufficiency (9/5/2021)      Respiratory failure, post-operative ([address] (9/5/2021)      Thrombocytopenia ([facility] [address] (9/5/2021)           Number and Complexity of Problems addressed and   Risks of complications and/or morbidity of management  Plan:  Care Management per primary  General Surgery following  Wean pressors as tolerated  Prophylactic anticoagulation started- high risk for DVT  Monitor for bleeding  Monitor Hgb  Monitor platelets  DC NGT  Consult for speech eval  Giovanni maintained.     Susana An, MESSI

## 2021-09-07 NOTE — PROGRESS NOTES
[facility], [place] CARDIOLOGY PROGRESS NOTE           9/7/2021 3:22 PM    Admit Date: 9/5/2021      Subjective:   weak      Objective:      Vitals:    09/07/21 1315 09/07/21 1330 09/07/21 1345 09/07/21 1400   BP:       Pulse: (!) 102 (!) 104 (!) 102 100   Resp: 12 12 13 13   Temp:       SpO2: 100% 100% 100% 100%   Weight:       Height:           Physical Exam:  General- somnolent but arouses and communicates  CV- soft hs's  Lung- clear bilaterally  Abd- soft, nontender, nondistended  Ext- ++++ edema bilaterally. Skin- warm and dry    Data Review:   Recent Labs     09/07/21  1413 09/07/21  0920 09/07/21  0312 09/07/21  0312 09/06/21  0822 09/06/21  0220   NA  --   --   --  143  --  147*   K  --   --   --  5.1  --  4.5   BUN  --   --   --  31*  --  25*   CREA  --   --   --  3.49*  --  2.28*   GLU  --   --   --  110*  --  135*   WBC  --   --   --  22.3*  --  22.2*   HGB 7.7* 7.7*   < > 7.5*   < > 9.5*   HCT  --   --   --  22.8*  --  28.9*   PLT  --   --   --  84*  --  67*    < > = values in this interval not displayed. Assessment/Plan:     Principal Problem:    Hemorrhagic shock ([facility] [address] (9/5/2021)        Active Problems:    COPD (chronic obstructive pulmonary disease) ([facility] [address] (5/2/2014)          Hypertension (6/1/2017)          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[facility] [address] (7/25/2018)          [facility] (9/9/2019)          CKD (chronic kidney disease), stage III ([facility] [address] (9/9/2019)          Chronic combined systolic and diastolic heart failure ([facility] [address] (12/21/2020)          Hyperkalemia (7/7/2021)          Acute blood loss anemia (8/31/2021)          Hyperlipidemia (9/5/2021)          Lower GI bleed (9/5/2021)          Acute on chronic renal insufficiency (9/5/2021)          Respiratory failure, post-operative ([facility] [address] (9/5/2021)          Thrombocytopenia ([facility] [address] (9/5/2021)      /////    He is volume overloaded. IVF is stopped and IV Lasix is prescribed.           Liu Yanez MD  9/7/2021 3:22 PM

## 2021-09-08 NOTE — PROGRESS NOTES
LTG: Patient will tolerate least restrictive diet without overt signs or symptoms of airway compromise. STG: Patient will tolerate soft and bite sized diet and thin liquids without overt signs or symptoms of airway compromise. STG: Patient will participate in modified barium swallow study as clinically indicated. SPEECH LANGUAGE PATHOLOGY: DYSPHAGIA- Daily Note 1    NAME/AGE/GENDER: Tory Galeana is a 68 y.o. male  DATE: 9/8/2021  PRIMARY DIAGNOSIS: Hemorrhagic shock ([facility] [address] [R57.8]  Lower GI bleed [K92.2]  Hypertension [I10]  Hyperlipidemia [E78.5]  Procedure(s) (LRB):  LAPAROTOMY EXPLORATORY EXTENDED LEFT HEMICOLECTOMY WITH COLOSTOMY  Mobilization of splenic flexure (N/A) 3 Days Post-Op  ICD-10: Treatment Diagnosis: R13.12 Dysphagia, Oropharyngeal Phase    RECOMMENDATIONS   DIET:   Pureed  Thin Liquids    MEDICATIONS: As tolerated     PRECAUTIONS, MODIFICATIONS, AND STRATEGIES  Slow rate of intake  Small bites/sips  Upright at 90 degrees during meal   Provide assistance with all PO intake. RECOMMENDATIONS FOR CONTINUED SPEECH THERAPY:   YES: Anticipate need for ongoing speech therapy during this hospitalization and at next level of care. ASSESSMENT   Patient with limited intake today. 2 ounces of thin liquids and 4 tsp of puree tolerated without overt s/sx of airway compromise. Ongoing oral dysphagia with prolonged prep/decreased propulsion with puree; therefore chewable trials deferred. Per RN report, patient's family to come in later today for discussion regarding goals of care. Recommend continue with puree foods with Thin Liquids. Medications as tolerated. 1:1 assistance with po intake. Will follow along as indicated by goals. EDUCATION:  Recommendations discussed with Patient    REHABILITATION POTENTIAL FOR STATED GOALS: Fair    PLAN    FREQUENCY/DURATION:   Continue to follow patient 3 times a week for duration of hospital stay to address above goals.  Recommendations for next treatment session: Next treatment session will address diet tolerance    CONTINUATION OF SKILLED SERVICES/MEDICAL NECESSITY:  Patient continues to require skilled intervention due to dysphagia. .     SUBJECTIVE   Patient awake, but essentially non-verbal. He indicated preferences re: po intake via head nods. Oxygen Device: Nasal Cannula at 2-3 liters  Pain: Pain Scale 1: Adult Nonverbal Pain Scale  Pain Intensity 1: 2  Pain Intervention(s) 1: Medication (see MAR)    History of Present Injury/Illness: Mr. Anastasiya Dailey  has a past medical history of A-fib ([facility] [address] (5/2/2014), AAA (abdominal aortic aneurysm) without rupture ([address] (9/9/1127), Acute metabolic encephalopathy (7/3/2426), Arthritis, Cancer ([address] COPD (chronic obstructive pulmonary disease) ([address] (5/2/2014), Elevated prostate specific antigen (PSA), Glaucoma (5/2/2014), Heart disease, Heart failure ([address] Hyperlipemia (5/2/2014), Hypertension, Hypertrophy of prostate with urinary obstruction and other lower urinary tract symptoms (LUTS), 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[address] (7/25/2018), OA (osteoarthritis) (5/2/2014), Peptic ulcer disease (6/1/2017), Poor historian, Prostate cancer ([address] Pulmonary embolus ([address] (6/1/2017), Supplemental oxygen dependent (5/2/2014), and Warfarin anticoagulation (5/2/2014). He also has no past medical history of Autoimmune disease ([address] Dementia, Liver disease, Other ill-defined conditions(799.89), Psychiatric disorder, Seizures ([address] or Sleep disorder. Joo Brown He also  has a past surgical history that includes hx heart catheterization; colonoscopy (N/A, 9/12/2019); vascular surgery procedure unlist (09/14/2019); and egd (12/18/2019). PRECAUTIONS/ALLERGIES: Statins-hmg-coa reductase inhibitors     Problem List:  (Impairments causing functional limitations):  Dysphagia    Previous Dysphagia: NONE REPORTED    Orientation:  No verbal response to orientation questions.      OBJECTIVE   Dysphagia treatment:   Patient agreeable to only limited po intake (indicating needs via head nods). Serial sips of thin liquids totaling 2-3 ounces consumed without overt s/sx of airway compromise. 4 tsp of puree presented by clinician. Appropriate acceptance of trials. Prolonged oral prep and delayed propulsion with trials. Good oral clearance with additional time for oral prep. He shook head \"no\" to decline any additional puree, thin, or trials of chewable textures. Tool Used: Dysphagia Outcome and Severity Scale (MALINI)    Score Comments   Normal Diet  [] 7 With no strategies or extra time needed   Functional Swallow  [] 6 May have mild oral or pharyngeal delay   Mild Dysphagia  [] 5 Which may require one diet consistency restricted    Mild-Moderate Dysphagia  [x] 4 With 1-2 diet consistencies restricted   Moderate Dysphagia  [] 3 With 2 or more diet consistencies restricted   Moderate-Severe Dysphagia  [] 2 With partial PO strategies (trials with ST only)   Severe Dysphagia  [] 1 With inability to tolerate any PO safely      Score:  Initial: 4 Most Recent:4 (Date 09/08/21 )   Interpretation of Tool: The Dysphagia Outcome and Severity Scale (MALINI) is a simple, easy-to-use, 7-point scale developed to systematically rate the functional severity of dysphagia based on objective assessment and make recommendations for diet level, independence level, and type of nutrition.        INTERDISCIPLINARY COLLABORATION: RN    After treatment position/precautions:  Upright in bed  RN  notified    Total Treatment Duration:   Time In: 0657  Time Out: [address], INST MED[facility], [facility] HALINA FREEDMAN, CCC-SLP  Speech Language Pathologist  Acute Rehabilitation Services  Contact: Yecenia

## 2021-09-08 NOTE — PROGRESS NOTES
H&P/Consult Note/Progress Note/Office Note:   Addison Gandara MRN: [id number]  :1940  Age:80 y.o. General Surgery Consult ordered by: Dr. Claudy Samaniego  Reason for General Surgery Consult: Diverticular Bleed    HPI: Addison Gandara is a 68 y.o. male with a past medical history of CHF, HTN, Hyperlipidemia, OA, AFib/ flutter (not on [address] 2/2 recurrent GI bleeding) s/p AVN ablation and BiV ICD placement 2021, COPD and chronic hypoxemic respiratory failure, iron deficiency anemia, abdominal aortic aneurysm s/p EVAR 2019, CKD, prostate cancer, and Gastric AVM who initially presented to [facility] ED 21 with C/o dark stools x 24 hours. Pt denied pain and hx of similar problem. At that time, pt was found to be hemodynamically stable with hgb 10.5 which was above baseline. Pt was discharges with outpatient referral made to GI for further workup. Pt returned to ED 21 with C/o bright red blood per rectum. Pt states he noticed an increase in the amount of blood he was passing and now passing blood clots. Pt's hgb down from 10.5 on  to 7.7. Pt was transferred to [place] for admission by Hospitalist due to bed shortage. GI was consulted and has been following. Pt's hgb has continued to drop requiring multiple transfusions. Pt also hypotensive requiring Levophed. Nuclear scan showing evidence of lower GI bleed. Pt transferred downtown 21 for IR consultation. General Surgery consulted in case surgical intervention necessary for GI bleed. 21 Last hgb 9.1. Pt currently passing jaiden red blood with clots in stool. 21- moaning, but alert and oriented. Continues on Levo, nurses states pressures unable to tolerate dilaudid. Trying to wean. Ostomy stoma pink, minimal bloody output. Staples intact. Hgb 7.5 , platelets 84, continues to  Elevate 3.49    21- Patient alert, no active bleeding noted. Ostomy not functioning yet, not minimal blood drainage.  Giovanni with serosanguineous drainage drainage. Noted in chart, discussing with family comfort measures        Past Medical History:   Diagnosis Date    A-fib [facility]) 5/2/2014    AAA (abdominal aortic aneurysm) without rupture ([address] 5/2/2014    3.2 on US 2/14 [address]    Acute metabolic encephalopathy 4/9/5399    Arthritis     Cancer (HCC)     hx prostate cancer    COPD (chronic obstructive pulmonary disease) ([address] 5/2/2014    Elevated prostate specific antigen (PSA)     Glaucoma 5/2/2014    Heart disease     Heart failure ([address]     Hyperlipemia 5/2/2014    Hypertension     Hypertrophy of prostate with urinary obstruction and other lower urinary tract symptoms (LUTS)     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    OA (osteoarthritis) 5/2/2014    Peptic ulcer disease 6/1/2017    Poor historian     Prostate cancer ([address]     Pulmonary embolus ([address] 6/1/2017    Supplemental oxygen dependent 5/2/2014    Warfarin anticoagulation 5/2/2014     Past Surgical History:   Procedure Laterality Date    COLONOSCOPY N/A 9/12/2019    COLONOSCOPY/ 26 ROOM 614 performed by [facility]ota [facility] at [facility] ENDOSCOPY    EGD  12/18/2019         HX HEART CATHETERIZATION      VASCULAR SURGERY PROCEDURE UNLIST  09/14/2019     Bilateral common femoral artery cutdown with exposure and placement of catheter in aorta for aortogram,Endovascular repair of infrarenal abdominal aortic aneurysm with bifurcated modulated device (31 x 14 x 13 main body) via the left common femoral, right iliac extender measuring 27 x 10.      Current Facility-Administered Medications   Medication Dose Route Frequency    furosemide (LASIX) injection 80 mg  80 mg IntraVENous Q12H    LORazepam (ATIVAN) injection 1 mg  1 mg IntraVENous Q1H PRN    HYDROmorphone (DILAUDID) injection 1 mg  1 mg IntraVENous Q1H PRN    PHENYLephrine (LASHELL-SYNEPHRINE) 30 mg in 0.9% sodium chloride 250 mL infusion   mcg/min IntraVENous TITRATE    vasopressin (VASOSTRICT) 20 Units in 0.9% sodium chloride 100 mL infusion  0-0.03 Units/min IntraVENous TITRATE    hydrocortisone Sod Succ (PF) (SOLU-CORTEF) injection 50 mg  50 mg IntraVENous Q6H    albuterol (PROVENTIL VENTOLIN) nebulizer solution 2.5 mg  2.5 mg Nebulization Q6H RT    insulin lispro (HUMALOG) injection   SubCUTAneous Q4H    sodium chloride (NS) flush 5-40 mL  5-40 mL IntraVENous Q8H    sodium chloride (NS) flush 5-40 mL  5-40 mL IntraVENous PRN    ondansetron (ZOFRAN) injection 4 mg  4 mg IntraVENous Q4H PRN    albuterol (PROVENTIL VENTOLIN) nebulizer solution 2.5 mg  2.5 mg Inhalation Q6H PRN    allopurinoL (ZYLOPRIM) tablet 300 mg  300 mg Oral DAILY    brimonidine (ALPHAGAN) 0.2 % ophthalmic solution 1 Drop  1 Drop Both Eyes BID    docusate sodium (COLACE) capsule 100 mg  100 mg Oral DAILY    [Held by provider] ferrous sulfate tablet 325 mg  1 Tablet Oral BID WITH MEALS    fluticasone (FLOVENT HFA) 110 mcg inhaler  1 Puff Inhalation DAILY    latanoprost (XALATAN) 0.005 % ophthalmic solution 1 Drop  1 Drop Both Eyes QHS    [Held by provider] lisinopriL (PRINIVIL, ZESTRIL) tablet 10 mg  10 mg Oral DAILY    nitroglycerin (NITROSTAT) tablet 0.4 mg  0.4 mg SubLINGual Q5MIN PRN    [Held by provider] tamsulosin (FLOMAX) capsule 0.4 mg  0.4 mg Oral DAILY    acetaminophen (TYLENOL) tablet 650 mg  650 mg Oral Q6H PRN    NOREPINephrine (LEVOPHED) 4 mg in 5% dextrose 250 mL infusion  0.5-30 mcg/min IntraVENous TITRATE    sodium chloride (NS) flush 5-40 mL  5-40 mL IntraVENous Q8H    sodium chloride (NS) flush 5-40 mL  5-40 mL IntraVENous PRN    pantoprazole (PROTONIX) 40 mg in 0.9% sodium chloride 10 mL injection  40 mg IntraVENous Q24H     Statins-hmg-coa reductase inhibitors  Social History     Socioeconomic History    Marital status: SINGLE     Spouse name: Not on file    Number of children: Not on file    Years of education: Not on file    Highest education level: Not on file   Tobacco Use    Smoking status: Former Smoker     Packs/day: 2.00 Years: 40.00     Pack years: 80.00     Quit date: 2014     Years since quittin.1    Smokeless tobacco: Never Used    Tobacco comment: still taking Chantix    Substance and Sexual Activity    Alcohol use: No    Drug use: No    Sexual activity: Yes     Partners: Female     Birth control/protection: None     Social Determinants of Health     Financial Resource Strain: Medium Risk    Difficulty of Paying Living Expenses: Somewhat hard   Food Insecurity: No Food Insecurity    Worried About Running Out of Food in the Last Year: Never true    Salas of Food in the Last Year: Never true   Transportation Needs: No Transportation Needs    Lack of Transportation (Medical): No    Lack of Transportation (Non-Medical): No   Physical Activity:     Days of Exercise per Week:     Minutes of Exercise per Session:    Stress:     Feeling of Stress :    Social Connections:     Frequency of Communication with Friends and Family:     Frequency of Social Gatherings with Friends and Family:     Attends Yarsani Services:     Active Member of Clubs or Organizations:     Attends Club or Organization Meetings:     Marital Status:      Social History     Tobacco Use   Smoking Status Former Smoker    Packs/day: 2.00    Years: 40.00    Pack years: 80.00    Quit date: 2014    Years since quittin.1   Smokeless Tobacco Never Used   Tobacco Comment    still taking Chantix      Family History   Problem Relation Age of Onset    Cancer Mother     Heart Disease Father      ROS: No fever or chills. Comprehensive review of systems was otherwise unremarkable except as noted above.     Physical Exam:   Visit Vitals  BP (!) 177/47   Pulse (!) 104   Temp 97.7 °F (36.5 °C)   Resp 18   Ht 5' 9\" (1.753 m)   Wt 187 lb 6.3 oz (85 kg)   SpO2 95%   BMI 27.67 kg/m²     Vitals:    21 0732 21 0802 21 1049 21 1123   BP:  (!) 140/56 (!) 177/47    Pulse:  (!) 114 (!) 104    Resp:  18     Temp:  97.7 °F (36.5 °C)     SpO2: 100% 96%  95%   Weight:       Height:         No intake/output data recorded. 09/06 1901 - 09/08 0700  In: 3507.3 [I.V.:3507.3]  Out: 1220 [Urine:895; Drains:250]    Constitutional: Alert, cooperative, NAD  Eyes: Sclera are clear. EOMs intact  ENMT: no external lesions gross hearing normal; no obvious neck masses, no ear or lip lesions, nares normal, ngt  CV: RRR. Normal perfusion  Resp: No JVD. Breathing is  non-labored; no audible wheezing. GI: soft and slightly distended, staples intact without redness or drainage. Ostomy with minimal bloody drainage, reji with serosanguineous drainage  Musculoskeletal: No embolic signs or cyanosis. Edema noted bilateral upper ext.    Neuro: moves all 4; no focal deficits  Psychiatric: normal affect and mood    Recent vitals (if inpt):  Patient Vitals for the past 24 hrs:   BP Temp Pulse Resp SpO2 Weight   09/08/21 1123 -- -- -- -- 95 % --   09/08/21 1049 (!) 177/47 -- (!) 104 -- -- --   09/08/21 0802 (!) 140/56 97.7 °F (36.5 °C) (!) 114 18 96 % --   09/08/21 0732 -- -- -- -- 100 % --   09/08/21 0731 116/67 -- (!) 102 -- -- --   09/08/21 0415 -- -- 70 17 100 % --   09/08/21 0402 130/61 -- 72 20 100 % --   09/08/21 0345 -- -- 73 20 -- --   09/08/21 0330 -- -- 70 17 -- --   09/08/21 0316 132/62 -- 70 24 -- --   09/08/21 0302 127/65 -- 70 29 (!) 81 % --   09/08/21 0300 127/65 96.8 °F (36 °C) 70 29 (!) 81 % --   09/08/21 0247 (!) 120/50 -- 70 14 (!) 84 % --   09/08/21 0231 (!) 107/48 -- -- -- -- --   09/08/21 0230 -- -- 70 14 100 % --   09/08/21 0216 (!) 117/48 -- 70 16 98 % --   09/08/21 0201 -- -- 72 16 (!) 86 % --   09/08/21 0145 -- -- 70 13 98 % --   09/08/21 0131 (!) 107/46 -- 70 14 100 % --   09/08/21 0115 -- -- 70 13 100 % --   09/08/21 0059 -- -- 70 14 100 % --   09/08/21 0045 -- -- 70 14 100 % --   09/08/21 0030 -- -- 73 14 100 % --   09/08/21 0000 -- -- 70 21 100 % --   09/07/21 2345 -- -- 70 16 100 % --   09/07/21 2330 -- -- 70 16 100 % -- 09/07/21 2300 (!) 135/41 97.6 °F (36.4 °C) 70 18 97 % --   09/07/21 2246 118/62 -- (!) 114 23 (!) 71 % --   09/07/21 2231 (!) 105/57 -- 96 14 94 % --   09/07/21 2146 (!) 99/53 -- (!) 105 14 98 % --   09/07/21 2130 -- -- 99 14 99 % --   09/07/21 2113 (!) 115/50 -- (!) 101 15 100 % --   09/07/21 2100 -- -- 100 13 100 % --   09/07/21 2044 -- -- -- -- 99 % --   09/07/21 1945 -- -- 100 14 100 % --   09/07/21 1928 -- -- 98 16 100 % --   09/07/21 1915 -- -- (!) 102 15 100 % --   09/07/21 1901 -- -- (!) 103 16 100 % --   09/07/21 1900 -- 98 °F (36.7 °C) (!) 103 -- 100 % 187 lb 6.3 oz (85 kg)   09/07/21 1845 -- -- (!) 109 16 100 % --   09/07/21 1830 -- -- (!) 105 14 100 % --   09/07/21 1815 -- -- (!) 107 16 99 % --   09/07/21 1800 -- -- (!) 109 16 100 % --   09/07/21 1745 -- -- (!) 107 19 100 % --   09/07/21 1730 -- -- (!) 108 19 100 % --   09/07/21 1715 -- -- (!) 107 19 100 % --   09/07/21 1700 -- -- (!) 110 19 100 % --   09/07/21 1645 -- -- (!) 105 15 100 % --   09/07/21 1630 -- -- (!) 106 14 100 % --   09/07/21 1615 -- -- (!) 110 16 100 % --   09/07/21 1600 -- -- (!) 107 16 100 % --   09/07/21 1545 -- -- (!) 109 17 100 % --   09/07/21 1530 -- 98.1 °F (36.7 °C) (!) 111 26 100 % --   09/07/21 1515 -- -- (!) 104 14 100 % --   09/07/21 1500 -- -- (!) 114 18 100 % --   09/07/21 1445 -- -- (!) 107 15 100 % --   09/07/21 1430 -- -- (!) 102 14 100 % --   09/07/21 1415 -- -- (!) 105 16 100 % --   09/07/21 1400 -- -- 100 13 100 % --   09/07/21 1345 -- -- (!) 102 13 100 % --   09/07/21 1330 -- -- (!) 104 12 100 % --   09/07/21 1315 -- -- (!) 102 12 100 % --   09/07/21 1300 -- -- (!) 103 13 100 % --   09/07/21 1245 -- -- (!) 105 13 100 % --   09/07/21 1230 -- -- (!) 105 14 100 % --       Amount and/or Complexity of Data Reviewed and Analyzed:  I reviewed and analyzed all of the unique labs and radiologic studies that are shown below as well as any that are in the HPI, and any that are in the expanded problem list below  *Each unique test, order, or document contributes to the combination of 2 or combination of 3 in Category 1 below. For this visit I also reviewed old records and prior notes. Recent Labs     09/07/21  1413 09/07/21  0920 09/07/21  0312   WBC  --   --  22.3*   HGB 7.7*   < > 7.5*   PLT  --   --  84*   NA  --   --  143   K  --   --  5.1   CL  --   --  111*   CO2  --   --  24   BUN  --   --  31*   CREA  --   --  3.49*   GLU  --   --  110*   TBILI  --   --  0.4   ALT  --   --  <6*   AP  --   --  24*    < > = values in this interval not displayed. Review of most recent CBC  Lab Results   Component Value Date/Time    WBC 22.3 (H) 09/07/2021 03:12 AM    HGB 7.7 (L) 09/07/2021 02:13 PM    HCT 22.8 (L) 09/07/2021 03:12 AM    PLATELET 84 (L) 54/50/3046 03:12 AM    MCV 88.4 09/07/2021 03:12 AM       Review of most recent BMP  Lab Results   Component Value Date/Time    Sodium 143 09/07/2021 03:12 AM    Potassium 5.1 09/07/2021 03:12 AM    Chloride 111 (H) 09/07/2021 03:12 AM    CO2 24 09/07/2021 03:12 AM    Anion gap 8 09/07/2021 03:12 AM    Glucose 110 (H) 09/07/2021 03:12 AM    BUN 31 (H) 09/07/2021 03:12 AM    Creatinine 3.49 (H) 09/07/2021 03:12 AM    BUN/Creatinine ratio 12 06/24/2021 09:53 AM    GFR est AA 22 (L) 09/07/2021 03:12 AM    GFR est non-AA 18 (L) 09/07/2021 03:12 AM    Calcium 7.9 (L) 09/07/2021 03:12 AM       Review of most recent LFTs (and lipase if done)  Lab Results   Component Value Date/Time    ALT (SGPT) <6 (L) 09/07/2021 03:12 AM    AST (SGOT) 31 09/07/2021 03:12 AM    Alk.  phosphatase 24 (L) 09/07/2021 03:12 AM    Bilirubin, total 0.4 09/07/2021 03:12 AM     No results found for: LPSE    Lab Results   Component Value Date/Time    INR 0.9 08/31/2021 12:35 AM    aPTT 73.1 (H) 08/31/2018 01:40 AM    Lactic acid 2.4 (H) 12/21/2014 08:38 PM    Ammonia 19 07/03/2021 04:16 PM    Troponin-I, Qt. <0.02 (L) 04/09/2020 10:46 AM       Review of most recent HgbA1c  Lab Results   Component Value Date/Time Hemoglobin A1c (POC) 5.1 08/13/2019 10:10 AM       Nutritional assessment screen for wound healing issues:  Lab Results   Component Value Date/Time    Protein, total 4.1 (L) 09/07/2021 03:12 AM    Albumin 2.1 (L) 09/07/2021 03:12 AM       @lastcovr@  XR Results (most recent):  Results from [place] encounter on 09/05/21    XR CHEST PORT    Narrative  EXAM: Chest x-ray. INDICATION: Dyspnea. COMPARISON: Yesterday's chest x-ray. TECHNIQUE: Frontal view chest x-ray. FINDINGS: Again noted are emphysematous changes in the lungs, with no focal  infiltrate. The heart size is normal. No pneumothorax or pleural effusion is  seen. Again noted is a left chest wall biventricular pacemaker. The enteric tube  projects below the diaphragms. The right jugular central line tip is at the  cavoatrial junction. Impression  No acute process. Emphysema. CT Results (most recent):  Results from Hospital Encounter encounter on 07/01/21    CT HEAD WO CONT    Narrative  NONCONTRAST HEAD CT    CLINICAL HISTORY:  Decreased level of consciousness. TECHNIQUE:  Axial images were obtained with spiral technique. Radiation dose  reduction was achieved using one or all of the following techniques: automated  exposure control, weight-based dosing, iterative reconstruction. COMPARISON:  None. REPORT:   Standard noncontrast head CT demonstrates no definite intracranial  mass effect, hemorrhage, or evidence of acute geographic infarction. Extensive  white matter hypodensities are most consistent with small vessel ischemic  disease. The ventricles are normal in size and configuration, accounting for  the patient's age. Orbits  and paranasal sinuses are clear where imaged. Bone  windows demonstrate no definite fracture or destruction.     Impression  EXTENSIVE SMALL VESSEL ISCHEMIC DISEASE WITH NO ACUTE  INTRACRANIAL ABNORMALITY IDENTIFIED AT NONCONTRAST CT.    US Results (most recent):  No results found for this or any previous visit.         Admission date (for inpatients): 9/5/2021   * No surgery found *  Procedure(s):  LAPAROTOMY EXPLORATORY EXTENDED LEFT HEMICOLECTOMY WITH COLOSTOMY  Mobilization of splenic flexure        ASSESSMENT/PLAN:  Problem List  Date Reviewed: 9/5/2021        Codes Class Noted    * (Principal) Hemorrhagic shock ([facility] ICD-10-CM: R57.8  ICD-9-CM: 785.59  9/5/2021        Hyperlipidemia ICD-10-CM: E78.5  ICD-9-CM: 272.4  9/5/2021        Lower GI bleed ICD-10-CM: K92.2  ICD-9-CM: 578.9  9/5/2021        Acute on chronic renal insufficiency ICD-10-CM: N28.9, N18.9  ICD-9-CM: 593.9, 585.9  9/5/2021        Respiratory failure, post-operative ([facility] ICD-10-CM: O96.986  ICD-9-CM: 518.51  9/5/2021        Thrombocytopenia ([facility] ICD-10-CM: D69.6  ICD-9-CM: 287.5  9/5/2021        KAYLA (acute kidney injury) ([facility] ICD-10-CM: N17.9  ICD-9-CM: 584.9  9/1/2021        Acute blood loss anemia ICD-10-CM: D62  ICD-9-CM: 285.1  8/31/2021        GIB (gastrointestinal bleeding) ICD-10-CM: K92.2  ICD-9-CM: 578.9  8/31/2021        Hyperkalemia ICD-10-CM: E87.5  ICD-9-CM: 276.7  7/7/2021        Acute metabolic encephalopathy VYC-51-YV: G93.41  ICD-9-CM: 348.31  7/5/2021        Acute on chronic respiratory failure with hypoxia and hypercapnia (HCC) ICD-10-CM: J96.21, J96.22  ICD-9-CM: 518.84, 786.09, 799.02  6/9/2021        Diverticulosis ICD-10-CM: K57.90  ICD-9-CM: 562.10  5/27/2021    Overview Signed 5/27/2021  8:48 AM by Yecenia Pagan MD     Formatting of this note might be different from the original.  Noted on colonoscopy 2013             Mild protein-calorie malnutrition ([facility] ICD-10-CM: E44.1  ICD-9-CM: 263.1  5/19/2021        Short-term memory loss ICD-10-CM: R41.3  ICD-9-CM: 780.93  5/18/2021        COPD exacerbation ([facility] ICD-10-CM: J44.1  ICD-9-CM: 491.21  5/17/2021        Prostate cancer [facility]) ICD-10-CM: A97  ICD-9-CM: 185  3/25/2021    Overview Signed 3/25/2021  6:12 AM by Yecenia Pagan MD     Last Assessment & Plan:   Follows with Dr Alisha Fonseca             Chronic combined systolic and diastolic heart failure [facility]) ICD-10-CM: I50.42  ICD-9-CM: 428.42  12/21/2020        History of GI bleed ICD-10-CM: Z87.19  ICD-9-CM: V12.79  12/21/2020        Iron deficiency anemia ICD-10-CM: D50.9  ICD-9-CM: 280.9  12/21/2020        AAA (abdominal aortic aneurysm) ([facility] ICD-10-CM: I71.4  ICD-9-CM: 441.4  9/14/2019        Upper GI bleed ICD-10-CM: K92.2  ICD-9-CM: 578.9  9/9/2019        Chronic hypoxemic respiratory failure (HCC) (Chronic) ICD-10-CM: J96.11  ICD-9-CM: 518.83, 799.02  9/9/2019        Heart failure with reduced ejection fraction (HCC) (Chronic) ICD-10-CM: I50.20  ICD-9-CM: 428.20  9/9/2019        CKD (chronic kidney disease), stage III (HCC) (Chronic) ICD-10-CM: N18.30  ICD-9-CM: 585.3  9/9/2019        Absolute anemia ICD-10-CM: D64.9  ICD-9-CM: 285.9  4/12/2019        Multiple pulmonary nodules (Chronic) ICD-10-CM: R91.8  ICD-9-CM: 793.19  8/30/2018        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HCC) (Chronic) ICD-10-CM: J15.8, A31.9  ICD-9-CM: 482.89, 031.9  7/25/2018    Overview Signed 8/30/2018  8:54 AM by MESSI Louis followed by Dr. Girish Box at [address] In March 2018 was found with new SAMANTHA nodules with spiculation.   CT-guided biopsy of SAMANTHA nodule revealed Екатерина Pisano was started on ETB/azithro/rif.               Acute on chronic respiratory failure with hypoxia (HCC) ICD-10-CM: J96.21  ICD-9-CM: 518.84, 799.02  7/25/2018        Atrial flutter with rapid ventricular response [facility]) ICD-10-CM: I48.92  ICD-9-CM: 427.32  7/23/2018        History of pulmonary embolism ICD-10-CM: R26.162  ICD-9-CM: V12.55  6/22/2017        Tobacco use disorder ICD-10-CM: F17.200  ICD-9-CM: 305.1  6/1/2017        Hypertension ICD-10-CM: I10  ICD-9-CM: 401.9  6/1/2017        Cardiomyopathy ([facility] ICD-10-CM: I42.9  ICD-9-CM: 425.4  6/1/2017        Peptic ulcer disease ICD-10-CM: K27.9  ICD-9-CM: 533.90  6/1/2017        Nontoxic multinodular goiter ICD-10-CM: P64.5  ICD-9-CM: 241.1  5/19/2017    Overview Signed 5/27/2021  8:48 AM by Richy Luis MD     Last Assessment & Plan:   Formatting of this note might be different from the original.  Thyroid ultrasound was done today. This study was difficult due to his chronic shortness of breath while lying supine. He was somewhat of a moving target. 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 The right lobe measures 1.90 x 2.38 x 3.55 cm. The isthmus is thickened and irregular. The left lobe measures 2.46 x 1.56 x 4.59 cm. There are numerous scattered cystic or partially cystic nodules throughout both lobes the thyroid gland. Essentially there is no normal thyroid tissue. The largest nodule that I couldn't visualize is in the right lobe measuring 0.84 x 1.29 x 1.29 cm. It is partially cystic and surrounded by a halo sign. There is a similar-sized purely cystic nodule in the left lobe also. He appears to have long-standing multinodular/multicystic disease. I don't recommend anything further be done for this other than observation. I don't feel that he needs to have biopsy of any these lesions as they appear to be low risk for malignancy. Because of his rather significant COPD he is at high risk for any surgery such as thyroidectomy and I would only recommend moving to this as a last resort and only if absolutely necessary. I will see him again in 6 months, and then annually thereafter at least for a while to watch his thyroid gland.              Vitamin D deficiency ICD-10-CM: E55.9  ICD-9-CM: 268.9  2/2/2016    Overview Signed 5/27/2021  8:48 AM by Richy Luis MD     Last Assessment & Plan:   Formatting of this note might be different from the original.  Vitamin D at goal no changes             Renal cysts, acquired, bilateral ICD-10-CM: N28.1  ICD-9-CM: 593.2  5/18/2015        AAA (abdominal aortic aneurysm) without rupture (HCC) (Chronic) ICD-10-CM: I71.4  ICD-9-CM: 441.4  5/2/2014    Overview Signed 5/2/2014 10:53 AM by Sonia Rivera NP     3.2 on US 2/14 [facility]             COPD (chronic obstructive pulmonary disease) ([facility] (Chronic) ICD-10-CM: J44.9  ICD-9-CM: 712  5/2/2014        Hyperlipemia (Chronic) ICD-10-CM: E78.5  ICD-9-CM: 272.4  5/2/2014        Glaucoma (Chronic) ICD-10-CM: H40.9  ICD-9-CM: 365.9  5/2/2014        Supplemental oxygen dependent (Chronic) ICD-10-CM: Z99.81  ICD-9-CM: V46.2  5/2/2014        OA (osteoarthritis) (Chronic) ICD-10-CM: M19.90  ICD-9-CM: 715.90  5/2/2014            Principal Problem:    Hemorrhagic shock ([address] (9/5/2021)    Active Problems:    COPD (chronic obstructive pulmonary disease) ([address] (5/2/2014)      Hypertension (6/1/2017)      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[address] (7/25/2018)      Overview: MAC followed by Dr. Jessica Dawkins at [facility]. In March 2018 was found with new SAMANTHA       nodules with spiculation.   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        He was started on ETB/azithro/rif.        Chronic hypoxemic respiratory failure ([address] (9/9/2019)      CKD (chronic kidney disease), stage III ([address] (9/9/2019)      Chronic combined systolic and diastolic heart failure ([address] (12/21/2020)      Hyperkalemia (7/7/2021)      Acute blood loss anemia (8/31/2021)      Hyperlipidemia (9/5/2021)      Lower GI bleed (9/5/2021)      Acute on chronic renal insufficiency (9/5/2021)      Respiratory failure, post-operative ([address] (9/5/2021)      Thrombocytopenia ([address] (9/5/2021)           Number and Complexity of Problems addressed and   Risks of complications and/or morbidity of management  Plan:  Care Management per primary  DC reji drain  DC antibiotics  Noted family is discussing making comfort measure      Mary Du NP

## 2021-09-08 NOTE — PROGRESS NOTES
Pt is for discharged today to [address] @ [phone] notified  Transport packet at [address] with no needs/supportive care orders received for CM at this time. Pt will have close follow up at th [address] [facility] Interventions  PCP Verified by CM:  Yes Marquezlas File)  Mode of Transport at Discharge: BLGHASSAN [facility] @ 7:30p)  Transition of Care Consult (CM Consult): Discharge Planning, Hospice House (CLTC aid 5 days  week/3 hours day)  Discharge Durable Medical Equipment: No (O2, walker, w/c)  Physical Therapy Consult: No  Occupational Therapy Consult: No  Speech Therapy Consult: No  Support Systems: Other Family Member(s)  Read Only, Retired: Current Support Network: Lives Alone, Own Home  Confirm Follow Up Transport: Family  The Plan for Transition of Care is Related to the Following Treatment Goals : next level of care  The Patient and/or Patient Representative was Provided with a Choice of Provider and Agrees with the Discharge Plan?: Yes  Name of the Patient Representative Who was Provided with a Choice of Provider and Agrees with the Discharge Plan: [facility]  [place] of Choice List was Provided with Basic Dialogue that Supports the Patient's Individualized Plan of Care/Goals, Treatment Preferences and Shares the Quality Data Associated with the Providers?: Yes  The [employer] Information Provided?:  CHRISTIN AND SERGO [facility])  Discharge Location  Discharge Placement: Other: [facility])

## 2021-09-08 NOTE — DISCHARGE SUMMARY
DISCHARGE NOTE    Bradley Numbers. Admission date:  9/5/2021  Discharge date:  9/8/2021    Admitting Diagnosis:  Hemorrhagic shock ([facility] [R57.8]; Lower GI bleed [K92.2]; Hypertension [I10]; Hyperlipidemia [E78.5]    Discharge Diagnoses:    Hospital Problems  Date Reviewed: 9/5/2021        Codes Class Noted POA    * (Principal) Hemorrhagic shock ([facility] ICD-10-CM: R57.8  ICD-9-CM: 785.59  9/5/2021 Yes        Hyperlipidemia ICD-10-CM: E78.5  ICD-9-CM: 272.4  9/5/2021 Yes        Lower GI bleed ICD-10-CM: K92.2  ICD-9-CM: 578.9  9/5/2021 Yes        Acute on chronic renal insufficiency ICD-10-CM: N28.9, N18.9  ICD-9-CM: 593.9, 585.9  9/5/2021 Yes        Respiratory failure, post-operative ([facility] ICD-10-CM: Q68.242  ICD-9-CM: 518.51  9/5/2021 Unknown        Thrombocytopenia ([facility] ICD-10-CM: D69.6  ICD-9-CM: 287.5  9/5/2021 Unknown        Acute blood loss anemia ICD-10-CM: D62  ICD-9-CM: 285.1  8/31/2021 Yes        Hyperkalemia ICD-10-CM: E87.5  ICD-9-CM: 276.7  7/7/2021 Yes        Chronic combined systolic and diastolic heart failure (HCC) ICD-10-CM: I50.42  ICD-9-CM: 428.42  12/21/2020 Yes        Chronic hypoxemic respiratory failure (HCC) (Chronic) ICD-10-CM: J96.11  ICD-9-CM: 518.83, 799.02  9/9/2019 Yes        CKD (chronic kidney disease), stage III (HCC) (Chronic) ICD-10-CM: N18.30  ICD-9-CM: 585.3  9/9/2019 Yes        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HCC) (Chronic) ICD-10-CM: J15.8, A31.9  ICD-9-CM: 482.89, 031.9  7/25/2018 Yes    Overview Signed 8/30/2018  8:54 AM by MESSI Bergeron followed by Dr. Ye Conklin at [facility]. In March 2018 was found with new SAMANTHA nodules with spiculation.   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  He was started on ETB/azithro/rif.               Hypertension ICD-10-CM: I10  ICD-9-CM: 401.9  6/1/2017 Yes        COPD (chronic obstructive pulmonary disease) (HCC) (Chronic) ICD-10-CM: J44.9  ICD-9-CM: 496  5/2/2014 Yes            Consultants: Surgery, Critical Care, Palliative Care Medicine    Studies/Procedures:   SMA Angiography with Interventional Radiology 9/5  Hemicolectomy with colostomy 9/5    Condition on Discharge:  Hospice care    Disposition:  Hospice House    Presenting Illness: Johnathan Patton Jr. is H 8 y. o. male with a past medical history of CHF, HTN, Hyperlipidemia, OA, AFib/ flutter (not on [address] 2/2 recurrent GI bleeding) s/p AVN ablation and BiV ICD placement July 2021, COPD and chronic hypoxemic respiratory failure, iron deficiency anemia, abdominal aortic aneurysm s/p EVAR 9/2019, CKD, prostate cancer, and Gastric AVM who initially presented to [facility] ED 8/25/21 with C/o dark stools x 24 hours. Pt denied pain and hx of similar problem. At that time, pt was found to be hemodynamically stable with hgb 10.5 which was above baseline. Pt was discharges with outpatient referral made to GI for further workup. Pt returned to ED 8/31/21 with C/o bright red blood per rectum. Pt states he noticed an increase in the amount of blood he was passing and now passing blood clots. Pt's hgb down from 10.5 on 8/25 to 7.7. Pt was transferred to HOSPITAL [address] for admission by Hospitalist due to bed shortage. GI was consulted and has been following. Pt's hgb has continued to drop requiring multiple transfusions. Pt also hypotensive requiring Levophed. Nuclear scan showing evidence of lower GI bleed. Pt transferred downtown 9/5/21 for IR consultation. Hospital course: He was taken to IR for angiography, but his anatomy did not allow for this intervention and he was taken to the OR by Dr. Sarah Baptiste and had a hemicolectomy. Despite resuscitation he was on levophed for about 48 hours post surgery and extubated the morning after surgery. Today he was on 4L NC and off pressors, but had worsening renal failure yesterday (9/7) and palliative care met with patient and spoke with niece (HCPOA).  The patient had actually expressed wishes for no aggressive care for years now and did not want to do any more medical therapies. He did not want to pursue dialysis or even other antibiotics, etc. He and his HCPOA agreed to comfort measures and hospice referral and he was accepted to hospice house this afternoon. Physical Exam:   Constitution:  Acutely and chronically ill  EENMT:  Sclera clear, pupils equal, oral mucosa moist  Respiratory: coarse BS, 4L NC  Cardiovascular:  RRR without M,G,R  Gastrointestinal: soft and non-tender; with positive bowel sounds. Musculoskeletal: warm without cyanosis. There is no lower leg edema. Skin:  no jaundice or rashes, abdominal dressings   Neurologic: seems confused, noded to questions, but unsure if he understood today  Psychiatric:  Calm, but seems confused today. LAB  Recent Labs     09/07/21  1413 09/07/21  0920 09/07/21 0312 09/06/21 0822 09/06/21 0220 09/05/21 2136 09/05/21 2136   WBC  --   --  22.3*  --  22.2*  --  15.8*   HGB 7.7* 7.7* 7.5*   < > 9.5*   < > 11.2*   HCT  --   --  22.8*  --  28.9*  --  34.5*   PLT  --   --  84*  --  67*  --  78*    < > = values in this interval not displayed. Recent Labs     09/07/21 0312 09/06/21 0220 09/05/21 2136    147* 143   K 5.1 4.5 5.4*   * 122* 119*   CO2 24 19* 17*   BUN 31* 25* 27*   CREA 3.49* 2.28* 2.32*   CA 7.9* 6.5* 7.9*   ALB 2.1*  --   --    TBILI 0.4  --   --    ALT <6*  --   --      No results for input(s): PH, PCO2, PO2, HCO3 in the last 72 hours. Discharge Medications:   Current Discharge Medication List        Condition on Discharge:  Comfort measures, hospice    Followup/Outpt Studies:  --Hospice house for end of life care/support  --Total discharge greater than 30 minutes in duration. More than 50% of the time documented was spent in face-to-face contact with the patient and in the care of the patient on the floor/unit where the patient is located.     Nilo Orellana MD

## 2021-09-08 NOTE — PROGRESS NOTES
Bedside, Verbal and Written shift change report given to SAULO Dixon (oncoming nurse) by Claudia Frank RN (offgoing nurse). Report included the following information SBAR, Kardex, ED Summary, OR Summary, Intake/Output, MAR, Recent Results, Alarm Parameters  and Quality Measures.

## 2021-09-08 NOTE — PROGRESS NOTES
's follow-up visit to convey care and concern. According to staff patient decided to become comfort measures. Patient's son and nephew (also a ) were at bedside and seemed to be coping well, accepting patient's wishes. I offered spiritual/emotional support including prayer.      Felix Wilson 70  Board Certified

## 2021-09-08 NOTE — PROGRESS NOTES
Attempted to call report at this time, but receiving staff not available. Transferring RN will remove arterial line, then await return call for transfer SBAR.

## 2021-09-08 NOTE — ROUTINE PROCESS
Bedside and Verbal report given to self by Capri Mccollum RN. Report included SBAR, Kardex, ED Summary, Procedure Summary, Intake and Output and Cardiac Rhythm paced.

## 2021-09-08 NOTE — PROGRESS NOTES
Referral placed for [facility] potential patient for Hospice House. Ritu Cost liaison will contact if patient meet criteria for [facility].     Patient is now comfort care

## 2021-09-08 NOTE — HOSPICE
[facility]-    I called pt's niece/HCPOA, Velma Turner, and we discussed hospice services. She states that she is familiar with the [facility] as her mother passed away there. She states that [employer] he does not want to suffer\" and she would like him to receive care at the [facility]. Dr. Mariaelena Butts has approved the pt for general inpatient care at the [facility]. Pt to transport at 1730 to [facility].     Thank you for this referral-    Roland Chau RN BSN  Hospice Liaison  [phone]

## 2021-09-08 NOTE — PROGRESS NOTES
09/08/21 1550   Dual Skin Pressure Injury Assessment   Dual Skin Pressure Injury Assessment WDL   Second Care Provider (Based on [address] [place] [address]) Mahamed Martinez RN    Skin Integumentary   Skin Integumentary (WDL) X    Pressure  Injury Documentation No Pressure Injury Noted-Pressure Ulcer Prevention Initiated   Skin Color Appropriate for ethnicity   Skin Condition/Temp Cool;Dry;Warm   Skin Integrity Incision (comment)  (midline)

## 2021-09-08 NOTE — CONSULTS
Palliative Care    Patient: Mony Villalba. MRN: [id number]  SSN: [id number]    YOB: 1940  Age: 68 y.o. Sex: male       Date of Request: 9/8/2021  Date of Consult:  9/8/2021  Reason for Consult:  pain and symptom management, goals of care and category status  Requesting Physician: Dr. Forest Alejandro     Assessment/Plan:     Principal Diagnosis:    Pain, abdomen  R10.9    Additional Diagnoses:   · Altered Mental Status R41.82  · Debility, Unspecified  R53.81  · Frailty  R54  · Counseling, Encounter for Medical Advice  Z71.9  · Encounter for Palliative Care  Z51.5    Palliative Performance Scale (PPS):       Medical Decision Making:   Reviewed and summarized labs and imaging. Pt lethargic. Spoke with niece via phone who confirmed wishes to pursue comfort measures. Pt has stated he does not wish for further surgeries and wishes to stop all aggressive treatments. Has required pressors for hypotension which was stopped earlier this am.  Appears to have oliguric renal failure as well. Family planning to come in this am.    Dilaudid 1 mg iv q 1 hr prn pain, dypsnea  Ativan 1 mg iv q 1 hr prn anxiety  Stop unnecessary meds, labs  Consult hospice, likely candidate for [facility] as expect condition to decline    Will discuss findings with members of the interdisciplinary team.      Thank you for this referral.         Subjective:     History obtained from:  Family and Chart    Chief Complaint: abdominal pain  History of Present Illness:  Mony Villalba. is a 68 y.o. male with a past medical history of CHF, HTN, Hyperlipidemia, OA, AFib/ flutter (not on [address] 2/2 recurrent GI bleeding) s/p AVN ablation and BiV ICD placement July 2021, COPD and chronic hypoxemic respiratory failure, iron deficiency anemia, abdominal aortic aneurysm s/p EVAR 9/2019, CKD, prostate cancer, and Gastric AVM who initially presented to [facility] ED 8/25/21 with C/o dark stools x 24 hours. Pt denied pain and hx of similar problem.  At that time, pt was found to be hemodynamically stable with hgb 10.5 which was above baseline. Pt was discharges with outpatient referral made to GI for further workup. Pt returned to ED 8/31/21 with C/o bright red blood per rectum. Pt states he noticed an increase in the amount of blood he was passing and now passing blood clots. Pt's hgb down from 10.5 on 8/25 to 7.7. Pt was transferred to HOSPITAL [address] for admission by Hospitalist due to bed shortage. GI was consulted and has been following. Pt's hgb has continued to drop requiring multiple transfusions. Pt also hypotensive requiring Levophed. Nuclear scan showing evidence of lower GI bleed. Pt transferred downtown 9/5/21 for IR consultation. Advance Directive: Yes       Code Status:  DNR            Health Care Power of : Yes - Copy of [address] on file.   Pt Niece is HCPOA    Past Medical History:   Diagnosis Date    A-fib [facility]) 5/2/2014    AAA (abdominal aortic aneurysm) without rupture ([address] 5/2/2014    3.2 on US 2/14 [address]    Acute metabolic encephalopathy 3/8/2726    Arthritis     Cancer (HCC)     hx prostate cancer    COPD (chronic obstructive pulmonary disease) ([address] 5/2/2014    Elevated prostate specific antigen (PSA)     Glaucoma 5/2/2014    Heart disease     Heart failure ([address]     Hyperlipemia 5/2/2014    Hypertension     Hypertrophy of prostate with urinary obstruction and other lower urinary tract symptoms (LUTS)     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    OA (osteoarthritis) 5/2/2014    Peptic ulcer disease 6/1/2017    Poor historian     Prostate cancer ([address]     Pulmonary embolus ([address] 6/1/2017    Supplemental oxygen dependent 5/2/2014    Warfarin anticoagulation 5/2/2014      Past Surgical History:   Procedure Laterality Date    COLONOSCOPY N/A 9/12/2019    COLONOSCOPY/ [address] 614 performed by Glen [facility] ENDOSCOPY    EGD  12/18/2019         HX HEART CATHETERIZATION      VASCULAR SURGERY PROCEDURE UNLIST  2019     Bilateral common femoral artery cutdown with exposure and placement of catheter in aorta for aortogram,Endovascular repair of infrarenal abdominal aortic aneurysm with bifurcated modulated device (31 x 14 x 13 main body) via the left common femoral, right iliac extender measuring 27 x 10. Family History   Problem Relation Age of Onset    Cancer Mother     Heart Disease Father       Social History     Tobacco Use    Smoking status: Former Smoker     Packs/day: 2.00     Years: 40.00     Pack years: 80.00     Quit date: 2014     Years since quittin.1    Smokeless tobacco: Never Used    Tobacco comment: still taking Chantix    Substance Use Topics    Alcohol use: No     Prior to Admission medications    Medication Sig Start Date End Date Taking? Authorizing Provider   ferrous sulfate (IRON) 325 mg (65 mg iron) EC tablet Take 1 Tablet by mouth two (2) times a day. 21   Jason Harman MD   guaiFENesin ER [facility] [place]) 600 mg ER tablet Take 1 Tablet by mouth two (2) times a day. Indications: cough 21   Page VICTORIA NP   fluticasone furoate (ARNUITY ELLIPTA) 100 mcg/actuation dsdv inhaler Take 1 Puff by inhalation daily. Wash mouth out with water after each dose. Indications: worsening of debilitating chronic lung disease called COPD 21   Page VICTORIA NP   albuterol (PROVENTIL VENTOLIN) 2.5 mg /3 mL (0.083 %) nebu Take 3 mL by inhalation every six (6) hours as needed for Shortness of Breath or Wheezing. 21   Gail Smith MD   dexAMETHasone (DECADRON) 2 mg tablet 4 tabs po x3 days, 3 tabs po x3 days, 2 tabs po x3 days, 1 tab po x3 days, 1/2 tab po x3 days then stop. Patient not taking: Reported on 2021   Lazaro Trevizo MD   lisinopriL (PRINIVIL, ZESTRIL) 10 mg tablet Take 1 Tablet by mouth daily.   Patient not taking: Reported on 2021 7/15/21   Praful Waggoner MD   metoprolol succinate (TOPROL-XL) 100 mg tablet Take 1 Tablet by mouth daily. Indications: ventricular rate control in atrial fibrillation 7/14/21   Jeffery Zarco MD   predniSONE (DELTASONE) 20 mg tablet Take 40mg qday x3d, then 20mg qday x3d, then 10mg x2d, then stop. Take with dinner  Patient taking differently: Take 40 mg by mouth daily for 3 days, then 20 mg by mouth daily for 3 days, then take 10 mg by mouth daily for 2 days - then stop - take with dinner 7/5/21   Driss Minaya MD   allopurinoL (ZYLOPRIM) 300 mg tablet Take 300 mg by mouth daily. Provider, Historical   docusate sodium (COLACE) 100 mg capsule Take 1 Capsule by mouth daily. 5/27/21   Edward Izquierdo MD   albuterol-ipratropium (DUO-NEB) 2.5 mg-0.5 mg/3 ml nebu 3 mL by Nebulization route three (3) times daily for 180 days. 3/24/21 9/20/21  Edward Izquierdo MD   rosuvastatin (Crestor) 40 mg tablet Take 1 Tab by mouth nightly. 3/24/21   Edward Izquierdo MD   nitroglycerin (NITROSTAT) 0.4 mg SL tablet 1 Tab by SubLINGual route every five (5) minutes as needed for Chest Pain (call EMS if pain fails to resolve after 2 tabs. max daily dose of 3 tabs). Indications: after 15 minutes or 3 doses, if chest pain persists, contact EMS/911 3/24/21   Edward Izquierdo MD   tamsulosin [facility]) 0.4 mg capsule Take 1 Cap by mouth daily. 3/24/21   Edward Izquierdo MD   aspirin delayed-release 81 mg tablet Take 81 mg by mouth daily. Provider, Historical   albuterol (PROVENTIL HFA, VENTOLIN HFA, PROAIR HFA) 90 mcg/actuation inhaler TAKE 2 PUFFS BY MOUTH EVERY 4 HOURS AS NEEDED FOR WHEEZE 1/3/21   Provider, Historical   latanoprost (XALATAN) 0.005 % ophthalmic solution LOCATION  BOTH EYES. INSTILL ONE DROP INTO EACH EYE AT BEDTIME 11/12/20   Provider, Historical   famotidine (PEPCID) 40 mg tablet Take 1 Tab by mouth daily. 1/22/21   Danni Zheng MD   OXYGEN-AIR DELIVERY SYSTEMS 2 L/min by Nasal route daily.  nasal canula 9/15/15   Provider, Historical   brimonidine (ALPHAGAN P) 0.1 % ophthalmic solution Administer 1 Drop to both eyes two (2) times a day. Provider, Historical       Allergies   Allergen Reactions    Statins-Hmg-Coa Reductase Inhibitors Myalgia     Muscle pain        Review of systems unable to obtain due to mentation. Objective:     Visit Vitals  BP (!) 140/56   Pulse (!) 114   Temp 97.7 °F (36.5 °C)   Resp 18   Ht 5' 9\" (1.753 m)   Wt 187 lb 6.3 oz (85 kg)   SpO2 96%   BMI 27.67 kg/m²        Physical Exam:    General:   No acute distress. Eyes:  Conjunctivae/corneas clear    Nose: Nares normal. Septum midline. Neck: Supple, symmetrical, trachea midline, no JVD   Lungs:   Clear to auscultation bilaterally, unlabored   Heart:  Regular rate and rhythm, no murmur    Abdomen:   Soft, non-tender, non-distended. Positive bowel sounds   Extremities: Normal, atraumatic, no cyanosis or edema   Skin: Skin color, texture, turgor normal. No rash or lesions.    Neurologic: Nonfocal   Psych: lethargic      Assessment:     Hospital Problems  Date Reviewed: 9/5/2021        Codes Class Noted POA    * (Principal) Hemorrhagic shock ([facility] ICD-10-CM: R57.8  ICD-9-CM: 785.59  9/5/2021 Yes        Hyperlipidemia ICD-10-CM: E78.5  ICD-9-CM: 272.4  9/5/2021 Yes        Lower GI bleed ICD-10-CM: K92.2  ICD-9-CM: 578.9  9/5/2021 Yes        Acute on chronic renal insufficiency ICD-10-CM: N28.9, N18.9  ICD-9-CM: 593.9, 585.9  9/5/2021 Yes        Respiratory failure, post-operative ([facility] ICD-10-CM: O68.835  ICD-9-CM: 518.51  9/5/2021 Unknown        Thrombocytopenia ([facility] ICD-10-CM: D69.6  ICD-9-CM: 287.5  9/5/2021 Unknown        Acute blood loss anemia ICD-10-CM: D62  ICD-9-CM: 285.1  8/31/2021 Yes        Hyperkalemia ICD-10-CM: E87.5  ICD-9-CM: 276.7  7/7/2021 Yes        Chronic combined systolic and diastolic heart failure (HCC) ICD-10-CM: I50.42  ICD-9-CM: 428.42  12/21/2020 Yes        Chronic hypoxemic respiratory failure (HCC) (Chronic) ICD-10-CM: J96.11  ICD-9-CM: 518.83, 799.02  9/9/2019 Yes        CKD (chronic kidney disease), stage III ([facility] (Chronic) ICD-10-CM: N18.30  ICD-9-CM: 585.3  9/9/2019 Yes        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[facility] (Chronic) ICD-10-CM: J15.8, A31.9  ICD-9-CM: 482.89, 031.9  7/25/2018 Yes    Overview Signed 8/30/2018  8:54 AM by Collette Stade, NP     MAC followed by Dr. John Hernandez at [facility]. In March 2018 was found with new SAMANTHA nodules with spiculation.   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  He was started on ETB/azithro/rif.               Hypertension ICD-10-CM: I10  ICD-9-CM: 401.9  6/1/2017 Yes        COPD (chronic obstructive pulmonary disease) (HCC) (Chronic) ICD-10-CM: J44.9  ICD-9-CM: 496  5/2/2014 Yes              Signed By: Katie Carrion MD     September 8, 2021

## 2021-09-08 NOTE — PROGRESS NOTES
[facility] Critical Care Note[de-identified] 9/8/2021  Angel Adan. Admission Date: 9/5/2021     Length of Stay: 3 days    Background: 68 y.o. y/o male with lower GI bleed and multiple medical problems now post colectomy in PACU and still on vent. Notable PMH:  has a past medical history of A-fib ([address] (5/2/2014), AAA (abdominal aortic aneurysm) without rupture ([address] (5/4/9607), Acute metabolic encephalopathy (8/6/7341), Arthritis, Cancer ([address] COPD (chronic obstructive pulmonary disease) ([address] (5/2/2014), Elevated prostate specific antigen (PSA), Glaucoma (5/2/2014), Heart disease, Heart failure ([address] Hyperlipemia (5/2/2014), Hypertension, Hypertrophy of prostate with urinary obstruction and other lower urinary tract symptoms (LUTS), 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[address] (7/25/2018), OA (osteoarthritis) (5/2/2014), Peptic ulcer disease (6/1/2017), Poor historian, Prostate cancer ([address] Pulmonary embolus ([address] (6/1/2017), Supplemental oxygen dependent (5/2/2014), and Warfarin anticoagulation (5/2/2014). He also has no past medical history of Autoimmune disease ([address] Dementia, Liver disease, Other ill-defined conditions(799.89), Psychiatric disorder, Seizures ([address] or Sleep disorder. 24 Hour events:  Off levophed, declining further therapy. PC met with him and his HCPOA and have decided to pursue comfort measures. Hospice consult placed for hospice house. He is not verbal, but nods to questions, not sure how much he understands. ROS: unable to obtain/negative except as listed elsewhere. Lines: (insertion date)     ETT: (9/5)  Harper: (9/5)  OGT: (9/5)  Central line: (9/4)  Arterial Line (9/5)    Drips: current dose (range) OFF    Pertinent Exam:         Blood pressure (!) 177/47, pulse (!) 104, temperature 97.7 °F (36.5 °C), resp. rate 18, height 5' 9\" (1.753 m), weight 187 lb 6.3 oz (85 kg), SpO2 96 %.      Intake/Output Summary (Last 24 hours) at 9/8/2021 1114  Last data filed at 9/8/2021 0550  Gross per 24 hour   Intake 1775.62 ml   Output 505 ml   Net 1270.62 ml     Constitutional:  chronically ill appearing  EENMT:  Sclera clear, pupils equal, oral mucosa moist  Respiratory: coarse breath sounds  Cardiovascular:  RRR  Gastrointestinal:  soft with no tenderness; positive bowel sounds present  Musculoskeletal:  warm with no cyanosis, no lower extremity edema  Skin:  no jaundice or ecchymosis  Neurologic: sleepy, but eyes open, nods to questiosn  Psychiatric: seems confused    CXR:       Recent Labs     09/07/21  1413 09/07/21  0920 09/07/21  0312 09/06/21 0822 09/06/21 0220 09/05/21 2136 09/05/21 2136   WBC  --   --  22.3*  --  22.2*  --  15.8*   HGB 7.7* 7.7* 7.5*   < > 9.5*   < > 11.2*   HCT  --   --  22.8*  --  28.9*  --  34.5*   PLT  --   --  84*  --  67*  --  78*    < > = values in this interval not displayed. Recent Labs     09/07/21 0312 09/06/21 0220 09/05/21 2136    147* 143   K 5.1 4.5 5.4*   * 122* 119*   CO2 24 19* 17*   * 135* 147*   BUN 31* 25* 27*   CREA 3.49* 2.28* 2.32*   CA 7.9* 6.5* 7.9*   ALB 2.1*  --   --    AST 31  --   --    ALT <6*  --   --    AP 24*  --   --      No results for input(s): LAC, TROPHS, BNPNT, CRP in the last 72 hours. No lab exists for component: ESR  Recent Labs     09/08/21  1042 09/08/21  0512 09/08/21  0232   GLUCPOC 89 74 112*     ECHO: Results from [place] encounter on 06/09/21    ECHO ADULT COMPLETE    Interpretation Summary  · LV: Estimated LVEF is 30 - 35%. Normal cavity size, wall thickness and systolic function (ejection fraction normal). Mild (grade 1) left ventricular diastolic dysfunction. · Image quality for this study was technically difficult. · Contrast used: DEFINITY. · RV: Not well visualized. · LA: Mildly dilated left atrium.      Results     Procedure Component Value Units Date/Time    COVID-19 RAPID TEST [[id number]] Collected: 09/05/21 4110    Order Status: Completed Specimen: Nasopharyngeal Updated: 09/05/21 1707     Specimen source Nasopharyngeal        COVID-19 rapid test Not detected        Comment:      The specimen is NEGATIVE for SARS-CoV-2, the novel coronavirus associated with COVID-19. A negative result does not rule out COVID-19. This test has been authorized by the FDA under an Emergency Use Authorization (EUA) for use by authorized laboratories. Fact sheet for Healthcare Providers: [facility]  Fact sheet for Patients: [facility]       Methodology: Isothermal Nucleic Acid Amplification         COVID-19 RAPID TEST [[id number]] Collected: 08/31/21 1051    Order Status: Completed Specimen: Nasopharyngeal Updated: 08/31/21 1122     Specimen source Nasopharyngeal        COVID-19 rapid test Not detected        Comment:      The specimen is NEGATIVE for SARS-CoV-2, the novel coronavirus associated with COVID-19. A negative result does not rule out COVID-19. This test has been authorized by the FDA under an Emergency Use Authorization (EUA) for use by authorized laboratories.         Fact sheet for Healthcare Providers: [facility]  Fact sheet for Patients: [facility]       Methodology: Isothermal Nucleic Acid Amplification             Inpat Anti-Infectives (From admission, onward)     Start     Ordered Stop    09/07/21 1733  piperacillin-tazobactam (ZOSYN) 3.375 g in 0.9% sodium chloride (MBP/ADV) 100 mL MBP  3.375 g,   IntraVENous,   EVERY 12 HOURS      09/07/21 0824 --    09/06/21 0450  Vancomycin Intermittent Dosing  Other,   RX DOSING/MONITORING      09/06/21 0450 --    09/05/21 1600  ceFAZolin (ANCEF) 2 g/20 mL in sterile water IV syringe  2 g,   IntraVENous,   ON CALL TO OR      09/05/21 1549 09/06/21 1600              Ventilator Settings:  Ideal body weight: 70.7 kg (155 lb 13.8 oz)   Mode FIO2 Rate Tidal Volume Pressure PEEP   Pressure support  36 % 500 ml  10 cm H2O  8 cm H20    Peak airway pressure: 18 cm H2O       Minute ventilation: 9.64 l/min  ABG:  Recent Labs     09/06/21  0909 09/06/21  0313 09/05/21  2350   PHI 7.29* 7.23* 7.27*   PCO2I 45.9* 48.2* 40.8   PO2I 85 69* 123*   HCO3I 22.1 20.1* 18.7*     Assessment and Plan:  (Medical Decision Making)   Impression: 68 y.o. y/o male with advanced COPD with hypercapneic RF post colectomy. extubated yesterday     Now comfort measures per his and his HCPOA decision with PC. He is off pressors, on 4L NC, but with worsened encephalopathy and renal failure with poor UOP. We will move to the floor, pending hospice house discharge. If stays admitted tomorrow hospitalist can take over. Spoke with patient, son at bedside, nurse and Dr. Corby Feliciano.     DNR    Chao Evangelista MD

## 2021-09-08 NOTE — PROGRESS NOTES
TRANSFER - OUT REPORT:    Verbal report given to Linda RN (name) on Mancini Gails.  being transferred to [facility] house (unit) for routine progression of care       Report consisted of patients Situation, Background, Assessment and   Recommendations(SBAR). Information from the following report(s) SBAR, Kardex, Intake/Output, MAR and Recent Results was reviewed with the receiving nurse. Lines:   Triple Lumen Right IJ 09/04/21 Anterior;Right Neck (Active)   Central Line Being Utilized Yes 09/08/21 1550   Criteria for Appropriate Use Hemodynamically unstable, requiring monitoring lines, vasopressors, or volume resuscitation 09/08/21 1550   Site Assessment Clean, dry, & intact 09/08/21 1550   Infiltration Assessment 0 09/08/21 1550   Affected Extremity/Extremities Color distal to insertion site pink (or appropriate for race) 09/08/21 1550   Date of Last Dressing Change 09/04/21 09/08/21 0802   Dressing Status Clean, dry, & intact 09/08/21 1550   Dressing Type Disk with Chlorhexadine gluconate (CHG); Transparent 09/08/21 1550   Action Taken Other (comment) 09/07/21 1900   Proximal Hub Color/Line Status White;Capped 09/08/21 1550   Positive Blood Return (Medial Site) Yes 09/08/21 0802   Medial Hub Color/Line Status Blue;Flushed 09/08/21 1550   Positive Blood Return (Lateral Site) Yes 09/08/21 0802   Distal Hub Color/Line Status Brown;Capped 09/08/21 1550   Positive Blood Return (Site #3) Yes 09/08/21 0802   Alcohol Cap Used No 09/08/21 0802        Opportunity for questions and clarification was provided.       Patient transported with:   O2 @ 4L  liters

## 2021-09-08 NOTE — WOUND CARE
Stoma pouch leaking and loose, changed. Stoma is pink scant sanguinous drainage in pouch that was removed. Noted patient may be choosing comfort cares. Will be able to assisst with ostomy while in acute care please call.

## 2021-09-09 NOTE — PROGRESS NOTES
Problem: Emotional Support Needs  Goal: Patient/family is receiving emotional support  Description: Pt, Lala Gun, and family will receive emotional support weekly from SW through weekly check-ins, education on the hospice philosophy, validation of feelings, rapport building, and active listening throughout pt's hospice journey.    Outcome: Progressing Towards Goal

## 2021-09-09 NOTE — PROGRESS NOTES
Bedside report received from off-going RN visual identification made, assumed care of pt. Pt moaning, no agitation or restlessness. Pt respirations unlabored. Tab alert in place, rails up x 2, bed in lowest position, safety maintained. FLACC 0. Pt is at GIP level of care, no change to pt discharge plan. [facility] [place] [facility] [place]. Plan of care reviewed with CNA. 1908- Pt moaning PRN Dilaudid given. 1930- PRN effective. 2214- Pt moaning crying out, PRN Dilaudid. 2254- PRN not effective second PRN dilaudid given.      2345- PRN effective

## 2021-09-09 NOTE — PROGRESS NOTES
2020:  Patient arrived via EMS. Patient ID by name and  through EMS. Patient hollering out and restless on arrival.  FLACC 1010. Will medicate as ordered when medications are verified by pharmacy. Physical assessment complete. Magnet placed over ICD as ordered. Patient positioned for comfort. Bed low and locked and all safety measures in place. Door open. :  Medicated with PRN Hydromorphone IV for pain and PRN Haldol IV for agitation. Will reassess. :  Reassessment:  Patient resting with no s/sx of pain or agitation observed. FLACC 0/10.    2145:  IJ dressing changed as dressing was loose and coming off patient. Dressing applied to midline incision as ordered. 5:  Patient resting with no s/sx of pain, dyspnea, or agitation observed. FLACC 0/10.    2359:  Patient heard moaning out loudly. Grimacing noted. Medicated with PRN Hydromorphone IV for pain and PRN Haldol IV for agitation. Will reassess. 0030:  Reassessment:  Patient resting with no s/sx of pain or agitation observed. FLACC 0/10.    0200:  Patient moaning out loudly. Medicated with PRN Hydromorphone IV for pain and PRN Haldol IV for agitation. Will reassess. 0230:  Reassessment:  Patient resting with no s/sx of pain or agitation observed. FLACC 010.    9362:  Patient hollering out loudly; eyes wide. Appears restless. Medicated with PRN Hydromorphone IV for pain and PRN Haldol IV for agitation. Will reassess. Patient repositioned for comfort. 9460:  Reassessment:  Patient resting with no s/sx of pain or agitation observed. FLACC 0/10.    0559:  Patient hollering out loudly; moving head back and forth. Medicated with PRN Hydromorphone IV for pain and PRN Haldol IV for agitation. Will reassess. 7134:  Reassessment:  Patient resting with no s/sx of pain or agitation observed. FLACC 0/10.    7534:  Patient again moaning out loudly. Medicated with PRN Hydromorphone IV for pain.   Will have first shift reassess. Patient has required PRN Hydromorphone x 6 for pain and PRN Haldol x 5 for agitation this shift. Report to Cheko Nunes RN.

## 2021-09-09 NOTE — [facility] IDG CHAPLAIN NOTES
Patient: Geoffrey Galdamez. Date: 09/09/21  Time: 11:11 AM    [facility]  Notes    / Grief Counselor has reviewed  Initial Comprehensive Assessment and plan of care. Bereavement and Spiritual Care Assessments to be completed and plan of care put in place to meet patient and family needs.        Signed by: Lotus Ruiz

## 2021-09-09 NOTE — PROGRESS NOTES
Demographics      Information provided by: Meagan Blackwood- pt's niece and HCPOA. Pt is obtunded. Name:  Blanca Mackey of Care  GIP [x] Routine  [] Respite   []           From the in home program Yes [] No [x]                                                        Diagnosis: Hemorrhagic Shock         Insurance    Medicare [x]   Medicaid  []  [id number] [address]  []      Other []                Social    []   Single [x]     []    []     Children: No.            Community Resources Used in the Home prior to admission: Yes []  No [x]    [employer] Resources Needed? Yes []  No [x]    If yes, explain:         Financial Concerns: There are no financial concerns at this time. The IFA is not scanned into the system at this time and SW will continue to assess needs for pt, Lillette, and family around financial concerns. Jennifer Application Needed   Yes []  No [x]     Medicaid application needed Yes []  No [x]                                    IFA Form Complete  Yes [x]   No []     Discharge Plans: Plans are to remain [facility] for GIP and comfort care. Work History :  Retired [x] [employer] [] Part-time [] Disabled []     [address]   Yes []  No [x]  [employer] []   [employer] [] [employer] [] [facility] []  Affiliated [facility] []  [employer] []  Linked to [facility]   Yes []  No []  Referral made to tna Yes [] No [x]     Advanced Directives Scanned in the system      Living Will  Yes  []  No [x]   HCPOA     Yes  [x]  No []   DPOA        Yes  []  No [x]  DNR           Yes [x]  No []         Spiritual / Sonu Edgar Support: Meagan Blackwood reports that the family has many preachers and they are close to one another. There is no other Buddhism family supporting pt at this time. Final Arrangements: The family will be using [employer].  Meagan Blackwood reports that there will be no formal service at this time. Medical History and/or Narrative copied from the patients chart from the [address] [place] [address] Physician or Rn:  [address] [place] [address] Nurse Notes-  Patient is a 62-year-old male admitted to [facility] from [facility] with hospice diagnosis of Hemorrhagic shock to manage symptoms of pain, dyspnea, and agitation. Patient admitted to [facility] with complaints of passing blood per rectum. Patient had a hemicolectomy performed with colostomy placement. Active bleeding noted from large bowel. Patient also with multi organ system failure. Patient has progressed into an obtunded state and patient's niece who is HCPOA has opted for comfort measures at [facility]. Discharge plan is for patient to remain in [facility] until demise. Discharge plans to be evaluated for potential LOC change. Mental Health History: There is no reported mental health history. Volunteer discussion: Yes []    No [x]  **Due to COVID-19 protocols. Goals of care for the patient and family: To keep pt comfortable and to meet pt and family needs. Coping and Bereavement: Nakul Dunham reports that her uncle (pt) being here is hard due to her being very close to him. She knows that she is doing what he wants her to do and she is glad he is comfortable. Nakul Dunham reports that her other uncle, pt's brother, passed away in May and that her own mother passed away at [facility] two years ago. She reported it almost took her breath away to come back to [facility] and SW provided emotional support. SW will continue to assess for coping and bereavement needs. Was a Referral made to Bereavement  Yes [] No  [x]    Support Needs: Nakul Dunham reports no support needs at this time. SW explained SWs role and encouraged Aristidese to reach out to SW with any questions and/or needs. SW will continue to assess for emotional support needs for Lillette, pt, and other family members present.

## 2021-09-09 NOTE — PROGRESS NOTES
Problem: Falls - Risk of  Goal: *Absence of Falls  Description: Document Raine Xavier Fall Risk and appropriate interventions in the flowsheet. Outcome: Progressing Towards Goal  Note: Fall Risk Interventions:  Mobility Interventions: Bed/chair exit alarm    Mentation Interventions: Adequate sleep, hydration, pain control, Bed/chair exit alarm, Door open when patient unattended, Evaluate medications/consider consulting pharmacy, More frequent rounding, Toileting rounds    Medication Interventions: Bed/chair exit alarm, Evaluate medications/consider consulting pharmacy    Elimination Interventions: Bed/chair exit alarm, Call light in reach, Toileting schedule/hourly rounds              Problem: Pain  Goal: *Control of Pain  Description: Alan Benjamin will have pain control AEB a FLACC score of 2 or less. Outcome: Progressing Towards Goal     Problem: Anxiety/Agitation  Goal: Verbalize or staff assess the ability to manage anxiety  Description: The patient/family/caregiver will verbalize and demonstrate ability to manage the patient's anxiety throughout hospice care.   Outcome: Progressing Towards Goal     Problem: General Wound Care  Goal: *Non-infected wound: Improvement of existing wound, absence of infection, and maintenance of skin integrity  Outcome: Progressing Towards Goal  Goal: Interventions  Outcome: Progressing Towards Goal     Problem: Infection - Risk of, Central Venous Catheter-Associated Bloodstream Infection  Goal: *Absence of infection signs and symptoms  Outcome: Progressing Towards Goal     Problem: Infection - Risk of, Urinary Catheter-Associated Urinary Tract Infection  Goal: *Absence of infection signs and symptoms  Outcome: Progressing Towards Goal     Problem: Dyspnea Due to End of Life  Goal: Demonstrate understanding of and ability to manage respiratory symptoms at end of life  Outcome: Progressing Towards Goal

## 2021-09-09 NOTE — [facility] IDG NURSE NOTES
Patient: Leeann Turner. Date: 09/08/21  Time: 10:54 PM    [facility] [place] Nurse Notes    Patient is a 68-year-old male admitted to [facility] from [facility] with hospice diagnosis of Hemorrhagic shock to manage symptoms of pain, dyspnea, and agitation. Patient admitted to [facility] with complaints of passing blood per rectum. Patient had a hemicolectomy performed with colostomy placement. Active bleeding noted from large bowel. Patient also with multi organ system failure. Patient has progressed into an obtunded state and patient's niece who is HCPOA has opted for comfort measures at [facility]. Discharge plan is for patient to remain in [facility] until demise. Discharge plans to be evaluated for potential LOC change. RN has reviewed POC with CNA. Patient noted to have weeks catheter and colostomy to left quadrant. Midline incision with staples noted and dressing applied per provider's orders. Small puncture wound to right of midline incision noted. Patient has right IJ and new dressing applied on admission as dressing was loose. Other skin areas intact. Patient opens eyes but is nonverbal.  Will moan out loudly during care. Medicated on admission for pain and agitation per orders as patient was moaning out loudly and was restless. Patient positioned for comfort with all safety measures in place. No family present on admission to orient to room and facility. Admission complete and hospice care plan initiated which includes spiritual, psychosocial, and bereavement. No initial needs identified. IDG team, including MD, made aware of care plans. Volunteer services suspended due to [employer].             Signed by: Vinh Hernandez RN

## 2021-09-09 NOTE — H&P
History and Physical    Patient: Emory Cote MRN: [id number]  SSN: [id number]    YOB: 1940  Age: 68 y.o.   Sex: male      Subjective:      Emory Cote is a 68 y.o. male who has a hospice diagnosis of hemorrhagic shock with multiorgan system failure.  Associated diagnoses which also adversely affect prognosis include blood loss anemia, lower GI hemorrhage, acute ischemic transverse colitis, hemicolectomy with right colostomy, multiunit PRBC transfusion, metabolic acidosis, acute renal failure, metabolic encephalopathy, respiratory failure with hypoxia, exacerbation of COPD, and ileus.  Past history of gastric AV malformation with hemorrhage, AAA status post endovascular stent, ischemic cardiomyopathy (LVEF 30%), pulmonary edema, supraventricular tachycardia, implanted automated defibrillator, cardiac pacemaker dependence are a set of diagnoses apart from hemorrhagic shock but nonetheless contribute adversely to prognosis.  After a prodrome of scant rectal bleeding x3 days patient presented with jaiden hematochezia September 3, 2021 and was admitted to [facility] where nuclear bleeding studies demonstrated active bleeding at the splenic flexure of the large bowel.  Angiography was obtained demonstrating 2 occluded branch arteries in the distal transverse colon but angiographic measures could not be used to control continued blood loss.  Patient underwent urgent hemicolectomy with colostomy but sustained multiple complications including those listed above.  The patient has lapsed from agitation into obtundation and his niece as legal medical POA, Ms Abiola Rm, has agreed with treating medical team at [address] [place] [address] that further supportive care including pressors, transfusions and artificial intravascular feeding and hydration would be futile and prolonged her uncle's agony. Zenaida Espana has chosen instead to limit further care to comfort measures only.  Under the circumstances Mr. Henderson's life expectancy is less than 5 days. [facility] has become an uric and his creatinine is rapidly rising. He has had a comfortable transfer to the hospice house but continued to have pain. He is moaning and groaning and responds nicely to intravenous morphine. His family is not in attendance during my visit this morning. Past Medical History:   Diagnosis Date    A-fib [facility]) 5/2/2014    AAA (abdominal aortic aneurysm) without rupture ([address] 5/2/2014    3.2 on US 2/14 [facility]    Acute metabolic encephalopathy 7/5/1168    Arthritis     Cancer (HCC)     hx prostate cancer    COPD (chronic obstructive pulmonary disease) ([address] 5/2/2014    Elevated prostate specific antigen (PSA)     Glaucoma 5/2/2014    Heart disease     Heart failure ([address]     Hyperlipemia 5/2/2014    Hypertension     Hypertrophy of prostate with urinary obstruction and other lower urinary tract symptoms (LUTS)     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    OA (osteoarthritis) 5/2/2014    Peptic ulcer disease 6/1/2017    Poor historian     Prostate cancer ([address]     Pulmonary embolus ([address] 6/1/2017    Supplemental oxygen dependent 5/2/2014    Warfarin anticoagulation 5/2/2014     Past Surgical History:   Procedure Laterality Date    COLONOSCOPY N/A 9/12/2019    COLONOSCOPY/ 26 ROOM 614 performed by Dung Fenton MD at [facility] ENDOSCOPY    EGD  12/18/2019         HX HEART CATHETERIZATION      VASCULAR SURGERY PROCEDURE UNLIST  09/14/2019     Bilateral common femoral artery cutdown with exposure and placement of catheter in aorta for aortogram,Endovascular repair of infrarenal abdominal aortic aneurysm with bifurcated modulated device (31 x 14 x 13 main body) via the left common femoral, right iliac extender measuring 27 x 10.       Family History   Problem Relation Age of Onset    Cancer Mother     Heart Disease Father      Social History     Tobacco Use    Smoking status: Former Smoker     Packs/day: 2.00 Years: 40.00     Pack years: 80.00     Quit date: 2014     Years since quittin.1    Smokeless tobacco: Never Used    Tobacco comment: still taking Chantix    Substance Use Topics    Alcohol use: No      Prior to Admission medications    Medication Sig Start Date End Date Taking? Authorizing Provider   ferrous sulfate (IRON) 325 mg (65 mg iron) EC tablet Take 1 Tablet by mouth two (2) times a day. 21   Jerri Nielsen MD   guaiFENesin ER [facility]) 600 mg ER tablet Take 1 Tablet by mouth two (2) times a day. Indications: cough 21   Sparkle VICTORIA, NP   fluticasone furoate (ARNUITY ELLIPTA) 100 mcg/actuation dsdv inhaler Take 1 Puff by inhalation daily. Wash mouth out with water after each dose. Indications: worsening of debilitating chronic lung disease called COPD 21   Sparkle VICTORIA, NP   albuterol (PROVENTIL VENTOLIN) 2.5 mg /3 mL (0.083 %) nebu Take 3 mL by inhalation every six (6) hours as needed for Shortness of Breath or Wheezing. 21   Shashank Smith MD   dexAMETHasone (DECADRON) 2 mg tablet 4 tabs po x3 days, 3 tabs po x3 days, 2 tabs po x3 days, 1 tab po x3 days, 1/2 tab po x3 days then stop. Patient not taking: Reported on 2021   Tahir Smith MD   lisinopriL (PRINIVIL, ZESTRIL) 10 mg tablet Take 1 Tablet by mouth daily. Patient not taking: Reported on 2021 7/15/21   Thuy Godinez MD   metoprolol succinate (TOPROL-XL) 100 mg tablet Take 1 Tablet by mouth daily. Indications: ventricular rate control in atrial fibrillation 21   Thuy Godinez MD   predniSONE (DELTASONE) 20 mg tablet Take 40mg qday x3d, then 20mg qday x3d, then 10mg x2d, then stop. Take with dinner  Patient taking differently: Take 40 mg by mouth daily for 3 days, then 20 mg by mouth daily for 3 days, then take 10 mg by mouth daily for 2 days - then stop - take with dinner 21   Jania Houser MD   allopurinoL (ZYLOPRIM) 300 mg tablet Take 300 mg by mouth daily. Provider, Historical   docusate sodium (COLACE) 100 mg capsule Take 1 Capsule by mouth daily. 5/27/21   Milagro Gamble MD   albuterol-ipratropium (DUO-NEB) 2.5 mg-0.5 mg/3 ml nebu 3 mL by Nebulization route three (3) times daily for 180 days. 3/24/21 9/20/21  Milagro Gamble MD   rosuvastatin (Crestor) 40 mg tablet Take 1 Tab by mouth nightly. 3/24/21   Milagro Gamble MD   nitroglycerin (NITROSTAT) 0.4 mg SL tablet 1 Tab by SubLINGual route every five (5) minutes as needed for Chest Pain (call EMS if pain fails to resolve after 2 tabs. max daily dose of 3 tabs). Indications: after 15 minutes or 3 doses, if chest pain persists, contact EMS/911 3/24/21   Milagro Gamble MD   tamsulosin [facility]) 0.4 mg capsule Take 1 Cap by mouth daily. 3/24/21   Milagro Gamble MD   aspirin delayed-release 81 mg tablet Take 81 mg by mouth daily. Provider, Historical   albuterol (PROVENTIL HFA, VENTOLIN HFA, PROAIR HFA) 90 mcg/actuation inhaler TAKE 2 PUFFS BY MOUTH EVERY 4 HOURS AS NEEDED FOR WHEEZE 1/3/21   Provider, Historical   latanoprost (XALATAN) 0.005 % ophthalmic solution LOCATION  BOTH EYES. INSTILL ONE DROP INTO EACH EYE AT BEDTIME 11/12/20   Provider, Historical   famotidine (PEPCID) 40 mg tablet Take 1 Tab by mouth daily. 1/22/21   Jennifer Naidu MD   OXYGEN-AIR DELIVERY SYSTEMS 2 L/min by Nasal route daily. nasal canula 9/15/15   Provider, Historical   brimonidine (ALPHAGAN P) 0.1 % ophthalmic solution Administer 1 Drop to both eyes two (2) times a day. Provider, Historical        Allergies   Allergen Reactions    Statins-Hmg-Coa Reductase Inhibitors Myalgia     Muscle pain       Review of Systems: The remainder of the admission historical database is as outlined in the Clinical Record. Objective:     Vitals:    09/08/21 2030   BP: 110/62   Pulse: 100   Resp: 14        Physical Exam:    Vital signs are stable as outlined. Skin examination reveals no petechiae or ecchymoses.   Head and neck examination reveals no adenopathy jaundice or venous distention. Cardiopulmonary examination reveals few rhonchi but no respiratory distress and a regular rhythm with no diastolic murmurs or rubs. Abdominal examination reveals a large abdominal dressing with the peritoneal drain, moderate distention and some guarding. Extremities reveal osteoarthritic changes with no edema, calf tenderness or ischemic change. Neurologic examination reveals patient to be groaning mildly, much less than earlier and demonstrating no lateralizing abnormalities. Assessment:     Hospital Problems  Date Reviewed: 9/5/2021        Codes Class Noted POA    Hemorrhagic shock [facility]) ICD-10-CM: R57.8  ICD-9-CM: 785.59  9/5/2021 Unknown              Plan:     Current Facility-Administered Medications   Medication Dose Route Frequency    HYDROmorphone (DILAUDID) injection 1 mg  1 mg IntraVENous Q30MIN PRN    fentaNYL (DURAGESIC) 25 mcg/hr patch 1 Patch  1 Patch TransDERmal Q72H    haloperidol lactate (HALDOL) injection 2 mg  2 mg SubCUTAneous Q1H PRN    Or    haloperidol lactate (HALDOL) injection 2 mg  2 mg IntraVENous Q1H PRN    haloperidol lactate (HALDOL) injection 2 mg  2 mg SubCUTAneous Q1H PRN    Or    haloperidol lactate (HALDOL) injection 2 mg  2 mg IntraVENous Q1H PRN    glycopyrrolate (ROBINUL) injection 0.2 mg  0.2 mg IntraVENous Q4H PRN    heparin (porcine) pf 300 Units  300 Units InterCATHeter Q12H    sodium chloride (NS) flush 10 mL  10 mL InterCATHeter Q12H    sodium chloride (NS) flush 10 mL  10 mL InterCATHeter PRN    heparin (porcine) pf 300 Units  300 Units InterCATHeter PRN       This gentleman has numerous and likely fatal complications from profound ischemic colitis requiring emergency colectomy. Pain is his major issue and we will both and FENTANYL to his regimen as well as increasing his MORPHINE dose for better control of his pain. Death is anticipated this week.       Signed By: Carmine Frances MD September 9, 2021

## 2021-09-09 NOTE — PROGRESS NOTES
Initial Visit:    Background: Mr. Jacek Neville is a 68year old gentleman  With a hospice diagnosis of Hemorrhagic Shock. He is supported by his niece, Sonny Méndez.  took a message from her this morning when covering phones but did not assess.  did not feel it was the appropriate time. Assessment:    visited Mr. Radha Belloer today. He appeared to resting, no signs of distress or pain. His eyes were open throughout the visit but there was no verbal communication.  offered words of peace and assurance, sang the song, \" Surely The  Presence\" and shared   scripture from [facility] [place] [facility]: 1-2.  offered prayer. Plan:    to contact Una Soares to offer support and explore needs and desires from spiritual care. Yolanda Funes

## 2021-09-09 NOTE — PROGRESS NOTES
1840: [employer] notified to deactivate ICD per provider order.

## 2021-09-09 NOTE — PROGRESS NOTES
9632 Report received from off going RN. Pt was admitted 9/8 [facility] level of status with hospice dx of hemorrhagic shock and symptom management of pain, dyspnea, agitation. Pt required six doses of PRN Dilaudid and five doses of PRN Haldol overnight to obtain comfort level. Currently pt is resting in bed quietly,  eyes open but no tracking. RR unlabored. Harper to drainage. Magnet in place over difibrilator on left chest.  Gwenith Jn drain had been removed prior to transport, dsg over site that is clean dry and intact. Safety measures in place with bed low and locked, call bell in reach, tab alert in place, side rails up x2. Door remains open for continued monitoring. Plan of care reviewed with CNA. D/C plan - pt will remain at [facility] until his passing and ongoing assessment for appropriate level of care. 0820 Pt resting with FLACC 0/10. Safety measures in place. Door remains open for continued monitoring. 1014 New orders received for increase in Dilaudid to 1mg IV/SQ. Pt given Dilaudid 1mg for signs and symptoms of pain via triple lumen CVC. Fentanyl 25mcg patch applied to right arm.     1105 Re-assess, pt resting with his eyes closed, FLACC 0/10. Safety measures in place. Door remains open for continued monitoring. 1146 Pt medicated with Dilaudid 1mg IVP for crying out and moaning. Safety measures in place. 1239 re-assess, pt with audible gurgling noted and slight moaning when touched. Pt was medicated with Robinul 0.2mg IVP and Dilaudid 1mg IVP for symptom management. Oxygen in place via NC at 2L. Safety measures in place. Door remains open for continued monitoring. 1310 Reassessment, pt resting quietly, relaxed features, FLACC 0/10. Safety measures in place. Pt repositioned for comfort. Door remains open for continued monitoring. 1430 Pt resting with his eyes closed, FLACC 0/10. Safety measures in place. Door remains open for continued monitoring.      1538 Pt medicated with Dilaudid 1mg IVP for comfort measures related to pain, facial grimacing. Son at the bedside and security code provided and update given. Safety measures in place. 1620 Pt resting with his eyes half open, non tracking. FLACC 0/10. Son at the bedside. Safety measures in place. 1703 Pt medicated with Dilaudid 1mg IVP for comfort measures related to moaning. Pt's son is at the bedside. Safety measures in place. 1725 Pt is resting with his eyes closed, FLACC 0/10. Oxygen in place via NC. Safety measures in place. Door remains open for continued monitoring. Report given to oncoming RN.

## 2021-09-09 NOTE — [facility] IDG SOCIAL WORKER NOTES
Patient: Rosalia Rodriguez. Date: 09/09/21  Time: 11:55 AM    [facility]  Notes  SW has read the initial comprehensive assessment and plan of care. No immediate needs noted. Initial SW assessment visit will be completed within 5 days of admission.          Signed by: Noe Landis LMSW

## 2021-09-10 NOTE — DEATH NOTE
This nurse to room for evaluation. No pulse, respiratory effort for 2 minutes. TOD 18.        56- Family notified of pt passing. grieving appropriately. Post mortem care completed and all lines/drains removed. [employer] notified via fax; Garrison Zuniga Family to use [place] Restor[place]/ Jasmin Higginbotham5-call placed to  home for body .

## 2021-10-04 NOTE — PROGRESS NOTES
Physician Progress Note      Michelet Morillo  CSN #:                  [id number]  :                       1940  ADMIT DATE:       2021 12:22 AM  DISCH DATE:        2021 7:40 PM  RESPONDING  PROVIDER #:        Jes COTTRELL MD          QUERY TEXT:    Pt admitted with GI Bleed . Pt noted to have Leukocytosis, tachycardia, hypotension, hypoxia, Lactate 2.4, Procal 0.22. If possible, please document in the progress notes and discharge summary if you are evaluating and /or treating any of the following: The medical record reflects the following:  Risk Factors: 68 YOM, Diverticulitis,  Mycobacterial pneumonia , COPD,  Clinical Indicators: KAYLA on CKD, hypotension, tachycardia, Leukocytosis,hypoxia Lactate 2.4 , procal maxed at 0.22   PN: Septic shock: On levophed and dipesh. Albumin low and need to replace to spare pressors  Treatment: ICU Monitoring, General Surgery Consult, Exp Lap L Hemicolectomy and Colostomy, 7U PRBCs, IV Dipesh-Synephrine, IV Vasopressin, IV Levophed, IV Zosyn, IV Vancomycin, IV Ancef t  Options provided:  -- Sepsis, present on admission  -- Sepsis, not present on admission  -- Diverticulitis without Sepsis  -- Sepsis was ruled out  -- Other - I will add my own diagnosis  -- Disagree - Not applicable / Not valid  -- Disagree - Clinically unable to determine / Unknown  -- Refer to Clinical Documentation Reviewer    PROVIDER RESPONSE TEXT:    This patient has sepsis which was present on admission. Query created by:  Cyndi Ramsey on 10/1/2021 6:03 PM      Electronically signed by:  Jose Sanchez MD 10/4/2021 7:40 AM

## 2022-10-15 NOTE — ED NOTES
Pt requested bedpan for a BM. This RN placed pt on the bedpan, and then cleaned the pt. BM was bloody with clots.
Pt's BP cycled and was 83/49. This RN went into the room and placed pt in trendelenburg. BP was retaken and came back at 93/52. This RN notified hospitalist, Dr. Keli Nguyen, all of the aforementioned. Awaiting results of HGB/HCT to get further orders.
TRANSFER - OUT REPORT:    Verbal report given to Nanette(name) on [facility].  being transferred to [facility](unit) for routine progression of care       Report consisted of patients Situation, Background, Assessment and   Recommendations(SBAR). Information from the following report(s) SBAR was reviewed with the receiving nurse. Lines:   Peripheral IV 08/31/21 Left Antecubital (Active)        Opportunity for questions and clarification was provided.       Patient transported with:   O2 @ 3 liters  [facility]
This RN attempted to call report. The phone rang 15 times with no answer. This RN will attempt again.
This RN was advised that transport will not be available until after 0700. This RN called [facility] back and left a message for the receiving RN that pt won't be coming until day shift. Dr. Usha Amezcua and Dr. Lucia Rowland both aware.
show
Employed at a [facility] [employer] [facility] [employer]

## 2022-12-29 NOTE — PROGRESS NOTES
Care Management Interventions  PCP Verified by CM: Yes  Last Visit to PCP: 07/29/21  Current Support Network: Lives Alone  Discharge Location  Discharge Placement: Unable to determine at this time    SW spoke w/ pt, pt is admitted for GI Bleed. Pt lives alone, receives assistance from an aide, but can not remember the company. SW made an attempt to contact the aide, left message. Pt uses O2, walker, and a wheelchair at home. Pt sometimes receive assistance with understanding medical concerns and advice from niece. Pt's PCP is Dr. Joseph Bernal, seen every 3 months. Pt's pharmacy of choice is [facility] [address].  SW will follow-up w/ pt    SIVAN Eaton You can access the FollowMyHealth Patient Portal offered by [facility] by registering at the following website: [email or web address]. By joining [facility]’s FollowMyHealth portal, you will also be able to view your health information using other applications (apps) compatible with our system.

## 2023-10-18 NOTE — PROGRESS NOTES
Presenting creatinine of 1.67 -> continue IV fluid hydration -> at 1.64 this morning  Unclear if acute kidney injury or CKD as no prior baseline available  Likely contributed to by renal hypoperfusion and insensible volume loss from recent vomiting  Limit/avoid nephrotoxins as possible - holding HCTZ/ACEI  Monitor renal function and urine output The documentation for this period is being entered following the guidelines as defined in the [facility] policy by Skip Iraheta RN.

## 2023-12-18 NOTE — ED NOTES
Dr. Kei Valencia called in regards to ICU bed order. Bed order to be changed. [Mother] : mother [Eats healthy meals and snacks] : eats healthy meals and snacks [Eats meals with family] : eats meals with family [Normal] : Normal [Brushing teeth twice/d] : brushing teeth twice per day [Yes] : Patient goes to dentist yearly [Playtime (60 min/d)] : playtime 60 min a day

## 2025-03-17 NOTE — CDMP QUERY
Called patient to go over recent lab results per Dr Jackson. No answer. Left vm for patient to return phone call to office.    Please clarify if this patient is being treated/managed for    Severe Protein Calorie Malnutrition in the setting of COPD Exacerbation, decreased appetite with recent lost of  14-23 pounds without trying, reported 10 pound weight loss over 1 week since  has been sick and being treated with Ensure with breakfast, RD consult        =>Other Explanation of clinical findings  =>Unable to Determine (no explanation of clinical findings)    The medical record reflects the following:    Risk Factors: COPD Exacerbation, decreased appetite    Clinical Indicators: recent lost of 14-23 pounds without trying, reported 10 pound weight loss over 1 week since  has been sick    Treatment: Ensure with breakfast, RD consult    Please clarify and document your clinical opinion in the progress notes and discharge summary including the definitive and/or presumptive diagnosis, (suspected or probable), related to the above clinical findings. Please include clinical findings supporting your diagnosis.     Thanks,    Zoë Shi RN, CDS  Compliant Documentation Management Program  [phone]

## 2025-04-23 NOTE — ED PROVIDER NOTES
Patient is 30-year-old male presenting to the emergency department today secondary to palpitations. The patient has a history of atrial flutter and has been controlled with metoprolol at home. The patient was just recently in the hospital secondary to a COPD exacerbation. He was noted to be at a heart rate of 160 bpm per EMS and they gave the patient 20 mg of Cardizem in route to the ER and his heart rate slowed down to the 80s. Patient remains in atrial flutter. He has not have any acute complaints at this time. Patient states has been taking his medication as prescribed.            Past Medical History:   Diagnosis Date    A-fib [facility]) 5/2/2014    AAA (abdominal aortic aneurysm) without rupture ([address] 5/2/2014    3.2 on US 2/14 [facility]    Acute metabolic encephalopathy 3/9/5918    Arthritis     Cancer (HCC)     hx prostate cancer    COPD (chronic obstructive pulmonary disease) ([address] 5/2/2014    Elevated prostate specific antigen (PSA)     Glaucoma 5/2/2014    Heart disease     Heart failure ([address]     Hyperlipemia 5/2/2014    Hypertension     Hypertrophy of prostate with urinary obstruction and other lower urinary tract symptoms (LUTS)     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    OA (osteoarthritis) 5/2/2014    Peptic ulcer disease 6/1/2017    Poor historian     Prostate cancer ([address]     Pulmonary embolus ([address] 6/1/2017    Supplemental oxygen dependent 5/2/2014    Warfarin anticoagulation 5/2/2014       Past Surgical History:   Procedure Laterality Date    COLONOSCOPY N/A 9/12/2019    COLONOSCOPY/ 26 ROOM 614 performed by Rose Marie Moore [facility] at [facility] ENDOSCOPY    EGD  12/18/2019         HX HEART CATHETERIZATION      VASCULAR SURGERY PROCEDURE UNLIST  09/14/2019     Bilateral common femoral artery cutdown with exposure and placement of catheter in aorta for aortogram,Endovascular repair of infrarenal abdominal aortic aneurysm with bifurcated modulated device (31 x 14 x 13 main body) via the left common femoral, right iliac extender measuring 27 x 10. Family History:   Problem Relation Age of Onset    Cancer Mother     Heart Disease Father        Social History     Socioeconomic History    Marital status: SINGLE     Spouse name: Not on file    Number of children: Not on file    Years of education: Not on file    Highest education level: Not on file   Occupational History    Not on file   Tobacco Use    Smoking status: Former Smoker     Packs/day: 2.00     Years: 40.00     Pack years: 80.00     Quit date: 2014     Years since quittin.9    Smokeless tobacco: Never Used    Tobacco comment: still taking Chantix    Substance and Sexual Activity    Alcohol use: No    Drug use: No    Sexual activity: Yes     Partners: Female     Birth control/protection: None   Other Topics Concern    Not on file   Social History Narrative    Not on file     Social Determinants of Health     Financial Resource Strain:     Difficulty of Paying Living Expenses:    Food Insecurity:     Worried About Running Out of Food in the Last Year:     Ran Out of Food in the Last Year:    Transportation Needs:     Lack of Transportation (Medical):  Lack of Transportation (Non-Medical):    Physical Activity:     Days of Exercise per Week:     Minutes of Exercise per Session:    Stress:     Feeling of Stress :    Social Connections:     Frequency of Communication with Friends and Family:     Frequency of Social Gatherings with Friends and Family:     Attends Alevism Services:     Active Member of Clubs or Organizations:     Attends Club or Organization Meetings:     Marital Status:    Intimate Partner Violence:     Fear of Current or Ex-Partner:     Emotionally Abused:     Physically Abused:     Sexually Abused: ALLERGIES: Statins-hmg-coa reductase inhibitors    Review of Systems   Respiratory: Positive for shortness of breath. Cardiovascular: Positive for palpitations.    All other systems reviewed and are negative. Vitals:    07/07/21 1136 07/07/21 1137   BP: (!) 115/58    Pulse: 81    Resp: 20    Temp: 98.7 °F (37.1 °C)    SpO2: 100%    Weight:  72.6 kg (160 lb)   Height:  5' 9\" (1.753 m)            Physical Exam     GENERAL:The patient has Body mass index is 23.63 kg/m². Well-hydrated. VITAL SIGNS: Heart rate, blood pressure, respiratory rate reviewed as recorded in  nurse's notes  EYES: Pupils reactive. Extraocular motion intact. No conjunctival redness or drainage. NECK: Supple, no meningeal signs. Trachea midline. No masses or thyromegaly. LUNGS: Breath sounds clear and equal bilaterally no accessory muscle use  CHEST: No deformity  CARDIOVASCULAR: Regular rate and rhythm  ABDOMEN: Soft without tenderness. No palpable masses or organomegaly. No  peritoneal signs. No rigidity. EXTREMITIES: No clubbing or cyanosis. No joint swelling. Normal muscle tone. No  restricted range of motion appreciated. NEUROLOGIC: Sensation is grossly intact. Cranial nerve exam reveals face is  symmetrical, tongue is midline speech is clear. SKIN: No rash or petechiae. Good skin turgor palpated. PSYCHIATRIC: Alert and oriented. Appropriate behavior and judgment. MDM  Number of Diagnoses or Management Options  Diagnosis management comments: MI, coronary artery disease, unstable angina, coronary artery disease,    Atrial fibrillation, cardiac arrhythmia, PVC, medication induced palpitations, heart block,  electrolyte induced palpitations,         Amount and/or Complexity of Data Reviewed  Clinical lab tests: ordered and reviewed  Review and summarize past medical records: yes  Discuss the patient with other providers: yes      ED Course as of Jul 07 1303 Wed Jul 07, 2021   1300 Spoke with cardiology and they requested the patient go ahead and be started on a Cardizem drip and they will come and see him here in the ER.     [KH]   1301 I talked to the patient about the findings here in the emergency department and the need for the IV meds. He is agreeable with this plan. [KH]      ED Course User Index  [KH] Tameka David DO       EKG    Date/Time: 7/7/2021 11:49 AM  Performed by: Tameka David DO  Authorized by: Tameka David DO     ECG reviewed by ED Physician in the absence of a cardiologist: yes    Comments:      Patient EKG shows atrial flutter with a ventricular rate of 84 bpm.    Critical Care  Performed by: Tameka David DO  Authorized by: Tameka David DO     Comments:      Critical care time: 79 minutes of critical care time was performed in the emergency department. This was separate from any other procedures listed during the patients emergency department course. The failure to initiate these interventions on an urgent basis would likely have resulted in sudden, clinically significant or life-threatening deterioration in the patients condition. 23-Apr-2025 12:57

## 2025-05-09 NOTE — ACP (ADVANCE CARE PLANNING)
----- Message from RICK Moncada sent at 5/8/2025  1:49 PM EDT -----  PT order was previously placed on 5/1- advise patient to call to schedule   Advance Care Planning     General Advance Care Planning (ACP) Conversation      Date of Conversation: 7/7/2021  Conducted with: Patient with Decision Making Capacity    Healthcare Decision Maker:     Primary Decision Maker: Stevie Stephen - Other Relative - [phone]  Click here to complete [address] including selection of the Healthcare Decision Maker Relationship (ie \"Primary\")  Today we documented Decision Maker(s) consistent with ACP documents on file. Content/Action Overview:    Has ACP document(s) on file - reflects the patient's care preferences  Reviewed DNR/DNI and patient elects Full Code (Attempt Resuscitation)         Length of Voluntary ACP Conversation in minutes:  <16 minutes (Non-Billable)    Pranav Ford RN

## (undated) DEVICE — GUIDEWIRE WITH ICE™ HYDROPHILIC COATING: Brand: CHOICE™ PT

## (undated) DEVICE — CATHETER PTCA BLLN L4CM INFL 10-37MM CATH L90CM MOLDING

## (undated) DEVICE — INTRODUCER SHEATH SAFESHEATH 8FR 13CM SPLITTABLE DILATOR

## (undated) DEVICE — 2000CC GUARDIAN II: Brand: GUARDIAN

## (undated) DEVICE — JACKSON-PRATT 100CC BULB RESERVOIR: Brand: CARDINAL HEALTH

## (undated) DEVICE — X-RAY SPONGES,12 PLY: Brand: DERMACEA

## (undated) DEVICE — BASIC SINGLE BASIN-LF: Brand: MEDLINE INDUSTRIES, INC.

## (undated) DEVICE — SURGICAL PROCEDURE PACK SPEC

## (undated) DEVICE — SYR 10ML LUER LOK 1/5ML GRAD --

## (undated) DEVICE — SUTURE VCRL SZ 3-0 L18IN ABSRB VLT L26MM SH 1/2 CIR J774D

## (undated) DEVICE — FORCEPS BX L240CM JAW DIA2.8MM L CAP W/ NDL MIC MESH TOOTH

## (undated) DEVICE — SUTURE VCRL SZ 3-0 L27IN ABSRB UD L26MM SH 1/2 CIR J416H

## (undated) DEVICE — NDL PRT INJ NSAF BLNT 18GX1.5 --

## (undated) DEVICE — AGENT HEMSTAT W4XL4IN OXIDIZED REGENERATED CELOS ABSRB SFT

## (undated) DEVICE — CONTAINER SPEC FRMLN 120ML --

## (undated) DEVICE — CATHETER MAP J CRV 2-5-2 MM 5 MM 7.5 FRX115 CM BIDIR STEER

## (undated) DEVICE — MEDI-VAC NON-CONDUCTIVE SUCTION TUBING: Brand: CARDINAL HEALTH

## (undated) DEVICE — BENTSON WIRE GUIDE 20CM DISTAL FLEXIBILITY WITH SOFTENED TIP: Brand: BENTSON

## (undated) DEVICE — BLADE SURG NO15 S STL STR DISP GLASSVAN

## (undated) DEVICE — Device

## (undated) DEVICE — DRAPE TWL SURG 16X26IN BLU ORB04] ALLCARE INC]

## (undated) DEVICE — SYRINGE MED 20ML WHT PLUNG CLR POLYCARB BRL FIX M LUER CONN

## (undated) DEVICE — SYR LR LCK 1ML GRAD NSAF 30ML --

## (undated) DEVICE — VENOUS GUIDE WIRE: Brand: ACUITY STRAIT-TRAK®

## (undated) DEVICE — BLADE ELECTRODE: Brand: EDGE

## (undated) DEVICE — CATHETER VASC AD 5FR L65CM 0.038 DIAG KA 2 PERIPH W/O

## (undated) DEVICE — BUTTON SWITCH PENCIL BLADE ELECTRODE, HOLSTER: Brand: EDGE

## (undated) DEVICE — DRAIN SURG 19FR 100% SIL RADPQ RND CHN FULL FLUT

## (undated) DEVICE — GUIDE COR SNUS L40CM DIA9FR 0.035IN STD CRV ADV UNIQUE

## (undated) DEVICE — STOCKINETTE: Brand: DEROYAL

## (undated) DEVICE — SLING ARM ENV PD DLX MED RED --

## (undated) DEVICE — Z DUPLICATE USE 2716240 STAPLER INT CUT LN L40MM STPL L51MM GRN CRV HD B FRM

## (undated) DEVICE — BLOCK BITE AD 60FR W/ VELC STRP ADDRESSES MOST PT AND

## (undated) DEVICE — 3M™ TEGADERM™ TRANSPARENT FILM DRESSING FRAME STYLE, 1627, 4 IN X 10 IN (10 CM X 25 CM), 20/CT 4CT/CASE: Brand: 3M™ TEGADERM™

## (undated) DEVICE — AMD ANTIMICROBIAL GAUZE SPONGES,12 PLY USP TYPE VII, 0.2% POLYHEXAMETHYLENE BIGUANIDE HCI (PHMB): Brand: CURITY

## (undated) DEVICE — SUTURE PROL SZ 6-0 L24IN NONABSORBABLE BLU BV-1 L9.3MM 3/8 M8805

## (undated) DEVICE — STERILE SLEEVE: Brand: CONVERTORS

## (undated) DEVICE — SUTURE ETHBND EXCEL SZ 0 L30IN NONABSORBABLE GRN L26MM CT-2 X412H

## (undated) DEVICE — DRESSING POSTOP AG PRISMASEAL 3.5X6IN

## (undated) DEVICE — BIPOLAR ELECTROHEMOSTASIS CATHETER: Brand: GOLD PROBE

## (undated) DEVICE — SHEET, T, LAPAROTOMY, STERILE: Brand: MEDLINE

## (undated) DEVICE — CATHETERIZATION TRAY FOL 16 FR 5 CC INF CTRL LUBRI-SIL IC

## (undated) DEVICE — 3M™ IOBAN™ 2 ANTIMICROBIAL INCISE DRAPE 6651EZ: Brand: IOBAN™ 2

## (undated) DEVICE — CONNECTOR TBNG OD5-7MM O2 END DISP

## (undated) DEVICE — GEL US 20GM NONIRRITATING OVERWRAPPED FILE PCH TRNSMIT

## (undated) DEVICE — CARDINAL HEALTH FLEXIBLE LIGHT HANDLE COVER: Brand: CARDINAL HEALTH

## (undated) DEVICE — SUTURE VCRL SZ 3-0 L18IN ABSRB UD L26MM SH 1/2 CIR J864D

## (undated) DEVICE — SNARE POLYP SM W13MMXL240CM SHTH DIA2.4MM OVL FLX DISP

## (undated) DEVICE — INTRO PEELWY HEMVLV 6F 13CM -- SHRT PRELUDE SNAP

## (undated) DEVICE — BOWL UTIL 16OZ STRL --

## (undated) DEVICE — REM POLYHESIVE ADULT PATIENT RETURN ELECTRODE: Brand: VALLEYLAB

## (undated) DEVICE — SUTURE VCRL SZ 2-0 L36IN ABSRB UD L36MM CT-1 1/2 CIR J945H

## (undated) DEVICE — INTRODUCER LAT VEIN L62CM OD5.5FR ADV TELSCP SYS RENAL

## (undated) DEVICE — SLIM BODY SKIN STAPLER: Brand: APPOSE ULC

## (undated) DEVICE — GARMENT,MEDLINE,DVT,INT,CALF,MED, GEN2: Brand: MEDLINE

## (undated) DEVICE — SPONGE LAP 18X18IN STRL -- 5/PK

## (undated) DEVICE — KENDALL RADIOLUCENT FOAM MONITORING ELECTRODE RECTANGULAR SHAPE: Brand: KENDALL

## (undated) DEVICE — PINNACLE TIF INTRODUCER SHEATH: Brand: PINNACLE

## (undated) DEVICE — GOWN,REINFORCED,POLY,AURORA,XXLARGE,STR: Brand: MEDLINE

## (undated) DEVICE — TELFA NON-ADHERENT ABSORBENT DRESSING: Brand: TELFA

## (undated) DEVICE — CONTAINER PREFIL FRMLN 40ML --

## (undated) DEVICE — TUBE SET IRR PUMP THERMALCOOL -- SMARTABLATE

## (undated) DEVICE — BLADE SCALP SURG BARD-PARK 10 --

## (undated) DEVICE — SYRINGE ANGIO CNTRST DEL 20 CC POLYCARB DK GRN MEDALLION

## (undated) DEVICE — CATHETER ETER TY TEMP SENS F MBO W URIN M FOLLOWS CDC GUIDELINES TO

## (undated) DEVICE — INTENDED TO BE USED TO OCCLUDE, RETRACT AND IDENTIFY ARTERIES, VEINS, TENDONS AND NERVES IN SURGICAL PROCEDURES: Brand: STERION®  VESSEL LOOP

## (undated) DEVICE — DRAPE SURG SPEC PROC 4 PC

## (undated) DEVICE — SUTURE PDS + SZ 1 L96IN ABSRB VLT L65MM TP-1 1/2 CIR PDP880G

## (undated) DEVICE — INFLATION DEVICE: Brand: ENCORE™ 26

## (undated) DEVICE — SUTURE V-LOC 90 3-0 L9IN ABSRB VLT L26MM V-20 1/2 CIR TAPR VLOCM0644

## (undated) DEVICE — CANNULA NSL ORAL AD FOR CAPNOFLEX CO2 O2 AIRLFE

## (undated) DEVICE — GUIDEWIRE VASC L180CM DIA0.035IN TIP L7CM PTFE S STL STR

## (undated) DEVICE — APPLIER CLP L9.375IN APER 2.1MM CLS L3.8MM 20 SM TI CLP

## (undated) DEVICE — SOLUTION IV 1000ML 0.9% SOD CHL

## (undated) DEVICE — SUTURE PERMAHAND SZ 2-0 L12X18IN NONABSORBABLE BLK SILK A185H

## (undated) DEVICE — SUTURE PROL SZ 5-0 L24IN NONABSORBABLE BLU L13MM C-1 3/8 M8725

## (undated) DEVICE — CONTAINER SPEC HISTOLOGY 900ML POLYPR

## (undated) DEVICE — DUAL LUMEN STOMACH TUBE: Brand: SALEM SUMP

## (undated) DEVICE — SYR ART 700 CLEAR MARK 7 -- ARTERION

## (undated) DEVICE — TUBING CNTRST INJ HI PRESSURE 108 IN

## (undated) DEVICE — SUTURE PERMAHAND SZ 2-0 L18IN NONABSORBABLE BLK L26MM PS 1588H

## (undated) DEVICE — SUTURE PERMAHAND SZ 3-0 L18IN NONABSORBABLE BLK L26MM SH C013D

## (undated) DEVICE — SYR 3ML LL TIP 1/10ML GRAD --

## (undated) DEVICE — MAJOR GENERAL: Brand: MEDLINE INDUSTRIES, INC.

## (undated) DEVICE — SUTURE ABSORBABLE BRAIDED 2-0 CT-1 27 IN UD VICRYL J259H

## (undated) DEVICE — SUTURE PERMAHAND SZ 3-0 L18IN NONABSORBABLE BLK SILK BRAID A184H

## (undated) DEVICE — KIT ANGIO CNTRST ADMIN W O BWL WORLEY

## (undated) DEVICE — SYR 5ML 1/5 GRAD LL NSAF LF --

## (undated) DEVICE — TOTAL TRAY, DB, 100% SILI FOLEY, 16FR 10: Brand: MEDLINE

## (undated) DEVICE — RADIFOCUS GLIDEWIRE: Brand: GLIDEWIRE

## (undated) DEVICE — PERCUTANEOUS ENTRY THINWALL NEEDLE  ONE-PART: Brand: COOK

## (undated) DEVICE — STAPLER INT L75MM CUT LN L73MM STPL LN L77MM BLU B FRM 8

## (undated) DEVICE — CATHETER DIAG 6FR L110CM INTRO 6FR BLLN DIA9MM 1CC PULM ART

## (undated) DEVICE — PREP SKN CHLRAPRP APL 26ML STR --

## (undated) DEVICE — PLASMABLADE X PS210-030S-LIGHT 3.0SL: Brand: PLASMABLADE™ X

## (undated) DEVICE — SUTURE MCRYL SZ 4-0 L27IN ABSRB UD L19MM PS-2 1/2 CIR PRIM Y426H

## (undated) DEVICE — DISH PTRI STRL --

## (undated) DEVICE — (D)PREP SKN CHLRAPRP APPL 26ML -- CONVERT TO ITEM 371833

## (undated) DEVICE — PAD THRM L UNIV NONSLIP HYDRGEL RECT PROVIDE OPTIMAL ENERGY

## (undated) DEVICE — SUTURE PDS II SZ 1 L96IN ABSRB VLT TP-1 L65MM 1/2 CIR Z880G